# Patient Record
Sex: MALE | Race: WHITE | Employment: OTHER | URBAN - METROPOLITAN AREA
[De-identification: names, ages, dates, MRNs, and addresses within clinical notes are randomized per-mention and may not be internally consistent; named-entity substitution may affect disease eponyms.]

---

## 2017-02-14 ENCOUNTER — HOSPITAL ENCOUNTER (OUTPATIENT)
Dept: NON INVASIVE DIAGNOSTICS | Facility: CLINIC | Age: 77
Discharge: HOME | End: 2017-02-14
Payer: MEDICARE

## 2017-02-14 DIAGNOSIS — I71.4 ABDOMINAL AORTIC ANEURYSM WITHOUT RUPTURE (HCC): ICD-10-CM

## 2017-02-14 PROCEDURE — 93978 VASCULAR STUDY: CPT

## 2017-02-15 ENCOUNTER — LAB (OUTPATIENT)
Dept: LAB | Facility: CLINIC | Age: 77
End: 2017-02-15
Payer: MEDICARE

## 2017-02-15 DIAGNOSIS — Z00.00 ENCOUNTER FOR GENERAL ADULT MEDICAL EXAMINATION WITHOUT ABNORMAL FINDINGS: ICD-10-CM

## 2017-02-15 DIAGNOSIS — I10 ESSENTIAL (PRIMARY) HYPERTENSION: ICD-10-CM

## 2017-02-15 DIAGNOSIS — E79.0 HYPERURICEMIA WITHOUT SIGNS OF INFLAMMATORY ARTHRITIS AND TOPHACEOUS DISEASE: ICD-10-CM

## 2017-02-15 DIAGNOSIS — E78.5 HYPERLIPIDEMIA: ICD-10-CM

## 2017-02-15 DIAGNOSIS — E03.9 HYPOTHYROIDISM: ICD-10-CM

## 2017-02-15 DIAGNOSIS — I71.4 ABDOMINAL AORTIC ANEURYSM WITHOUT RUPTURE (HCC): ICD-10-CM

## 2017-02-15 LAB
ALBUMIN SERPL BCP-MCNC: 4 G/DL (ref 3.5–5)
ALP SERPL-CCNC: 71 U/L (ref 46–116)
ALT SERPL W P-5'-P-CCNC: 29 U/L (ref 12–78)
ANION GAP SERPL CALCULATED.3IONS-SCNC: 5 MMOL/L (ref 4–13)
AST SERPL W P-5'-P-CCNC: 19 U/L (ref 5–45)
BASOPHILS # BLD AUTO: 0.02 THOUSANDS/ΜL (ref 0–0.1)
BASOPHILS NFR BLD AUTO: 0 % (ref 0–1)
BILIRUB SERPL-MCNC: 0.89 MG/DL (ref 0.2–1)
BUN SERPL-MCNC: 17 MG/DL (ref 5–25)
CALCIUM SERPL-MCNC: 9 MG/DL (ref 8.3–10.1)
CHLORIDE SERPL-SCNC: 105 MMOL/L (ref 100–108)
CHOLEST SERPL-MCNC: 148 MG/DL (ref 50–200)
CO2 SERPL-SCNC: 32 MMOL/L (ref 21–32)
CREAT SERPL-MCNC: 1.1 MG/DL (ref 0.6–1.3)
EOSINOPHIL # BLD AUTO: 0.25 THOUSAND/ΜL (ref 0–0.61)
EOSINOPHIL NFR BLD AUTO: 4 % (ref 0–6)
ERYTHROCYTE [DISTWIDTH] IN BLOOD BY AUTOMATED COUNT: 13.1 % (ref 11.6–15.1)
GFR SERPL CREATININE-BSD FRML MDRD: >60 ML/MIN/1.73SQ M
GLUCOSE SERPL-MCNC: 88 MG/DL (ref 65–140)
HCT VFR BLD AUTO: 48.8 % (ref 36.5–49.3)
HDLC SERPL-MCNC: 48 MG/DL (ref 40–60)
HGB BLD-MCNC: 16.2 G/DL (ref 12–17)
LDLC SERPL CALC-MCNC: 70 MG/DL (ref 0–100)
LYMPHOCYTES # BLD AUTO: 1.13 THOUSANDS/ΜL (ref 0.6–4.47)
LYMPHOCYTES NFR BLD AUTO: 17 % (ref 14–44)
MCH RBC QN AUTO: 30.9 PG (ref 26.8–34.3)
MCHC RBC AUTO-ENTMCNC: 33.2 G/DL (ref 31.4–37.4)
MCV RBC AUTO: 93 FL (ref 82–98)
MONOCYTES # BLD AUTO: 0.5 THOUSAND/ΜL (ref 0.17–1.22)
MONOCYTES NFR BLD AUTO: 7 % (ref 4–12)
NEUTROPHILS # BLD AUTO: 4.81 THOUSANDS/ΜL (ref 1.85–7.62)
NEUTS SEG NFR BLD AUTO: 72 % (ref 43–75)
NRBC BLD AUTO-RTO: 0 /100 WBCS
PLATELET # BLD AUTO: 171 THOUSANDS/UL (ref 149–390)
PMV BLD AUTO: 11.5 FL (ref 8.9–12.7)
POTASSIUM SERPL-SCNC: 4.3 MMOL/L (ref 3.5–5.3)
PROT SERPL-MCNC: 7.3 G/DL (ref 6.4–8.2)
RBC # BLD AUTO: 5.24 MILLION/UL (ref 3.88–5.62)
SODIUM SERPL-SCNC: 142 MMOL/L (ref 136–145)
TRIGL SERPL-MCNC: 150 MG/DL
TSH SERPL DL<=0.05 MIU/L-ACNC: 2.15 UIU/ML (ref 0.36–3.74)
URATE SERPL-MCNC: 8.5 MG/DL (ref 4.2–8)
WBC # BLD AUTO: 6.73 THOUSAND/UL (ref 4.31–10.16)

## 2017-02-15 PROCEDURE — 84550 ASSAY OF BLOOD/URIC ACID: CPT

## 2017-02-15 PROCEDURE — 85025 COMPLETE CBC W/AUTO DIFF WBC: CPT

## 2017-02-15 PROCEDURE — 84443 ASSAY THYROID STIM HORMONE: CPT

## 2017-02-15 PROCEDURE — 36415 COLL VENOUS BLD VENIPUNCTURE: CPT

## 2017-02-15 PROCEDURE — 80053 COMPREHEN METABOLIC PANEL: CPT

## 2017-02-15 PROCEDURE — 86376 MICROSOMAL ANTIBODY EACH: CPT

## 2017-02-15 PROCEDURE — 80061 LIPID PANEL: CPT

## 2017-02-16 LAB — THYROPEROXIDASE AB SERPL-ACNC: 9 IU/ML (ref 0–34)

## 2017-02-21 ENCOUNTER — APPOINTMENT (OUTPATIENT)
Dept: MRI IMAGING | Facility: HOSPITAL | Age: 77
DRG: 100 | End: 2017-02-21
Payer: MEDICARE

## 2017-02-21 ENCOUNTER — APPOINTMENT (INPATIENT)
Dept: NEUROLOGY | Facility: HOSPITAL | Age: 77
DRG: 100 | End: 2017-02-21
Attending: PSYCHIATRY & NEUROLOGY
Payer: MEDICARE

## 2017-02-21 ENCOUNTER — HOSPITAL ENCOUNTER (INPATIENT)
Facility: HOSPITAL | Age: 77
LOS: 3 days | Discharge: HOME HEALTH | DRG: 100 | End: 2017-02-24
Attending: EMERGENCY MEDICINE | Admitting: FAMILY MEDICINE
Payer: MEDICARE

## 2017-02-21 ENCOUNTER — APPOINTMENT (EMERGENCY)
Dept: RADIOLOGY | Facility: HOSPITAL | Age: 77
DRG: 100 | End: 2017-02-21
Payer: MEDICARE

## 2017-02-21 ENCOUNTER — APPOINTMENT (EMERGENCY)
Dept: CT IMAGING | Facility: HOSPITAL | Age: 77
DRG: 100 | End: 2017-02-21
Payer: MEDICARE

## 2017-02-21 ENCOUNTER — APPOINTMENT (INPATIENT)
Dept: CT IMAGING | Facility: HOSPITAL | Age: 77
DRG: 100 | End: 2017-02-21
Payer: MEDICARE

## 2017-02-21 ENCOUNTER — GENERIC CONVERSION - ENCOUNTER (OUTPATIENT)
Dept: OTHER | Facility: OTHER | Age: 77
End: 2017-02-21

## 2017-02-21 DIAGNOSIS — R77.8 ELEVATED TROPONIN: ICD-10-CM

## 2017-02-21 DIAGNOSIS — R56.9 NEW ONSET SEIZURE (HCC): Primary | ICD-10-CM

## 2017-02-21 DIAGNOSIS — N17.9 ACUTE KIDNEY INJURY (HCC): ICD-10-CM

## 2017-02-21 DIAGNOSIS — M25.519 SHOULDER PAIN, ACUTE: ICD-10-CM

## 2017-02-21 DIAGNOSIS — R79.89 ELEVATED LACTIC ACID LEVEL: ICD-10-CM

## 2017-02-21 LAB
ALBUMIN SERPL BCP-MCNC: 4.1 G/DL (ref 3.5–5)
ALP SERPL-CCNC: 73 U/L (ref 46–116)
ALT SERPL W P-5'-P-CCNC: 38 U/L (ref 12–78)
AMMONIA PLAS-SCNC: 38 UMOL/L (ref 11–35)
AMPHETAMINES SERPL QL SCN: NEGATIVE
ANION GAP BLD CALC-SCNC: 17 MMOL/L (ref 4–13)
ANION GAP SERPL CALCULATED.3IONS-SCNC: 13 MMOL/L (ref 4–13)
APAP SERPL-MCNC: <2 UG/ML (ref 10–30)
APTT PPP: 21 SECONDS (ref 24–36)
AST SERPL W P-5'-P-CCNC: 29 U/L (ref 5–45)
ATRIAL RATE: 115 BPM
ATRIAL RATE: 82 BPM
BACTERIA UR QL AUTO: ABNORMAL /HPF
BARBITURATES UR QL: NEGATIVE
BASOPHILS # BLD AUTO: 0.01 THOUSANDS/ΜL (ref 0–0.1)
BASOPHILS NFR BLD AUTO: 0 % (ref 0–1)
BENZODIAZ UR QL: NEGATIVE
BILIRUB SERPL-MCNC: 0.6 MG/DL (ref 0.2–1)
BILIRUB UR QL STRIP: NEGATIVE
BUN BLD-MCNC: 22 MG/DL (ref 5–25)
BUN SERPL-MCNC: 18 MG/DL (ref 5–25)
CA-I BLD-SCNC: 1.11 MMOL/L (ref 1.12–1.32)
CALCIUM SERPL-MCNC: 8.9 MG/DL (ref 8.3–10.1)
CHLORIDE BLD-SCNC: 104 MMOL/L (ref 100–108)
CHLORIDE SERPL-SCNC: 105 MMOL/L (ref 100–108)
CK MB SERPL-MCNC: 5 NG/ML (ref 0–5)
CK MB SERPL-MCNC: 5.8 NG/ML (ref 0–5)
CK MB SERPL-MCNC: <1 % (ref 0–2.5)
CK MB SERPL-MCNC: <1 % (ref 0–2.5)
CK SERPL-CCNC: 160 U/L (ref 39–308)
CK SERPL-CCNC: 527 U/L (ref 39–308)
CK SERPL-CCNC: 719 U/L (ref 39–308)
CLARITY UR: CLEAR
CO2 SERPL-SCNC: 23 MMOL/L (ref 21–32)
COCAINE UR QL: NEGATIVE
COLOR UR: YELLOW
CREAT BLD-MCNC: 1.2 MG/DL (ref 0.6–1.3)
CREAT SERPL-MCNC: 1.44 MG/DL (ref 0.6–1.3)
EOSINOPHIL # BLD AUTO: 0.2 THOUSAND/ΜL (ref 0–0.61)
EOSINOPHIL NFR BLD AUTO: 3 % (ref 0–6)
ERYTHROCYTE [DISTWIDTH] IN BLOOD BY AUTOMATED COUNT: 13.1 % (ref 11.6–15.1)
ETHANOL SERPL-MCNC: <3 MG/DL (ref 0–3)
GFR SERPL CREATININE-BSD FRML MDRD: 48 ML/MIN/1.73SQ M
GFR SERPL CREATININE-BSD FRML MDRD: 59.2 ML/MIN/1.73SQ M
GLUCOSE SERPL-MCNC: 145 MG/DL (ref 65–140)
GLUCOSE SERPL-MCNC: 149 MG/DL (ref 65–140)
GLUCOSE SERPL-MCNC: 153 MG/DL (ref 65–140)
GLUCOSE UR STRIP-MCNC: NEGATIVE MG/DL
HCT VFR BLD AUTO: 49.1 % (ref 36.5–49.3)
HCT VFR BLD CALC: 51 % (ref 36.5–49.3)
HGB BLD-MCNC: 16.3 G/DL (ref 12–17)
HGB BLDA-MCNC: 17.3 G/DL (ref 12–17)
HGB UR QL STRIP.AUTO: ABNORMAL
INR PPP: 1.1 (ref 0.86–1.16)
KETONES UR STRIP-MCNC: NEGATIVE MG/DL
LACTATE SERPL-SCNC: 1.5 MMOL/L (ref 0.5–2)
LACTATE SERPL-SCNC: 1.6 MMOL/L (ref 0.5–2)
LACTATE SERPL-SCNC: 4 MMOL/L (ref 0.5–2)
LACTATE SERPL-SCNC: 6.6 MMOL/L (ref 0.5–2)
LEUKOCYTE ESTERASE UR QL STRIP: NEGATIVE
LYMPHOCYTES # BLD AUTO: 1.43 THOUSANDS/ΜL (ref 0.6–4.47)
LYMPHOCYTES NFR BLD AUTO: 18 % (ref 14–44)
MCH RBC QN AUTO: 30.4 PG (ref 26.8–34.3)
MCHC RBC AUTO-ENTMCNC: 33.2 G/DL (ref 31.4–37.4)
MCV RBC AUTO: 92 FL (ref 82–98)
METHADONE UR QL: NEGATIVE
MONOCYTES # BLD AUTO: 0.26 THOUSAND/ΜL (ref 0.17–1.22)
MONOCYTES NFR BLD AUTO: 3 % (ref 4–12)
NEUTROPHILS # BLD AUTO: 6.21 THOUSANDS/ΜL (ref 1.85–7.62)
NEUTS SEG NFR BLD AUTO: 76 % (ref 43–75)
NITRITE UR QL STRIP: NEGATIVE
NON-SQ EPI CELLS URNS QL MICRO: ABNORMAL /HPF
OPIATES UR QL SCN: NEGATIVE
OTHER STN SPEC: ABNORMAL
P AXIS: -2 DEGREES
P AXIS: 37 DEGREES
PCO2 BLD: 24 MMOL/L (ref 21–32)
PCP UR QL: NEGATIVE
PH UR STRIP.AUTO: 5 [PH] (ref 4.5–8)
PLATELET # BLD AUTO: 191 THOUSANDS/UL (ref 149–390)
PMV BLD AUTO: 11.4 FL (ref 8.9–12.7)
POTASSIUM BLD-SCNC: 4.7 MMOL/L (ref 3.5–5.3)
POTASSIUM SERPL-SCNC: 5 MMOL/L (ref 3.5–5.3)
PR INTERVAL: 142 MS
PROT SERPL-MCNC: 6.9 G/DL (ref 6.4–8.2)
PROT UR STRIP-MCNC: ABNORMAL MG/DL
PROTHROMBIN TIME: 14 SECONDS (ref 12–14.3)
QRS AXIS: 69 DEGREES
QRS AXIS: 70 DEGREES
QRSD INTERVAL: 72 MS
QRSD INTERVAL: 78 MS
QT INTERVAL: 334 MS
QT INTERVAL: 382 MS
QTC INTERVAL: 424 MS
QTC INTERVAL: 446 MS
RBC # BLD AUTO: 5.36 MILLION/UL (ref 3.88–5.62)
RBC #/AREA URNS AUTO: ABNORMAL /HPF
SALICYLATES SERPL-MCNC: 5.1 MG/DL (ref 3–20)
SODIUM BLD-SCNC: 139 MMOL/L (ref 136–145)
SODIUM SERPL-SCNC: 141 MMOL/L (ref 136–145)
SP GR UR STRIP.AUTO: >=1.03 (ref 1–1.03)
SPECIMEN SOURCE: ABNORMAL
T WAVE AXIS: 71 DEGREES
T WAVE AXIS: 79 DEGREES
T4 FREE SERPL-MCNC: 0.97 NG/DL (ref 0.76–1.46)
THC UR QL: NEGATIVE
TROPONIN I SERPL-MCNC: 0.23 NG/ML
TROPONIN I SERPL-MCNC: 0.24 NG/ML
TROPONIN I SERPL-MCNC: 0.28 NG/ML
TROPONIN I SERPL-MCNC: 0.29 NG/ML
TROPONIN I SERPL-MCNC: 0.33 NG/ML
TSH SERPL DL<=0.05 MIU/L-ACNC: 6.16 UIU/ML (ref 0.36–3.74)
UROBILINOGEN UR QL STRIP.AUTO: 0.2 E.U./DL
VENTRICULAR RATE: 82 BPM
VENTRICULAR RATE: 97 BPM
WBC # BLD AUTO: 8.11 THOUSAND/UL (ref 4.31–10.16)
WBC #/AREA URNS AUTO: ABNORMAL /HPF

## 2017-02-21 PROCEDURE — 81001 URINALYSIS AUTO W/SCOPE: CPT | Performed by: EMERGENCY MEDICINE

## 2017-02-21 PROCEDURE — 87040 BLOOD CULTURE FOR BACTERIA: CPT | Performed by: EMERGENCY MEDICINE

## 2017-02-21 PROCEDURE — 85025 COMPLETE CBC W/AUTO DIFF WBC: CPT | Performed by: EMERGENCY MEDICINE

## 2017-02-21 PROCEDURE — 80047 BASIC METABLC PNL IONIZED CA: CPT

## 2017-02-21 PROCEDURE — 82553 CREATINE MB FRACTION: CPT | Performed by: PHYSICIAN ASSISTANT

## 2017-02-21 PROCEDURE — 96375 TX/PRO/DX INJ NEW DRUG ADDON: CPT

## 2017-02-21 PROCEDURE — 87086 URINE CULTURE/COLONY COUNT: CPT | Performed by: EMERGENCY MEDICINE

## 2017-02-21 PROCEDURE — 70496 CT ANGIOGRAPHY HEAD: CPT

## 2017-02-21 PROCEDURE — 83605 ASSAY OF LACTIC ACID: CPT | Performed by: PHYSICIAN ASSISTANT

## 2017-02-21 PROCEDURE — 70553 MRI BRAIN STEM W/O & W/DYE: CPT

## 2017-02-21 PROCEDURE — 96365 THER/PROPH/DIAG IV INF INIT: CPT

## 2017-02-21 PROCEDURE — 80320 DRUG SCREEN QUANTALCOHOLS: CPT | Performed by: EMERGENCY MEDICINE

## 2017-02-21 PROCEDURE — 71020 HB CHEST X-RAY 2VW FRONTAL&LATL: CPT

## 2017-02-21 PROCEDURE — 83605 ASSAY OF LACTIC ACID: CPT | Performed by: EMERGENCY MEDICINE

## 2017-02-21 PROCEDURE — 80329 ANALGESICS NON-OPIOID 1 OR 2: CPT | Performed by: EMERGENCY MEDICINE

## 2017-02-21 PROCEDURE — 73030 X-RAY EXAM OF SHOULDER: CPT

## 2017-02-21 PROCEDURE — 84443 ASSAY THYROID STIM HORMONE: CPT | Performed by: EMERGENCY MEDICINE

## 2017-02-21 PROCEDURE — 70498 CT ANGIOGRAPHY NECK: CPT

## 2017-02-21 PROCEDURE — 85014 HEMATOCRIT: CPT

## 2017-02-21 PROCEDURE — 99285 EMERGENCY DEPT VISIT HI MDM: CPT

## 2017-02-21 PROCEDURE — 84484 ASSAY OF TROPONIN QUANT: CPT | Performed by: FAMILY MEDICINE

## 2017-02-21 PROCEDURE — 80307 DRUG TEST PRSMV CHEM ANLYZR: CPT | Performed by: EMERGENCY MEDICINE

## 2017-02-21 PROCEDURE — 93005 ELECTROCARDIOGRAM TRACING: CPT | Performed by: PHYSICIAN ASSISTANT

## 2017-02-21 PROCEDURE — 85610 PROTHROMBIN TIME: CPT | Performed by: EMERGENCY MEDICINE

## 2017-02-21 PROCEDURE — 82140 ASSAY OF AMMONIA: CPT | Performed by: EMERGENCY MEDICINE

## 2017-02-21 PROCEDURE — 80053 COMPREHEN METABOLIC PANEL: CPT | Performed by: EMERGENCY MEDICINE

## 2017-02-21 PROCEDURE — 86592 SYPHILIS TEST NON-TREP QUAL: CPT | Performed by: PSYCHIATRY & NEUROLOGY

## 2017-02-21 PROCEDURE — 82948 REAGENT STRIP/BLOOD GLUCOSE: CPT

## 2017-02-21 PROCEDURE — 70450 CT HEAD/BRAIN W/O DYE: CPT

## 2017-02-21 PROCEDURE — 93005 ELECTROCARDIOGRAM TRACING: CPT | Performed by: EMERGENCY MEDICINE

## 2017-02-21 PROCEDURE — 95816 EEG AWAKE AND DROWSY: CPT

## 2017-02-21 PROCEDURE — 84484 ASSAY OF TROPONIN QUANT: CPT | Performed by: PHYSICIAN ASSISTANT

## 2017-02-21 PROCEDURE — 36415 COLL VENOUS BLD VENIPUNCTURE: CPT | Performed by: EMERGENCY MEDICINE

## 2017-02-21 PROCEDURE — 84439 ASSAY OF FREE THYROXINE: CPT | Performed by: PHYSICIAN ASSISTANT

## 2017-02-21 PROCEDURE — A9585 GADOBUTROL INJECTION: HCPCS | Performed by: FAMILY MEDICINE

## 2017-02-21 PROCEDURE — 82550 ASSAY OF CK (CPK): CPT | Performed by: PHYSICIAN ASSISTANT

## 2017-02-21 PROCEDURE — 85730 THROMBOPLASTIN TIME PARTIAL: CPT | Performed by: EMERGENCY MEDICINE

## 2017-02-21 PROCEDURE — 84484 ASSAY OF TROPONIN QUANT: CPT | Performed by: EMERGENCY MEDICINE

## 2017-02-21 PROCEDURE — 82550 ASSAY OF CK (CPK): CPT | Performed by: EMERGENCY MEDICINE

## 2017-02-21 RX ORDER — HEPARIN SODIUM 5000 [USP'U]/ML
INJECTION, SOLUTION INTRAVENOUS; SUBCUTANEOUS
Status: COMPLETED
Start: 2017-02-21 | End: 2017-02-21

## 2017-02-21 RX ORDER — ASPIRIN 325 MG
325 TABLET ORAL DAILY
Status: DISCONTINUED | OUTPATIENT
Start: 2017-02-21 | End: 2017-02-24 | Stop reason: HOSPADM

## 2017-02-21 RX ORDER — ALLOPURINOL 100 MG/1
300 TABLET ORAL DAILY
Status: DISCONTINUED | OUTPATIENT
Start: 2017-02-21 | End: 2017-02-24 | Stop reason: HOSPADM

## 2017-02-21 RX ORDER — SIMVASTATIN 40 MG
40 TABLET ORAL DAILY
COMMUNITY
End: 2018-02-02 | Stop reason: SDUPTHER

## 2017-02-21 RX ORDER — THIAMINE HYDROCHLORIDE 100 MG/ML
INJECTION, SOLUTION INTRAMUSCULAR; INTRAVENOUS
Status: COMPLETED
Start: 2017-02-21 | End: 2017-02-21

## 2017-02-21 RX ORDER — LORAZEPAM 2 MG/ML
1 INJECTION INTRAMUSCULAR ONCE
Status: COMPLETED | OUTPATIENT
Start: 2017-02-21 | End: 2017-02-21

## 2017-02-21 RX ORDER — PRAVASTATIN SODIUM 80 MG/1
80 TABLET ORAL
Status: DISCONTINUED | OUTPATIENT
Start: 2017-02-21 | End: 2017-02-24 | Stop reason: HOSPADM

## 2017-02-21 RX ORDER — ACETAMINOPHEN 325 MG/1
975 TABLET ORAL ONCE
Status: COMPLETED | OUTPATIENT
Start: 2017-02-21 | End: 2017-02-21

## 2017-02-21 RX ORDER — SODIUM CHLORIDE 9 MG/ML
100 INJECTION, SOLUTION INTRAVENOUS CONTINUOUS
Status: DISCONTINUED | OUTPATIENT
Start: 2017-02-21 | End: 2017-02-24 | Stop reason: HOSPADM

## 2017-02-21 RX ORDER — LORAZEPAM 2 MG/ML
1 INJECTION INTRAMUSCULAR EVERY 4 HOURS PRN
Status: DISCONTINUED | OUTPATIENT
Start: 2017-02-21 | End: 2017-02-24 | Stop reason: HOSPADM

## 2017-02-21 RX ORDER — SODIUM CHLORIDE 9 MG/ML
125 INJECTION, SOLUTION INTRAVENOUS CONTINUOUS
Status: DISCONTINUED | OUTPATIENT
Start: 2017-02-21 | End: 2017-02-21

## 2017-02-21 RX ORDER — LEVETIRACETAM 500 MG/1
TABLET ORAL
Status: COMPLETED
Start: 2017-02-21 | End: 2017-02-21

## 2017-02-21 RX ORDER — LORAZEPAM 2 MG/ML
INJECTION INTRAMUSCULAR
Status: COMPLETED
Start: 2017-02-21 | End: 2017-02-21

## 2017-02-21 RX ORDER — ONDANSETRON 2 MG/ML
4 INJECTION INTRAMUSCULAR; INTRAVENOUS ONCE
Status: COMPLETED | OUTPATIENT
Start: 2017-02-21 | End: 2017-02-21

## 2017-02-21 RX ORDER — HEPARIN SODIUM 5000 [USP'U]/ML
5000 INJECTION, SOLUTION INTRAVENOUS; SUBCUTANEOUS EVERY 8 HOURS SCHEDULED
Status: DISCONTINUED | OUTPATIENT
Start: 2017-02-21 | End: 2017-02-24 | Stop reason: HOSPADM

## 2017-02-21 RX ORDER — ALLOPURINOL 300 MG/1
300 TABLET ORAL DAILY
COMMUNITY
End: 2017-06-06

## 2017-02-21 RX ORDER — LEVETIRACETAM 500 MG/1
1000 TABLET ORAL EVERY 12 HOURS
Status: DISCONTINUED | OUTPATIENT
Start: 2017-02-21 | End: 2017-02-24 | Stop reason: HOSPADM

## 2017-02-21 RX ORDER — THIAMINE HYDROCHLORIDE 100 MG/ML
100 INJECTION, SOLUTION INTRAMUSCULAR; INTRAVENOUS DAILY
Status: DISCONTINUED | OUTPATIENT
Start: 2017-02-21 | End: 2017-02-22

## 2017-02-21 RX ORDER — ACETAMINOPHEN 325 MG/1
650 TABLET ORAL EVERY 6 HOURS PRN
Status: DISCONTINUED | OUTPATIENT
Start: 2017-02-21 | End: 2017-02-24 | Stop reason: HOSPADM

## 2017-02-21 RX ORDER — LORAZEPAM 2 MG/ML
INJECTION INTRAMUSCULAR
Status: DISCONTINUED
Start: 2017-02-21 | End: 2017-02-22 | Stop reason: WASHOUT

## 2017-02-21 RX ORDER — ONDANSETRON 2 MG/ML
4 INJECTION INTRAMUSCULAR; INTRAVENOUS EVERY 6 HOURS PRN
Status: DISCONTINUED | OUTPATIENT
Start: 2017-02-21 | End: 2017-02-24 | Stop reason: HOSPADM

## 2017-02-21 RX ADMIN — IOHEXOL 85 ML: 350 INJECTION, SOLUTION INTRAVENOUS at 12:50

## 2017-02-21 RX ADMIN — LEVETIRACETAM 1000 MG: 500 TABLET ORAL at 20:03

## 2017-02-21 RX ADMIN — LEVETIRACETAM 500 MG: 100 INJECTION, SOLUTION INTRAVENOUS at 06:26

## 2017-02-21 RX ADMIN — HEPARIN SODIUM 5000 UNITS: 5000 INJECTION, SOLUTION INTRAVENOUS; SUBCUTANEOUS at 21:50

## 2017-02-21 RX ADMIN — SODIUM CHLORIDE 125 ML/HR: 0.9 INJECTION, SOLUTION INTRAVENOUS at 09:00

## 2017-02-21 RX ADMIN — SODIUM CHLORIDE 1000 ML: 0.9 INJECTION, SOLUTION INTRAVENOUS at 06:25

## 2017-02-21 RX ADMIN — THIAMINE HYDROCHLORIDE 100 MG: 100 INJECTION, SOLUTION INTRAMUSCULAR; INTRAVENOUS at 05:39

## 2017-02-21 RX ADMIN — ACETAMINOPHEN 650 MG: 325 TABLET ORAL at 23:38

## 2017-02-21 RX ADMIN — ACETAMINOPHEN 975 MG: 325 TABLET ORAL at 05:39

## 2017-02-21 RX ADMIN — HEPARIN SODIUM 5000 UNITS: 5000 INJECTION, SOLUTION INTRAVENOUS; SUBCUTANEOUS at 14:53

## 2017-02-21 RX ADMIN — ACETAMINOPHEN 650 MG: 325 TABLET ORAL at 16:54

## 2017-02-21 RX ADMIN — HYDROMORPHONE HYDROCHLORIDE 0.5 MG: 1 INJECTION, SOLUTION INTRAMUSCULAR; INTRAVENOUS; SUBCUTANEOUS at 08:29

## 2017-02-21 RX ADMIN — LEVETIRACETAM 1000 MG: 500 TABLET ORAL at 20:05

## 2017-02-21 RX ADMIN — LORAZEPAM 1 MG: 2 INJECTION INTRAMUSCULAR; INTRAVENOUS at 06:44

## 2017-02-21 RX ADMIN — ONDANSETRON 4 MG: 2 INJECTION INTRAMUSCULAR; INTRAVENOUS at 08:29

## 2017-02-21 RX ADMIN — PRAVASTATIN SODIUM 80 MG: 80 TABLET ORAL at 16:55

## 2017-02-21 RX ADMIN — LEVETIRACETAM 1500 MG: 100 INJECTION, SOLUTION INTRAVENOUS at 12:10

## 2017-02-21 RX ADMIN — LORAZEPAM 1 MG: 2 INJECTION INTRAMUSCULAR at 06:44

## 2017-02-21 RX ADMIN — ASPIRIN 325 MG: 325 TABLET ORAL at 12:10

## 2017-02-21 RX ADMIN — SODIUM CHLORIDE 100 ML/HR: 0.9 INJECTION, SOLUTION INTRAVENOUS at 18:39

## 2017-02-21 RX ADMIN — GADOBUTROL 10 ML: 604.72 INJECTION INTRAVENOUS at 23:41

## 2017-02-22 ENCOUNTER — APPOINTMENT (INPATIENT)
Dept: NON INVASIVE DIAGNOSTICS | Facility: HOSPITAL | Age: 77
DRG: 100 | End: 2017-02-22
Payer: MEDICARE

## 2017-02-22 PROBLEM — E87.20 LACTIC ACIDOSIS: Status: ACTIVE | Noted: 2017-02-21

## 2017-02-22 PROBLEM — E87.2 LACTIC ACIDOSIS: Status: ACTIVE | Noted: 2017-02-21

## 2017-02-22 LAB
AMMONIA PLAS-SCNC: <10 UMOL/L (ref 11–35)
ANION GAP SERPL CALCULATED.3IONS-SCNC: 5 MMOL/L (ref 4–13)
BACTERIA UR CULT: NORMAL
BUN SERPL-MCNC: 12 MG/DL (ref 5–25)
CALCIUM SERPL-MCNC: 7.8 MG/DL (ref 8.3–10.1)
CHLORIDE SERPL-SCNC: 108 MMOL/L (ref 100–108)
CHOLEST SERPL-MCNC: 131 MG/DL (ref 50–200)
CK MB SERPL-MCNC: 4.8 NG/ML (ref 0–5)
CK MB SERPL-MCNC: <1 % (ref 0–2.5)
CK SERPL-CCNC: 705 U/L (ref 39–308)
CO2 SERPL-SCNC: 31 MMOL/L (ref 21–32)
CREAT SERPL-MCNC: 1.39 MG/DL (ref 0.6–1.3)
ERYTHROCYTE [DISTWIDTH] IN BLOOD BY AUTOMATED COUNT: 13.3 % (ref 11.6–15.1)
EST. AVERAGE GLUCOSE BLD GHB EST-MCNC: 111 MG/DL
GFR SERPL CREATININE-BSD FRML MDRD: 50 ML/MIN/1.73SQ M
GLUCOSE SERPL-MCNC: 106 MG/DL (ref 65–140)
HBA1C MFR BLD: 5.5 % (ref 4.2–6.3)
HCT VFR BLD AUTO: 43.3 % (ref 36.5–49.3)
HDLC SERPL-MCNC: 42 MG/DL (ref 40–60)
HGB BLD-MCNC: 13.9 G/DL (ref 12–17)
LDLC SERPL CALC-MCNC: 54 MG/DL (ref 0–100)
MAGNESIUM SERPL-MCNC: 2.4 MG/DL (ref 1.6–2.6)
MCH RBC QN AUTO: 30.2 PG (ref 26.8–34.3)
MCHC RBC AUTO-ENTMCNC: 32.1 G/DL (ref 31.4–37.4)
MCV RBC AUTO: 94 FL (ref 82–98)
PLATELET # BLD AUTO: 142 THOUSANDS/UL (ref 149–390)
PMV BLD AUTO: 11.3 FL (ref 8.9–12.7)
POTASSIUM SERPL-SCNC: 4.5 MMOL/L (ref 3.5–5.3)
RBC # BLD AUTO: 4.61 MILLION/UL (ref 3.88–5.62)
RPR SER QL: NORMAL
SODIUM SERPL-SCNC: 144 MMOL/L (ref 136–145)
TRIGL SERPL-MCNC: 175 MG/DL
WBC # BLD AUTO: 8.66 THOUSAND/UL (ref 4.31–10.16)

## 2017-02-22 PROCEDURE — 82140 ASSAY OF AMMONIA: CPT | Performed by: NURSE PRACTITIONER

## 2017-02-22 PROCEDURE — 80061 LIPID PANEL: CPT | Performed by: NURSE PRACTITIONER

## 2017-02-22 PROCEDURE — 82550 ASSAY OF CK (CPK): CPT | Performed by: NURSE PRACTITIONER

## 2017-02-22 PROCEDURE — 83036 HEMOGLOBIN GLYCOSYLATED A1C: CPT | Performed by: NURSE PRACTITIONER

## 2017-02-22 PROCEDURE — 82553 CREATINE MB FRACTION: CPT | Performed by: NURSE PRACTITIONER

## 2017-02-22 PROCEDURE — 83735 ASSAY OF MAGNESIUM: CPT | Performed by: PHYSICIAN ASSISTANT

## 2017-02-22 PROCEDURE — 80048 BASIC METABOLIC PNL TOTAL CA: CPT | Performed by: PHYSICIAN ASSISTANT

## 2017-02-22 PROCEDURE — 85027 COMPLETE CBC AUTOMATED: CPT | Performed by: PHYSICIAN ASSISTANT

## 2017-02-22 PROCEDURE — 93306 TTE W/DOPPLER COMPLETE: CPT

## 2017-02-22 RX ORDER — OXYCODONE HYDROCHLORIDE AND ACETAMINOPHEN 5; 325 MG/1; MG/1
1 TABLET ORAL EVERY 4 HOURS PRN
Status: DISCONTINUED | OUTPATIENT
Start: 2017-02-22 | End: 2017-02-24 | Stop reason: HOSPADM

## 2017-02-22 RX ADMIN — LEVETIRACETAM 1000 MG: 500 TABLET ORAL at 20:07

## 2017-02-22 RX ADMIN — OXYCODONE HYDROCHLORIDE AND ACETAMINOPHEN 1 TABLET: 5; 325 TABLET ORAL at 20:07

## 2017-02-22 RX ADMIN — SODIUM CHLORIDE 100 ML/HR: 0.9 INJECTION, SOLUTION INTRAVENOUS at 16:28

## 2017-02-22 RX ADMIN — OXYCODONE HYDROCHLORIDE AND ACETAMINOPHEN 1 TABLET: 5; 325 TABLET ORAL at 05:10

## 2017-02-22 RX ADMIN — THIAMINE HYDROCHLORIDE 100 MG: 100 INJECTION, SOLUTION INTRAMUSCULAR; INTRAVENOUS at 10:01

## 2017-02-22 RX ADMIN — HEPARIN SODIUM 5000 UNITS: 5000 INJECTION, SOLUTION INTRAVENOUS; SUBCUTANEOUS at 05:10

## 2017-02-22 RX ADMIN — PRAVASTATIN SODIUM 80 MG: 80 TABLET ORAL at 16:12

## 2017-02-22 RX ADMIN — SODIUM CHLORIDE 100 ML/HR: 0.9 INJECTION, SOLUTION INTRAVENOUS at 05:13

## 2017-02-22 RX ADMIN — HEPARIN SODIUM 5000 UNITS: 5000 INJECTION, SOLUTION INTRAVENOUS; SUBCUTANEOUS at 14:14

## 2017-02-22 RX ADMIN — HEPARIN SODIUM 5000 UNITS: 5000 INJECTION, SOLUTION INTRAVENOUS; SUBCUTANEOUS at 21:16

## 2017-02-22 RX ADMIN — LEVETIRACETAM 1000 MG: 500 TABLET ORAL at 09:15

## 2017-02-22 RX ADMIN — ALLOPURINOL 300 MG: 100 TABLET ORAL at 09:15

## 2017-02-22 RX ADMIN — ASPIRIN 325 MG: 325 TABLET ORAL at 09:15

## 2017-02-23 PROBLEM — R79.89 ELEVATED TROPONIN LEVEL: Status: RESOLVED | Noted: 2017-02-21 | Resolved: 2017-02-23

## 2017-02-23 PROBLEM — E87.20 LACTIC ACIDOSIS: Status: RESOLVED | Noted: 2017-02-21 | Resolved: 2017-02-23

## 2017-02-23 PROBLEM — E87.2 LACTIC ACIDOSIS: Status: RESOLVED | Noted: 2017-02-21 | Resolved: 2017-02-23

## 2017-02-23 PROBLEM — R77.8 ELEVATED TROPONIN LEVEL: Status: RESOLVED | Noted: 2017-02-21 | Resolved: 2017-02-23

## 2017-02-23 LAB
ANION GAP SERPL CALCULATED.3IONS-SCNC: 4 MMOL/L (ref 4–13)
BUN SERPL-MCNC: 11 MG/DL (ref 5–25)
CALCIUM SERPL-MCNC: 7.6 MG/DL (ref 8.3–10.1)
CHLORIDE SERPL-SCNC: 110 MMOL/L (ref 100–108)
CK MB SERPL-MCNC: 2.7 NG/ML (ref 0–5)
CK MB SERPL-MCNC: <1 % (ref 0–2.5)
CK SERPL-CCNC: 550 U/L (ref 39–308)
CO2 SERPL-SCNC: 30 MMOL/L (ref 21–32)
CREAT SERPL-MCNC: 1.22 MG/DL (ref 0.6–1.3)
ERYTHROCYTE [DISTWIDTH] IN BLOOD BY AUTOMATED COUNT: 13.3 % (ref 11.6–15.1)
GFR SERPL CREATININE-BSD FRML MDRD: 58.1 ML/MIN/1.73SQ M
GLUCOSE SERPL-MCNC: 101 MG/DL (ref 65–140)
HCT VFR BLD AUTO: 41.6 % (ref 36.5–49.3)
HGB BLD-MCNC: 13.4 G/DL (ref 12–17)
MCH RBC QN AUTO: 30.3 PG (ref 26.8–34.3)
MCHC RBC AUTO-ENTMCNC: 32.2 G/DL (ref 31.4–37.4)
MCV RBC AUTO: 94 FL (ref 82–98)
PLATELET # BLD AUTO: 133 THOUSANDS/UL (ref 149–390)
PMV BLD AUTO: 11 FL (ref 8.9–12.7)
POTASSIUM SERPL-SCNC: 4.6 MMOL/L (ref 3.5–5.3)
RBC # BLD AUTO: 4.42 MILLION/UL (ref 3.88–5.62)
SODIUM SERPL-SCNC: 144 MMOL/L (ref 136–145)
WBC # BLD AUTO: 7.39 THOUSAND/UL (ref 4.31–10.16)

## 2017-02-23 PROCEDURE — G8979 MOBILITY GOAL STATUS: HCPCS

## 2017-02-23 PROCEDURE — 82553 CREATINE MB FRACTION: CPT | Performed by: NURSE PRACTITIONER

## 2017-02-23 PROCEDURE — G8988 SELF CARE GOAL STATUS: HCPCS

## 2017-02-23 PROCEDURE — 97166 OT EVAL MOD COMPLEX 45 MIN: CPT

## 2017-02-23 PROCEDURE — 80048 BASIC METABOLIC PNL TOTAL CA: CPT | Performed by: NURSE PRACTITIONER

## 2017-02-23 PROCEDURE — 85027 COMPLETE CBC AUTOMATED: CPT | Performed by: NURSE PRACTITIONER

## 2017-02-23 PROCEDURE — 82550 ASSAY OF CK (CPK): CPT | Performed by: NURSE PRACTITIONER

## 2017-02-23 PROCEDURE — G8978 MOBILITY CURRENT STATUS: HCPCS

## 2017-02-23 PROCEDURE — G8987 SELF CARE CURRENT STATUS: HCPCS

## 2017-02-23 PROCEDURE — 97163 PT EVAL HIGH COMPLEX 45 MIN: CPT

## 2017-02-23 PROCEDURE — 97116 GAIT TRAINING THERAPY: CPT

## 2017-02-23 RX ORDER — OXYCODONE HYDROCHLORIDE AND ACETAMINOPHEN 5; 325 MG/1; MG/1
1 TABLET ORAL EVERY 6 HOURS PRN
Qty: 15 TABLET | Refills: 0 | Status: SHIPPED | OUTPATIENT
Start: 2017-02-23 | End: 2017-02-24

## 2017-02-23 RX ORDER — LEVETIRACETAM 1000 MG/1
1000 TABLET ORAL EVERY 12 HOURS
Qty: 60 TABLET | Refills: 0 | Status: SHIPPED | OUTPATIENT
Start: 2017-02-23 | End: 2017-02-24

## 2017-02-23 RX ORDER — ASPIRIN 325 MG
325 TABLET ORAL DAILY
Qty: 30 TABLET | Refills: 0 | Status: SHIPPED | OUTPATIENT
Start: 2017-02-23 | End: 2017-02-24

## 2017-02-23 RX ADMIN — ALLOPURINOL 300 MG: 100 TABLET ORAL at 08:21

## 2017-02-23 RX ADMIN — OXYCODONE HYDROCHLORIDE AND ACETAMINOPHEN 1 TABLET: 5; 325 TABLET ORAL at 08:23

## 2017-02-23 RX ADMIN — SODIUM CHLORIDE 100 ML/HR: 0.9 INJECTION, SOLUTION INTRAVENOUS at 00:37

## 2017-02-23 RX ADMIN — OXYCODONE HYDROCHLORIDE AND ACETAMINOPHEN 1 TABLET: 5; 325 TABLET ORAL at 04:22

## 2017-02-23 RX ADMIN — LEVETIRACETAM 1000 MG: 500 TABLET ORAL at 08:22

## 2017-02-23 RX ADMIN — HEPARIN SODIUM 5000 UNITS: 5000 INJECTION, SOLUTION INTRAVENOUS; SUBCUTANEOUS at 05:06

## 2017-02-23 RX ADMIN — ACETAMINOPHEN 650 MG: 325 TABLET ORAL at 14:02

## 2017-02-23 RX ADMIN — THIAMINE HYDROCHLORIDE 100 MG: 100 INJECTION, SOLUTION INTRAMUSCULAR; INTRAVENOUS at 11:00

## 2017-02-23 RX ADMIN — HEPARIN SODIUM 5000 UNITS: 5000 INJECTION, SOLUTION INTRAVENOUS; SUBCUTANEOUS at 21:26

## 2017-02-23 RX ADMIN — PRAVASTATIN SODIUM 80 MG: 80 TABLET ORAL at 17:04

## 2017-02-23 RX ADMIN — LEVETIRACETAM 1000 MG: 500 TABLET ORAL at 21:26

## 2017-02-23 RX ADMIN — HEPARIN SODIUM 5000 UNITS: 5000 INJECTION, SOLUTION INTRAVENOUS; SUBCUTANEOUS at 14:01

## 2017-02-23 RX ADMIN — ASPIRIN 325 MG: 325 TABLET ORAL at 08:22

## 2017-02-23 RX ADMIN — OXYCODONE HYDROCHLORIDE AND ACETAMINOPHEN 1 TABLET: 5; 325 TABLET ORAL at 21:26

## 2017-02-24 VITALS
DIASTOLIC BLOOD PRESSURE: 60 MMHG | RESPIRATION RATE: 15 BRPM | TEMPERATURE: 99 F | WEIGHT: 225.53 LBS | BODY MASS INDEX: 32.29 KG/M2 | HEIGHT: 70 IN | SYSTOLIC BLOOD PRESSURE: 137 MMHG | HEART RATE: 66 BPM | OXYGEN SATURATION: 95 %

## 2017-02-24 LAB
ANION GAP SERPL CALCULATED.3IONS-SCNC: 6 MMOL/L (ref 4–13)
BUN SERPL-MCNC: 11 MG/DL (ref 5–25)
CALCIUM SERPL-MCNC: 8.3 MG/DL (ref 8.3–10.1)
CHLORIDE SERPL-SCNC: 107 MMOL/L (ref 100–108)
CO2 SERPL-SCNC: 29 MMOL/L (ref 21–32)
CREAT SERPL-MCNC: 0.97 MG/DL (ref 0.6–1.3)
GFR SERPL CREATININE-BSD FRML MDRD: >60 ML/MIN/1.73SQ M
GLUCOSE SERPL-MCNC: 95 MG/DL (ref 65–140)
POTASSIUM SERPL-SCNC: 4 MMOL/L (ref 3.5–5.3)
SODIUM SERPL-SCNC: 142 MMOL/L (ref 136–145)

## 2017-02-24 PROCEDURE — 80048 BASIC METABOLIC PNL TOTAL CA: CPT | Performed by: NURSE PRACTITIONER

## 2017-02-24 RX ORDER — OXYCODONE HYDROCHLORIDE AND ACETAMINOPHEN 5; 325 MG/1; MG/1
1 TABLET ORAL EVERY 6 HOURS PRN
Qty: 15 TABLET | Refills: 0 | Status: SHIPPED | OUTPATIENT
Start: 2017-02-24 | End: 2017-03-06

## 2017-02-24 RX ORDER — LEVETIRACETAM 1000 MG/1
1000 TABLET ORAL EVERY 12 HOURS
Qty: 60 TABLET | Refills: 0 | Status: ON HOLD | OUTPATIENT
Start: 2017-02-24 | End: 2017-06-07 | Stop reason: DRUGHIGH

## 2017-02-24 RX ORDER — ASPIRIN 325 MG
325 TABLET ORAL DAILY
Qty: 30 TABLET | Refills: 0 | Status: SHIPPED | OUTPATIENT
Start: 2017-02-24 | End: 2018-02-13 | Stop reason: SDUPTHER

## 2017-02-24 RX ADMIN — HEPARIN SODIUM 5000 UNITS: 5000 INJECTION, SOLUTION INTRAVENOUS; SUBCUTANEOUS at 13:06

## 2017-02-24 RX ADMIN — LEVETIRACETAM 1000 MG: 500 TABLET ORAL at 08:10

## 2017-02-24 RX ADMIN — HEPARIN SODIUM 5000 UNITS: 5000 INJECTION, SOLUTION INTRAVENOUS; SUBCUTANEOUS at 05:09

## 2017-02-24 RX ADMIN — PRAVASTATIN SODIUM 80 MG: 80 TABLET ORAL at 15:59

## 2017-02-24 RX ADMIN — ASPIRIN 325 MG: 325 TABLET ORAL at 08:10

## 2017-02-24 RX ADMIN — ALLOPURINOL 300 MG: 100 TABLET ORAL at 08:11

## 2017-02-24 RX ADMIN — OXYCODONE HYDROCHLORIDE AND ACETAMINOPHEN 1 TABLET: 5; 325 TABLET ORAL at 04:04

## 2017-02-26 LAB
BACTERIA BLD CULT: NORMAL
BACTERIA BLD CULT: NORMAL

## 2017-03-01 ENCOUNTER — ALLSCRIPTS OFFICE VISIT (OUTPATIENT)
Dept: OTHER | Facility: OTHER | Age: 77
End: 2017-03-01

## 2017-03-29 ENCOUNTER — TRANSCRIBE ORDERS (OUTPATIENT)
Dept: ADMINISTRATIVE | Facility: HOSPITAL | Age: 77
End: 2017-03-29

## 2017-03-29 ENCOUNTER — GENERIC CONVERSION - ENCOUNTER (OUTPATIENT)
Dept: OTHER | Facility: OTHER | Age: 77
End: 2017-03-29

## 2017-03-29 ENCOUNTER — ALLSCRIPTS OFFICE VISIT (OUTPATIENT)
Dept: OTHER | Facility: OTHER | Age: 77
End: 2017-03-29

## 2017-03-29 DIAGNOSIS — M75.101 RIGHT ROTATOR CUFF TEAR: ICD-10-CM

## 2017-03-29 DIAGNOSIS — G40.909 UNSPECIFIED EPILEPSY WITHOUT MENTION OF INTRACTABLE EPILEPSY: Primary | ICD-10-CM

## 2017-03-29 DIAGNOSIS — M25.511 PAIN IN RIGHT SHOULDER: ICD-10-CM

## 2017-03-29 DIAGNOSIS — M25.512 PAIN IN LEFT SHOULDER: ICD-10-CM

## 2017-04-01 ENCOUNTER — HOSPITAL ENCOUNTER (OUTPATIENT)
Dept: MRI IMAGING | Facility: HOSPITAL | Age: 77
Discharge: HOME | End: 2017-04-01
Attending: FAMILY MEDICINE
Payer: MEDICARE

## 2017-04-01 DIAGNOSIS — M25.511 PAIN IN RIGHT SHOULDER: ICD-10-CM

## 2017-04-01 PROCEDURE — 73221 MRI JOINT UPR EXTREM W/O DYE: CPT

## 2017-04-03 ENCOUNTER — GENERIC CONVERSION - ENCOUNTER (OUTPATIENT)
Dept: OTHER | Facility: OTHER | Age: 77
End: 2017-04-03

## 2017-04-14 ENCOUNTER — ALLSCRIPTS OFFICE VISIT (OUTPATIENT)
Dept: OTHER | Facility: OTHER | Age: 77
End: 2017-04-14

## 2017-04-14 ENCOUNTER — HOSPITAL ENCOUNTER (OUTPATIENT)
Dept: RADIOLOGY | Facility: CLINIC | Age: 77
Discharge: HOME | End: 2017-04-14
Payer: MEDICARE

## 2017-04-14 DIAGNOSIS — M25.512 PAIN IN LEFT SHOULDER: ICD-10-CM

## 2017-04-14 PROCEDURE — 73030 X-RAY EXAM OF SHOULDER: CPT

## 2017-04-18 ENCOUNTER — GENERIC CONVERSION - ENCOUNTER (OUTPATIENT)
Dept: OTHER | Facility: OTHER | Age: 77
End: 2017-04-18

## 2017-04-18 ENCOUNTER — HOSPITAL ENCOUNTER (OUTPATIENT)
Dept: NEUROLOGY | Facility: AMBULATORY SURGERY CENTER | Age: 77
Discharge: HOME | End: 2017-04-18
Payer: MEDICARE

## 2017-04-18 DIAGNOSIS — G40.909 EPILEPSY (HCC): ICD-10-CM

## 2017-04-18 DIAGNOSIS — R56.9 SEIZURE (HCC): ICD-10-CM

## 2017-04-18 PROCEDURE — 95813 EEG EXTND MNTR 61-119 MIN: CPT

## 2017-04-21 ENCOUNTER — GENERIC CONVERSION - ENCOUNTER (OUTPATIENT)
Dept: OTHER | Facility: OTHER | Age: 77
End: 2017-04-21

## 2017-05-03 ENCOUNTER — GENERIC CONVERSION - ENCOUNTER (OUTPATIENT)
Dept: OTHER | Facility: OTHER | Age: 77
End: 2017-05-03

## 2017-05-31 ENCOUNTER — ALLSCRIPTS OFFICE VISIT (OUTPATIENT)
Dept: OTHER | Facility: OTHER | Age: 77
End: 2017-05-31

## 2017-06-01 ENCOUNTER — APPOINTMENT (OUTPATIENT)
Dept: LAB | Facility: CLINIC | Age: 77
End: 2017-06-01
Payer: MEDICARE

## 2017-06-01 ENCOUNTER — GENERIC CONVERSION - ENCOUNTER (OUTPATIENT)
Dept: OTHER | Facility: OTHER | Age: 77
End: 2017-06-01

## 2017-06-01 DIAGNOSIS — I10 ESSENTIAL (PRIMARY) HYPERTENSION: ICD-10-CM

## 2017-06-01 DIAGNOSIS — E03.9 HYPOTHYROIDISM: ICD-10-CM

## 2017-06-01 LAB
ANION GAP SERPL CALCULATED.3IONS-SCNC: 5 MMOL/L (ref 4–13)
BUN SERPL-MCNC: 13 MG/DL (ref 5–25)
CALCIUM SERPL-MCNC: 9.7 MG/DL (ref 8.3–10.1)
CHLORIDE SERPL-SCNC: 107 MMOL/L (ref 100–108)
CO2 SERPL-SCNC: 31 MMOL/L (ref 21–32)
CREAT SERPL-MCNC: 1.15 MG/DL (ref 0.6–1.3)
GFR SERPL CREATININE-BSD FRML MDRD: >60 ML/MIN/1.73SQ M
GLUCOSE SERPL-MCNC: 93 MG/DL (ref 65–140)
POTASSIUM SERPL-SCNC: 5.3 MMOL/L (ref 3.5–5.3)
SODIUM SERPL-SCNC: 143 MMOL/L (ref 136–145)
TSH SERPL DL<=0.05 MIU/L-ACNC: 2.52 UIU/ML (ref 0.36–3.74)

## 2017-06-01 PROCEDURE — 80048 BASIC METABOLIC PNL TOTAL CA: CPT

## 2017-06-01 PROCEDURE — 36415 COLL VENOUS BLD VENIPUNCTURE: CPT

## 2017-06-01 PROCEDURE — 84443 ASSAY THYROID STIM HORMONE: CPT

## 2017-06-07 ENCOUNTER — ANESTHESIA (OUTPATIENT)
Dept: GASTROENTEROLOGY | Facility: HOSPITAL | Age: 77
End: 2017-06-07
Payer: MEDICARE

## 2017-06-07 ENCOUNTER — HOSPITAL ENCOUNTER (OUTPATIENT)
Facility: HOSPITAL | Age: 77
Setting detail: OUTPATIENT SURGERY
Discharge: HOME | End: 2017-06-07
Attending: COLON & RECTAL SURGERY | Admitting: COLON & RECTAL SURGERY
Payer: MEDICARE

## 2017-06-07 ENCOUNTER — ANESTHESIA EVENT (OUTPATIENT)
Dept: GASTROENTEROLOGY | Facility: HOSPITAL | Age: 77
End: 2017-06-07
Payer: MEDICARE

## 2017-06-07 VITALS
DIASTOLIC BLOOD PRESSURE: 65 MMHG | BODY MASS INDEX: 30.8 KG/M2 | HEART RATE: 64 BPM | HEIGHT: 71 IN | SYSTOLIC BLOOD PRESSURE: 120 MMHG | TEMPERATURE: 98.8 F | WEIGHT: 220 LBS | OXYGEN SATURATION: 95 % | RESPIRATION RATE: 16 BRPM

## 2017-06-07 DIAGNOSIS — Z86.010 HISTORY OF COLONIC POLYPS: ICD-10-CM

## 2017-06-07 PROCEDURE — 88305 TISSUE EXAM BY PATHOLOGIST: CPT | Performed by: COLON & RECTAL SURGERY

## 2017-06-07 RX ORDER — PROPOFOL 10 MG/ML
INJECTION, EMULSION INTRAVENOUS CONTINUOUS PRN
Status: DISCONTINUED | OUTPATIENT
Start: 2017-06-07 | End: 2017-06-07 | Stop reason: SURG

## 2017-06-07 RX ORDER — LEVETIRACETAM 500 MG/1
1500 TABLET ORAL DAILY
COMMUNITY
End: 2018-02-13 | Stop reason: SDUPTHER

## 2017-06-07 RX ORDER — AMOXICILLIN 500 MG/1
500 CAPSULE ORAL AS NEEDED
COMMUNITY
End: 2019-02-25 | Stop reason: ALTCHOICE

## 2017-06-07 RX ORDER — SODIUM CHLORIDE 9 MG/ML
125 INJECTION, SOLUTION INTRAVENOUS CONTINUOUS
Status: DISCONTINUED | OUTPATIENT
Start: 2017-06-07 | End: 2017-06-07 | Stop reason: HOSPADM

## 2017-06-07 RX ORDER — ACETAMINOPHEN 500 MG
500 TABLET ORAL EVERY 6 HOURS PRN
COMMUNITY

## 2017-06-07 RX ORDER — LIDOCAINE HYDROCHLORIDE 10 MG/ML
INJECTION, SOLUTION INFILTRATION; PERINEURAL AS NEEDED
Status: DISCONTINUED | OUTPATIENT
Start: 2017-06-07 | End: 2017-06-07 | Stop reason: SURG

## 2017-06-07 RX ORDER — PROPOFOL 10 MG/ML
INJECTION, EMULSION INTRAVENOUS AS NEEDED
Status: DISCONTINUED | OUTPATIENT
Start: 2017-06-07 | End: 2017-06-07 | Stop reason: SURG

## 2017-06-07 RX ADMIN — SODIUM CHLORIDE 125 ML/HR: 0.9 INJECTION, SOLUTION INTRAVENOUS at 09:11

## 2017-06-07 RX ADMIN — PROPOFOL 120 MG: 10 INJECTION, EMULSION INTRAVENOUS at 10:01

## 2017-06-07 RX ADMIN — PROPOFOL 120 MCG/KG/MIN: 10 INJECTION, EMULSION INTRAVENOUS at 10:01

## 2017-06-07 RX ADMIN — LIDOCAINE HYDROCHLORIDE 50 MG: 10 INJECTION, SOLUTION INFILTRATION; PERINEURAL at 10:01

## 2017-08-10 ENCOUNTER — ALLSCRIPTS OFFICE VISIT (OUTPATIENT)
Dept: OTHER | Facility: OTHER | Age: 77
End: 2017-08-10

## 2017-08-10 DIAGNOSIS — G40.909 EPILEPSY WITHOUT STATUS EPILEPTICUS, NOT INTRACTABLE (HCC): ICD-10-CM

## 2017-08-10 DIAGNOSIS — I71.4 ABDOMINAL AORTIC ANEURYSM WITHOUT RUPTURE (HCC): ICD-10-CM

## 2017-08-10 DIAGNOSIS — E78.5 HYPERLIPIDEMIA: ICD-10-CM

## 2017-08-10 DIAGNOSIS — I10 ESSENTIAL (PRIMARY) HYPERTENSION: ICD-10-CM

## 2017-08-10 DIAGNOSIS — E79.0 HYPERURICEMIA WITHOUT SIGNS OF INFLAMMATORY ARTHRITIS AND TOPHACEOUS DISEASE: ICD-10-CM

## 2017-08-25 ENCOUNTER — APPOINTMENT (OUTPATIENT)
Dept: LAB | Facility: CLINIC | Age: 77
End: 2017-08-25
Payer: MEDICARE

## 2017-08-25 DIAGNOSIS — E78.5 HYPERLIPIDEMIA: ICD-10-CM

## 2017-08-25 DIAGNOSIS — G40.909 EPILEPSY WITHOUT STATUS EPILEPTICUS, NOT INTRACTABLE (HCC): ICD-10-CM

## 2017-08-25 DIAGNOSIS — I71.4 ABDOMINAL AORTIC ANEURYSM WITHOUT RUPTURE (HCC): ICD-10-CM

## 2017-08-25 DIAGNOSIS — I10 ESSENTIAL (PRIMARY) HYPERTENSION: ICD-10-CM

## 2017-08-25 DIAGNOSIS — E79.0 HYPERURICEMIA WITHOUT SIGNS OF INFLAMMATORY ARTHRITIS AND TOPHACEOUS DISEASE: ICD-10-CM

## 2017-08-25 LAB
ALBUMIN SERPL BCP-MCNC: 4 G/DL (ref 3.5–5)
ALP SERPL-CCNC: 59 U/L (ref 46–116)
ALT SERPL W P-5'-P-CCNC: 23 U/L (ref 12–78)
ANION GAP SERPL CALCULATED.3IONS-SCNC: 6 MMOL/L (ref 4–13)
AST SERPL W P-5'-P-CCNC: 20 U/L (ref 5–45)
BASOPHILS # BLD AUTO: 0.01 THOUSANDS/ΜL (ref 0–0.1)
BASOPHILS NFR BLD AUTO: 0 % (ref 0–1)
BILIRUB SERPL-MCNC: 0.96 MG/DL (ref 0.2–1)
BUN SERPL-MCNC: 16 MG/DL (ref 5–25)
CALCIUM SERPL-MCNC: 9.5 MG/DL (ref 8.3–10.1)
CHLORIDE SERPL-SCNC: 104 MMOL/L (ref 100–108)
CHOLEST SERPL-MCNC: 119 MG/DL (ref 50–200)
CO2 SERPL-SCNC: 32 MMOL/L (ref 21–32)
CREAT SERPL-MCNC: 1.15 MG/DL (ref 0.6–1.3)
EOSINOPHIL # BLD AUTO: 0.6 THOUSAND/ΜL (ref 0–0.61)
EOSINOPHIL NFR BLD AUTO: 8 % (ref 0–6)
ERYTHROCYTE [DISTWIDTH] IN BLOOD BY AUTOMATED COUNT: 12.7 % (ref 11.6–15.1)
GFR SERPL CREATININE-BSD FRML MDRD: 62 ML/MIN/1.73SQ M
GLUCOSE P FAST SERPL-MCNC: 93 MG/DL (ref 65–99)
HCT VFR BLD AUTO: 47.6 % (ref 36.5–49.3)
HDLC SERPL-MCNC: 39 MG/DL (ref 40–60)
HGB BLD-MCNC: 16.2 G/DL (ref 12–17)
LDLC SERPL CALC-MCNC: 44 MG/DL (ref 0–100)
LYMPHOCYTES # BLD AUTO: 1.81 THOUSANDS/ΜL (ref 0.6–4.47)
LYMPHOCYTES NFR BLD AUTO: 23 % (ref 14–44)
MCH RBC QN AUTO: 31.7 PG (ref 26.8–34.3)
MCHC RBC AUTO-ENTMCNC: 34 G/DL (ref 31.4–37.4)
MCV RBC AUTO: 93 FL (ref 82–98)
MONOCYTES # BLD AUTO: 0.58 THOUSAND/ΜL (ref 0.17–1.22)
MONOCYTES NFR BLD AUTO: 7 % (ref 4–12)
NEUTROPHILS # BLD AUTO: 4.99 THOUSANDS/ΜL (ref 1.85–7.62)
NEUTS SEG NFR BLD AUTO: 62 % (ref 43–75)
NRBC BLD AUTO-RTO: 0 /100 WBCS
PLATELET # BLD AUTO: 174 THOUSANDS/UL (ref 149–390)
PMV BLD AUTO: 11.5 FL (ref 8.9–12.7)
POTASSIUM SERPL-SCNC: 4.8 MMOL/L (ref 3.5–5.3)
PROT SERPL-MCNC: 7 G/DL (ref 6.4–8.2)
RBC # BLD AUTO: 5.11 MILLION/UL (ref 3.88–5.62)
SODIUM SERPL-SCNC: 142 MMOL/L (ref 136–145)
TRIGL SERPL-MCNC: 180 MG/DL
URATE SERPL-MCNC: 8.2 MG/DL (ref 4.2–8)
WBC # BLD AUTO: 8 THOUSAND/UL (ref 4.31–10.16)

## 2017-08-25 PROCEDURE — 80061 LIPID PANEL: CPT

## 2017-08-25 PROCEDURE — 80053 COMPREHEN METABOLIC PANEL: CPT

## 2017-08-25 PROCEDURE — 84550 ASSAY OF BLOOD/URIC ACID: CPT

## 2017-08-25 PROCEDURE — 85025 COMPLETE CBC W/AUTO DIFF WBC: CPT

## 2017-08-27 ENCOUNTER — GENERIC CONVERSION - ENCOUNTER (OUTPATIENT)
Dept: OTHER | Facility: OTHER | Age: 77
End: 2017-08-27

## 2017-09-12 ENCOUNTER — ALLSCRIPTS OFFICE VISIT (OUTPATIENT)
Dept: OTHER | Facility: OTHER | Age: 77
End: 2017-09-12

## 2017-10-26 NOTE — PROGRESS NOTES
Assessment  1  Seizure disorder (345 90) (G40 909)    Plan  Seizure disorder    · LevETIRAcetam  MG Oral Tablet Extended Release 24 Hour; TAKE 3  TABLET Bedtime   Rx By: Alfred Pop; Dispense: 90 Days ; #:270 Tablet Extended Release 24 Hour; Refill: 3;For: Seizure disorder; RAJEEV = N; Verified Transmission to [facility]; Last Updated By: SystemOmmven; 9/12/2017 11:25:23 AM   · Follow-Up visit in 9 months Evaluation and Treatment  Follow-up, 30 min  Status:  Complete  Done: 65KDM5696   Ordered; For: Seizure disorder; Ordered By: Alfred Pop Performed:  Due: 83Woj7675; Last Updated By: Aayush Kasper; 9/12/2017 11:28:40 AM    Discussion/Summary  Donovan Self is a 78year old right handed man with history that includes abdominal aortic aneurysm status post repair that presents regarding 2 lifetime seizures occurring a few hours apart on 2/21/2017  His neurological exam has been essentially normal  MRI And EEG (awake only) were unrevealing for a cause  There are no seizure risk factors  His seizures were not  by more than 24 hours and therefore at this point he does not meet the technical criteria for a diagnosis of epilepsy  However, his initial seizure occurred out of sleep and there was no clear provocation suggesting some degree of elevated recurrence risk  He is tolerating levetiracetam without side effects  The plan is currently to continue therapy for at least 18 months  EEG should be repeated if medication withdrawal is considered  Discussion Summary:   Today's Plan: Continue levetiracetam ER 1500 mg nightly  Let us know if there are seizures or problems with your medication  Return in about 9 months       Counseling Documentation With Imm: The patient was counseled regarding diagnostic results,-- instructions for management,-- risk factor reductions,-- prognosis,-- patient and family education,-- impressions,-- risks and benefits of treatment options,-- importance of compliance with treatment  Chief Complaint  Chief Complaint Free Text Note Form: Patient present for follow up appointment regarding seizures      History of Present Illness  Delmi Nogueira presents to the [facility] for follow up  He was last seen on 3/29/2017 (intake visit)  No seizures since last visit  He has returned to driving  Mood is stable  Injections helped his shoulder pain  Seizures/Spell characteristics:  1  Witnessed GTC seizure  2 events on 2/21/17  One out of sleep, the other a few hours later in the ER  Special Features:  - History of status epilepticus: No  - Self injury due to seizures: No  - Precipitating factors: None    Seizure risk factors: Normal birth and development  No history of seizures as a child  No family history of seizures  No head trauma resulting in loss of consciousness  No history of brain tumor, stroke or CNS infection  Past Medical History includes:  abdominal aortic aneurysm s/p endovascular repair  HLD, HTN, hypothyroidism, hyperuricemia    Social History:  Lives with his wife  She has MS and he is her caregiver  Retired   Current AEDs:  Levetiracetam ER 1500 mg qhs (compliant, no side effects)    Past AEDs:  None    Other medications include:      A full 12 system review was performed and is negative except for what is mentioned in the ROS section or in the HPI     ================================================================  Prior Work-up:  REEG 2/21/17: normal, awake  MRI brain w/ and w/o 2/21/17: normal           Review of Systems  ROS Reviewed:   ROS reviewed  Active Problems  1  Aneurysm of abdominal aorta (441 4) (I71 4)   2  Disc degeneration, 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)   3  Diverticulosis (562 10) (K57 90)   4  Hyperlipidemia (272 4) (E78 5)   5  Hypertension (401 9) (I10)   6  Hyperuricemia (790 6) (E79 0)   7  Hypothyroidism (244 9) (E03 9)   8  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 94)   9  Screening for genitourinary condition (V81 6) (Z13 89)   10  Screening for neurological condition (V80 09) (Z13 89)   11  Seizure disorder (345 90) (G40 909)   12  Tear of right rotator cuff (840 4) (M75 101)    Past Medical History  1  History of Adhesive capsulitis of left shoulder (726 0) (M75 02)   2  History of Benign Polyps Of The Large Intestine (V12 72)   3  History of Bursitis of left shoulder (726 10) (M75 52)   4  History of basal cell carcinoma (V10 83) (Z85 828)   5  History of gout (V12 29) (Z87 39)   6  History of Lumbar Disc Degeneration L1 - L2 (722 52)   7  History of Lumbar Facet Syndrome (724 8)   8  History of Shoulder Impingement (726 2)  Active Problems And Past Medical History Reviewed: The active problems and past medical history were reviewed and updated today  Surgical History  1  History of Endovascular Repair Of Abdominal Aorta Using Prosthesis    Family History  Mother    1  Family history of Aneurysm Of Abdominal Aorta  Father    2  Family history of Coronary Artery Disease (V17 49)  Brother    3  Family history of Coronary Artery Disease (V17 49)  Family History    4  Family history of No history of seizures    Social History   · Alcohol   · Former smoker (J08 04) (I23 291)  Social History Reviewed: The social history was reviewed and updated today  Current Meds   1  Amoxicillin 500 MG Oral Tablet; 4 tablet 30-60 mins prior to dental   procedure Recorded   2  Aspirin 325 MG CAPS; take 1 capsule daily; Therapy: (Recorded:01Mar2017) to Recorded   3  LevETIRAcetam  MG Oral Tablet Extended Release 24 Hour; TAKE 3 TABLET   Bedtime; Therapy: 21Apr2017 to (Evaluate:18Oct2017)  Requested for: 21Apr2017; Last   Rx:21Apr2017; Status: ACTIVE - Transmit to Pharmacy - Awaiting Verification Ordered   4  Oxycodone-Acetaminophen 5-325 MG Oral Tablet; TAKE 1 TABLET EVERY 4 HOURS AS   NEEDED FOR PAIN;   Therapy: 03WCF2777 to (Evaluate:04May2017); Last Rx:26Apr2017 Ordered   5  Oxycodone-Acetaminophen 5-325 MG Oral Tablet; TAKE 1 TO 2 TABLETS EVERY 4 TO 6   HOURS AS NEEDED FOR PAIN;   Therapy: (Recorded:01Mar2017) to Recorded   6  Simvastatin 40 MG Oral Tablet; take 1 tablet by mouth every day; Therapy: 17Cas2441 to (Evaluate:18Nov2017)  Requested for: 21Jul2017; Last   Rx:21Jul2017 Ordered  Medication List Reviewed: The medication list was reviewed and updated today  Allergies  1  Fenofibrate TABS    Vitals  Signs   Recorded: 12Sep2017 10:55AM   Heart Rate: 011  Systolic: 299, LUE, Sitting  Diastolic: 84, LUE, Sitting  Weight: 228 lb 4 oz  BMI Calculated: 32 75  BSA Calculated: 2 21  O2 Saturation: 98    Physical Exam    Constitutional   General appearance: No acute distress, well appearing and well nourished  Musculoskeletal   Gait and station: Normal gait, stance and balance  Neurologic   Orientation to person, place, and time: Normal        Future Appointments    Date/Time Provider Specialty Site   02/13/2018 02:00 PM MILTON Avery  [address] [facility]   06/20/2018 10:00 AM MILTON Ford   Neurology [facility]     Signatures   Electronically signed by : MILTON Gambino ; Sep 12 2017  7:14PM EST                       (Author)

## 2017-12-26 ENCOUNTER — ALLSCRIPTS OFFICE VISIT (OUTPATIENT)
Dept: OTHER | Facility: OTHER | Age: 77
End: 2017-12-26

## 2017-12-27 NOTE — PROGRESS NOTES
Assessment   1  Bilateral shoulder pain (719 41) (M25 511,M25 512)    Plan   Bilateral shoulder pain    · MethylPREDNISolone Acetate 40 MG/ML Injection Suspension    (DEPO-Medrol); INJECT 1  ML Injection; To Be Done:    74PLO6104   · MethylPREDNISolone Acetate 40 MG/ML Injection Suspension    (DEPO-Medrol)    Chief Complaint   1  Shoulder Pain    Discussion/Summary      Bilateral shoulder pain with chronic rotator cuff tear right shoulder and subacromial bursitis left shoulder  steroid injections performed today  with massage therapy for neck pain as planned  and activity modification as needed  as needed if pain persists  History of Present Illness   Patient returns today with increased pain in bilateral shoulders localized to the anterior lateral aspect  Pain is worse with activity  He has increased pain with lying on his side as well  He has developed some neck pain on the right in addition to the shoulder pain  He plans to pursue massage therapy for that  He had subacromial steroid injections on 4/14/17 with moderate relief until recently  Active Problems   1  Aneurysm of abdominal aorta (441 4) (I71 4)   2  Bilateral shoulder pain (719 41) (M25 511,M25 512)   3  Disc degeneration, 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)   4  Diverticulosis (562 10) (K57 90)   5  Hyperlipidemia (272 4) (E78 5)   6  Hypertension (401 9) (I10)   7  Hyperuricemia (790 6) (E79 0)   8  Hypothyroidism (244 9) (E03 9)   9  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)   10  Screening for genitourinary condition (V81 6) (Z13 89)   11  Screening for neurological condition (V80 09) (Z13 89)   12  Seizure disorder (345 90) (G40 909)   13   Tear of right rotator cuff (840 4) (M75 101)    Past Medical History    · History of Adhesive capsulitis of left shoulder (726 0) (M75 02)   · History of Benign Polyps Of The Large Intestine (V12 72)   · History of Bursitis of left shoulder (726 10) (M75 52)   · History of basal cell carcinoma (V10 83) (N40 922)   · History of gout (V12 29) (Z87 39)   · History of Lumbar Disc Degeneration L1 - L2 (722 52)   · History of Lumbar Facet Syndrome (724 8)   · History of Shoulder Impingement (726 2)    Surgical History    · History of Endovascular Repair Of Abdominal Aorta Using Prosthesis    Family History   Mother    · Family history of Aneurysm Of Abdominal Aorta  Father    · Family history of Coronary Artery Disease (V17 49)  Brother    · Family history of Coronary Artery Disease (V17 49)  Family History    · Family history of No history of seizures    Social History    · Alcohol   · Former smoker (V15 82) (L54 430)    Current Meds    1  Amoxicillin 500 MG Oral Tablet; 4 tablet 30-60 mins prior to dental   procedure Recorded   2  Aspirin 325 MG CAPS; take 1 capsule daily; Therapy: (Recorded:01Mar2017) to Recorded   3  LevETIRAcetam  MG Oral Tablet Extended Release 24 Hour; TAKE 3 TABLET     Bedtime; Therapy: 15Euu9987 to (Evaluate:39Ibd4617)  Requested for: 87Ekz7258; Last     Rx:33Xhc4193 Ordered   4  Oxycodone-Acetaminophen 5-325 MG Oral Tablet; TAKE 1 TABLET EVERY 4 HOURS     AS NEEDED FOR PAIN;     Therapy: 38KLV7352 to (Evaluate:68Zlv2064); Last Rx:69Lay1856 Ordered   5  Simvastatin 40 MG Oral Tablet; TAKE ONE TABLET BY MOUTH ONCE DAILY; Therapy: 90Rfn6770 to ([facility])  Requested for: [phone]; Last     Rx:27Nov2017 Ordered    Allergies   1  Fenofibrate TABS    Vitals    Recorded: 53DLA0606 03:07PM   Heart Rate 91   Systolic 132   Diastolic 83   Height 5 ft 10 in   Weight 227 lb    BMI Calculated 32 57   BSA Calculated 2 21     Physical Exam   Bilateral shoulders:  Tenderness to palpation diffusely  Passive range of motion is forward flexion 130Â°, external rotation-abduction 70Â°, and internal rotation-abduction 30Â°, which is symmetric bilaterally  No gross instability  Impingement signs are positive  Empty can test is positive  Speed's test is positive  Cross-body adduction test is positive    4/5 strength forward flexion bilaterally  2+ radial pulse  Skin is intact without erythema  Mood and affect are appropriate  Procedure      Procedure: Injection of bilateral subacromial bursa  Verbal consent was obtained prior to the procedure  Alcohol was used to prep the area  ethyl chloride spray was used as a topical anesthetic  Was used to inject 5 mL of 1% Lidocaine-- and-- 1 mL 40mg/mL methylprednisolone  A bandage was applied  the patient tolerated the procedure well  Complications: none  Future Appointments      Date/Time Provider Specialty Site   02/13/2018 02:00 PM MILTON Coburn  [address] [place] [address]   06/20/2018 10:00 AM MILTON Davalos   Neurology [address] [place] [address]     Signatures    Electronically signed by : MILTON Santiago ; Dec 26 2017  3:26PM EST                       (Author)

## 2018-01-10 NOTE — PROCEDURES
Procedures by Mary Kay Nguyen MD  at 2017  1:44 PM      Author:  Mary Kay Nguyne MD Service:  Neurology Author Type:  Physician     Filed:  2017  1:51 PM Date of Service:  2017  1:44 PM Status:  Signed     :  Mary Kay Nguyen MD (Physician)            ELECTROENCEPHALOGRAM (EEG)      Patient Name:  Ricardo Ferguson  MRN: [id number]   :  1940 File #: SLT    Age: 78 y o  Encounter #: [id number]   Date performed: 2017            Report date: 2017          Study type: Routine EEG    ICD 10 diagnosis: Spells/Fit NOS R56 9    Start time:  End time: 7917     -------------------------------------------------------------------------------------------------------------------   Patient History:  78year old male who reportedly had seizure activity noted by wife on 17  No known previous history of seizures  Medications include:   aspirin 325 mg Oral Daily   levETIRAcetam 1,000 mg Oral Q12H   thiamine 100 mg Intravenous Daily       -------------------------------------------------------------------------------------------------------------------   Description of Procedure:  ·  32 channel digital recording with electrodes placed according to the International 10-20 system with additional  T1/T2 electrodes, EOG, EKG, and simultaneous  video  The recording was technically satisfactory  -------------------------------------------------------------------------------------------------------------------   Results:  Background Activity:  The recording was performed with the patient awake  During wakefulness, there were long runs of well regulated/regular,  mid amplitude, posteriorly dominant, symmetric 8-10 Hz activity that  did attenuate with eye opening      -------------------------------------------------------------------------------------------------------------------   Activation Procedures:  Hyperventilation was not performed    Stepped photic stimulation between 1-20 cps was performed and did not induce any changes  -------------------------------------------------------------------------------------------------------------------   Abnormal Findings:  No focal abnormalities or interictal epileptiform discharges were noted  No electrographic seizures were observed during this recording     -------------------------------------------------------------------------------------------------------------------  Other Findings: The single lead ECG demonstrated a regular  rhythm     -------------------------------------------------------------------------------------------------------------------  Events:  No patient push button events  -------------------------------------------------------------------------------------------------------------------   EEG Interpretation:  Normal 32 minute EEG  Donn Portillo MD   Medical Director  [address] [facility]  Pager # Liana CANAS    Feb 21 2017  1:51PM [facility] Standard Time

## 2018-01-11 NOTE — PROCEDURES
Procedures by J Luis Clarke MD  at 2017  5:34 PM      Author:  J Luis Clarke MD Service:  Neurology Author Type:  Physician     Filed:  2017  5:38 PM Date of Service:  2017  5:34 PM Status:  Signed     :  J Luis Clarke MD (Physician)            ELECTROENCEPHALOGRAM (EEG)      Patient Name:  Sonu Gómez  MRN: [id number]   :  1940 File #: [id number] 12-26   Age: 78 y o  Encounter #: [id number]   Date performed: 17            Report date: 2017          Study type: EEG > 1 hour    ICD 10 diagnosis: Spells/Fit NOS R56 9    Start time: 12:15 End time: 14:37     -------------------------------------------------------------------------------------------------------------------   Patient History:  78year old male who had a seizure on 17  EEG was ordered to evaluate seizure risk and to classify possible epilepsy      -------------------------------------------------------------------------------------------------------------------   Description of Procedure:  ·  32 channel digital recording with electrodes placed according to the International 10-20 system with additional  T1/T2 electrodes, EOG, EKG, and simultaneous  video  A monitoring technologist supervised the continuous recording  The recording was technically satisfactory  -------------------------------------------------------------------------------------------------------------------   Results:   Background Activity:   During wakefulness, background activity consists of continuous well formed,  normal voltage, posteriorly dominant, symmetric, reactive  9-10 Hz alpha  activity with AP gradient present  Activation Procedures:   Hyperventilation was performed for 3-4 minutes and did not produce any  changes  Stepped photic stimulation between 1-30 cps was performed and did not alter the tracing  Abnormal Findings:  No focal abnormalities nor interictal epileptiform discharges were noted   No electrographic seizures occurred during this recording  Other findings: The single lead EKG demonstrated a regular rhythm  Events:   No push buttons were activated  -------------------------------------------------------------------------------------------------------------------   Interpretation:   Normal 2 hour EEG  Scribe Attestation:  Documented by Murray Gray, acting as a scribe for Dr Radha Crawford on 4/18/201 7 at 5:38 PM     Physician Attestation:  All documentation recorded by the scribe accurately reflects the service I personally performed and the decisions made by me  I was present during the time the encounter was recorded and have reviewed all the documentation and confirm that it is all accurate  and representative of the encounter  Brenden Peguero MD   Medical Director  [address] [facility]  Pager # Terrerikan Kyle CANAS    Apr 18 2017  5:39PM [facility] Standard Time

## 2018-01-12 VITALS
DIASTOLIC BLOOD PRESSURE: 83 MMHG | SYSTOLIC BLOOD PRESSURE: 180 MMHG | HEART RATE: 88 BPM | BODY MASS INDEX: 31.85 KG/M2 | WEIGHT: 222.5 LBS | HEIGHT: 70 IN

## 2018-01-12 VITALS
RESPIRATION RATE: 18 BRPM | TEMPERATURE: 98 F | BODY MASS INDEX: 32.21 KG/M2 | HEART RATE: 84 BPM | SYSTOLIC BLOOD PRESSURE: 140 MMHG | DIASTOLIC BLOOD PRESSURE: 80 MMHG | WEIGHT: 225 LBS | HEIGHT: 70 IN

## 2018-01-12 NOTE — RESULT NOTES
Discussion/Summary   Mr Maria Eugenia Oh I have enclosed a copy of your labs  normal TSH or thyroid test  normal fasting blood sugar, kidney function and elecrtrolytes  Verified Results  (1) BASIC METABOLIC PROFILE 62GCY6972 09:05AM Shakeel Wang Order Number: [id number]     Test Name Result Flag Reference   GLUCOSE,RANDM 93 mg/dL     If the patient is fasting, the ADA then defines impaired fasting glucose as > 100 mg/dL and diabetes as > or equal to 123 mg/dL  SODIUM 143 mmol/L  136-145   POTASSIUM 5 3 mmol/L  3 5-5 3   CHLORIDE 107 mmol/L  100-108   CARBON DIOXIDE 31 mmol/L  21-32   ANION GAP (CALC) 5 mmol/L  4-13   BLOOD UREA NITROGEN 13 mg/dL  5-25   CREATININE 1 15 mg/dL  0 60-1 30   Standardized to IDMS reference method   CALCIUM 9 7 mg/dL  8 3-10 1   eGFR Non-African American      >60 0 ml/min/1 73sq [facility] [employer] Disease Education Program recommendations are as follows:  GFR calculation is accurate only with a steady state creatinine  Chronic Kidney disease less than 60 ml/min/1 73 sq  meters  Kidney failure less than 15 ml/min/1 73 sq  meters

## 2018-01-12 NOTE — MISCELLANEOUS
Message   Recorded as Task   Date: 03/16/2017 09:19 AM, Created By: Gatito Panda   Task Name: Medical Complaint Callback   Assigned To: Anil Schilling   Regarding Patient: Joshua Mancini, Status: In Progress   Comment:    Melany Zavala - 16 Mar 2017 9:19 AM     TASK CREATED  Caller: Self; Medical Complaint; [phone] (Home); [phone] (Work)  pt calling    needs to renew his Levetiracetam 1000mg (given to pt when he was in the hospital)    pt states that this medication gives him arm pain   makes arms feel heavy (pain travels across back)    pt also taking Tylenol    pt has f/u apt with Neurology on 3/29        pls advise   Sudheer Goncalves - 16 Mar 2017 1:28 PM     TASK REPLIED TO: Previously Assigned To Anil Schilling                      call not sure if symptoms are from his seizure medication generic Keppra  I would prefer not to change his seizure medication until he is seen by neurology and let them make that decision   Chris Magaña - 16 Mar 2017 3:36 PM     TASK IN PROGRESS   [place] - 16 Mar 2017 3:41 PM     TASK REPLIED TO: Previously Assigned To Ainl Schilling  SPOKE WITH PATIENT, INFORMED PATIENT, PATIENT WAS WONDERING HE HAS ENOUGH MEDS TO 03/27- AND HE IS SEEING DR TODD ARTEAGA ON 3/29 AND WANTS TO KNOW IF HE CAN GET ENOUGH MEDS TO LAST HIM TILL HE SEES THE NEUROLOGIST? PLEASE ADVISE   Raeann Lake - 16 Mar 2017 4:41 PM     TASK REPLIED TO: Previously Assigned To Sudheer Goncalves                      yes script refilled   Annemarie Quezada - 16 Mar 2017 5:01 PM     TASK EDITED  patient notified that RX was sent to pharmacy  Annemarie Quezada - 16 Mar 2017 5:01 PM     TASK COMPLETED   Melany Zavala - 29 Mar 2017 10:09 AM     TASK REACTIVATED   Melany Zavala - 29 Mar 2017 10:18 AM     TASK EDITED  PT CALLING    PT WAS SEEN BY NEUROLOGIST TODAY   STATED THAT HE STILL IS HAVING SHOULDER PAIN (WHICH HE FEELSWAS CAUSED WHEN PT WAS DROPPED FROM THE STRETCHER WHEN PT HAD HIS SEIZURE)  NEUROLOGIST ADVISED PT TO CALL PCP  PT TAKING TYLENOL FOR SHOULDER PAIN  ARM IS SORE, ESPECIALLY WHEN HE LAYS ON IT     PT SAYS IT IS HARD TO GET TO [facility] BECAUSE PT UNABLE TO DRIVE/HIS WIFE CANT DRIVE  PLS ADVISE    CELL    Sudheer Goncalves - 29 Mar 2017 12:23 PM     TASK REPLIED TO: Previously Assigned To Sudheer Goncalves                      Call patient would need MRI of shoulder  script printed   LindaTika cuba - 29 Mar 2017 12:32 PM     TASK IN PROGRESS   Tika Mcdermott - 29 Mar 2017 12:39 PM     TASK EDITED  Called patient back and patient got very angry on the phone  I explained to him in order for Dr Dea Vazquez to be able to treat him properly he would need to get some testing done  He was angry at that, proceeded to say he will suffer with the pain  I advised with his current driving situation I could mail him the script and he stated he will not go for MRI and hung up on me  Active Problems    1  Aneurysm of abdominal aorta (441 4) (I71 4)   2  Disc degeneration, 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)   3  Diverticulosis (562 10) (K57 90)   4  Hyperlipidemia (272 4) (E78 5)   5  Hypertension (401 9) (I10)   6  Hyperuricemia (790 6) (E79 0)   7  Hypothyroidism (244 9) (E03 9)   8  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)   9  Right shoulder pain (719 41) (M25 511)   10  Seizure disorder (345 90) (G40 909)    Current Meds   1  Amoxicillin 500 MG Oral Tablet; 4 tablet 30-60 mins prior to dental   procedure Recorded   2  Aspirin 325 MG CAPS; take 1 capsule daily; Therapy: (Recorded:01Mar2017) to Recorded   3  LevETIRAcetam 500 MG Oral Tablet; TAKE 1 5 TABLET Twice daily  Requested for:   29Mar2017; Last Rx:29Mar2017; Status: ACTIVE - Transmit to Pharmacy - Awaiting   Verification Ordered   4  Oxycodone-Acetaminophen 5-325 MG Oral Tablet; TAKE 1 TO 2 TABLETS EVERY 4 TO   6 HOURS AS NEEDED FOR PAIN;   Therapy: (Recorded:01Mar2017) to Recorded   5   Simvastatin 40 MG Oral Tablet; TAKE ONE TABLET BY MOUTH ONCE DAILY; Therapy: 68Kdg1291 to [phone])  Requested for: 35Auz8681; Last   Rx:47Txk4272 Ordered    Allergies    1  Fenofibrate TABS   2   Vicodin TABS    Signatures   Electronically signed by : Diamond Cooper, ; Mar 29 2017 12:42PM EST                       (Author)    Electronically signed by : MILTON Yadav ; Mar 30 2017  7:26AM EST                       (Author)

## 2018-01-12 NOTE — RESULT NOTES
Verified Results  * MRI SHOULDER RIGHT WO CONTRAST 01Apr2017 08:17AM Jillian Guthrie Order Number: [id number]   Performing Comments: Recurrent right shoulder pain status post new onset seizure disorder  X-rays of right shoulder normal   - Patient Instructions: To schedule this appointment, please contact Central Scheduling at [phone]  Test Name Result Flag Reference   MRI SHOULDER RIGHT WO CONTRAST (Report)     This is a summary report  The complete report is available in the patient's medical record  If you cannot access the medical record, please contact the sending organization for a detailed fax or copy  MRI RIGHT SHOULDER     INDICATION: [id number]: Pain in right shoulder  History taken directly from the electronic ordering system  Shoulder pain     COMPARISON: Plain film dated 2/21/2017     TECHNIQUE:  The following MR sequences were obtained of the right shoulder: Localizer, axial GRE/PD fat sat, oblique coronal T2 fat sat, oblique sagittal T1/T2 fat sat  Images were acquired on a 1 5 Angi unit  Gadolinium was not used  FINDINGS: Image quality is degraded by patient motion  SUBCUTANEOUS TISSUES: Normal     JOINT EFFUSION: Moderate effusion  ACROMION PROCESS: Mild to moderate subacromial spur  ROTATOR CUFF: Complete full-thickness infraspinatus tendon tear with retraction to the level of the glenohumeral joint  The tear extends anteriorly to involve the dorsal half of the supraspinatus tendon which also appears torn  The anterior aspect of    the supraspinatus remains intact with insertional tendinosis and interstitial tearing  Subscapularis insertional tendinosis without tear is also appreciated  SUBACROMIAL/SUBDELTOID BURSA: Mild bursal fluid evident  LONG HEAD OF BICEPS TENDON: Proximal biceps tendinosis without tear  GLENOID LABRUM: Intact  GLENOHUMERAL JOINT: Intact  ACROMIOCLAVICULAR JOINT: Mild degenerative change noted       BONE MARROW SIGNAL: Normal        IMPRESSION:   1  Full-thickness complete infraspinatus tendon tear extending anteriorly to involve the dorsal half of the supraspinatus tendon as above  There is tendon retraction to the level of the glenohumeral joint without evidence of fatty muscular    infiltration  Associated subscapularis insertional tendinosis without further tear also noted  2  Proximal biceps tendinosis without tear  3  Joint effusion         Workstation performed: [id number]     Signed by:   Sujit Zuniga MD   4/3/17

## 2018-01-13 VITALS
SYSTOLIC BLOOD PRESSURE: 130 MMHG | HEART RATE: 109 BPM | DIASTOLIC BLOOD PRESSURE: 84 MMHG | BODY MASS INDEX: 32.75 KG/M2 | WEIGHT: 228.25 LBS | OXYGEN SATURATION: 98 %

## 2018-01-13 VITALS
SYSTOLIC BLOOD PRESSURE: 171 MMHG | HEIGHT: 70 IN | DIASTOLIC BLOOD PRESSURE: 80 MMHG | HEART RATE: 93 BPM | WEIGHT: 220.5 LBS | BODY MASS INDEX: 31.57 KG/M2

## 2018-01-13 VITALS
HEART RATE: 86 BPM | SYSTOLIC BLOOD PRESSURE: 130 MMHG | HEIGHT: 70 IN | WEIGHT: 222.5 LBS | BODY MASS INDEX: 31.85 KG/M2 | RESPIRATION RATE: 16 BRPM | OXYGEN SATURATION: 96 % | DIASTOLIC BLOOD PRESSURE: 68 MMHG

## 2018-01-13 NOTE — RESULT NOTES
Message  Mr Devon Benson I have enclosed a copy of your most recent labs        Results/Data  Results    (1) CBC/PLT/DIFF   WBC COUNT: 7 72 Thousand/uL Reference Range 4 31-10 16  RBC COUNT: 4 99 Million/uL Reference Range 3 88-5 62  HEMOGLOBIN: 16 0 g/dL Reference Range 12 0-17 0  HEMATOCRIT: 47 7 % Reference Range 36 5-49 3  MCV: 96 fL Reference Range 82-98  MCH: 32 1 pg Reference Range 26 8-34 3  MCHC: 33 5 g/dL Reference Range 31 4-37 4  RDW: 13 3 % Reference Range 11 6-15 1  MPV: 11 8 fL Reference Range 8 9-12 7  PLATELET COUNT: 445 Thousands/uL Reference Range 149-390  nRBC AUTOMATED: 0 /100 WBCs  NEUTROPHILS RELATIVE PERCENT: 67 % Reference Range 43-75  LYMPHOCYTES RELATIVE PERCENT: 20 % Reference Range 14-44  MONOCYTES RELATIVE PERCENT: 7 % Reference Range 4-12  EOSINOPHILS RELATIVE PERCENT: 6 % Reference Range 0-6  BASOPHILS RELATIVE PERCENT: 0 % Reference Range 0-1  NEUTROPHILS ABSOLUTE COUNT: 5 18 Thousands/?L Reference Range 1 85-7 62  LYMPHOCYTES ABSOLUTE COUNT: 1 52 Thousands/?L Reference Range 0 60-4 47  MONOCYTES ABSOLUTE COUNT: 0 53 Thousand/?L Reference Range 0 17-1 22  EOSINOPHILS ABSOLUTE COUNT: 0 46 Thousand/?L Reference Range 0 00-0 61  BASOPHILS ABSOLUTE COUNT: 0 02 Thousands/?L Reference Range 0 00-0 10  (1) URIC ACID   URIC ACID: 4 8 mg/dL Reference Range 4 2-8 0  (1) COMPREHENSIVE METABOLIC PANEL   SODIUM: 092 mmol/L Reference Range 136-145  POTASSIUM: 4 4 mmol/L Reference Range 3 5-5 3  CHLORIDE: 104 mmol/L Reference Range 100-108  CARBON DIOXIDE: 29 mmol/L Reference Range 21-32  ANION GAP (CALC): 7 mmol/L Reference Range 4-13  BLOOD UREA NITROGEN: 15 mg/dL Reference Range 5-25  CREATININE: 1 21 mg/dL Reference Range 0 60-1 30  GLUCOSE,RANDM: 89 mg/dL Reference Range   CALCIUM: 9 3 mg/dL Reference Range 8 3-10 1  AST(SGOT): 26 U/L Reference Range 5-45  ALT (SGPT): 35 U/L Reference Range 12-78  ALK PHOSPHATAS: 71 U/L Reference Range   TOTAL PROTEIN: 7 2 g/dL Reference Range 6  4-8  2  ALBUMIN: 4 0 g/dL Reference Range 3 5-5 0  BILI, TOTAL: 0 71 mg/dL Reference Range 0 20-1 00  eGFR Non-: 58 8 ml/min/1 73sq m  (1) LIPID PANEL, FASTING   CHOLESTEROL: 163 mg/dL Reference Range   TRIGLYCERIDES: 251 mg/dL Abnormal High Reference Range <=150  HDL,DIRECT: 42 mg/dL Reference Range 40-60  LDL CHOLESTEROL CALCULATED: 71 mg/dL Reference Range 0-100  (1) TSH   TSH: 4 350 uIU/mL Abnormal High Reference Range 0 358-3 740    Signatures   Electronically signed by : MILTON West ; Jul 7 2016 12:46PM EST                       (Author)

## 2018-01-13 NOTE — MISCELLANEOUS
Assessment    1  Seizure disorder (345 90) (G40 909)   2  Hypertension (401 9) (I10)   3  Hyperuricemia (790 6) (E79 0)   4  Hyperlipidemia (272 4) (E78 5)   5  Aneurysm of abdominal aorta (441 4) (I71 4)   6  Hypothyroidism (244 9) (E03 9)    Plan  Hypertension, Hypothyroidism    · (1) BASIC METABOLIC PROFILE; Status:Active; Requested TCH:[id number];    Perform:[facility]; SKJ:[id number]; Ordered;  For:Hypertension, Hypothyroidism; Ordered By:Sudheer Goncalves;   · (1) TSH WITH FT4 REFLEX; Status:Active; Requested EHX:[id number];    Perform:[facility]; ZGN:[id number]; Ordered;  For:Hypertension, Hypothyroidism; Ordered By:Sudheer Goncalves;  PMH: Depression screen    · *VB-Depression Screening; Status:Temporary Deferral - Condition may interfere with  Testing;    3/1/2018   Perform: In Office; 21 ; Last Updated Ciera Grey; 3/1/2017 1:31:46 PM;Ordered; [address]: Depression screen; Ordered By:Sudheer Goncalves;  PMH: Need for influenza vaccination, PMH: Need for prophylactic vaccination against  Streptococcus pneumoniae (pneumococcus)    · Stop: Fluzone Quadrivalent 0 5 ML Intramuscular Suspension   For: PMH: Need for influenza vaccination, PMH: Need for prophylactic vaccination against Streptococcus pneumoniae (pneumococcus); Ordered By:Paulino Goncalves; Effective Date:01Mar2017; Last Updated By: Mary Doan; 3/1/2017 1:30:05 PM  Seizure disorder    · *1 - [facility] Physician Referral  Consult Only: the expectation is that the  referring provider will communicate back to the patient on treatment options  Evaluation  and Treatment: the expectation is that the referred to provider will communicate back  to the patient on treatment options  NEW ONSET SEIZURE  PATIENT WAS SEEN IN HOSPITAL BY NEUROLOGY  Milagro Recinos FOLLOW UP  Status: Active  Requested for: [id number]   Ordered; For: Seizure disorder; Ordered By: Shanel Genao Performed:  Due: 14NWD6165  Care Summary provided   [facility] Yes    Discussion/Summary  Discussion Summary:   Continue with current medications  I did not restart Allopurinol  no treatment at this time for borderline abnormal TSH  repeat TSH and BMP in 3 months  follow up visit with neurology  he has been advised not to drive for 6 months  History of Present Illness  TCM Communication [facility]: The patient is being contacted for follow-up after hospitalization  Hospital course was discussed with the inpatient physician  He was hospitalized at [facility]  The date of admission: 02/21/2017, date of discharge: 02/24/2017  Diagnosis: SEIZURE, HYPERLIPIDEMIA, GOUT, RICK, HTN, ELEVATED TROPONIN, LACTIC ACIDOSIS  He was discharged to home  Medications were not reviewed today  He scheduled a follow up appointment  Counseling was provided to the patient  Topics counseled included importance of compliance with treatment  Communication performed and completed by Ping Barrientos   HPI: Follow-up visit  ALBERTO new onset seizure disorder  seizure witnessed by wife while in bed  patient had another seizure while in the hospital  admission labs CBC normal hemoglobin 16 3 chemistry profile random blood glucose 149 electrolytes normal  Creatinine 1 44  LFTs normal  TSH mildly elevated at 6 163  Ammonia level mildly elevated 38  Lactic acid 6 6  Troponin elevated at 0 23  EKG normal sinus rhythm  no acute changes  elevated troponin felt to be secondary to NSTEMI type II due to RICK and seizure  Urinalysis 1+ protein and trace blood    Urine drug screen negative  Alcohol level less than 3  Salicylate 5 1  CXR left lung base opacity atelectasis versus pneumonia  CT scan head showed mild microangiopathic changes no acute changes  Patient was seen and evaluated by neurology  MRI of brain no acute infarction, intracranial hemorrhage or mass  No MR evidence of mesial temporal sclerosis  CTA showed no large vessel flow restrictive disease   EEG no focal abnormalities or interictal epileptiform discharges were noted  medications reviewed  patient was discharged on Keppra  out patient labs 02/2017 prior to admission see note  hyperlipidemia on Simvastatin 40 mg daily  lipid profile cholesterol 148, TGs 150 decreased from 251 HDL 48, LDL 70  LFTs normal  FBS 88  TSH 2 150 decreased from 4 350  past history of hypertension stable off blood pressure medications  creatinine 02/15/2017 pre admission 1 10 electrolytes normal  hyperuricemia  past history of gout  no recent episodes  patient stopped taking Allopurinol 300 mg daily  Uric acid level 8 5 increased from 4 8      Review of Systems  Complete-Male:   Constitutional: no recent weight changes  6 lb weight gain from 08/2015  Eyes: no blurred vision  Cardiovascular: no claudications  history of AAA s/p endovascular stent  02/2017 abdominal aorta u/s widely patent endograft  no endovascular leak , but no chest pain, no palpitations and no lower extremity edema  Respiratory: no shortness of breath, no wheezing, not sleeping upright or with extra pillows and no cough  Gastrointestinal: no abdominal pain, no nausea, no diarrhea, no constipation, no bright red blood per rectum and no melena  Genitourinary: 07/2015 PSA 0 5, but no dysuria, no urinary incontinence, no urinary hesitancy and no nocturia  Musculoskeletal: pain in other joints and bilateral shoulder pain after seizure  he was seen by orthopedics while in the hospital  x rays right shoulder normal    Integumentary and Breasts: BCC nose s/p Mohs surgery, but no rashes and no skin lesions  Neurological: no headache, no dizziness and no difficulty walking  Psychiatric: no anxiety and no depression  Endocrine: no muscle weakness  Active Problems    1  Aneurysm of abdominal aorta (441 4) (I71 4)   2  Disc degeneration, 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)   3  Diverticulosis (562 10) (K57 90)   4  Hyperlipidemia (272 4) (E78 5)   5  Hypertension (401 9) (I10)   6   Hyperuricemia (790 6) (E79 0)   7  Hypothyroidism (244 9) (E03 9)   8  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)    Past Medical History    1  History of Benign Polyps Of The Large Intestine (V12 72)   2  History of basal cell carcinoma (V10 83) (Z85 828)   3  History of dizziness (V13 89) (Z87 898)   4  History of gout (V12 29) (Z87 39)   5  History of Lumbar Disc Degeneration L1 - L2 (722 52)   6  History of Lumbar Facet Syndrome (724 8)   7  History of Shoulder Impingement (726 2)    Surgical History    1  History of Endovascular Repair Of Abdominal Aorta Using Prosthesis    Family History  Mother    1  Family history of Aneurysm Of Abdominal Aorta  Father    2  Family history of Coronary Artery Disease (V17 49)  Brother    3  Family history of Coronary Artery Disease (V17 49)    Social History    · Alcohol   · Former smoker (L27 31) (W70 737)    Current Meds   1  Aspirin 325 MG CAPS; take 1 capsule daily; Therapy: (Recorded:01Mar2017) to Recorded   2  LevETIRAcetam 1000 MG Oral Tablet; TAKE 1 TABLET TWICE DAILY; Therapy: (Recorded:01Mar2017) to Recorded   3  Oxycodone-Acetaminophen 5-325 MG Oral Tablet; TAKE 1 TO 2 TABLETS EVERY 4 TO 6   HOURS AS NEEDED FOR PAIN;   Therapy: (Recorded:01Mar2017) to Recorded   4  Simvastatin 40 MG Oral Tablet; TAKE ONE TABLET BY MOUTH ONCE DAILY; Therapy: 46Jky7021 to 585 812 351)  Requested for: 53Tqb7806; Last   Rx:[id number] Ordered    Allergies    1  Fenofibrate TABS   2  Vicodin TABS    Vitals  Signs   Recorded: 15BTC3019 01:22PM   Temperature: 99 1 F  Heart Rate: 84  Respiration: 16  Systolic: 599, LUE, Sitting  Diastolic: 72, LUE, Sitting  BP Cuff Size: Large  Height: 5 ft 10 in  Weight: 221 lb   BMI Calculated: 31 71  BSA Calculated: 2 18    Physical Exam    Constitutional   General appearance: No acute distress, well appearing and well nourished  Eyes   Conjunctiva and lids: No erythema, swelling or discharge  fundi not seen  Pupils and irises: Equal, round, reactive to light  EOMI  no nystagmus  Ears, Nose, Mouth, and Throat   Otoscopic examination: Tympanic membranes translucent with normal light reflex  Canals patent without erythema  Oropharynx: Normal with no erythema, edema, exudate or lesions  Neck   Neck: Supple, symmetric, trachea midline, no masses  Thyroid: Normal, no thyromegaly  There were no thyroid nodules  Pulmonary   Auscultation of lungs: Clear to auscultation  Cardiovascular   Auscultation of heart: Normal rate and rhythm, normal S1 and S2, no murmurs  Heart sounds: no gallop heard  Carotid pulses: 2+ bilaterally  no bruit heard over the right carotid and no bruit heard over the left carotid  Examination of extremities for edema and/or varicosities: Normal     Lymphatic   Palpation of lymph nodes in neck: No lymphadenopathy  no anterior cervical node enlargement, no posterior cervical node enlargement, no submandibular node enlargement and no supraclavicular node enlargement  Musculoskeletal   Gait and station: Normal     Range of motion: Normal   full ROM shoulders  no impingement sign  Skin   Skin and subcutaneous tissue: Normal without rashes or lesions  Neurologic   Cranial nerves: Cranial nerves 2-12 intact  Psychiatric   Mood and affect: Normal        Results/Data  (1) URIC ACID [id number] 09:49AM Ulloa Gil     Test Name Result Flag Reference   URIC ACID 8 5 mg/dL H 4 2-8 0   Specimen collection should occur prior to Metamizole administration due to the potential for falsely depressed results       (1) CBC/PLT/DIFF [id number] 08:40AM Grant Aquino Order Number: [id number]     Test Name Result Flag Reference   WBC COUNT 6 73 Thousand/uL  4 31-10 16   RBC COUNT 5 24 Million/uL  3 88-5 62   HEMOGLOBIN 16 2 g/dL  12 0-17 0   HEMATOCRIT 48 8 %  36 5-49 3   MCV 93 fL  82-98   MCH 30 9 pg  26 8-34 3   MCHC 33 2 g/dL  31 4-37 4   RDW 13 1 %  11 6-15 1   MPV 11 5 fL  8 9-12 7   PLATELET COUNT 025 Thousands/uL  149-390   nRBC AUTOMATED 0 /100 WBCs     NEUTROPHILS RELATIVE PERCENT 72 %  43-75   LYMPHOCYTES RELATIVE PERCENT 17 %  14-44   MONOCYTES RELATIVE PERCENT 7 %  4-12   EOSINOPHILS RELATIVE PERCENT 4 %  0-6   BASOPHILS RELATIVE PERCENT 0 %  0-1   NEUTROPHILS ABSOLUTE COUNT 4 81 Thousands/?L  1 85-7 62   LYMPHOCYTES ABSOLUTE COUNT 1 13 Thousands/?L  0 60-4 47   MONOCYTES ABSOLUTE COUNT 0 50 Thousand/?L  0 17-1 22   EOSINOPHILS ABSOLUTE COUNT 0 25 Thousand/?L  0 00-0 61   BASOPHILS ABSOLUTE COUNT 0 02 Thousands/?L  0 00-0 10   - Patient Instructions: This bloodwork is non-fasting  Please drink two glasses of water morning of bloodwork  - Patient Instructions: This bloodwork is non-fasting  Please drink two glasses of water morning of bloodwork  (1) COMPREHENSIVE METABOLIC PANEL [id number] 50:11KI Leon Jewels Order Number: [id number]     Test Name Result Flag Reference   GLUCOSE,RANDM 88 mg/dL     If the patient is fasting, the ADA then defines impaired fasting glucose as > 100 mg/dL and diabetes as > or equal to 123 mg/dL  SODIUM 142 mmol/L  136-145   POTASSIUM 4 3 mmol/L  3 5-5 3   CHLORIDE 105 mmol/L  100-108   CARBON DIOXIDE 32 mmol/L  21-32   ANION GAP (CALC) 5 mmol/L  4-13   BLOOD UREA NITROGEN 17 mg/dL  5-25   CREATININE 1 10 mg/dL  0 60-1 30   Standardized to IDMS reference method   CALCIUM 9 0 mg/dL  8 3-10 1   BILI, TOTAL 0 89 mg/dL  0 20-1 00   ALK PHOSPHATAS 71 U/L     ALT (SGPT) 29 U/L  12-78   AST(SGOT) 19 U/L  5-45   ALBUMIN 4 0 g/dL  3 5-5 0   TOTAL PROTEIN 7 3 g/dL  6 4-8 2   eGFR Non-African American      >60 0 ml/min/1 73sq m   - Patient Instructions:  This is a fasting blood test  Water,black tea or black  coffee only after 9:00pm the night before test Drink 2 glasses of water the morning of test   National Kidney Disease Education Program recommendations are as follows:  GFR calculation is accurate only with a steady state creatinine  Chronic Kidney disease less than 60 ml/min/1 73 sq  meters  Kidney failure less than 15 ml/min/1 73 sq  meters  (1) LIPID PANEL, FASTING [id number] 08:40AM Precious Hacorky Order Number: [id number]     Test Name Result Flag Reference   CHOLESTEROL 148 mg/dL     HDL,DIRECT 48 mg/dL  40-60   Specimen collection should occur prior to Metamizole administration due to the potential for falsely depressed results  LDL CHOLESTEROL CALCULATED 70 mg/dL  0-100   - Patient Instructions: This is a fasting blood test  Water,black tea or black  coffee only after 9:00pm the night before test   Drink 2 glasses of water the morning of test     - Patient Instructions: This is a fasting blood test  Water,black tea or black  coffee only after 9:00pm the night before test Drink 2 glasses of water the morning of test   Triglyceride:         Normal              <150 mg/dl       Borderline High    150-199 mg/dl       High               200-499 mg/dl       Very High          >499 mg/dl  Cholesterol:         Desirable        <200 mg/dl      Borderline High  200-239 mg/dl      High             >239 mg/dl  HDL Cholesterol:        High    >59 mg/dL      Low     <41 mg/dL  LDL CALCULATED:    This screening LDL is a calculated result  It does not have the accuracy of the Direct Measured LDL in the monitoring of patients with hyperlipidemia and/or statin therapy  Direct Measure LDL (VWM291) must be ordered separately in these patients  TRIGLYCERIDES 150 mg/dL  <=150   Specimen collection should occur prior to N-Acetylcysteine or Metamizole administration due to the potential for falsely depressed results  (1) TSH WITH FT4 REFLEX [id number] 08:40AM Precious corky Order Number: [id number]     Test Name Result Flag Reference   TSH 2 150 uIU/mL  0 358-3 740   - Patient Instructions:  This is a fasting blood test  Water,black tea or black  coffee only after 9:00pm the night before test Drink 2 glasses of water the morning of test   Patients undergoing fluorescein dye angiography may retain small amounts of fluorescein in the body for 48-72 hours post procedure  Samples containing fluorescein can produce falsely depressed TSH values  If the patient had this procedure,a specimen should be resubmitted post fluorescein clearance  (1) THYROID MICROSOMAL ANTIBODY [id number] 08:40AM Emma Zachary Order Number: [id number]     Test Name Result Flag Reference   THY MICROSOM AB 9 IU/mL  0 - 34   Performed at:  [address] [place] [address]  [id number]  : Katherine Murray MD, Phone:  [phone]     VAS EVAR ENDOVASCULAR AORTIC REPAIR DUPLEX [id number] 07:46AM Remington Dolan     Test Name Result Flag Reference   VAS EVAR ENDOVASCULAR AORTIC REPAIR DUPLEX (Report)     [facility] REPORT   CLINICAL:   Indications: Abdominal Aortic Aneurysm, without Rupture [I71 4]  He denies any   claudication type symptoms  Dizziness with position changes  Clinical:   Operative History:   2010-09-16 Endovascular graft, abdominal aortic aneurysm Endoluminal graft   2010-09-16 EVAR - AAA repair - Cascade Excluder graft and Cardiomems   2010-09-16 Insertion CardioMems Sensor Endoluminal graft   Clinical   Right Pressure: 141/ mm Hg, Left Pressure: 156/ mm Hg  FINDINGS:      Unilateral        PSV AP (cm)    AAA Sac              3 9    Prox Fixation       77         Prox Main Stem Endograft  57         Dist Main Stem Endograft  83         Sup-Damari Ao         64   2 5    Jux Renal Aorta      77   2 5    Celiac          105         Prox  SMA         102            Segment          Rig      Left                            PSV AP (cm) Impression PSV AP (cm)    Prox Limb Endograft     79            59         Mid Limb Endograft     74            63         Dist Limb Endograft     64            109         Distal endograft fixation 169   1 0       120   1 5    Prox   EIA         102   1 2 Normal   104   1 4    Dist EIA          107 1 0       119   1 1    Ostial Renal                     79         Prox Renal         152            136                  CONCLUSION:      Impression   Duplex evaluation of the abdominal aortic aneurysm post EVAR shows a widely   patent endograft without evidence of endoleak  Sac size is 3 9 cm  (Prior 4 1cm)   The aorta proximal to the graft measures 2 5 cm  The iliac arteries distal to the graft measures 1 0 on the right and 1 5cm on   the left  The celiac artery, SMA and bilateral proximal renal arteries are patent  Right Lower Limb   Ankle Pressure: 191 mm Hg  RUI: 1 22  Right Great Toe Pressure 150mm Hg  within the healing range  Right Metatarsal Pressure 208 mm Hg   Left Lower Limb:   Ankle Pressure 193 mm Hg  RUI: 1 24   Left Great Toe Pressure 149 mm Hg  within the healing range  Left Metatarsal Pressure 203 mm Hg  In comparison to the study of 02/12/2016, there is no significant interval   change in the disease process  Recommend repeat testing in 1 year as per protocol unless otherwise indicated        SIGNATURE:   Electronically Signed by: Jemima Treviño on 2017-02-14 03:59:19 PM     Message   Recorded as Task   Date: 02/24/2017 05:09 PM, Created By: System   Task Name: Selin Head To: Ruben Risk   Regarding Patient: Oral Sacks, Status: Active   Comment:    System - 24 Feb 2017 5:09 PM     Patient discharged from hospital   Patient Name: Colonel Soto  Patient YOB: 1940  Discharge Date: 2/24/2017  Facility: [facility] 27 Feb 2017 7:54 AM     TASK REASSIGNED: Previously Assigned To Padilla Jimenez     Signatures   Electronically signed by : MILTON Briones ; Mar  1 2017  8:03PM EST                       (Author)

## 2018-01-14 VITALS
BODY MASS INDEX: 31.64 KG/M2 | DIASTOLIC BLOOD PRESSURE: 72 MMHG | RESPIRATION RATE: 16 BRPM | SYSTOLIC BLOOD PRESSURE: 130 MMHG | HEART RATE: 84 BPM | TEMPERATURE: 99.1 F | WEIGHT: 221 LBS | HEIGHT: 70 IN

## 2018-01-15 NOTE — MISCELLANEOUS
Message   Recorded as Task   Date: 04/21/2017 03:45 PM, Created By: Jaymie Lentz   Task Name: Miscellaneous   Assigned To: Annabel Nielsen   Regarding Patient: Sarah Barrow, Status: Active   Comment:    Bridget Robertson - 21 Apr 2017 3:45 PM     TASK CREATED  Pt called to review EEG  Advised pt of findings  Pt states he decreased keppra to 500mg BID yesterday as he feels he is overmedicated  Please advise  Pt [phone]   Ryan Naylor - 21 Apr 2017 3:53 PM     TASK REPLIED TO: Previously Assigned To Neurology Clinical,Team  Rather than decreasing the dose to a level that may be ineffective, I would prefer changing him to the ER formulation and doing the entire dose at night (levetiracetam ER 1500 mg qhs)  He told me the morning dose was making him feel bad and the ER form will eliminate the morning peak dose effect  Luzmaria Moreno - 21 Apr 2017 4:21 PM     TASK REPLIED TO: Previously Assigned To Neurology Clinical,Team                    Pt called back to state that he is not agreeable to switch to extended release as it would cost him $55 vs the $20 he currently pays and he also has a lot of the current medication  Pt states he is agreeable to stay at the 750mg dose    Pt denies the morning dose effects are that bad - "I just sit for a few minutes and feel better"        Plan  Seizure disorder    · LevETIRAcetam  MG Oral Tablet Extended Release 24 Hour; TAKE 3  TABLET Bedtime    Signatures   Electronically signed by : MILTON Gambino ; Apr 21 2017  5:21PM EST                       (Author)

## 2018-01-15 NOTE — MISCELLANEOUS
Message  I returned phone call from patient  he is concerned/distressed over the fact his license was temporarily suspended due to recent new onset seizure  he was seen by neurology at the end of March 2017  per patient he has retained a  and now requesting if needed that I speak with his         Signatures   Electronically signed by : MILTON Wolf ; May  3 2017  6:20PM EST                       (Author)

## 2018-01-16 ENCOUNTER — TRANSCRIBE ORDERS (OUTPATIENT)
Dept: ADMINISTRATIVE | Facility: HOSPITAL | Age: 78
End: 2018-01-16

## 2018-01-17 NOTE — RESULT NOTES
Message  Mr Lucia Gordillo I have enclosed a copy of your recent labs  all results are normal except for an elevated triglyceride level and mildly elevated uric acid  watch diet  you could consider restarted Allopurinol for elevated uric acid level  Verified Results  (1) CBC/PLT/DIFF [id number] 08:19AM Rosemary Obrien Order Number: [id number]     Test Name Result Flag Reference   WBC COUNT 8 00 Thousand/uL  4 31-10 16   RBC COUNT 5 11 Million/uL  3 88-5 62   HEMOGLOBIN 16 2 g/dL  12 0-17 0   HEMATOCRIT 47 6 %  36 5-49 3   MCV 93 fL  82-98   MCH 31 7 pg  26 8-34 3   MCHC 34 0 g/dL  31 4-37 4   RDW 12 7 %  11 6-15 1   MPV 11 5 fL  8 9-12 7   PLATELET COUNT 737 Thousands/uL  149-390   nRBC AUTOMATED 0 /100 WBCs     NEUTROPHILS RELATIVE PERCENT 62 %  43-75   LYMPHOCYTES RELATIVE PERCENT 23 %  14-44   MONOCYTES RELATIVE PERCENT 7 %  4-12   EOSINOPHILS RELATIVE PERCENT 8 % H 0-6   BASOPHILS RELATIVE PERCENT 0 %  0-1   NEUTROPHILS ABSOLUTE COUNT 4 99 Thousands/? ??L  1 85-7 62   LYMPHOCYTES ABSOLUTE COUNT 1 81 Thousands/? ??L  0 60-4 47   MONOCYTES ABSOLUTE COUNT 0 58 Thousand/? ??L  0 17-1 22   EOSINOPHILS ABSOLUTE COUNT 0 60 Thousand/? ??L  0 00-0 61   BASOPHILS ABSOLUTE COUNT 0 01 Thousands/? ??L  0 00-0 10     (1) COMPREHENSIVE METABOLIC PANEL [id number] 74:50WW Rosemarychelsey Obrien Order Number: [id number]     Test Name Result Flag Reference   SODIUM 142 mmol/L  136-145   POTASSIUM 4 8 mmol/L  3 5-5 3   CHLORIDE 104 mmol/L  100-108   CARBON DIOXIDE 32 mmol/L  21-32   ANION GAP (CALC) 6 mmol/L  4-13   BLOOD UREA NITROGEN 16 mg/dL  5-25   CREATININE 1 15 mg/dL  0 60-1 30   Standardized to IDMS reference method   CALCIUM 9 5 mg/dL  8 3-10 1   BILI, TOTAL 0 96 mg/dL  0 20-1 00   ALK PHOSPHATAS 59 U/L     ALT (SGPT) 23 U/L  12-78   Specimen collection should occur prior to Sulfasalazine and/or Sulfapyridine administration due to the potential for falsely depressed results     AST(SGOT) 20 U/L  5-45 Specimen collection should occur prior to Sulfasalazine administration due to the potential for falsely depressed results  ALBUMIN 4 0 g/dL  3 5-5 0   TOTAL PROTEIN 7 0 g/dL  6 4-8 2   eGFR 62 ml/min/1 73sq m     National Kidney Disease Education Program recommendations are as follows:  GFR calculation is accurate only with a steady state creatinine  Chronic Kidney disease less than 60 ml/min/1 73 sq  meters  Kidney failure less than 15 ml/min/1 73 sq  meters  GLUCOSE FASTING 93 mg/dL  65-99   Specimen collection should occur prior to Sulfasalazine administration due to the potential for falsely depressed results  Specimen collection should occur prior to Sulfapyridine administration due to the potential for falsely elevated results  (1) LIPID PANEL, FASTING [id number] 08:19AM Josiemj Sarah Order Number: [id number]     Test Name Result Flag Reference   CHOLESTEROL 119 mg/dL     HDL,DIRECT 39 mg/dL L 40-60   Specimen collection should occur prior to Metamizole administration due to the potential for falsley depressed results  LDL CHOLESTEROL CALCULATED 44 mg/dL  0-100   Triglyceride:        Normal ??? ??? ??? ??? ??? ??? ??? <150 mg/dl   ??? ??? ???Borderline High ??? ??? 150-199 mg/dl   ??? ??? ? ?? High ??? ??? ??? ??? ??? ??? ??? 200-499 mg/dl   ??? ??? ? ??Very High ??? ??? ??? ??? ??? >499 mg/dl      Cholesterol:       Desirable ??? ??? ??? ??? <200 mg/dl   ??? ??? Borderline High ??? 200-239 mg/dl   ??? ??? High ??? ??? ??? ??? ??? ??? >239 mg/dl      HDL Cholesterol:       High ??? ???>59 mg/dL   ??? ??? Low ??? ??? <41 mg/dL      This screening LDL is a calculated result  It does not have the accuracy of the Direct Measured LDL in the monitoring of patients with hyperlipidemia and/or statin therapy  Direct Measure LDL (ILR739) must be ordered separately in these patients     TRIGLYCERIDES 180 mg/dL H <=150   Specimen collection should occur prior to N-Acetylcysteine or Metamizole administration due to the potential for falsely depressed results  (1) URIC ACID [id number] 08:19AM Saman Alexmike Order Number: [id number]     Test Name Result Flag Reference   URIC ACID 8 2 mg/dL H 4 2-8 0   Specimen collection should occur prior to Metamizole administration due to the potential for falsely depressed results         Signatures   Electronically signed by : MILTON Bhatt ; Aug 27 2017 10:09AM EST                       (Author)

## 2018-01-22 VITALS
HEART RATE: 91 BPM | DIASTOLIC BLOOD PRESSURE: 83 MMHG | HEIGHT: 70 IN | WEIGHT: 227 LBS | BODY MASS INDEX: 32.5 KG/M2 | SYSTOLIC BLOOD PRESSURE: 157 MMHG

## 2018-02-02 ENCOUNTER — TELEPHONE (OUTPATIENT)
Dept: FAMILY MEDICINE CLINIC | Facility: CLINIC | Age: 78
End: 2018-02-02

## 2018-02-02 DIAGNOSIS — E78.2 MIXED HYPERLIPIDEMIA: Primary | ICD-10-CM

## 2018-02-02 RX ORDER — SIMVASTATIN 40 MG
40 TABLET ORAL DAILY
Qty: 90 TABLET | Refills: 3 | Status: SHIPPED | OUTPATIENT
Start: 2018-02-02 | End: 2018-02-13 | Stop reason: SDUPTHER

## 2018-02-02 RX ORDER — SIMVASTATIN 40 MG
40 TABLET ORAL DAILY
Qty: 90 TABLET | Refills: 0 | Status: SHIPPED | OUTPATIENT
Start: 2018-02-02 | End: 2018-02-02 | Stop reason: SDUPTHER

## 2018-02-13 ENCOUNTER — OFFICE VISIT (OUTPATIENT)
Dept: FAMILY MEDICINE CLINIC | Facility: CLINIC | Age: 78
End: 2018-02-13
Payer: MEDICARE

## 2018-02-13 VITALS
WEIGHT: 229 LBS | TEMPERATURE: 97 F | SYSTOLIC BLOOD PRESSURE: 124 MMHG | DIASTOLIC BLOOD PRESSURE: 78 MMHG | BODY MASS INDEX: 32.06 KG/M2 | HEART RATE: 80 BPM | HEIGHT: 71 IN | RESPIRATION RATE: 16 BRPM

## 2018-02-13 DIAGNOSIS — I10 ESSENTIAL HYPERTENSION: Primary | ICD-10-CM

## 2018-02-13 DIAGNOSIS — E03.9 ACQUIRED HYPOTHYROIDISM: ICD-10-CM

## 2018-02-13 DIAGNOSIS — R56.9 SEIZURE (HCC): ICD-10-CM

## 2018-02-13 DIAGNOSIS — E78.2 MIXED HYPERLIPIDEMIA: ICD-10-CM

## 2018-02-13 DIAGNOSIS — I71.4 ABDOMINAL AORTIC ANEURYSM (AAA) WITHOUT RUPTURE (HCC): ICD-10-CM

## 2018-02-13 DIAGNOSIS — E79.0 HYPERURICEMIA: ICD-10-CM

## 2018-02-13 PROBLEM — M75.101 TEAR OF RIGHT ROTATOR CUFF: Status: ACTIVE | Noted: 2017-04-04

## 2018-02-13 PROBLEM — G40.909 SEIZURE DISORDER (HCC): Status: ACTIVE | Noted: 2017-03-01

## 2018-02-13 PROBLEM — N17.9 AKI (ACUTE KIDNEY INJURY) (HCC): Status: RESOLVED | Noted: 2017-02-21 | Resolved: 2018-02-13

## 2018-02-13 PROCEDURE — 99214 OFFICE O/P EST MOD 30 MIN: CPT | Performed by: FAMILY MEDICINE

## 2018-02-13 RX ORDER — SIMVASTATIN 40 MG
1 TABLET ORAL DAILY
COMMUNITY
Start: 2010-07-19 | End: 2019-02-25 | Stop reason: ALTCHOICE

## 2018-02-13 RX ORDER — AMOXICILLIN 500 MG/1
TABLET, FILM COATED ORAL
COMMUNITY
End: 2018-06-20 | Stop reason: SDUPTHER

## 2018-02-13 RX ORDER — LEVETIRACETAM 500 MG/1
3 TABLET, EXTENDED RELEASE ORAL
COMMUNITY
Start: 2017-04-21 | End: 2018-06-20 | Stop reason: SDUPTHER

## 2018-02-13 RX ORDER — ASPIRIN 325 MG
1 TABLET ORAL DAILY
COMMUNITY
End: 2020-08-31 | Stop reason: ALTCHOICE

## 2018-02-13 NOTE — PATIENT INSTRUCTIONS

## 2018-02-13 NOTE — PROGRESS NOTES
Assessment/Plan:         Diagnoses and all orders for this visit:    Essential hypertension    Mixed hyperlipidemia    Abdominal aortic aneurysm (AAA) without rupture (HCC)    Seizure ([facility] [address] )    Hyperuricemia    Acquired hypothyroidism    Other orders  -     aspirin 325 mg tablet; Take 1 capsule by mouth daily  -     amoxicillin (AMOXIL) 500 MG tablet; Take by mouth  -     levETIRAcetam (KEPPRA XR) 500 MG 24 hr tablet; Take 3 tablets by mouth  -     simvastatin (ZOCOR) 40 mg tablet; Take 1 tablet by mouth daily          Continue with current medications  Repeat labs  Follow up with vascular surgery and Neurology  Monitor blood pressures    Patient Instructions   Hyperlipidemia   AMBULATORY CARE:   Hyperlipidemia  is a high level of lipids (fats) in your blood  These lipids include cholesterol or triglycerides  Lipids are made by your body  They also come from the foods you eat  Your body needs lipids to work properly, but high levels increase your risk for heart disease, heart attack, and stroke  Call 911 for any of the following:   · You have any of the following signs of a heart attack:      ¨ Squeezing, pressure, or pain in your chest that lasts longer than 5 minutes or returns    ¨ Discomfort or pain in your back, neck, jaw, stomach, or arm     ¨ Trouble breathing    ¨ Nausea or vomiting    ¨ Lightheadedness or a sudden cold sweat, especially with chest pain or trouble breathing    · You have any of the following signs of a stroke:      ¨ Numbness or drooping on one side of your face     ¨ Weakness in an arm or leg    ¨ Confusion or difficulty speaking    ¨ Dizziness, a severe headache, or vision loss  Contact your healthcare provider if:   · You have questions or concerns about your condition or care  Treatment of hyperlipidemia  may first include lifestyle changes to help decrease your lipid levels  You may also need to take medicine to lower your lipid levels   Some of the lifestyle changes you may need to make include the following:  · Maintain a healthy weight  Ask your healthcare provider how much you should weigh  Ask him or her to help you create a weight loss plan if you are overweight  Weight loss can decrease your cholesterol and triglyceride levels  · Exercise as directed  Exercise lowers your cholesterol levels and helps you maintain a healthy weight  Get 40 minutes or more of moderate exercise 3 to 4 days each week  You can split your exercise into four 10-minute workouts instead of 40 minutes at one time  Examples of moderate exercises include walking briskly, swimming, or riding a bike  Work with your healthcare provider to plan the best exercise program for you  · Do not smoke  Nicotine and other chemicals in cigarettes and cigars can increase your risk for a heart attack and stroke  Ask your healthcare provider for information if you currently smoke and need help to quit  E-cigarettes or smokeless tobacco still contain nicotine  Talk to your healthcare provider before you use these products  · Eat heart-healthy foods  Talk to your dietitian about a heart-healthy diet  The following will help you manage hyperlipidemia:     ¨ Decrease the total amount of fat you eat  Choose lean meats, fat-free or 1% fat milk, and low-fat dairy products, such as yogurt and cheese  Limit or do not eat red meat  Red meats are high in fat and cholesterol  ¨ Replace unhealthy fats with healthy fats  Unhealthy fats include saturated fat, trans fat, and cholesterol  Choose soft margarines that are low in saturated fat and have little or no trans fat  Monounsaturated fats are healthy fats  These are found in olive oil, canola oil, avocado, and nuts  Polyunsaturated fats are also healthy  These are found in fish, flaxseed, walnuts, and soybeans  ¨ Eat fruits and vegetables every day  They are low in calories and fat and a good source of essential vitamins   Include dark green, red, and orange vegetables  Examples include spinach, kale, broccoli, and carrots  ¨ Eat foods high in fiber  Choose whole grain, high-fiber foods  Good choices include whole-wheat breads or cereals, beans, peas, fruits, and vegetables  · Ask your healthcare provider if it is safe for you to drink alcohol  Alcohol can increase your cholesterol and triglyceride levels  A drink of alcohol is 12 ounces of beer, 5 ounces of wine, or 1½ ounces of liquor  Follow up with your healthcare provider as directed: You may need to return for more tests  Your healthcare provider may refer you to a dietitian  Write down your questions so you remember to ask them during your visits  © 2017 [employer]  Information is for End User's use only and may not be sold, redistributed or otherwise used for commercial purposes  All illustrations and images included in CareNotes® are the copyrighted property of A D A M , Inc  or [employer]  The above information is an  only  It is not intended as medical advice for individual conditions or treatments  Talk to your doctor, nurse or pharmacist before following any medical regimen to see if it is safe and effective for you  Patient ID: King Travis is a 78 y o  male  Follow-up visit  Medications reviewed  Labs 08/2017  Hypertension blood pressures have been stable off medication  Creatinine 1 15  Electrolytes normal   Hemoglobin 16 2  Hyperlipidemia with history of AAA s/p  Endovascular stent  On Simvastatin 40 mg daily  Cholesterol 119  Triglycerides 180  HDL 39  LDL 44  LFTs normal   FBS 93           Review of Systems   Constitutional: Negative for activity change, appetite change, chills, fever and unexpected weight change  HENT: Negative for congestion, trouble swallowing and voice change  Eyes: Negative for visual disturbance  Respiratory: Negative for cough, chest tightness, shortness of breath and wheezing  Cardiovascular: Negative for chest pain, palpitations and leg swelling  History of abdominal aortic aneurysm status post endovascular stenting  He is followed by vascular surgery  No claudications   Gastrointestinal: Negative for abdominal pain, blood in stool, constipation, diarrhea, nausea and vomiting  Endocrine:          Past history of borderline abnormal elevated TSH 6 163  Repeat TSH normal at 2 520  On no medications   Genitourinary: Negative for difficulty urinating and urgency  Musculoskeletal: Negative for arthralgias and myalgias  History of hyperlipidemia and gout  08/2017 uric acid 8 2  No longer on allopurinol  Chronic bilateral shoulder pain with chronic rotator cuff tear right shoulder and subacromial bursitis left shoulder  Status post bilateral steroid injections by Orthopedic surgery 12/2017  He is on no pain medications at present   Skin: Negative for rash  Neurological: Negative for dizziness and headaches  History of seizure disorder with 2 seizures occurring within several hours 02/ 2017  extensive workup and neurological evaluation  MRI brain normal   Awake EEG normal   He did not drive for 6 months and regained his license at the end of August 2017  He has been seizure-free on Keppra no side effects  Hematological: Negative for adenopathy  Does not bruise/bleed easily  Psychiatric/Behavioral: Negative for dysphoric mood and sleep disturbance  /78 (BP Location: Left arm, Patient Position: Sitting, Cuff Size: Large)   Pulse 80   Temp (!) 97 °F (36 1 °C) (Tympanic)   Resp 16   Ht 5' 11" (1 803 m)   Wt 104 kg (229 lb)   BMI 31 94 kg/m²          Physical Exam   Constitutional: He is oriented to person, place, and time  He appears well-developed and well-nourished  No distress     HENT:   Right Ear: Tympanic membrane normal    Left Ear: Tympanic membrane normal    Mouth/Throat: Oropharynx is clear and moist    Eyes: Conjunctivae and EOM are normal  Pupils are equal, round, and reactive to light  No scleral icterus  Funduscopic exam normal   Neck: Normal range of motion  Neck supple  No JVD present  Carotid bruit is not present  No tracheal deviation present  No thyroid mass and no thyromegaly present  Cardiovascular: Normal rate, regular rhythm, normal heart sounds and intact distal pulses  Exam reveals no gallop  No murmur heard  Pulses:       Carotid pulses are 2+ on the right side, and 2+ on the left side  Pulmonary/Chest: Effort normal and breath sounds normal  No respiratory distress  He has no wheezes  He has no rales  Abdominal: Soft  Bowel sounds are normal  He exhibits no distension and no mass  There is no hepatosplenomegaly  There is no tenderness  There is no rebound and no guarding  Musculoskeletal: He exhibits no edema or deformity  Decreased range of motion of shoulders with internal rotation   Lymphadenopathy:     He has no cervical adenopathy  Neurological: He is alert and oriented to person, place, and time  No cranial nerve deficit  Skin: Skin is warm and dry  No rash noted  Psychiatric: He has a normal mood and affect  His behavior is normal    Nursing note and vitals reviewed        Recent Results (from the past 5040 hour(s))   CBC and differential    Collection Time: 08/25/17  8:19 AM   Result Value Ref Range    WBC 8 00 4 31 - 10 16 Thousand/uL    RBC 5 11 3 88 - 5 62 Million/uL    Hemoglobin 16 2 12 0 - 17 0 g/dL    Hematocrit 47 6 36 5 - 49 3 %    MCV 93 82 - 98 fL    MCH 31 7 26 8 - 34 3 pg    MCHC 34 0 31 4 - 37 4 g/dL    RDW 12 7 11 6 - 15 1 %    MPV 11 5 8 9 - 12 7 fL    Platelets 904 320 - 946 Thousands/uL    nRBC 0 /100 WBCs    Neutrophils Relative 62 43 - 75 %    Lymphocytes Relative 23 14 - 44 %    Monocytes Relative 7 4 - 12 %    Eosinophils Relative 8 (H) 0 - 6 %    Basophils Relative 0 0 - 1 %    Neutrophils Absolute 4 99 1 85 - 7 62 Thousands/µL    Lymphocytes Absolute 1 81 0 60 - 4 47 Thousands/µL    Monocytes Absolute 0 58 0 17 - 1 22 Thousand/µL    Eosinophils Absolute 0 60 0 00 - 0 61 Thousand/µL    Basophils Absolute 0 01 0 00 - 0 10 Thousands/µL   Comprehensive metabolic panel    Collection Time: 08/25/17  8:19 AM   Result Value Ref Range    Sodium 142 136 - 145 mmol/L    Potassium 4 8 3 5 - 5 3 mmol/L    Chloride 104 100 - 108 mmol/L    CO2 32 21 - 32 mmol/L    Anion Gap 6 4 - 13 mmol/L    BUN 16 5 - 25 mg/dL    Creatinine 1 15 0 60 - 1 30 mg/dL    Glucose, Fasting 93 65 - 99 mg/dL    Calcium 9 5 8 3 - 10 1 mg/dL    AST 20 5 - 45 U/L    ALT 23 12 - 78 U/L    Alkaline Phosphatase 59 46 - 116 U/L    Total Protein 7 0 6 4 - 8 2 g/dL    Albumin 4 0 3 5 - 5 0 g/dL    Total Bilirubin 0 96 0 20 - 1 00 mg/dL    eGFR 62 ml/min/1 73sq m   Lipid panel    Collection Time: 08/25/17  8:19 AM   Result Value Ref Range    Cholesterol 119 50 - 200 mg/dL    Triglycerides 180 (H) <=150 mg/dL    HDL, Direct 39 (L) 40 - 60 mg/dL    LDL Calculated 44 0 - 100 mg/dL   Uric acid    Collection Time: 08/25/17  8:19 AM   Result Value Ref Range    Uric Acid 8 2 (H) 4 2 - 8 0 mg/dL

## 2018-02-16 DIAGNOSIS — I71.4 ABDOMINAL AORTIC ANEURYSM WITHOUT RUPTURE (HCC): ICD-10-CM

## 2018-02-20 ENCOUNTER — APPOINTMENT (OUTPATIENT)
Dept: LAB | Facility: CLINIC | Age: 78
End: 2018-02-20
Payer: MEDICARE

## 2018-02-20 ENCOUNTER — HOSPITAL ENCOUNTER (OUTPATIENT)
Dept: NON INVASIVE DIAGNOSTICS | Facility: CLINIC | Age: 78
Discharge: HOME | End: 2018-02-20
Payer: MEDICARE

## 2018-02-20 DIAGNOSIS — I71.4 ABDOMINAL AORTIC ANEURYSM WITHOUT RUPTURE (HCC): ICD-10-CM

## 2018-02-20 LAB
ALBUMIN SERPL BCP-MCNC: 4 G/DL (ref 3.5–5)
ALP SERPL-CCNC: 56 U/L (ref 46–116)
ALT SERPL W P-5'-P-CCNC: 30 U/L (ref 12–78)
ANION GAP SERPL CALCULATED.3IONS-SCNC: 5 MMOL/L (ref 4–13)
AST SERPL W P-5'-P-CCNC: 25 U/L (ref 5–45)
BASOPHILS # BLD AUTO: 0.02 THOUSANDS/ΜL (ref 0–0.1)
BASOPHILS NFR BLD AUTO: 0 % (ref 0–1)
BILIRUB SERPL-MCNC: 0.92 MG/DL (ref 0.2–1)
BUN SERPL-MCNC: 15 MG/DL (ref 5–25)
CALCIUM SERPL-MCNC: 9.1 MG/DL (ref 8.3–10.1)
CHLORIDE SERPL-SCNC: 106 MMOL/L (ref 100–108)
CHOLEST SERPL-MCNC: 126 MG/DL (ref 50–200)
CO2 SERPL-SCNC: 32 MMOL/L (ref 21–32)
CREAT SERPL-MCNC: 1.18 MG/DL (ref 0.6–1.3)
EOSINOPHIL # BLD AUTO: 0.44 THOUSAND/ΜL (ref 0–0.61)
EOSINOPHIL NFR BLD AUTO: 5 % (ref 0–6)
ERYTHROCYTE [DISTWIDTH] IN BLOOD BY AUTOMATED COUNT: 13 % (ref 11.6–15.1)
GFR SERPL CREATININE-BSD FRML MDRD: 60 ML/MIN/1.73SQ M
GLUCOSE P FAST SERPL-MCNC: 88 MG/DL (ref 65–99)
HCT VFR BLD AUTO: 47.8 % (ref 36.5–49.3)
HDLC SERPL-MCNC: 44 MG/DL (ref 40–60)
HGB BLD-MCNC: 15.9 G/DL (ref 12–17)
LDLC SERPL CALC-MCNC: 46 MG/DL (ref 0–100)
LYMPHOCYTES # BLD AUTO: 1.42 THOUSANDS/ΜL (ref 0.6–4.47)
LYMPHOCYTES NFR BLD AUTO: 18 % (ref 14–44)
MCH RBC QN AUTO: 30.9 PG (ref 26.8–34.3)
MCHC RBC AUTO-ENTMCNC: 33.3 G/DL (ref 31.4–37.4)
MCV RBC AUTO: 93 FL (ref 82–98)
MONOCYTES # BLD AUTO: 0.54 THOUSAND/ΜL (ref 0.17–1.22)
MONOCYTES NFR BLD AUTO: 7 % (ref 4–12)
NEUTROPHILS # BLD AUTO: 5.64 THOUSANDS/ΜL (ref 1.85–7.62)
NEUTS SEG NFR BLD AUTO: 70 % (ref 43–75)
NRBC BLD AUTO-RTO: 0 /100 WBCS
PLATELET # BLD AUTO: 180 THOUSANDS/UL (ref 149–390)
PMV BLD AUTO: 11.4 FL (ref 8.9–12.7)
POTASSIUM SERPL-SCNC: 4.7 MMOL/L (ref 3.5–5.3)
PROT SERPL-MCNC: 7.3 G/DL (ref 6.4–8.2)
RBC # BLD AUTO: 5.14 MILLION/UL (ref 3.88–5.62)
SODIUM SERPL-SCNC: 143 MMOL/L (ref 136–145)
TRIGL SERPL-MCNC: 182 MG/DL
TSH SERPL DL<=0.05 MIU/L-ACNC: 3.54 UIU/ML (ref 0.36–3.74)
WBC # BLD AUTO: 8.09 THOUSAND/UL (ref 4.31–10.16)

## 2018-02-20 PROCEDURE — 85025 COMPLETE CBC W/AUTO DIFF WBC: CPT | Performed by: FAMILY MEDICINE

## 2018-02-20 PROCEDURE — 80061 LIPID PANEL: CPT | Performed by: FAMILY MEDICINE

## 2018-02-20 PROCEDURE — 93922 UPR/L XTREMITY ART 2 LEVELS: CPT

## 2018-02-20 PROCEDURE — 36415 COLL VENOUS BLD VENIPUNCTURE: CPT | Performed by: FAMILY MEDICINE

## 2018-02-20 PROCEDURE — 84443 ASSAY THYROID STIM HORMONE: CPT | Performed by: FAMILY MEDICINE

## 2018-02-20 PROCEDURE — 93978 VASCULAR STUDY: CPT

## 2018-02-20 PROCEDURE — 80053 COMPREHEN METABOLIC PANEL: CPT | Performed by: FAMILY MEDICINE

## 2018-02-20 PROCEDURE — 93979 VASCULAR STUDY: CPT | Performed by: SURGERY

## 2018-03-26 ENCOUNTER — TELEPHONE (OUTPATIENT)
Dept: FAMILY MEDICINE CLINIC | Facility: CLINIC | Age: 78
End: 2018-03-26

## 2018-03-26 DIAGNOSIS — Z20.828 EXPOSURE TO THE FLU: Primary | ICD-10-CM

## 2018-03-26 RX ORDER — OSELTAMIVIR PHOSPHATE 75 MG/1
75 CAPSULE ORAL DAILY
Qty: 10 CAPSULE | Refills: 0 | Status: SHIPPED | OUTPATIENT
Start: 2018-03-26 | End: 2018-04-05

## 2018-03-26 NOTE — TELEPHONE ENCOUNTER
Spoke with pt, gave message  [facility]ed rx to [facility] in chart  [facility] to [facility] [address] by mistake  Called and cx'ed at that pharm

## 2018-03-26 NOTE — TELEPHONE ENCOUNTER
PTS WIFE IS AT OLD [place] FOR REHAB AND WAS JUST DX WITH [facility] THIS MORNING   HE HAS BEEN SPENDING A LOT OF TIME WITH HER AND WANTS TO KNOW IF YOU THINK HE SHOULD START TAMIFLU AS A PREVENTATIVE

## 2018-04-12 ENCOUNTER — TRANSCRIBE ORDERS (OUTPATIENT)
Dept: VASCULAR SURGERY | Facility: CLINIC | Age: 78
End: 2018-04-12

## 2018-04-12 DIAGNOSIS — I71.4 ABDOMINAL AORTIC ANEURYSM WITHOUT RUPTURE (HCC): Primary | ICD-10-CM

## 2018-05-07 ENCOUNTER — ANESTHESIA EVENT (OUTPATIENT)
Dept: PERIOP | Facility: AMBULARY SURGERY CENTER | Age: 78
End: 2018-05-07
Payer: MEDICARE

## 2018-05-18 ENCOUNTER — ANESTHESIA (OUTPATIENT)
Dept: PERIOP | Facility: AMBULARY SURGERY CENTER | Age: 78
End: 2018-05-18
Payer: MEDICARE

## 2018-05-18 ENCOUNTER — HOSPITAL ENCOUNTER (OUTPATIENT)
Facility: AMBULARY SURGERY CENTER | Age: 78
Setting detail: OUTPATIENT SURGERY
Discharge: HOME | End: 2018-05-18
Attending: COLON & RECTAL SURGERY | Admitting: COLON & RECTAL SURGERY
Payer: MEDICARE

## 2018-05-18 VITALS
BODY MASS INDEX: 29.92 KG/M2 | SYSTOLIC BLOOD PRESSURE: 118 MMHG | WEIGHT: 209 LBS | RESPIRATION RATE: 18 BRPM | OXYGEN SATURATION: 96 % | HEIGHT: 70 IN | DIASTOLIC BLOOD PRESSURE: 65 MMHG | HEART RATE: 80 BPM | TEMPERATURE: 96.5 F

## 2018-05-18 DIAGNOSIS — Z86.010 HISTORY OF COLONIC POLYPS: ICD-10-CM

## 2018-05-18 PROCEDURE — 88305 TISSUE EXAM BY PATHOLOGIST: CPT | Performed by: PATHOLOGY

## 2018-05-18 RX ORDER — EPHEDRINE SULFATE 50 MG/ML
INJECTION, SOLUTION INTRAVENOUS AS NEEDED
Status: DISCONTINUED | OUTPATIENT
Start: 2018-05-18 | End: 2018-05-18 | Stop reason: SURG

## 2018-05-18 RX ORDER — PROPOFOL 10 MG/ML
INJECTION, EMULSION INTRAVENOUS AS NEEDED
Status: DISCONTINUED | OUTPATIENT
Start: 2018-05-18 | End: 2018-05-18 | Stop reason: SURG

## 2018-05-18 RX ORDER — SODIUM CHLORIDE 9 MG/ML
125 INJECTION, SOLUTION INTRAVENOUS CONTINUOUS
Status: DISCONTINUED | OUTPATIENT
Start: 2018-05-18 | End: 2018-05-18

## 2018-05-18 RX ADMIN — PROPOFOL 20 MG: 10 INJECTION, EMULSION INTRAVENOUS at 09:48

## 2018-05-18 RX ADMIN — PROPOFOL 20 MG: 10 INJECTION, EMULSION INTRAVENOUS at 09:50

## 2018-05-18 RX ADMIN — EPHEDRINE SULFATE 10 MG: 50 INJECTION, SOLUTION INTRAMUSCULAR; INTRAVENOUS; SUBCUTANEOUS at 09:44

## 2018-05-18 RX ADMIN — PROPOFOL 80 MG: 10 INJECTION, EMULSION INTRAVENOUS at 09:38

## 2018-05-18 RX ADMIN — SODIUM CHLORIDE 125 ML/HR: 0.9 INJECTION, SOLUTION INTRAVENOUS at 08:37

## 2018-05-18 RX ADMIN — PROPOFOL 50 MG: 10 INJECTION, EMULSION INTRAVENOUS at 09:42

## 2018-05-18 NOTE — ANESTHESIA PREPROCEDURE EVALUATION
Review of Systems/Medical History  Patient summary reviewed        Cardiovascular  EKG reviewed, Hyperlipidemia, Hypertension , Valvular heart disease , aortic insufficiency,   Comment: s/p aaa    LEFT VENTRICLE:  Systolic function was normal by visual assessment  Ejection fraction was estimated to be 60 %  There were no regional wall motion abnormalities  Wall thickness was mildly increased      MITRAL VALVE:  There was mild regurgitation      AORTIC VALVE:  Transaortic velocity was increased due to valvular stenosis  There was mild stenosis  There was mild to moderate regurgitation      TRICUSPID VALVE:  There was mild regurgitation      PULMONIC VALVE:  There was trace regurgitation      AORTA:  The root exhibited mild dilatation  The ascending aorta is mildly dilated at 4 1 cm ,  Pulmonary  Negative pulmonary ROS        GI/Hepatic  Negative GI/hepatic ROS          Negative  ROS        Endo/Other  History of thyroid disease , hypothyroidism,      GYN  Negative gynecology ROS          Hematology  Negative hematology ROS      Musculoskeletal  Gout,   Arthritis     Neurology  Seizures ,     Psychology   Negative psychology ROS              Physical Exam    Airway    Mallampati score: II  TM Distance: >3 FB  Neck ROM: full     Dental       Cardiovascular  Cardiovascular exam normal    Pulmonary  Pulmonary exam normal     Other Findings        Anesthesia Plan  ASA Score- 3     Anesthesia Type- IV sedation with anesthesia with ASA Monitors  Additional Monitors:   Airway Plan:         Plan Factors-    Induction- intravenous  Postoperative Plan-     Informed Consent- Anesthetic plan and risks discussed with patient  I personally reviewed this patient with the CRNA  Discussed and agreed on the Anesthesia Plan with the CRNA  Arleen Brush

## 2018-05-18 NOTE — OP NOTE
OPERATIVE REPORT  PATIENT NAME: Claudio Hoskins    :  1940  MRN: [id number]  Pt Location: [facility]  GI ROOM 01    SURGERY DATE: 2018    Surgeon(s) and Role:     Amanda Cole MD - Primary    Preop Diagnosis:  History of colonic polyps [Z86 010]    Post-Op Diagnosis Codes:     * History of colonic polyps [Z86 010]    Procedure(s) (LRB):  COLONOSCOPY (N/A)    Specimen(s):  ID Type Source Tests Collected by Time Destination   1 : cold snare sigmoid colon polyp Tissue Polyp, Colorectal TISSUE EXAM Oleksandr Muhammad MD 2018 [id number]        Estimated Blood Loss:   Minimal    Drains:       Anesthesia Type:   IV Sedation with Anesthesia    Operative Indications:  History of colonic polyps [Z86 010]    Operative Findings:  Colon polyps    Complications:   None    Procedure and Technique:  Digital rectal exam revealed a normal prostate gland without nodules  There were no rectal masses are anal disease noted  The colonoscope was inserted and advanced to the cecum without difficulty  The preparation was excellent  Two sigmoid colon polyps that were flat and measured approximately 1 cm in diameter were removed with cold snare technique and sent for pathologic evaluation  A large transverse colon scar at a tattooed site was free of any recurrence polyp  Sigmoid diverticulosis was mild to moderate  The remainder of the study was unremarkable  The cecal landmarks were clearly identified and were photographed  The appendiceal orifice was clearly seen  I was present for the entire procedure    Patient Disposition:  APU     Plan: Colonoscopy in 3 years      SIGNATURE: Oleksandr Muhammad MD  DATE: May 18, 2018  TIME: 9:52 AM

## 2018-05-18 NOTE — ANESTHESIA POSTPROCEDURE EVALUATION
Post-Op Assessment Note      CV Status:  Stable    Mental Status:  Alert and awake    Hydration Status:  Euvolemic    PONV Controlled:  Controlled    Airway Patency:  Patent    Post Op Vitals Reviewed: Yes          Staff: MILI           BP (!) 93/47 (05/18/18 0951)    Temp      Pulse 76 (05/18/18 0951)   Resp 15 (05/18/18 0951)    SpO2 96 % (05/18/18 0951)

## 2018-05-18 NOTE — H&P
History and Physical   Colon and Rectal Surgery   Channing Macdonald 78 y o  male MRN: [id number]  Unit/Bed#: OR [place] Encounter: [id number]  05/18/18   9:30 AM      CC: History of polyps  History of Present Illness   HPI:  Channing Macdonald is a 78 y o  male with no GI symptoms  Historical Information   Past Medical History:   Diagnosis Date    Colon polyps     Gout     Hyperlipidemia     Hypertension     S/P AAA repair     Seizure disorder [facility])      Past Surgical History:   Procedure Laterality Date    ABDOMINAL AORTIC ANEURYSM REPAIR, ENDOVASCULAR N/A     DC COLONOSCOPY FLX DX W/COLLJ SPEC WHEN PFRMD N/A 6/7/2017    Procedure: COLONOSCOPY;  Surgeon: Dimitrios Guerra MD;  Location: [facility];   Service: Colorectal    TONSILLECTOMY      WISDOM TOOTH EXTRACTION Bilateral        Meds/Allergies     Prescriptions Prior to Admission   Medication    acetaminophen (TYLENOL) 500 mg tablet    aspirin 325 mg tablet    levETIRAcetam (KEPPRA XR) 500 MG 24 hr tablet    simvastatin (ZOCOR) 40 mg tablet    amoxicillin (AMOXIL) 500 mg capsule    amoxicillin (AMOXIL) 500 MG tablet         Current Facility-Administered Medications:     sodium chloride 0 9 % infusion, 125 mL/hr, Intravenous, Continuous, Sun MD [place], Last Rate: 125 mL/hr at 05/18/18 0837, 125 mL/hr at 05/18/18 3176    Allergies   Allergen Reactions    Fenofibrate      Patient not aware of allergy    Hydrocodone-Acetaminophen      Patient not aware of allergy         Social History   History   Alcohol Use    Yes     Comment: occasional     History   Drug Use No     History   Smoking Status    Former Smoker    Packs/day: 3 00    Years: 10 00    Quit date: 2/21/1971   Smokeless Tobacco    Never Used         Family History:   Family History   Problem Relation Age of Onset    Aneurysm Mother      ABDMONIAL  AORTA    Coronary artery disease Father     Coronary artery disease Brother          Objective     Current Vitals:   Blood Pressure: 152/76 (05/18/18 4518)  Pulse: 81 (05/18/18 0821)  Temperature: 97 5 °F (36 4 °C) (05/18/18 0821)  Temp Source: Temporal (05/18/18 1777)  Respirations: 18 (05/18/18 0821)  Height: 5' 10" (177 8 cm) (05/18/18 8127)  Weight - Scale: 94 8 kg (209 lb) (05/18/18 0821)  SpO2: 96 % (05/18/18 0821)  No intake or output data in the 24 hours ending 05/18/18 0930    Physical Exam:  General:  Well nourished, no distress  Neuro: Alert and oriented  Eyes:Sclera anicteric, conjunctiva pink  Pulm: Clear to auscultation bilaterally  No respiratory Distress  CV:  Regular rate and rhythm  No murmurs  Abdomen:  Soft, flat, non-tender, without masses or hepatosplenomegaly  ASSESSMENT:  Heike Cedeño is a 78 y o  male for surveillance of colon polyps  PLAN:  Colonoscopy  Risks , including, but not limited to, bleeding, perforation, missed lesions, and potential need for surgery, were reviewed  Alternatives to colonoscopy were discussed    Radha Limon MD

## 2018-05-18 NOTE — DISCHARGE INSTRUCTIONS
Colonoscopy   WHAT YOU NEED TO KNOW:   A colonoscopy is a procedure to examine the inside of your colon (intestine) with a scope  Polyps or tissue growths may have been removed during your colonoscopy  It is normal to feel bloated and to have some abdominal discomfort  You should be passing gas  If you have hemorrhoids or you had polyps removed, you may have a small amount of bleeding  DISCHARGE INSTRUCTIONS:   Seek care immediately if:   · You have a large amount of bright red blood in your bowel movements  · Your abdomen is hard and firm and you have severe pain  · You have sudden trouble breathing  Contact your healthcare provider if:   · You develop a rash or hives  · You have a fever within 24 hours of your procedure       · You have not had a bowel movement for 3 days after your procedure  · You have questions or concerns about your condition or care  Activity:   · Do not lift, strain, or run  for 3 days after your procedure  · Rest after your procedure  You have been given medicine to relax you  Do not  drive or make important decisions until the day after your procedure  Return to your normal activity as directed  · Relieve gas and discomfort from bloating  by lying on your right side with a heating pad on your abdomen  You may need to take short walks to help the gas move out  Eat small meals until bloating is relieved  If you had polyps removed: For 7 days after your procedure:  · Do not  take aspirin  · Do not  go on long car rides  Follow up with your healthcare provider as directed:  Write down your questions so you remember to ask them during your visits  © 2017 [employer] is for End User's use only and may not be sold, redistributed or otherwise used for commercial purposes  All illustrations and images included in CareNotes® are the copyrighted property of [employer]  or Christian Moe    The above information is an  only  It is not intended as medical advice for individual conditions or treatments  Talk to your doctor, nurse or pharmacist before following any medical regimen to see if it is safe and effective for you

## 2018-06-20 ENCOUNTER — OFFICE VISIT (OUTPATIENT)
Dept: NEUROLOGY | Facility: CLINIC | Age: 78
End: 2018-06-20
Payer: MEDICARE

## 2018-06-20 VITALS
SYSTOLIC BLOOD PRESSURE: 132 MMHG | WEIGHT: 212.3 LBS | DIASTOLIC BLOOD PRESSURE: 64 MMHG | BODY MASS INDEX: 30.39 KG/M2 | HEIGHT: 70 IN | HEART RATE: 79 BPM

## 2018-06-20 DIAGNOSIS — R56.9 SEIZURE (HCC): Primary | ICD-10-CM

## 2018-06-20 PROCEDURE — 99214 OFFICE O/P EST MOD 30 MIN: CPT | Performed by: PSYCHIATRY & NEUROLOGY

## 2018-06-20 RX ORDER — LEVETIRACETAM 500 MG/1
1500 TABLET, EXTENDED RELEASE ORAL DAILY
Qty: 270 TABLET | Refills: 3 | Status: SHIPPED | OUTPATIENT
Start: 2018-06-20 | End: 2019-02-25 | Stop reason: SDUPTHER

## 2018-06-20 NOTE — PROGRESS NOTES
[place] [address] [facility]  Follow Up Visit    Impression/Plan    Mr  Sheba Davison is a 78 y o  male with history that includes abdominal aortic aneurysm status post repair that presents regarding 2 lifetime seizures occurring a few hours apart on 2/21/2017  His neurological exam has been essentially normal  MRI and EEG have been unrevealing for a cause  There are no seizure risk factors  His seizures were not  by more than 24 hours and therefore at this point he does not meet the technical criteria for a diagnosis of epilepsy  However, his initial seizure occurred out of sleep and there was no clear provocation suggesting some degree of elevated recurrence risk  He is tolerating levetiracetam without side effects  The initial plan had been to continue therapy for at least 18 months  He is currently experiencing no side effects and would like to continue on levetiracetam to reduce seizure risk especially in the setting of increased stress related to caring for his wife who has MS  She was recently admitted after a urinary tract infection became a systemic infection and she has been in out of the hospital and rehab since  If he decides to wean off AED therapy I would likely repeat an EEG after therapy is discontinued to confirm low seizure recurrence risk  Patient Instructions   1  Continue levetiracetam ER 1500 mg nightly  2  Return in one year  Diagnoses and all orders for this visit:    Seizure ([address] )  -     levETIRAcetam (KEPPRA XR) 500 MG 24 hr tablet; Take 3 tablets (1,500 mg total) by mouth daily        Asha Nicole is returning to the [place] [address] [facility] for follow up regarding seizure  Interval Events:   Seizures since last visit: None    There is increased stress related to caring for his wife who has MS  There has been a decline in the recent months and multiple admissions related to UTI and system infection   His wife follows with   Hammad  Current AEDs:  Levetiracetam ER 1500 mg nightly  Medication side effects: None  Medication adherence: Yes    Seizures/Spell characteristics:  1  Witnessed GTC seizure  2 events on 2/21/17  One out of sleep, the other a few hours later in the ER       Special Features:  - History of status epilepticus: No  - Self injury due to seizures: No  - Precipitating factors: None     Seizure risk factors: Normal birth and development  No history of seizures as a child  No family history of seizures  No head trauma resulting in loss of consciousness  No history of brain tumor, stroke or CNS infection       Past Medical History includes:  abdominal aortic aneurysm s/p endovascular repair  HLD, HTN, hypothyroidism, hyperuricemia     Social History:  Lives with his wife  She has MS and he is her caregiver  Retired        Prior AEDs:  None    Prior Evaluation:  REEG 2/21/17: normal, awake  1 hour EEG sleep-deprived EEG, 2 hours, 4/18/2017:  Normal     MRI brain w/ and w/o 2/21/17: normal      History Reviewed: The following were reviewed and updated as appropriate: allergies, current medications, past medical history, past social history and problem list    Psychiatric History:  None    Social History:   Driving: Yes  Lives Alone: No  Occupation: retired    ROS:  Review of Systems  Constitutional: Negative  Negative for appetite change and fever  HENT: Negative  Negative for hearing loss, tinnitus, trouble swallowing and voice change  Eyes: Negative  Negative for photophobia and pain  Respiratory: Negative  Negative for shortness of breath  Cardiovascular: Negative  Negative for palpitations  Gastrointestinal: Negative  Negative for nausea and vomiting  Endocrine: Negative  Negative for cold intolerance and heat intolerance  Genitourinary: Negative  Negative for dysuria, frequency and urgency  Musculoskeletal: Negative  Negative for myalgias and neck pain     Skin: Negative  Negative for rash  Neurological: Negative  Negative for dizziness, tremors, seizures, syncope, facial asymmetry, speech difficulty, weakness, light-headedness, numbness and headaches  Hematological: Negative  Does not bruise/bleed easily  Psychiatric/Behavioral: Negative  Negative for confusion, hallucinations and sleep disturbance  Ten systems were reviewed and negative except for what is documented separately or in the HPI  Objective    /64 (BP Location: Right arm, Patient Position: Sitting, Cuff Size: Adult)   Pulse 79   Ht 5' 10" (1 778 m)   Wt 96 3 kg (212 lb 4 8 oz)   BMI 30 46 kg/m²      General Exam  No acute distress  Neurologic Exam  Mental Status:  Alert and oriented x 3  Language: normal fluency and comprehension  Cranial Nerves:  VFFTC  EOMI, no nystagums  Face symmetric  No dysarthria  Motor:  No drift  Strength 5/5 throughout  Coordination: Finger to nose intact  DTRs: Normal and symmetric (biceps, patella)  Gait: Normal casual gait

## 2018-08-13 ENCOUNTER — OFFICE VISIT (OUTPATIENT)
Dept: FAMILY MEDICINE CLINIC | Facility: CLINIC | Age: 78
End: 2018-08-13
Payer: MEDICARE

## 2018-08-13 VITALS
HEART RATE: 80 BPM | SYSTOLIC BLOOD PRESSURE: 142 MMHG | TEMPERATURE: 98.2 F | WEIGHT: 206.6 LBS | RESPIRATION RATE: 16 BRPM | DIASTOLIC BLOOD PRESSURE: 68 MMHG | BODY MASS INDEX: 29.58 KG/M2 | HEIGHT: 70 IN

## 2018-08-13 DIAGNOSIS — E79.0 HYPERURICEMIA: ICD-10-CM

## 2018-08-13 DIAGNOSIS — E78.2 MIXED HYPERLIPIDEMIA: ICD-10-CM

## 2018-08-13 DIAGNOSIS — G40.909 SEIZURE DISORDER (HCC): ICD-10-CM

## 2018-08-13 DIAGNOSIS — E03.9 ACQUIRED HYPOTHYROIDISM: ICD-10-CM

## 2018-08-13 DIAGNOSIS — I71.4 ABDOMINAL AORTIC ANEURYSM (AAA) WITHOUT RUPTURE (HCC): ICD-10-CM

## 2018-08-13 DIAGNOSIS — Z00.00 MEDICARE ANNUAL WELLNESS VISIT, SUBSEQUENT: Primary | ICD-10-CM

## 2018-08-13 PROBLEM — R56.9 SEIZURE (HCC): Status: RESOLVED | Noted: 2017-02-21 | Resolved: 2018-08-13

## 2018-08-13 PROCEDURE — G0439 PPPS, SUBSEQ VISIT: HCPCS | Performed by: FAMILY MEDICINE

## 2018-08-13 PROCEDURE — 99214 OFFICE O/P EST MOD 30 MIN: CPT | Performed by: FAMILY MEDICINE

## 2018-08-13 NOTE — PROGRESS NOTES
Assessment/Plan:     Diagnoses and all orders for this visit:    Medicare annual wellness visit, subsequent    Mixed hyperlipidemia  -     Lipid panel  -     TSH, 3rd generation with Free T4 reflex    Abdominal aortic aneurysm (AAA) without rupture (HCC)    Hyperuricemia    Seizure disorder (HCC)  -     CBC and differential  -     Comprehensive metabolic panel    Acquired hypothyroidism        Repeat blood pressure right arm large cuff 132/70  Diet, weight loss and exercise  Monitor blood pressures  Repeat labs  He is not interested in flu vaccine, pneumococcal vaccines or shingles vaccine  OV 6 months  Patient ID: Claudio Hoskins is a 78 y o  male  Follow-up visit  Medications reviewed  Labs 02/2018  Hypertension blood pressures have been stable off medication  Creatinine 1 18  Electrolytes normal   Hemoglobin 15 9 Hyperlipidemia with history of AAA s/p  Endovascular stent  On Simvastatin 40 mg daily  Cholesterol 126  Triglycerides 182  HDL 44  LDL 46  LFTs normal   FBS 88        The following portions of the patient's history were reviewed and updated as appropriate: allergies, current medications, past family history, past medical history, past social history, past surgical history and problem list     Review of Systems   Constitutional: Negative for activity change, appetite change, chills, fever and unexpected weight change  HENT: Negative for congestion, trouble swallowing and voice change  Eyes: Negative for visual disturbance  Respiratory: Negative for cough, shortness of breath and wheezing  Cardiovascular: Negative for chest pain, palpitations and leg swelling  History of abdominal aortic aneurysm status post endovascular stenting  He is followed by vascular surgery  No claudications  02/2018 aorto iliac study Widely patent Endograft without evidence of endoleak  Celiac artery, SMA and bilateral proximal renal arteries patent  RUI right 1 3    RUI left 1 28   02/2017 echocardiogram normal left ventricular systolic function  No regional wall motion abnormalities  Mild MR  mild to moderate aortic regurgitation  Mild aortic stenosis  Mild tricuspid regurgitation  Aortic root mild dilatation  Ascending aorta mildly dilated at 4 1 cm  Gastrointestinal: Negative for abdominal pain, blood in stool, constipation, diarrhea, nausea and vomiting  Colonoscopy 05/2018   Endocrine:        Past history of borderline abnormal elevated TSH 6 163   02/2018 TSH normal at 3 540  On no medications   Genitourinary: Negative for difficulty urinating and urgency  07/2015 PSA 0 5   Musculoskeletal: Negative for arthralgias and myalgias  History of hyperuricemia and gout  08/2017 uric acid 8 2  No longer on allopurinol  Chronic bilateral shoulder pain with chronic rotator cuff tear right shoulder and subacromial bursitis left shoulder  Status post bilateral steroid injections by Orthopedic surgery 12/2017  He is on no pain medications at present   Skin: Negative for rash  Neurological: Negative for dizziness and headaches  History of seizure disorder with 2 seizures occurring within several hours 02/ 2017  extensive workup and neurological evaluation  MRI brain normal   Awake EEG normal   He did not drive for 6 months and regained his license at the end of August 2017  He has been seizure-free on Keppra no side effects  Hematological: Negative for adenopathy  Does not bruise/bleed easily  Psychiatric/Behavioral: Negative for dysphoric mood and sleep disturbance  Objective:      /68 (BP Location: Left arm, Patient Position: Sitting, Cuff Size: Large)   Pulse 80   Temp 98 2 °F (36 8 °C) (Tympanic)   Resp 16   Ht 5' 10" (1 778 m)   Wt 93 7 kg (206 lb 9 6 oz)   BMI 29 64 kg/m²        Physical Exam   Constitutional: He is oriented to person, place, and time  He appears well-developed and well-nourished  No distress     HENT:   Right Ear: Tympanic membrane normal    Left Ear: Tympanic membrane normal    Mouth/Throat: Oropharynx is clear and moist    Eyes: Conjunctivae and EOM are normal  Pupils are equal, round, and reactive to light  No scleral icterus  Neck: Normal range of motion  Neck supple  No JVD present  Carotid bruit is not present  No tracheal deviation present  No thyroid mass and no thyromegaly present  Cardiovascular: Normal rate, regular rhythm, normal heart sounds and intact distal pulses  Exam reveals no gallop  No murmur heard  Pulses:       Carotid pulses are 2+ on the right side, and 2+ on the left side  Pulmonary/Chest: Effort normal and breath sounds normal  No respiratory distress  He has no wheezes  He has no rales  Abdominal: Soft  Bowel sounds are normal  He exhibits no distension and no mass  There is no hepatosplenomegaly  There is no tenderness  There is no rebound and no guarding  Musculoskeletal: He exhibits no edema or deformity  Decreased range of motion of shoulders with internal rotation   Lymphadenopathy:     He has no cervical adenopathy  Neurological: He is alert and oriented to person, place, and time  No cranial nerve deficit  Skin: Skin is warm and dry  No rash noted  Psychiatric: He has a normal mood and affect  His behavior is normal    Nursing note and vitals reviewed            Recent Results (from the past 4704 hour(s))   CBC and differential    Collection Time: 02/20/18  9:41 AM   Result Value Ref Range    WBC 8 09 4 31 - 10 16 Thousand/uL    RBC 5 14 3 88 - 5 62 Million/uL    Hemoglobin 15 9 12 0 - 17 0 g/dL    Hematocrit 47 8 36 5 - 49 3 %    MCV 93 82 - 98 fL    MCH 30 9 26 8 - 34 3 pg    MCHC 33 3 31 4 - 37 4 g/dL    RDW 13 0 11 6 - 15 1 %    MPV 11 4 8 9 - 12 7 fL    Platelets 332 164 - 973 Thousands/uL    nRBC 0 /100 WBCs    Neutrophils Relative 70 43 - 75 %    Lymphocytes Relative 18 14 - 44 %    Monocytes Relative 7 4 - 12 %    Eosinophils Relative 5 0 - 6 %    Basophils Relative 0 0 - 1 %    Neutrophils Absolute 5 64 1 85 - 7 62 Thousands/µL    Lymphocytes Absolute 1 42 0 60 - 4 47 Thousands/µL    Monocytes Absolute 0 54 0 17 - 1 22 Thousand/µL    Eosinophils Absolute 0 44 0 00 - 0 61 Thousand/µL    Basophils Absolute 0 02 0 00 - 0 10 Thousands/µL   Comprehensive metabolic panel    Collection Time: 02/20/18  9:41 AM   Result Value Ref Range    Sodium 143 136 - 145 mmol/L    Potassium 4 7 3 5 - 5 3 mmol/L    Chloride 106 100 - 108 mmol/L    CO2 32 21 - 32 mmol/L    Anion Gap 5 4 - 13 mmol/L    BUN 15 5 - 25 mg/dL    Creatinine 1 18 0 60 - 1 30 mg/dL    Glucose, Fasting 88 65 - 99 mg/dL    Calcium 9 1 8 3 - 10 1 mg/dL    AST 25 5 - 45 U/L    ALT 30 12 - 78 U/L    Alkaline Phosphatase 56 46 - 116 U/L    Total Protein 7 3 6 4 - 8 2 g/dL    Albumin 4 0 3 5 - 5 0 g/dL    Total Bilirubin 0 92 0 20 - 1 00 mg/dL    eGFR 60 ml/min/1 73sq m   Lipid panel    Collection Time: 02/20/18  9:41 AM   Result Value Ref Range    Cholesterol 126 50 - 200 mg/dL    Triglycerides 182 (H) <=150 mg/dL    HDL, Direct 44 40 - 60 mg/dL    LDL Calculated 46 0 - 100 mg/dL   TSH, 3rd generation with T4 reflex    Collection Time: 02/20/18  9:41 AM   Result Value Ref Range    TSH 3RD GENERATON 3 540 0 358 - 3 740 uIU/mL   Tissue Exam    Collection Time: 05/18/18  9:41 AM   Result Value Ref Range    Case Report       Surgical Pathology Report                         Case: [id number]                                   Authorizing Provider:  Charis Aguilar MD       Collected:           05/18/2018 0941              Ordering Location:     [facility]        Received:            05/18/2018 [address] [place] [address]                                                      Pathologist:           Sherif Ritter MD                                                                Specimen: Polyp, Colorectal, cold snare sigmoid colon polyp                                          Final Diagnosis       A  Sigmoid colon polyp (cold snare):     - Portions of tubular adenoma  - No high-grade dysplasia or malignancy identified  Note       Interpretation performed at [facility], [address] [place] [address]  Additional Information       All controls performed with the immunohistochemical stains reported above reacted appropriately  These tests were developed and their performance characteristics determined by [facility] or [facility]  They may not be cleared or approved by the [place]  Food and Drug Administration  The FDA has determined that such clearance or approval is not necessary  These tests are used for clinical purposes  They should not be regarded as investigational or for research  This laboratory has been approved by [place], designated as a high-complexity laboratory and is qualified to perform these tests  Gross Description       A  The specimen is received in formalin, labeled with the patient's name and hospital number, and is designated "sigmoid colon polyp  The specimen consists 2 tan soft tissue fragments measuring 0 6 and 1 1 cm  Entirely submitted  One cassette  Note: The estimated total formalin fixation time based upon information provided by the submitting clinician and the standard processing schedule is under 72 hours      MCrites       Clinical Information History of colonic polyps

## 2018-08-14 NOTE — PROGRESS NOTES
Assessment and Plan:    Problem List Items Addressed This Visit     Hyperlipidemia    Relevant Orders    Lipid panel    TSH, 3rd generation with Free T4 reflex    Aneurysm of abdominal aorta (HCC)    Hyperuricemia    Hypothyroidism    Seizure disorder ([address] )    Relevant Orders    CBC and differential    Comprehensive metabolic panel      Other Visit Diagnoses     Medicare annual wellness visit, subsequent    -  Primary        Health Maintenance   Topic Date Due    INFLUENZA VACCINE  02/13/2019 (Originally 9/1/2018)    Medicare Annual Wellness Visit (AWV)  08/13/2019 (Originally 1940)    GLAUCOMA SCREENING 65 + YR  08/13/2019 (Originally 10/4/2009)    DTaP,Tdap,and Td Vaccines (1 - Tdap) 08/13/2019 (Originally 10/4/1961)    PNEUMOCOCCAL POLYSACCHARIDE VACCINE AGE 72 AND OVER  08/13/2019 (Originally 10/4/2007)    Fall Risk  08/13/2019    Depression Screening PHQ-9  08/13/2019    CRC Screening: Colonoscopy  05/18/2028     Medicare AWV, fall risk and depression screen today  Up to date with eye exam  He is not interested in flu vaccine or pneumococcal vaccines  Colonoscopy 05/2018    HPI:  Gregorio Sotelo is a 78 y o  male here for his Subsequent Wellness Visit  Patient Active Problem List   Diagnosis    Hyperlipidemia    Hypertension    Aneurysm of abdominal aorta (HCC)    Disc degeneration, lumbar    Diverticulosis    Hyperuricemia    Hypothyroidism    Osteoarthrosis, hand    Seizure disorder ([address] )    Tear of right rotator cuff     Past Medical History:   Diagnosis Date    Colon polyps     Gout     Hyperlipidemia     Hypertension     S/P AAA repair     Seizure disorder [facility])      Past Surgical History:   Procedure Laterality Date    ABDOMINAL AORTIC ANEURYSM REPAIR, ENDOVASCULAR N/A     MA COLONOSCOPY FLX DX W/COLLJ SPEC WHEN PFRMD N/A 6/7/2017    Procedure: COLONOSCOPY;  Surgeon: Robert Saldaña MD;  [facility];   Service: Colorectal    MA COLONOSCOPY FLX DX W/COLLJ SPEC WHEN PFRMD N/A 5/18/2018    Procedure: COLONOSCOPY;  Surgeon: Daron Lara MD;  [facility]; Service: Colorectal    TONSILLECTOMY      WISDOM TOOTH EXTRACTION Bilateral      Family History   Problem Relation Age of Onset    Aneurysm Mother         ABDMONIAL  AORTA    Coronary artery disease Father     Coronary artery disease Brother      History   Smoking Status    Former Smoker    Packs/day: 3 00    Years: 10 00    Quit date: 2/21/1971   Smokeless Tobacco    Never Used     History   Alcohol Use    Yes     Comment: occasional      History   Drug Use No       Current Outpatient Prescriptions   Medication Sig Dispense Refill    acetaminophen (TYLENOL) 500 mg tablet Take 500 mg by mouth every 6 (six) hours as needed for mild pain      amoxicillin (AMOXIL) 500 mg capsule Take 500 mg by mouth as needed (when has dental work)      aspirin 325 mg tablet Take 1 capsule by mouth daily      levETIRAcetam (KEPPRA XR) 500 MG 24 hr tablet Take 3 tablets (1,500 mg total) by mouth daily 270 tablet 3    simvastatin (ZOCOR) 40 mg tablet Take 1 tablet by mouth daily       No current facility-administered medications for this visit  Allergies   Allergen Reactions    Fenofibrate      Patient not aware of allergy    Hydrocodone-Acetaminophen      Patient not aware of allergy     There is no immunization history for the selected administration types on file for this patient  Patient Care Team:  Rosalva Alexis MD as PCP - MD Johnathon Red MD Burnadette Cone, MD (Colon and Rectal Surgery)      Medicare Screening Tests and Risk Assessments:  AWV Clinical     ISAR:   Previous hospitalizations?:  Yes       Once in a Lifetime Medicare Screening:   EKG performed?:  Yes Results:  02/2017    AAA screening performed? (if performed, please add date to Health Maintenance):   Yes    Results:  known AAA s/p endovascular stent        Medicare Screening Tests and Risk Assessment:   AAA Risk Assessment    Age over 72 (males only):  Yes    Osteoporosis Risk Assessment    :  Yes    Age over 48:  Yes    Tobacco use:  No    HIV Risk Assessment    None indicated:  Yes        Drug and Alcohol Use:   Tobacco use    Cigarettes:  former smoker    Smokeless:  never used smokeless tobacco    Tobacco use duration    Tobacco Cessation Readiness    Alcohol use    Alcohol use:  rare use    Alcohol Treatment Readiness   Illicit Drug Use    Drug use:  never        Diet & Exercise:   Diet   What is your diet?:  Regular, Limited junk food   How many servings a day of the following:   Fruits and Vegetables:  3-4 Meat:  3-4   Whole Grains:  3    Dairy:  1    Coffee:  2    Exercise    Do you currently exercise?:  yes    Frequency:  daily    Minutes per day:  60    Type of exercise:  walking       Cognitive Impairment Screening:   Depression screening preformed:  Yes     PHQ-9 Depression scale score:  7   Depression screening results:  mild to moderate symptoms   Cognitive Impairment Screening    Do you have difficulty learning or retaining new information?:  No Do you have difficulty handling new tasks?:  No   Do you have difficulty with reasoning?:  No Do you have difficulty with spatial ability and orientation?:  No   Do you have difficulty with language?:  No Do you have difficulty with behavior?:  No       Functional Ability/Level of Safety:   Hearing    Hearing difficulties:  No Bilateral:  normal   Hearing Impairment Assessment    Current Activities    Status:  unlimited ADL's, unlimited driving, unlimited IADL's, unlimited social activities   Help needed with the folllowing:    ADL    Fall Risk   Have you fallen in the last 12 months?:  No    Injury History       Home Safety:   Are there hazards in your environment?:  No   Home Safety Risk Factors   Unfamilar with surroundings:  No        Advanced Directives:   Advanced Directives    Living Will:  No Durable POA for healthcare:  No   Advanced directive: No    Patient's End of Life Decisions    Reviewed with patient:  No        Urinary Incontinence:   Do you have urinary incontinence?:  No        Glaucoma:            Provider Screening     Preventative Screening/Counseling:   Cardiovascular Screening/Counseling:   (Labs Q5 years, EKG optional one-time)   General:  Screening Not Indicated Counseling:  Healthy Diet, Healthy Weight          Diabetes Screening/Counseling:   (2 tests/year if Pre-Diabetes or 1 test/year if no Diabetes)   General:  Screening Current           Colorectal Cancer Screening/Counseling:   (FOBT Q1 yr; Flex Sig Q4 yrs or Q10 yrs after Screening Colonoscopy; Screening Colonoscpy Q2 yrs High Risk or Q10 yrs Low Risk; Barium Enema Q2 yrs High Risk or Q4 yrs Low Risk)   General:  Screening Current           Prostate Cancer Screening/Counseling:   (Annual)    General:  Screening Not Indicated          Breast Cancer Screening/Counseling:   (Baseline Age 28 - 43; Annual Age 36+)         Cervical Cancer Screening/Counseling:   (Annual for High Risk or Childbearing Age with Abnormal Pap in Last 3 yrs; Every 2 all others)         Osteoporosis Screening/Counseling:   (Every 2 Yrs if at risk or more if medically necessary)   General:  Screening Not Indicated           AAA Screening/Counseling:   (Once per Lifetime with risk factors)     Age over 72 (males only):  Yes    General:  Screening Current           Glaucoma Screening/Counseling:   (Annual)         HIV Screening/Counseling:   (Voluntary;  Once annually for high risk OR 3 times for Pregnancy at diagnosis of IUP; 3rd trimester; and at Labor   General:  Screening Current           Hepatitis C Screening:             Immunizations:   Influenza (annual):  Risks & Benefits Discussed, Patient Declines   Pneumococcal (Once in a Lifetime):  Risks & Benefits Discussed, Patient Declines   Zostavax (Medicare D Coverage, Pt >72 yo):  Risks & Benefits Discussed, Patient Declines       Other Preventative Couseling (Non-Medicare Wellness Visit Required):   nutrition counseling performed, weight reduction was discussed       Referrals (Non-Medicare Wellness Visit Required):   neurology consult       Medical Equipment/Suppliers:

## 2018-09-18 ENCOUNTER — APPOINTMENT (OUTPATIENT)
Dept: LAB | Facility: CLINIC | Age: 78
End: 2018-09-18
Payer: MEDICARE

## 2018-09-18 LAB
ALBUMIN SERPL BCP-MCNC: 3.8 G/DL (ref 3.5–5)
ALP SERPL-CCNC: 64 U/L (ref 46–116)
ALT SERPL W P-5'-P-CCNC: 24 U/L (ref 12–78)
ANION GAP SERPL CALCULATED.3IONS-SCNC: 6 MMOL/L (ref 4–13)
AST SERPL W P-5'-P-CCNC: 20 U/L (ref 5–45)
BASOPHILS # BLD AUTO: 0.03 THOUSANDS/ΜL (ref 0–0.1)
BASOPHILS NFR BLD AUTO: 0 % (ref 0–1)
BILIRUB SERPL-MCNC: 1.25 MG/DL (ref 0.2–1)
BUN SERPL-MCNC: 18 MG/DL (ref 5–25)
CALCIUM SERPL-MCNC: 8.8 MG/DL (ref 8.3–10.1)
CHLORIDE SERPL-SCNC: 105 MMOL/L (ref 100–108)
CHOLEST SERPL-MCNC: 116 MG/DL (ref 50–200)
CO2 SERPL-SCNC: 31 MMOL/L (ref 21–32)
CREAT SERPL-MCNC: 1.19 MG/DL (ref 0.6–1.3)
EOSINOPHIL # BLD AUTO: 0.39 THOUSAND/ΜL (ref 0–0.61)
EOSINOPHIL NFR BLD AUTO: 5 % (ref 0–6)
ERYTHROCYTE [DISTWIDTH] IN BLOOD BY AUTOMATED COUNT: 12.5 % (ref 11.6–15.1)
GFR SERPL CREATININE-BSD FRML MDRD: 59 ML/MIN/1.73SQ M
GLUCOSE P FAST SERPL-MCNC: 83 MG/DL (ref 65–99)
HCT VFR BLD AUTO: 50.5 % (ref 36.5–49.3)
HDLC SERPL-MCNC: 44 MG/DL (ref 40–60)
HGB BLD-MCNC: 16.3 G/DL (ref 12–17)
IMM GRANULOCYTES # BLD AUTO: 0.02 THOUSAND/UL (ref 0–0.2)
IMM GRANULOCYTES NFR BLD AUTO: 0 % (ref 0–2)
LDLC SERPL CALC-MCNC: 44 MG/DL (ref 0–100)
LYMPHOCYTES # BLD AUTO: 1.3 THOUSANDS/ΜL (ref 0.6–4.47)
LYMPHOCYTES NFR BLD AUTO: 17 % (ref 14–44)
MCH RBC QN AUTO: 31.2 PG (ref 26.8–34.3)
MCHC RBC AUTO-ENTMCNC: 32.3 G/DL (ref 31.4–37.4)
MCV RBC AUTO: 97 FL (ref 82–98)
MONOCYTES # BLD AUTO: 0.49 THOUSAND/ΜL (ref 0.17–1.22)
MONOCYTES NFR BLD AUTO: 6 % (ref 4–12)
NEUTROPHILS # BLD AUTO: 5.52 THOUSANDS/ΜL (ref 1.85–7.62)
NEUTS SEG NFR BLD AUTO: 72 % (ref 43–75)
NONHDLC SERPL-MCNC: 72 MG/DL
NRBC BLD AUTO-RTO: 0 /100 WBCS
PLATELET # BLD AUTO: 183 THOUSANDS/UL (ref 149–390)
PMV BLD AUTO: 11.3 FL (ref 8.9–12.7)
POTASSIUM SERPL-SCNC: 4.2 MMOL/L (ref 3.5–5.3)
PROT SERPL-MCNC: 7.1 G/DL (ref 6.4–8.2)
RBC # BLD AUTO: 5.22 MILLION/UL (ref 3.88–5.62)
SODIUM SERPL-SCNC: 142 MMOL/L (ref 136–145)
TRIGL SERPL-MCNC: 141 MG/DL
TSH SERPL DL<=0.05 MIU/L-ACNC: 3.16 UIU/ML (ref 0.36–3.74)
WBC # BLD AUTO: 7.75 THOUSAND/UL (ref 4.31–10.16)

## 2018-09-18 PROCEDURE — 80053 COMPREHEN METABOLIC PANEL: CPT | Performed by: FAMILY MEDICINE

## 2018-09-18 PROCEDURE — 36415 COLL VENOUS BLD VENIPUNCTURE: CPT | Performed by: FAMILY MEDICINE

## 2018-09-18 PROCEDURE — 80061 LIPID PANEL: CPT | Performed by: FAMILY MEDICINE

## 2018-09-18 PROCEDURE — 85025 COMPLETE CBC W/AUTO DIFF WBC: CPT | Performed by: FAMILY MEDICINE

## 2018-09-18 PROCEDURE — 84443 ASSAY THYROID STIM HORMONE: CPT | Performed by: FAMILY MEDICINE

## 2019-01-24 DIAGNOSIS — E78.2 MIXED HYPERLIPIDEMIA: ICD-10-CM

## 2019-01-24 RX ORDER — SIMVASTATIN 40 MG
TABLET ORAL
Qty: 90 TABLET | Refills: 3 | Status: SHIPPED | OUTPATIENT
Start: 2019-01-24 | End: 2020-02-20 | Stop reason: SDUPTHER

## 2019-02-04 DIAGNOSIS — I71.4 ABDOMINAL AORTIC ANEURYSM WITHOUT RUPTURE (HCC): Primary | ICD-10-CM

## 2019-02-24 NOTE — PROGRESS NOTES
Assessment/Plan:     Diagnoses and all orders for this visit:    Mixed hyperlipidemia  -     Lipid panel  -     TSH, 3rd generation with Free T4 reflex    Hyperuricemia  -     Uric acid; Future    Abdominal aortic aneurysm (AAA) without rupture (HCC)    Essential hypertension  -     CBC and differential  -     Comprehensive metabolic panel    Seizure disorder (HCC)  -     levETIRAcetam (KEPPRA XR) 500 MG 24 hr tablet; Take 3 tablets (1,500 mg total) by mouth daily        Continue with current medications  Monitor blood pressures  Repeat labs 03/2019  BMI Counseling: Body mass index is 28 27 kg/m²  Discussed the patient's BMI with him  The BMI is above average  BMI counseling and education was provided to the patient  Nutrition recommendations include reducing portion sizes, decreasing overall calorie intake, consuming healthier snacks, moderation in carbohydrate intake, reducing intake of saturated fat and trans fat and reducing intake of cholesterol  Patient ID: Mike Marroquin is a 70 y o  male  Follow-up visit  Medications reviewed  Last labs 09/2018   Hypertension blood pressures have been stable off medication  Creatinine 1 19  Electrolytes normal   Hemoglobin 16 3  Hyperlipidemia with history of AAA s/p  Endovascular stent on Simvastatin 40 mg daily  Lipid profile  Cholesterol 116  Triglycerides 141  HDL 44  LDL 44  LFTs normal   FBS 83      The following portions of the patient's history were reviewed and updated as appropriate: allergies, current medications, past family history, past medical history, past social history, past surgical history and problem list     Review of Systems   Constitutional: Negative for activity change, appetite change, chills, fever and unexpected weight change  HENT: Negative for congestion, ear pain, postnasal drip, rhinorrhea, sinus pressure, sinus pain, sore throat, trouble swallowing and voice change  Eyes: Negative for visual disturbance  Respiratory: Negative for cough, chest tightness, shortness of breath and wheezing  Cardiovascular: Negative for chest pain, palpitations and leg swelling  History of abdominal aortic aneurysm status post endovascular stenting  He is followed by vascular surgery  No claudications  02/2018 aorto iliac study Widely patent Endograft without evidence of endoleak  Celiac artery, SMA and bilateral proximal renal arteries patent  RUI right 1 3  RUI left 1 28   02/2017 echocardiogram normal left ventricular systolic function  No regional wall motion abnormalities  Mild MR  mild to moderate aortic regurgitation  Mild aortic stenosis  Mild tricuspid regurgitation  Aortic root mild dilatation  Ascending aorta mildly dilated at 4 1 cm  Gastrointestinal: Negative for abdominal pain, blood in stool, constipation, diarrhea, nausea and vomiting  Colonoscopy 05/2018   Endocrine:        Past history of borderline abnormal elevated TSH 6 163   02/2018 TSH normal at 3 540  On no medications   Genitourinary: Negative for difficulty urinating and urgency  07/2015 PSA 0 5   Musculoskeletal: Negative for arthralgias and myalgias  History of hyperuricemia and gout  08/2017 uric acid 8 2  No longer on allopurinol  Chronic bilateral shoulder pain with chronic rotator cuff tear right shoulder and subacromial bursitis left shoulder  Status post bilateral steroid injections by Orthopedic surgery 12/2017  He is on no pain medications at present   Skin: Negative for rash  Allergic/Immunologic: Negative for environmental allergies  Neurological: Negative for dizziness, weakness, light-headedness and headaches  History of seizure disorder with 2 seizures occurring within several hours 02/ 2017  extensive workup and neurological evaluation  MRI brain normal   Awake EEG normal   He did not drive for 6 months and regained his license at the end of August 2017    He has been seizure-free on Keppra no side effects  Hematological: Negative for adenopathy  Does not bruise/bleed easily  Psychiatric/Behavioral: Negative for dysphoric mood and sleep disturbance  Objective:      /62   Pulse 88   Temp 98 1 °F (36 7 °C)   Resp 16   Wt 89 4 kg (197 lb)   SpO2 96%   BMI 28 27 kg/m²          Physical Exam   Constitutional: He is oriented to person, place, and time  He appears well-developed and well-nourished  No distress  HENT:   Right Ear: Tympanic membrane normal    Left Ear: Tympanic membrane normal    Mouth/Throat: Oropharynx is clear and moist    Eyes: Pupils are equal, round, and reactive to light  Conjunctivae and EOM are normal  No scleral icterus  Neck: No JVD present  Carotid bruit is not present  No tracheal deviation present  No thyroid mass and no thyromegaly present  Cardiovascular: Normal rate, regular rhythm and normal heart sounds  Exam reveals no gallop  No murmur heard  Pulses:       Carotid pulses are 2+ on the right side, and 2+ on the left side  Pulmonary/Chest: Effort normal and breath sounds normal  No respiratory distress  He has no wheezes  He has no rales  Abdominal: Soft  Bowel sounds are normal  He exhibits no distension, no abdominal bruit, no pulsatile midline mass and no mass  There is no hepatosplenomegaly  There is no tenderness  There is no rebound and no guarding  Musculoskeletal: He exhibits no edema  Lymphadenopathy:     He has no cervical adenopathy  Neurological: He is alert and oriented to person, place, and time  He has normal reflexes  No cranial nerve deficit  Skin: No rash noted  Psychiatric: He has a normal mood and affect  Nursing note and vitals reviewed          Recent Results (from the past 6048 hour(s))   CBC and differential    Collection Time: 09/18/18  8:27 AM   Result Value Ref Range    WBC 7 75 4 31 - 10 16 Thousand/uL    RBC 5 22 3 88 - 5 62 Million/uL    Hemoglobin 16 3 12 0 - 17 0 g/dL    Hematocrit 50 5 (H) 36 5 - 49 3 %    MCV 97 82 - 98 fL    MCH 31 2 26 8 - 34 3 pg    MCHC 32 3 31 4 - 37 4 g/dL    RDW 12 5 11 6 - 15 1 %    MPV 11 3 8 9 - 12 7 fL    Platelets 488 948 - 234 Thousands/uL    nRBC 0 /100 WBCs    Neutrophils Relative 72 43 - 75 %    Immat GRANS % 0 0 - 2 %    Lymphocytes Relative 17 14 - 44 %    Monocytes Relative 6 4 - 12 %    Eosinophils Relative 5 0 - 6 %    Basophils Relative 0 0 - 1 %    Neutrophils Absolute 5 52 1 85 - 7 62 Thousands/µL    Immature Grans Absolute 0 02 0 00 - 0 20 Thousand/uL    Lymphocytes Absolute 1 30 0 60 - 4 47 Thousands/µL    Monocytes Absolute 0 49 0 17 - 1 22 Thousand/µL    Eosinophils Absolute 0 39 0 00 - 0 61 Thousand/µL    Basophils Absolute 0 03 0 00 - 0 10 Thousands/µL   Comprehensive metabolic panel    Collection Time: 09/18/18  8:27 AM   Result Value Ref Range    Sodium 142 136 - 145 mmol/L    Potassium 4 2 3 5 - 5 3 mmol/L    Chloride 105 100 - 108 mmol/L    CO2 31 21 - 32 mmol/L    ANION GAP 6 4 - 13 mmol/L    BUN 18 5 - 25 mg/dL    Creatinine 1 19 0 60 - 1 30 mg/dL    Glucose, Fasting 83 65 - 99 mg/dL    Calcium 8 8 8 3 - 10 1 mg/dL    AST 20 5 - 45 U/L    ALT 24 12 - 78 U/L    Alkaline Phosphatase 64 46 - 116 U/L    Total Protein 7 1 6 4 - 8 2 g/dL    Albumin 3 8 3 5 - 5 0 g/dL    Total Bilirubin 1 25 (H) 0 20 - 1 00 mg/dL    eGFR 59 ml/min/1 73sq m   Lipid panel    Collection Time: 09/18/18  8:27 AM   Result Value Ref Range    Cholesterol 116 50 - 200 mg/dL    Triglycerides 141 <=150 mg/dL    HDL, Direct 44 40 - 60 mg/dL    LDL Calculated 44 0 - 100 mg/dL    Non-HDL-Chol (CHOL-HDL) 72 mg/dl   TSH, 3rd generation with Free T4 reflex    Collection Time: 09/18/18  8:27 AM   Result Value Ref Range    TSH 3RD GENERATON 3 160 0 358 - 3 740 uIU/mL

## 2019-02-25 ENCOUNTER — OFFICE VISIT (OUTPATIENT)
Dept: FAMILY MEDICINE CLINIC | Facility: CLINIC | Age: 79
End: 2019-02-25
Payer: MEDICARE

## 2019-02-25 VITALS
TEMPERATURE: 98.1 F | OXYGEN SATURATION: 96 % | RESPIRATION RATE: 16 BRPM | HEART RATE: 88 BPM | DIASTOLIC BLOOD PRESSURE: 62 MMHG | WEIGHT: 197 LBS | BODY MASS INDEX: 28.27 KG/M2 | SYSTOLIC BLOOD PRESSURE: 140 MMHG

## 2019-02-25 DIAGNOSIS — G40.909 SEIZURE DISORDER (HCC): ICD-10-CM

## 2019-02-25 DIAGNOSIS — I10 ESSENTIAL HYPERTENSION: ICD-10-CM

## 2019-02-25 DIAGNOSIS — I71.4 ABDOMINAL AORTIC ANEURYSM (AAA) WITHOUT RUPTURE (HCC): ICD-10-CM

## 2019-02-25 DIAGNOSIS — E78.2 MIXED HYPERLIPIDEMIA: Primary | ICD-10-CM

## 2019-02-25 DIAGNOSIS — E79.0 HYPERURICEMIA: ICD-10-CM

## 2019-02-25 PROBLEM — M75.101 TEAR OF RIGHT ROTATOR CUFF: Status: RESOLVED | Noted: 2017-04-04 | Resolved: 2019-02-25

## 2019-02-25 PROCEDURE — 99214 OFFICE O/P EST MOD 30 MIN: CPT | Performed by: FAMILY MEDICINE

## 2019-02-25 RX ORDER — LEVETIRACETAM 500 MG/1
1500 TABLET, EXTENDED RELEASE ORAL DAILY
Qty: 270 TABLET | Refills: 3 | Status: SHIPPED | OUTPATIENT
Start: 2019-02-25 | End: 2019-07-23 | Stop reason: SDUPTHER

## 2019-02-26 ENCOUNTER — HOSPITAL ENCOUNTER (OUTPATIENT)
Dept: NON INVASIVE DIAGNOSTICS | Facility: CLINIC | Age: 79
Discharge: HOME | End: 2019-02-26
Payer: MEDICARE

## 2019-02-26 ENCOUNTER — TELEPHONE (OUTPATIENT)
Dept: VASCULAR SURGERY | Facility: CLINIC | Age: 79
End: 2019-02-26

## 2019-02-26 DIAGNOSIS — I71.4 ABDOMINAL AORTIC ANEURYSM WITHOUT RUPTURE (HCC): ICD-10-CM

## 2019-02-26 PROCEDURE — 93978 VASCULAR STUDY: CPT

## 2019-02-26 PROCEDURE — 93922 UPR/L XTREMITY ART 2 LEVELS: CPT

## 2019-02-26 PROCEDURE — 93978 VASCULAR STUDY: CPT | Performed by: SURGERY

## 2019-02-26 NOTE — TELEPHONE ENCOUNTER
Pt here for doppler  He is scheduled for dental cleaning and is ? If he needs antibx prior, his dentist always prescribes  Per protocol antibx recommended for 6mos after evar, pt's evar was done in 2010, pt states he has not had any additional surgery/procedures since then     Confirmed w/ JUANA WARREN per protocol antibx not required    Pt states he will notify his dentist

## 2019-04-22 ENCOUNTER — APPOINTMENT (OUTPATIENT)
Dept: LAB | Facility: CLINIC | Age: 79
End: 2019-04-22
Payer: MEDICARE

## 2019-04-22 DIAGNOSIS — E79.0 HYPERURICEMIA: ICD-10-CM

## 2019-04-22 LAB
ALBUMIN SERPL BCP-MCNC: 3.7 G/DL (ref 3.5–5)
ALP SERPL-CCNC: 77 U/L (ref 46–116)
ALT SERPL W P-5'-P-CCNC: 21 U/L (ref 12–78)
ANION GAP SERPL CALCULATED.3IONS-SCNC: 0 MMOL/L (ref 4–13)
AST SERPL W P-5'-P-CCNC: 16 U/L (ref 5–45)
BASOPHILS # BLD AUTO: 0.02 THOUSANDS/ΜL (ref 0–0.1)
BASOPHILS NFR BLD AUTO: 0 % (ref 0–1)
BILIRUB SERPL-MCNC: 0.87 MG/DL (ref 0.2–1)
BUN SERPL-MCNC: 15 MG/DL (ref 5–25)
CALCIUM SERPL-MCNC: 8.3 MG/DL (ref 8.3–10.1)
CHLORIDE SERPL-SCNC: 107 MMOL/L (ref 100–108)
CHOLEST SERPL-MCNC: 111 MG/DL (ref 50–200)
CO2 SERPL-SCNC: 31 MMOL/L (ref 21–32)
CREAT SERPL-MCNC: 1.18 MG/DL (ref 0.6–1.3)
EOSINOPHIL # BLD AUTO: 0.28 THOUSAND/ΜL (ref 0–0.61)
EOSINOPHIL NFR BLD AUTO: 4 % (ref 0–6)
ERYTHROCYTE [DISTWIDTH] IN BLOOD BY AUTOMATED COUNT: 12 % (ref 11.6–15.1)
GFR SERPL CREATININE-BSD FRML MDRD: 60 ML/MIN/1.73SQ M
GLUCOSE P FAST SERPL-MCNC: 85 MG/DL (ref 65–99)
HCT VFR BLD AUTO: 48.8 % (ref 36.5–49.3)
HDLC SERPL-MCNC: 36 MG/DL (ref 40–60)
HGB BLD-MCNC: 16.1 G/DL (ref 12–17)
IMM GRANULOCYTES # BLD AUTO: 0.02 THOUSAND/UL (ref 0–0.2)
IMM GRANULOCYTES NFR BLD AUTO: 0 % (ref 0–2)
LDLC SERPL CALC-MCNC: 51 MG/DL (ref 0–100)
LYMPHOCYTES # BLD AUTO: 1.28 THOUSANDS/ΜL (ref 0.6–4.47)
LYMPHOCYTES NFR BLD AUTO: 17 % (ref 14–44)
MCH RBC QN AUTO: 31.6 PG (ref 26.8–34.3)
MCHC RBC AUTO-ENTMCNC: 33 G/DL (ref 31.4–37.4)
MCV RBC AUTO: 96 FL (ref 82–98)
MONOCYTES # BLD AUTO: 0.4 THOUSAND/ΜL (ref 0.17–1.22)
MONOCYTES NFR BLD AUTO: 5 % (ref 4–12)
NEUTROPHILS # BLD AUTO: 5.53 THOUSANDS/ΜL (ref 1.85–7.62)
NEUTS SEG NFR BLD AUTO: 74 % (ref 43–75)
NONHDLC SERPL-MCNC: 75 MG/DL
NRBC BLD AUTO-RTO: 0 /100 WBCS
PLATELET # BLD AUTO: 192 THOUSANDS/UL (ref 149–390)
PMV BLD AUTO: 12.1 FL (ref 8.9–12.7)
POTASSIUM SERPL-SCNC: 4.3 MMOL/L (ref 3.5–5.3)
PROT SERPL-MCNC: 6.9 G/DL (ref 6.4–8.2)
RBC # BLD AUTO: 5.1 MILLION/UL (ref 3.88–5.62)
SODIUM SERPL-SCNC: 138 MMOL/L (ref 136–145)
TRIGL SERPL-MCNC: 119 MG/DL
TSH SERPL DL<=0.05 MIU/L-ACNC: 3.26 UIU/ML (ref 0.36–3.74)
URATE SERPL-MCNC: 7 MG/DL (ref 4.2–8)
WBC # BLD AUTO: 7.53 THOUSAND/UL (ref 4.31–10.16)

## 2019-04-22 PROCEDURE — 80061 LIPID PANEL: CPT | Performed by: FAMILY MEDICINE

## 2019-04-22 PROCEDURE — 80053 COMPREHEN METABOLIC PANEL: CPT | Performed by: FAMILY MEDICINE

## 2019-04-22 PROCEDURE — 36415 COLL VENOUS BLD VENIPUNCTURE: CPT | Performed by: FAMILY MEDICINE

## 2019-04-22 PROCEDURE — 84550 ASSAY OF BLOOD/URIC ACID: CPT

## 2019-04-22 PROCEDURE — 85025 COMPLETE CBC W/AUTO DIFF WBC: CPT | Performed by: FAMILY MEDICINE

## 2019-04-22 PROCEDURE — 84443 ASSAY THYROID STIM HORMONE: CPT | Performed by: FAMILY MEDICINE

## 2019-07-23 ENCOUNTER — OFFICE VISIT (OUTPATIENT)
Dept: NEUROLOGY | Facility: CLINIC | Age: 79
End: 2019-07-23
Payer: MEDICARE

## 2019-07-23 VITALS
DIASTOLIC BLOOD PRESSURE: 60 MMHG | BODY MASS INDEX: 26.7 KG/M2 | WEIGHT: 186.5 LBS | SYSTOLIC BLOOD PRESSURE: 120 MMHG | HEIGHT: 70 IN | HEART RATE: 73 BPM

## 2019-07-23 DIAGNOSIS — G40.909 SEIZURE DISORDER (HCC): Primary | ICD-10-CM

## 2019-07-23 PROCEDURE — 99213 OFFICE O/P EST LOW 20 MIN: CPT | Performed by: PSYCHIATRY & NEUROLOGY

## 2019-07-23 RX ORDER — LEVETIRACETAM 500 MG/1
1500 TABLET, EXTENDED RELEASE ORAL DAILY
Qty: 270 TABLET | Refills: 3 | Status: SHIPPED | OUTPATIENT
Start: 2019-07-23 | End: 2020-08-31 | Stop reason: SDUPTHER

## 2019-07-23 NOTE — PROGRESS NOTES
[place] [address] [facility] [address] [facility]  Follow Up Visit    Impression/Plan    Mr Robin Ayon is a 70 y o  male with history that includes abdominal aortic aneurysm status post repair that presents regarding 2 lifetime seizures occurring a few hours apart on 2/21/2017  His neurological exam has been essentially normal  MRI and EEG have been unrevealing for a cause  There are no seizure risk factors  His seizures were not  by more than 24 hours and therefore at this point he does not meet the technical criteria for a diagnosis of epilepsy  However, his initial seizure occurred out of sleep and there was no clear provocation suggesting some degree of elevated recurrence risk  He is tolerating levetiracetam without side effects  The initial plan had been to continue therapy for at least 18 months  He is currently experiencing no side effects and would like to continue on levetiracetam to reduce seizure risk especially in the setting of increased stress related to caring for his wife who has MS  If he decides to wean off AED therapy I would likely repeat an EEG after therapy is discontinued to confirm low seizure recurrence risk  Patient Instructions   1  Continue Keppra XR 1500 mg    2  Let us know if there are events concerning for seizure  3  Let us know if you want to consider wean off levetiracetam    4  Return in one year  Diagnoses and all orders for this visit:    Seizure disorder ([facility] [address] )  -     levETIRAcetam (KEPPRA XR) 500 MG 24 hr tablet; Take 3 tablets (1,500 mg total) by mouth daily        Asha Mcintyre is returning to the [place] [address] [facility] for follow up regarding seizure  Interval Events:   Seizures since last visit: None    No events concerning for seizure  No staring spells  No evidence of nocturnal seizure  No concerning myoclonus  He denies any possible side effects  Mood stable   He remains the primary caregiver for this wife who has MS and will be taking her to an appointment after this visit  He sleeps on the couch downstairs next to her hospital bed  He has an aide in the house for 4 hours per day  He is not interested in coming off levetiracetam at this point due to the risk of having another seizure, especially while under the stress of caring for his wife  Current AEDs:  Levetiracetam ER 1500 mg nightly  Medication side effects: None  Medication adherence: Yes    Seizures/Spell characteristics:  1  Witnessed GTC seizure  2 events on 2/21/17  One out of sleep, the other a few hours later in the ER       Special Features:  - History of status epilepticus: No  - Self injury due to seizures: No  - Precipitating factors: None     Seizure risk factors: Normal birth and development  No history of seizures as a child  No family history of seizures  No head trauma resulting in loss of consciousness  No history of brain tumor, stroke or CNS infection       Prior AEDs:  None    Prior Evaluation:  REEG 2/21/17: normal, awake  1 hour EEG sleep-deprived EEG, 2 hours, 4/18/2017:  Normal     MRI brain w/ and w/o 2/21/17: normal      History Reviewed: The following were reviewed and updated as appropriate: allergies, current medications, past medical history, past social history and problem list   Past Medical History includes:  abdominal aortic aneurysm s/p endovascular repair  HLD, HTN, hypothyroidism, hyperuricemia       Psychiatric History:  None    Social History:   Driving: Yes  Lives Alone: No  Occupation: retired  Lives with his wife  She has MS and he is her caregiver  His wife follows with Dr Arleth Peace  Retired   ROS:  Constitutional: Negative  Negative for appetite change and fever  HENT: Negative  Negative for hearing loss, tinnitus, trouble swallowing and voice change  Eyes: Negative  Negative for photophobia and pain  Respiratory: Negative  Negative for shortness of breath  Cardiovascular: Negative    Negative for palpitations  Gastrointestinal: Negative  Negative for nausea and vomiting  Endocrine: Negative  Negative for cold intolerance and heat intolerance  Genitourinary: Negative  Negative for dysuria, frequency and urgency  Musculoskeletal: Negative  Negative for myalgias and neck pain  Skin: Negative  Negative for rash  Neurological: Negative  Negative for dizziness, tremors, seizures, syncope, facial asymmetry, speech difficulty, weakness, light-headedness, numbness and headaches  Hematological: Negative  Does not bruise/bleed easily  Psychiatric/Behavioral: Negative  Negative for confusion, hallucinations and sleep disturbance  ROS reviewed and updated as appropriate  Objective    /60 (BP Location: Right arm, Patient Position: Sitting, Cuff Size: Standard)   Pulse 73   Ht 5' 10" (1 778 m)   Wt 84 6 kg (186 lb 8 oz)   BMI 26 76 kg/m²      General Exam  No acute distress  Neurologic Exam  Mental Status:  Alert and oriented x 3  Language: normal fluency and comprehension  Cranial Nerves: Face symmetric  No dysarthria  Gait: Normal casual gait

## 2019-07-23 NOTE — PATIENT INSTRUCTIONS
1  Continue Keppra XR 1500 mg    2  Let us know if there are events concerning for seizure  3  Let us know if you want to consider wean off levetiracetam    4  Return in one year

## 2019-08-18 NOTE — PROGRESS NOTES
Assessment/Plan:         Diagnoses and all orders for this visit:    Mixed hyperlipidemia    Hyperuricemia    Abdominal aortic aneurysm (AAA) without rupture (HCC)    Other orders  -     TDAP VACCINE GREATER THAN OR EQUAL TO 8YO IM  -     PNEUMOCOCCAL CONJUGATE VACCINE 13-VALENT GREATER THAN 6 MONTHS         Continue with current medications  OV 6 months  He is not interested in pneumococcal vaccines or tetanus booster  Patient ID: Justino Panchal is a 70 y o  male  Follow-up visit  Medications reviewed  Labs 04/2019   Hypertension blood pressures have been stable off medication  Creatinine 1 19  Electrolytes normal   Hemoglobin 16 3  Hyperlipidemia with history of AAA s/p  endovascular stent on Simvastatin 40 mg daily  04/2019 Lipid profile Cholesterol 111  Triglycerides 119  HDL 36  LDL 51  LFTs normal  FBS 85  Creatinine 1 18      The following portions of the patient's history were reviewed and updated as appropriate: allergies, current medications, past family history, past medical history, past social history, past surgical history and problem list     Review of Systems   Constitutional: Negative for activity change, appetite change, chills, fatigue, fever and unexpected weight change  HENT: Negative for congestion, ear pain, hearing loss, postnasal drip, rhinorrhea, sinus pressure, sinus pain, sore throat, trouble swallowing and voice change  Eyes: Negative for visual disturbance  Respiratory: Negative for cough, chest tightness, shortness of breath and wheezing  Cardiovascular: Negative for chest pain, palpitations and leg swelling  History of abdominal aortic aneurysm status post endovascular stenting  He is followed by vascular surgery  No claudications  02/2019 aorto iliac study Widely patent Endograft without evidence of endoleak  Celiac artery, SMA and bilateral proximal renal arteries patent  02/2017 echocardiogram normal left ventricular systolic function    No regional wall motion abnormalities  Mild MR  mild to moderate aortic regurgitation  Mild aortic stenosis  Mild tricuspid regurgitation  Aortic root mild dilatation  Ascending aorta mildly dilated at 4 1 cm  Gastrointestinal: Negative for abdominal pain, blood in stool, constipation, diarrhea, nausea and vomiting  Colonoscopy 05/2018   Endocrine: Negative for polydipsia and polyuria  Past history of borderline abnormal elevated TSH 6 163   04/2019 TSH normal at 3 260  On no medications   Genitourinary: Negative for difficulty urinating and urgency  07/2015 PSA 0 5   Musculoskeletal: Negative for arthralgias and myalgias  History of hyperuricemia and gout  04/2019 uric acid 7 0  No longer on allopurinol  Chronic bilateral shoulder pain with chronic rotator cuff tear right shoulder and subacromial bursitis left shoulder  Status post bilateral steroid injections by Orthopedic surgery 12/2017  He is on no pain medications at present   Skin: Negative for rash  Allergic/Immunologic: Negative for environmental allergies  Neurological: Negative for dizziness, weakness, light-headedness and headaches  History of seizure disorder with 2 seizures occurring within several hours 02/ 2017  extensive workup and neurological evaluation  MRI brain normal   Awake EEG normal   He did not drive for 6 months and regained his license at the end of August 2017  He has been seizure-free on Keppra no side effects  Hematological: Negative for adenopathy  Does not bruise/bleed easily  Psychiatric/Behavioral: Negative for dysphoric mood and sleep disturbance  Objective:      /70 (BP Location: Left arm, Patient Position: Sitting, Cuff Size: Adult)   Pulse 76          Physical Exam   Constitutional: He appears well-nourished  No distress  Neck: Carotid bruit is not present  No tracheal deviation present  No thyromegaly present     Cardiovascular: Normal rate, regular rhythm and normal heart sounds  Exam reveals no gallop  No murmur heard  Pulses:       Carotid pulses are 2+ on the right side, and 2+ on the left side  Pulmonary/Chest: Effort normal and breath sounds normal  He has no wheezes  He has no rales  Musculoskeletal: He exhibits no edema  Lymphadenopathy:     He has no cervical adenopathy  Skin: No cyanosis  Nails show no clubbing  Psychiatric: He has a normal mood and affect           Recent Results (from the past 4032 hour(s))   CBC and differential    Collection Time: 04/22/19  8:36 AM   Result Value Ref Range    WBC 7 53 4 31 - 10 16 Thousand/uL    RBC 5 10 3 88 - 5 62 Million/uL    Hemoglobin 16 1 12 0 - 17 0 g/dL    Hematocrit 48 8 36 5 - 49 3 %    MCV 96 82 - 98 fL    MCH 31 6 26 8 - 34 3 pg    MCHC 33 0 31 4 - 37 4 g/dL    RDW 12 0 11 6 - 15 1 %    MPV 12 1 8 9 - 12 7 fL    Platelets [phone] - 822 Thousands/uL    nRBC 0 /100 WBCs    Neutrophils Relative 74 43 - 75 %    Immat GRANS % 0 0 - 2 %    Lymphocytes Relative 17 14 - 44 %    Monocytes Relative 5 4 - 12 %    Eosinophils Relative 4 0 - 6 %    Basophils Relative 0 0 - 1 %    Neutrophils Absolute 5 53 1 85 - 7 62 Thousands/µL    Immature Grans Absolute 0 02 0 00 - 0 20 Thousand/uL    Lymphocytes Absolute 1 28 0 60 - 4 47 Thousands/µL    Monocytes Absolute 0 40 0 17 - 1 22 Thousand/µL    Eosinophils Absolute 0 28 0 00 - 0 61 Thousand/µL    Basophils Absolute 0 02 0 00 - 0 10 Thousands/µL   Comprehensive metabolic panel    Collection Time: 04/22/19  8:36 AM   Result Value Ref Range    Sodium 138 136 - 145 mmol/L    Potassium 4 3 3 5 - 5 3 mmol/L    Chloride 107 100 - 108 mmol/L    CO2 31 21 - 32 mmol/L    ANION GAP 0 (L) 4 - 13 mmol/L    BUN 15 5 - 25 mg/dL    Creatinine 1 18 0 60 - 1 30 mg/dL    Glucose, Fasting 85 65 - 99 mg/dL    Calcium 8 3 8 3 - 10 1 mg/dL    AST 16 5 - 45 U/L    ALT 21 12 - 78 U/L    Alkaline Phosphatase 77 46 - 116 U/L    Total Protein 6 9 6 4 - 8 2 g/dL    Albumin 3 7 3 5 - 5 0 g/dL    Total Bilirubin 0 87 0 20 - 1 00 mg/dL    eGFR 60 ml/min/1 73sq m   Lipid panel    Collection Time: 04/22/19  8:36 AM   Result Value Ref Range    Cholesterol 111 50 - 200 mg/dL    Triglycerides 119 <=150 mg/dL    HDL, Direct 36 (L) 40 - 60 mg/dL    LDL Calculated 51 0 - 100 mg/dL    Non-HDL-Chol (CHOL-HDL) 75 mg/dl   TSH, 3rd generation with Free T4 reflex    Collection Time: 04/22/19  8:36 AM   Result Value Ref Range    TSH 3RD GENERATON 3 260 0 358 - 3 740 uIU/mL   Uric acid    Collection Time: 04/22/19  8:36 AM   Result Value Ref Range    Uric Acid 7 0 4 2 - 8 0 mg/dL

## 2019-08-19 ENCOUNTER — OFFICE VISIT (OUTPATIENT)
Dept: FAMILY MEDICINE CLINIC | Facility: CLINIC | Age: 79
End: 2019-08-19
Payer: MEDICARE

## 2019-08-19 VITALS — DIASTOLIC BLOOD PRESSURE: 70 MMHG | HEART RATE: 76 BPM | SYSTOLIC BLOOD PRESSURE: 130 MMHG

## 2019-08-19 DIAGNOSIS — I71.4 ABDOMINAL AORTIC ANEURYSM (AAA) WITHOUT RUPTURE (HCC): ICD-10-CM

## 2019-08-19 DIAGNOSIS — E79.0 HYPERURICEMIA: ICD-10-CM

## 2019-08-19 DIAGNOSIS — E78.2 MIXED HYPERLIPIDEMIA: Primary | ICD-10-CM

## 2019-08-19 PROCEDURE — 99213 OFFICE O/P EST LOW 20 MIN: CPT | Performed by: FAMILY MEDICINE

## 2020-02-10 ENCOUNTER — TELEPHONE (OUTPATIENT)
Dept: FAMILY MEDICINE CLINIC | Facility: CLINIC | Age: 80
End: 2020-02-10

## 2020-02-10 DIAGNOSIS — E79.0 HYPERURICEMIA: Primary | ICD-10-CM

## 2020-02-10 DIAGNOSIS — I10 ESSENTIAL HYPERTENSION: ICD-10-CM

## 2020-02-10 DIAGNOSIS — E78.2 MIXED HYPERLIPIDEMIA: ICD-10-CM

## 2020-02-10 NOTE — TELEPHONE ENCOUNTER
Patient called asking if he needed to have blood work done prior to next weeks  Apt  Please advise   Patient would like  A return call

## 2020-02-11 ENCOUNTER — APPOINTMENT (OUTPATIENT)
Dept: LAB | Facility: CLINIC | Age: 80
End: 2020-02-11
Payer: MEDICARE

## 2020-02-11 DIAGNOSIS — E79.0 HYPERURICEMIA: ICD-10-CM

## 2020-02-11 LAB
ALBUMIN SERPL BCP-MCNC: 3.9 G/DL (ref 3.5–5)
ALP SERPL-CCNC: 69 U/L (ref 46–116)
ALT SERPL W P-5'-P-CCNC: 20 U/L (ref 12–78)
ANION GAP SERPL CALCULATED.3IONS-SCNC: 2 MMOL/L (ref 4–13)
AST SERPL W P-5'-P-CCNC: 20 U/L (ref 5–45)
BASOPHILS # BLD AUTO: 0.03 THOUSANDS/ΜL (ref 0–0.1)
BASOPHILS NFR BLD AUTO: 0 % (ref 0–1)
BILIRUB SERPL-MCNC: 1 MG/DL (ref 0.2–1)
BUN SERPL-MCNC: 12 MG/DL (ref 5–25)
CALCIUM SERPL-MCNC: 9.2 MG/DL (ref 8.3–10.1)
CHLORIDE SERPL-SCNC: 110 MMOL/L (ref 100–108)
CHOLEST SERPL-MCNC: 138 MG/DL (ref 50–200)
CO2 SERPL-SCNC: 31 MMOL/L (ref 21–32)
CREAT SERPL-MCNC: 1.22 MG/DL (ref 0.6–1.3)
EOSINOPHIL # BLD AUTO: 0.31 THOUSAND/ΜL (ref 0–0.61)
EOSINOPHIL NFR BLD AUTO: 4 % (ref 0–6)
ERYTHROCYTE [DISTWIDTH] IN BLOOD BY AUTOMATED COUNT: 12.6 % (ref 11.6–15.1)
GFR SERPL CREATININE-BSD FRML MDRD: 57 ML/MIN/1.73SQ M
GLUCOSE P FAST SERPL-MCNC: 95 MG/DL (ref 65–99)
HCT VFR BLD AUTO: 48.4 % (ref 36.5–49.3)
HDLC SERPL-MCNC: 49 MG/DL
HGB BLD-MCNC: 15.8 G/DL (ref 12–17)
IMM GRANULOCYTES # BLD AUTO: 0.01 THOUSAND/UL (ref 0–0.2)
IMM GRANULOCYTES NFR BLD AUTO: 0 % (ref 0–2)
LDLC SERPL CALC-MCNC: 63 MG/DL (ref 0–100)
LYMPHOCYTES # BLD AUTO: 1.36 THOUSANDS/ΜL (ref 0.6–4.47)
LYMPHOCYTES NFR BLD AUTO: 19 % (ref 14–44)
MCH RBC QN AUTO: 31.7 PG (ref 26.8–34.3)
MCHC RBC AUTO-ENTMCNC: 32.6 G/DL (ref 31.4–37.4)
MCV RBC AUTO: 97 FL (ref 82–98)
MONOCYTES # BLD AUTO: 0.45 THOUSAND/ΜL (ref 0.17–1.22)
MONOCYTES NFR BLD AUTO: 6 % (ref 4–12)
NEUTROPHILS # BLD AUTO: 5.09 THOUSANDS/ΜL (ref 1.85–7.62)
NEUTS SEG NFR BLD AUTO: 71 % (ref 43–75)
NONHDLC SERPL-MCNC: 89 MG/DL
NRBC BLD AUTO-RTO: 0 /100 WBCS
PLATELET # BLD AUTO: 175 THOUSANDS/UL (ref 149–390)
PMV BLD AUTO: 11.4 FL (ref 8.9–12.7)
POTASSIUM SERPL-SCNC: 4.4 MMOL/L (ref 3.5–5.3)
PROT SERPL-MCNC: 6.9 G/DL (ref 6.4–8.2)
RBC # BLD AUTO: 4.99 MILLION/UL (ref 3.88–5.62)
SODIUM SERPL-SCNC: 143 MMOL/L (ref 136–145)
TRIGL SERPL-MCNC: 128 MG/DL
URATE SERPL-MCNC: 7 MG/DL (ref 4.2–8)
WBC # BLD AUTO: 7.25 THOUSAND/UL (ref 4.31–10.16)

## 2020-02-11 PROCEDURE — 80061 LIPID PANEL: CPT | Performed by: FAMILY MEDICINE

## 2020-02-11 PROCEDURE — 85025 COMPLETE CBC W/AUTO DIFF WBC: CPT | Performed by: FAMILY MEDICINE

## 2020-02-11 PROCEDURE — 80053 COMPREHEN METABOLIC PANEL: CPT | Performed by: FAMILY MEDICINE

## 2020-02-11 PROCEDURE — 84550 ASSAY OF BLOOD/URIC ACID: CPT

## 2020-02-11 PROCEDURE — 36415 COLL VENOUS BLD VENIPUNCTURE: CPT | Performed by: FAMILY MEDICINE

## 2020-02-20 ENCOUNTER — OFFICE VISIT (OUTPATIENT)
Dept: FAMILY MEDICINE CLINIC | Facility: CLINIC | Age: 80
End: 2020-02-20
Payer: MEDICARE

## 2020-02-20 VITALS
RESPIRATION RATE: 16 BRPM | HEART RATE: 92 BPM | BODY MASS INDEX: 25.2 KG/M2 | WEIGHT: 180 LBS | SYSTOLIC BLOOD PRESSURE: 118 MMHG | DIASTOLIC BLOOD PRESSURE: 66 MMHG | TEMPERATURE: 99.3 F | HEIGHT: 71 IN

## 2020-02-20 DIAGNOSIS — I77.810 MILD DILATION OF ASCENDING AORTA (HCC): ICD-10-CM

## 2020-02-20 DIAGNOSIS — I35.1 NONRHEUMATIC AORTIC VALVE INSUFFICIENCY: ICD-10-CM

## 2020-02-20 DIAGNOSIS — E78.2 MIXED HYPERLIPIDEMIA: Primary | ICD-10-CM

## 2020-02-20 DIAGNOSIS — I34.0 NONRHEUMATIC MITRAL VALVE REGURGITATION: ICD-10-CM

## 2020-02-20 DIAGNOSIS — I71.4 ABDOMINAL AORTIC ANEURYSM (AAA) WITHOUT RUPTURE (HCC): ICD-10-CM

## 2020-02-20 DIAGNOSIS — I35.0 NONRHEUMATIC AORTIC VALVE STENOSIS: ICD-10-CM

## 2020-02-20 DIAGNOSIS — E79.0 HYPERURICEMIA: ICD-10-CM

## 2020-02-20 DIAGNOSIS — G40.909 SEIZURE DISORDER (HCC): ICD-10-CM

## 2020-02-20 PROCEDURE — 1125F AMNT PAIN NOTED PAIN PRSNT: CPT | Performed by: FAMILY MEDICINE

## 2020-02-20 PROCEDURE — 1170F FXNL STATUS ASSESSED: CPT | Performed by: FAMILY MEDICINE

## 2020-02-20 PROCEDURE — G0438 PPPS, INITIAL VISIT: HCPCS | Performed by: FAMILY MEDICINE

## 2020-02-20 PROCEDURE — 99214 OFFICE O/P EST MOD 30 MIN: CPT | Performed by: FAMILY MEDICINE

## 2020-02-20 PROCEDURE — 3078F DIAST BP <80 MM HG: CPT | Performed by: FAMILY MEDICINE

## 2020-02-20 PROCEDURE — 1036F TOBACCO NON-USER: CPT | Performed by: FAMILY MEDICINE

## 2020-02-20 PROCEDURE — 3074F SYST BP LT 130 MM HG: CPT | Performed by: FAMILY MEDICINE

## 2020-02-20 PROCEDURE — 1160F RVW MEDS BY RX/DR IN RCRD: CPT | Performed by: FAMILY MEDICINE

## 2020-02-20 PROCEDURE — 3008F BODY MASS INDEX DOCD: CPT | Performed by: FAMILY MEDICINE

## 2020-02-20 RX ORDER — SIMVASTATIN 40 MG
40 TABLET ORAL DAILY
Qty: 90 TABLET | Refills: 3 | Status: SHIPPED | OUTPATIENT
Start: 2020-02-20 | End: 2021-09-01 | Stop reason: SDUPTHER

## 2020-02-20 NOTE — PROGRESS NOTES
Assessment/Plan:       Diagnoses and all orders for this visit:    Mixed hyperlipidemia  -     simvastatin (ZOCOR) 40 mg tablet; Take 1 tablet (40 mg total) by mouth daily  -     Comprehensive metabolic panel  -     Lipid panel    Abdominal aortic aneurysm (AAA) without rupture (HCC)    Seizure disorder (HCC)  -     CBC and differential    Hyperuricemia  -     Uric acid; Future    Nonrheumatic aortic valve stenosis    Nonrheumatic mitral valve regurgitation    Nonrheumatic aortic valve insufficiency    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HCC)          Continue with current medications  OV 6 months with repeat labs at that time  He is not interested in a flu vaccine or pneumococcal vaccines  Patient ID: Berlin Spears is a 70 y o  male  Follow-up visit  Medications reviewed  Labs 02/2020 see note  Hypertension blood pressures have been stable off medication  Creatinine 1 22  GFR 57  Electrolytes normal except for chloride 110  Hemoglobin 15 8  Hyperlipidemia with history of AAA s/p  endovascular stent on Simvastatin 40 mg daily  02/2020 Lipid profile Cholesterol 138  Triglycerides 128  HDL 49  LDL 63  LFTs normal  FBS 95  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  She has advanced MS  The following portions of the patient's history were reviewed and updated as appropriate: allergies, current medications, past family history, past medical history, past social history, past surgical history and problem list     Review of Systems   Constitutional: Positive for unexpected weight change  Negative for appetite change, chills, fatigue and fever  17 lb weight loss from 02/2019   HENT: Negative for congestion, ear pain, hearing loss, postnasal drip, rhinorrhea, sinus pressure, sinus pain, sore throat, trouble swallowing and voice change  Eyes: Negative for visual disturbance  Respiratory: Negative for cough, shortness of breath and wheezing      Cardiovascular: Negative for chest pain, palpitations and leg swelling  Abdominal aortic aneurysm status post endovascular stenting followed by vascular surgery  No claudications  Last vascular study 02/2019 aorto iliac study-widely patent Endograft without evidence of endoleak  Celiac artery, SMA and bilateral proximal renal arteries patent  02/2017 echocardiogram normal left ventricular systolic function  No regional wall motion abnormalities  Mild MR  mild to moderate aortic regurgitation  Mild aortic stenosis  Mild tricuspid regurgitation  Aortic root mild dilatation  Ascending aorta mildly dilated at 4 1 cm  Gastrointestinal: Negative for abdominal pain, blood in stool, constipation, diarrhea, nausea and vomiting  Colonoscopy 05/2018   Endocrine: Negative for polydipsia and polyuria  Past history of borderline abnormal elevated TSH 6 163   04/2019  Repeat TSH normal at 3 260  On no medications   Genitourinary: Negative for difficulty urinating and urgency  07/2015 PSA 0 5   Musculoskeletal: Positive for arthralgias  Negative for myalgias  Hyperuricemia and history of gout  02/2020 uric acid 7 0  No longer on allopurinol  Chronic bilateral shoulder pain with chronic rotator cuff tear right shoulder and subacromial bursitis left shoulder  Status post bilateral steroid injections by Orthopedic surgery 12/2017  He is on no pain medications at present   Skin: Negative for rash  Allergic/Immunologic: Negative for environmental allergies  Neurological: Positive for seizures  Negative for dizziness, light-headedness and headaches  History of seizure disorder with 2 seizures occurring within several hours 02/2017  Extensive workup and neurological evaluation  MRI brain normal   Awake EEG normal   He did not drive for 6 months and regained his license at the end of August 2017  He has been seizure-free on Keppra no side effects  Hematological: Negative for adenopathy  Does not bruise/bleed easily  Psychiatric/Behavioral: Negative for dysphoric mood and sleep disturbance  Objective:      /66   Pulse 92   Temp 99 3 °F (37 4 °C)   Resp 16   Ht 5' 11" (1 803 m)   Wt 81 6 kg (180 lb)   BMI 25 10 kg/m²     BP Readings from Last 3 Encounters:   02/20/20 118/66   08/19/19 130/70   07/23/19 120/60       Wt Readings from Last 3 Encounters:   02/20/20 81 6 kg (180 lb)   07/23/19 84 6 kg (186 lb 8 oz)   02/25/19 89 4 kg (197 lb)        Physical Exam   Constitutional: He is oriented to person, place, and time  He appears well-developed and well-nourished  No distress  HENT:   Right Ear: Tympanic membrane normal    Left Ear: Tympanic membrane normal    Mouth/Throat: Oropharynx is clear and moist    Eyes: Pupils are equal, round, and reactive to light  Conjunctivae and EOM are normal  No scleral icterus  Neck: No JVD present  Carotid bruit is not present  No tracheal deviation present  No thyroid mass and no thyromegaly present  Cardiovascular: Normal rate, regular rhythm and normal heart sounds  Exam reveals no gallop  No murmur heard  Pulses:       Carotid pulses are 2+ on the right side, and 2+ on the left side  Pulmonary/Chest: Effort normal and breath sounds normal  No respiratory distress  He has no wheezes  He has no rales  Abdominal: Soft  Bowel sounds are normal  He exhibits no distension, no abdominal bruit and no mass  There is no hepatosplenomegaly  There is no tenderness  There is no rebound and no guarding  Musculoskeletal: Normal range of motion  He exhibits no edema  Lymphadenopathy:     He has no cervical adenopathy  Neurological: He is alert and oriented to person, place, and time  No cranial nerve deficit  Skin: No rash noted  No cyanosis  Nails show no clubbing  Psychiatric: He has a normal mood and affect  Cognition and memory are normal    Nursing note and vitals reviewed          Creatinine (mg/dL)   Date Value   02/11/2020 1 22 04/22/2019 1 18 09/18/2018 1 19 07/29/2015 1 23 01/26/2015 1 08   07/17/2014 1 05       Recent Results (from the past 672 hour(s))   Comprehensive metabolic panel    Collection Time: 02/11/20  9:30 AM   Result Value Ref Range    Sodium 143 136 - 145 mmol/L    Potassium 4 4 3 5 - 5 3 mmol/L    Chloride 110 (H) 100 - 108 mmol/L    CO2 31 21 - 32 mmol/L    ANION GAP 2 (L) 4 - 13 mmol/L    BUN 12 5 - 25 mg/dL    Creatinine 1 22 0 60 - 1 30 mg/dL    Glucose, Fasting 95 65 - 99 mg/dL    Calcium 9 2 8 3 - 10 1 mg/dL    AST 20 5 - 45 U/L    ALT 20 12 - 78 U/L    Alkaline Phosphatase 69 46 - 116 U/L    Total Protein 6 9 6 4 - 8 2 g/dL    Albumin 3 9 3 5 - 5 0 g/dL    Total Bilirubin 1 00 0 20 - 1 00 mg/dL    eGFR 57 ml/min/1 73sq m   CBC and differential    Collection Time: 02/11/20  9:30 AM   Result Value Ref Range    WBC 7 25 4 31 - 10 16 Thousand/uL    RBC 4 99 3 88 - 5 62 Million/uL    Hemoglobin 15 8 12 0 - 17 0 g/dL    Hematocrit 48 4 36 5 - 49 3 %    MCV 97 82 - 98 fL    MCH 31 7 26 8 - 34 3 pg    MCHC 32 6 31 4 - 37 4 g/dL    RDW 12 6 11 6 - 15 1 %    MPV 11 4 8 9 - 12 7 fL    Platelets 570 566 - 787 Thousands/uL    nRBC 0 /100 WBCs    Neutrophils Relative 71 43 - 75 %    Immat GRANS % 0 0 - 2 %    Lymphocytes Relative 19 14 - 44 %    Monocytes Relative 6 4 - 12 %    Eosinophils Relative 4 0 - 6 %    Basophils Relative 0 0 - 1 %    Neutrophils Absolute 5 09 1 85 - 7 62 Thousands/µL    Immature Grans Absolute 0 01 0 00 - 0 20 Thousand/uL    Lymphocytes Absolute 1 36 0 60 - 4 47 Thousands/µL    Monocytes Absolute 0 45 0 17 - 1 22 Thousand/µL    Eosinophils Absolute 0 31 0 00 - 0 61 Thousand/µL    Basophils Absolute 0 03 0 00 - 0 10 Thousands/µL   Lipid panel    Collection Time: 02/11/20  9:30 AM   Result Value Ref Range    Cholesterol 138 50 - 200 mg/dL    Triglycerides 128 <=150 mg/dL    HDL, Direct 49 >=40 mg/dL    LDL Calculated 63 0 - 100 mg/dL    Non-HDL-Chol (CHOL-HDL) 89 mg/dl   Uric acid    Collection Time: 02/11/20  9:30 AM   Result Value Ref Range    Uric Acid 7 0 4 2 - 8 0 mg/dL       Lab Results   Component Value Date    [id number] 3 260 04/22/2019

## 2020-02-20 NOTE — PATIENT INSTRUCTIONS

## 2020-02-20 NOTE — PROGRESS NOTES
Assessment and Plan:     Problem List Items Addressed This Visit        Cardiovascular and Mediastinum    Aneurysm of abdominal aorta (HCC)    Nonrheumatic aortic valve stenosis    Nonrheumatic mitral valve regurgitation    Nonrheumatic aortic valve insufficiency    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HCC)       Nervous and Auditory    Seizure disorder (HCC)       Other    Hyperlipidemia - Primary    Relevant Medications    simvastatin (ZOCOR) 40 mg tablet    Hyperuricemia        BMI Counseling: Body mass index is 25 1 kg/m²  The BMI is above normal  Nutrition recommendations include decreasing portion sizes, consuming healthier snacks, moderation in carbohydrate intake, reducing intake of saturated and trans fat and reducing intake of cholesterol  Exercise recommendations include exercising 3-5 times per week  No pharmacotherapy was ordered  Preventive health issues were discussed with patient, and age appropriate screening tests were ordered as noted in patient's After Visit Summary  Personalized health advice and appropriate referrals for health education or preventive services given if needed, as noted in patient's After Visit Summary       History of Present Illness:     Patient presents for Medicare Annual Wellness visit    Patient Care Team:  Vandana Blackmon MD as PCP - Karrie Shone, MD Linell Morales, MD Luz Poles, MD (Colon and Rectal Surgery)  Elodia Sanchez MD as Endoscopist     Problem List:     Patient Active Problem List   Diagnosis    Hyperlipidemia    Hypertension    Aneurysm of abdominal aorta ([address] )    Disc degeneration, lumbar    Diverticulosis    Hyperuricemia    Hypothyroidism    Osteoarthrosis, hand    Seizure disorder ([address] )    Nonrheumatic aortic valve stenosis    Nonrheumatic mitral valve regurgitation    Nonrheumatic aortic valve insufficiency    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[address] )      Past Medical and Surgical History:     Past Medical History: Diagnosis Date    Colon polyps     Gout     Hyperlipidemia     Hypertension     S/P AAA repair     Se[address]ure disorder ([facility] [address]     Tear of right rotator cuff 2017     Past Surgical History:   Procedure Laterality Date    ABDOMINAL AORTIC ANEURYSM REPAIR, ENDOVASCULAR N/A     [place] COLONOSCOPY FLX DX W/COLLJ SPEC WHEN PFRMD N/A 2017    Procedure: COLONOSCOPY;  Surgeon: Aleksandar Lange MD;  Location: [facility]; Service: Colorectal    [place] COLONOSCOPY FLX DX W/COLLJ SPEC WHEN PFRMD N/A 2018    Procedure: COLONOSCOPY;  Surgeon: Aleksandar Lange MD;  [facility];   Service: Colorectal    TONSILLECTOMY      WISDOM TOOTH EXTRACTION Bilateral       Family History:     Family History   Problem Relation Age of Onset    Aneurysm Mother         ABDMONIAL  AORTA    Coronary artery disease Father     Coronary artery disease Brother       Social History:        Social History     Socioeconomic History    Marital status: /Civil Union     Spouse name: Not on file    Number of children: Not on file    Years of education: Not on file    Highest education level: Not on file   Occupational History    Not on file   Social Needs    Financial resource strain: Not on file    Food insecurity:     Worry: Not on file     Inability: Not on file    Transportation needs:     Medical: Not on file     Non-medical: Not on file   Tobacco Use    Smoking status: Former Smoker     Packs/day: 3 00     Years: 10 00     Pack years: 30 00     Last attempt to quit: 1971     Years since quittin 0    Smokeless tobacco: Never Used   Substance and Sexual Activity    Alcohol use: Yes     Comment: occasional    Drug use: No    Sexual activity: Not on file   Lifestyle    Physical activity:     Days per week: Not on file     Minutes per session: Not on file    Stress: Not on file   Relationships    Social connections:     Talks on phone: Not on file     Gets together: Not on file     Attends Rastafari service: Not on file     Active member of club or organization: Not on file     Attends meetings of clubs or organizations: Not on file     Relationship status: Not on file    Intimate partner violence:     Fear of current or ex partner: Not on file     Emotionally abused: Not on file     Physically abused: Not on file     Forced sexual activity: Not on file   Other Topics Concern    Not on file   Social History Narrative    Not on file      Medications and Allergies:     Current Outpatient Medications   Medication Sig Dispense Refill    acetaminophen (TYLENOL) 500 mg tablet Take 500 mg by mouth every 6 (six) hours as needed for mild pain      aspirin 325 mg tablet Take 1 capsule by mouth daily      levETIRAcetam (KEPPRA XR) 500 MG 24 hr tablet Take 3 tablets (1,500 mg total) by mouth daily 270 tablet 3    simvastatin (ZOCOR) 40 mg tablet Take 1 tablet (40 mg total) by mouth daily 90 tablet 3     No current facility-administered medications for this visit  Allergies   Allergen Reactions    Fenofibrate      Patient not aware of allergy    Hydrocodone-Acetaminophen      Patient not aware of allergy      Immunizations: There is no immunization history for the selected administration types on file for this patient  Health Maintenance:         Topic Date Due    CRC Screening: Colonoscopy  05/18/2028     There are no preventive care reminders to display for this patient  Medicare Health Risk Assessment:     /66   Pulse 92   Temp 99 3 °F (37 4 °C)   Resp 16   Ht 5' 11" (1 803 m)   Wt 81 6 kg (180 lb)   BMI 25 10 kg/m²      Honorio Shepherd is here for his Subsequent Wellness visit  Last Medicare Wellness visit information reviewed, patient interviewed and updates made to the record today  Health Risk Assessment:   Patient rates overall health as good  Patient feels that their physical health rating is same  Eyesight was rated as same  Hearing was rated as same   Patient feels that their emotional and mental health rating is same  Pain experienced in the last 7 days has been some  Patient's pain rating has been 4/10  Patient states that he has experienced no weight loss or gain in last 6 months  Depression Screening:   PHQ-2 Score: 0      Fall Risk Screening: In the past year, patient has experienced: no history of falling in past year      Home Safety:  Patient does not have trouble with stairs inside or outside of their home  Patient has working smoke alarms and has no working carbon monoxide detector  Home safety hazards include: none  Nutrition:   Current diet is Regular  Medications:   Patient is currently taking over-the-counter supplements  OTC medications include: see medication list  Patient is able to manage medications  Activities of Daily Living (ADLs)/Instrumental Activities of Daily Living (IADLs):   Walk and transfer into and out of bed and chair?: Yes  Dress and groom yourself?: Yes    Bathe or shower yourself?: Yes    Feed yourself?  Yes  Do your laundry/housekeeping?: Yes  Manage your money, pay your bills and track your expenses?: Yes  Make your own meals?: Yes    Do your own shopping?: Yes    Previous Hospitalizations:   Any hospitalizations or ED visits within the last 12 months?: No      Advance Care Planning:   Living will: No    Advanced directive: No      Cognitive Screening:   Provider or family/friend/caregiver concerned regarding cognition?: No    PREVENTIVE SCREENINGS      Cardiovascular Screening:    General: Screening Not Indicated and History Lipid Disorder      Diabetes Screening:     General: Screening Current      Colorectal Cancer Screening:     General: Screening Current      Prostate Cancer Screening:    General: Screening Not Indicated      Osteoporosis Screening:    General: Screening Not Indicated      Abdominal Aortic Aneurysm (AAA) Screening:    Risk factors include: tobacco use        General: Screening Not Indicated and History AAA Lung Cancer Screening:     General: Screening Not Indicated      Hepatitis C Screening:    General: Screening Not Indicated    Other Counseling Topics:   Calcium and vitamin D intake and regular weightbearing exercise         Kandace Newsome MD

## 2020-04-16 ENCOUNTER — TELEPHONE (OUTPATIENT)
Dept: FAMILY MEDICINE CLINIC | Facility: CLINIC | Age: 80
End: 2020-04-16

## 2020-06-01 ENCOUNTER — TELEPHONE (OUTPATIENT)
Dept: FAMILY MEDICINE CLINIC | Facility: CLINIC | Age: 80
End: 2020-06-01

## 2020-06-01 ENCOUNTER — NURSE TRIAGE (OUTPATIENT)
Dept: OTHER | Facility: OTHER | Age: 80
End: 2020-06-01

## 2020-06-02 ENCOUNTER — OFFICE VISIT (OUTPATIENT)
Dept: FAMILY MEDICINE CLINIC | Facility: CLINIC | Age: 80
End: 2020-06-02
Payer: MEDICARE

## 2020-06-02 VITALS
HEIGHT: 71 IN | TEMPERATURE: 97.9 F | DIASTOLIC BLOOD PRESSURE: 70 MMHG | RESPIRATION RATE: 16 BRPM | BODY MASS INDEX: 25.2 KG/M2 | HEART RATE: 72 BPM | WEIGHT: 180 LBS | SYSTOLIC BLOOD PRESSURE: 122 MMHG

## 2020-06-02 DIAGNOSIS — H11.32 SUBCONJUNCTIVAL HEMORRHAGE OF LEFT EYE: Primary | ICD-10-CM

## 2020-06-02 PROCEDURE — 3078F DIAST BP <80 MM HG: CPT | Performed by: PHYSICIAN ASSISTANT

## 2020-06-02 PROCEDURE — 99214 OFFICE O/P EST MOD 30 MIN: CPT | Performed by: PHYSICIAN ASSISTANT

## 2020-06-02 PROCEDURE — 1160F RVW MEDS BY RX/DR IN RCRD: CPT | Performed by: PHYSICIAN ASSISTANT

## 2020-06-02 PROCEDURE — 3074F SYST BP LT 130 MM HG: CPT | Performed by: PHYSICIAN ASSISTANT

## 2020-06-02 PROCEDURE — 3008F BODY MASS INDEX DOCD: CPT | Performed by: PHYSICIAN ASSISTANT

## 2020-06-02 PROCEDURE — 1036F TOBACCO NON-USER: CPT | Performed by: PHYSICIAN ASSISTANT

## 2020-07-29 ENCOUNTER — OFFICE VISIT (OUTPATIENT)
Dept: NEUROLOGY | Facility: CLINIC | Age: 80
End: 2020-07-29
Payer: MEDICARE

## 2020-07-29 VITALS
BODY MASS INDEX: 24.92 KG/M2 | HEIGHT: 71 IN | DIASTOLIC BLOOD PRESSURE: 63 MMHG | HEART RATE: 77 BPM | TEMPERATURE: 97.7 F | WEIGHT: 178 LBS | SYSTOLIC BLOOD PRESSURE: 137 MMHG

## 2020-07-29 DIAGNOSIS — G40.909 SEIZURE DISORDER (HCC): Primary | ICD-10-CM

## 2020-07-29 PROCEDURE — 1036F TOBACCO NON-USER: CPT | Performed by: PSYCHIATRY & NEUROLOGY

## 2020-07-29 PROCEDURE — 3078F DIAST BP <80 MM HG: CPT | Performed by: PSYCHIATRY & NEUROLOGY

## 2020-07-29 PROCEDURE — 99213 OFFICE O/P EST LOW 20 MIN: CPT | Performed by: PSYCHIATRY & NEUROLOGY

## 2020-07-29 PROCEDURE — 3075F SYST BP GE 130 - 139MM HG: CPT | Performed by: PSYCHIATRY & NEUROLOGY

## 2020-07-29 PROCEDURE — 1160F RVW MEDS BY RX/DR IN RCRD: CPT | Performed by: PSYCHIATRY & NEUROLOGY

## 2020-07-29 NOTE — PROGRESS NOTES
Patient ID: Adriano Bruce is a 70 y o  male  Assessment/Plan:    No problem-specific Assessment & Plan notes found for this encounter  {Assess/PlanSmartLinks:78019}       Subjective:    HPI    {[facility] HPI texts:59324}    {Common ambulatory SmartLinks:11974}         Objective:    Blood pressure 137/63, pulse 77, temperature 97 7 °F (36 5 °C), height 5' 11" (1 803 m), weight 80 7 kg (178 lb)  Physical Exam    Neurological Exam      ROS:    Review of Systems   Constitutional: Negative  Negative for appetite change and fever  HENT: Negative  Negative for hearing loss, tinnitus, trouble swallowing and voice change  Eyes: Negative  Negative for photophobia and pain  Respiratory: Negative  Negative for shortness of breath  Cardiovascular: Negative  Negative for palpitations  Gastrointestinal: Negative  Negative for nausea and vomiting  Endocrine: Negative  Negative for cold intolerance  Genitourinary: Negative  Negative for dysuria, frequency and urgency  Musculoskeletal: Negative  Negative for myalgias and neck pain  Skin: Negative  Negative for rash  Allergic/Immunologic: Negative  Neurological: Positive for dizziness  Negative for tremors, seizures, syncope, facial asymmetry, speech difficulty, weakness, light-headedness, numbness and headaches  Hematological: Negative  Does not bruise/bleed easily  Psychiatric/Behavioral: Negative  Negative for confusion, hallucinations and sleep disturbance

## 2020-07-29 NOTE — PROGRESS NOTES
[facility] [address] [facility]  Follow Up Visit    Impression/Plan    Mr Vaishali Leyva is a 70 y o  male with history that includes abdominal aortic aneurysm status post repair that presents regarding 2 lifetime seizures occurring a few hours apart on 2/21/2017  His neurological exam has been essentially normal  MRI and EEG have been unrevealing for a cause  There are no seizure risk factors  His seizures were not  by more than 24 hours and therefore at this point he does not meet the technical criteria for a diagnosis of epilepsy  However, his initial seizure occurred out of sleep and there was no clear provocation suggesting some degree of elevated recurrence risk  He is tolerating levetiracetam without side effects  The initial plan had been to continue therapy for at least 18 months  He is currently experiencing no side effects and would like to continue on levetiracetam to reduce seizure risk especially in the setting of increased stress related to caring for his wife who has MS  If he decides to wean off AED therapy I would likely repeat an EEG after therapy is discontinued to confirm low seizure recurrence risk  Does not want to make any changes  Spent time discussing medication cost  Reviewed goodrx com  Patient Instructions   1  Continue levetiracetam ER 1500 mg nightly  2  Look into [employer]   3  Return in about one year  Diagnoses and all orders for this visit:    Seizure disorder ([address] )        Subjective    Cyn Lawson is returning to the [facility] for follow up regarding seizure  Interval Events:   Seizures since last visit: None    No events concerning for seizure  No new medical problems  Feels depressed at times lately related to COVID-19  He continues to care for his wife who as MS  She is not able to walk  He has help 4 hours per day with her care       Current AEDs:  Levetiracetam ER 1500 mg nightly  Medication side effects: None  Medication adherence: Yes    Seizures/Spell characteristics:  1  Witnessed GTC seizure  2 events on 2/21/17  One out of sleep, the other a few hours later in the ER       Special Features:  - History of status epilepticus: No  - Self injury due to seizures: No  - Precipitating factors: None     Seizure risk factors: Normal birth and development  No history of seizures as a child  No family history of seizures  No head trauma resulting in loss of consciousness  No history of brain tumor, stroke or CNS infection       Prior AEDs:  None    Prior Evaluation:  REEG 2/21/17: normal, awake  1 hour EEG sleep-deprived EEG, 2 hours, 4/18/2017:  Normal     MRI brain w/ and w/o 2/21/17: normal      History Reviewed: The following were reviewed and updated as appropriate: allergies, current medications, past medical history, past social history and problem list   Past Medical History includes:  abdominal aortic aneurysm s/p endovascular repair  HLD, HTN, hypothyroidism, hyperuricemia       Psychiatric History:  None    Social History:   Driving: Yes  Lives Alone: No  Occupation: retired  Lives with his wife  She has MS and he is her caregiver  His wife follows with Dr Tatiana Madden  Retired   ROS:  Review of Systems   Constitutional: Negative  Negative for appetite change and fever  HENT: Negative  Negative for hearing loss, tinnitus, trouble swallowing and voice change  Eyes: Negative  Negative for photophobia and pain  Respiratory: Negative  Negative for shortness of breath  Cardiovascular: Negative  Negative for palpitations  Gastrointestinal: Negative  Negative for nausea and vomiting  Endocrine: Negative  Negative for cold intolerance  Genitourinary: Negative  Negative for dysuria, frequency and urgency  Musculoskeletal: Negative  Negative for myalgias and neck pain  Skin: Negative  Negative for rash  Allergic/Immunologic: Negative      Neurological: Positive for dizziness  Negative for tremors, seizures, syncope, facial asymmetry, speech difficulty, weakness, light-headedness, numbness and headaches  Hematological: Negative  Does not bruise/bleed easily  Psychiatric/Behavioral: Negative  Negative for confusion, hallucinations and sleep disturbance  ROS reviewed and updated as appropriate  Objective    /63 (Patient Position: Sitting, Cuff Size: Large)   Pulse 77   Temp 97 7 °F (36 5 °C)   Ht 5' 11"   Wt 80 7 kg (178 lb)   BMI 24 83 kg/m²      General Exam  No acute distress  Neurologic Exam  Mental Status:  Alert and oriented x 3  Language: normal fluency and comprehension  Cranial Nerves: No dysarthria  Gait: Normal casual gait  Total time with patient today: 20 minutes  Greater than 50% of total time was spent with the patient and / or family counseling and / or coordination of care  A description of the counseling / coordination of care: discussed impression/plan in detail  See above

## 2020-08-31 ENCOUNTER — OFFICE VISIT (OUTPATIENT)
Dept: FAMILY MEDICINE CLINIC | Facility: CLINIC | Age: 80
End: 2020-08-31
Payer: MEDICARE

## 2020-08-31 VITALS
BODY MASS INDEX: 24.85 KG/M2 | WEIGHT: 177.5 LBS | SYSTOLIC BLOOD PRESSURE: 132 MMHG | DIASTOLIC BLOOD PRESSURE: 78 MMHG | HEIGHT: 71 IN | TEMPERATURE: 98.2 F | HEART RATE: 70 BPM | RESPIRATION RATE: 18 BRPM | OXYGEN SATURATION: 96 %

## 2020-08-31 DIAGNOSIS — E79.0 HYPERURICEMIA: ICD-10-CM

## 2020-08-31 DIAGNOSIS — I35.1 NONRHEUMATIC AORTIC VALVE INSUFFICIENCY: ICD-10-CM

## 2020-08-31 DIAGNOSIS — G40.909 SEIZURE DISORDER (HCC): ICD-10-CM

## 2020-08-31 DIAGNOSIS — I77.810 MILD DILATION OF ASCENDING AORTA (HCC): ICD-10-CM

## 2020-08-31 DIAGNOSIS — I34.0 NONRHEUMATIC MITRAL VALVE REGURGITATION: ICD-10-CM

## 2020-08-31 DIAGNOSIS — E78.2 MIXED HYPERLIPIDEMIA: Primary | ICD-10-CM

## 2020-08-31 DIAGNOSIS — I35.0 NONRHEUMATIC AORTIC VALVE STENOSIS: ICD-10-CM

## 2020-08-31 DIAGNOSIS — I71.4 ABDOMINAL AORTIC ANEURYSM (AAA) WITHOUT RUPTURE (HCC): ICD-10-CM

## 2020-08-31 DIAGNOSIS — E03.8 SUBCLINICAL HYPOTHYROIDISM: ICD-10-CM

## 2020-08-31 PROCEDURE — 3078F DIAST BP <80 MM HG: CPT | Performed by: FAMILY MEDICINE

## 2020-08-31 PROCEDURE — 99214 OFFICE O/P EST MOD 30 MIN: CPT | Performed by: FAMILY MEDICINE

## 2020-08-31 PROCEDURE — 3008F BODY MASS INDEX DOCD: CPT | Performed by: FAMILY MEDICINE

## 2020-08-31 PROCEDURE — 3075F SYST BP GE 130 - 139MM HG: CPT | Performed by: FAMILY MEDICINE

## 2020-08-31 PROCEDURE — 1036F TOBACCO NON-USER: CPT | Performed by: FAMILY MEDICINE

## 2020-08-31 PROCEDURE — 1160F RVW MEDS BY RX/DR IN RCRD: CPT | Performed by: FAMILY MEDICINE

## 2020-08-31 RX ORDER — LEVETIRACETAM 500 MG/1
1500 TABLET, EXTENDED RELEASE ORAL DAILY
Qty: 270 TABLET | Refills: 3 | Status: SHIPPED | OUTPATIENT
Start: 2020-08-31 | End: 2021-05-26 | Stop reason: SDUPTHER

## 2020-08-31 NOTE — PROGRESS NOTES
Assessment/Plan:       Diagnoses and all orders for this visit:    Mixed hyperlipidemia  -     Comprehensive metabolic panel  -     Lipid panel    Abdominal aortic aneurysm (AAA) without rupture (HCC)  -     VAS evar endovascular aortic repair duplex; Future    Seizure disorder (HCC)  -     CBC and differential  -     levETIRAcetam (KEPPRA XR) 500 MG 24 hr tablet; Take 3 tablets (1,500 mg total) by mouth daily    Subclinical hypothyroidism  -     TSH, 3rd generation with Free T4 reflex    Hyperuricemia  -     Uric acid; Future    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HCC)    Nonrheumatic aortic valve insufficiency    Nonrheumatic aortic valve stenosis    Nonrheumatic mitral valve regurgitation          Continue with current medications  Repeat labs  He is due for a follow-up abdominal aortic ultrasound  Flu vaccine offered patient decline  Office visit 6 months     Patient ID: Levy Merida is a 70 y o  male  Follow-up visit  Medications reviewed  Last labs 02/2020 see note  Hypertension blood pressures have been stable off medication  Creatinine 1 22  GFR 57  Electrolytes normal except for chloride 110  Hemoglobin 15 8  Hyperlipidemia with history of AAA s/p  endovascular stent on Simvastatin 40 mg  He cut his dose to every other day  02/2020 Lipid profile Cholesterol 138  Triglycerides 128  HDL 49  LDL 63  LFTs normal  FBS 95  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  She has advanced MS  The following portions of the patient's history were reviewed and updated as appropriate: allergies, current medications, past family history, past medical history, past social history, past surgical history and problem list     Review of Systems   Constitutional: Negative for appetite change, chills, fatigue, fever and unexpected weight change  HENT: Negative for congestion, ear pain, hearing loss, postnasal drip, rhinorrhea, sinus pressure, sinus pain, sore throat, trouble swallowing and voice change      Eyes: Negative for visual disturbance  Respiratory: Negative for cough, shortness of breath and wheezing  Cardiovascular: Negative for chest pain, palpitations and leg swelling  Abdominal aortic aneurysm status post endovascular stenting followed by vascular surgery  No claudications  Last vascular study 02/2019 aorto iliac study-widely patent Endograft without evidence of endoleak  Celiac artery, SMA and bilateral proximal renal arteries patent  02/2017 echocardiogram normal left ventricular systolic function  No regional wall motion abnormalities  Mild MR  mild to moderate aortic regurgitation  Mild aortic stenosis  Mild tricuspid regurgitation  Aortic root mild dilatation  Ascending aorta mildly dilated at 4 1 cm  Gastrointestinal: Negative for abdominal pain, blood in stool, constipation, diarrhea, nausea and vomiting  Colonoscopy 05/2018   Endocrine: Negative for polydipsia and polyuria  Past history of borderline abnormal elevated TSH 6 163   04/2019  Repeat TSH normal at 3 260  On no medications      Lab Results       Component                Value               Date                       [id number]             3 260               04/22/2019               Genitourinary: Negative for difficulty urinating and urgency  07/2015 PSA 0 5   Musculoskeletal: Negative for arthralgias and myalgias  Hyperuricemia and history of gout  02/2020 uric acid 7 0  No longer on allopurinol  Chronic bilateral shoulder pain with chronic rotator cuff tear right shoulder and subacromial bursitis left shoulder  Status post bilateral steroid injections by Orthopedic surgery 12/2017  He is on no pain medications at present   Skin: Negative for rash  Allergic/Immunologic: Negative for environmental allergies  Neurological: Positive for seizures  Negative for dizziness, light-headedness and headaches  History of seizure disorder with 2 seizures occurring within several hours 02/2017  Extensive workup and neurological evaluation  MRI brain normal   Awake EEG normal   He did not drive for 6 months and regained his license at the end of August 2017  He has been seizure-free on Keppra no side effects  Hematological: Negative for adenopathy  Does not bruise/bleed easily  Psychiatric/Behavioral: Negative for dysphoric mood and sleep disturbance  Objective:      /78 (BP Location: Left arm, Patient Position: Sitting, Cuff Size: Large)   Pulse 70   Temp 98 2 °F (36 8 °C)   Resp 18   Ht 5' 11" (1 803 m)   Wt 80 5 kg (177 lb 8 oz)   SpO2 96%   BMI 24 76 kg/m²     BP Readings from Last 3 Encounters:   08/31/20 132/78   07/29/20 137/63   06/02/20 122/70       Wt Readings from Last 3 Encounters:   08/31/20 80 5 kg (177 lb 8 oz)   07/29/20 80 7 kg (178 lb)   06/02/20 81 6 kg (180 lb)        Physical Exam  Vitals signs and nursing note reviewed  Constitutional:       General: He is not in acute distress  Appearance: He is well-developed  HENT:      Right Ear: External ear normal       Left Ear: External ear normal    Eyes:      General: No scleral icterus  Conjunctiva/sclera: Conjunctivae normal       Pupils: Pupils are equal, round, and reactive to light  Neck:      Thyroid: No thyroid mass or thyromegaly  Vascular: No carotid bruit or JVD  Trachea: No tracheal deviation  Cardiovascular:      Rate and Rhythm: Normal rate and regular rhythm  Pulses:           Carotid pulses are 2+ on the right side and 2+ on the left side  Heart sounds: Murmur (1/6 systolic murmur at LSB) present  No gallop  Pulmonary:      Effort: Pulmonary effort is normal  No respiratory distress  Breath sounds: Normal breath sounds  No wheezing or rales  Abdominal:      General: Bowel sounds are normal  There is no distension or abdominal bruit  Palpations: Abdomen is soft  There is no mass  Tenderness: There is no abdominal tenderness   There is no guarding or rebound  Musculoskeletal: Normal range of motion  Lymphadenopathy:      Cervical: No cervical adenopathy  Skin:     Findings: No rash  Nails: There is no clubbing  Neurological:      Mental Status: He is alert and oriented to person, place, and time  Cranial Nerves: No cranial nerve deficit     Psychiatric:         Mood and Affect: Mood normal          Behavior: Behavior normal

## 2020-09-04 ENCOUNTER — APPOINTMENT (OUTPATIENT)
Dept: LAB | Facility: CLINIC | Age: 80
End: 2020-09-04
Payer: MEDICARE

## 2020-09-04 DIAGNOSIS — E79.0 HYPERURICEMIA: ICD-10-CM

## 2020-09-04 LAB
ALBUMIN SERPL BCP-MCNC: 3.9 G/DL (ref 3.5–5)
ALP SERPL-CCNC: 70 U/L (ref 46–116)
ALT SERPL W P-5'-P-CCNC: 19 U/L (ref 12–78)
ANION GAP SERPL CALCULATED.3IONS-SCNC: 4 MMOL/L (ref 4–13)
AST SERPL W P-5'-P-CCNC: 20 U/L (ref 5–45)
BASOPHILS # BLD AUTO: 0.02 THOUSANDS/ΜL (ref 0–0.1)
BASOPHILS NFR BLD AUTO: 0 % (ref 0–1)
BILIRUB SERPL-MCNC: 1.41 MG/DL (ref 0.2–1)
BUN SERPL-MCNC: 17 MG/DL (ref 5–25)
CALCIUM SERPL-MCNC: 8.9 MG/DL (ref 8.3–10.1)
CHLORIDE SERPL-SCNC: 109 MMOL/L (ref 100–108)
CHOLEST SERPL-MCNC: 164 MG/DL (ref 50–200)
CO2 SERPL-SCNC: 30 MMOL/L (ref 21–32)
CREAT SERPL-MCNC: 1.2 MG/DL (ref 0.6–1.3)
EOSINOPHIL # BLD AUTO: 0.23 THOUSAND/ΜL (ref 0–0.61)
EOSINOPHIL NFR BLD AUTO: 3 % (ref 0–6)
ERYTHROCYTE [DISTWIDTH] IN BLOOD BY AUTOMATED COUNT: 12.2 % (ref 11.6–15.1)
GFR SERPL CREATININE-BSD FRML MDRD: 58 ML/MIN/1.73SQ M
GLUCOSE P FAST SERPL-MCNC: 86 MG/DL (ref 65–99)
HCT VFR BLD AUTO: 46.4 % (ref 36.5–49.3)
HDLC SERPL-MCNC: 47 MG/DL
HGB BLD-MCNC: 15.2 G/DL (ref 12–17)
IMM GRANULOCYTES # BLD AUTO: 0.03 THOUSAND/UL (ref 0–0.2)
IMM GRANULOCYTES NFR BLD AUTO: 0 % (ref 0–2)
LDLC SERPL CALC-MCNC: 81 MG/DL (ref 0–100)
LYMPHOCYTES # BLD AUTO: 1.45 THOUSANDS/ΜL (ref 0.6–4.47)
LYMPHOCYTES NFR BLD AUTO: 21 % (ref 14–44)
MCH RBC QN AUTO: 31.7 PG (ref 26.8–34.3)
MCHC RBC AUTO-ENTMCNC: 32.8 G/DL (ref 31.4–37.4)
MCV RBC AUTO: 97 FL (ref 82–98)
MONOCYTES # BLD AUTO: 0.46 THOUSAND/ΜL (ref 0.17–1.22)
MONOCYTES NFR BLD AUTO: 7 % (ref 4–12)
NEUTROPHILS # BLD AUTO: 4.6 THOUSANDS/ΜL (ref 1.85–7.62)
NEUTS SEG NFR BLD AUTO: 69 % (ref 43–75)
NONHDLC SERPL-MCNC: 117 MG/DL
NRBC BLD AUTO-RTO: 0 /100 WBCS
PLATELET # BLD AUTO: 180 THOUSANDS/UL (ref 149–390)
PMV BLD AUTO: 11.1 FL (ref 8.9–12.7)
POTASSIUM SERPL-SCNC: 4.4 MMOL/L (ref 3.5–5.3)
PROT SERPL-MCNC: 6.6 G/DL (ref 6.4–8.2)
RBC # BLD AUTO: 4.8 MILLION/UL (ref 3.88–5.62)
SODIUM SERPL-SCNC: 143 MMOL/L (ref 136–145)
TRIGL SERPL-MCNC: 182 MG/DL
TSH SERPL DL<=0.05 MIU/L-ACNC: 2.08 UIU/ML (ref 0.36–3.74)
URATE SERPL-MCNC: 7.7 MG/DL (ref 4.2–8)
WBC # BLD AUTO: 6.79 THOUSAND/UL (ref 4.31–10.16)

## 2020-09-04 PROCEDURE — 84550 ASSAY OF BLOOD/URIC ACID: CPT

## 2020-09-04 PROCEDURE — 84443 ASSAY THYROID STIM HORMONE: CPT | Performed by: FAMILY MEDICINE

## 2020-09-04 PROCEDURE — 85025 COMPLETE CBC W/AUTO DIFF WBC: CPT | Performed by: FAMILY MEDICINE

## 2020-09-04 PROCEDURE — 36415 COLL VENOUS BLD VENIPUNCTURE: CPT | Performed by: FAMILY MEDICINE

## 2020-09-04 PROCEDURE — 80053 COMPREHEN METABOLIC PANEL: CPT | Performed by: FAMILY MEDICINE

## 2020-09-04 PROCEDURE — 80061 LIPID PANEL: CPT | Performed by: FAMILY MEDICINE

## 2020-09-07 ENCOUNTER — TELEPHONE (OUTPATIENT)
Dept: FAMILY MEDICINE CLINIC | Facility: CLINIC | Age: 80
End: 2020-09-07

## 2020-09-08 NOTE — TELEPHONE ENCOUNTER
Call re labs   All labs are normal except for mildly elevated triglycerides at 182 < 150 watch diet   Cholesterol 164 < 200 FBS 86 < 100 normal  Uric acid normal      Recent Results (from the past 336 hour(s))   Comprehensive metabolic panel    Collection Time: 09/04/20  9:43 AM   Result Value Ref Range    Sodium 143 136 - 145 mmol/L    Potassium 4 4 3 5 - 5 3 mmol/L    Chloride 109 (H) 100 - 108 mmol/L    CO2 30 21 - 32 mmol/L    ANION GAP 4 4 - 13 mmol/L    BUN 17 5 - 25 mg/dL    Creatinine 1 20 0 60 - 1 30 mg/dL    Glucose, Fasting 86 65 - 99 mg/dL    Calcium 8 9 8 3 - 10 1 mg/dL    AST 20 5 - 45 U/L    ALT 19 12 - 78 U/L    Alkaline Phosphatase 70 46 - 116 U/L    Total Protein 6 6 6 4 - 8 2 g/dL    Albumin 3 9 3 5 - 5 0 g/dL    Total Bilirubin 1 41 (H) 0 20 - 1 00 mg/dL    eGFR 58 ml/min/1 73sq m   CBC and differential    Collection Time: 09/04/20  9:43 AM   Result Value Ref Range    WBC 6 79 4 31 - 10 16 Thousand/uL    RBC 4 80 3 88 - 5 62 Million/uL    Hemoglobin 15 2 12 0 - 17 0 g/dL    Hematocrit 46 4 36 5 - 49 3 %    MCV 97 82 - 98 fL    MCH 31 7 26 8 - 34 3 pg    MCHC 32 8 31 4 - 37 4 g/dL    RDW 12 2 11 6 - 15 1 %    MPV 11 1 8 9 - 12 7 fL    Platelets 724 130 - 998 Thousands/uL    nRBC 0 /100 WBCs    Neutrophils Relative 69 43 - 75 %    Immat GRANS % 0 0 - 2 %    Lymphocytes Relative 21 14 - 44 %    Monocytes Relative 7 4 - 12 %    Eosinophils Relative 3 0 - 6 %    Basophils Relative 0 0 - 1 %    Neutrophils Absolute 4 60 1 85 - 7 62 Thousands/µL    Immature Grans Absolute 0 03 0 00 - 0 20 Thousand/uL    Lymphocytes Absolute 1 45 0 60 - 4 47 Thousands/µL    Monocytes Absolute 0 46 0 17 - 1 22 Thousand/µL    Eosinophils Absolute 0 23 0 00 - 0 61 Thousand/µL    Basophils Absolute 0 02 0 00 - 0 10 Thousands/µL   Lipid panel    Collection Time: 09/04/20  9:43 AM   Result Value Ref Range    Cholesterol 164 50 - 200 mg/dL    Triglycerides 182 (H) <=150 mg/dL    HDL, Direct 47 >=40 mg/dL    LDL Calculated 81 0 - 100 mg/dL    Non-HDL-Chol (CHOL-HDL) 117 mg/dl   TSH, 3rd generation with Free T4 reflex    Collection Time: 09/04/20  9:43 AM   Result Value Ref Range    TSH 3RD GENERATON 2 080 0 358 - 3 740 uIU/mL   Uric acid    Collection Time: 09/04/20  9:43 AM   Result Value Ref Range    Uric Acid 7 7 4 2 - 8 0 mg/dL         Current Outpatient Medications:     acetaminophen (TYLENOL) 500 mg tablet, Take 500 mg by mouth every 6 (six) hours as needed for mild pain, Disp: , Rfl:     levETIRAcetam (KEPPRA XR) 500 MG 24 hr tablet, Take 3 tablets (1,500 mg total) by mouth daily, Disp: 270 tablet, Rfl: 3    simvastatin (ZOCOR) 40 mg tablet, Take 1 tablet (40 mg total) by mouth daily, Disp: 90 tablet, Rfl: 3

## 2020-10-09 ENCOUNTER — HOSPITAL ENCOUNTER (OUTPATIENT)
Dept: NON INVASIVE DIAGNOSTICS | Facility: CLINIC | Age: 80
Discharge: HOME | End: 2020-10-09
Payer: MEDICARE

## 2020-10-09 DIAGNOSIS — I71.4 ABDOMINAL AORTIC ANEURYSM (AAA) WITHOUT RUPTURE (HCC): ICD-10-CM

## 2020-10-09 PROCEDURE — 93978 VASCULAR STUDY: CPT

## 2020-10-09 PROCEDURE — 93978 VASCULAR STUDY: CPT | Performed by: SURGERY

## 2020-10-09 PROCEDURE — 93922 UPR/L XTREMITY ART 2 LEVELS: CPT

## 2021-01-26 ENCOUNTER — IMMUNIZATIONS (OUTPATIENT)
Dept: FAMILY MEDICINE CLINIC | Facility: HOSPITAL | Age: 81
End: 2021-01-26

## 2021-01-26 DIAGNOSIS — Z23 ENCOUNTER FOR IMMUNIZATION: Primary | ICD-10-CM

## 2021-01-26 PROCEDURE — 0011A SARS-COV-2 / COVID-19 MRNA VACCINE (MODERNA) 100 MCG: CPT

## 2021-01-26 PROCEDURE — 91301 SARS-COV-2 / COVID-19 MRNA VACCINE (MODERNA) 100 MCG: CPT

## 2021-01-28 ENCOUNTER — TELEPHONE (OUTPATIENT)
Dept: FAMILY MEDICINE CLINIC | Facility: CLINIC | Age: 81
End: 2021-01-28

## 2021-01-28 NOTE — TELEPHONE ENCOUNTER
Call he can OTC elbow wrap with Aleve 1-2 tablets BID with food  Try ice or heat   Consider x rays/PT if symptoms persist

## 2021-01-28 NOTE — TELEPHONE ENCOUNTER
Patient is complaining pf right elbow pain  There is no swelling  He hurt his elbow a couple of months ago when he caught Elen Cintron from falling  He wants you to recommend a treatment plan to help with the pain like a support or bandage & otc pain relievers

## 2021-02-21 ENCOUNTER — IMMUNIZATIONS (OUTPATIENT)
Dept: FAMILY MEDICINE CLINIC | Facility: HOSPITAL | Age: 81
End: 2021-02-21

## 2021-02-21 DIAGNOSIS — Z23 ENCOUNTER FOR IMMUNIZATION: Primary | ICD-10-CM

## 2021-02-21 PROCEDURE — 91301 SARS-COV-2 / COVID-19 MRNA VACCINE (MODERNA) 100 MCG: CPT

## 2021-02-21 PROCEDURE — 0012A SARS-COV-2 / COVID-19 MRNA VACCINE (MODERNA) 100 MCG: CPT

## 2021-02-28 NOTE — PROGRESS NOTES
Assessment/Plan:     Diagnoses and all orders for this visit:    Mixed hyperlipidemia    Hyperuricemia    Stage 3a chronic kidney disease    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HCC)    Nonrheumatic aortic valve insufficiency    Nonrheumatic aortic valve stenosis    Nonrheumatic mitral valve regurgitation    Seizure disorder ([address] )    Medicare annual wellness visit, subsequent          No changes in medications  He completed the COV 19 vaccine series  Patient ID: Lorenzo Aj is a 68 y o  male  Follow-up visit  Medications reviewed  Hypertension blood pressures have been stable off medication  09/2020 Creatinine 1 20  GFR 58  Electrolytes normal except for chloride 109  Hemoglobin 15 2  Hyperlipidemia with history of AAA s/p  endovascular stent on Simvastatin 40 mg  He cut his dose to every other day  09/2020 Lipid profile Cholesterol 164  Triglycerides 182 increased from 128  HDL 47  LDL 81  LFTs normal except for total bilirubin 1 41  FBS 86  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  She has advanced MS       Recent Results (from the past 5040 hour(s))   Comprehensive metabolic panel    Collection Time: 09/04/20  9:43 AM   Result Value Ref Range    Sodium 143 136 - 145 mmol/L    Potassium 4 4 3 5 - 5 3 mmol/L    Chloride 109 (H) 100 - 108 mmol/L    CO2 30 21 - 32 mmol/L    ANION GAP 4 4 - 13 mmol/L    BUN 17 5 - 25 mg/dL    Creatinine 1 20 0 60 - 1 30 mg/dL    Glucose, Fasting 86 65 - 99 mg/dL    Calcium 8 9 8 3 - 10 1 mg/dL    AST 20 5 - 45 U/L    ALT 19 12 - 78 U/L    Alkaline Phosphatase 70 46 - 116 U/L    Total Protein 6 6 6 4 - 8 2 g/dL    Albumin 3 9 3 5 - 5 0 g/dL    Total Bilirubin 1 41 (H) 0 20 - 1 00 mg/dL    eGFR 58 ml/min/1 73sq m   CBC and differential    Collection Time: 09/04/20  9:43 AM   Result Value Ref Range    WBC 6 79 4 31 - 10 16 Thousand/uL    RBC 4 80 3 88 - 5 62 Million/uL    Hemoglobin 15 2 12 0 - 17 0 g/dL    Hematocrit 46 4 36 5 - 49 3 %    MCV 97 82 - 98 fL    MCH 31 7 26 8 - 34 3 pg MCHC 32 8 31 4 - 37 4 g/dL    RDW 12 2 11 6 - 15 1 %    MPV 11 1 8 9 - 12 7 fL    Platelets 387 691 - 478 Thousands/uL    nRBC 0 /100 WBCs    Neutrophils Relative 69 43 - 75 %    Immat GRANS % 0 0 - 2 %    Lymphocytes Relative 21 14 - 44 %    Monocytes Relative 7 4 - 12 %    Eosinophils Relative 3 0 - 6 %    Basophils Relative 0 0 - 1 %    Neutrophils Absolute 4 60 1 85 - 7 62 Thousands/µL    Immature Grans Absolute 0 03 0 00 - 0 20 Thousand/uL    Lymphocytes Absolute 1 45 0 60 - 4 47 Thousands/µL    Monocytes Absolute 0 46 0 17 - 1 22 Thousand/µL    Eosinophils Absolute 0 23 0 00 - 0 61 Thousand/µL    Basophils Absolute 0 02 0 00 - 0 10 Thousands/µL   Lipid panel    Collection Time: 09/04/20  9:43 AM   Result Value Ref Range    Cholesterol 164 50 - 200 mg/dL    Triglycerides 182 (H) <=150 mg/dL    HDL, Direct 47 >=40 mg/dL    LDL Calculated 81 0 - 100 mg/dL    Non-HDL-Chol (CHOL-HDL) 117 mg/dl   TSH, 3rd generation with Free T4 reflex    Collection Time: 09/04/20  9:43 AM   Result Value Ref Range    TSH 3RD GENERATON 2 080 0 358 - 3 740 uIU/mL   Uric acid    Collection Time: 09/04/20  9:43 AM   Result Value Ref Range    Uric Acid 7 7 4 2 - 8 0 mg/dL               The following portions of the patient's history were reviewed and updated as appropriate: allergies, current medications, past family history, past medical history, past social history, past surgical history and problem list     Review of Systems   Constitutional: Negative for appetite change, chills, fatigue, fever and unexpected weight change  HENT: Negative for congestion, ear pain, hearing loss, postnasal drip, rhinorrhea, sinus pressure, sinus pain, sore throat, trouble swallowing and voice change  Eyes: Negative for visual disturbance  Respiratory: Negative for cough, shortness of breath and wheezing  Cardiovascular: Negative for chest pain, palpitations and leg swelling          Abdominal aortic aneurysm status post endovascular stenting followed by vascular surgery  No claudications  10/2020 widely patent endograft without evidence of endoleak  Celiac artery, SMA and bilateral proximal renal arteries patent  No changes from 02/2019 02/2017 echocardiogram normal left ventricular systolic function  No regional wall motion abnormalities  Mild MR  Mild to moderate aortic regurgitation  Mild aortic stenosis  Mild tricuspid regurgitation  Aortic root mild dilatation  Ascending aorta mildly dilated at 4 1 cm  Gastrointestinal: Negative for abdominal pain, blood in stool, constipation, diarrhea, nausea and vomiting  Colonoscopy 05/2018   Endocrine: Negative for polydipsia and polyuria  Past history of borderline abnormal elevated TSH 6 163   04/2019  Repeat TSH normal at 3 260  On no medications      Lab Results       Component                Value               Date                       [id number]             2 080               09/04/2020                         Genitourinary: Negative for difficulty urinating and urgency  07/2015 PSA 0 5   Musculoskeletal: Negative for arthralgias and myalgias  Hyperuricemia and history of gout  No longer on allopurinol  Chronic bilateral shoulder pain with chronic rotator cuff tear right shoulder and subacromial bursitis left shoulder  Status post bilateral steroid injections by Orthopedic surgery 12/2017  He is on no pain medications at present      Lab Results       Component                Value               Date                       URICACID                 7 7                 09/04/2020               Skin: Negative for rash  Allergic/Immunologic: Negative for environmental allergies  Neurological: Positive for seizures  Negative for dizziness, light-headedness and headaches  History of seizure disorder with 2 seizures occurring within several hours 02/2017  Extensive workup and neurological evaluation    MRI brain normal   Awake EEG normal   He did not drive for 6 months and regained his license at the end of August 2017  He has been seizure-free on Keppra no side effects  Hematological: Negative for adenopathy  Does not bruise/bleed easily  Psychiatric/Behavioral: Negative for dysphoric mood and sleep disturbance  Objective:    /74   Pulse 88   Temp (!) 96 8 °F (36 °C)   Resp 16   Ht 5' 10" (1 778 m)   Wt 82 1 kg (181 lb)   BMI 25 97 kg/m²     BP Readings from Last 3 Encounters:   03/01/21 126/74   08/31/20 132/78   07/29/20 137/63       Wt Readings from Last 3 Encounters:   03/01/21 82 1 kg (181 lb)   08/31/20 80 5 kg (177 lb 8 oz)   07/29/20 80 7 kg (178 lb)         Physical Exam   Constitutional: No distress  Neurological: He is alert     Psychiatric: His behavior is normal  Mood, memory and judgment normal

## 2021-03-01 ENCOUNTER — OFFICE VISIT (OUTPATIENT)
Dept: FAMILY MEDICINE CLINIC | Facility: CLINIC | Age: 81
End: 2021-03-01
Payer: MEDICARE

## 2021-03-01 VITALS
SYSTOLIC BLOOD PRESSURE: 126 MMHG | HEART RATE: 88 BPM | BODY MASS INDEX: 25.91 KG/M2 | DIASTOLIC BLOOD PRESSURE: 74 MMHG | WEIGHT: 181 LBS | HEIGHT: 70 IN | TEMPERATURE: 96.8 F | RESPIRATION RATE: 16 BRPM

## 2021-03-01 DIAGNOSIS — I77.810 MILD DILATION OF ASCENDING AORTA (HCC): ICD-10-CM

## 2021-03-01 DIAGNOSIS — I35.0 NONRHEUMATIC AORTIC VALVE STENOSIS: ICD-10-CM

## 2021-03-01 DIAGNOSIS — E79.0 HYPERURICEMIA: ICD-10-CM

## 2021-03-01 DIAGNOSIS — N18.31 STAGE 3A CHRONIC KIDNEY DISEASE (HCC): ICD-10-CM

## 2021-03-01 DIAGNOSIS — I35.1 NONRHEUMATIC AORTIC VALVE INSUFFICIENCY: ICD-10-CM

## 2021-03-01 DIAGNOSIS — G40.909 SEIZURE DISORDER (HCC): ICD-10-CM

## 2021-03-01 DIAGNOSIS — E78.2 MIXED HYPERLIPIDEMIA: Primary | ICD-10-CM

## 2021-03-01 DIAGNOSIS — I34.0 NONRHEUMATIC MITRAL VALVE REGURGITATION: ICD-10-CM

## 2021-03-01 DIAGNOSIS — Z00.00 MEDICARE ANNUAL WELLNESS VISIT, SUBSEQUENT: ICD-10-CM

## 2021-03-01 PROCEDURE — 99213 OFFICE O/P EST LOW 20 MIN: CPT | Performed by: FAMILY MEDICINE

## 2021-03-01 PROCEDURE — G0439 PPPS, SUBSEQ VISIT: HCPCS | Performed by: FAMILY MEDICINE

## 2021-03-01 PROCEDURE — 1123F ACP DISCUSS/DSCN MKR DOCD: CPT | Performed by: FAMILY MEDICINE

## 2021-03-01 NOTE — PROGRESS NOTES
Assessment and Plan:     Problem List Items Addressed This Visit        Cardiovascular and Mediastinum    Nonrheumatic aortic valve stenosis    Nonrheumatic mitral valve regurgitation    Nonrheumatic aortic valve insufficiency    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HCC)       Nervous and Auditory    Seizure disorder (HCC)       Genitourinary    Stage 3a chronic kidney disease       Other    Hyperlipidemia - Primary    Hyperuricemia      Other Visit Diagnoses     Medicare annual wellness visit, subsequent            BMI Counseling: Body mass index is 25 97 kg/m²  The BMI is above normal  Nutrition recommendations include decreasing portion sizes, consuming healthier snacks, moderation in carbohydrate intake, reducing intake of saturated and trans fat and reducing intake of cholesterol  Exercise recommendations include exercising 3-5 times per week  No pharmacotherapy was ordered  Preventive health issues were discussed with patient, and age appropriate screening tests were ordered as noted in patient's After Visit Summary  Personalized health advice and appropriate referrals for health education or preventive services given if needed, as noted in patient's After Visit Summary       History of Present Illness:     Patient presents for Medicare Annual Wellness visit    Review of Systems     Lab Results   Component Value Date    WBC 6 79 09/04/2020    HGB 15 2 09/04/2020    HCT 46 4 09/04/2020    MCV 97 09/04/2020     09/04/2020     Lab Results   Component Value Date     07/29/2015    SODIUM 143 09/04/2020    K 4 4 09/04/2020     (H) 09/04/2020    CO2 30 09/04/2020    ANIONGAP 7 07/29/2015    AGAP 4 09/04/2020    BUN 17 09/04/2020    CREATININE 1 20 09/04/2020    GLUC 93 06/01/2017    GLUF 86 09/04/2020    CALCIUM 8 9 09/04/2020    AST 20 09/04/2020    ALT 19 09/04/2020    ALKPHOS 70 09/04/2020    PROT 7 0 07/29/2015    TP 6 6 09/04/2020    BILITOT 1 23 (H) 07/29/2015    TBILI 1 41 (H) 09/04/2020    EGFR 58 09/04/2020       Lab Results   Component Value Date    CHOLESTEROL 164 09/04/2020    CHOLESTEROL 138 02/11/2020    CHOLESTEROL 111 04/22/2019     Lab Results   Component Value Date    HDL 47 09/04/2020    HDL 49 02/11/2020    HDL 36 (L) 04/22/2019     Lab Results   Component Value Date    TRIG 182 (H) 09/04/2020    TRIG 128 02/11/2020    TRIG 119 04/22/2019     Lab Results   Component Value Date    LDLCALC 81 09/04/2020           Patient Care Team:  Alvaro Pham MD as PCP - MD Josué Howard MD Volney Mandril, MD (Colon and Rectal Surgery)     Problem List:     Patient Active Problem List   Diagnosis    Hyperlipidemia    Hypertension    Aneurysm of abdominal aorta ([address] )    Disc degeneration, lumbar    Diverticulosis    Hyperuricemia    Osteoarthrosis, hand    Seizure disorder ([address] )    Nonrheumatic aortic valve stenosis    Nonrheumatic mitral valve regurgitation    Nonrheumatic aortic valve insufficiency    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HCC)    Stage 3a chronic kidney disease      Past Medical and Surgical History:     Past Medical History:   Diagnosis Date    Colon polyps     Gout     Hyperlipidemia     Hypertension     S/P AAA repair     [facility] disorder [facility]     Tear of right rotator cuff 4/4/2017     Past Surgical History:   Procedure Laterality Date    ABDOMINAL AORTIC ANEURYSM REPAIR, ENDOVASCULAR N/A     VA COLONOSCOPY FLX DX W/COLLJ SPEC WHEN PFRMD N/A 6/7/2017    Procedure: COLONOSCOPY;  Surgeon: Kisha Watkins MD;  [facility]; Service: Colorectal    VA COLONOSCOPY FLX DX W/COLLJ SPEC WHEN PFRMD N/A 5/18/2018    Procedure: COLONOSCOPY;  Surgeon: Kisha Watkins MD;  [facility];   Service: Colorectal    TONSILLECTOMY      WISDOM TOOTH EXTRACTION Bilateral       Family History:     Family History   Problem Relation Age of Onset    Aneurysm Mother         ABDMONIAL  AORTA    Coronary artery disease Father     Coronary artery disease Brother       Social History:     E-Cigarette/Vaping    E-Cigarette Use Never User      E-Cigarette/Vaping Substances    Nicotine No     THC No     CBD No      Social History     Socioeconomic History    Marital status: /Civil Union     Spouse name: None    Number of children: None    Years of education: None    Highest education level: None   Occupational History    None   Social Needs    Financial resource strain: None    Food insecurity     Worry: None     Inability: None    Transportation needs     Medical: None     Non-medical: None   Tobacco Use    Smoking status: Former Smoker     Packs/day: 3 00     Years: 10 00     Pack years: 30 00     Quit date: 1971     Years since quittin 0    Smokeless tobacco: Never Used   Substance and Sexual Activity    Alcohol use: Yes     Frequency: 2-3 times a week     Comment: occasional    Drug use: No    Sexual activity: None   Lifestyle    Physical activity     Days per week: None     Minutes per session: None    Stress: None   Relationships    Social connections     Talks on phone: None     Gets together: None     Attends Sabianism service: None     Active member of club or organization: None     Attends meetings of clubs or organizations: None     Relationship status: None    Intimate partner violence     Fear of current or ex partner: None     Emotionally abused: None     Physically abused: None     Forced sexual activity: None   Other Topics Concern    None   Social History Narrative    None      Medications and Allergies:     Current Outpatient Medications   Medication Sig Dispense Refill    acetaminophen (TYLENOL) 500 mg tablet Take 500 mg by mouth every 6 (six) hours as needed for mild pain      levETIRAcetam (KEPPRA XR) 500 MG 24 hr tablet Take 3 tablets (1,500 mg total) by mouth daily 270 tablet 3    simvastatin (ZOCOR) 40 mg tablet Take 1 tablet (40 mg total) by mouth daily (Patient taking differently: Take 40 mg by mouth every other day ) 90 tablet 3     No current facility-administered medications for this visit  Allergies   Allergen Reactions    Fenofibrate      Patient not aware of allergy    Hydrocodone-Acetaminophen      Patient not aware of allergy      Immunizations:     Immunization History   Administered Date(s) Administered    SARS-CoV-2 / COVID-19 mRNA IM ([facility]) 01/26/2021, 02/21/2021      Health Maintenance:         Topic Date Due    Colonoscopy Surveillance  05/18/2023     There are no preventive care reminders to display for this patient  Medicare Health Risk Assessment:     /74   Pulse 88   Temp (!) 96 8 °F (36 °C)   Resp 16   Ht 5' 10" (1 778 m)   Wt 82 1 kg (181 lb)   BMI 25 97 kg/m²      Rosa Covington is here for his Subsequent Wellness visit  Last Medicare Wellness visit information reviewed, patient interviewed and updates made to the record today  Health Risk Assessment:   Patient rates overall health as good  Patient feels that their physical health rating is same  Eyesight was rated as slightly worse  Hearing was rated as same  Patient feels that their emotional and mental health rating is same  Pain experienced in the last 7 days has been some  Patient's pain rating has been 3/10  Patient states that he has experienced no weight loss or gain in last 6 months  Depression Screening:   PHQ-2 Score: 2      Fall Risk Screening: In the past year, patient has experienced: history of falling in past year    Number of falls: 2 or more  Injured during fall?: No    Feels unsteady when standing or walking?: No    Worried about falling?: No      Home Safety:  Patient does not have trouble with stairs inside or outside of their home  Patient has working smoke alarms and has working carbon monoxide detector  Home safety hazards include: not having non-slip bath and/or shower mats  Nutrition:   Current diet is Regular and Limited junk food  Medications:   Patient is currently taking over-the-counter supplements  OTC medications include: see medication list  Patient is able to manage medications  Activities of Daily Living (ADLs)/Instrumental Activities of Daily Living (IADLs):   Walk and transfer into and out of bed and chair?: Yes  Dress and groom yourself?: Yes    Bathe or shower yourself?: Yes    Feed yourself? Yes  Do your laundry/housekeeping?: Yes  Manage your money, pay your bills and track your expenses?: Yes  Make your own meals?: Yes    Do your own shopping?: Yes    Previous Hospitalizations:   Any hospitalizations or ED visits within the last 12 months?: No      Advance Care Planning:   Living will: No    Advanced directive: No      Cognitive Screening:   Provider or family/friend/caregiver concerned regarding cognition?: No    PREVENTIVE SCREENINGS      Cardiovascular Screening:    General: Screening Not Indicated and History Lipid Disorder      Diabetes Screening:     General: Screening Current      Colorectal Cancer Screening:     General: Screening Current      Prostate Cancer Screening:    General: Screening Not Indicated      Osteoporosis Screening:    General: Screening Not Indicated      Abdominal Aortic Aneurysm (AAA) Screening:    Risk factors include: tobacco use        General: Screening Not Indicated and History AAA      Lung Cancer Screening:     General: Screening Not Indicated      Hepatitis C Screening:    General: Screening Not Indicated    Other Counseling Topics:   Calcium and vitamin D intake and regular weightbearing exercise         Cj Rogers MD

## 2021-03-01 NOTE — PATIENT INSTRUCTIONS
Medicare Preventive Visit Patient Instructions  Thank you for completing your Welcome to Medicare Visit or Medicare Annual Wellness Visit today  Your next wellness visit will be due in one year (3/1/2022)  The screening/preventive services that you may require over the next 5-10 years are detailed below  Some tests may not apply to you based off risk factors and/or age  Screening tests ordered at today's visit but not completed yet may show as past due  Also, please note that scanned in results may not display below  Preventive Screenings:  Service Recommendations Previous Testing/Comments   Colorectal Cancer Screening  · Colonoscopy    · Fecal Occult Blood Test (FOBT)/Fecal Immunochemical Test (FIT)  · Fecal DNA/Cologuard Test  · Flexible Sigmoidoscopy Age: 54-65 years old   Colonoscopy: every 10 years (May be performed more frequently if at higher risk)  OR  FOBT/FIT: every 1 year  OR  Cologuard: every 3 years  OR  Sigmoidoscopy: every 5 years  Screening may be recommended earlier than age 48 if at higher risk for colorectal cancer  Also, an individualized decision between you and your healthcare provider will decide whether screening between the ages of 74-80 would be appropriate   Colonoscopy: Not on file  FOBT/FIT: Not on file  Cologuard: Not on file  Sigmoidoscopy: Not on file         Prostate Cancer Screening Individualized decision between patient and health care provider in men between ages of 53-78   Medicare will cover every 12 months beginning on the day after your 50th birthday PSA: No results in last 5 years     Screening Not Indicated     Hepatitis C Screening Once for adults born between [facility]  More frequently in patients at high risk for Hepatitis C Hep C Antibody: Not on file       Diabetes Screening 1-2 times per year if you're at risk for diabetes or have pre-diabetes Fasting glucose: 86 mg/dL   A1C: 5 5 %    Screening Current   Cholesterol Screening Once every 5 years if you don't have a lipid disorder  May order more often based on risk factors  Lipid panel: 09/04/2020    Screening Not Indicated  History Lipid Disorder      Other Preventive Screenings Covered by Medicare:  1  Abdominal Aortic Aneurysm (AAA) Screening: covered once if your at risk  You're considered to be at risk if you have a family history of AAA or a male between the age of 73-68 who smoking at least 100 cigarettes in your lifetime  2  Lung Cancer Screening: covers low dose CT scan once per year if you meet all of the following conditions: (1) Age 50-69; (2) No signs or symptoms of lung cancer; (3) Current smoker or have quit smoking within the last 15 years; (4) You have a tobacco smoking history of at least 30 pack years (packs per day x number of years you smoked); (5) You get a written order from a healthcare provider  3  Glaucoma Screening: covered annually if you're considered high risk: (1) You have diabetes OR (2) Family history of glaucoma OR (3)  aged 48 and older OR (3)  American aged 72 and older  3  Osteoporosis Screening: covered every 2 years if you meet one of the following conditions: (1) Have a vertebral abnormality; (2) On glucocorticoid therapy for more than 3 months; (3) Have primary hyperparathyroidism; (4) On osteoporosis medications and need to assess response to drug therapy  5  HIV Screening: covered annually if you're between the age of 12-76  Also covered annually if you are younger than 13 and older than 72 with risk factors for HIV infection  For pregnant patients, it is covered up to 3 times per pregnancy      Immunizations:  Immunization Recommendations   Influenza Vaccine Annual influenza vaccination during flu season is recommended for all persons aged >= 6 months who do not have contraindications   Pneumococcal Vaccine (Prevnar and Pneumovax)  * Prevnar = PCV13  * Pneumovax = PPSV23 Adults 25-60 years old: 1-3 doses may be recommended based on certain risk factors  Adults 72 years old: Prevnar (PCV13) vaccine recommended followed by Pneumovax (PPSV23) vaccine  If already received PPSV23 since turning 65, then PCV13 recommended at least one year after PPSV23 dose  Hepatitis B Vaccine 3 dose series if at intermediate or high risk (ex: diabetes, end stage renal disease, liver disease)   Tetanus (Td) Vaccine - COST NOT COVERED BY MEDICARE PART B Following completion of primary series, a booster dose should be given every 10 years to maintain immunity against tetanus  Td may also be given as tetanus wound prophylaxis  Tdap Vaccine - COST NOT COVERED BY MEDICARE PART B Recommended at least once for all adults  For pregnant patients, recommended with each pregnancy  Shingles Vaccine (Shingrix) - COST NOT COVERED BY MEDICARE PART B  2 shot series recommended in those aged 48 and above     Health Maintenance Due:      Topic Date Due    Colonoscopy Surveillance  05/18/2023     Immunizations Due:  There are no preventive care reminders to display for this patient  Advance Directives   What are advance directives? Advance directives are legal documents that state your wishes and plans for medical care  These plans are made ahead of time in case you lose your ability to make decisions for yourself  Advance directives can apply to any medical decision, such as the treatments you want, and if you want to donate organs  What are the types of advance directives? There are many types of advance directives, and each state has rules about how to use them  You may choose a combination of any of the following:  · Living will: This is a written record of the treatment you want  You can also choose which treatments you do not want, which to limit, and which to stop at a certain time  This includes surgery, medicine, IV fluid, and tube feedings  · Durable power of  for healthcare [facility]):   This is a written record that states who you want to make healthcare choices for you when you are unable to make them for yourself  This person, called a proxy, is usually a family member or a friend  You may choose more than 1 proxy  · Do not resuscitate (DNR) order:  A DNR order is used in case your heart stops beating or you stop breathing  It is a request not to have certain forms of treatment, such as CPR  A DNR order may be included in other types of advance directives  · Medical directive: This covers the care that you want if you are in a coma, near death, or unable to make decisions for yourself  You can list the treatments you want for each condition  Treatment may include pain medicine, surgery, blood transfusions, dialysis, IV or tube feedings, and a ventilator (breathing machine)  · Values history: This document has questions about your views, beliefs, and how you feel and think about life  This information can help others choose the care that you would choose  Why are advance directives important? An advance directive helps you control your care  Although spoken wishes may be used, it is better to have your wishes written down  Spoken wishes can be misunderstood, or not followed  Treatments may be given even if you do not want them  An advance directive may make it easier for your family to make difficult choices about your care  Fall Prevention    Fall prevention  includes ways to make your home and other areas safer  It also includes ways you can move more carefully to prevent a fall  Health conditions that cause changes in your blood pressure, vision, or muscle strength and coordination may increase your risk for falls  Medicines may also increase your risk for falls if they make you dizzy, weak, or sleepy  Fall prevention tips:   · Stand or sit up slowly  · Use assistive devices as directed  · Wear shoes that fit well and have soles that   · Wear a personal alarm  · Stay active  · Manage your medical conditions      Home Safety Tips:  · Add items to prevent falls in the bathroom  · Keep paths clear  · Install bright lights in your home  · Keep items you use often on shelves within reach  · Paint or place reflective tape on the edges of your stairs  Weight Management   Why it is important to manage your weight:  Being overweight increases your risk of health conditions such as heart disease, high blood pressure, type 2 diabetes, and certain types of cancer  It can also increase your risk for osteoarthritis, sleep apnea, and other respiratory problems  Aim for a slow, steady weight loss  Even a small amount of weight loss can lower your risk of health problems  How to lose weight safely:  A safe and healthy way to lose weight is to eat fewer calories and get regular exercise  You can lose up about 1 pound a week by decreasing the number of calories you eat by 500 calories each day  Healthy meal plan for weight management:  A healthy meal plan includes a variety of foods, contains fewer calories, and helps you stay healthy  A healthy meal plan includes the following:  · Eat whole-grain foods more often  A healthy meal plan should contain fiber  Fiber is the part of grains, fruits, and vegetables that is not broken down by your body  Whole-grain foods are healthy and provide extra fiber in your diet  Some examples of whole-grain foods are whole-wheat breads and pastas, oatmeal, brown rice, and bulgur  · Eat a variety of vegetables every day  Include dark, leafy greens such as spinach, kale, raymundo greens, and mustard greens  Eat yellow and orange vegetables such as carrots, sweet potatoes, and winter squash  · Eat a variety of fruits every day  Choose fresh or canned fruit (canned in its own juice or light syrup) instead of juice  Fruit juice has very little or no fiber  · Eat low-fat dairy foods  Drink fat-free (skim) milk or 1% milk  Eat fat-free yogurt and low-fat cottage cheese   Try low-fat cheeses such as mozzarella and other reduced-fat cheeses  · Choose meat and other protein foods that are low in fat  Choose beans or other legumes such as split peas or lentils  Choose fish, skinless poultry (chicken or turkey), or lean cuts of red meat (beef or pork)  Before you cook meat or poultry, cut off any visible fat  · Use less fat and oil  Try baking foods instead of frying them  Add less fat, such as margarine, sour cream, regular salad dressing and mayonnaise to foods  Eat fewer high-fat foods  Some examples of high-fat foods include french fries, doughnuts, ice cream, and cakes  · Eat fewer sweets  Limit foods and drinks that are high in sugar  This includes candy, cookies, regular soda, and sweetened drinks  Exercise:  Exercise at least 30 minutes per day on most days of the week  Some examples of exercise include walking, biking, dancing, and swimming  You can also fit in more physical activity by taking the stairs instead of the elevator or parking farther away from stores  Ask your healthcare provider about the best exercise plan for you  © Copyright [employer] 2018 Information is for End User's use only and may not be sold, redistributed or otherwise used for commercial purposes   All illustrations and images included in CareNotes® are the copyrighted property of [employer]  or [address] [place] [address] [employer] [facility]

## 2021-05-26 DIAGNOSIS — G40.909 SEIZURE DISORDER (HCC): ICD-10-CM

## 2021-05-26 DIAGNOSIS — I71.4 ABDOMINAL AORTIC ANEURYSM (AAA) WITHOUT RUPTURE (HCC): Primary | ICD-10-CM

## 2021-05-26 RX ORDER — LEVETIRACETAM 500 MG/1
1500 TABLET, EXTENDED RELEASE ORAL DAILY
Qty: 270 TABLET | Refills: 3 | Status: SHIPPED | OUTPATIENT
Start: 2021-05-26 | End: 2021-07-28 | Stop reason: SDUPTHER

## 2021-07-28 ENCOUNTER — OFFICE VISIT (OUTPATIENT)
Dept: NEUROLOGY | Facility: CLINIC | Age: 81
End: 2021-07-28
Payer: MEDICARE

## 2021-07-28 VITALS
DIASTOLIC BLOOD PRESSURE: 70 MMHG | SYSTOLIC BLOOD PRESSURE: 140 MMHG | HEART RATE: 72 BPM | BODY MASS INDEX: 25.91 KG/M2 | HEIGHT: 70 IN | WEIGHT: 181 LBS

## 2021-07-28 DIAGNOSIS — G40.909 SEIZURE DISORDER (HCC): Primary | ICD-10-CM

## 2021-07-28 PROCEDURE — 99214 OFFICE O/P EST MOD 30 MIN: CPT | Performed by: PSYCHIATRY & NEUROLOGY

## 2021-07-28 RX ORDER — LEVETIRACETAM 500 MG/1
1000 TABLET, EXTENDED RELEASE ORAL DAILY
Qty: 180 TABLET | Refills: 3 | Status: SHIPPED | OUTPATIENT
Start: 2021-07-28 | End: 2021-12-20 | Stop reason: SDUPTHER

## 2021-07-28 NOTE — PROGRESS NOTES
[place] [address] [facility]  Follow Up Visit    Impression/Plan    Mr Ramon Brice is a 68 y o  male with history that includes abdominal aortic aneurysm status post repair that presents regarding 2 lifetime seizures occurring a few hours apart on 2/21/2017  His neurological exam has been essentially normal  MRI and EEG have been unrevealing for a cause  There are no seizure risk factors  His seizures were not  by more than 24 hours and therefore at this point he does not meet the technical criteria for a diagnosis of epilepsy  However, his initial seizure occurred out of sleep and there was no clear provocation suggesting some degree of elevated recurrence risk  He is tolerating levetiracetam without side effects  The initial plan had been to continue therapy for at least 18 months  Today we again discussed the options of continuing levetiracetam therapy or tapering off in the setting of indefinite diagnosis and more than 4 years of seizure freedom  He does not want to come off at this point, but was interested in a decrease in dose  Will decrease to 1000 mg per day  Return in one year  If levetiracetam is stopped would repeat an EEG after stopping  Patient Instructions   1  Decrease levetiracetam ER to 1000 mg nightly  2  Let us know if there are events concerning for seizure  3  Return in about one year  Diagnoses and all orders for this visit:    Seizure disorder ([address] )  -     levETIRAcetam (KEPPRA XR) 500 MG 24 hr tablet; Take 2 tablets (1,000 mg total) by mouth daily        Asha Ferguson is returning to the [place] [address] [facility] for follow up  Interval Events:   Seizures since last visit: None    He wonders if his events in 2017 were caused by low blood sugar  He has been noticing his blood sugar get low at times and the feeling improves when he eats something  No events concerning for seizure  He cares for his wife who has MS  Current AEDs:  Levetiracetam ER 1500 mg nightly  Medication side effects: None  Medication adherence: Yes     Seizures/Spell characteristics:  1  Witnessed GTC seizure  2 events on 2/21/17  One out of sleep, the other a few hours later in the ER       Special Features:  - History of status epilepticus: No  - Self injury due to seizures: No  - Precipitating factors: None     Seizure risk factors: Normal birth and development  No history of seizures as a child  No family history of seizures  No head trauma resulting in loss of consciousness  No history of brain tumor, stroke or CNS infection       Prior AEDs:  None     Prior Evaluation:  REEG 2/21/17: normal, awake       1 hour EEG sleep-deprived EEG, 2 hours, 4/18/2017:  Normal     MRI brain w/ and w/o 2/21/17: normal       History Reviewed: The following were reviewed and updated as appropriate: allergies, current medications, past medical history, past social history and problem list   Past Medical History includes:  abdominal aortic aneurysm s/p endovascular repair  HLD, HTN, hypothyroidism, hyperuricemia        Psychiatric History:  None     Social History:   Driving: Yes  Lives Alone: No  Occupation: retired  Lives with his wife  She has MS and he is her caregiver  His wife follows with Dr Macario Mcclelland  Retired   Objective    /70 (BP Location: Left arm, Patient Position: Sitting, Cuff Size: Standard)   Pulse 72   Ht 5' 10" (1 778 m)   Wt 82 1 kg (181 lb)   BMI 25 97 kg/m²      General Exam  No acute distress  Neurologic Exam  Mental Status:  Alert and oriented x 3  Language: normal fluency and comprehension  Cranial Nerves:  VFFTC  EOMI, no nystagums  No dysarthria  Motor:  No drift  Coordination: Finger to nose intact  Gait: Normal casual gait  ROS:    Review of Systems   Constitutional: Negative  Negative for appetite change and fever  HENT: Negative    Negative for hearing loss, tinnitus, trouble swallowing and voice change  Eyes: Negative  Negative for photophobia and pain  Respiratory: Negative  Negative for shortness of breath  Cardiovascular: Negative  Negative for palpitations  Gastrointestinal: Negative  Negative for nausea and vomiting  Endocrine: Negative  Negative for cold intolerance  Genitourinary: Negative  Negative for dysuria, frequency and urgency  Musculoskeletal: Negative  Negative for myalgias and neck pain  Skin: Negative  Negative for rash  Neurological: Negative  Negative for dizziness, tremors, seizures, syncope, facial asymmetry, speech difficulty, weakness, light-headedness, numbness and headaches  Hematological: Negative  Does not bruise/bleed easily  Psychiatric/Behavioral: Negative  Negative for confusion, hallucinations and sleep disturbance  ROS reviewed and updated as appropriate

## 2021-07-28 NOTE — PATIENT INSTRUCTIONS
1  Decrease levetiracetam ER to 1000 mg nightly  2  Let us know if there are events concerning for seizure  3  Return in about one year

## 2021-07-30 ENCOUNTER — TELEPHONE (OUTPATIENT)
Dept: FAMILY MEDICINE CLINIC | Facility: CLINIC | Age: 81
End: 2021-07-30

## 2021-07-30 NOTE — TELEPHONE ENCOUNTER
Patient called stating he has a stye on his eye  He is asking if there is anything over the counter that he can take or if a prescription can be sent

## 2021-09-01 DIAGNOSIS — E78.2 MIXED HYPERLIPIDEMIA: ICD-10-CM

## 2021-09-01 RX ORDER — SIMVASTATIN 40 MG
40 TABLET ORAL DAILY
Qty: 90 TABLET | Refills: 3 | Status: SHIPPED | OUTPATIENT
Start: 2021-09-01 | End: 2022-06-10 | Stop reason: SDUPTHER

## 2021-10-12 ENCOUNTER — HOSPITAL ENCOUNTER (OUTPATIENT)
Dept: NON INVASIVE DIAGNOSTICS | Facility: CLINIC | Age: 81
Discharge: HOME | End: 2021-10-12
Payer: MEDICARE

## 2021-10-12 DIAGNOSIS — I71.4 ABDOMINAL AORTIC ANEURYSM (AAA) WITHOUT RUPTURE (HCC): ICD-10-CM

## 2021-10-12 PROCEDURE — 93923 UPR/LXTR ART STDY 3+ LVLS: CPT

## 2021-10-12 PROCEDURE — 93978 VASCULAR STUDY: CPT | Performed by: SURGERY

## 2021-10-12 PROCEDURE — 93978 VASCULAR STUDY: CPT

## 2021-10-21 ENCOUNTER — OFFICE VISIT (OUTPATIENT)
Dept: VASCULAR SURGERY | Facility: CLINIC | Age: 81
End: 2021-10-21
Payer: MEDICARE

## 2021-10-21 VITALS
HEIGHT: 70 IN | BODY MASS INDEX: 25.91 KG/M2 | DIASTOLIC BLOOD PRESSURE: 80 MMHG | WEIGHT: 181 LBS | SYSTOLIC BLOOD PRESSURE: 132 MMHG | TEMPERATURE: 96.9 F | HEART RATE: 86 BPM

## 2021-10-21 DIAGNOSIS — I71.4 ABDOMINAL AORTIC ANEURYSM (AAA) WITHOUT RUPTURE (HCC): Primary | ICD-10-CM

## 2021-10-21 PROCEDURE — 99203 OFFICE O/P NEW LOW 30 MIN: CPT | Performed by: NURSE PRACTITIONER

## 2021-12-06 ENCOUNTER — HOSPITAL ENCOUNTER (OUTPATIENT)
Dept: NON INVASIVE DIAGNOSTICS | Facility: CLINIC | Age: 81
Discharge: HOME | End: 2021-12-06
Payer: MEDICARE

## 2021-12-06 DIAGNOSIS — I71.4 ABDOMINAL AORTIC ANEURYSM (AAA) WITHOUT RUPTURE (HCC): ICD-10-CM

## 2021-12-06 PROCEDURE — 93922 UPR/L XTREMITY ART 2 LEVELS: CPT | Performed by: SURGERY

## 2021-12-06 PROCEDURE — 93925 LOWER EXTREMITY STUDY: CPT | Performed by: SURGERY

## 2021-12-06 PROCEDURE — 93923 UPR/LXTR ART STDY 3+ LVLS: CPT

## 2021-12-06 PROCEDURE — 93925 LOWER EXTREMITY STUDY: CPT

## 2021-12-09 ENCOUNTER — TELEPHONE (OUTPATIENT)
Dept: VASCULAR SURGERY | Facility: CLINIC | Age: 81
End: 2021-12-09

## 2021-12-17 ENCOUNTER — TELEPHONE (OUTPATIENT)
Dept: VASCULAR SURGERY | Facility: CLINIC | Age: 81
End: 2021-12-17

## 2021-12-20 DIAGNOSIS — G40.909 SEIZURE DISORDER (HCC): ICD-10-CM

## 2021-12-20 RX ORDER — LEVETIRACETAM 500 MG/1
1000 TABLET, EXTENDED RELEASE ORAL DAILY
Qty: 180 TABLET | Refills: 3 | Status: SHIPPED | OUTPATIENT
Start: 2021-12-20 | End: 2022-02-01 | Stop reason: HOSPADM

## 2022-01-18 ENCOUNTER — TELEPHONE (OUTPATIENT)
Dept: FAMILY MEDICINE CLINIC | Facility: CLINIC | Age: 82
End: 2022-01-18

## 2022-01-18 ENCOUNTER — HOSPITAL ENCOUNTER (INPATIENT)
Facility: HOSPITAL | Age: 82
LOS: 14 days | Discharge: HOME HEALTH | DRG: 100 | End: 2022-02-01
Attending: INTERNAL MEDICINE | Admitting: INTERNAL MEDICINE
Payer: MEDICARE

## 2022-01-18 ENCOUNTER — HOSPITAL ENCOUNTER (EMERGENCY)
Facility: HOSPITAL | Age: 82
End: 2022-01-18
Attending: EMERGENCY MEDICINE | Admitting: EMERGENCY MEDICINE
Payer: MEDICARE

## 2022-01-18 ENCOUNTER — APPOINTMENT (EMERGENCY)
Dept: CT IMAGING | Facility: HOSPITAL | Age: 82
End: 2022-01-18
Payer: MEDICARE

## 2022-01-18 ENCOUNTER — APPOINTMENT (EMERGENCY)
Dept: RADIOLOGY | Facility: HOSPITAL | Age: 82
End: 2022-01-18
Payer: MEDICARE

## 2022-01-18 VITALS
DIASTOLIC BLOOD PRESSURE: 57 MMHG | WEIGHT: 187.39 LBS | OXYGEN SATURATION: 99 % | BODY MASS INDEX: 26.89 KG/M2 | SYSTOLIC BLOOD PRESSURE: 111 MMHG | HEART RATE: 66 BPM | TEMPERATURE: 99.3 F | RESPIRATION RATE: 16 BRPM

## 2022-01-18 DIAGNOSIS — I63.9 STROKE (CEREBRUM) (HCC): Primary | ICD-10-CM

## 2022-01-18 DIAGNOSIS — S06.5X9A SUBDURAL HEMATOMA (HCC): ICD-10-CM

## 2022-01-18 DIAGNOSIS — G40.909 SEIZURE DISORDER (HCC): Primary | ICD-10-CM

## 2022-01-18 DIAGNOSIS — R56.9 SEIZURE (HCC): ICD-10-CM

## 2022-01-18 DIAGNOSIS — G40.901 STATUS EPILEPTICUS (HCC): ICD-10-CM

## 2022-01-18 LAB
2HR DELTA HS TROPONIN: -214 NG/L
4HR DELTA HS TROPONIN: -368 NG/L
ALBUMIN SERPL BCP-MCNC: 3.7 G/DL (ref 3.5–5)
ALP SERPL-CCNC: 80 U/L (ref 46–116)
ALT SERPL W P-5'-P-CCNC: 28 U/L (ref 12–78)
AMMONIA PLAS-SCNC: 21 UMOL/L (ref 11–35)
AMORPH URATE CRY URNS QL MICRO: ABNORMAL /HPF
ANION GAP SERPL CALCULATED.3IONS-SCNC: 11 MMOL/L (ref 4–13)
ANION GAP SERPL CALCULATED.3IONS-SCNC: 9 MMOL/L (ref 4–13)
APPEARANCE CSF: NORMAL
APTT PPP: 28 SECONDS (ref 23–37)
AST SERPL W P-5'-P-CCNC: 36 U/L (ref 5–45)
ATRIAL RATE: 68 BPM
ATRIAL RATE: 93 BPM
BACTERIA UR QL AUTO: ABNORMAL /HPF
BASE EX.OXY STD BLDV CALC-SCNC: 88.6 % (ref 60–80)
BASE EXCESS BLDV CALC-SCNC: -0.2 MMOL/L
BILIRUB SERPL-MCNC: 1.1 MG/DL (ref 0.2–1)
BILIRUB UR QL STRIP: NEGATIVE
BUN SERPL-MCNC: 20 MG/DL (ref 5–25)
BUN SERPL-MCNC: 20 MG/DL (ref 5–25)
C GATTII+NEOFOR DNA CSF QL NAA+NON-PROBE: NOT DETECTED
CALCIUM SERPL-MCNC: 8.5 MG/DL (ref 8.3–10.1)
CALCIUM SERPL-MCNC: 8.5 MG/DL (ref 8.3–10.1)
CARDIAC TROPONIN I PNL SERPL HS: 1159 NG/L
CARDIAC TROPONIN I PNL SERPL HS: 791 NG/L
CARDIAC TROPONIN I PNL SERPL HS: 945 NG/L
CHLORIDE SERPL-SCNC: 104 MMOL/L (ref 100–108)
CHLORIDE SERPL-SCNC: 104 MMOL/L (ref 100–108)
CLARITY UR: ABNORMAL
CMV DNA CSF QL NAA+NON-PROBE: NOT DETECTED
CO2 SERPL-SCNC: 25 MMOL/L (ref 21–32)
CO2 SERPL-SCNC: 27 MMOL/L (ref 21–32)
COLOR UR: YELLOW
CREAT SERPL-MCNC: 1.53 MG/DL (ref 0.6–1.3)
CREAT SERPL-MCNC: 1.57 MG/DL (ref 0.6–1.3)
E COLI K1 DNA CSF QL NAA+NON-PROBE: NOT DETECTED
ERYTHROCYTE [DISTWIDTH] IN BLOOD BY AUTOMATED COUNT: 12.5 % (ref 11.6–15.1)
EV RNA CSF QL NAA+NON-PROBE: NOT DETECTED
FLUAV RNA RESP QL NAA+PROBE: NEGATIVE
FLUBV RNA RESP QL NAA+PROBE: NEGATIVE
GFR SERPL CREATININE-BSD FRML MDRD: 41 ML/MIN/1.73SQ M
GFR SERPL CREATININE-BSD FRML MDRD: 42 ML/MIN/1.73SQ M
GLUCOSE CSF-MCNC: 81 MG/DL (ref 50–80)
GLUCOSE SERPL-MCNC: 133 MG/DL (ref 65–140)
GLUCOSE SERPL-MCNC: 135 MG/DL (ref 65–140)
GLUCOSE SERPL-MCNC: 135 MG/DL (ref 65–140)
GLUCOSE UR STRIP-MCNC: NEGATIVE MG/DL
GP B STREP DNA CSF QL NAA+NON-PROBE: NOT DETECTED
GRAM STN SPEC: NORMAL
HAEM INFLU DNA CSF QL NAA+NON-PROBE: NOT DETECTED
HCO3 BLDV-SCNC: 24.7 MMOL/L (ref 24–30)
HCT VFR BLD AUTO: 40.5 % (ref 36.5–49.3)
HGB BLD-MCNC: 13.3 G/DL (ref 12–17)
HGB UR QL STRIP.AUTO: ABNORMAL
HHV6 DNA CSF QL NAA+NON-PROBE: NOT DETECTED
HSV1 DNA CSF QL NAA+NON-PROBE: NOT DETECTED
HSV2 DNA CSF QL NAA+NON-PROBE: NOT DETECTED
INR PPP: 1.1 (ref 0.84–1.19)
KETONES UR STRIP-MCNC: ABNORMAL MG/DL
L MONOCYTOG DNA CSF QL NAA+NON-PROBE: NOT DETECTED
LACTATE SERPL-SCNC: 2.1 MMOL/L (ref 0.5–2)
LACTATE SERPL-SCNC: 3.1 MMOL/L (ref 0.5–2)
LEUKOCYTE ESTERASE UR QL STRIP: NEGATIVE
LYMPHOCYTES NFR CSF MANUAL: 40 %
MCH RBC QN AUTO: 31.7 PG (ref 26.8–34.3)
MCHC RBC AUTO-ENTMCNC: 32.8 G/DL (ref 31.4–37.4)
MCV RBC AUTO: 96 FL (ref 82–98)
MONOS+MACROS CSF MANUAL: 50 %
N MEN DNA CSF QL NAA+NON-PROBE: NOT DETECTED
NEUTROPHILS NFR CSF MANUAL: 10 %
NITRITE UR QL STRIP: NEGATIVE
NON-SQ EPI CELLS URNS QL MICRO: ABNORMAL /HPF
O2 CT BLDV-SCNC: 17.4 ML/DL
OTHER STN SPEC: ABNORMAL
P AXIS: 58 DEGREES
P AXIS: 59 DEGREES
PARECHOVIRUS A RNA CSF QL NAA+NON-PROBE: NOT DETECTED
PCO2 BLDV: 41.3 MM HG (ref 42–50)
PH BLDV: 7.39 [PH] (ref 7.3–7.4)
PH UR STRIP.AUTO: 5.5 [PH]
PLATELET # BLD AUTO: 159 THOUSANDS/UL (ref 149–390)
PMV BLD AUTO: 11.2 FL (ref 8.9–12.7)
PO2 BLDV: 57.5 MM HG (ref 35–45)
POTASSIUM SERPL-SCNC: 3.8 MMOL/L (ref 3.5–5.3)
POTASSIUM SERPL-SCNC: 3.8 MMOL/L (ref 3.5–5.3)
PR INTERVAL: 140 MS
PR INTERVAL: 142 MS
PROLACTIN SERPL-MCNC: 5.7 NG/ML (ref 2.5–17.4)
PROT CSF-MCNC: 73 MG/DL (ref 15–45)
PROT SERPL-MCNC: 6.8 G/DL (ref 6.4–8.2)
PROT UR STRIP-MCNC: NEGATIVE MG/DL
PROTHROMBIN TIME: 14.2 SECONDS (ref 11.6–14.5)
QRS AXIS: 71 DEGREES
QRS AXIS: 73 DEGREES
QRSD INTERVAL: 76 MS
QRSD INTERVAL: 82 MS
QT INTERVAL: 376 MS
QT INTERVAL: 446 MS
QTC INTERVAL: 467 MS
QTC INTERVAL: 474 MS
RBC # BLD AUTO: 4.2 MILLION/UL (ref 3.88–5.62)
RBC # CSF MANUAL: 388 UL (ref 0–10)
RBC # CSF MANUAL: 7 UL (ref 0–10)
RBC #/AREA URNS AUTO: ABNORMAL /HPF
RSV RNA RESP QL NAA+PROBE: NEGATIVE
S PNEUM DNA CSF QL NAA+NON-PROBE: NOT DETECTED
SARS-COV-2 RNA RESP QL NAA+PROBE: NEGATIVE
SODIUM SERPL-SCNC: 140 MMOL/L (ref 136–145)
SODIUM SERPL-SCNC: 140 MMOL/L (ref 136–145)
SP GR UR STRIP.AUTO: 1.02 (ref 1–1.03)
T WAVE AXIS: 71 DEGREES
T WAVE AXIS: 76 DEGREES
TOTAL CELLS COUNTED BLD: NO
TOTAL CELLS COUNTED SPEC: 10
TUBE # CSF: 4
UROBILINOGEN UR QL STRIP.AUTO: 0.2 E.U./DL
VENTRICULAR RATE: 68 BPM
VENTRICULAR RATE: 93 BPM
VZV DNA CSF QL NAA+NON-PROBE: NOT DETECTED
WBC # BLD AUTO: 15.62 THOUSAND/UL (ref 4.31–10.16)
WBC # CSF AUTO: 1 /UL (ref 0–5)
WBC #/AREA URNS AUTO: ABNORMAL /HPF

## 2022-01-18 PROCEDURE — 81001 URINALYSIS AUTO W/SCOPE: CPT | Performed by: EMERGENCY MEDICINE

## 2022-01-18 PROCEDURE — 99285 EMERGENCY DEPT VISIT HI MDM: CPT

## 2022-01-18 PROCEDURE — 87070 CULTURE OTHR SPECIMN AEROBIC: CPT

## 2022-01-18 PROCEDURE — 96365 THER/PROPH/DIAG IV INF INIT: CPT

## 2022-01-18 PROCEDURE — 0T9B70Z DRAINAGE OF BLADDER WITH DRAINAGE DEVICE, VIA NATURAL OR ARTIFICIAL OPENING: ICD-10-PCS | Performed by: INTERNAL MEDICINE

## 2022-01-18 PROCEDURE — 89051 BODY FLUID CELL COUNT: CPT

## 2022-01-18 PROCEDURE — 93005 ELECTROCARDIOGRAM TRACING: CPT

## 2022-01-18 PROCEDURE — 0241U HB NFCT DS VIR RESP RNA 4 TRGT: CPT | Performed by: EMERGENCY MEDICINE

## 2022-01-18 PROCEDURE — 96368 THER/DIAG CONCURRENT INF: CPT

## 2022-01-18 PROCEDURE — 84157 ASSAY OF PROTEIN OTHER: CPT

## 2022-01-18 PROCEDURE — 94760 N-INVAS EAR/PLS OXIMETRY 1: CPT

## 2022-01-18 PROCEDURE — 36415 COLL VENOUS BLD VENIPUNCTURE: CPT | Performed by: EMERGENCY MEDICINE

## 2022-01-18 PROCEDURE — 89050 BODY FLUID CELL COUNT: CPT

## 2022-01-18 PROCEDURE — 99285 EMERGENCY DEPT VISIT HI MDM: CPT | Performed by: EMERGENCY MEDICINE

## 2022-01-18 PROCEDURE — 80053 COMPREHEN METABOLIC PANEL: CPT | Performed by: PSYCHIATRY & NEUROLOGY

## 2022-01-18 PROCEDURE — 87483 CNS DNA AMP PROBE TYPE 12-25: CPT

## 2022-01-18 PROCEDURE — 71045 X-RAY EXAM CHEST 1 VIEW: CPT

## 2022-01-18 PROCEDURE — 96366 THER/PROPH/DIAG IV INF ADDON: CPT

## 2022-01-18 PROCEDURE — 62270 DX LMBR SPI PNXR: CPT | Performed by: EMERGENCY MEDICINE

## 2022-01-18 PROCEDURE — 31500 INSERT EMERGENCY AIRWAY: CPT

## 2022-01-18 PROCEDURE — 85610 PROTHROMBIN TIME: CPT | Performed by: EMERGENCY MEDICINE

## 2022-01-18 PROCEDURE — 96367 TX/PROPH/DG ADDL SEQ IV INF: CPT

## 2022-01-18 PROCEDURE — 83605 ASSAY OF LACTIC ACID: CPT

## 2022-01-18 PROCEDURE — 85027 COMPLETE CBC AUTOMATED: CPT | Performed by: EMERGENCY MEDICINE

## 2022-01-18 PROCEDURE — 94762 N-INVAS EAR/PLS OXIMTRY CONT: CPT

## 2022-01-18 PROCEDURE — 82945 GLUCOSE OTHER FLUID: CPT

## 2022-01-18 PROCEDURE — 94002 VENT MGMT INPAT INIT DAY: CPT

## 2022-01-18 PROCEDURE — 82948 REAGENT STRIP/BLOOD GLUCOSE: CPT

## 2022-01-18 PROCEDURE — 99291 CRITICAL CARE FIRST HOUR: CPT | Performed by: PSYCHIATRY & NEUROLOGY

## 2022-01-18 PROCEDURE — 84146 ASSAY OF PROLACTIN: CPT | Performed by: INTERNAL MEDICINE

## 2022-01-18 PROCEDURE — 70498 CT ANGIOGRAPHY NECK: CPT

## 2022-01-18 PROCEDURE — 87040 BLOOD CULTURE FOR BACTERIA: CPT

## 2022-01-18 PROCEDURE — 80048 BASIC METABOLIC PNL TOTAL CA: CPT | Performed by: EMERGENCY MEDICINE

## 2022-01-18 PROCEDURE — 93010 ELECTROCARDIOGRAM REPORT: CPT | Performed by: INTERNAL MEDICINE

## 2022-01-18 PROCEDURE — 94150 VITAL CAPACITY TEST: CPT

## 2022-01-18 PROCEDURE — G1004 CDSM NDSC: HCPCS

## 2022-01-18 PROCEDURE — 80177 DRUG SCRN QUAN LEVETIRACETAM: CPT | Performed by: EMERGENCY MEDICINE

## 2022-01-18 PROCEDURE — 85730 THROMBOPLASTIN TIME PARTIAL: CPT | Performed by: EMERGENCY MEDICINE

## 2022-01-18 PROCEDURE — 31500 INSERT EMERGENCY AIRWAY: CPT | Performed by: EMERGENCY MEDICINE

## 2022-01-18 PROCEDURE — 5A1945Z RESPIRATORY VENTILATION, 24-96 CONSECUTIVE HOURS: ICD-10-PCS | Performed by: INTERNAL MEDICINE

## 2022-01-18 PROCEDURE — 82805 BLOOD GASES W/O2 SATURATION: CPT

## 2022-01-18 PROCEDURE — 84484 ASSAY OF TROPONIN QUANT: CPT | Performed by: EMERGENCY MEDICINE

## 2022-01-18 PROCEDURE — 82140 ASSAY OF AMMONIA: CPT

## 2022-01-18 PROCEDURE — 70496 CT ANGIOGRAPHY HEAD: CPT

## 2022-01-18 RX ORDER — LORAZEPAM 2 MG/ML
2 INJECTION INTRAMUSCULAR ONCE
Status: COMPLETED | OUTPATIENT
Start: 2022-01-18 | End: 2022-01-18

## 2022-01-18 RX ORDER — SUCCINYLCHOLINE/SOD CL,ISO/PF 100 MG/5ML
1.5 SYRINGE (ML) INTRAVENOUS ONCE
Status: COMPLETED | OUTPATIENT
Start: 2022-01-18 | End: 2022-01-18

## 2022-01-18 RX ORDER — PROPOFOL 10 MG/ML
5-50 INJECTION, EMULSION INTRAVENOUS
Status: DISCONTINUED | OUTPATIENT
Start: 2022-01-19 | End: 2022-01-22

## 2022-01-18 RX ORDER — ETOMIDATE 2 MG/ML
0.3 INJECTION INTRAVENOUS ONCE
Status: COMPLETED | OUTPATIENT
Start: 2022-01-18 | End: 2022-01-18

## 2022-01-18 RX ORDER — CHLORHEXIDINE GLUCONATE 0.12 MG/ML
15 RINSE ORAL EVERY 12 HOURS SCHEDULED
Status: DISCONTINUED | OUTPATIENT
Start: 2022-01-19 | End: 2022-02-01 | Stop reason: HOSPADM

## 2022-01-18 RX ORDER — PROPOFOL 10 MG/ML
5-50 INJECTION, EMULSION INTRAVENOUS
Status: DISCONTINUED | OUTPATIENT
Start: 2022-01-18 | End: 2022-01-18 | Stop reason: HOSPADM

## 2022-01-18 RX ORDER — SODIUM CHLORIDE, SODIUM GLUCONATE, SODIUM ACETATE, POTASSIUM CHLORIDE, MAGNESIUM CHLORIDE, SODIUM PHOSPHATE, DIBASIC, AND POTASSIUM PHOSPHATE .53; .5; .37; .037; .03; .012; .00082 G/100ML; G/100ML; G/100ML; G/100ML; G/100ML; G/100ML; G/100ML
75 INJECTION, SOLUTION INTRAVENOUS CONTINUOUS
Status: DISCONTINUED | OUTPATIENT
Start: 2022-01-18 | End: 2022-01-18 | Stop reason: HOSPADM

## 2022-01-18 RX ADMIN — SODIUM CHLORIDE 500 ML: 0.9 INJECTION, SOLUTION INTRAVENOUS at 11:46

## 2022-01-18 RX ADMIN — IOHEXOL 85 ML: 350 INJECTION, SOLUTION INTRAVENOUS at 10:27

## 2022-01-18 RX ADMIN — Medication 128 MG: at 13:45

## 2022-01-18 RX ADMIN — SODIUM CHLORIDE, SODIUM GLUCONATE, SODIUM ACETATE, POTASSIUM CHLORIDE, MAGNESIUM CHLORIDE, SODIUM PHOSPHATE, DIBASIC, AND POTASSIUM PHOSPHATE 75 ML/HR: .53; .5; .37; .037; .03; .012; .00082 INJECTION, SOLUTION INTRAVENOUS at 21:49

## 2022-01-18 RX ADMIN — VANCOMYCIN HYDROCHLORIDE 1750 MG: 1 INJECTION, POWDER, LYOPHILIZED, FOR SOLUTION INTRAVENOUS at 16:54

## 2022-01-18 RX ADMIN — ACYCLOVIR SODIUM 725 MG: 50 INJECTION, SOLUTION INTRAVENOUS at 15:47

## 2022-01-18 RX ADMIN — PROPOFOL 20 MCG/KG/MIN: 10 INJECTION, EMULSION INTRAVENOUS at 13:51

## 2022-01-18 RX ADMIN — PROPOFOL 30 MCG/KG/MIN: 10 INJECTION, EMULSION INTRAVENOUS at 18:22

## 2022-01-18 RX ADMIN — SODIUM CHLORIDE 1700 MG: 9 INJECTION, SOLUTION INTRAVENOUS at 12:37

## 2022-01-18 RX ADMIN — ETOMIDATE 25.6 MG: 2 INJECTION, SOLUTION INTRAVENOUS at 13:45

## 2022-01-18 RX ADMIN — CEFTRIAXONE SODIUM 1000 MG: 10 INJECTION, POWDER, FOR SOLUTION INTRAVENOUS at 14:12

## 2022-01-18 RX ADMIN — LEVETIRACETAM 500 MG: 100 INJECTION, SOLUTION INTRAVENOUS at 11:16

## 2022-01-18 RX ADMIN — SODIUM CHLORIDE 1000 ML: 0.9 INJECTION, SOLUTION INTRAVENOUS at 18:17

## 2022-01-18 RX ADMIN — AMPICILLIN SODIUM 2000 MG: 2 INJECTION, POWDER, FOR SOLUTION INTRAMUSCULAR; INTRAVENOUS at 20:20

## 2022-01-18 RX ADMIN — LEVETIRACETAM 1000 MG: 100 INJECTION, SOLUTION INTRAVENOUS at 11:46

## 2022-01-18 RX ADMIN — CEFTRIAXONE SODIUM 1000 MG: 10 INJECTION, POWDER, FOR SOLUTION INTRAVENOUS at 15:05

## 2022-01-18 NOTE — ED NOTES
Pt transferred to CT with this RN  Pt on monitor       Salinas Signs, RN  01/18/22 [address] Bianca Coffey RN  01/18/22 1146

## 2022-01-18 NOTE — ED NOTES
Propofol increased per request of provider  Provider at bedside with team for lumbar puncture       Nasima Jenkins RN  01/18/22 4709

## 2022-01-18 NOTE — ASSESSMENT & PLAN NOTE
Patient with history seizure disorder on Keppra 1g ER q h s, hypertension, hyperlipidemia, CKD and AAA who presents initially as a stroke alert but later found with breakthrough seizure activity in the setting of fever (102 reported by EMS) and elevated WBC  Previous history of two seizures in 2017 without diagnosis of epilepsy, stable on Keppra since then  Family denies any known risk factors to decrease seizure threshold such as fever or illness  Only report stress taking care of his wife  Found to have an elevated WBC in the ED  CTH/CTA for stroke alert negative  Received ativan 4 mg on transport, 2 mg in ED, Keppra load 1500 and depakote load 20 mg /kg  Patient continue to seize assisted into keppra load  Exam later improved with localization to pain  Intubated and sedated for airway protection  Impression: Patient came in as a stroke alert however noted to be seizing  Breakthrough seizure activity in the setting of infection  Initially likely in status epilepticus with later improvement in exam   Rule out meningitis  Likely to improve with treatment of underlying infection  Plan:  · Transfer to [facility] for continuous EEG monitoring   · Blood cultures, U/A, CXR for etiology of possible underlying infection, procal pending, source control   · Recommend empiric coverage for bacterial/viral meningitis with ceftriaxone vancomycin and acyclovir    · Rule out meningitis, ideally LP in a timely manner   · Continue Keppra 1 g bid   · Continue Depakote 500 mg bid   · Ativan 2 mg p r n    · Seizure precautions   · Frequent neurochecks

## 2022-01-18 NOTE — EMTALA/ACUTE CARE TRANSFER
[address] [place] [address]  Dept: [phone]      EMTALA TRANSFER CONSENT    NAME Levy Merida                                         1940                              MRN [id number]    I have been informed of my rights regarding examination, treatment, and transfer   by Dr Bhupendra Wei MD    Benefits: Specialized equipment and/or services available at the receiving facility (Include comment)________________________    Risks: Potential for delay in receiving treatment,Potential deterioration of medical condition,Loss of IV,Increased discomfort during transfer,Possible worsening of condition or death during transfer      Transfer Request   I acknowledge that my medical condition has been evaluated and explained to me by the emergency department physician or other qualified medical person and/or my attending physician who has recommended and offered to me further medical examination and treatment  I understand the Hospital's obligation with respect to the treatment and stabilization of my emergency medical condition  I nevertheless request to be transferred  I release the Hospital, the doctor, and any other persons caring for me from all responsibility or liability for any injury or ill effects that may result from my transfer and agree to accept all responsibility for the consequences of my choice to transfer, rather than receive stabilizing treatment at the Hospital  I understand that because the transfer is my request, my insurance may not provide reimbursement for the services  The Hospital will assist and direct me and my family in how to make arrangements for transfer, but the hospital is not liable for any fees charged by the transport service    In spite of this understanding, I refuse to consent to further medical examination and treatment which has been offered to me, and request transfer to  [facility] Name, [address] : [facility]  I authorize the performance of emergency medical procedures and treatments upon me in both transit and upon arrival at the receiving facility  Additionally, I authorize the release of any and all medical records to the receiving facility and request they be transported with me, if possible  I authorize the performance of emergency medical procedures and treatments upon me in both transit and upon arrival at the receiving facility  Additionally, I authorize the release of any and all medical records to the receiving facility and request they be transported with me, if possible  I understand that the safest mode of transportation during a medical emergency is an ambulance and that the Hospital advocates the use of this mode of transport  Risks of traveling to the receiving facility by car, including absence of medical control, life sustaining equipment, such as oxygen, and medical personnel has been explained to me and I fully understand them  (RAY CORRECT BOX BELOW)  [  ]  I consent to the stated transfer and to be transported by ambulance/helicopter  [  ]  I consent to the stated transfer, but refuse transportation by ambulance and accept full responsibility for my transportation by car  I understand the risks of non-ambulance transfers and I exonerate the Hospital and its staff from any deterioration in my condition that results from this refusal     X___________________________________________    DATE  22  TIME________  Signature of patient or legally responsible individual signing on patient behalf           RELATIONSHIP TO PATIENT_________________________          Provider Certification    NAME Nohemy Thomson                                        [facility] 1940                              MRN [id number]    A medical screening exam was performed on the above named patient    Based on the examination:    Condition Necessitating Transfer The encounter diagnosis was Stroke (cerebrum) ([facility] [address] )  Patient Condition: The patient has been stabilized such that within reasonable medical probability, no material deterioration of the patient condition or the condition of the unborn child(mildred) is likely to result from the transfer    Reason for Transfer: Level of Care needed not available at this facility    Transfer Requirements: [facility]   · Space available and qualified personnel available for treatment as acknowledged by Verito Cohen  · Agreed to accept transfer and to provide appropriate medical treatment as acknowledged by       Dr Raegan Sandhu  · Appropriate medical records of the examination and treatment of the patient are provided at the time of transfer   [facility], [address] _______  · Transfer will be performed by qualified personnel from    and appropriate transfer equipment as required, including the use of necessary and appropriate life support measures      Provider Certification: I have examined the patient and explained the following risks and benefits of being transferred/refusing transfer to the patient/family:  General risk, such as traffic hazards, adverse weather conditions, rough terrain or turbulence, possible failure of equipment (including vehicle or aircraft), or consequences of actions of persons outside the control of the transport personnel,Unanticipated needs of medical equipment and personnel during transport,Risk of worsening condition,The possibility of a transport vehicle being unavailable,Consent was not obtained as patient is committed to psychiatric facility and transfer is mandated,The patient is stable for psychiatric transfer because they are medically stable, and is protected from harming him/herself or others during transport      Based on these reasonable risks and benefits to the patient and/or the unborn child(mildred), and based upon the information available at the time of the patients examination, I certify that the medical benefits reasonably to be expected from the provision of appropriate medical treatments at another medical facility outweigh the increasing risks, if any, to the individuals medical condition, and in the case of labor to the unborn child, from effecting the transfer      X____________________________________________ DATE 01/18/22        TIME_______      ORIGINAL - SEND TO MEDICAL RECORDS   COPY - SEND WITH PATIENT DURING TRANSFER

## 2022-01-18 NOTE — CONSULTS
Consultation - Stroke   Mike Marroquin 80 y o  male MRN: [id number]  Unit/Bed#: ED 24 Encounter: [id number]      Assessment/Plan     Seizure disorder [facility])  Assessment & Plan  Patient with history seizure disorder on Keppra 1g ER q h s, hypertension, hyperlipidemia, CKD and AAA who presents initially as a stroke alert but later found with breakthrough seizure activity in the setting of fever (102 reported by EMS) and elevated WBC  Previous history of two seizures in 2017 without diagnosis of epilepsy, stable on Keppra since then  Family denies any known risk factors to decrease seizure threshold such as fever or illness  Only report stress taking care of his wife  Found to have an elevated WBC in the ED  CTH/CTA for stroke alert negative  Received ativan 4 mg on transport, 2 mg in ED, Keppra load 1500 and depakote load 20 mg /kg  Patient continue to seize [facility] into keppra load  Exam later improved with localization to pain  Intubated and sedated for airway protection  Impression: Patient came in as a stroke alert however noted to be seizing  Breakthrough seizure activity in the setting of infection  Initially likely in status epilepticus with later improvement in exam   Rule out meningitis  Likely to improve with treatment of underlying infection  Plan:  · Transfer to [facility] for continuous EEG monitoring   · Blood cultures, U/A, CXR for etiology of possible underlying infection, procal pending, source control   · Recommend empiric coverage for bacterial/viral meningitis with ceftriaxone vancomycin and acyclovir    · Rule out meningitis, ideally LP in a timely manner   · Continue Keppra 1 g bid   · Continue Depakote 500 mg bid   · Ativan 2 mg p r n  · Seizure precautions   · Frequent neurochecks           Mike Marroquin will need follow up in in 2 weeks with epilepsy attending or advance practitioner  He will not require outpatient neurological testing      History of Present Illness     Reason for Consult / Principal Problem: Stroke alert   Hx and PE limited by: Mental status change   Patient last known well: 1/17   Stroke alert called: 1010  Neurology time of arrival: 1015  HPI: Alen Florez is a 80 y o   male with history seizure disorder on Keppra 1g ER q h s, hypertension, hyperlipidemia and AAA who presents as a stroke alert for facial droop  History gathered from patient's wife's caregiver and daughter, Princess Jean  This morning caregiver found patient seizing on the couch at around 9:15 a m  upon arrival to the house  From the moment he was found to the moment EMS arrived patient had 4 seizures  Unclear how long patient had been seizing prior to arrival as the only other person in the house was his wife and she has dementia and is nonverbal   Patient's daughter states that yesterday around 9:17 a m  She was talking to patient and he was talking about nonsensical things such as a jacket belonging to an old neighbor or spitting into a bottle  There was a moment were patient stared off into space  She denies him having any recent illness, fever, cough, diarrhea or COVID symptoms  She does state that he assumes all responsibility for the care of his wife with MS and he has been exhausted due to this  She believes that he might have missed the Keppra dose the night of 1/16  EMS called  Found him with a temperature of 102 with right gaze preference and seizure activity on transport to the ED  He was given 4 mg of ativan  On arrival to the ED /80, HR 90, RR 18 and SPO2 96% on room air  Stroke alert called  CT/CTA negative for ischemia or hemorrhage, CTA with 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  I noted on chart review that patient has a history of seizure  In the ED patient continued unresponsive with gaze deviation  Given additional 2 mg of ativan  Loaded with keppra 1500 and Depakote 20 mg/kg  Gaze deviation continue midway through Keppra load   GCS of 8  It was decided to transfer patient to [facility] for continuous VEEG monitoring  Elevated WBC on CBC  Patient is followed as outpatient by Dr Marilee Chauhan given two seizures episodes  by hours in 2017  This occurred out of sleep with no provocation  He has been seizure free for 4 years with no recurrence  Last visit in 21 when keppra was decreased to 1000 mg qhs  Initially discharged on 1000 mg bid, then switched to 1500 mg qhs before more recent dosing  Consults    Review of Systems   Unable to perform ROS: Mental status change       Historical Information   Past Medical History:   Diagnosis Date    Colon polyps     Gout     Hyperlipidemia     Hypertension     S/P AAA repair     Seizure disorder ([facility] [address] )     Tear of right rotator cuff 2017     Past Surgical History:   Procedure Laterality Date    ABDOMINAL AORTIC ANEURYSM REPAIR, ENDOVASCULAR N/A     AL COLONOSCOPY FLX DX W/COLLJ SPEC WHEN PFRMD N/A 2017    Procedure: COLONOSCOPY;  Surgeon: Christian Gomes MD;  Location: [facility]; Service: Colorectal    AL COLONOSCOPY FLX DX W/COLLJ SPEC WHEN PFRMD N/A 2018    Procedure: COLONOSCOPY;  Surgeon: Christian Gomes MD;  [facility]; Service: Colorectal    TONSILLECTOMY      WISDOM TOOTH EXTRACTION Bilateral      Social History   Social History     Substance and Sexual Activity   Alcohol Use Yes    Comment: occasional     Social History     Substance and Sexual Activity   Drug Use No     E-Cigarette/Vaping    E-Cigarette Use Never User      E-Cigarette/Vaping Substances    Nicotine No     THC No     CBD No      Social History     Tobacco Use   Smoking Status Former Smoker    Packs/day: 3 00    Years: 10 00    Pack years: 30 00    Quit date: 1971    Years since quittin 9   Smokeless Tobacco Never Used     Family History: non-contributory    Review of previous medical records was  completed       Meds/Allergies   all current active meds have been reviewed    Allergies   Allergen Reactions    Fenofibrate      Patient not aware of allergy    Hydrocodone-Acetaminophen      Patient not aware of allergy       Objective   Vitals:Blood pressure 137/65, pulse 68, temperature 99 °F (37 2 °C), resp  rate 16, weight 85 kg (187 lb 6 3 oz), SpO2 100 %  ,Body mass index is 26 89 kg/m²  Intake/Output Summary (Last 24 hours) at 1/18/2022 1522  Last data filed at 1/18/2022 1420  Gross per 24 hour   Intake --   Output 300 ml   Net -300 ml       Invasive Devices: Invasive Devices  Report    Peripheral Intravenous Line            Peripheral IV 01/18/22 Left;Ventral (anterior) Forearm <1 day    Peripheral IV 01/18/22 Right Antecubital <1 day    Peripheral IV 01/18/22 Right Forearm <1 day          Drain            NG/OG/Enteral Tube Orogastric 16 Fr Center mouth <1 day    Urethral Catheter Temperature probe 16 Fr  <1 day                Physical Exam  Eyes:      Pupils: Pupils are equal, round, and reactive to light  Neurologic Exam     Mental Status   Patient obtunded   Initially had minimal response to noxious stimuli  Later able to localize with right arm   One one occasion able to say ow     Cranial Nerves     CN III, IV, VI   Pupils are equal, round, and reactive to light  Nystagmus: none   Right gaze deviation on arrival, improvement with AED  Some asymmetry of smile  Normal eye lid strength with improvement of exam      Motor Exam Moves RUE with noxious stimuli  Limited movement of the LUE and LLE      Gait, Coordination, and Reflexes     Reflexes   Right plantar: equivocal  Left plantar: equivocalDiminished reflexes all throughout   Mute plantar      Modified [place] Score:  0 (No baseline symptoms/disability)    Lab Results: I have personally reviewed pertinent reports  Imaging Studies: I have personally reviewed pertinent reports  EKG, Pathology, and Other Studies: I have personally reviewed pertinent reports      VTE Prophylaxis: Sequential compression device Andreina Castillo)     Code Status: Prior  Advance Directive and Living Will:      Power of :    POLST:      Counseling / Coordination of Care  Total Critical Care time spent 60 minutes excluding procedures, teaching and family updates

## 2022-01-18 NOTE — TELEPHONE ENCOUNTER
Patient's daughter called to let us know that they had to send the patient to the hospital due to possible seizures   Just an FYI at this time

## 2022-01-18 NOTE — ED PROVIDER NOTES
History  Chief Complaint   Patient presents with   Jade Boone     Pt brought in by EMS with worsening confusion and seizure activity prior to arrival       80year old male with past medical history of seizure disorder on Keppra 1g BID, hypertension, hyperlipidemia, status post AAA repair who presents as a stroke alert for possible facial droop vs  status epilepticus  History obtained from EMS, patient's wife's caregiver via phone and patient's daughter, Marta Saint on arrival to ED  Patient's daughter states that she was talking to patient on the phone yesterday afternoon and he started to say nonsensical things  However, she spoke to him later that evening and he was at his baseline  Last known normal was around 830 pm last night  This morning, the caregiver arrived at the house and found the patient seizing on the couch at around 915 AM  Per caregiver, patient had 4 seizures in her presence until EMS arrived  Unclear how long patient had been seizing prior to arrival as the only other person in the house was his wife and she has dementia and is nonverbal   Patient's daughter denies him having any recent illness, fever, cough, or any additional complaints that she was aware  Per daughter, she believes that he missed his Keppra dose the night of 1/16  Per EMS, they found him with a temperature of 102 with right gaze preference and seizure activity on transport to the ED  He was given 4 mg of ativan en route  On arrival to the ED /80, HR 90, RR 18 and SPO2 96% on room air  Stroke alert called  CT/CTA negative for ischemia or hemorrhage, 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 Taking?   acetaminophen (TYLENOL) 500 mg tablet  Self Yes No   Sig: Take 500 mg by mouth every 6 (six) hours as needed for mild pain   levETIRAcetam (KEPPRA XR) 500 MG 24 hr tablet   No No   Sig: Take 2 tablets (1,000 mg total) by mouth daily   simvastatin (ZOCOR) 40 mg tablet  Self No No   Sig: Take 1 tablet (40 mg total) by mouth daily      Facility-Administered Medications: None       Past Medical History:   Diagnosis Date    Colon polyps     Gout     Hyperlipidemia     Hypertension     S/P AAA repair     Seizure disorder ([facility], [address] )     Tear of right rotator cuff 2017       Past Surgical History:   Procedure Laterality Date    ABDOMINAL AORTIC ANEURYSM REPAIR, ENDOVASCULAR N/A     PA COLONOSCOPY FLX DX W/COLLJ SPEC WHEN PFRMD N/A 2017    Procedure: COLONOSCOPY;  Surgeon: Horacio Barry MD;  Location: [facility]; Service: Colorectal    PA COLONOSCOPY FLX DX W/COLLJ SPEC WHEN PFRMD N/A 2018    Procedure: COLONOSCOPY;  Surgeon: Horacio Barry MD;  Location: [facility]; Service: Colorectal    TONSILLECTOMY      WISDOM TOOTH EXTRACTION Bilateral        Family History   Problem Relation Age of Onset    Aneurysm Mother         ABDMONIAL  AORTA    Coronary artery disease Father     Coronary artery disease Brother      I have reviewed and agree with the history as documented      E-Cigarette/Vaping    E-Cigarette Use Never User      E-Cigarette/Vaping Substances    Nicotine No     THC No     CBD No      Social History     Tobacco Use    Smoking status: Former Smoker     Packs/day: 3 00     Years: 10 00     Pack years: 30 00     Quit date: 1971     Years since quittin 9    Smokeless tobacco: Never Used   Vaping Use    Vaping Use: Never used   Substance Use Topics    Alcohol use: Yes     Comment: occasional    Drug use: No        Review of Systems   Unable to perform ROS: Patient unresponsive       Physical Exam  ED Triage Vitals   Temperature Pulse Respirations Blood Pressure SpO2   01/18/22 1012 01/18/22 1012 01/18/22 1012 01/18/22 1012 01/18/22 1045   99 3 °F (37 4 °C) 90 18 122/80 96 %      Temp Source Heart Rate Source Patient Position - Orthostatic VS BP Location FiO2 (%)   01/18/22 1012 01/18/22 1145 01/18/22 1230 -- --   Rectal Monitor Lying        Pain Score       --                    Orthostatic Vital Signs  Vitals:    01/18/22 1600 01/18/22 1700 01/18/22 1800 01/18/22 1814   BP: 107/57 126/65 97/55 93/55   Pulse: 70 82 70    Patient Position - Orthostatic VS:           Physical Exam  Vitals and nursing note reviewed  Constitutional:       General: He is in acute distress (patient only localizes to pain, nonverbal, GCS 8)  Appearance: He is well-developed  He is ill-appearing  HENT:      Head: Normocephalic and atraumatic  Mouth/Throat:      Mouth: Mucous membranes are moist    Eyes:      Extraocular Movements:      Right eye: Nystagmus present  Left eye: Nystagmus present  Conjunctiva/sclera: Conjunctivae normal       Pupils: Pupils are equal, round, and reactive to light  Comments: Right gaze deviation, pupils sluggishly reactive to light    Cardiovascular:      Rate and Rhythm: Normal rate and regular rhythm  Heart sounds: No murmur heard  Pulmonary:      Effort: Pulmonary effort is normal  No respiratory distress  Breath sounds: Normal breath sounds  Abdominal:      Palpations: Abdomen is soft  Tenderness: There is no abdominal tenderness  Musculoskeletal:      Cervical back: Neck supple  Right lower leg: No edema  Left lower leg: No edema  Skin:     General: Skin is warm and dry  Neurological:      Mental Status: He is unresponsive        GCS: GCS eye subscore is 2  GCS verbal subscore is 1  GCS motor subscore is 5  Cranial Nerves: No facial asymmetry           ED Medications  Medications   propofol (DIPRIVAN) 1000 mg in 100 mL infusion (premix) (30 mcg/kg/min × 85 kg Intravenous Rate/Dose Change 1/18/22 1814)   vancomycin (VANCOCIN) 1,750 mg in sodium chloride 0 9 % 500 mL IVPB ( Intravenous Restarted 1/18/22 1816)   multi-electrolyte (PLASMALYTE-A/ISOLYTE-S PH 7 4) IV solution (has no administration in time range)   ampicillin (OMNIPEN) 2,000 mg in sodium chloride 0 9 % 100 mL IVPB (has no administration in time range)   sodium chloride 0 9 % bolus 1,000 mL (1,000 mL Intravenous New Bag 1/18/22 1817)   LORazepam (FOR EMS ONLY) (ATIVAN) 2 mg/mL injection 4 mg (0 mg Does not apply Given to EMS 1/18/22 1105)   iohexol (OMNIPAQUE) 350 MG/ML injection (SINGLE-DOSE) 100 mL (85 mL Intravenous Given 1/18/22 1027)   levETIRAcetam (KEPPRA) 500 mg in sodium chloride 0 9 % 100 mL IVPB (0 mg Intravenous Stopped 1/18/22 1145)   levETIRAcetam (KEPPRA) 1,000 mg in sodium chloride 0 9 % 100 mL IVPB (0 mg Intravenous Stopped 1/18/22 1237)   sodium chloride 0 9 % bolus 500 mL (0 mL Intravenous Stopped 1/18/22 1237)   valproate (DEPACON) 1,700 mg in sodium chloride 0 9 % 50 mL BOLUS (0 mg/kg × 85 kg Intravenous Stopped 1/18/22 1341)   etomidate (AMIDATE) 2 mg/mL injection 25 6 mg (25 6 mg Intravenous Given 1/18/22 1345)   Succinylcholine Chloride 100 mg/5 mL syringe 128 mg (128 mg Intravenous Given 1/18/22 1345)   ceftriaxone (ROCEPHIN) 1 g/50 mL in dextrose IVPB (0 mg Intravenous Stopped 1/18/22 1506)   acyclovir (ZOVIRAX) 725 mg in sodium chloride 0 9 % 250 mL IVPB (0 mg/kg × 73 kg (Ideal) Intravenous Stopped 1/18/22 1654)   ceftriaxone (ROCEPHIN) 1 g/50 mL in dextrose IVPB (0 mg Intravenous Stopped 1/18/22 1612)       Diagnostic Studies  Results Reviewed     Procedure Component Value Units Date/Time    HS Troponin I 4hr [[id number]]  (Abnormal) Collected: 01/18/22 0296 Lab Status: Final result Specimen: Blood from Arm, Right Updated: 01/18/22 1755     hs TnI 4hr 791 ng/L      Delta 4hr hsTnI -368 ng/L     CSF, Glucose (Tube 2) [[id number]]  (Abnormal) Collected: 01/18/22 1721    Lab Status: Final result Specimen: Cerebrospinal Fluid from Lumbar Puncture Updated: 01/18/22 1752     Glucose, CSF 81 mg/dL     CSF, Total Protein (Tube 2) [[id number]]  (Abnormal) Collected: 01/18/22 1721    Lab Status: Final result Specimen: Cerebrospinal Fluid from Lumbar Puncture Updated: 01/18/22 1752     Protein, CSF 73 mg/dL     CSF, White cell count with differential (Tube 4) [[id number]] Collected: 01/18/22 1721    Lab Status: In process Specimen: Cerebrospinal Fluid from Lumbar Puncture Updated: 01/18/22 1728    Meningitis/Encephalitis (ME) Panel [[id number]] Collected: 01/18/22 1721    Lab Status: In process Specimen: Cerebrospinal Fluid from Lumbar Puncture Updated: 01/18/22 1728    CSF, Culture and Gram stain (Tube 3) [[id number]] Collected: 01/18/22 1721    Lab Status: In process Specimen: Cerebrospinal Fluid from Lumbar Puncture Updated: 01/18/22 1728    CSF, RBC count (Tube 4) [[id number]] Collected: 01/18/22 1721    Lab Status: In process Specimen: Cerebrospinal Fluid from Lumbar Puncture Updated: 01/18/22 1728    CSF, Gram Stain (Tube 3) [[id number]] Collected: 01/18/22 1721    Lab Status: In process Specimen: Cerebrospinal Fluid from Lumbar Puncture Updated: 01/18/22 1728    CSF, RBC count (Tube 1) [[id number]] Collected: 01/18/22 1721    Lab Status: In process Specimen: Cerebrospinal Fluid from Lumbar Puncture Updated: 01/18/22 1727    Blood culture #1 [[id number]] Collected: 01/18/22 1048    Lab Status: Preliminary result Specimen: Blood from Arm, Right Updated: 01/18/22 1601     Blood Culture Received in Microbiology Lab  Culture in Progress      Blood culture #2 [[id number]] Collected: 01/18/22 1126    Lab Status: Preliminary result Specimen: Blood from Arm, Left Updated: 01/18/22 1601 Blood Culture Received in Microbiology Lab  Culture in Progress  Prolactin [[id number]]  (Normal) Collected: 01/18/22 1326    Lab Status: Final result Specimen: Blood from Arm, Left Updated: 01/18/22 1523     Prolactin 5 7 ng/mL     Lactic acid 2 Hours [[id number]]  (Abnormal) Collected: 01/18/22 1326    Lab Status: Final result Specimen: Blood from Arm, Left Updated: 01/18/22 1414     LACTIC ACID 2 1 mmol/L     Narrative:      Result may be elevated if tourniquet was used during collection  HS Troponin I 2hr [[id number]]  (Abnormal) Collected: 01/18/22 1326    Lab Status: Final result Specimen: Blood from Arm, Left Updated: 01/18/22 1414     hs TnI 2hr 945 ng/L      Delta 2hr hsTnI -214 ng/L     Lactic acid [[id number]]  (Abnormal) Collected: 01/18/22 1048    Lab Status: Final result Specimen: Blood from Arm, Right Updated: 01/18/22 1205     LACTIC ACID 3 1 mmol/L     Narrative:      Result may be elevated if tourniquet was used during collection  HS Troponin 0hr (reflex protocol) [[id number]]  (Abnormal) Collected: 01/18/22 1058    Lab Status: Final result Specimen: Blood from Arm, Right Updated: 01/18/22 1205     hs TnI 0hr 1,159 ng/L     COVID/FLU/RSV - 2 hour TAT [[id number]]  (Normal) Collected: 01/18/22 1058    Lab Status: Final result Specimen: Nares from Nose Updated: 01/18/22 1149     SARS-CoV-2 Negative     INFLUENZA A PCR Negative     INFLUENZA B PCR Negative     RSV PCR Negative    Narrative:      FOR PEDIATRIC PATIENTS - copy/paste COVID Guidelines URL to browser: [email or web address]    SARS-CoV-2 assay is a Nucleic Acid Amplification assay intended for the  qualitative detection of nucleic acid from SARS-CoV-2 in nasopharyngeal  swabs  Results are for the presumptive identification of SARS-CoV-2 RNA      Positive results are indicative of infection with SARS-CoV-2, the virus  causing COVID-19, but do not rule out bacterial infection or co-infection  with other viruses  Laboratories within the [place] and its  territories are required to report all positive results to the appropriate  public health authorities  Negative results do not preclude SARS-CoV-2  infection and should not be used as the sole basis for treatment or other  patient management decisions  Negative results must be combined with  clinical observations, patient history, and epidemiological information  This test has not been FDA cleared or approved  This test has been authorized by FDA under an Emergency Use Authorization  (EUA)  This test is only authorized for the duration of time the  declaration that circumstances exist justifying the authorization of the  emergency use of an in vitro diagnostic tests for detection of SARS-CoV-2  virus and/or diagnosis of COVID-19 infection under section 564(b)(1) of  the Act, 21 U  S C  503FDR-5(Y)(3), unless the authorization is terminated  or revoked sooner  The test has been validated but independent review by FDA  and CLIA is pending  Test performed using [employer] Gene[employer]pert: This RT-PCR assay targets N2,  a region unique to SARS-CoV-2  A conserved region in the E-gene was chosen  for pan-Sarbecovirus detection which includes SARS-CoV-2      Comprehensive metabolic panel [[id number]]  (Abnormal) Collected: 01/18/22 1058    Lab Status: Final result Specimen: Blood from Arm, Right Updated: 01/18/22 1145     Sodium 140 mmol/L      Potassium 3 8 mmol/L      Chloride 104 mmol/L      CO2 27 mmol/L      ANION GAP 9 mmol/L      BUN 20 mg/dL      Creatinine 1 53 mg/dL      Glucose 135 mg/dL      Calcium 8 5 mg/dL      AST 36 U/L      ALT 28 U/L      Alkaline Phosphatase 80 U/L      Total Protein 6 8 g/dL      Albumin 3 7 g/dL      Total Bilirubin 1 10 mg/dL      eGFR 42 ml/min/1 73sq m     Narrative:      [place] guidelines for Chronic Kidney Disease (CKD):     Stage 1 with normal or high GFR (GFR > 90 mL/min/1 73 square meters)    Stage 2 Mild CKD (GFR = 60-89 mL/min/1 73 square meters)    Stage 3A Moderate CKD (GFR = 45-59 mL/min/1 73 square meters)    Stage 3B Moderate CKD (GFR = 30-44 mL/min/1 73 square meters)    Stage 4 Severe CKD (GFR = 15-29 mL/min/1 73 square meters)    Stage 5 End Stage CKD (GFR <15 mL/min/1 73 square meters)  Note: GFR calculation is accurate only with a steady state creatinine    Protime-INR [[id number]]  (Normal) Collected: 01/18/22 1058    Lab Status: Final result Specimen: Blood from Arm, Right Updated: 01/18/22 1142     Protime 14 2 seconds      INR 1 10    APTT [[id number]]  (Normal) Collected: 01/18/22 1058    Lab Status: Final result Specimen: Blood from Arm, Right Updated: 01/18/22 1142     PTT 28 seconds     Ammonia [[id number]]  (Normal) Collected: 01/18/22 1058    Lab Status: Final result Specimen: Blood from Arm, Right Updated: 01/18/22 1141     Ammonia 21 umol/L     Fingerstick Glucose (POCT) [[id number]]  (Normal) Collected: 01/18/22 1110    Lab Status: Final result Updated: 01/18/22 1139     POC Glucose 133 mg/dl     Basic metabolic panel [[id number]]  (Abnormal) Collected: 01/18/22 1058    Lab Status: Final result Specimen: Blood from Arm, Right Updated: 01/18/22 1138     Sodium 140 mmol/L      Potassium 3 8 mmol/L      Chloride 104 mmol/L      CO2 25 mmol/L      ANION GAP 11 mmol/L      BUN 20 mg/dL      Creatinine 1 57 mg/dL      Glucose 135 mg/dL      Calcium 8 5 mg/dL      eGFR 41 ml/min/1 73sq m     Narrative:      [place] guidelines for Chronic Kidney Disease (CKD):     Stage 1 with normal or high GFR (GFR > 90 mL/min/1 73 square meters)    Stage 2 Mild CKD (GFR = 60-89 mL/min/1 73 square meters)    Stage 3A Moderate CKD (GFR = 45-59 mL/min/1 73 square meters)    Stage 3B Moderate CKD (GFR = 30-44 mL/min/1 73 square meters)    Stage 4 Severe CKD (GFR = 15-29 mL/min/1 73 square meters)    Stage 5 End Stage CKD (GFR <15 mL/min/1 73 square meters)  Note: GFR calculation is accurate only with a steady state creatinine    CBC and Platelet [[id number]]  (Abnormal) Collected: 01/18/22 1058    Lab Status: Final result Specimen: Blood from Arm, Right Updated: 01/18/22 1128     WBC 15 62 Thousand/uL      RBC 4 20 Million/uL      Hemoglobin 13 3 g/dL      Hematocrit 40 5 %      MCV 96 fL      MCH 31 7 pg      MCHC 32 8 g/dL      RDW 12 5 %      Platelets 408 Thousands/uL      MPV 11 2 fL     Blood gas, venous [[id number]]  (Abnormal) Collected: 01/18/22 1058    Lab Status: Final result Specimen: Blood from Arm, Right Updated: 01/18/22 1111     pH, Boris 7 395     pCO2, Boris 41 3 mm Hg      pO2, Boris 57 5 mm Hg      HCO3, Boris 24 7 mmol/L      Base Excess, Boris -0 2 mmol/L      O2 Content, Boris 17 4 ml/dL      O2 HGB, VENOUS 88 6 %     Levetiracetam level [[id number]] Collected: 01/18/22 1058    Lab Status: In process Specimen: Blood from Arm, Right Updated: 01/18/22 1108                 XR chest 1 view portable   Final Result by Jeanie Alas MD (01/18 1602)      Endotracheal tube appears somewhat high in position as above  Further advancement of approximately 4 cm would be helpful  Left lower lobe consolidation and small to moderate left pleural effusion  This is concerning for pneumonia  Follow-up to radiographic resolution advised  Questionable 1 2 cm nodular density in left mid to lower lung which can be further evaluated with a chest CT study after treatment  I personally discussed the position of the endotracheal tube with EMMA JARA on 1/18/2022 at 4:01 PM                                  Workstation performed: [id number]         XR chest 1 view portable   Final Result by Marina Beverly MD (01/18 8227)      No acute cardiopulmonary disease                    Workstation performed: [id number]         CTA stroke alert (head/neck)   Final Result by Shaina Mike DO (01/18 1048)      Stable CT angiography with no flow restrictive stenosis, occlusion or thrombosis of the cervical or intracranial vasculature  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  Findings were directly discussed with Nic Parsons at 10:35 AM                         Workstation performed: [id number]         CT stroke alert brain   Final Result by Maribeth Roman DO (01/18 1037)      Stable examination with no acute intracranial abnormality identified  Findings were directly discussed with Dr Nic Parsons at 10:35 AM       Workstation performed: [id number]               Procedures  Intubation    Date/Time: 1/18/2022 1:45 PM  Performed by: Isa Lee MD  Authorized by: Isa Lee MD     Patient location:  ED and bedside  Other Assisting Provider: Yes (comment) (Dr Jesica Benltey)    Consent:     Consent obtained:  Emergent situation and verbal    Consent given by: Daughter (POA)    Risks discussed:  Aspiration, brain injury, dental trauma, laryngeal injury, pneumothorax, hypoxia, death and bleeding    Alternatives discussed:  No treatment  Universal protocol:     Procedure explained and questions answered to patient or proxy's satisfaction: yes      Patient identity confirmed:  Arm band  Pre-procedure details:     Patient status:  Unresponsive    Mallampati score:  2    Pretreatment medications:  Etomidate    Paralytics:  Succinylcholine  Indications:     Indications for intubation: airway protection    Procedure details:     Preoxygenation:  Bag valve mask    CPR in progress: no      Intubation method:  Oral    Oral intubation technique:  Glidescope    Laryngoscope blade: Mac 3    Tube size (mm):  7 5    Tube type:  Cuffed    Number of attempts:  1    Ventilation between attempts: no      Cricoid pressure: no      Tube visualized through cords: yes    Placement assessment:     ETT to lip:  22    ETT to teeth:  21    Tube secured with:   Adhesive tape and ETT varela    Breath sounds:  Equal and absent over the epigastrium    Placement verification: chest rise, condensation, CXR verification, direct visualization, equal breath sounds and ETCO2 detector      CXR findings:  ETT in proper place    Ventilator settings:  400/16/40%/6  Post-procedure details:     Patient tolerance of procedure: Tolerated well, no immediate complications  Lumbar puncture    Date/Time: 1/18/2022 5:00 PM  Performed by: Jennifer Jarrett MD  Authorized by: Jennifer Jarrett MD   Universal Protocol:  Procedure performed by: (Dr Stephani Mullins )  Consent: Verbal consent obtained  Written consent obtained  Risks and benefits: risks, benefits and alternatives were discussed  Consent given by: power of   Time out: Immediately prior to procedure a "time out" was called to verify the correct patient, procedure, equipment, support staff and site/side marked as required  Timeout called at: 1/18/2022 5:00 PM   Patient understanding: patient states understanding of the procedure being performed  Patient consent: the patient's understanding of the procedure matches consent given  Procedure consent: procedure consent matches procedure scheduled  Site marked: the operative site was marked  Patient identity confirmed: arm band      Patient location:  ED  Pre-procedure details:     Preparation: Patient was prepped and draped in usual sterile fashion    Indications:     Indications: evaluation for infection, evaluation for altered mental status and diagnostic intervention    Anesthesia (see MAR for exact dosages):      Anesthesia method:  Local infiltration    Local anesthetic:  Lidocaine 1% w/o epi  Procedure details:     Lumbar space:  L4-L5 interspace    Patient position:  L lateral decubitus    Equipment: Lumbar puncture kit used      Needle gauge:  22G x 3 5in    Needle type:  Spinal needle - Quincke tip    Ultrasound guidance: no      Number of attempts:  1    Fluid appearance:  Clear    Tubes of fluid:  4    Total volume (ml):  4  Post-procedure: Puncture site:  Adhesive bandage applied and direct pressure applied    Patient tolerance of procedure: Tolerated well, no immediate complications    Patient instructed to lie flat for one(1) hour post lumbar puncture : Yes    ECG 12 Lead Documentation Only    Date/Time: 1/18/2022 10:47 AM  Performed by: Miguel Garner MD  Authorized by: Miguel Garner MD     Indications / Diagnosis:  Ams  ECG reviewed by me, the ED Provider: yes    Patient location:  ED  Previous ECG:     Previous ECG:  Compared to current    Similarity:  Changes noted    Comparison to cardiac monitor: Yes    Interpretation:     Interpretation: abnormal    Rate:     ECG rate:  93    ECG rate assessment: normal    Rhythm:     Rhythm: sinus rhythm    Ectopy:     Ectopy: none    QRS:     QRS axis:  Normal    QRS intervals:  Normal  Conduction:     Conduction: normal    ST segments:     ST segments:  Abnormal    Elevation:  II and III  T waves:     T waves: non-specific    Comments:      Normal sinus rhythm  Normal axis  Normal intervals  Mild ST elevations in V2, V3 with no reciprocal changes   ECG 12 Lead Documentation Only    Date/Time: 1/18/2022 12:18 PM  Performed by: Miguel Garner MD  Authorized by: Miguel Garner MD     Indications / Diagnosis:  Ams  ECG reviewed by me, the ED Provider: yes    Patient location:  ED  Previous ECG:     Previous ECG:  Compared to current    Similarity:  No change    Comparison to cardiac monitor: Yes    Interpretation:     Interpretation: abnormal    Rate:     ECG rate:  68    ECG rate assessment: normal    Rhythm:     Rhythm: sinus rhythm    Ectopy:     Ectopy: none    QRS:     QRS axis:  Normal    QRS intervals:  Normal  Conduction:     Conduction: normal    ST segments:     ST segments:  Abnormal    Elevation:  II and III  T waves:     T waves: normal    Comments:      Normal sinus rhythm  Normal axis  Normal intervals    Mild ST elevations in V2, V3 improved from 2 hour prior, with no reciprocal changes           ED Course  ED Course as of 01/18/22 1819 Tue Jan 18, 2022   2645 Spoke to patients daughter, Pattie Avila, who consented to LP procedure, indications and risks were explained, and she expressed understanding                                        MDM  Number of Diagnoses or Management Options  Status epilepticus ([address] )  Stroke (cerebrum) [facility])  Diagnosis management comments: 80year old male with past medical history of seizure disorder on Keppra 1g BID, hypertension, hyperlipidemia, status post AAA repair who presents as a stroke alert vs  status epilepticus  Last known normal was around 830 pm last night  This morning, the caregiver arrived at the house and found the patient seizing on the couch at around 915 AM  Per caregiver, patient had 4 seizures in her presence until EMS arrived  Per daughter, she believes that he missed his Keppra dose the night of 1/16  Per EMS, they found him with a temperature of 102 with right gaze preference and seizure activity on transport to the ED  He was given 4 mg of ativan en route  Stroke alert called on arrival to ED  CT/CTA negative for ischemia or hemorrhage  Additional 2 mg of ativan given for suspected status epilepticus  Patient received 6 mg Ativan total and 1500 mg loading dose of Keppra in ED  Valproic acid was also initiated per neuro recommendations  Patient has right gaze deviation on arrival that converted to midline pupils while receiving Keppra  Patient localizes pain on the right to sternal rub but does not follow commands and does not move his left side  At this time, considering Derick's paralysis vs  acute CVA  GCS remains 8 throughout encounter but at that time, patient was maintaining and protecting his airway  Neuro recommended transfer to [facility] for continuous EEG monitoring and further treatment in the neuro ICU  Spoke to Dr Kaiden Ge who accepts the patient transfer   Since patient will be transferred, the decision to intubate for airway protection was made  Consent obtained from patient's daughter, who is power of clark, at bedside  Patient was intubated with 7 5 mm ETT tube and tolerated well  Propofol was started for sedation  Post-intubation x-ray showed the ETT placement was somewhat high so further advancement of ETT to additional 4 cm was performed  During workup, patient was also found to have elevated troponin and questionable mild ST elevations on EKG without any reciprocal changes  Findings were discussed with cardiology, who recommended no further interventions are required at this point  Troponin trending downwards and given clinical picture, elevations likely secondary to demand from prolonged seizures  Given reported fever per EMS, neuro ICU at [facility] recommended lumbar puncture to rule out meningitis encephalopathy  Patient was prophylacticly started on antibiotics, including ceftriaxone, vancomycin, ampicillin, and acyclovir given possible suspicion of meningitis  Verbal consent for LP obtained from patient's daughter via phone call  Lumbar puncture performed, patient tolerated procedure without any immediate complications  Patient in stable condition, awaiting transfer to [facility] for further care in the neuro ICU          Amount and/or Complexity of Data Reviewed  Clinical lab tests: ordered and reviewed  Tests in the radiology section of CPT®: ordered and reviewed  Discussion of test results with the performing providers: yes  Decide to obtain previous medical records or to obtain history from someone other than the patient: yes  Obtain history from someone other than the patient: yes  Review and summarize past medical records: yes  Discuss the patient with other providers: yes  Independent visualization of images, tracings, or specimens: yes    Risk of Complications, Morbidity, and/or Mortality  Presenting problems: high  Diagnostic procedures: high  Management options: high    Patient Progress  Patient progress: stable      Disposition  Final diagnoses:   Stroke (cerebrum) ([facility] )   Status epilepticus ([facility] )     Time reflects when diagnosis was documented in both MDM as applicable and the Disposition within this note     Time User Action Codes Description Comment    1/18/2022 10:19 AM Eden Brito Add [I63 9] Stroke (cerebrum) ([facility] )     1/18/2022  5:57 PM Sherif Revelo Add [G40 901] Status epilepticus [facility])       ED Disposition     ED Disposition Condition Date/Time Comment    Transfer to Another Facility-In Network  Tue Jan 18, 2022  2:11 PM Chad Levine should be transferred out to [facility]          MD Documentation      Most Recent Value   Patient Condition The patient has been stabilized such that within reasonable medical probability, no material deterioration of the patient condition or the condition of the unborn child(mildred) is likely to result from the transfer   Reason for Transfer Level of Care needed not available at this facility   Benefits of Transfer Specialized equipment and/or services available at the receiving facility (Include comment)________________________   Risks of Transfer Potential for delay in receiving treatment, Potential deterioration of medical condition, Loss of IV, Increased discomfort during transfer, Possible worsening of condition or death during transfer   Accepting Physician Dr Alfredo Solis Name, [address] [facility] Neuro ICU    (Name & Tel number) Elizabeth Tinsley   Provider Certification General risk, such as traffic hazards, adverse weather conditions, rough terrain or turbulence, possible failure of equipment (including vehicle or aircraft), or consequences of actions of persons outside the control of the transport personnel, Unanticipated needs of medical equipment and personnel during transport, Risk of worsening condition, The possibility of a transport vehicle being unavailable, Consent was not obtained as patient is committed to psychiatric facility and transfer is mandated, The patient is stable for psychiatric transfer because they are medically stable, and is protected from harming him/herself or others during transport      RN Documentation      Most [address] Name, [address] Neuro ICU    (Name & Tel number) Brissa      Follow-up Information    None         Patient's Medications   Discharge Prescriptions    No medications on file     No discharge procedures on file  PDMP Review     None           ED Provider  Attending physically available and evaluated Guero Ohms  I managed the patient along with the ED Attending      Electronically Signed by         Promise Galan MD  01/18/22 2480

## 2022-01-18 NOTE — ED NOTES
Please call wilman Chow at  with any updates, and notify when transport is arranged       Cristóbal Riley RN  01/18/22 7749

## 2022-01-19 ENCOUNTER — APPOINTMENT (INPATIENT)
Dept: RADIOLOGY | Facility: HOSPITAL | Age: 82
DRG: 100 | End: 2022-01-19
Payer: MEDICARE

## 2022-01-19 ENCOUNTER — APPOINTMENT (INPATIENT)
Dept: NON INVASIVE DIAGNOSTICS | Facility: HOSPITAL | Age: 82
DRG: 100 | End: 2022-01-19
Payer: MEDICARE

## 2022-01-19 ENCOUNTER — TELEPHONE (OUTPATIENT)
Dept: RADIOLOGY | Facility: HOSPITAL | Age: 82
End: 2022-01-19

## 2022-01-19 ENCOUNTER — APPOINTMENT (INPATIENT)
Dept: NEUROLOGY | Facility: CLINIC | Age: 82
DRG: 100 | End: 2022-01-19
Payer: MEDICARE

## 2022-01-19 LAB
4HR DELTA HS TROPONIN: -43 NG/L
ALBUMIN SERPL BCP-MCNC: 3.1 G/DL (ref 3.5–5)
ALP SERPL-CCNC: 72 U/L (ref 46–116)
ALT SERPL W P-5'-P-CCNC: 25 U/L (ref 12–78)
ANION GAP SERPL CALCULATED.3IONS-SCNC: 6 MMOL/L (ref 4–13)
AORTIC ROOT: 4.1 CM
AORTIC VALVE MEAN VELOCITY: 24.6 M/S
APICAL FOUR CHAMBER EJECTION FRACTION: 58 %
ASCENDING AORTA: 3.9 CM
AST SERPL W P-5'-P-CCNC: 54 U/L (ref 5–45)
ATRIAL RATE: 85 BPM
AV AREA BY CONTINUOUS VTI: 1.5 CM2
AV AREA PEAK VELOCITY: 1.4 CM2
AV LVOT MEAN GRADIENT: 3 MMHG
AV LVOT PEAK GRADIENT: 5 MMHG
AV MEAN GRADIENT: 27 MMHG
AV PEAK GRADIENT: 49 MMHG
AV VALVE AREA: 1.32 CM2
AV VELOCITY RATIO: 0.31
BASOPHILS # BLD AUTO: 0.03 THOUSANDS/ΜL (ref 0–0.1)
BASOPHILS NFR BLD AUTO: 0 % (ref 0–1)
BILIRUB SERPL-MCNC: 1.4 MG/DL (ref 0.2–1)
BUN SERPL-MCNC: 18 MG/DL (ref 5–25)
CA-I BLD-SCNC: 1.05 MMOL/L (ref 1.12–1.32)
CALCIUM ALBUM COR SERPL-MCNC: 8.5 MG/DL (ref 8.3–10.1)
CALCIUM SERPL-MCNC: 7.8 MG/DL (ref 8.3–10.1)
CARDIAC TROPONIN I PNL SERPL HS: 563 NG/L
CARDIAC TROPONIN I PNL SERPL HS: 606 NG/L
CHLORIDE SERPL-SCNC: 111 MMOL/L (ref 100–108)
CO2 SERPL-SCNC: 26 MMOL/L (ref 21–32)
CREAT SERPL-MCNC: 0.93 MG/DL (ref 0.6–1.3)
DOP CALC AO PEAK VEL: 3.51 M/S
DOP CALC AO VTI: 74.89 CM
DOP CALC LVOT AREA: 3.8 CM2
DOP CALC LVOT DIAMETER: 2.2 CM
DOP CALC LVOT PEAK VEL VTI: 26 CM
DOP CALC LVOT PEAK VEL: 1.08 M/S
DOP CALC LVOT STROKE INDEX: 54.2 ML/M2
DOP CALC LVOT STROKE VOLUME: 98.78 CM3
E WAVE DECELERATION TIME: 263 MS
EOSINOPHIL # BLD AUTO: 0 THOUSAND/ΜL (ref 0–0.61)
EOSINOPHIL NFR BLD AUTO: 0 % (ref 0–6)
ERYTHROCYTE [DISTWIDTH] IN BLOOD BY AUTOMATED COUNT: 12.4 % (ref 11.6–15.1)
FRACTIONAL SHORTENING: 29 % (ref 28–44)
GFR SERPL CREATININE-BSD FRML MDRD: 77 ML/MIN/1.73SQ M
GLUCOSE SERPL-MCNC: 105 MG/DL (ref 65–140)
HCT VFR BLD AUTO: 39.5 % (ref 36.5–49.3)
HGB BLD-MCNC: 12.9 G/DL (ref 12–17)
IMM GRANULOCYTES # BLD AUTO: 0.05 THOUSAND/UL (ref 0–0.2)
IMM GRANULOCYTES NFR BLD AUTO: 0 % (ref 0–2)
INTERVENTRICULAR SEPTUM IN DIASTOLE (PARASTERNAL SHORT AXIS VIEW): 1.4 CM
LEFT ATRIUM AREA SYSTOLE SINGLE PLANE A4C: 19.9 CM2
LEFT ATRIUM SIZE: 4.7 CM
LEFT INTERNAL DIMENSION IN SYSTOLE: 4 CM (ref 2.1–4)
LEFT VENTRICULAR INTERNAL DIMENSION IN DIASTOLE: 5.6 CM (ref 5.1–7.59)
LEFT VENTRICULAR POSTERIOR WALL IN END DIASTOLE: 0.8 CM
LEFT VENTRICULAR STROKE VOLUME: 82 ML
LYMPHOCYTES # BLD AUTO: 0.8 THOUSANDS/ΜL (ref 0.6–4.47)
LYMPHOCYTES NFR BLD AUTO: 6 % (ref 14–44)
MAGNESIUM SERPL-MCNC: 2.4 MG/DL (ref 1.6–2.6)
MCH RBC QN AUTO: 31.2 PG (ref 26.8–34.3)
MCHC RBC AUTO-ENTMCNC: 32.7 G/DL (ref 31.4–37.4)
MCV RBC AUTO: 96 FL (ref 82–98)
MONOCYTES # BLD AUTO: 0.97 THOUSAND/ΜL (ref 0.17–1.22)
MONOCYTES NFR BLD AUTO: 7 % (ref 4–12)
MV E'TISSUE VEL-SEP: 8 CM/S
MV PEAK A VEL: 0.96 M/S
MV PEAK E VEL: 121 CM/S
MV STENOSIS PRESSURE HALF TIME: 0 MS
NEUTROPHILS # BLD AUTO: 12.46 THOUSANDS/ΜL (ref 1.85–7.62)
NEUTS SEG NFR BLD AUTO: 87 % (ref 43–75)
NRBC BLD AUTO-RTO: 0 /100 WBCS
P AXIS: 42 DEGREES
PHOSPHATE SERPL-MCNC: 1.5 MG/DL (ref 2.3–4.1)
PLATELET # BLD AUTO: 132 THOUSANDS/UL (ref 149–390)
PMV BLD AUTO: 11.2 FL (ref 8.9–12.7)
POTASSIUM SERPL-SCNC: 3.5 MMOL/L (ref 3.5–5.3)
PR INTERVAL: 125 MS
PROCALCITONIN SERPL-MCNC: 0.79 NG/ML
PROT SERPL-MCNC: 6.2 G/DL (ref 6.4–8.2)
QRS AXIS: 81 DEGREES
QRSD INTERVAL: 88 MS
QT INTERVAL: 396 MS
QTC INTERVAL: 471 MS
RA PRESSURE ESTIMATED: 8 MMHG
RBC # BLD AUTO: 4.13 MILLION/UL (ref 3.88–5.62)
RIGHT ATRIUM AREA SYSTOLE A4C: 14.8 CM2
RIGHT VENTRICLE ID DIMENSION: 3.4 CM
RV PSP: 53 MMHG
SL CV LV EF: 60
SL CV PED ECHO LEFT VENTRICLE DIASTOLIC VOLUME (MOD BIPLANE) 2D: 153 ML
SL CV PED ECHO LEFT VENTRICLE SYSTOLIC VOLUME (MOD BIPLANE) 2D: 72 ML
SODIUM SERPL-SCNC: 143 MMOL/L (ref 136–145)
T WAVE AXIS: 83 DEGREES
TR MAX PG: 45 MMHG
TRICUSPID VALVE PEAK REGURGITATION VELOCITY: 3.34 M/S
VALPROATE SERPL-MCNC: 52 UG/ML (ref 50–100)
VENTRICULAR RATE: 85 BPM
WBC # BLD AUTO: 14.31 THOUSAND/UL (ref 4.31–10.16)
Z-SCORE OF LEFT VENTRICULAR DIMENSION IN END SYSTOLE: -1.05

## 2022-01-19 PROCEDURE — 36556 INSERT NON-TUNNEL CV CATH: CPT | Performed by: INTERNAL MEDICINE

## 2022-01-19 PROCEDURE — 05HM33Z INSERTION OF INFUSION DEVICE INTO RIGHT INTERNAL JUGULAR VEIN, PERCUTANEOUS APPROACH: ICD-10-PCS | Performed by: INTERNAL MEDICINE

## 2022-01-19 PROCEDURE — 99232 SBSQ HOSP IP/OBS MODERATE 35: CPT | Performed by: PSYCHIATRY & NEUROLOGY

## 2022-01-19 PROCEDURE — 93306 TTE W/DOPPLER COMPLETE: CPT

## 2022-01-19 PROCEDURE — 93010 ELECTROCARDIOGRAM REPORT: CPT | Performed by: INTERNAL MEDICINE

## 2022-01-19 PROCEDURE — 84484 ASSAY OF TROPONIN QUANT: CPT | Performed by: PHYSICIAN ASSISTANT

## 2022-01-19 PROCEDURE — 71045 X-RAY EXAM CHEST 1 VIEW: CPT

## 2022-01-19 PROCEDURE — 84145 PROCALCITONIN (PCT): CPT | Performed by: PHYSICIAN ASSISTANT

## 2022-01-19 PROCEDURE — 83735 ASSAY OF MAGNESIUM: CPT | Performed by: PHYSICIAN ASSISTANT

## 2022-01-19 PROCEDURE — 94003 VENT MGMT INPAT SUBQ DAY: CPT

## 2022-01-19 PROCEDURE — NC001 PR NO CHARGE: Performed by: PSYCHIATRY & NEUROLOGY

## 2022-01-19 PROCEDURE — 95700 EEG CONT REC W/VID EEG TECH: CPT

## 2022-01-19 PROCEDURE — NC001 PR NO CHARGE: Performed by: INTERNAL MEDICINE

## 2022-01-19 PROCEDURE — 85025 COMPLETE CBC W/AUTO DIFF WBC: CPT | Performed by: PHYSICIAN ASSISTANT

## 2022-01-19 PROCEDURE — 95715 VEEG EA 12-26HR INTMT MNTR: CPT

## 2022-01-19 PROCEDURE — 80053 COMPREHEN METABOLIC PANEL: CPT | Performed by: PHYSICIAN ASSISTANT

## 2022-01-19 PROCEDURE — 82330 ASSAY OF CALCIUM: CPT | Performed by: PHYSICIAN ASSISTANT

## 2022-01-19 PROCEDURE — 94760 N-INVAS EAR/PLS OXIMETRY 1: CPT

## 2022-01-19 PROCEDURE — 84100 ASSAY OF PHOSPHORUS: CPT | Performed by: PHYSICIAN ASSISTANT

## 2022-01-19 PROCEDURE — 99291 CRITICAL CARE FIRST HOUR: CPT | Performed by: PHYSICIAN ASSISTANT

## 2022-01-19 PROCEDURE — 80164 ASSAY DIPROPYLACETIC ACD TOT: CPT | Performed by: PHYSICIAN ASSISTANT

## 2022-01-19 PROCEDURE — 87040 BLOOD CULTURE FOR BACTERIA: CPT | Performed by: PHYSICIAN ASSISTANT

## 2022-01-19 PROCEDURE — 87205 SMEAR GRAM STAIN: CPT

## 2022-01-19 PROCEDURE — 93306 TTE W/DOPPLER COMPLETE: CPT | Performed by: INTERNAL MEDICINE

## 2022-01-19 PROCEDURE — 87070 CULTURE OTHR SPECIMN AEROBIC: CPT

## 2022-01-19 RX ORDER — DEXMEDETOMIDINE 100 UG/ML
.1-.7 INJECTION, SOLUTION, CONCENTRATE INTRAVENOUS
Status: DISCONTINUED | OUTPATIENT
Start: 2022-01-19 | End: 2022-01-19

## 2022-01-19 RX ORDER — DIPHENHYDRAMINE HYDROCHLORIDE 50 MG/ML
25 INJECTION INTRAMUSCULAR; INTRAVENOUS ONCE
Status: DISCONTINUED | OUTPATIENT
Start: 2022-01-19 | End: 2022-01-19

## 2022-01-19 RX ORDER — LORAZEPAM 2 MG/ML
2 INJECTION INTRAMUSCULAR ONCE
Status: COMPLETED | OUTPATIENT
Start: 2022-01-19 | End: 2022-01-19

## 2022-01-19 RX ORDER — PRAVASTATIN SODIUM 80 MG/1
80 TABLET ORAL
Status: DISCONTINUED | OUTPATIENT
Start: 2022-01-19 | End: 2022-02-01 | Stop reason: HOSPADM

## 2022-01-19 RX ORDER — LANOLIN ALCOHOL/MO/W.PET/CERES
100 CREAM (GRAM) TOPICAL DAILY
Status: DISCONTINUED | OUTPATIENT
Start: 2022-01-20 | End: 2022-02-01 | Stop reason: HOSPADM

## 2022-01-19 RX ORDER — FOLIC ACID 1 MG/1
1 TABLET ORAL DAILY
Status: DISCONTINUED | OUTPATIENT
Start: 2022-01-20 | End: 2022-02-01 | Stop reason: HOSPADM

## 2022-01-19 RX ORDER — SODIUM CHLORIDE 9 MG/ML
100 INJECTION, SOLUTION INTRAVENOUS CONTINUOUS
Status: DISCONTINUED | OUTPATIENT
Start: 2022-01-19 | End: 2022-01-19

## 2022-01-19 RX ORDER — NOREPINEPHRINE BITARTRATE 1 MG/ML
INJECTION, SOLUTION INTRAVENOUS
Status: COMPLETED
Start: 2022-01-19 | End: 2022-01-19

## 2022-01-19 RX ORDER — CALCIUM GLUCONATE 20 MG/ML
2 INJECTION, SOLUTION INTRAVENOUS ONCE
Status: COMPLETED | OUTPATIENT
Start: 2022-01-19 | End: 2022-01-19

## 2022-01-19 RX ORDER — SODIUM CHLORIDE, SODIUM GLUCONATE, SODIUM ACETATE, POTASSIUM CHLORIDE, MAGNESIUM CHLORIDE, SODIUM PHOSPHATE, DIBASIC, AND POTASSIUM PHOSPHATE .53; .5; .37; .037; .03; .012; .00082 G/100ML; G/100ML; G/100ML; G/100ML; G/100ML; G/100ML; G/100ML
100 INJECTION, SOLUTION INTRAVENOUS CONTINUOUS
Status: DISCONTINUED | OUTPATIENT
Start: 2022-01-19 | End: 2022-01-20

## 2022-01-19 RX ORDER — LORAZEPAM 2 MG/ML
INJECTION INTRAMUSCULAR
Status: COMPLETED
Start: 2022-01-19 | End: 2022-01-19

## 2022-01-19 RX ADMIN — CHLORHEXIDINE GLUCONATE 15 ML: 1.2 SOLUTION ORAL at 02:13

## 2022-01-19 RX ADMIN — LORAZEPAM 2 MG: 2 INJECTION INTRAMUSCULAR at 16:29

## 2022-01-19 RX ADMIN — SODIUM CHLORIDE 500 ML: 9 INJECTION, SOLUTION INTRAVENOUS at 07:40

## 2022-01-19 RX ADMIN — LORAZEPAM 2 MG: 2 INJECTION INTRAMUSCULAR; INTRAVENOUS at 06:34

## 2022-01-19 RX ADMIN — PROPOFOL 50 MCG/KG/MIN: 10 INJECTION, EMULSION INTRAVENOUS at 19:26

## 2022-01-19 RX ADMIN — LEVETIRACETAM 500 MG: 100 INJECTION, SOLUTION INTRAVENOUS at 10:36

## 2022-01-19 RX ADMIN — VALPROATE SODIUM 500 MG: 100 INJECTION, SOLUTION INTRAVENOUS at 22:16

## 2022-01-19 RX ADMIN — POTASSIUM & SODIUM PHOSPHATES POWDER PACK 280-160-250 MG 2 PACKET: 280-160-250 PACK at 09:22

## 2022-01-19 RX ADMIN — NOREPINEPHRINE BITARTRATE 4 MCG/MIN: 1 INJECTION, SOLUTION, CONCENTRATE INTRAVENOUS at 16:38

## 2022-01-19 RX ADMIN — POTASSIUM PHOSPHATE, MONOBASIC AND POTASSIUM PHOSPHATE, DIBASIC 30 MMOL: 224; 236 INJECTION, SOLUTION, CONCENTRATE INTRAVENOUS at 10:46

## 2022-01-19 RX ADMIN — PROPOFOL 25 MCG/KG/MIN: 10 INJECTION, EMULSION INTRAVENOUS at 09:21

## 2022-01-19 RX ADMIN — VALPROATE SODIUM 500 MG: 100 INJECTION, SOLUTION INTRAVENOUS at 09:24

## 2022-01-19 RX ADMIN — NOREPINEPHRINE BITARTRATE 4 MG: 1 INJECTION, SOLUTION, CONCENTRATE INTRAVENOUS at 08:31

## 2022-01-19 RX ADMIN — LEVETIRACETAM 1000 MG: 100 INJECTION, SOLUTION INTRAVENOUS at 02:00

## 2022-01-19 RX ADMIN — AZITHROMYCIN MONOHYDRATE 500 MG: 500 INJECTION, POWDER, LYOPHILIZED, FOR SOLUTION INTRAVENOUS at 04:37

## 2022-01-19 RX ADMIN — SODIUM CHLORIDE, SODIUM GLUCONATE, SODIUM ACETATE, POTASSIUM CHLORIDE, MAGNESIUM CHLORIDE, SODIUM PHOSPHATE, DIBASIC, AND POTASSIUM PHOSPHATE 100 ML/HR: .53; .5; .37; .037; .03; .012; .00082 INJECTION, SOLUTION INTRAVENOUS at 22:16

## 2022-01-19 RX ADMIN — SODIUM CHLORIDE, SODIUM GLUCONATE, SODIUM ACETATE, POTASSIUM CHLORIDE, MAGNESIUM CHLORIDE, SODIUM PHOSPHATE, DIBASIC, AND POTASSIUM PHOSPHATE 100 ML/HR: .53; .5; .37; .037; .03; .012; .00082 INJECTION, SOLUTION INTRAVENOUS at 08:33

## 2022-01-19 RX ADMIN — LEVETIRACETAM 1500 MG: 100 INJECTION, SOLUTION INTRAVENOUS at 14:57

## 2022-01-19 RX ADMIN — CALCIUM GLUCONATE 2 G: 20 INJECTION, SOLUTION INTRAVENOUS at 09:21

## 2022-01-19 RX ADMIN — VALPROATE SODIUM 250 MG: 100 INJECTION, SOLUTION INTRAVENOUS at 02:00

## 2022-01-19 RX ADMIN — CEFTRIAXONE SODIUM 1000 MG: 10 INJECTION, POWDER, FOR SOLUTION INTRAVENOUS at 15:35

## 2022-01-19 RX ADMIN — PROPOFOL 25 MCG/KG/MIN: 10 INJECTION, EMULSION INTRAVENOUS at 14:54

## 2022-01-19 RX ADMIN — LEVETIRACETAM 1000 MG: 100 INJECTION, SOLUTION INTRAVENOUS at 08:29

## 2022-01-19 RX ADMIN — PROPOFOL 25 MCG/KG/MIN: 10 INJECTION, EMULSION INTRAVENOUS at 00:14

## 2022-01-19 RX ADMIN — DEXMEDETOMIDINE HYDROCHLORIDE 0.5 MCG/KG/HR: 100 INJECTION, SOLUTION INTRAVENOUS at 03:00

## 2022-01-19 RX ADMIN — NOREPINEPHRINE BITARTRATE 3 MCG/MIN: 1 INJECTION, SOLUTION, CONCENTRATE INTRAVENOUS at 09:24

## 2022-01-19 RX ADMIN — VALPROATE SODIUM 500 MG: 100 INJECTION, SOLUTION INTRAVENOUS at 13:31

## 2022-01-19 RX ADMIN — VALPROATE SODIUM 250 MG: 100 INJECTION, SOLUTION INTRAVENOUS at 07:42

## 2022-01-19 RX ADMIN — CHLORHEXIDINE GLUCONATE 15 ML: 1.2 SOLUTION ORAL at 20:11

## 2022-01-19 RX ADMIN — SODIUM CHLORIDE 75 ML/HR: 9 INJECTION, SOLUTION INTRAVENOUS at 02:40

## 2022-01-19 RX ADMIN — PRAVASTATIN SODIUM 80 MG: 80 TABLET ORAL at 16:59

## 2022-01-19 RX ADMIN — CHLORHEXIDINE GLUCONATE 15 ML: 1.2 SOLUTION ORAL at 09:22

## 2022-01-19 RX ADMIN — LORAZEPAM 2 MG: 2 INJECTION INTRAMUSCULAR; INTRAVENOUS at 16:29

## 2022-01-19 NOTE — CONSULTS
This is a duplicate order   Please see original consult completed at [facility] by Dr Arden Renee and Dr Gary Zabala from 1/18/22

## 2022-01-19 NOTE — ASSESSMENT & PLAN NOTE
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 XR 1000 mg QHS, CKD, HTN, hyperlipidemia, and s/p AAA repair  Presented to the ED as a stroke alert for right-sided facial droop however had multiple seizures at home, witnessed by daughter, and en route to the ED via EMS  LKW was 1 day prior  Daughter reported patient possibly missing one dose of Keppra and in speaking with patient, he reports he may have missed a dose but is unsure  Received 4 mg of Ativan per EMS followed by an additional 2 mg in the ED due to persistent right gaze deviation concerning for ongoing seizure activity  Was intubated for airway protection  EMS reported a fever of 102F  Received loading dose of 1500 mg Keppra and 20 mg/kg of valproic acid  LP was completed; see CSF results below  CTH and CTA were also unremarkable  Limited MRI brain was negative for ischemia  Patient had additional seizure activity noted on EEG on 1/19  He has now been seizure free for multiple days with last seizure 1/19 in the afternoon  He self-extubated on 1/22/22 and has made gradual improvement each day since then  On exam today, patient able answer orientation questions appropriately and follow commands  Of note, patient's Keppra dose was being decreased (to 1000 mg HS) by his outpatient Epileptologist as patient had been seizure free x 4 years until this admission  Keppra level on presentation therapeutic, no other prior levels for comparison  Lower suspicion of breakthrough seizure due to missed dose as level on presentation was therapeutic  Etiology possibly in the setting of infection/CAP  Plan:  - Video EEG monitoring was discontinued as of 1/22  - Increase Keppra to 1250 mg HS  - Seizure precautions  - Ativan PRN for seizure activity > 2 minutes  - [employer] form completed and faxed on 1/25/22   Patient aware that he should stop driving at this time   - PT/OT  - Monitor neuro exam; notify with any changes  - Medical management and supportive care per primary team  Correction of metabolic or infectious disturbances  Results:   - 1/18 CT head: Stable examination with no acute intracranial abnormality identified  - 1/18 CTA head and neck:  1  Stable CT angiography with no flow restrictive stenosis, occlusion or thrombosis of the cervical or intracranial vasculature  2  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  - 1/22 MRI brain limited: No MR evidence of acute ischemia  - 1/24 MRI brain wo contrast: Motion limited incomplete study  No acute infarct, hemorrhage or mass  - Video EEG monitoring:  · 1/18-1/19: This abnormal study captured 11 subclinical focal seizures from the right frontal region  There were also findings to suggest high seizure tendency and focal non-specific cerebral dysfunction of the right frontal region  The last seizure was seen at 1448  After the escalation of IV sedation the background transitioned to a severe generalized non-specific encephalopathy  · 1/19-1/20: This abnormal study captured seizure tendency from the right frontal region and a severe generalized non-specific encephalopathy  No electrographic seizures were seen  No diagnostic clinical events were captured  Compared to the previous day of recording there were no seizures, BIRDS, and the prevalence of epileptiform discharges are less  · 1/20-1/21: This abnormal study is consistent with a severe encephalopathy and focal left temporal dysfunction  The degree of encephalopathy during recording improved to a moderate degree  No electrographic seizures or interictal epileptiform discharges were seen  No diagnostic clinical events were captured  Compared to the previous day of recording the right epileptiform discharges resolved and the degree of encephalopathy improved  There was also newly appreciated left temporal dysfunction seen on this recording  · 1/21-1/22:  This abnormal study is consistent with a moderate generalized non-specific encephalopathy and focal left temporal cerebral dysfunction  No electrographic seizures or interictal epileptiform discharges were seen  No diagnostic clinical events were captured     - CSF results: RBC tube 1- 388, tube 4- 7, glucose 81, protein 73, gram stain negative, culture and gram stain negative, ME panel negative, WBC 1

## 2022-01-19 NOTE — PROGRESS NOTES
NEUROLOGY RESIDENCY PROGRESS NOTE     Name: Missy Liang   Age & Sex: 80 y o  male   MRN: [id number]  Unit/Bed#: ICU 09   Encounter: [id number]    Recommendations for outpatient neurological follow up have yet to be determined  Pending for discharge: management of seizures, clinical improvement    ASSESSMENT & PLAN     * Seizure [facility])  Assessment & Plan  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 XR 1000 mg QHS, CKD, HTN, hyperlipidemia, and s/p AAA repair  Presented to the ED as a stroke alert for right-sided facial droop however had multiple seizures en route to the ED  LKW was 1 day prior  Reports of possibly missing one dose of Keppra  Received 4 mg of Ativan per EMS followed by an additional 2 mg in the ED due to persistent right gaze deviation concerning for ongoing seizure activity  Was intubated for airway protection  EMS reported a fever of 102F however he has been afebrile since admission  Received loading dose of 1500 mg Keppra and 20 mg/kg of valproic acid  LP was completed and was unrevealing  CTH and CTA were also unremarkable  As patient was weaned off of sedation this morning had increased seizure activity and required additional ativan  AED's were also increased at that time  Concern at this time for recurrent seizures or status epilepticus  It is unclear why he would be in status considering that he missed only one dose of his Keppra which would be unusual to have multiple seizures in that setting  Also questionable febrile illness which may have decreased seizure threshold though again investigations have been unrevealing and WBC is improved  Urine this morning was cloudy and UA will be sent       Plan:  - Stroke pathway discontinued as presentation is not consistent with stroke  - Monitor on video EEG  - Continue Keppra 1500 mg BID  - Valproic acid 500 mg Q8H  - Seizure precautions  - Acyclovir discontinued by CC team as meningitis/encephalitis panel was negative  - MRI brain wo contrast  - Continue ceftriaxone as per CC team  - Remainder of management as per CC team      SUBJECTIVE     Patient was seen and examined  Transferred to [facility] from [facility]  Was started on video EEG at approximately 1:45 AM today  Initially had epileptiform discharges  As sedation was weaned, patient had increased seizure activity  Received another 2 mg of Ativan IV  Currently sedated on Propofol at 25 mcg/kg/min and EEG background is now suppressed  Review of Systems   Unable to perform ROS: Intubated       OBJECTIVE     Patient ID: Chad Levine is a 80 y o  male  Vitals:    22 0700 22 0800 22 0815 22 0830   BP: 102/58 (!) 79/43 134/61 121/60   Pulse: 70 58 66 64   Resp: (!) 23 16 16 16   Temp:       SpO2: 97% 98% 99% 99%      Temperature:   Temp (24hrs), Av 4 °F (37 4 °C), Min:99 °F (37 2 °C), Max:99 7 °F (37 6 °C)    Temperature: 99 7 °F (37 6 °C)      Physical Exam  Vitals and nursing note reviewed  Constitutional:       Appearance: He is normal weight  He is ill-appearing  He is not toxic-appearing  Interventions: He is sedated, intubated and restrained  HENT:      Head: Normocephalic and atraumatic  Right Ear: External ear normal       Left Ear: External ear normal       Nose: Nose normal       Mouth/Throat:      Mouth: Mucous membranes are dry  Pharynx: Oropharynx is clear  Eyes:      Conjunctiva/sclera: Conjunctivae normal       Pupils: Pupils are equal, round, and reactive to light  Cardiovascular:      Rate and Rhythm: Normal rate  Pulses: Normal pulses  Pulmonary:      Effort: He is intubated  Comments: On ventilator  Abdominal:      General: Abdomen is flat  There is no distension  Musculoskeletal:      Cervical back: Neck supple  Right lower leg: No edema  Left lower leg: No edema  Skin:     General: Skin is warm and dry     Psychiatric: Comments: Unable to assess at this time in the setting of intubated, ventilated, and sedated          Neurologic Exam     Mental Status   Patient examined while sedated on Propofol at 25 mcg/kg/hr  Unresponsive to verbal or painful stimuli  Does not follow commands  Cranial Nerves     CN III, IV, VI   Pupils are equal, round, and reactive to light  Blink to threat intact  Eyes are midline, conjugate gaze, no gaze deviation  Corneal reflexes intact  PERRL  VOR intact  Face appears symmetric but presence of ET tube is limiting exam  Tongue is midline in mouth  Motor Exam Tone is decreased throughout all four extremities in the setting of sedation  No spontaneous movements     Sensory Exam   No grimace or withdrawal to deep painful stimuli in all four extremities both distally and proximally     Gait, Coordination, and Reflexes Gait not tested  Coordination not tested  No tremors noted at rest  Reflexes are trace throughout  Toes are mute  LABORATORY DATA     Labs: I have personally reviewed pertinent reports      Results from last 7 days   Lab Units 01/19/22  0537 01/18/22  1058   WBC Thousand/uL 14 31* 15 62*   HEMOGLOBIN g/dL 12 9 13 3   HEMATOCRIT % 39 5 40 5   PLATELETS Thousands/uL 132* 159   NEUTROS PCT % 87*  --    MONOS PCT % 7  --       Results from last 7 days   Lab Units 01/19/22  0536 01/18/22  1058   SODIUM mmol/L 143 140  140   POTASSIUM mmol/L 3 5 3 8  3 8   CHLORIDE mmol/L 111* 104  104   CO2 mmol/L 26 27  25   BUN mg/dL 18 20  20   CREATININE mg/dL 0 93 1 53*  1 57*   CALCIUM mg/dL 7 8* 8 5  8 5   ALK PHOS U/L 72 80   ALT U/L 25 28   AST U/L 54* 36     Results from last 7 days   Lab Units 01/19/22  0536   MAGNESIUM mg/dL 2 4     Results from last 7 days   Lab Units 01/19/22  0536   PHOSPHORUS mg/dL 1 5*      Results from last 7 days   Lab Units 01/18/22  1058   INR  1 10   PTT seconds 28     Results from last 7 days   Lab Units 01/18/22  1326   LACTIC ACID mmol/L 2 1* IMAGING & DIAGNOSTIC TESTING     Radiology Results: I have personally reviewed pertinent reports  and I have personally reviewed pertinent films in PACS    XR chest 1 view portable  Result Date: 1/18/2022  Impression: Endotracheal tube appears somewhat high in position as above  Further advancement of approximately 4 cm would be helpful  Left lower lobe consolidation and small to moderate left pleural effusion  This is concerning for pneumonia  Follow-up to radiographic resolution advised  Questionable 1 2 cm nodular density in left mid to lower lung which can be further evaluated with a chest CT study after treatment  I personally discussed the position of the endotracheal tube with EMMA JARA on 1/18/2022 at 4:01 PM  Workstation performed: [id number]     XR chest 1 view portable  Result Date: 1/18/2022  Impression: No acute cardiopulmonary disease  Workstation performed: [id number]     CT stroke alert brain  Result Date: 1/18/2022  Impression: Stable examination with no acute intracranial abnormality identified  Findings were directly discussed with Dr Connor Rodriguez at 10:35 AM  Workstation performed: [id number]     CTA stroke alert (head/neck)  Result Date: 1/18/2022  Impression: Stable CT angiography with no flow restrictive stenosis, occlusion or thrombosis of the cervical or intracranial vasculature  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  Findings were directly discussed with Connor Rodriguez at 10:35 AM  Workstation performed: [id number]     Other Diagnostic Testing: I have personally reviewed pertinent reports        ACTIVE MEDICATIONS     Current Facility-Administered Medications   Medication Dose Route Frequency    azithromycin (ZITHROMAX) 500 mg in sodium chloride 0 9 % 250 mL IVPB  500 mg Intravenous Q24H    calcium gluconate 2 g in sodium chloride 0 9% 100 mL (premix)  2 g Intravenous Once    cefTRIAXone (ROCEPHIN) 1,000 mg in dextrose 5 % 50 mL IVPB  1,000 mg Intravenous Q24H    chlorhexidine (PERIDEX) 0 12 % oral rinse 15 mL  15 mL Mouth/Throat Q12H PARVIZ    levETIRAcetam (KEPPRA) 1,500 mg in sodium chloride 0 9 % 100 mL IVPB  1,500 mg Intravenous Q12H [address]    levETIRAcetam (KEPPRA) 500 mg in sodium chloride 0 9 % 100 mL IVPB  500 mg Intravenous Once    multi-electrolyte (PLASMALYTE-A/ISOLYTE-S PH 7 4) IV solution  100 mL/hr Intravenous Continuous    norepinephrine (LEVOPHED) 4 mg (STANDARD CONCENTRATION) IV in sodium chloride 0 9% 250 mL  1-30 mcg/min Intravenous Titrated    potassium phosphates 30 mmol in sodium chloride 0 9 % 250 mL infusion  30 mmol Intravenous Once    potassium-sodium phosphates (PHOS-NAK) packet 2 packet  2 packet Oral Once    propofol (DIPRIVAN) 1000 mg in 100 mL infusion (premix)  5-50 mcg/kg/min Intravenous Titrated    valproate (DEPACON) 500 mg in sodium chloride 0 9 % 50 mL IVPB  500 mg Intravenous Q8H [address]       Prior to Admission medications    Medication Sig Start Date End Date Taking? Authorizing Provider   acetaminophen (TYLENOL) 500 mg tablet Take 500 mg by mouth every 6 (six) hours as needed for mild pain    Historical Provider, MD   levETIRAcetam (KEPPRA XR) 500 MG 24 hr tablet Take 2 tablets (1,000 mg total) by mouth daily 12/20/21   Bill Jackson MD   simvastatin (ZOCOR) 40 mg tablet Take 1 tablet (40 mg total) by mouth daily 9/1/21   Bill Jackson MD       VTE Pharmacologic Prophylaxis: Patient is not currently on pharmacological VTE ppx   Recommend starting if no additional invasive procedures are planned  VTE Mechanical Prophylaxis: sequential compression device    ==  Abbe Ji DO   [facility] Neurology Residency, PGY-2

## 2022-01-19 NOTE — PROCEDURES
Central Line Insertion    Date/Time: 1/19/2022 10:23 AM  Performed by: BRIANA Wood  Authorized by: Nacho Wood     Patient location:  ICU  Other Assisting Provider: Yes (comment) Keron Horan    Consent:     Consent obtained:  Verbal    Consent given by:  Healthcare agent (Jam Silva)    Risks discussed:  Arterial puncture, bleeding, incorrect placement, infection and pneumothorax    Alternatives discussed:  No treatment and delayed treatment  Universal protocol:     Patient identity confirmed:  Arm band  Pre-procedure details:     Hand hygiene: Hand hygiene performed prior to insertion      Sterile barrier technique: All elements of maximal sterile technique followed      Skin preparation:  ChloraPrep    Skin preparation agent: Skin preparation agent completely dried prior to procedure    Indications:     Central line indications: medications requiring central line and hemodynamic monitoring    Anesthesia (see MAR for exact dosages): Anesthesia method:  None  Procedure details:     Location:  Right internal jugular    Vessel type: vein      Approach: percutaneous technique used      Catheter type:  Triple lumen 20cm    Landmarks identified: yes      Ultrasound guidance: yes      Ultrasound image availability:  Not saved    Manometry confirmation: yes      Successful placement: yes    Post-procedure details:     Post-procedure:  Dressing applied and line sutured    Assessment:  Blood return through all ports, no pneumothorax on x-ray and placement verified by x-ray    Post-procedure complications: none      Patient tolerance of procedure:   Tolerated well, no immediate complications    Observer: Yes      Observer name:  Shahram Morris

## 2022-01-19 NOTE — RESPIRATORY THERAPY NOTE
RT Ventilator Management Note  Kelley Mccoy 80 y o  male MRN: [id number]  Unit/Bed#: ICU 09 Encounter: [id number]      Daily Screen       1/18/2022  9233             Patient safety screen outcome[de-identified] Failed    Not Ready for Weaning due to[de-identified] Underline problem not resolved            Physical Exam:   Assessment Type: Assess only  General Appearance: Sedated  Respiratory Pattern: Assisted  Chest Assessment: Chest expansion symmetrical  Bilateral Breath Sounds: Diminished  O2 Device: vent      Resp Comments: Pt continues on AC/VC mode  Will continue to mointor

## 2022-01-19 NOTE — CASE MANAGEMENT
Case Management Assessment & Discharge Planning Note    Patient name Wendy Saul  Location ICU ICU 09 MRN [id number]  : 1940 Date 2022       Current Admission Date: 2022  Current Admission Diagnosis:Seizure [facility])   Patient Active Problem List    Diagnosis Date Noted    Stage 3a chronic kidney disease ([facility] [address] ) 2021    Nonrheumatic aortic valve stenosis 2020    Nonrheumatic mitral valve regurgitation 2020    Nonrheumatic aortic valve insufficiency 2020    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[facility] [address] ) 2020    Seizure disorder ([facility] [address] ) 2017    [facility] 2017    Hyperlipidemia     Hypertension     Hyperuricemia 2016    Disc degeneration, lumbar 2013    Diverticulosis 2013    Osteoarthrosis, hand 2013    Aneurysm of abdominal aorta ([facility] [address] ) 2013      LOS (days): 1  Geometric Mean LOS (GMLOS) (days): 4 30  Days to GMLOS:3 8     OBJECTIVE:    Risk of Unplanned Readmission Score: 10      Current admission status: Inpatient    Preferred Pharmacy:   [address] [address]  Phone: [phone] Fax: [phone]    [facility]  [address]  [address] [address]  Phone: [phone] Fax: [phone] [facility] [address]  Phone: [phone] Fax: [phone]    Primary Care Provider: Anika Spencer MD    Primary Insurance: MEDICARE  Secondary Insurance: [facility] E SHIELD    ASSESSMENT:  Active Health Care Agents    There are no active Health Care Agents on file         Advance Directives  Does patient have a [address]?: Yes  Does patient have Advance Directives?: No (unknown)    Patient Information  Admitted from[de-identified] Home  Mental Status: Intubated (on vEEG)  During Assessment patient was accompanied by: Not accompanied during assessment  Assessment information provided by[de-identified] Daughter  Primary Caregiver: Self  Support Systems: Self,Family members,Spouse/significant other,Daughter  Home entry access options  Select all that apply : Stairs  Number of steps to enter home : 1  Type of Current Residence: 2 story home  Upon entering residence, is there a bedroom on the main floor (no further steps)?: Yes  Upon entering residence, is there a bathroom on the main floor (no further steps)?: Yes  In the last 12 months, was there a time when you were not able to pay the mortgage or rent on time?: No  In the last 12 months, how many places have you lived?: 1  In the last 12 months, was there a time when you did not have a steady place to sleep or slept in a shelter (including now)?: No  Living Arrangements: Lives w/ Spouse/significant other (Pt lives with his spouse and is caregiver for her as she is disabled  Pt also has private duty caregiver in the home daily from 10am-2pm to assist with housekeeping and ADLs for pt's wife ) Pt's mike Reyes and grandchildren are taking care of pt's spouse while he is hospitalized      Activities of Daily Living Prior to Admission  Functional Status: Independent  Completes ADLs independently?: Yes  Ambulates independently?: Yes  Does patient use assisted devices?: No  Does patient currently own DME?: No  Does patient have a history of Outpatient Therapy (PT/OT)?: No  Does the patient have a history of Short-Term Rehab?: No  Does patient have a history of HHC?: Yes ([facility])  Does patient currently have [facility]?: No    Patient Information Continued  Within the past 12 months, you worried that your food would run out before you got the money to buy more : Never true  Within the past 12 months, the food you bought just didnt last and you didnt have money to get more : Never true  Does patient receive dialysis treatments?: No  Does patient have a history of substance abuse?: No  Does patient have a history of Mental Health Diagnosis?: No     Means of Transportation  Means of Transport to Appts[de-identified] Drives Self  In the past 12 months, has lack of transportation kept you from medical appointments or from getting medications?: No  In the past 12 months, has lack of transportation kept you from meetings, work, or from getting things needed for daily living?: No    DISCHARGE DETAILS:    Discharge planning discussed with[de-identified] CM spoke with pt's mike Kee via phone to introduce self/role and begin d/c planning  Freedom of Choice: Yes     CM contacted family/caregiver?: Yes     Contacts  Patient Contacts: Terrence mckeon  Relationship to Patient[de-identified] Family  Contact Method: Phone  Phone Number: [phone]  Reason/Outcome: Continuity of Care,Discharge Planning,Emergency Contact     CM reviewed d/c planning process including the following: identifying help at home, patient preference for d/c planning needs, Discharge Lounge, [facility] Meds to Bed program, availability of treatment team to discuss questions or concerns patient and/or family may have regarding understanding medications and recognizing signs and symptoms once discharged  CM also encouraged patient to follow up with all recommended appointments after discharge  Patient advised of importance for patient and family to participate in managing patients medical well being

## 2022-01-19 NOTE — H&P
H&P Exam - [address] 80 y o  male MRN: [id number]  Unit/Bed#: ICU 09 Encounter: [id number]      -------------------------------------------------------------------------------------------------------------  Chief Complaint: Seizure     History of Present Illness   HX and PE limited by: Intubated/Sedated       Duane Ferrer is a 80 y o  male who presents with Stroke Alert due to R facial droop, subsequently found to have multiple seizures around 9:15 on 1/18  LKN 1/17 and per report patient missed a Keppra dose 1/16  Recently also had Keppra dose decreased 7/28/21 from 1500mg HS to 1000mg HS  Last reported seizure 2/21/17  PMH includes seizure disorder, nonrheumatic aortic stenosis, ascending aorta ectasia, AAA s/p EVAR  Patient Keppra loaded 1500mg and VPA loaded 20mg/kg  Also given 6mg total of Ativan  To note, patient also with fever and leokocytosis  LP performed at [facility] ED prior to transfer  Meningitis panel negative  Additionally, patient had elevated troponin  History obtained from chart review  -------------------------------------------------------------------------------------------------------------  Assessment and Plan:    Neuro:    Diagnosis: Seizure    o Plan:   - CTH: Stable examination with no acute intracranial abnormality identified   - CTA H&N: Stable CT angiography with no flow restrictive stenosis, occlusion or thrombosis of the cervical or intracranial vasculature   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   - MRI pending   - S/p Keppra load [id number] and VPA load 20mg/kg   - Continue Keppra 1000mg BID   - Continue VAP 500mg BID   - VAP level pending   - Keppra level pending   - LP performed with meningitis panel negative    DC acyclovir   Blood and urine culture pending   - Ammonia 21  - Continue Continuous vEEG    - Wean propofol off and initiate Precedex infusion to increase chances of capturing seizure activity    Adjust AEDs per EMU  - Ativan PRN for seizure activity    Diagnosis: Hx of seizure disorder   o Plan:   - Follows with Dr Nat Lui as outpatient   - Home regimen: Keppra 1000mg HS   - Dose reduced 7/28/21 from 1500mg HS   - Last known seizure 2/21/17, 2 seizures unprovoked   - Missed dose 1/16       CV:    Diagnosis: Hx of HTN   o Plan:   - No home regimen  - SBP goal <160    Diagnosis: Troponin elevation   o Plan:   - Continue to trend   - Troponin: 1159--> 975--> 791   - Cardiology consult per protocol    Diagnosis: AAA s/p EVAR   o Plan:   - Sx in 2010   - Follows up yearly with repeat duplex, stable     Diagnosis: HLD   o Plan:   - Hold home simvastatin       Pulm:   Diagnosis: Acute Respiratory Failure   o Plan:   - [facility]   - Continue mechanical ventilation D1   VAP bundle    SBT as appropriate    Maintain SpO2>92%       GI:    Diagnosis: No acute issues   o Plan:   - GI ppx: not indicated   - Bowel regimen PRN       :    Diagnosis: RICK superimposed on CKD III  o Plan:   - Continue to trend BUN/Cr  - Baseline creatinine 1 15-1 22  - Strict I/O   - Avoid nephrotoxins       F/E/N:    Plan:   o Fluids: Continue maintenance fluids   o Electrolytes: Will replete as necessary to maintain potassium > 4 0, magnesium > 2 0, and phosphrous > 3 0    o Nutrition: NPO       Heme/Onc:    Diagnosis: Leukocytosis   o Plan:   - Continue to follow fever curve and WBC count   - Follow up cultures   - INR on admission 1 10   - DVT ppx: SQH & SCDs      Endo:    Diagnosis: No acute issues   o Plan:   - Blood glucose goal 140-180   - Initiate SSI if indicated       ID:    Diagnosis: Leukocytosis   o Plan:   - CXR unremarkable   - LP with negative meningitis panel  - Covid negative   - Continue to follow up blood cultures       MSK/Skin:    Diagnosis:  At risk for pressure injury   o Plan:   - Frequent turning/pressure off loading   - PT/OT      Disposition: Admit to Critical Care Code Status: Level 1 - Full Code  --------------------------------------------------------------------------------------------------------------  Review of Systems   Unable to perform ROS: Intubated       A 12-point, complete review of systems was reviewed and negative except as stated above     Physical Exam  Constitutional:       General: He is not in acute distress  Appearance: He is normal weight  He is not toxic-appearing or diaphoretic  Comments: Sedated on prop @25   HENT:      Head: Normocephalic and atraumatic  Mouth/Throat:      Pharynx: Oropharynx is clear  Eyes:      General: No scleral icterus  Conjunctiva/sclera: Conjunctivae normal       Pupils: Pupils are equal, round, and reactive to light  Cardiovascular:      Rate and Rhythm: Normal rate and regular rhythm  Pulmonary:      Effort: Pulmonary effort is normal  No respiratory distress  Abdominal:      General: There is no distension  Palpations: Abdomen is soft  Tenderness: There is no abdominal tenderness  Genitourinary:     Comments: Rigoberto  Musculoskeletal:         General: No swelling or deformity  Normal range of motion  Cervical back: Normal range of motion and neck supple  No rigidity  Right lower leg: No edema  Left lower leg: No edema  Skin:     Coloration: Skin is not jaundiced or pale  Neurological:      Comments: Exam on prop @25; minimal withdrawal to painful stimuli in all 4 extremities        --------------------------------------------------------------------------------------------------------------  Vitals:   Vitals:    01/18/22 2335   SpO2: 96%     Temp  Min: 99 °F (37 2 °C)  Max: 99 7 °F (37 6 °C)        There is no height or weight on file to calculate BMI    N/A    Laboratory and Diagnostics:  Results from last 7 days   Lab Units 01/18/22  1058   WBC Thousand/uL 15 62*   HEMOGLOBIN g/dL 13 3   HEMATOCRIT % 40 5   PLATELETS Thousands/uL 159     Results from last 7 days Lab Units 01/18/22  1058   SODIUM mmol/L 140  140   POTASSIUM mmol/L 3 8  3 8   CHLORIDE mmol/L 104  104   CO2 mmol/L 27  25   ANION GAP mmol/L 9  11   BUN mg/dL 20  20   CREATININE mg/dL 1 53*  1 57*   CALCIUM mg/dL 8 5  8 5   GLUCOSE RANDOM mg/dL 135  135   ALT U/L 28   AST U/L 36   ALK PHOS U/L 80   ALBUMIN g/dL 3 7   TOTAL BILIRUBIN mg/dL 1 10*          Results from last 7 days   Lab Units 01/18/22  1058   INR  1 10   PTT seconds 28          Results from last 7 days   Lab Units 01/18/22  1326 01/18/22  1048   LACTIC ACID mmol/L 2 1* 3 1*     ABG:    VBG:  Results from last 7 days   Lab Units 01/18/22  1058   PH JANET  7 395   PCO2 JANET mm Hg 41 3*   PO2 JANET mm Hg 57 5*   HCO3 JANET mmol/L 24 7   BASE EXC JANET mmol/L -0 2           Micro:  Results from last 7 days   Lab Units 01/18/22  1721 01/18/22  1126 01/18/22  1048   BLOOD CULTURE   --  Received in Microbiology Lab  Culture in Progress  Received in Microbiology Lab  Culture in Progress  GRAM STAIN RESULT  No Polys or Bacteria seen  --   --        EKG: Reviewed       Imaging: I have personally reviewed pertinent films in PACS    Historical Information   Past Medical History:   Diagnosis Date    Colon polyps     Gout     Hyperlipidemia     Hypertension     S/P AAA repair     Seizure disorder ([facility] [address] )     Tear of right rotator cuff 4/4/2017     Past Surgical History:   Procedure Laterality Date    ABDOMINAL AORTIC ANEURYSM REPAIR, ENDOVASCULAR N/A     CA COLONOSCOPY FLX DX W/COLLJ SPEC WHEN PFRMD N/A 6/7/2017    Procedure: COLONOSCOPY;  Surgeon: Sergey Schulte MD;  Location: [facility]; Service: Colorectal    CA COLONOSCOPY FLX DX W/COLLJ SPEC WHEN PFRMD N/A 5/18/2018    Procedure: COLONOSCOPY;  Surgeon: Sergey Schulte MD;  Location: [facility];   Service: Colorectal    TONSILLECTOMY      WISDOM TOOTH EXTRACTION Bilateral      Social History   Social History     Substance and Sexual Activity   Alcohol Use Yes    Comment: occasional Social History     Substance and Sexual Activity   Drug Use No     Social History     Tobacco Use   Smoking Status Former Smoker    Packs/day: 3 00    Years: 10 00    Pack years: 30 00    Quit date: 1971    Years since quittin 9   Smokeless Tobacco Never Used       Family History:   Family History   Problem Relation Age of Onset    Aneurysm Mother         ABDMONIAL  AORTA    Coronary artery disease Father     Coronary artery disease Brother      I have reviewed this patient's family history and commented on sigificant items within the HPI      Medications:  Current Facility-Administered Medications   Medication Dose Route Frequency    chlorhexidine (PERIDEX) 0 12 % oral rinse 15 mL  15 mL Mouth/Throat Q12H [address]    diphenhydrAMINE (BENADRYL) injection 25 mg  25 mg Intravenous Once    propofol (DIPRIVAN) 1000 mg in 100 mL infusion (premix)  5-50 mcg/kg/min Intravenous Titrated     Home medications:  Prior to Admission Medications   Prescriptions Last Dose Informant Patient Reported? Taking?   acetaminophen (TYLENOL) 500 mg tablet  Self Yes No   Sig: Take 500 mg by mouth every 6 (six) hours as needed for mild pain   levETIRAcetam (KEPPRA XR) 500 MG 24 hr tablet   No No   Sig: Take 2 tablets (1,000 mg total) by mouth daily   simvastatin (ZOCOR) 40 mg tablet  Self No No   Sig: Take 1 tablet (40 mg total) by mouth daily      Facility-Administered Medications: None     Allergies:   Allergies   Allergen Reactions    Fenofibrate      Patient not aware of allergy    Hydrocodone-Acetaminophen      Patient not aware of allergy     ------------------------------------------------------------------------------------------------------------  Advance Directive and Living Will:      Power of :    POLST:    ------------------------------------------------------------------------------------------------------------  Anticipated Length of Stay is > 2 midnights    Care Time Delivered:   Upon my evaluation, this patient had a high probability of imminent or life-threatening deterioration due to seizures , which required my direct attention, intervention, and personal management  I have personally provided 40 minutes (0000 to 0040) of critical care time, exclusive of procedures, teaching, family meetings, and any prior time recorded by providers other than myself         Mague Winn PA-C

## 2022-01-19 NOTE — RESPIRATORY THERAPY NOTE
RT Ventilator Management Note  Levi Rosas 80 y o  male MRN: [id number]  Unit/Bed#: ICU 09 Encounter: [id number]      Daily Screen       1/18/2022  1354 1/19/2022  0822          Patient safety screen outcome[de-identified] Failed Failed      Not Ready for Weaning due to[de-identified] Underline problem not resolved Underline problem not resolved      Spont breathing trial outcome[de-identified] -- Failed              Physical Exam:   Assessment Type: Assess only  General Appearance: Sedated  Respiratory Pattern: (P) Assisted  Chest Assessment: (P) Chest expansion symmetrical  Bilateral Breath Sounds: (P) Clear,Diminished  Suction: ET Tube      Resp Comments: Pt found on AC settings  Sedated at this time   No weaning done

## 2022-01-19 NOTE — OCCUPATIONAL THERAPY NOTE
OT CANCEL NOTE    OT orders received  Chart reviewed  Pt is currently intubated/sedated and not appropriate to engage in skilled OT services at this time  Will hold initial OT evaluation  Will continue to follow pt on caseload and see pt when medically stable and as clinically appropriate  01/19/22 0755   OT Last Visit   OT Visit Date 01/19/22   Note Type   Note type Evaluation; Cancelled Session   Cancel Reasons Intubated/sedated       Brea Sosa MS, OTR/L

## 2022-01-19 NOTE — PLAN OF CARE
Problem: MOBILITY - ADULT  Goal: Maintain or return to baseline ADL function  Description: INTERVENTIONS:  -  Assess patient's ability to carry out ADLs; assess patient's baseline for ADL function and identify physical deficits which impact ability to perform ADLs (bathing, care of mouth/teeth, toileting, grooming, dressing, etc )  - Assess/evaluate cause of self-care deficits   - Assess range of motion  - Assess patient's mobility; develop plan if impaired  - Assess patient's need for assistive devices and provide as appropriate  - Encourage maximum independence but intervene and supervise when necessary  - Involve family in performance of ADLs  - Assess for home care needs following discharge   - Consider OT consult to assist with ADL evaluation and planning for discharge  - Provide patient education as appropriate  Outcome: Progressing     Problem: Neurological Deficit  Goal: Neurological status is stable or improving  Description: Interventions:  - Monitor and assess patient's level of consciousness, motor function, sensory function, and level of assistance needed for ADLs  - Monitor and report changes from baseline  Collaborate with interdisciplinary team to initiate plan and implement interventions as ordered  - Provide and maintain a safe environment  - Consider seizure precautions  - Consider fall precautions  - Consider aspiration precautions  - Consider bleeding precautions  Outcome: Progressing     Problem: Activity Intolerance/Impaired Mobility  Goal: Mobility/activity is maintained at optimum level for patient  Description: Interventions:  - Assess and monitor patient  barriers to mobility and need for assistive/adaptive devices  - Assess patient's emotional response to limitations  - Collaborate with interdisciplinary team and initiate plans and interventions as ordered  - Encourage independent activity per ability   - Maintain proper body alignment    - Perform active/passive rom as tolerated/ordered  - Plan activities to conserve energy   - Turn patient as appropriate  Outcome: Progressing     Problem: Communication Impairment  Goal: Ability to express needs and understand communication  Description: Assess patient's communication skills and ability to understand information  Patient will demonstrate use of effective communication techniques, alternative methods of communication and understanding even if not able to speak  - Encourage communication and provide alternate methods of communication as needed  - Collaborate with case management/ for discharge needs  - Include patient/family/caregiver in decisions related to communication  Outcome: Progressing     Problem: Potential for Aspiration  Goal: Non-ventilated patient's risk of aspiration is minimized  Description: Assess and monitor vital signs, respiratory status, and labs (WBC)  Monitor for signs of aspiration (tachypnea, cough, rales, wheezing, cyanosis, fever)  - Assess and monitor patient's ability to swallow  - Place patient up in chair to eat if possible  - HOB up at 90 degrees to eat if unable to get patient up into chair   - Supervise patient during oral intake  - Instruct patient/ family to take small bites  - Instruct patient/ family to take small single sips when taking liquids    - Follow patient-specific strategies generated by speech pathologist   Outcome: Progressing

## 2022-01-19 NOTE — PHYSICAL THERAPY NOTE
Physical Therapy Cancellation Note         01/19/22 0800   PT Last Visit   PT Visit Date 01/19/22   Note Type   Note type Evaluation; Cancelled Session     PT consult received  Chart reviewed  Patient is currently intubated at this time and not medically appropriate for participation in skilled PT services  PT will continue to follow on caseload and perform initial evaluation as medically appropriate        GRIFFIN FINE PT, DPT

## 2022-01-19 NOTE — PROGRESS NOTES
Vancomycin Assessment    Erich Burkett is a 80 y o  male who is currently receiving vancomycin 1750mg loading dose for Pneumonia     Relevant clinical data and objective history reviewed:  Creatinine   Date Value Ref Range Status   01/18/2022 1 57 (H) 0 60 - 1 30 mg/dL Final     Comment:     Standardized to IDMS reference method   01/18/2022 1 53 (H) 0 60 - 1 30 mg/dL Final     Comment:     Standardized to IDMS reference method   09/04/2020 1 20 0 60 - 1 30 mg/dL Final     Comment:     Standardized to IDMS reference method   07/29/2015 1 23 0 60 - 1 30 mg/dL Final     Comment:     Standardized to IDMS reference method   01/26/2015 1 08 0 60 - 1 30 mg/dL Final     Comment:     Standardized to IDMS reference method   07/17/2014 1 05 0 60 - 1 30 mg/dL Final     Comment:     Standardized to IDMS reference method     SpO2 96%   No intake/output data recorded  Lab Results   Component Value Date/Time    BUN 20 01/18/2022 10:58 AM    BUN 20 01/18/2022 10:58 AM    BUN 18 07/29/2015 07:21 AM    WBC 15 62 (H) 01/18/2022 10:58 AM    WBC 7 38 07/29/2015 07:21 AM    HGB 13 3 01/18/2022 10:58 AM    HGB 15 5 07/29/2015 07:21 AM    HCT 40 5 01/18/2022 10:58 AM    HCT 46 4 07/29/2015 07:21 AM    MCV 96 01/18/2022 10:58 AM    MCV 95 07/29/2015 07:21 AM     01/18/2022 10:58 AM     07/29/2015 07:21 AM     Temp Readings from Last 3 Encounters:   01/18/22 99 3 °F (37 4 °C)   10/21/21 (!) 96 9 °F (36 1 °C) (Tympanic)   03/01/21 (!) 96 8 °F (36 °C)     Vancomycin Days of Therapy: 1    Assessment/Plan  The patient is currently on vancomycin utilizing scheduled dosing based on actual body weight  Baseline risks associated with therapy include: advanced age and dehydration  The patient is currently receiving 1750mg loading dose and after clinical evaluation will be changed to 750mg Q12H  Pharmacy will also follow closely for s/sx of nephrotoxicity, infusion reactions, and appropriateness of therapy    BMP and CBC will be ordered per protocol  Plan for trough as patient approaches steady state, prior to the 5th  dose at approximately 2000 on 1/20/22  Due to infection severity, will target a trough of 15-20 (appropriate for most indications)   Pharmacy will continue to follow the patients culture results and clinical progress daily      Kj Lua, Pharmacist

## 2022-01-20 ENCOUNTER — APPOINTMENT (INPATIENT)
Dept: NEUROLOGY | Facility: CLINIC | Age: 82
DRG: 100 | End: 2022-01-20
Payer: MEDICARE

## 2022-01-20 LAB
ANION GAP SERPL CALCULATED.3IONS-SCNC: 3 MMOL/L (ref 4–13)
BASOPHILS # BLD AUTO: 0.02 THOUSANDS/ΜL (ref 0–0.1)
BASOPHILS NFR BLD AUTO: 0 % (ref 0–1)
BUN SERPL-MCNC: 15 MG/DL (ref 5–25)
CA-I BLD-SCNC: 1.07 MMOL/L (ref 1.12–1.32)
CALCIUM SERPL-MCNC: 7.7 MG/DL (ref 8.3–10.1)
CHLORIDE SERPL-SCNC: 115 MMOL/L (ref 100–108)
CO2 SERPL-SCNC: 28 MMOL/L (ref 21–32)
CREAT SERPL-MCNC: 0.83 MG/DL (ref 0.6–1.3)
EOSINOPHIL # BLD AUTO: 0.18 THOUSAND/ΜL (ref 0–0.61)
EOSINOPHIL NFR BLD AUTO: 1 % (ref 0–6)
ERYTHROCYTE [DISTWIDTH] IN BLOOD BY AUTOMATED COUNT: 12.6 % (ref 11.6–15.1)
GFR SERPL CREATININE-BSD FRML MDRD: 83 ML/MIN/1.73SQ M
GLUCOSE SERPL-MCNC: 117 MG/DL (ref 65–140)
GLUCOSE SERPL-MCNC: 135 MG/DL (ref 65–140)
HCT VFR BLD AUTO: 38.5 % (ref 36.5–49.3)
HGB BLD-MCNC: 12.5 G/DL (ref 12–17)
IMM GRANULOCYTES # BLD AUTO: 0.05 THOUSAND/UL (ref 0–0.2)
IMM GRANULOCYTES NFR BLD AUTO: 0 % (ref 0–2)
LYMPHOCYTES # BLD AUTO: 1.02 THOUSANDS/ΜL (ref 0.6–4.47)
LYMPHOCYTES NFR BLD AUTO: 7 % (ref 14–44)
MAGNESIUM SERPL-MCNC: 2.7 MG/DL (ref 1.6–2.6)
MCH RBC QN AUTO: 31.9 PG (ref 26.8–34.3)
MCHC RBC AUTO-ENTMCNC: 32.5 G/DL (ref 31.4–37.4)
MCV RBC AUTO: 98 FL (ref 82–98)
MONOCYTES # BLD AUTO: 0.85 THOUSAND/ΜL (ref 0.17–1.22)
MONOCYTES NFR BLD AUTO: 6 % (ref 4–12)
NEUTROPHILS # BLD AUTO: 12.22 THOUSANDS/ΜL (ref 1.85–7.62)
NEUTS SEG NFR BLD AUTO: 86 % (ref 43–75)
NRBC BLD AUTO-RTO: 0 /100 WBCS
PHOSPHATE SERPL-MCNC: 2.6 MG/DL (ref 2.3–4.1)
PLATELET # BLD AUTO: 161 THOUSANDS/UL (ref 149–390)
PMV BLD AUTO: 10.8 FL (ref 8.9–12.7)
POTASSIUM SERPL-SCNC: 3.8 MMOL/L (ref 3.5–5.3)
PROCALCITONIN SERPL-MCNC: 0.6 NG/ML
RBC # BLD AUTO: 3.92 MILLION/UL (ref 3.88–5.62)
SODIUM SERPL-SCNC: 146 MMOL/L (ref 136–145)
TRIGL SERPL-MCNC: 123 MG/DL
WBC # BLD AUTO: 14.34 THOUSAND/UL (ref 4.31–10.16)

## 2022-01-20 PROCEDURE — NC001 PR NO CHARGE: Performed by: INTERNAL MEDICINE

## 2022-01-20 PROCEDURE — 94760 N-INVAS EAR/PLS OXIMETRY 1: CPT

## 2022-01-20 PROCEDURE — 99232 SBSQ HOSP IP/OBS MODERATE 35: CPT | Performed by: PSYCHIATRY & NEUROLOGY

## 2022-01-20 PROCEDURE — 4A133J1 MONITORING OF ARTERIAL PULSE, PERIPHERAL, PERCUTANEOUS APPROACH: ICD-10-PCS | Performed by: INTERNAL MEDICINE

## 2022-01-20 PROCEDURE — 80048 BASIC METABOLIC PNL TOTAL CA: CPT | Performed by: PHYSICIAN ASSISTANT

## 2022-01-20 PROCEDURE — 82330 ASSAY OF CALCIUM: CPT | Performed by: PHYSICIAN ASSISTANT

## 2022-01-20 PROCEDURE — 85025 COMPLETE CBC W/AUTO DIFF WBC: CPT | Performed by: PHYSICIAN ASSISTANT

## 2022-01-20 PROCEDURE — 82948 REAGENT STRIP/BLOOD GLUCOSE: CPT

## 2022-01-20 PROCEDURE — 03HY32Z INSERTION OF MONITORING DEVICE INTO UPPER ARTERY, PERCUTANEOUS APPROACH: ICD-10-PCS | Performed by: INTERNAL MEDICINE

## 2022-01-20 PROCEDURE — 99292 CRITICAL CARE ADDL 30 MIN: CPT | Performed by: INTERNAL MEDICINE

## 2022-01-20 PROCEDURE — 94003 VENT MGMT INPAT SUBQ DAY: CPT

## 2022-01-20 PROCEDURE — 84145 PROCALCITONIN (PCT): CPT | Performed by: PHYSICIAN ASSISTANT

## 2022-01-20 PROCEDURE — 4A133B1 MONITORING OF ARTERIAL PRESSURE, PERIPHERAL, PERCUTANEOUS APPROACH: ICD-10-PCS | Performed by: INTERNAL MEDICINE

## 2022-01-20 PROCEDURE — 95715 VEEG EA 12-26HR INTMT MNTR: CPT

## 2022-01-20 PROCEDURE — 99291 CRITICAL CARE FIRST HOUR: CPT | Performed by: PHYSICIAN ASSISTANT

## 2022-01-20 PROCEDURE — 84478 ASSAY OF TRIGLYCERIDES: CPT | Performed by: PHYSICIAN ASSISTANT

## 2022-01-20 PROCEDURE — 95720 EEG PHY/QHP EA INCR W/VEEG: CPT | Performed by: STUDENT IN AN ORGANIZED HEALTH CARE EDUCATION/TRAINING PROGRAM

## 2022-01-20 PROCEDURE — 83735 ASSAY OF MAGNESIUM: CPT | Performed by: PHYSICIAN ASSISTANT

## 2022-01-20 PROCEDURE — 84100 ASSAY OF PHOSPHORUS: CPT | Performed by: PHYSICIAN ASSISTANT

## 2022-01-20 RX ORDER — AZITHROMYCIN 500 MG/1
500 TABLET, FILM COATED ORAL EVERY 24 HOURS
Status: DISCONTINUED | OUTPATIENT
Start: 2022-01-21 | End: 2022-01-20

## 2022-01-20 RX ORDER — AZITHROMYCIN 500 MG/1
500 TABLET, FILM COATED ORAL EVERY 24 HOURS
Status: COMPLETED | OUTPATIENT
Start: 2022-01-21 | End: 2022-01-21

## 2022-01-20 RX ADMIN — CHLORHEXIDINE GLUCONATE 15 ML: 1.2 SOLUTION ORAL at 20:24

## 2022-01-20 RX ADMIN — VALPROATE SODIUM 500 MG: 100 INJECTION, SOLUTION INTRAVENOUS at 22:14

## 2022-01-20 RX ADMIN — NOREPINEPHRINE BITARTRATE 9 MCG/MIN: 1 INJECTION, SOLUTION, CONCENTRATE INTRAVENOUS at 00:38

## 2022-01-20 RX ADMIN — NOREPINEPHRINE BITARTRATE 9.01 MCG/MIN: 1 INJECTION, SOLUTION, CONCENTRATE INTRAVENOUS at 10:39

## 2022-01-20 RX ADMIN — CEFTRIAXONE SODIUM 1000 MG: 10 INJECTION, POWDER, FOR SOLUTION INTRAVENOUS at 14:24

## 2022-01-20 RX ADMIN — PRAVASTATIN SODIUM 80 MG: 80 TABLET ORAL at 16:13

## 2022-01-20 RX ADMIN — ENOXAPARIN SODIUM 40 MG: 40 INJECTION SUBCUTANEOUS at 09:12

## 2022-01-20 RX ADMIN — THIAMINE HCL TAB 100 MG 100 MG: 100 TAB at 08:03

## 2022-01-20 RX ADMIN — PROPOFOL 50 MCG/KG/MIN: 10 INJECTION, EMULSION INTRAVENOUS at 14:21

## 2022-01-20 RX ADMIN — PROPOFOL 50 MCG/KG/MIN: 10 INJECTION, EMULSION INTRAVENOUS at 06:50

## 2022-01-20 RX ADMIN — PROPOFOL 50 MCG/KG/MIN: 10 INJECTION, EMULSION INTRAVENOUS at 17:36

## 2022-01-20 RX ADMIN — LEVETIRACETAM 1500 MG: 100 INJECTION, SOLUTION INTRAVENOUS at 08:03

## 2022-01-20 RX ADMIN — FOLIC ACID 1 MG: 1 TABLET ORAL at 08:03

## 2022-01-20 RX ADMIN — PROPOFOL 50 MCG/KG/MIN: 10 INJECTION, EMULSION INTRAVENOUS at 00:01

## 2022-01-20 RX ADMIN — VALPROATE SODIUM 500 MG: 100 INJECTION, SOLUTION INTRAVENOUS at 14:20

## 2022-01-20 RX ADMIN — LEVETIRACETAM 1500 MG: 100 INJECTION, SOLUTION INTRAVENOUS at 20:24

## 2022-01-20 RX ADMIN — VALPROATE SODIUM 500 MG: 100 INJECTION, SOLUTION INTRAVENOUS at 06:35

## 2022-01-20 RX ADMIN — CHLORHEXIDINE GLUCONATE 15 ML: 1.2 SOLUTION ORAL at 08:03

## 2022-01-20 RX ADMIN — PROPOFOL 50 MCG/KG/MIN: 10 INJECTION, EMULSION INTRAVENOUS at 02:23

## 2022-01-20 RX ADMIN — NOREPINEPHRINE BITARTRATE 9 MCG/MIN: 1 INJECTION, SOLUTION, CONCENTRATE INTRAVENOUS at 17:36

## 2022-01-20 RX ADMIN — AZITHROMYCIN MONOHYDRATE 500 MG: 500 INJECTION, POWDER, LYOPHILIZED, FOR SOLUTION INTRAVENOUS at 02:23

## 2022-01-20 RX ADMIN — PROPOFOL 50 MCG/KG/MIN: 10 INJECTION, EMULSION INTRAVENOUS at 11:11

## 2022-01-20 RX ADMIN — PROPOFOL 50 MCG/KG/MIN: 10 INJECTION, EMULSION INTRAVENOUS at 22:14

## 2022-01-20 NOTE — PROGRESS NOTES
Pastoral Care Progress Note    2022  Patient: Lisa Lechuga : 1940  Admission Date & Time: 20226  MRN: [id number] CSN: [id number]      Donned appropriate PPE for visit with pt "Rumamichoacano Alvares " Ruma Alvares is presently intubated and does not respond to his name being said and so this  offered interfaith blessing outside of the pt's room  Will continue to follow  If [place]/ support system desire spiritual care, please contact [employer]  via [employer]  Thank you!                Chaplaincy Interventions Utilized:     Relationship Building: Provided silent and supportive presence    Ritual: Provided prayer    Chaplaincy Outcomes Achieved:  Unknown outcome     22 0900   Clinical Encounter Type   Visited With Patient   Routine Visit Introduction

## 2022-01-20 NOTE — PHYSICAL THERAPY NOTE
Physical Therapy Cancellation Note    PT orders received chart review completed  Pt is currently intubated/sedated and not appropriate to participate in skilled PT at this time  PT will follow and eval as medically appropriate       01/20/22 1300   PT Last Visit   PT Visit Date 01/20/22   Note Type   Note type Cancelled Session   Cancel Reasons Intubated/sedated     Marisol Mckenzie, PT

## 2022-01-20 NOTE — RESPIRATORY THERAPY NOTE
RT Ventilator Management Note  Alen Florez 80 y o  male MRN: [id number]  Unit/Bed#: ICU 09 Encounter: [id number]      Daily Screen       1/19/2022  0822 1/20/2022  0813          Patient safety screen outcome[de-identified] Failed Failed (P)       Not Ready for Weaning due to[de-identified] Underline problem not resolved Underline problem not resolved (P)       Spont breathing trial outcome[de-identified] Failed --              Physical Exam:   Assessment Type: (P) Assess only  General Appearance: (P) Sedated  Respiratory Pattern: (P) Assisted  Chest Assessment: (P) Chest expansion symmetrical  Bilateral Breath Sounds: (P) Diminished  O2 Device: vent      Resp Comments: (P) pt continues on A/C VC settings no changes at this time    will continue to monitor

## 2022-01-20 NOTE — PROCEDURES
Arterial Line Insertion    Date/Time: 1/19/2022 7:50 AM  Performed by: Yaneli Guido MD  Authorized by: Yaneli Guido MD     Patient location:  ICU  Other Assisting Provider: Yes (comment) (Dung Lyons)    Consent:     Consent obtained:  Emergent situation  Universal protocol:     Patient identity confirmed:  Arm band  Indications:     Indications: hemodynamic monitoring    Pre-procedure details:     Skin preparation:  Chlorhexidine  Anesthesia (see MAR for exact dosages): Anesthesia method:  Local infiltration  Procedure details:     Location / Tip of Catheter:  Radial    Laterality:  Left    Needle gauge:  2 5 Fr    Placement technique:  Percutaneous    Number of attempts:  2    Successful placement: yes      Transducer: waveform confirmed    Post-procedure details:     Post-procedure:  Biopatch applied    Patient tolerance of procedure:   Tolerated well, no immediate complications

## 2022-01-20 NOTE — ED ATTENDING ATTESTATION
1/18/2022  ILuz MD, saw and evaluated the patient  I have discussed the patient with the resident/non-physician practitioner and agree with the resident's/non-physician practitioner's findings, Plan of Care, and MDM as documented in the resident's/non-physician practitioner's note, except where noted  All available labs and Radiology studies were reviewed  I was present for key portions of any procedure(s) performed by the resident/non-physician practitioner and I was immediately available to provide assistance  At this point I agree with the current assessment done in the Emergency Department  I have conducted an independent evaluation of this patient a history and physical is as follows:    ED Course  ED Course as of 01/19/22 2231 Tue Jan 18, 2022   1243 Discussed with Cardiology, discussed EKG, labs and patient's clinical situation, no acute intervention needed at this time   1312 Patient evaluated by Critical Care  Would like patient to be transferred to [place]  Recommend intubation at this time  79-year-old presenting with seizure per EMS, on arrival somnolent, GCS 8, responding to painful stimuli only from the right side, stroke alert was called  Ativan given by EMS, had been given by us per Neurology recommendation  Also loaded with Keppra and Depakote  Patient minute status not improving  Concern for continue seizures  Patient intubated and started on propofol  With reported fever per EMS LP was done and antibiotics started and also covered with acyclovir      Patient will need continues EEG monitoring, transfer to [place] neuro ICU        Critical Care Time  Procedures

## 2022-01-20 NOTE — OCCUPATIONAL THERAPY NOTE
OT CANCEL NOTE    OT orders received  Chart reviewed  Pt is currently intubated/sedated and not appropriate to engage in skilled OT services at this time  Will hold initial OT evaluation  Will continue to follow pt on caseload and see pt when medically stable and as clinically appropriate  01/20/22 0757   OT Last Visit   OT Visit Date 01/20/22   Note Type   Note type Evaluation; Cancelled Session   Cancel Reasons Intubated/sedated       Drew Mendez MS, OTR/L

## 2022-01-20 NOTE — RESPIRATORY THERAPY NOTE
RT Ventilator Management Note  Jumana Aldana 80 y o  male MRN: [id number]  Unit/Bed#: ICU 09 Encounter: [id number]      Daily Screen       1/18/2022  1354 1/19/2022  0822          Patient safety screen outcome[de-identified] Failed Failed      Not Ready for Weaning due to[de-identified] Underline problem not resolved Underline problem not resolved      Spont breathing trial outcome[de-identified] -- Failed              Physical Exam:   Assessment Type: Assess only  General Appearance: Sedated  Respiratory Pattern: Assisted  Chest Assessment: Chest expansion symmetrical  Bilateral Breath Sounds: Diminished  O2 Device: vent      Resp Comments: Pt remains on AC/VC settings  Will continue to monitor

## 2022-01-20 NOTE — PLAN OF CARE
Problem: MOBILITY - ADULT  Goal: Maintain or return to baseline ADL function  Description: INTERVENTIONS:  -  Assess patient's ability to carry out ADLs; assess patient's baseline for ADL function and identify physical deficits which impact ability to perform ADLs (bathing, care of mouth/teeth, toileting, grooming, dressing, etc )  - Assess/evaluate cause of self-care deficits   - Assess range of motion  - Assess patient's mobility; develop plan if impaired  - Assess patient's need for assistive devices and provide as appropriate  - Encourage maximum independence but intervene and supervise when necessary  - Involve family in performance of ADLs  - Assess for home care needs following discharge   - Consider OT consult to assist with ADL evaluation and planning for discharge  - Provide patient education as appropriate  Outcome: Progressing  Goal: Maintains/Returns to pre admission functional level  Description: INTERVENTIONS:  - Perform BMAT or MOVE assessment daily    - Set and communicate daily mobility goal to care team and patient/family/caregiver  - Collaborate with rehabilitation services on mobility goals if consulted  - Perform Range of Motion multiple times a day  - Reposition patient every 2 hours    - Record patient progress and toleration of activity level   Outcome: Progressing     Problem: Potential for Falls  Goal: Patient will remain free of falls  Description: INTERVENTIONS:  - Educate patient/family on patient safety including physical limitations  - Instruct patient to call for assistance with activity   - Consult OT/PT to assist with strengthening/mobility   - Keep Call bell within reach  - Keep bed low and locked with side rails adjusted as appropriate  - Keep care items and personal belongings within reach  - Initiate and maintain comfort rounds  - Make Fall Risk Sign visible to staff  - Offer Toileting every 2 Hours, in advance of need  - Initiate/Maintain bed alarm  - Obtain necessary fall risk management equipment  - Apply yellow socks and bracelet for high fall risk patients  - Consider moving patient to room near nurses station  Outcome: Progressing     Problem: Prexisting or High Potential for Compromised Skin Integrity  Goal: Skin integrity is maintained or improved  Description: INTERVENTIONS:  - Identify patients at risk for skin breakdown  - Assess and monitor skin integrity  - Assess and monitor nutrition and hydration status  - Monitor labs   - Assess for incontinence   - Turn and reposition patient  - Assist with mobility/ambulation  - Relieve pressure over bony prominences  - Avoid friction and shearing  - Provide appropriate hygiene as needed including keeping skin clean and dry  - Evaluate need for skin moisturizer/barrier cream  - Collaborate with interdisciplinary team   - Patient/family teaching  - Consider wound care consult   Outcome: Progressing     Problem: SAFETY,RESTRAINT: NV/NON-SELF DESTRUCTIVE BEHAVIOR  Goal: Remains free of harm/injury (restraint for non violent/non self-detsructive behavior)  Description: INTERVENTIONS:  - Instruct patient/family regarding restraint use   - Assess and monitor physiologic and psychological status   - Provide interventions and comfort measures to meet assessed patient needs   - Identify and implement measures to help patient regain control  - Assess readiness for release of restraint   Outcome: Progressing  Goal: Returns to optimal restraint-free functioning  Description: INTERVENTIONS:  - Assess the patient's behavior and symptoms that indicate continued need for restraint  - Identify and implement measures to help patient regain control  - Assess readiness for release of restraint   Outcome: Progressing     Problem: Neurological Deficit  Goal: Neurological status is stable or improving  Description: Interventions:  - Monitor and assess patient's level of consciousness, motor function, sensory function, and level of assistance needed for ADLs    - Monitor and report changes from baseline  Collaborate with interdisciplinary team to initiate plan and implement interventions as ordered  - Provide and maintain a safe environment  - Consider seizure precautions  - Consider fall precautions  - Consider aspiration precautions  - Consider bleeding precautions  Outcome: Progressing     Problem: Activity Intolerance/Impaired Mobility  Goal: Mobility/activity is maintained at optimum level for patient  Description: Interventions:  - Assess and monitor patient  barriers to mobility and need for assistive/adaptive devices  - Assess patient's emotional response to limitations  - Collaborate with interdisciplinary team and initiate plans and interventions as ordered  - Encourage independent activity per ability   - Maintain proper body alignment  - Perform active/passive rom as tolerated/ordered  - Plan activities to conserve energy   - Turn patient as appropriate  Outcome: Progressing     Problem: Communication Impairment  Goal: Ability to express needs and understand communication  Description: Assess patient's communication skills and ability to understand information  Patient will demonstrate use of effective communication techniques, alternative methods of communication and understanding even if not able to speak  - Encourage communication and provide alternate methods of communication as needed  - Collaborate with case management/ for discharge needs  - Include patient/family/caregiver in decisions related to communication  Outcome: Progressing     Problem: Potential for Aspiration  Goal: Non-ventilated patient's risk of aspiration is minimized  Description: Assess and monitor vital signs, respiratory status, and labs (WBC)  Monitor for signs of aspiration (tachypnea, cough, rales, wheezing, cyanosis, fever)  - Assess and monitor patient's ability to swallow  - Place patient up in chair to eat if possible    - HOB up at 90 degrees to eat if unable to get patient up into chair   - Supervise patient during oral intake  - Instruct patient/ family to take small bites  - Instruct patient/ family to take small single sips when taking liquids  - Follow patient-specific strategies generated by speech pathologist   Outcome: Progressing  Goal: Ventilated patient's risk of aspiration is minimized  Description: Assess and monitor vital signs, respiratory status, airway cuff pressure, and labs (WBC)  Monitor for signs of aspiration (tachypnea, cough, rales, wheezing, cyanosis, fever)  - Elevate head of bed 30 degrees if patient has tube feeding   - Monitor tube feeding  Outcome: Progressing     Problem: Nutrition  Goal: Nutrition/Hydration status is improving  Description: Monitor and assess patient's nutrition/hydration status for malnutrition (ex- brittle hair, bruises, dry skin, pale skin and conjunctiva, muscle wasting, smooth red tongue, and disorientation)  Collaborate with interdisciplinary team and initiate plan and interventions as ordered  Monitor patient's weight and dietary intake as ordered or per policy  Utilize nutrition screening tool and intervene per policy  Determine patient's food preferences and provide high-protein, high-caloric foods as appropriate  - Assist patient with eating   - Allow adequate time for meals   - Encourage patient to take dietary supplement as ordered  - Collaborate with clinical nutritionist   - Include patient/family/caregiver in decisions related to nutrition    Outcome: Progressing

## 2022-01-20 NOTE — PROGRESS NOTES
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 XR 1000 mg QHS, CKD, HTN, hyperlipidemia, and s/p AAA repair  Presented to the ED as a stroke alert for right-sided facial droop however had multiple seizures en route to the ED  LKW was 1 day prior  Reports of possibly missing one dose of Keppra  Received 4 mg of Ativan per EMS followed by an additional 2 mg in the ED due to persistent right gaze deviation concerning for ongoing seizure activity  Was intubated for airway protection  EMS reported a fever of 102F however he has been afebrile since admission  Received loading dose of 1500 mg Keppra and 20 mg/kg of valproic acid  LP was completed and was unrevealing  CTH and CTA were also unremarkable  1/19 - had seizure in the morning as sedation was weaned  Had additional sub-clinical seizure in the afternoon  Sedation was increased and has since remained seizure free  Concern at this time for recurrent seizures or status epilepticus  It is unclear why he would be in status considering that he missed only one dose of his Keppra which would be unusual to have multiple seizures in that setting  Also questionable febrile illness which may have decreased seizure threshold though again investigations have been unrevealing and WBC is improved  Urine this morning was cloudy and UA will be sent       Plan:  - Stroke pathway discontinued as presentation is not consistent with stroke  - Monitor on video EEG  - Continue Keppra 1500 mg BID  - Valproic acid 500 mg Q8H  - Seizure precautions  - MRI brain wo contrast  - Continue ceftriaxone as per CC team  - Will discuss sending encephalitis panel with primary team  - Remainder of management as per CC team      SUBJECTIVE     Patient was seen and examined  No acute events overnight  Yesterday afternoon, patient had another subclinical seizure which lasted 22-seconds  This was localized over the right frontal region and was similar to seizure activity from the morning  Additionally the epileptiform discharges were increased during the afternoon compared to the morning  At that point he was sedated on 20 of propofol which has been increased  Since then, patient has remained seizure free  He currently remains sedated on 50 mcg/kg/hr of propofol  Has needed levophed for BP support while on sedation  Review of Systems   Unable to perform ROS: Intubated       OBJECTIVE     Patient ID: Eliud Tinoco is a 80 y o  male  Vitals:    22 0700 22 0733 22 0800 22 0900   BP: 116/55  134/56 104/50   BP Location:       Pulse: 72  64 58   Resp: 19  15 14   Temp:  99 5 °F (37 5 °C)     TempSrc:  Oral     SpO2: 99%  100% 99%   Weight:       Height:          Temperature:   Temp (24hrs), Av 6 °F (37 °C), Min:97 5 °F (36 4 °C), Max:100 4 °F (38 °C)    Temperature: 99 5 °F (37 5 °C)      Physical Exam  Vitals and nursing note reviewed  Constitutional:       Appearance: He is normal weight  He is ill-appearing  He is not toxic-appearing  Interventions: He is sedated, intubated and restrained  HENT:      Head: Normocephalic and atraumatic  Right Ear: External ear normal       Left Ear: External ear normal       Nose: Nose normal       Mouth/Throat:      Mouth: Mucous membranes are dry  Pharynx: Oropharynx is clear  Eyes:      Conjunctiva/sclera: Conjunctivae normal       Pupils: Pupils are equal, round, and reactive to light  Cardiovascular:      Rate and Rhythm: Normal rate  Pulses: Normal pulses     Pulmonary:      Effort: He is intubated  Comments: On ventilator  Abdominal:      General: Abdomen is flat  There is no distension  Musculoskeletal:      Cervical back: Neck supple  Right lower leg: No edema  Left lower leg: No edema  Skin:     General: Skin is warm and dry  Psychiatric:      Comments: Unable to assess at this time in the setting of intubated, ventilated, and sedated          Neurologic Exam     Mental Status   Patient examined while sedated on Propofol at 50 mcg/kg/hr  Arouses to painful stimuli  Cranial Nerves     CN III, IV, VI   Pupils are equal, round, and reactive to light  Blink to threat intact  Eyes are midline, conjugate gaze, no gaze deviation  Corneal reflexes intact  PERRL  VOR intact  Face appears symmetric but presence of ET tube is limiting exam  Tongue is midline in mouth  Motor Exam Tone is decreased throughout all four extremities in the setting of sedation  No spontaneous movements     Sensory Exam   No grimace or withdrawal to painful stimuli on the right upper and lower extremities  On the left, grimace and slight withdrawal noted  Gait, Coordination, and Reflexes Gait not tested  Coordination not tested  No tremors noted at rest  Reflexes are trace throughout  Toes are mute  LABORATORY DATA     Labs: I have personally reviewed pertinent reports      Results from last 7 days   Lab Units 01/20/22  0502 01/19/22  0537 01/18/22  1058   WBC Thousand/uL 14 34* 14 31* 15 62*   HEMOGLOBIN g/dL 12 5 12 9 13 3   HEMATOCRIT % 38 5 39 5 40 5   PLATELETS Thousands/uL 161 132* 159   NEUTROS PCT % 86* 87*  --    MONOS PCT % 6 7  --       Results from last 7 days   Lab Units 01/20/22  0502 01/19/22  0536 01/18/22  1058   SODIUM mmol/L 146* 143 140  140   POTASSIUM mmol/L 3 8 3 5 3 8  3 8   CHLORIDE mmol/L 115* 111* 104  104   CO2 mmol/L 28 26 27  25   BUN mg/dL 15 18 20  20   CREATININE mg/dL 0 83 0 93 1 53*  1 57*   CALCIUM mg/dL 7 7* 7 8* 8 5  8 5   ALK PHOS U/L  --  72 80 ALT U/L  --  25 28   AST U/L  --  54* 36     Results from last 7 days   Lab Units 01/20/22  0502 01/19/22  0536   MAGNESIUM mg/dL 2 7* 2 4     Results from last 7 days   Lab Units 01/20/22  0502 01/19/22  0536   PHOSPHORUS mg/dL 2 6 1 5*      Results from last 7 days   Lab Units 01/18/22  1058   INR  1 10   PTT seconds 28     Results from last 7 days   Lab Units 01/18/22  1326   LACTIC ACID mmol/L 2 1*           IMAGING & DIAGNOSTIC TESTING     Radiology Results: I have personally reviewed pertinent reports  and I have personally reviewed pertinent films in PACS    XR chest portable  Result Date: 1/19/2022  Impression: ET tube tip as above  Persistent left basilar opacity possibly representing atelectasis, pneumonia and/or pleural effusion  Workstation performed: [id number]     XR chest 1 view portable  Result Date: 1/18/2022  Impression: Endotracheal tube appears somewhat high in position as above  Further advancement of approximately 4 cm would be helpful  Left lower lobe consolidation and small to moderate left pleural effusion  This is concerning for pneumonia  Follow-up to radiographic resolution advised  Questionable 1 2 cm nodular density in left mid to lower lung which can be further evaluated with a chest CT study after treatment  I personally discussed the position of the endotracheal tube with EMMA JARA on 1/18/2022 at 4:01 PM  Workstation performed: [id number]     XR chest 1 view portable  Result Date: 1/18/2022  Impression: No acute cardiopulmonary disease  Workstation performed: [id number]     CT stroke alert brain  Result Date: 1/18/2022  Impression: Stable examination with no acute intracranial abnormality identified   Findings were directly discussed with Dr Patel Sis at 10:35 AM  Workstation performed: [id number]     XR chest portable ICU  Result Date: 1/19/2022  Impression: Interval placement of right IJ CVP line without complication Persistent lateral left lung base opacity suspicious for infiltrate/atelectasis Workstation performed: [id number]     CTA stroke alert (head/neck)  Result Date: 1/18/2022  Impression: Stable CT angiography with no flow restrictive stenosis, occlusion or thrombosis of the cervical or intracranial vasculature  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  Findings were directly discussed with Pablo Kent at 10:35 AM  Workstation performed: [id number]       Other Diagnostic Testing: I have personally reviewed pertinent reports  ACTIVE MEDICATIONS     Current Facility-Administered Medications   Medication Dose Route Frequency    azithromycin (ZITHROMAX) 500 mg in sodium chloride 0 9 % 250 mL IVPB  500 mg Intravenous Q24H    cefTRIAXone (ROCEPHIN) 1,000 mg in dextrose 5 % 50 mL IVPB  1,000 mg Intravenous Q24H    chlorhexidine (PERIDEX) 0 12 % oral rinse 15 mL  15 mL Mouth/Throat Q12H PARVIZ    enoxaparin (LOVENOX) subcutaneous injection 40 mg  40 mg Subcutaneous O00Y PARVIZ    folic acid (FOLVITE) tablet 1 mg  1 mg Oral Daily    levETIRAcetam (KEPPRA) 1,500 mg in sodium chloride 0 9 % 100 mL IVPB  1,500 mg Intravenous Q12H [address]    multi-electrolyte (PLASMALYTE-A/ISOLYTE-S PH 7 4) IV solution  100 mL/hr Intravenous Continuous    norepinephrine (LEVOPHED) 4 mg (STANDARD CONCENTRATION) IV in sodium chloride 0 9% 250 mL  1-30 mcg/min Intravenous Titrated    pravastatin (PRAVACHOL) tablet 80 mg  80 mg Oral Daily With Dinner    propofol (DIPRIVAN) 1000 mg in 100 mL infusion (premix)  5-50 mcg/kg/min Intravenous Titrated    thiamine tablet 100 mg  100 mg Oral Daily    valproate (DEPACON) 500 mg in sodium chloride 0 9 % 50 mL IVPB  500 mg Intravenous Q8H [address]       Prior to Admission medications    Medication Sig Start Date End Date Taking?  Authorizing Provider   acetaminophen (TYLENOL) 500 mg tablet Take 500 mg by mouth every 6 (six) hours as needed for mild pain    Historical Provider, MD   levETIRAcetam (KEPPRA XR) 500 MG 24 hr tablet Take 2 tablets (1,000 mg total) by mouth daily 12/20/21   Alan Vo MD   simvastatin (ZOCOR) 40 mg tablet Take 1 tablet (40 mg total) by mouth daily 9/1/21   Alan Vo MD         VTE Pharmacologic Prophylaxis: Enoxaparin (Lovenox)  VTE Mechanical Prophylaxis: sequential compression device    ==  Davie Morales DO   [facility] Neurology Residency, PGY-2

## 2022-01-20 NOTE — PLAN OF CARE
Problem: Neurological Deficit  Goal: Neurological status is stable or improving  Description: Interventions:  - Monitor and assess patient's level of consciousness, motor function, sensory function, and level of assistance needed for ADLs  - Monitor and report changes from baseline  Collaborate with interdisciplinary team to initiate plan and implement interventions as ordered  - Provide and maintain a safe environment  - Consider seizure precautions  - Consider fall precautions  - Consider aspiration precautions  - Consider bleeding precautions  Outcome: Progressing     Problem: Activity Intolerance/Impaired Mobility  Goal: Mobility/activity is maintained at optimum level for patient  Description: Interventions:  - Assess and monitor patient  barriers to mobility and need for assistive/adaptive devices  - Assess patient's emotional response to limitations  - Collaborate with interdisciplinary team and initiate plans and interventions as ordered  - Encourage independent activity per ability   - Maintain proper body alignment  - Perform active/passive rom as tolerated/ordered  - Plan activities to conserve energy   - Turn patient as appropriate  Outcome: Progressing     Problem: Communication Impairment  Goal: Ability to express needs and understand communication  Description: Assess patient's communication skills and ability to understand information  Patient will demonstrate use of effective communication techniques, alternative methods of communication and understanding even if not able to speak  - Encourage communication and provide alternate methods of communication as needed  - Collaborate with case management/ for discharge needs  - Include patient/family/caregiver in decisions related to communication    Outcome: Progressing     Problem: Potential for Aspiration  Goal: Non-ventilated patient's risk of aspiration is minimized  Description: Assess and monitor vital signs, respiratory status, and labs (WBC)  Monitor for signs of aspiration (tachypnea, cough, rales, wheezing, cyanosis, fever)  - Assess and monitor patient's ability to swallow  - Place patient up in chair to eat if possible  - HOB up at 90 degrees to eat if unable to get patient up into chair   - Supervise patient during oral intake  - Instruct patient/ family to take small bites  - Instruct patient/ family to take small single sips when taking liquids  - Follow patient-specific strategies generated by speech pathologist   Outcome: Progressing     Problem: Nutrition  Goal: Nutrition/Hydration status is improving  Description: Monitor and assess patient's nutrition/hydration status for malnutrition (ex- brittle hair, bruises, dry skin, pale skin and conjunctiva, muscle wasting, smooth red tongue, and disorientation)  Collaborate with interdisciplinary team and initiate plan and interventions as ordered  Monitor patient's weight and dietary intake as ordered or per policy  Utilize nutrition screening tool and intervene per policy  Determine patient's food preferences and provide high-protein, high-caloric foods as appropriate  - Assist patient with eating   - Allow adequate time for meals   - Encourage patient to take dietary supplement as ordered  - Collaborate with clinical nutritionist   - Include patient/family/caregiver in decisions related to nutrition    Outcome: Progressing

## 2022-01-20 NOTE — PROGRESS NOTES
Daily Progress Note - Critical Care   Nohemy Thomson 80 y o  male MRN: [id number]  Unit/Bed#: ICU 09 Encounter: [id number]      ------------------------------------------------------------------------------------------------------------    History of Present Illness   HX and PE limited by: Intubated/Sedated       Nohemy Thomson is a 80 y o  male who presents with Stroke Alert due to R facial droop, subsequently found to have multiple seizures around 9:15 on 1/18  LKN 1/17 and per report patient missed a Keppra dose 1/16  Recently also had Keppra dose decreased 7/28/21 from 1500mg HS to 1000mg HS  Last reported seizure 2/21/17  PMH includes seizure disorder, nonrheumatic aortic stenosis, ascending aorta ectasia, AAA s/p EVAR  Patient Keppra loaded 1500mg and VPA loaded 20mg/kg  Also given 6mg total of Ativan  To note, patient also with fever and leokocytosis  LP performed at [facility] ED prior to transfer  Meningitis panel negative  Once patient attached to vEEG propofol discontinued and Precedex initiated  Patient with 4 seizures within 20 minute period  Given 2mg of Ativan and re-initaited on propofol  Patient had an additional seizure at 1600 1/19  Overnight, sedated on propofol @50  Levo increased to 9, previously 6  No seizure activity noted on vEEG         -------------------------------------------------------------------------------------------------------------  Assessment and Plan:    Neuro:    Diagnosis: Seizure    o Plan:   - CTH: Stable examination with no acute intracranial abnormality identified   - CTA H&N: Stable CT angiography with no flow restrictive stenosis, occlusion or thrombosis of the cervical or intracranial vasculature   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   - MRI pending, unfortunately EEG leads not MRI compatible    - S/p Keppra load [id number] and VPA load 20mg/kg   - Continue Keppra 1500mg BID   - Continue VAP 500mg TID - Keppra level pending   - LP performed with meningitis panel negative    DC acyclovir   Blood and urine culture pending   - Ammonia 21  - Continue Continuous vEEG     Adjust AEDs per EMU  - Ativan PRN for seizure activity    Diagnosis: Hx of seizure disorder   o Plan:   - Follows with Dr Prescilla Severe as outpatient   - Home regimen: Keppra 1000mg HS   - Dose reduced 7/28/21 from 1500mg HS   - Last known seizure 2/21/17, 2 seizures unprovoked   - Missed dose 1/16       CV:    Diagnosis: Hypotension   o Plan:   - Likely in the setting of sedation   - Continue levo for MAP>65   - Arterial line in place    Diagnosis: Hx of HTN   o Plan:   - No home regimen  - SBP goal <160    Diagnosis: Troponin elevation   o Plan:   - Continue to trend   - Troponin: 1159--> 975--> 791-->606-->563   - Cardiology consult per protocol    Diagnosis: AAA s/p EVAR   o Plan:   - Sx in 2010   - Follows up yearly with repeat duplex, stable     Diagnosis: HLD   o Plan:   - Hold home simvastatin       Pulm:   Diagnosis: Acute Respiratory Failure   o Plan:   - Intubated at [facility] ED   - Continue mechanical ventilation D2   VAP bundle    SBT as appropriate    Maintain SpO2>92%       GI:    Diagnosis: Elevated AST and T bili   o Plan:   - Possibly exacerbated by RICK   - Continue to trend   - GI ppx: not indicated   - Bowel regimen PRN       :    Diagnosis: CKD III  o Plan:   - Continue to trend BUN/Cr  - Baseline creatinine 1 15-1 22  - Strict I/O   - Avoid nephrotoxins       F/E/N:    Plan:   o Fluids: Continue maintenance fluids   o Electrolytes:  Will replete as necessary to maintain potassium > 4 0, magnesium > 2 0, and phosphrous > 3 0    o Nutrition: TFs       Heme/Onc:    Diagnosis: Leukocytosis   o Plan:   - Continue to follow fever curve and WBC count   - Follow up cultures   - INR on admission 1 10   - DVT ppx: SQH & SCDs      Endo:    Diagnosis: No acute issues   o Plan:   - Blood glucose goal 140-180   - Initiate SSI if indicated       ID:    Diagnosis: Leukocytosis, downtrending    o Plan:   - CXR unremarkable   - LP with negative meningitis panel  - Covid negative   - Continue to follow up blood cultures       MSK/Skin:    Diagnosis: At risk for pressure injury   o Plan:   - Frequent turning/pressure off loading   - PT/OT      Disposition: Continue Critical Care   Code Status: Level 1 - Full Code  --------------------------------------------------------------------------------------------------------------  Review of Systems   Unable to perform ROS: Intubated       A 12-point, complete review of systems was reviewed and negative except as stated above     Physical Exam  Constitutional:       General: He is not in acute distress  Appearance: He is normal weight  He is not toxic-appearing or diaphoretic  Comments: Sedated on prop @50   HENT:      Head: Normocephalic and atraumatic  Mouth/Throat:      Pharynx: Oropharynx is clear  Eyes:      General: No scleral icterus  Conjunctiva/sclera: Conjunctivae normal       Pupils: Pupils are equal, round, and reactive to light  Comments: Pupils pinpoint, reactive   Cardiovascular:      Rate and Rhythm: Regular rhythm  Bradycardia present  Pulmonary:      Effort: Pulmonary effort is normal  No respiratory distress  Abdominal:      General: There is no distension  Palpations: Abdomen is soft  Tenderness: There is no abdominal tenderness  Genitourinary:     Comments: Rigoberto  Musculoskeletal:         General: No swelling or deformity  Normal range of motion  Cervical back: Normal range of motion and neck supple  No rigidity  Right lower leg: No edema  Left lower leg: No edema  Skin:     Coloration: Skin is not jaundiced or pale     Neurological:      Comments: Exam on prop @50; no withdrawal to painful stimuli in all 4 extremities, + corneal, cough, gag --------------------------------------------------------------------------------------------------------------  Vitals:   Vitals:    01/19/22 2130 01/19/22 2200 01/19/22 2300 01/20/22 0000   BP: 108/53 114/54 (!) 99/47 126/55   BP Location:    Right arm   Pulse: 58 58 58 60   Resp: 16 15 16 15   Temp: 97 8 °F (36 6 °C)   98 2 °F (36 8 °C)   TempSrc:    Axillary   SpO2: 99% 99% 99% 99%   Weight:       Height:         Temp  Min: 97 8 °F (36 6 °C)  Max: 100 4 °F (38 °C)  IBW (Ideal Body Weight): 73 kg  Height: 5' 10" (177 8 cm)  Body mass index is 26 83 kg/m²    N/A    Laboratory and Diagnostics:  Results from last 7 days   Lab Units 01/19/22  0537 01/18/22  1058   WBC Thousand/uL 14 31* 15 62*   HEMOGLOBIN g/dL 12 9 13 3   HEMATOCRIT % 39 5 40 5   PLATELETS Thousands/uL 132* 159   NEUTROS PCT % 87*  --    MONOS PCT % 7  --      Results from last 7 days   Lab Units 01/19/22  0536 01/18/22  1058   SODIUM mmol/L 143 140  140   POTASSIUM mmol/L 3 5 3 8  3 8   CHLORIDE mmol/L 111* 104  104   CO2 mmol/L 26 27  25   ANION GAP mmol/L 6 9  11   BUN mg/dL 18 20  20   CREATININE mg/dL 0 93 1 53*  1 57*   CALCIUM mg/dL 7 8* 8 5  8 5   GLUCOSE RANDOM mg/dL 105 135  135   ALT U/L 25 28   AST U/L 54* 36   ALK PHOS U/L 72 80   ALBUMIN g/dL 3 1* 3 7   TOTAL BILIRUBIN mg/dL 1 40* 1 10*     Results from last 7 days   Lab Units 01/19/22  0536   MAGNESIUM mg/dL 2 4   PHOSPHORUS mg/dL 1 5*      Results from last 7 days   Lab Units 01/18/22  1058   INR  1 10   PTT seconds 28          Results from last 7 days   Lab Units 01/18/22  1326 01/18/22  1048   LACTIC ACID mmol/L 2 1* 3 1*     ABG:    VBG:  Results from last 7 days   Lab Units 01/18/22  1058   PH JANET  7 395   PCO2 JANET mm Hg 41 3*   PO2 JANET mm Hg 57 5*   HCO3 JANET mmol/L 24 7   BASE EXC JANET mmol/L -0 2     Results from last 7 days   Lab Units 01/19/22  0537   PROCALCITONIN ng/ml 0 79*       Micro:  Results from last 7 days   Lab Units 01/19/22  0206 01/19/22  0133 22  1721 22  1126 22  1048   BLOOD CULTURE  Received in Microbiology Lab  Culture in Progress  Received in Microbiology Lab  Culture in Progress  --  No Growth at 24 hrs  No Growth at 24 hrs  GRAM STAIN RESULT   --   --  No Polys or Bacteria seen  --   --        EKG: Reviewed       Imaging: I have personally reviewed pertinent films in PACS    Historical Information   Past Medical History:   Diagnosis Date    Colon polyps     Gout     Hyperlipidemia     Hypertension     S/P AAA repair     Seizure disorder ([address] )     Tear of right rotator cuff 2017     Past Surgical History:   Procedure Laterality Date    ABDOMINAL AORTIC ANEURYSM REPAIR, ENDOVASCULAR N/A     MS COLONOSCOPY FLX DX W/COLLJ SPEC WHEN PFRMD N/A 2017    Procedure: COLONOSCOPY;  Surgeon: Edinson Grey MD;  Location: [facility]; Service: Colorectal    MS COLONOSCOPY FLX DX W/COLLJ SPEC WHEN PFRMD N/A 2018    Procedure: COLONOSCOPY;  Surgeon: Edinson Grey MD;  Location: [facility];   Service: Colorectal    TONSILLECTOMY      WISDOM TOOTH EXTRACTION Bilateral      Social History   Social History     Substance and Sexual Activity   Alcohol Use Yes    Comment: occasional     Social History     Substance and Sexual Activity   Drug Use No     Social History     Tobacco Use   Smoking Status Former Smoker    Packs/day: 3 00    Years: 10 00    Pack years: 30 00    Quit date: 1971    Years since quittin 9   Smokeless Tobacco Never Used       Family History:   Family History   Problem Relation Age of Onset    Aneurysm Mother         ABDMONIAL  AORTA    Coronary artery disease Father     Coronary artery disease Brother      I have reviewed this patient's family history and commented on sigificant items within the HPI      Medications:  Current Facility-Administered Medications   Medication Dose Route Frequency    azithromycin (ZITHROMAX) 500 mg in sodium chloride 0 9 % 250 mL IVPB  500 mg Intravenous Q24H    cefTRIAXone (ROCEPHIN) 1,000 mg in dextrose 5 % 50 mL IVPB  1,000 mg Intravenous Q24H    chlorhexidine (PERIDEX) 0 12 % oral rinse 15 mL  15 mL Mouth/Throat I17S PARVIZ    folic acid (FOLVITE) tablet 1 mg  1 mg Oral Daily    levETIRAcetam (KEPPRA) 1,500 mg in sodium chloride 0 9 % 100 mL IVPB  1,500 mg Intravenous Q12H [address]    multi-electrolyte (PLASMALYTE-A/ISOLYTE-S PH 7 4) IV solution  100 mL/hr Intravenous Continuous    norepinephrine (LEVOPHED) 4 mg (STANDARD CONCENTRATION) IV in sodium chloride 0 9% 250 mL  1-30 mcg/min Intravenous Titrated    pravastatin (PRAVACHOL) tablet 80 mg  80 mg Oral Daily With Dinner    propofol (DIPRIVAN) 1000 mg in 100 mL infusion (premix)  5-50 mcg/kg/min Intravenous Titrated    thiamine tablet 100 mg  100 mg Oral Daily    valproate (DEPACON) 500 mg in sodium chloride 0 9 % 50 mL IVPB  500 mg Intravenous Q8H [address]     Home medications:  Prior to Admission Medications   Prescriptions Last Dose Informant Patient Reported? Taking?   acetaminophen (TYLENOL) 500 mg tablet  Self Yes No   Sig: Take 500 mg by mouth every 6 (six) hours as needed for mild pain   levETIRAcetam (KEPPRA XR) 500 MG 24 hr tablet   No No   Sig: Take 2 tablets (1,000 mg total) by mouth daily   simvastatin (ZOCOR) 40 mg tablet  Self No No   Sig: Take 1 tablet (40 mg total) by mouth daily      Facility-Administered Medications: None     Allergies:   Allergies   Allergen Reactions    Fenofibrate      Patient not aware of allergy    Hydrocodone-Acetaminophen      Patient not aware of allergy     ------------------------------------------------------------------------------------------------------------  Advance Directive and Living Will:      Power of :    POLST:    ------------------------------------------------------------------------------------------------------------  Anticipated Length of Stay is > 2 midnights    Care Time Delivered:   Upon my evaluation, this patient had a high probability of imminent or life-threatening deterioration due to seizures , which required my direct attention, intervention, and personal management  I have personally provided 40 minutes (0000 to 0040) of critical care time, exclusive of procedures, teaching, family meetings, and any prior time recorded by providers other than myself         Edith Krishnan PA-C

## 2022-01-21 ENCOUNTER — APPOINTMENT (INPATIENT)
Dept: NEUROLOGY | Facility: CLINIC | Age: 82
DRG: 100 | End: 2022-01-21
Payer: MEDICARE

## 2022-01-21 LAB
ANION GAP SERPL CALCULATED.3IONS-SCNC: 2 MMOL/L (ref 4–13)
BACTERIA CSF CULT: NO GROWTH
BACTERIA SPT RESP CULT: NORMAL
BASOPHILS # BLD AUTO: 0.01 THOUSANDS/ΜL (ref 0–0.1)
BASOPHILS NFR BLD AUTO: 0 % (ref 0–1)
BUN SERPL-MCNC: 11 MG/DL (ref 5–25)
CA-I BLD-SCNC: 1.09 MMOL/L (ref 1.12–1.32)
CALCIUM SERPL-MCNC: 7.8 MG/DL (ref 8.3–10.1)
CHLORIDE SERPL-SCNC: 116 MMOL/L (ref 100–108)
CO2 SERPL-SCNC: 30 MMOL/L (ref 21–32)
CREAT SERPL-MCNC: 0.72 MG/DL (ref 0.6–1.3)
EOSINOPHIL # BLD AUTO: 0.3 THOUSAND/ΜL (ref 0–0.61)
EOSINOPHIL NFR BLD AUTO: 3 % (ref 0–6)
ERYTHROCYTE [DISTWIDTH] IN BLOOD BY AUTOMATED COUNT: 12.9 % (ref 11.6–15.1)
GFR SERPL CREATININE-BSD FRML MDRD: 88 ML/MIN/1.73SQ M
GLUCOSE SERPL-MCNC: 117 MG/DL (ref 65–140)
GLUCOSE SERPL-MCNC: 117 MG/DL (ref 65–140)
GLUCOSE SERPL-MCNC: 126 MG/DL (ref 65–140)
GLUCOSE SERPL-MCNC: 93 MG/DL (ref 65–140)
GRAM STN SPEC: NORMAL
GRAM STN SPEC: NORMAL
HCT VFR BLD AUTO: 37.4 % (ref 36.5–49.3)
HGB BLD-MCNC: 12.3 G/DL (ref 12–17)
IMM GRANULOCYTES # BLD AUTO: 0.04 THOUSAND/UL (ref 0–0.2)
IMM GRANULOCYTES NFR BLD AUTO: 0 % (ref 0–2)
LYMPHOCYTES # BLD AUTO: 0.85 THOUSANDS/ΜL (ref 0.6–4.47)
LYMPHOCYTES NFR BLD AUTO: 8 % (ref 14–44)
MAGNESIUM SERPL-MCNC: 2.8 MG/DL (ref 1.6–2.6)
MCH RBC QN AUTO: 32 PG (ref 26.8–34.3)
MCHC RBC AUTO-ENTMCNC: 32.9 G/DL (ref 31.4–37.4)
MCV RBC AUTO: 97 FL (ref 82–98)
MONOCYTES # BLD AUTO: 0.7 THOUSAND/ΜL (ref 0.17–1.22)
MONOCYTES NFR BLD AUTO: 7 % (ref 4–12)
NEUTROPHILS # BLD AUTO: 8.46 THOUSANDS/ΜL (ref 1.85–7.62)
NEUTS SEG NFR BLD AUTO: 82 % (ref 43–75)
NRBC BLD AUTO-RTO: 0 /100 WBCS
PLATELET # BLD AUTO: 130 THOUSANDS/UL (ref 149–390)
PMV BLD AUTO: 11 FL (ref 8.9–12.7)
POTASSIUM SERPL-SCNC: 3.9 MMOL/L (ref 3.5–5.3)
RBC # BLD AUTO: 3.84 MILLION/UL (ref 3.88–5.62)
SODIUM SERPL-SCNC: 148 MMOL/L (ref 136–145)
WBC # BLD AUTO: 10.36 THOUSAND/UL (ref 4.31–10.16)

## 2022-01-21 PROCEDURE — 95715 VEEG EA 12-26HR INTMT MNTR: CPT

## 2022-01-21 PROCEDURE — 85025 COMPLETE CBC W/AUTO DIFF WBC: CPT | Performed by: REGISTERED NURSE

## 2022-01-21 PROCEDURE — 99232 SBSQ HOSP IP/OBS MODERATE 35: CPT | Performed by: PSYCHIATRY & NEUROLOGY

## 2022-01-21 PROCEDURE — 80048 BASIC METABOLIC PNL TOTAL CA: CPT | Performed by: REGISTERED NURSE

## 2022-01-21 PROCEDURE — 82330 ASSAY OF CALCIUM: CPT | Performed by: REGISTERED NURSE

## 2022-01-21 PROCEDURE — 82948 REAGENT STRIP/BLOOD GLUCOSE: CPT

## 2022-01-21 PROCEDURE — 94760 N-INVAS EAR/PLS OXIMETRY 1: CPT

## 2022-01-21 PROCEDURE — 95720 EEG PHY/QHP EA INCR W/VEEG: CPT | Performed by: STUDENT IN AN ORGANIZED HEALTH CARE EDUCATION/TRAINING PROGRAM

## 2022-01-21 PROCEDURE — 83735 ASSAY OF MAGNESIUM: CPT | Performed by: REGISTERED NURSE

## 2022-01-21 PROCEDURE — 94003 VENT MGMT INPAT SUBQ DAY: CPT

## 2022-01-21 PROCEDURE — 99291 CRITICAL CARE FIRST HOUR: CPT | Performed by: INTERNAL MEDICINE

## 2022-01-21 RX ORDER — DOXAZOSIN MESYLATE 1 MG/1
1 TABLET ORAL DAILY
Status: DISCONTINUED | OUTPATIENT
Start: 2022-01-21 | End: 2022-02-01 | Stop reason: HOSPADM

## 2022-01-21 RX ADMIN — PRAVASTATIN SODIUM 80 MG: 80 TABLET ORAL at 17:57

## 2022-01-21 RX ADMIN — THIAMINE HCL TAB 100 MG 100 MG: 100 TAB at 09:33

## 2022-01-21 RX ADMIN — AZITHROMYCIN 500 MG: 500 TABLET, FILM COATED ORAL at 02:23

## 2022-01-21 RX ADMIN — PROPOFOL 50 MCG/KG/MIN: 10 INJECTION, EMULSION INTRAVENOUS at 02:23

## 2022-01-21 RX ADMIN — LEVETIRACETAM 1500 MG: 100 INJECTION, SOLUTION INTRAVENOUS at 09:11

## 2022-01-21 RX ADMIN — VALPROATE SODIUM 500 MG: 100 INJECTION, SOLUTION INTRAVENOUS at 13:11

## 2022-01-21 RX ADMIN — PROPOFOL 30 MCG/KG/MIN: 10 INJECTION, EMULSION INTRAVENOUS at 13:11

## 2022-01-21 RX ADMIN — FOLIC ACID 1 MG: 1 TABLET ORAL at 09:33

## 2022-01-21 RX ADMIN — LEVETIRACETAM 1500 MG: 100 INJECTION, SOLUTION INTRAVENOUS at 20:17

## 2022-01-21 RX ADMIN — VALPROATE SODIUM 500 MG: 100 INJECTION, SOLUTION INTRAVENOUS at 21:11

## 2022-01-21 RX ADMIN — DOXAZOSIN 1 MG: 1 TABLET ORAL at 13:06

## 2022-01-21 RX ADMIN — CHLORHEXIDINE GLUCONATE 15 ML: 1.2 SOLUTION ORAL at 20:22

## 2022-01-21 RX ADMIN — ENOXAPARIN SODIUM 40 MG: 40 INJECTION SUBCUTANEOUS at 09:33

## 2022-01-21 RX ADMIN — PROPOFOL 50 MCG/KG/MIN: 10 INJECTION, EMULSION INTRAVENOUS at 09:11

## 2022-01-21 RX ADMIN — VALPROATE SODIUM 500 MG: 100 INJECTION, SOLUTION INTRAVENOUS at 05:00

## 2022-01-21 RX ADMIN — CEFTRIAXONE SODIUM 1000 MG: 10 INJECTION, POWDER, FOR SOLUTION INTRAVENOUS at 14:18

## 2022-01-21 RX ADMIN — PROPOFOL 50 MCG/KG/MIN: 10 INJECTION, EMULSION INTRAVENOUS at 04:09

## 2022-01-21 RX ADMIN — CHLORHEXIDINE GLUCONATE 15 ML: 1.2 SOLUTION ORAL at 09:33

## 2022-01-21 RX ADMIN — NOREPINEPHRINE BITARTRATE 8 MCG/MIN: 1 INJECTION, SOLUTION, CONCENTRATE INTRAVENOUS at 02:23

## 2022-01-21 NOTE — RESPIRATORY THERAPY NOTE
RT Ventilator Management Note  Maureen Jimenez 80 y o  male MRN: [id number]  Unit/Bed#: ICU 09 Encounter: [id number]      Daily Screen       1/19/2022 0822 1/20/2022  0813          Patient safety screen outcome[de-identified] Failed Failed      Not Ready for Weaning due to[de-identified] Underline problem not resolved Underline problem not resolved      Spont breathing trial outcome[de-identified] Failed --              Physical Exam:   Assessment Type: Assess only  General Appearance: Sedated  Respiratory Pattern: Assisted  Chest Assessment: Chest expansion symmetrical  Bilateral Breath Sounds: Diminished  O2 Device: vent      Resp Comments: Pt remains on SPONT settings  Will continue to monitor

## 2022-01-21 NOTE — OCCUPATIONAL THERAPY NOTE
OT CANCEL NOTE    OT orders received  Chart reviewed  Pt is currently intubated/sedated and not appropriate to engage in skilled OT services at this time  Will hold initial OT evaluation  Will continue to follow pt on caseload and see pt when medically stable and as clinically appropriate  01/21/22 0755   OT Last Visit   OT Visit Date 01/21/22   Note Type   Note type Evaluation; Cancelled Session   Cancel Reasons Intubated/sedated       Caren Piper MS, OTR/L

## 2022-01-21 NOTE — PHYSICAL THERAPY NOTE
Physical Therapy Cancellation Note    PT orders received chart review completed  Pt is currently intubated/sedated and not appropriate to participate in skilled PT at this time  PT will follow and eval as medically appropriate  01/21/22 1201   PT Last Visit   PT Visit Date 01/21/22   Note Type   Note type Evaluation; Cancelled Session   Cancel Reasons Intubated/sedated     Merlyn Rodriguez, PT

## 2022-01-21 NOTE — PROGRESS NOTES
NEUROLOGY RESIDENCY PROGRESS NOTE     Name: Erich Burkett   Age & Sex: 80 y o  male   MRN: [id number]  Unit/Bed#: ICU 09   Encounter: [id number]    Recommendations for outpatient neurological follow up have yet to be determined  Pending for discharge: continued vEEG monitoring, MRI brain, weaning sedation, clinical improvement, extubation    ASSESSMENT & PLAN     * Seizure [facility])  Assessment & Plan  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 XR 1000 mg QHS, CKD, HTN, hyperlipidemia, and s/p AAA repair  Presented to the ED as a stroke alert for right-sided facial droop however had multiple seizures en route to the ED  LKW was 1 day prior  Reports of possibly missing one dose of Keppra  Received 4 mg of Ativan per EMS followed by an additional 2 mg in the ED due to persistent right gaze deviation concerning for ongoing seizure activity  Was intubated for airway protection  EMS reported a fever of 102F however he has been afebrile since admission  Received loading dose of 1500 mg Keppra and 20 mg/kg of valproic acid  LP was completed and was unrevealing  CTH and CTA were also unremarkable  Unclear why patient had multiple seizures, but prevailing theory is from missing his one dose of Keppra XR  Patient had additional seizure activity on 1/19 in the setting of weaning sedation  He has now been seizure free for 36+ hours with last seizure 1/19 in the afternoon  It would be reasonable at this time to start weaning sedation while monitoring on EEG  Would not change AED regimen at this time      Plan:  - Stroke pathway discontinued as presentation is not consistent with stroke  - Monitor on video EEG - last seizure afternoon of 1/19  - Continue Keppra 1500 mg BID  - Valproic acid 500 mg Q8H  - Please check ammonia level and valproic acid trough before next dose  - Seizure precautions  - MRI brain wo contrast when able  - Continue ceftriaxone as per CC team  - Will discuss sending encephalitis panel with primary team  - Remainder of management as per CC team      SUBJECTIVE     Patient was seen and examined  No acute events overnight  Patient remains on video EEG monitoring with last seizure during the afternoon of   Currently intubated and sedated with 25 mcg/kg/hr of propofol  EEG appears suppressed  Review of Systems   Unable to perform ROS: Intubated       OBJECTIVE     Patient ID: Darlene Frost is a 80 y o  male  Vitals:    22 0400 22 0500 22 0600 22 0826   BP: 140/58 127/55 136/59    BP Location: Left arm      Pulse: 64 56 68 70   Resp: 14 12 18    Temp: 98 7 °F (37 1 °C)      TempSrc: Oral      SpO2: 99% 99% 100% 99%   Weight:       Height:          Temperature:   Temp (24hrs), Av 2 °F (37 3 °C), Min:98 7 °F (37 1 °C), Max:99 8 °F (37 7 °C)    Temperature: 98 7 °F (37 1 °C)      Physical Exam  Vitals and nursing note reviewed  Constitutional:       Appearance: He is normal weight  He is ill-appearing  He is not toxic-appearing  Interventions: He is sedated, intubated and restrained  HENT:      Head: Normocephalic and atraumatic  Right Ear: External ear normal       Left Ear: External ear normal       Nose: Nose normal       Mouth/Throat:      Mouth: Mucous membranes are dry  Pharynx: Oropharynx is clear  Eyes:      Conjunctiva/sclera: Conjunctivae normal       Pupils: Pupils are equal, round, and reactive to light  Cardiovascular:      Rate and Rhythm: Normal rate  Pulses: Normal pulses  Pulmonary:      Effort: He is intubated  Comments: On ventilator  Abdominal:      General: Abdomen is flat  There is no distension  Musculoskeletal:      Cervical back: Neck supple  Right lower leg: No edema  Left lower leg: No edema  Skin:     General: Skin is warm and dry     Psychiatric:      Comments: Unable to assess at this time in the setting of intubated, ventilated, and sedated Neurologic Exam     Mental Status   Patient examined while sedated on Propofol at 25 mcg/kg/hr  Remains stuporous but arouses briefly to painful stimuli  Unable to follow commands  Cranial Nerves     CN III, IV, VI   Pupils are equal, round, and reactive to light  Blink to threat intact  Eyes are midline, conjugate gaze, no gaze deviation  Corneal reflexes intact  PERRL  VOR intact  Face appears symmetric but presence of ET tube is limiting exam  Tongue is midline in mouth  Motor Exam Tone is decreased throughout all four extremities in the setting of sedation  No spontaneous movement noted  Sensory Exam   No grimace or withdrawal to painful stimuli on the right upper and lower extremities  On the left, grimace and slight withdrawal noted  Gait, Coordination, and Reflexes Gait not tested  Coordination not tested  No tremors noted at rest  Reflexes are trace throughout  Toes are mute  LABORATORY DATA     Labs: I have personally reviewed pertinent reports  Results from last 7 days   Lab Units 01/21/22 0451 01/20/22  0502 01/19/22  0537   WBC Thousand/uL 10 36* 14 34* 14 31*   HEMOGLOBIN g/dL 12 3 12 5 12 9   HEMATOCRIT % 37 4 38 5 39 5   PLATELETS Thousands/uL 130* 161 132*   NEUTROS PCT % 82* 86* 87*   MONOS PCT % 7 6 7      Results from last 7 days   Lab Units 01/21/22 0451 01/20/22  0502 01/19/22  0536 01/18/22  1058 01/18/22  1058   SODIUM mmol/L 148* 146* 143   < > 140  140   POTASSIUM mmol/L 3 9 3 8 3 5   < > 3 8  3 8   CHLORIDE mmol/L 116* 115* 111*   < > 104  104   CO2 mmol/L 30 28 26   < > 27  25   BUN mg/dL 11 15 18   < > 20  20   CREATININE mg/dL 0 72 0 83 0 93   < > 1 53*  1 57*   CALCIUM mg/dL 7 8* 7 7* 7 8*   < > 8 5  8 5   ALK PHOS U/L  --   --  72  --  80   ALT U/L  --   --  25  --  28   AST U/L  --   --  54*  --  36    < > = values in this interval not displayed       Results from last 7 days   Lab Units 01/21/22  0451 01/20/22  0502 01/19/22  0536   MAGNESIUM mg/dL 2 8* 2 7* 2 4     Results from last 7 days   Lab Units 01/20/22  0502 01/19/22  0536   PHOSPHORUS mg/dL 2 6 1 5*      Results from last 7 days   Lab Units 01/18/22  1058   INR  1 10   PTT seconds 28     Results from last 7 days   Lab Units 01/18/22  1326   LACTIC ACID mmol/L 2 1*           IMAGING & DIAGNOSTIC TESTING     Radiology Results: No new imaging studies to review today      Other Diagnostic Testing: I have personally reviewed pertinent reports  ACTIVE MEDICATIONS     Current Facility-Administered Medications   Medication Dose Route Frequency    cefTRIAXone (ROCEPHIN) 1,000 mg in dextrose 5 % 50 mL IVPB  1,000 mg Intravenous Q24H    chlorhexidine (PERIDEX) 0 12 % oral rinse 15 mL  15 mL Mouth/Throat Q12H PARVIZ    doxazosin (CARDURA) tablet 1 mg  1 mg Oral Daily    enoxaparin (LOVENOX) subcutaneous injection 40 mg  40 mg Subcutaneous D78C PARVIZ    folic acid (FOLVITE) tablet 1 mg  1 mg Oral Daily    levETIRAcetam (KEPPRA) 1,500 mg in sodium chloride 0 9 % 100 mL IVPB  1,500 mg Intravenous Q12H [address]    norepinephrine (LEVOPHED) 4 mg (STANDARD CONCENTRATION) IV in sodium chloride 0 9% 250 mL  1-30 mcg/min Intravenous Titrated    pravastatin (PRAVACHOL) tablet 80 mg  80 mg Oral Daily With Dinner    propofol (DIPRIVAN) 1000 mg in 100 mL infusion (premix)  5-50 mcg/kg/min Intravenous Titrated    thiamine tablet 100 mg  100 mg Oral Daily    valproate (DEPACON) 500 mg in sodium chloride 0 9 % 50 mL IVPB  500 mg Intravenous Q8H [address]       Prior to Admission medications    Medication Sig Start Date End Date Taking?  Authorizing Provider   acetaminophen (TYLENOL) 500 mg tablet Take 500 mg by mouth every 6 (six) hours as needed for mild pain    Historical Provider, MD   levETIRAcetam (KEPPRA XR) 500 MG 24 hr tablet Take 2 tablets (1,000 mg total) by mouth daily 12/20/21   Hyacinth West MD   simvastatin (ZOCOR) 40 mg tablet Take 1 tablet (40 mg total) by mouth daily 9/1/21   Hyacinth West MD VTE Pharmacologic Prophylaxis: Enoxaparin (Lovenox)  VTE Mechanical Prophylaxis: sequential compression device    ==  Bret Garcia DO   [facility] Neurology Residency, PGY-2

## 2022-01-21 NOTE — NURSING NOTE
Updated patient's daughter via phone two times  Reviewed plan of care with daughter  Aware EEG will be on until tomorrow and MRI will be completed thereafter  Propofol decreasing Q2h decreasing by 10 mcg/kg/hr  per hour as per Dr Major Litten  EEG tech contacted for button to utilize when giving antiseizure medications and aware of the reduction of propofol q2h  Straight Cath #2 completed this am with 500 cc removed and bladder scan of 400 cc  Cardura started this am  Plans to reinsert patel if patient continues with urinary retention

## 2022-01-21 NOTE — PLAN OF CARE
Problem: MOBILITY - ADULT  Goal: Maintain or return to baseline ADL function  Description: INTERVENTIONS:  -  Assess patient's ability to carry out ADLs; assess patient's baseline for ADL function and identify physical deficits which impact ability to perform ADLs (bathing, care of mouth/teeth, toileting, grooming, dressing, etc )  - Assess/evaluate cause of self-care deficits   - Assess range of motion  - Assess patient's mobility; develop plan if impaired  - Assess patient's need for assistive devices and provide as appropriate  - Encourage maximum independence but intervene and supervise when necessary  - Involve family in performance of ADLs  - Assess for home care needs following discharge   - Consider OT consult to assist with ADL evaluation and planning for discharge  - Provide patient education as appropriate  Outcome: Progressing  Goal: Maintains/Returns to pre admission functional level  Description: INTERVENTIONS:  - Perform BMAT or MOVE assessment daily    - Set and communicate daily mobility goal to care team and patient/family/caregiver  - Collaborate with rehabilitation services on mobility goals if consulted  - Perform Range of Motion 3  times a day  - Reposition patient every 2 hours    - Stand patient 2 times a day  - Ambulate patient 3 times a day  - Out of bed to chair 3 times a day   - Out of bed for meals 3 times a day  - Out of bed for toileting for all occurrences  - Record patient progress and toleration of activity level   Outcome: Progressing     Problem: Potential for Falls  Goal: Patient will remain free of falls  Description: INTERVENTIONS:  - Educate patient/family on patient safety including physical limitations  - Instruct patient to call for assistance with activity   - Consult OT/PT to assist with strengthening/mobility   - Keep Call bell within reach  - Keep bed low and locked with side rails adjusted as appropriate  - Keep care items and personal belongings within reach  - Initiate and maintain comfort rounds  - Make Fall Risk Sign visible to staff  - Offer Toileting every 2 Hours, in advance of need  - Initiate/Maintain alarm  - Obtain necessary fall risk management equipment:  - Apply yellow socks and bracelet for high fall risk patients  - Consider moving patient to room near nurses station  Outcome: Progressing     Problem: Prexisting or High Potential for Compromised Skin Integrity  Goal: Skin integrity is maintained or improved  Description: INTERVENTIONS:  - Identify patients at risk for skin breakdown  - Assess and monitor skin integrity  - Assess and monitor nutrition and hydration status  - Monitor labs   - Assess for incontinence   - Turn and reposition patient  - Assist with mobility/ambulation  - Relieve pressure over bony prominences  - Avoid friction and shearing  - Provide appropriate hygiene as needed including keeping skin clean and dry  - Evaluate need for skin moisturizer/barrier cream  - Collaborate with interdisciplinary team   - Patient/family teaching  - Consider wound care consult   Outcome: Progressing     Problem: SAFETY,RESTRAINT: NV/NON-SELF DESTRUCTIVE BEHAVIOR  Goal: Remains free of harm/injury (restraint for non violent/non self-detsructive behavior)  Description: INTERVENTIONS:  - Instruct patient/family regarding restraint use   - Assess and monitor physiologic and psychological status   - Provide interventions and comfort measures to meet assessed patient needs   - Identify and implement measures to help patient regain control  - Assess readiness for release of restraint   Outcome: Progressing  Goal: Returns to optimal restraint-free functioning  Description: INTERVENTIONS:  - Assess the patient's behavior and symptoms that indicate continued need for restraint  - Identify and implement measures to help patient regain control  - Assess readiness for release of restraint   Outcome: Progressing     Problem: Neurological Deficit  Goal: Neurological status is stable or improving  Description: Interventions:  - Monitor and assess patient's level of consciousness, motor function, sensory function, and level of assistance needed for ADLs  - Monitor and report changes from baseline  Collaborate with interdisciplinary team to initiate plan and implement interventions as ordered  - Provide and maintain a safe environment  - Consider seizure precautions  - Consider fall precautions  - Consider aspiration precautions  - Consider bleeding precautions  Outcome: Progressing     Problem: Activity Intolerance/Impaired Mobility  Goal: Mobility/activity is maintained at optimum level for patient  Description: Interventions:  - Assess and monitor patient  barriers to mobility and need for assistive/adaptive devices  - Assess patient's emotional response to limitations  - Collaborate with interdisciplinary team and initiate plans and interventions as ordered  - Encourage independent activity per ability   - Maintain proper body alignment  - Perform active/passive rom as tolerated/ordered  - Plan activities to conserve energy   - Turn patient as appropriate  Outcome: Progressing     Problem: Communication Impairment  Goal: Ability to express needs and understand communication  Description: Assess patient's communication skills and ability to understand information  Patient will demonstrate use of effective communication techniques, alternative methods of communication and understanding even if not able to speak  - Encourage communication and provide alternate methods of communication as needed  - Collaborate with case management/ for discharge needs  - Include patient/family/caregiver in decisions related to communication  Outcome: Progressing     Problem: Potential for Aspiration  Goal: Non-ventilated patient's risk of aspiration is minimized  Description: Assess and monitor vital signs, respiratory status, and labs (WBC)    Monitor for signs of aspiration (tachypnea, cough, rales, wheezing, cyanosis, fever)  - Assess and monitor patient's ability to swallow  - Place patient up in chair to eat if possible  - HOB up at 90 degrees to eat if unable to get patient up into chair   - Supervise patient during oral intake  - Instruct patient/ family to take small bites  - Instruct patient/ family to take small single sips when taking liquids  - Follow patient-specific strategies generated by speech pathologist   Outcome: Progressing  Goal: Ventilated patient's risk of aspiration is minimized  Description: Assess and monitor vital signs, respiratory status, airway cuff pressure, and labs (WBC)  Monitor for signs of aspiration (tachypnea, cough, rales, wheezing, cyanosis, fever)  - Elevate head of bed 30 degrees if patient has tube feeding   - Monitor tube feeding  Outcome: Progressing     Problem: Nutrition  Goal: Nutrition/Hydration status is improving  Description: Monitor and assess patient's nutrition/hydration status for malnutrition (ex- brittle hair, bruises, dry skin, pale skin and conjunctiva, muscle wasting, smooth red tongue, and disorientation)  Collaborate with interdisciplinary team and initiate plan and interventions as ordered  Monitor patient's weight and dietary intake as ordered or per policy  Utilize nutrition screening tool and intervene per policy  Determine patient's food preferences and provide high-protein, high-caloric foods as appropriate  - Assist patient with eating   - Allow adequate time for meals   - Encourage patient to take dietary supplement as ordered  - Collaborate with clinical nutritionist   - Include patient/family/caregiver in decisions related to nutrition    Outcome: Progressing     Problem: Nutrition/Hydration-ADULT  Goal: Nutrient/Hydration intake appropriate for improving, restoring or maintaining nutritional needs  Description: Monitor and assess patient's nutrition/hydration status for malnutrition  Collaborate with interdisciplinary team and initiate plan and interventions as ordered  Monitor patient's weight and dietary intake as ordered or per policy  Utilize nutrition screening tool and intervene as necessary  Determine patient's food preferences and provide high-protein, high-caloric foods as appropriate       INTERVENTIONS:  - Monitor oral intake, urinary output, labs, and treatment plans  - Assess nutrition and hydration status and recommend course of action  - Evaluate amount of meals eaten  - Assist patient with eating if necessary   - Allow adequate time for meals  - Recommend/ encourage appropriate diets, oral nutritional supplements, and vitamin/mineral supplements  - Order, calculate, and assess calorie counts as needed  - Recommend, monitor, and adjust tube feedings and TPN/PPN based on assessed needs  - Assess need for intravenous fluids  - Provide specific nutrition/hydration education as appropriate  - Include patient/family/caregiver in decisions related to nutrition  Outcome: Progressing

## 2022-01-21 NOTE — NUTRITION
01/20/22 2003   Recommendations/Interventions   Summary Pt currently on 25 5 ml propofol  Recommend Jevity 1 2cal @ 55ml/hr continuous  Initiate @ 20ml/hr advance 10ml q4 hrs to goal   Provides 2257 Kcals, 73g protein, and 1065ml free water  Recommend adding 1 pkt Prosource liquid qday for an additional 60kcals +15g protein  Will monitor propofol titration and adjust EN accordingly

## 2022-01-21 NOTE — PROGRESS NOTES
Daily Progress Note - Critical Care   Maureen Jimenez 80 y o  male MRN: [id number]  Unit/Bed#: ICU 09 Encounter: [id number]        ----------------------------------------------------------------------------------------  HPI/24hr events: Maureen Jimenez is a 80 y o  male who presents with Stroke Alert due to R facial droop, subsequently found to have multiple seizures around 9:15 on 1/18  LKN 1/17 and per report patient missed a Keppra dose 1/16  Recently also had Keppra dose decreased 7/28/21 from 1500mg HS to 1000mg HS  Last reported seizure 2/21/17  PMH includes seizure disorder, nonrheumatic aortic stenosis, ascending aorta ectasia, AAA s/p EVAR  No acute events overnight    ---------------------------------------------------------------------------------------  SUBJECTIVE  Unable to obtain 2/2 mental status      ---------------------------------------------------------------------------------------  Assessment and Plan:    Neuro:   · Diagnosis: Seizure    ? Plan:   ? No more seizures on EEG, will DC today  ? Brain MRI today   § Continue Keppra, Depakote  § Keppra level pending   § Wean Propofol gtt today  § Ativan PRN for seizure activity   · Diagnosis: Hx of seizure disorder   ? Plan:   § Follows with Dr Clotilde Vizcarra as outpatient   § Home regimen: Keppra 1000mg HS   § Dose reduced 7/28/21 from 1500mg HS   § Last known seizure 2/21/17, 2 seizures unprovoked   § Missed dose 1/16         CV:   · Diagnosis: Hypotension   ? Plan:   § Likely in the setting of sedation   § Continue levo for MAP>65   § Arterial line in place   · Diagnosis: Hx of HTN   ? Plan:   § No home regimen  § SBP goal <160   · Diagnosis: Troponin elevation   ? Plan:   § Resolved  · Diagnosis: AAA s/p EVAR   ? Plan:   § Stable  · Diagnosis: HLD   ? Plan:   § Hold home simvastatin         Pulm:  · Diagnosis: Acute Respiratory Failure   ?  Plan:   § Intubated at [facility] ED   § Continue mechanical ventilation on PS  § VAP bundle   § SBT as appropriate § Maintain SpO2>92%         GI:   · Diagnosis: Elevated AST and T bili   ? Plan:   § Resolved  § GI ppx: not indicated   § Bowel regimen PRN         :   · Diagnosis: CKD III  ? Plan:   § Continue to trend BUN/Cr  § Baseline creatinine 1 15-1 22  § Strict I/O   § Avoid nephrotoxins         F/E/N:   · Plan:   ? Fluids: 10cc/hr  ? Electrolytes: Will replete as necessary to maintain potassium > 4 0, magnesium > 2 0, and phosphrous > 3 0    ? Nutrition: TFs         Heme/Onc:   · Diagnosis: Leukocytosis   ? Plan:   § Resolved, continue to monitor  2  DVT ppx: SQH & SCDs        Endo:   · Diagnosis: No acute issues   ? Plan:   § Blood glucose goal 140-180   § Initiate SSI if indicated         ID:   · Diagnosis: Leukocytosis, downtrending    ? Plan:   § CXR unremarkable   § LP with negative meningitis panel  § Covid negative  § Blood cx - no growth at 24 hrs      MSK/Skin:   · Diagnosis: At risk for pressure injury   ? Plan:   § Frequent turning/pressure off loading   § PT/OT        Disposition: Continue Critical Care   Code Status: Level 1 - Full Code  Patient appropriate for transfer out of the ICU today?: No  Disposition: Continue Critical Care   Code Status: Level 1 - Full Code  ---------------------------------------------------------------------------------------  ICU CORE MEASURES    Prophylaxis   VTE Pharmacologic Prophylaxis: Enoxaparin (Lovenox)  VTE Mechanical Prophylaxis: sequential compression device  Stress Ulcer Prophylaxis: Prophylaxis Not Indicated     ABCDE Protocol (if indicated)  Plan to perform spontaneous awakening trial today? Not applicable  Plan to perform spontaneous breathing trial today? No  Obvious barriers to extubation?  Yes  CAM-ICU: Negative    Invasive Devices Review  Invasive Devices  Report    Central Venous Catheter Line            CVC Central Lines 01/19/22 Triple 20cm 1 day          Peripheral Intravenous Line            Peripheral IV 01/18/22 Right Antecubital 2 days    Peripheral IV 22 Right Forearm 2 days          Arterial Line            Arterial Line 22 Radial 1 day          Drain            NG/OG/Enteral Tube Orogastric 16 Fr Center mouth 2 days    Rectal Tube <1 day          Airway            ETT  7 5 mm 2 days              Can any invasive devices be discontinued today? No  ---------------------------------------------------------------------------------------  OBJECTIVE    Vitals   Vitals:    22 0322 22 0400 22 0500 22 0600   BP:  140/58 127/55 136/59   BP Location:  Left arm     Pulse:  64 56 68   Resp:  14 12 18   Temp:  98 7 °F (37 1 °C)     TempSrc:  Oral     SpO2: 99% 99% 99% 100%   Weight:       Height:         Temp (24hrs), Av 3 °F (37 4 °C), Min:98 7 °F (37 1 °C), Max:99 8 °F (37 7 °C)  Current: Temperature: 98 7 °F (37 1 °C)      Respiratory:  SpO2: SpO2: 100 %       Invasive/non-invasive ventilation settings   Respiratory  Report   Lab Data (Last 4 hours)    None         O2/Vent Data (Last 4 hours)      322           Vent Mode CPAP/PS Spont       FIO2 (%) (%) 40       PEEP (cmH2O) (cmH2O) 6       Pressure Support (cmH2O) (cmH20) 8       MV (Obs) 6 31       RSBI 25                   Physical Exam  Constitutional:       Comments: Sedated   HENT:      Head: Normocephalic  Eyes:      General:         Right eye: No discharge  Left eye: No discharge  Cardiovascular:      Rate and Rhythm: Normal rate  Pulmonary:      Effort: No respiratory distress  Abdominal:      Tenderness: There is no abdominal tenderness  Musculoskeletal:      Cervical back: Neck supple  Skin:     General: Skin is warm and dry  Neurological:      Comments: Sedated, does not open eyes or follow commands, similar to prior exams   Psychiatric:         Behavior: Behavior is not agitated               Laboratory and Diagnostics:  Results from last 7 days   Lab Units 22  0451 22  0502 22  0537 22  1058   WBC Thousand/uL 10 36* 14 34* 14 31* 15 62*   HEMOGLOBIN g/dL 12 3 12 5 12 9 13 3   HEMATOCRIT % 37 4 38 5 39 5 40 5   PLATELETS Thousands/uL 130* 161 132* 159   NEUTROS PCT % 82* 86* 87*  --    MONOS PCT % 7 6 7  --      Results from last 7 days   Lab Units 01/21/22  0451 01/20/22  0502 01/19/22  0536 01/18/22  1058   SODIUM mmol/L 148* 146* 143 140  140   POTASSIUM mmol/L 3 9 3 8 3 5 3 8  3 8   CHLORIDE mmol/L 116* 115* 111* 104  104   CO2 mmol/L 30 28 26 27  25   ANION GAP mmol/L 2* 3* 6 9  11   BUN mg/dL 11 15 18 20  20   CREATININE mg/dL 0 72 0 83 0 93 1 53*  1 57*   CALCIUM mg/dL 7 8* 7 7* 7 8* 8 5  8 5   GLUCOSE RANDOM mg/dL 117 117 105 135  135   ALT U/L  --   --  25 28   AST U/L  --   --  54* 36   ALK PHOS U/L  --   --  72 80   ALBUMIN g/dL  --   --  3 1* 3 7   TOTAL BILIRUBIN mg/dL  --   --  1 40* 1 10*     Results from last 7 days   Lab Units 01/21/22  0451 01/20/22  0502 01/19/22  0536   MAGNESIUM mg/dL 2 8* 2 7* 2 4   PHOSPHORUS mg/dL  --  2 6 1 5*      Results from last 7 days   Lab Units 01/18/22  1058   INR  1 10   PTT seconds 28          Results from last 7 days   Lab Units 01/18/22  1326 01/18/22  1048   LACTIC ACID mmol/L 2 1* 3 1*     ABG:    VBG:  Results from last 7 days   Lab Units 01/18/22  1058   PH JANET  7 395   PCO2 JANET mm Hg 41 3*   PO2 JANET mm Hg 57 5*   HCO3 JANET mmol/L 24 7   BASE EXC JANET mmol/L -0 2     Results from last 7 days   Lab Units 01/20/22  0502 01/19/22  0537   PROCALCITONIN ng/ml 0 60* 0 79*       Micro  Results from last 7 days   Lab Units 01/19/22  1145 01/19/22  0206 01/19/22  0133 01/18/22  1721 01/18/22  1126 01/18/22  1048   BLOOD CULTURE   --  No Growth at 24 hrs  No Growth at 24 hrs   --  No Growth at 48 hrs  No Growth at 48 hrs  SPUTUM CULTURE  Culture too young- will reincubate  --   --   --   --   --    GRAM STAIN RESULT  No bacteria seen  2+ Polys  --   --  No Polys or Bacteria seen  --   --        EKG:   Imaging:  I have personally reviewed pertinent reports  Intake and Output  I/O       01/19 0701 01/20 0700 01/20 0701  01/21 0700    I V  (mL/kg) 3495 4 (41 2) 2038 9 (24)    NG/ 180    IV Piggyback 1580 480    Feedings 750 1160    Total Intake(mL/kg) 5945 4 (70 1) 3858 9 (45 5)    Urine (mL/kg/hr) 1595 (0 8) 965 (0 5)    Stool 0 200    Total Output 1595 1165    Net +4350 4 +2693 9          Unmeasured Stool Occurrence 4 x 1 x            Height and Weights   Height: 5' 10" (177 8 cm)  IBW (Ideal Body Weight): 73 kg  Body mass index is 26 83 kg/m²  Weight (last 2 days)     Date/Time Weight    01/19/22 1415 84 8 (187)            Nutrition       Diet Orders   (From admission, onward)             Start     Ordered    01/20/22 1406  Diet Enteral/Parenteral; Tube Feeding No Oral Diet; Jevity 1 2 Wu; Continuous; 40; 0  Diet effective now        References:    Nutrtion Support Algorithm Enteral vs  Parenteral   Question Answer Comment   Diet Type Enteral/Parenteral    Enteral/Parenteral Tube Feeding No Oral Diet    Tube Feeding Formula: Jevity 1 2 Wu    Bolus/Cyclic/Continuous Continuous    Tube Feeding Goal Rate (mL/hr): 40    Tube Feeding flush (mL): 0    RD to adjust diet per protocol?  Yes        01/20/22 1405                    Active Medications  Scheduled Meds:  Current Facility-Administered Medications   Medication Dose Route Frequency Provider Last Rate    cefTRIAXone  1,000 mg Intravenous Q24H Roro Barboza MD Stopped (01/20/22 1500)    chlorhexidine  15 mL Mouth/Throat Q12H [facility] Mckayla Medicus, PA-C      enoxaparin  40 mg Subcutaneous Q24H [facility] Edward Chávez MD      folic acid  1 mg Oral Daily [address] [employer] [facility] [place], CRNP      levETIRAcetam  1,500 mg Intravenous Q12H [facility] Edward Chávez MD Stopped (01/20/22 2040)    norepinephrine  1-30 mcg/min Intravenous Titrated BRIANA Alcantara 7 mcg/min (01/21/22 0451)    pravastatin  80 mg Oral Daily With Nancy Nam MD      propofol  5-50 mcg/kg/min Intravenous Titrated Jose Mckay PA-C 50 mcg/kg/min (01/21/22 0409)    thiamine  100 mg Oral Daily [facility]      valproate sodium  500 mg Intravenous [facility] Celestine Villeda MD Stopped (01/21/22 0600)     Continuous Infusions:  norepinephrine, 1-30 mcg/min, Last Rate: 7 mcg/min (01/21/22 0451)  propofol, 5-50 mcg/kg/min, Last Rate: 50 mcg/kg/min (01/21/22 0409)      PRN Meds:        Allergies   Allergies   Allergen Reactions    Fenofibrate      Patient not aware of allergy    Hydrocodone-Acetaminophen      Patient not aware of allergy     ---------------------------------------------------------------------------------------    Angy Queen MD      Portions of the record may have been created with voice recognition software  Occasional wrong word or "sound a like" substitutions may have occurred due to the inherent limitations of voice recognition software    Read the chart carefully and recognize, using context, where substitutions have occurred

## 2022-01-21 NOTE — RESPIRATORY THERAPY NOTE
RT Ventilator Management Note  Alen Florez 80 y o  male MRN: [id number]  Unit/Bed#: ICU 09 Encounter: [id number]      Daily Screen       1/20/2022  0813 1/21/2022  0826          Patient safety screen outcome[de-identified] Failed Passed (P)       Not Ready for Weaning due to[de-identified] Underline problem not resolved --      Spont breathing trial % for 30 min: -- No (P)               Physical Exam:   Assessment Type: (P) Assess only  General Appearance: (P) Sleeping  Respiratory Pattern: (P) Spontaneous,Assisted  Chest Assessment: (P) Chest expansion symmetrical  Bilateral Breath Sounds: (P) Diminished  Suction: (P) ET Tube  O2 Device: vent      Resp Comments: (P) Pt continues on PS settings  No changes made to settings at this time  Pt appears comfortbale on current settings  Will continue to monitor per resp protocol

## 2022-01-21 NOTE — PLAN OF CARE
Problem: MOBILITY - ADULT  Goal: Maintain or return to baseline ADL function  Description: INTERVENTIONS:  -  Assess patient's ability to carry out ADLs; assess patient's baseline for ADL function and identify physical deficits which impact ability to perform ADLs (bathing, care of mouth/teeth, toileting, grooming, dressing, etc )  - Assess/evaluate cause of self-care deficits   - Assess range of motion  - Assess patient's mobility; develop plan if impaired  - Assess patient's need for assistive devices and provide as appropriate  - Encourage maximum independence but intervene and supervise when necessary  - Involve family in performance of ADLs  - Assess for home care needs following discharge   - Consider OT consult to assist with ADL evaluation and planning for discharge  - Provide patient education as appropriate  Outcome: Progressing  Goal: Maintains/Returns to pre admission functional level  Description: INTERVENTIONS:  - Perform BMAT or MOVE assessment daily    - Set and communicate daily mobility goal to care team and patient/family/caregiver  - Collaborate with rehabilitation services on mobility goals if consulted  - Perform Range of Motion multiple times a day  - Reposition patient every 2 hours    - Record patient progress and toleration of activity level   Outcome: Progressing     Problem: Potential for Falls  Goal: Patient will remain free of falls  Description: INTERVENTIONS:  - Educate patient/family on patient safety including physical limitations  - Instruct patient to call for assistance with activity   - Consult OT/PT to assist with strengthening/mobility   - Keep Call bell within reach  - Keep bed low and locked with side rails adjusted as appropriate  - Keep care items and personal belongings within reach  - Initiate and maintain comfort rounds  - Make Fall Risk Sign visible to staff  - Offer Toileting every 2 Hours, in advance of need  - Initiate/Maintain bed alarm  - Obtain necessary fall risk management equipment  - Apply yellow socks and bracelet for high fall risk patients  - Consider moving patient to room near nurses station  Outcome: Progressing     Problem: Prexisting or High Potential for Compromised Skin Integrity  Goal: Skin integrity is maintained or improved  Description: INTERVENTIONS:  - Identify patients at risk for skin breakdown  - Assess and monitor skin integrity  - Assess and monitor nutrition and hydration status  - Monitor labs   - Assess for incontinence   - Turn and reposition patient  - Assist with mobility/ambulation  - Relieve pressure over bony prominences  - Avoid friction and shearing  - Provide appropriate hygiene as needed including keeping skin clean and dry  - Evaluate need for skin moisturizer/barrier cream  - Collaborate with interdisciplinary team   - Patient/family teaching  - Consider wound care consult   Outcome: Progressing     Problem: SAFETY,RESTRAINT: NV/NON-SELF DESTRUCTIVE BEHAVIOR  Goal: Remains free of harm/injury (restraint for non violent/non self-detsructive behavior)  Description: INTERVENTIONS:  - Instruct patient/family regarding restraint use   - Assess and monitor physiologic and psychological status   - Provide interventions and comfort measures to meet assessed patient needs   - Identify and implement measures to help patient regain control  - Assess readiness for release of restraint   Outcome: Progressing  Goal: Returns to optimal restraint-free functioning  Description: INTERVENTIONS:  - Assess the patient's behavior and symptoms that indicate continued need for restraint  - Identify and implement measures to help patient regain control  - Assess readiness for release of restraint   Outcome: Progressing     Problem: Neurological Deficit  Goal: Neurological status is stable or improving  Description: Interventions:  - Monitor and assess patient's level of consciousness, motor function, sensory function, and level of assistance needed for ADLs    - Monitor and report changes from baseline  Collaborate with interdisciplinary team to initiate plan and implement interventions as ordered  - Provide and maintain a safe environment  - Consider seizure precautions  - Consider fall precautions  - Consider aspiration precautions  - Consider bleeding precautions  Outcome: Progressing     Problem: Activity Intolerance/Impaired Mobility  Goal: Mobility/activity is maintained at optimum level for patient  Description: Interventions:  - Assess and monitor patient  barriers to mobility and need for assistive/adaptive devices  - Assess patient's emotional response to limitations  - Collaborate with interdisciplinary team and initiate plans and interventions as ordered  - Encourage independent activity per ability   - Maintain proper body alignment  - Perform active/passive rom as tolerated/ordered  - Plan activities to conserve energy   - Turn patient as appropriate  Outcome: Progressing     Problem: Communication Impairment  Goal: Ability to express needs and understand communication  Description: Assess patient's communication skills and ability to understand information  Patient will demonstrate use of effective communication techniques, alternative methods of communication and understanding even if not able to speak  - Encourage communication and provide alternate methods of communication as needed  - Collaborate with case management/ for discharge needs  - Include patient/family/caregiver in decisions related to communication  Outcome: Progressing     Problem: Potential for Aspiration  Goal: Non-ventilated patient's risk of aspiration is minimized  Description: Assess and monitor vital signs, respiratory status, and labs (WBC)  Monitor for signs of aspiration (tachypnea, cough, rales, wheezing, cyanosis, fever)  - Assess and monitor patient's ability to swallow  - Place patient up in chair to eat if possible    - HOB up at 90 degrees to eat if unable to get patient up into chair   - Supervise patient during oral intake  - Instruct patient/ family to take small bites  - Instruct patient/ family to take small single sips when taking liquids  - Follow patient-specific strategies generated by speech pathologist   Outcome: Progressing  Goal: Ventilated patient's risk of aspiration is minimized  Description: Assess and monitor vital signs, respiratory status, airway cuff pressure, and labs (WBC)  Monitor for signs of aspiration (tachypnea, cough, rales, wheezing, cyanosis, fever)  - Elevate head of bed 30 degrees if patient has tube feeding   - Monitor tube feeding  Outcome: Progressing     Problem: Nutrition  Goal: Nutrition/Hydration status is improving  Description: Monitor and assess patient's nutrition/hydration status for malnutrition (ex- brittle hair, bruises, dry skin, pale skin and conjunctiva, muscle wasting, smooth red tongue, and disorientation)  Collaborate with interdisciplinary team and initiate plan and interventions as ordered  Monitor patient's weight and dietary intake as ordered or per policy  Utilize nutrition screening tool and intervene per policy  Determine patient's food preferences and provide high-protein, high-caloric foods as appropriate  - Assist patient with eating   - Allow adequate time for meals   - Encourage patient to take dietary supplement as ordered  - Collaborate with clinical nutritionist   - Include patient/family/caregiver in decisions related to nutrition  Outcome: Progressing     Problem: Nutrition/Hydration-ADULT  Goal: Nutrient/Hydration intake appropriate for improving, restoring or maintaining nutritional needs  Description: Monitor and assess patient's nutrition/hydration status for malnutrition  Collaborate with interdisciplinary team and initiate plan and interventions as ordered  Monitor patient's weight and dietary intake as ordered or per policy   Utilize nutrition screening tool and intervene as necessary  Determine patient's food preferences and provide high-protein, high-caloric foods as appropriate       INTERVENTIONS:  - Monitor oral intake, urinary output, labs, and treatment plans  - Assess nutrition and hydration status and recommend course of action  - Evaluate amount of meals eaten  - Assist patient with eating if necessary   - Allow adequate time for meals  - Recommend/ encourage appropriate diets, oral nutritional supplements, and vitamin/mineral supplements  - Order, calculate, and assess calorie counts as needed  - Recommend, monitor, and adjust tube feedings and TPN/PPN based on assessed needs  - Assess need for intravenous fluids  - Provide specific nutrition/hydration education as appropriate  - Include patient/family/caregiver in decisions related to nutrition  Outcome: Progressing

## 2022-01-22 ENCOUNTER — APPOINTMENT (INPATIENT)
Dept: RADIOLOGY | Facility: HOSPITAL | Age: 82
DRG: 100 | End: 2022-01-22
Payer: MEDICARE

## 2022-01-22 ENCOUNTER — APPOINTMENT (INPATIENT)
Dept: NEUROLOGY | Facility: CLINIC | Age: 82
DRG: 100 | End: 2022-01-22
Attending: STUDENT IN AN ORGANIZED HEALTH CARE EDUCATION/TRAINING PROGRAM
Payer: MEDICARE

## 2022-01-22 LAB
ALBUMIN SERPL BCP-MCNC: 2.2 G/DL (ref 3.5–5)
ALP SERPL-CCNC: 57 U/L (ref 46–116)
ALT SERPL W P-5'-P-CCNC: 24 U/L (ref 12–78)
AMMONIA PLAS-SCNC: 21 UMOL/L (ref 11–35)
ANION GAP SERPL CALCULATED.3IONS-SCNC: 1 MMOL/L (ref 4–13)
AST SERPL W P-5'-P-CCNC: 37 U/L (ref 5–45)
BILIRUB SERPL-MCNC: 0.64 MG/DL (ref 0.2–1)
BUN SERPL-MCNC: 14 MG/DL (ref 5–25)
CALCIUM ALBUM COR SERPL-MCNC: 9.7 MG/DL (ref 8.3–10.1)
CALCIUM SERPL-MCNC: 8.3 MG/DL (ref 8.3–10.1)
CHLORIDE SERPL-SCNC: 114 MMOL/L (ref 100–108)
CO2 SERPL-SCNC: 31 MMOL/L (ref 21–32)
CREAT SERPL-MCNC: 0.78 MG/DL (ref 0.6–1.3)
ERYTHROCYTE [DISTWIDTH] IN BLOOD BY AUTOMATED COUNT: 12.8 % (ref 11.6–15.1)
GFR SERPL CREATININE-BSD FRML MDRD: 85 ML/MIN/1.73SQ M
GLUCOSE SERPL-MCNC: 112 MG/DL (ref 65–140)
GLUCOSE SERPL-MCNC: 112 MG/DL (ref 65–140)
GLUCOSE SERPL-MCNC: 113 MG/DL (ref 65–140)
GLUCOSE SERPL-MCNC: 118 MG/DL (ref 65–140)
GLUCOSE SERPL-MCNC: 81 MG/DL (ref 65–140)
HCT VFR BLD AUTO: 36.4 % (ref 36.5–49.3)
HGB BLD-MCNC: 11.7 G/DL (ref 12–17)
MCH RBC QN AUTO: 31.5 PG (ref 26.8–34.3)
MCHC RBC AUTO-ENTMCNC: 32.1 G/DL (ref 31.4–37.4)
MCV RBC AUTO: 98 FL (ref 82–98)
PLATELET # BLD AUTO: 107 THOUSANDS/UL (ref 149–390)
PMV BLD AUTO: 11.1 FL (ref 8.9–12.7)
POTASSIUM SERPL-SCNC: 4.1 MMOL/L (ref 3.5–5.3)
PROT SERPL-MCNC: 5.5 G/DL (ref 6.4–8.2)
RBC # BLD AUTO: 3.71 MILLION/UL (ref 3.88–5.62)
SODIUM SERPL-SCNC: 146 MMOL/L (ref 136–145)
VALPROATE SERPL-MCNC: 80 UG/ML (ref 50–100)
WBC # BLD AUTO: 6.74 THOUSAND/UL (ref 4.31–10.16)

## 2022-01-22 PROCEDURE — 0T9B70Z DRAINAGE OF BLADDER WITH DRAINAGE DEVICE, VIA NATURAL OR ARTIFICIAL OPENING: ICD-10-PCS | Performed by: INTERNAL MEDICINE

## 2022-01-22 PROCEDURE — 94003 VENT MGMT INPAT SUBQ DAY: CPT

## 2022-01-22 PROCEDURE — 85027 COMPLETE CBC AUTOMATED: CPT

## 2022-01-22 PROCEDURE — 94760 N-INVAS EAR/PLS OXIMETRY 1: CPT

## 2022-01-22 PROCEDURE — 82948 REAGENT STRIP/BLOOD GLUCOSE: CPT

## 2022-01-22 PROCEDURE — 95715 VEEG EA 12-26HR INTMT MNTR: CPT

## 2022-01-22 PROCEDURE — 82140 ASSAY OF AMMONIA: CPT

## 2022-01-22 PROCEDURE — 99291 CRITICAL CARE FIRST HOUR: CPT | Performed by: INTERNAL MEDICINE

## 2022-01-22 PROCEDURE — 95720 EEG PHY/QHP EA INCR W/VEEG: CPT | Performed by: STUDENT IN AN ORGANIZED HEALTH CARE EDUCATION/TRAINING PROGRAM

## 2022-01-22 PROCEDURE — 80164 ASSAY DIPROPYLACETIC ACD TOT: CPT

## 2022-01-22 PROCEDURE — 80053 COMPREHEN METABOLIC PANEL: CPT

## 2022-01-22 PROCEDURE — 92610 EVALUATE SWALLOWING FUNCTION: CPT

## 2022-01-22 PROCEDURE — 99232 SBSQ HOSP IP/OBS MODERATE 35: CPT | Performed by: PSYCHIATRY & NEUROLOGY

## 2022-01-22 RX ORDER — SODIUM CHLORIDE 9 MG/ML
100 INJECTION, SOLUTION INTRAVENOUS CONTINUOUS
Status: DISCONTINUED | OUTPATIENT
Start: 2022-01-22 | End: 2022-01-23

## 2022-01-22 RX ORDER — OLANZAPINE 10 MG/1
2.5 INJECTION, POWDER, LYOPHILIZED, FOR SOLUTION INTRAMUSCULAR ONCE
Status: COMPLETED | OUTPATIENT
Start: 2022-01-22 | End: 2022-01-22

## 2022-01-22 RX ADMIN — SODIUM CHLORIDE 100 ML/HR: 9 INJECTION, SOLUTION INTRAVENOUS at 16:20

## 2022-01-22 RX ADMIN — THIAMINE HCL TAB 100 MG 100 MG: 100 TAB at 09:52

## 2022-01-22 RX ADMIN — DOXAZOSIN 1 MG: 1 TABLET ORAL at 09:52

## 2022-01-22 RX ADMIN — CEFTRIAXONE SODIUM 1000 MG: 10 INJECTION, POWDER, FOR SOLUTION INTRAVENOUS at 15:37

## 2022-01-22 RX ADMIN — WATER: 1 INJECTION INTRAMUSCULAR; INTRAVENOUS; SUBCUTANEOUS at 21:41

## 2022-01-22 RX ADMIN — ENOXAPARIN SODIUM 40 MG: 40 INJECTION SUBCUTANEOUS at 09:52

## 2022-01-22 RX ADMIN — CHLORHEXIDINE GLUCONATE 15 ML: 1.2 SOLUTION ORAL at 09:52

## 2022-01-22 RX ADMIN — OLANZAPINE 2.5 MG: 10 INJECTION, POWDER, FOR SOLUTION INTRAMUSCULAR at 21:41

## 2022-01-22 RX ADMIN — VALPROATE SODIUM 500 MG: 100 INJECTION, SOLUTION INTRAVENOUS at 14:18

## 2022-01-22 RX ADMIN — VALPROATE SODIUM 500 MG: 100 INJECTION, SOLUTION INTRAVENOUS at 05:00

## 2022-01-22 RX ADMIN — LEVETIRACETAM 1500 MG: 100 INJECTION, SOLUTION INTRAVENOUS at 20:22

## 2022-01-22 RX ADMIN — VALPROATE SODIUM 500 MG: 100 INJECTION, SOLUTION INTRAVENOUS at 21:42

## 2022-01-22 RX ADMIN — LEVETIRACETAM 1500 MG: 100 INJECTION, SOLUTION INTRAVENOUS at 09:53

## 2022-01-22 RX ADMIN — FOLIC ACID 1 MG: 1 TABLET ORAL at 09:52

## 2022-01-22 RX ADMIN — CHLORHEXIDINE GLUCONATE 15 ML: 1.2 SOLUTION ORAL at 21:44

## 2022-01-22 NOTE — PROGRESS NOTES
Daily Progress Note - Critical Care   Radha Campos 80 y o  male MRN: [id number]  Unit/Bed#: ICU 09 Encounter: [id number]        ----------------------------------------------------------------------------------------  HPI/24hr events:  Pierce Page a 80 y  o  male who presents with Stroke Alert due to R facial droop, subsequently found to have multiple seizures around 9:15 on 1/18  LKN 1/17 and per report patient missed a Keppra dose 1/16  Recently also had Keppra dose decreased 7/28/21 from 1500mg HS to 1000mg HS  Last reported seizure 2/21/17  PMH includes seizure disorder, nonrheumatic aortic stenosis, ascending aorta ectasia, AAA s/p EVAR       Yesterday the Propofol was weaned and DC'd  Continue on vEEG overnight  No acute events overnight    ---------------------------------------------------------------------------------------  SUBJECTIVE  Unable to obtain  ---------------------------------------------------------------------------------------  Assessment and Plan:    Neuro:   · Diagnosis: Seizure    ? Plan:   § DC vEEG todday  § Brain MRI today   § Continue Keppra, Depakote  § Keppra level still pending   § Off Propofol  § Ativan PRN for seizure activity   · Diagnosis: Hx of seizure disorder   ? Plan:   § Follows with Dr Lalita Dubon as outpatient   § Home regimen: Keppra 1000mg HS   § Dose reduced 7/28/21 from 1500mg HS   § Last known seizure 2/21/17, 2 seizures unprovoked   § Missed dose 1/16         CV:   · Diagnosis: Hypotension   ? Plan:   § Likely in the setting of sedation   § Continue levo for MAP>65   § Arterial line in place   · Diagnosis: Hx of HTN   ? Plan:   § No home regimen  § SBP goal <160   · Diagnosis: Troponin elevation   ? Plan:   § Resolved  · Diagnosis: AAA s/p EVAR   ? Plan:   § Stable  · Diagnosis: HLD   ? Plan:   § Hold home simvastatin         Pulm:  · Diagnosis: Acute Respiratory Failure   ?  Plan:   § Intubated at [facility]   § Continue mechanical ventilation on PS  § VAP bundle § SBT as appropriate   § Maintain SpO2>92%   3  Consider Extubation today s/p propofol wean        GI:   · Diagnosis: Elevated AST and T bili   ? Plan:   § Resolved  § GI ppx: not indicated   § Bowel regimen PRN         :   · Diagnosis: CKD III  ? Plan:   § Continue to trend BUN/Cr  § Baseline creatinine 1 15-1 22  § Strict I/O   § Avoid nephrotoxins         F/E/N:   · Plan:   ? Fluids: 10cc/hr  ? Electrolytes: Will replete as necessary to maintain potassium > 4 0, magnesium > 2 0, and phosphrous > 3 0    ? Nutrition: TFs         Heme/Onc:   · Diagnosis: Leukocytosis   ? Plan:   § Resolved, continue to monitor  1  DVT ppx: SQH & SCDs        Endo:   · Diagnosis: No acute issues   ? Plan:   § Blood glucose goal 140-180   § Initiate SSI if indicated         ID:   § Diagnosis: No acute issues  § Continue to monitor for clinical signs of infection, trend vitals     MSK/Skin:   · Diagnosis: At risk for pressure injury   ? Plan:   § Frequent turning/pressure off loading   § PT/OT    Patient appropriate for transfer out of the ICU today?: No  Disposition: Continue Critical Care   Code Status: Level 1 - Full Code  ---------------------------------------------------------------------------------------  ICU CORE MEASURES    Prophylaxis   VTE Pharmacologic Prophylaxis: Enoxaparin (Lovenox)  VTE Mechanical Prophylaxis: sequential compression device  Stress Ulcer Prophylaxis: Prophylaxis Not Indicated     ABCDE Protocol (if indicated)  Plan to perform spontaneous awakening trial today? No Not applicable  Plan to perform spontaneous breathing trial today? Yes  Obvious barriers to extubation?  No  CAM-ICU: Negative    Invasive Devices Review  Invasive Devices  Report    Central Venous Catheter Line            CVC Central Lines 01/19/22 Triple 20cm 2 days          Arterial Line            Arterial Line 01/19/22 Radial 2 days          Drain            NG/OG/Enteral Tube Orogastric 16 Fr Center mouth 3 days    Rectal Tube 1 day Urethral Catheter 18 Fr  <1 day          Airway            ETT  7 5 mm 3 days              Can any invasive devices be discontinued today? No  ---------------------------------------------------------------------------------------  OBJECTIVE    Vitals   Vitals:    22 0345 22 0400 22 0500 22 0600   BP:  133/74 124/57 137/74   Pulse:  94 88 94   Resp:  13 13 (!) 5   Temp:    99 8 °F (37 7 °C)   TempSrc:    Rectal   SpO2: 98% 99% 99% 98%   Weight:       Height:         Temp (24hrs), Av 6 °F (37 6 °C), Min:99 5 °F (37 5 °C), Max:99 8 °F (37 7 °C)  Current: Temperature: 99 8 °F (37 7 °C)    Respiratory:  SpO2: SpO2: 99 %       Invasive/non-invasive ventilation settings   Respiratory  Report   Lab Data (Last 4 hours)    None         O2/Vent Data (Last 4 hours)      345           Vent Mode CPAP/PS Spont       FIO2 (%) (%) 40       PEEP (cmH2O) (cmH2O) 6       Pressure Support (cmH2O) (cmH20) 8       MV (Obs) 7 58       RSBI 30                   Physical Exam  Constitutional:       Appearance: He is ill-appearing  HENT:      Head: Normocephalic  Eyes:      Pupils: Pupils are equal, round, and reactive to light  Cardiovascular:      Rate and Rhythm: Normal rate  Pulmonary:      Effort: No respiratory distress  Abdominal:      Tenderness: There is no abdominal tenderness  Musculoskeletal:      Cervical back: Normal range of motion  Right lower leg: Edema present  Left lower leg: Edema present  Skin:     General: Skin is warm and dry  Neurological:      Comments: Pt opens eyes to pain, able to follow commands in all four extremities, no intentional movements, improved from prior exams   Psychiatric:         Behavior: Behavior is not agitated               Laboratory and Diagnostics:  Results from last 7 days   Lab Units 22  0000 22  0451 22  0502 22  0537 22  1058   WBC Thousand/uL 6 74 10 36* 14 34* 14 31* 15 62*   HEMOGLOBIN g/dL 11 7* 12 3 12 5 12 9 13 3   HEMATOCRIT % 36 4* 37 4 38 5 39 5 40 5   PLATELETS Thousands/uL 107* 130* 161 132* 159   NEUTROS PCT %  --  82* 86* 87*  --    MONOS PCT %  --  7 6 7  --      Results from last 7 days   Lab Units 01/22/22  0422 01/21/22  0451 01/20/22  0502 01/19/22  0536 01/18/22  1058   SODIUM mmol/L 146* 148* 146* 143 140  140   POTASSIUM mmol/L 4 1 3 9 3 8 3 5 3 8  3 8   CHLORIDE mmol/L 114* 116* 115* 111* 104  104   CO2 mmol/L 31 30 28 26 27  25   ANION GAP mmol/L 1* 2* 3* 6 9  11   BUN mg/dL 14 11 15 18 20  20   CREATININE mg/dL 0 78 0 72 0 83 0 93 1 53*  1 57*   CALCIUM mg/dL 8 3 7 8* 7 7* 7 8* 8 5  8 5   GLUCOSE RANDOM mg/dL 112 117 117 105 135  135   ALT U/L 24  --   --  25 28   AST U/L 37  --   --  54* 36   ALK PHOS U/L 57  --   --  72 80   ALBUMIN g/dL 2 2*  --   --  3 1* 3 7   TOTAL BILIRUBIN mg/dL 0 64  --   --  1 40* 1 10*     Results from last 7 days   Lab Units 01/21/22  0451 01/20/22  0502 01/19/22  0536   MAGNESIUM mg/dL 2 8* 2 7* 2 4   PHOSPHORUS mg/dL  --  2 6 1 5*      Results from last 7 days   Lab Units 01/18/22  1058   INR  1 10   PTT seconds 28          Results from last 7 days   Lab Units 01/18/22  1326 01/18/22  1048   LACTIC ACID mmol/L 2 1* 3 1*     ABG:    VBG:  Results from last 7 days   Lab Units 01/18/22  1058   PH JANET  7 395   PCO2 JANET mm Hg 41 3*   PO2 JANET mm Hg 57 5*   HCO3 JANET mmol/L 24 7   BASE EXC JANET mmol/L -0 2     Results from last 7 days   Lab Units 01/20/22  0502 01/19/22  0537   PROCALCITONIN ng/ml 0 60* 0 79*       Micro  Results from last 7 days   Lab Units 01/19/22  1145 01/19/22  0206 01/19/22  0133 01/18/22  1721 01/18/22  1126 01/18/22  1048   BLOOD CULTURE   --  No Growth at 48 hrs  No Growth at 48 hrs  --  No Growth at 72 hrs  No Growth at 72 hrs     SPUTUM CULTURE  Few Colonies of   --   --   --   --   --    GRAM STAIN RESULT  No bacteria seen  2+ Polys  --   --  No Polys or Bacteria seen  --   --        EKG:   Imaging:  I have personally reviewed pertinent reports  Intake and Output  I/O       01/20 0701 01/21 0700 01/21 0701  01/22 0700    I V  (mL/kg) 2038 9 (24) 224 3 (2 6)    NG/     IV Piggyback 480 200    Feedings 1160 880    Total Intake(mL/kg) 3858 9 (45 5) 1304 3 (15 4)    Urine (mL/kg/hr) 965 (0 5) 1230 (0 6)    Stool 200 125    Total Output 1165 1355    Net +2693 9 -50 7          Unmeasured Stool Occurrence 1 x           Height and Weights   Height: 5' 10" (177 8 cm)  IBW (Ideal Body Weight): 73 kg  Body mass index is 26 83 kg/m²  Weight (last 2 days)     None            Nutrition       Diet Orders   (From admission, onward)             Start     Ordered    01/20/22 1406  Diet Enteral/Parenteral; Tube Feeding No Oral Diet; Jevity 1 2 Uw; Continuous; 40; 0  Diet effective now        References:    Nutrtion Support Algorithm Enteral vs  Parenteral   Question Answer Comment   Diet Type Enteral/Parenteral    Enteral/Parenteral Tube Feeding No Oral Diet    Tube Feeding Formula: Jevity 1 2 Wu    Bolus/Cyclic/Continuous Continuous    Tube Feeding Goal Rate (mL/hr): 40    Tube Feeding flush (mL): 0    RD to adjust diet per protocol?  Yes        01/20/22 1405                    Active Medications  Scheduled Meds:  Current Facility-Administered Medications   Medication Dose Route Frequency Provider Last Rate    cefTRIAXone  1,000 mg Intravenous Q24H Leatha Mcgowan MD Stopped (01/21/22 1391)    chlorhexidine  15 mL Mouth/Throat Q12H [address], PA-C      doxazosin  1 mg Oral Daily Cl Cole MD      enoxaparin  40 mg Subcutaneous Q24H [facility] Bijan Sheppard MD      folic acid  1 mg Oral Daily Kevan Cavanaugh [facility]      levETIRAcetam  1,500 mg Intravenous Q12H Katalina Cotton MD 1,500 mg (01/21/22 2017)    norepinephrine  1-30 mcg/min Intravenous Titrated Cl Cole MD      pravastatin  80 mg Oral Daily With Nicanor Kelley MD      propofol  5-50 mcg/kg/min Intravenous Titrated Cade An PA-C Stopped (01/21/22 1859)    thiamine  100 mg Oral Daily [facility]      valproate sodium  500 mg Intravenous [facility] Dorina Galvin  mg (01/22/22 0500)     Continuous Infusions:  norepinephrine, 1-30 mcg/min  propofol, 5-50 mcg/kg/min, Last Rate: Stopped (01/21/22 1859)      PRN Meds:        Allergies   Allergies   Allergen Reactions    Fenofibrate      Patient not aware of allergy    Hydrocodone-Acetaminophen      Patient not aware of allergy     ---------------------------------------------------------------------------------------      Hood Parra MD      Portions of the record may have been created with voice recognition software  Occasional wrong word or "sound a like" substitutions may have occurred due to the inherent limitations of voice recognition software    Read the chart carefully and recognize, using context, where substitutions have occurred

## 2022-01-22 NOTE — PROGRESS NOTES
NEUROLOGY RESIDENCY PROGRESS NOTE     Name: Cricket Lee   Age & Sex: 80 y o  male   MRN: [id number]  Unit/Bed#: ICU 09   Encounter: [id number]    Recommendations for outpatient neurological follow up have yet to be determined  ASSESSMENT & PLAN     * Seizure [facility])  Assessment & Plan  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 XR 1000 mg QHS, CKD, HTN, hyperlipidemia, and s/p AAA repair  Presented to the ED as a stroke alert for right-sided facial droop however had multiple seizures en route to the ED  LKW was 1 day prior  Reports of possibly missing one dose of Keppra  Received 4 mg of Ativan per EMS followed by an additional 2 mg in the ED due to persistent right gaze deviation concerning for ongoing seizure activity  Was intubated for airway protection  EMS reported a fever of 102F however he has been afebrile since admission  Received loading dose of 1500 mg Keppra and 20 mg/kg of valproic acid  LP was completed and was unrevealing  CTH and CTA were also unremarkable  Valproic acid before trough 1/22/2022 80 normal  Ammonia level before trough 1/22/2022 21 normal    Continue to be unclear why patient had multiple seizures, but prevailing theory is from missing his one dose of Keppra XR  Patient had additional seizure activity on 1/19 in the setting of weaning sedation  He has now been seizure free for multiple days with last seizure 1/19 in the afternoon  Patient had weaned sedation today  Would not change AED regimen at this time      Plan:  - Stroke pathway discontinued as presentation is not consistent with stroke  - Monitor on video EEG - last seizure afternoon of 1/19  - Continue Keppra 1500 mg BID  - Valproic acid 500 mg Q8H  - Seizure precautions  - MRI brain wo contrast when able  - Continue ceftriaxone as per CC team  - Will discuss sending encephalitis panel with primary team  - Remainder of management as per CC team            SUBJECTIVE Patient was seen and examined  No acute events overnight  Patient No seizures on EEG  Patient continues to be intubated  Patient able to show 2 fingers on the left but find it difficult on the right  Patient can wiggle toes bilaterally  Patient no longer on propofol  Review of Systems   Unable to perform ROS: Intubated       OBJECTIVE     Patient ID: Mike Marroquin is a 80 y o  male  Vitals:    22 1600 22 1700 22 1800 22 1900   BP: 147/68 138/62 139/67 127/52   BP Location:       Pulse: 90 90 92 82   Resp: 15 18 20 18   Temp: 98 7 °F (37 1 °C)      TempSrc: Oral      SpO2: 99% 97% 98% 99%   Weight:       Height:          Temperature:   Temp (24hrs), Av 3 °F (37 4 °C), Min:98 7 °F (37 1 °C), Max:99 8 °F (37 7 °C)    Temperature: 98 7 °F (37 1 °C)      Physical Exam  Eyes:      Extraocular Movements: EOM normal       Pupils: Pupils are equal, round, and reactive to light  Neurological:      Deep Tendon Reflexes:      Reflex Scores:       Bicep reflexes are 1+ on the right side and 1+ on the left side  Brachioradialis reflexes are 1+ on the right side and 1+ on the left side  Patellar reflexes are 1+ on the right side and 1+ on the left side  Achilles reflexes are 1+ on the right side and 1+ on the left side  Neurologic Exam     Mental Status   Level of consciousness: alert  Patient intubated  Patient not on sedation    Patient readily responsive to voice  Patient attends to voice and person     Cranial Nerves     CN III, IV, VI   Pupils are equal, round, and reactive to light  Extraocular motions are normal    Vestibulo-ocular reflex: present    CN XI   CN XI normal      Motor Exam   Muscle bulk: normal  Overall muscle tone: normalPatient can squeeze hands bilaterally with mostly firm        Sensory Exam   Bilateral intact sensation upper and lower extremities     Gait, Coordination, and Reflexes     Reflexes   Right brachioradialis: 1+  Left brachioradialis: 1+  Right biceps: 1+  Left biceps: 1+  Right patellar: 1+  Left patellar: 1+  Right achilles: 1+  Left achilles: 1+  Right plantar: equivocal  Left plantar: equivocal         LABORATORY DATA     Labs: I have personally reviewed pertinent reports  Results from last 7 days   Lab Units 01/22/22  0000 01/21/22  0451 01/20/22  0502 01/19/22  0537 01/19/22  0537   WBC Thousand/uL 6 74 10 36* 14 34*   < > 14 31*   HEMOGLOBIN g/dL 11 7* 12 3 12 5   < > 12 9   HEMATOCRIT % 36 4* 37 4 38 5   < > 39 5   PLATELETS Thousands/uL 107* 130* 161   < > 132*   NEUTROS PCT %  --  82* 86*  --  87*   MONOS PCT %  --  7 6  --  7    < > = values in this interval not displayed  Results from last 7 days   Lab Units 01/22/22  0422 01/21/22  0451 01/20/22  0502 01/19/22  0536 01/19/22  0536 01/18/22  1058 01/18/22  1058   SODIUM mmol/L 146* 148* 146*   < > 143   < > 140  140   POTASSIUM mmol/L 4 1 3 9 3 8   < > 3 5   < > 3 8  3 8   CHLORIDE mmol/L 114* 116* 115*   < > 111*   < > 104  104   CO2 mmol/L 31 30 28   < > 26   < > 27  25   BUN mg/dL 14 11 15   < > 18   < > 20  20   CREATININE mg/dL 0 78 0 72 0 83   < > 0 93   < > 1 53*  1 57*   CALCIUM mg/dL 8 3 7 8* 7 7*   < > 7 8*   < > 8 5  8 5   ALK PHOS U/L 57  --   --   --  72  --  80   ALT U/L 24  --   --   --  25  --  28   AST U/L 37  --   --   --  54*  --  36    < > = values in this interval not displayed  Results from last 7 days   Lab Units 01/21/22  0451 01/20/22  0502 01/19/22  0536   MAGNESIUM mg/dL 2 8* 2 7* 2 4     Results from last 7 days   Lab Units 01/20/22  0502 01/19/22  0536   PHOSPHORUS mg/dL 2 6 1 5*      Results from last 7 days   Lab Units 01/18/22  1058   INR  1 10   PTT seconds 28     Results from last 7 days   Lab Units 01/18/22  1326   LACTIC ACID mmol/L 2 1*           IMAGING & DIAGNOSTIC TESTING     Radiology Results: I have personally reviewed pertinent reports        XR chest portable ICU   Final Result by Mell Duke MD (01/19 1026)   Interval placement of right IJ CVP line without complication      Persistent lateral left lung base opacity suspicious for infiltrate/atelectasis               Workstation performed: [id number]         XR chest portable   Final Result by Salinas Mendoza DO (01/19 9942)      ET tube tip as above  Persistent left basilar opacity possibly representing atelectasis, pneumonia and/or pleural effusion  Workstation performed: [id number]         MRI inpatient order    (Results Pending)       Other Diagnostic Testing: I have personally reviewed pertinent reports  ACTIVE MEDICATIONS     Current Facility-Administered Medications   Medication Dose Route Frequency    chlorhexidine (PERIDEX) 0 12 % oral rinse 15 mL  15 mL Mouth/Throat Q12H [address]    doxazosin (CARDURA) tablet 1 mg  1 mg Oral Daily    enoxaparin (LOVENOX) subcutaneous injection 40 mg  40 mg Subcutaneous X26U PARVIZ    folic acid (FOLVITE) tablet 1 mg  1 mg Oral Daily    levETIRAcetam (KEPPRA) 1,500 mg in sodium chloride 0 9 % 100 mL IVPB  1,500 mg Intravenous Q12H [address]    pravastatin (PRAVACHOL) tablet 80 mg  80 mg Oral Daily With Dinner    sodium chloride 0 9 % infusion  100 mL/hr Intravenous Continuous    thiamine tablet 100 mg  100 mg Oral Daily    valproate (DEPACON) 500 mg in sodium chloride 0 9 % 50 mL IVPB  500 mg Intravenous Q8H [address]       Prior to Admission medications    Medication Sig Start Date End Date Taking?  Authorizing Provider   acetaminophen (TYLENOL) 500 mg tablet Take 500 mg by mouth every 6 (six) hours as needed for mild pain    Historical Provider, MD   levETIRAcetam (KEPPRA XR) 500 MG 24 hr tablet Take 2 tablets (1,000 mg total) by mouth daily 12/20/21   Desirae Granger MD   simvastatin (ZOCOR) 40 mg tablet Take 1 tablet (40 mg total) by mouth daily 9/1/21   Desirae Granger MD         VTE Pharmacologic Prophylaxis: Enoxaparin (Lovenox)  VTE Mechanical Prophylaxis: sequential compression device    ==  MD Judy [facility]'s Neurology Residency, PGY-2

## 2022-01-22 NOTE — PROGRESS NOTES
Pt extubated self and removed OGT  while b/l wrist restraints intact, Dr Levi Mckenzie on unit, notified   Pt currently on NC at 6 liters, sats %, No resp difficulty

## 2022-01-22 NOTE — PLAN OF CARE
Problem: MOBILITY - ADULT  Goal: Maintain or return to baseline ADL function  Description: INTERVENTIONS:  -  Assess patient's ability to carry out ADLs; assess patient's baseline for ADL function and identify physical deficits which impact ability to perform ADLs (bathing, care of mouth/teeth, toileting, grooming, dressing, etc )  - Assess/evaluate cause of self-care deficits   - Assess range of motion  - Assess patient's mobility; develop plan if impaired  - Assess patient's need for assistive devices and provide as appropriate  - Encourage maximum independence but intervene and supervise when necessary  - Involve family in performance of ADLs  - Assess for home care needs following discharge   - Consider OT consult to assist with ADL evaluation and planning for discharge  - Provide patient education as appropriate  Outcome: Progressing  Goal: Maintains/Returns to pre admission functional level  Description: INTERVENTIONS:  - Perform BMAT or MOVE assessment daily    - Set and communicate daily mobility goal to care team and patient/family/caregiver     - Collaborate with rehabilitation services on mobility goals if consulted  - Out of bed for toileting  - Record patient progress and toleration of activity level   Outcome: Progressing     Problem: Potential for Falls  Goal: Patient will remain free of falls  Description: INTERVENTIONS:  - Educate patient/family on patient safety including physical limitations  - Instruct patient to call for assistance with activity   - Consult OT/PT to assist with strengthening/mobility   - Keep Call bell within reach  - Keep bed low and locked with side rails adjusted as appropriate  - Keep care items and personal belongings within reach  - Initiate and maintain comfort rounds  - Make Fall Risk Sign visible to staff  - Apply yellow socks and bracelet for high fall risk patients  - Consider moving patient to room near nurses station  Outcome: Progressing     Problem: Prexisting or High Potential for Compromised Skin Integrity  Goal: Skin integrity is maintained or improved  Description: INTERVENTIONS:  - Identify patients at risk for skin breakdown  - Assess and monitor skin integrity  - Assess and monitor nutrition and hydration status  - Monitor labs   - Assess for incontinence   - Turn and reposition patient  - Assist with mobility/ambulation  - Relieve pressure over bony prominences  - Avoid friction and shearing  - Provide appropriate hygiene as needed including keeping skin clean and dry  - Evaluate need for skin moisturizer/barrier cream  - Collaborate with interdisciplinary team   - Patient/family teaching  - Consider wound care consult   Outcome: Progressing     Problem: SAFETY,RESTRAINT: NV/NON-SELF DESTRUCTIVE BEHAVIOR  Goal: Remains free of harm/injury (restraint for non violent/non self-detsructive behavior)  Description: INTERVENTIONS:  - Instruct patient/family regarding restraint use   - Assess and monitor physiologic and psychological status   - Provide interventions and comfort measures to meet assessed patient needs   - Identify and implement measures to help patient regain control  - Assess readiness for release of restraint   Outcome: Progressing  Goal: Returns to optimal restraint-free functioning  Description: INTERVENTIONS:  - Assess the patient's behavior and symptoms that indicate continued need for restraint  - Identify and implement measures to help patient regain control  - Assess readiness for release of restraint   Outcome: Progressing     Problem: Neurological Deficit  Goal: Neurological status is stable or improving  Description: Interventions:  - Monitor and assess patient's level of consciousness, motor function, sensory function, and level of assistance needed for ADLs  - Monitor and report changes from baseline  Collaborate with interdisciplinary team to initiate plan and implement interventions as ordered  - Provide and maintain a safe environment    - Consider seizure precautions  - Consider fall precautions  - Consider aspiration precautions  - Consider bleeding precautions  Outcome: Progressing     Problem: Activity Intolerance/Impaired Mobility  Goal: Mobility/activity is maintained at optimum level for patient  Description: Interventions:  - Assess and monitor patient  barriers to mobility and need for assistive/adaptive devices  - Assess patient's emotional response to limitations  - Collaborate with interdisciplinary team and initiate plans and interventions as ordered  - Encourage independent activity per ability   - Maintain proper body alignment  - Perform active/passive rom as tolerated/ordered  - Plan activities to conserve energy   - Turn patient as appropriate  Outcome: Progressing     Problem: Communication Impairment  Goal: Ability to express needs and understand communication  Description: Assess patient's communication skills and ability to understand information  Patient will demonstrate use of effective communication techniques, alternative methods of communication and understanding even if not able to speak  - Encourage communication and provide alternate methods of communication as needed  - Collaborate with case management/ for discharge needs  - Include patient/family/caregiver in decisions related to communication  Outcome: Progressing     Problem: Potential for Aspiration  Goal: Non-ventilated patient's risk of aspiration is minimized  Description: Assess and monitor vital signs, respiratory status, and labs (WBC)  Monitor for signs of aspiration (tachypnea, cough, rales, wheezing, cyanosis, fever)  - Assess and monitor patient's ability to swallow  - Place patient up in chair to eat if possible  - HOB up at 90 degrees to eat if unable to get patient up into chair   - Supervise patient during oral intake  - Instruct patient/ family to take small bites    - Instruct patient/ family to take small single sips when taking liquids  - Follow patient-specific strategies generated by speech pathologist   Outcome: Progressing  Goal: Ventilated patient's risk of aspiration is minimized  Description: Assess and monitor vital signs, respiratory status, airway cuff pressure, and labs (WBC)  Monitor for signs of aspiration (tachypnea, cough, rales, wheezing, cyanosis, fever)  - Elevate head of bed 30 degrees if patient has tube feeding   - Monitor tube feeding  Outcome: Progressing     Problem: Nutrition  Goal: Nutrition/Hydration status is improving  Description: Monitor and assess patient's nutrition/hydration status for malnutrition (ex- brittle hair, bruises, dry skin, pale skin and conjunctiva, muscle wasting, smooth red tongue, and disorientation)  Collaborate with interdisciplinary team and initiate plan and interventions as ordered  Monitor patient's weight and dietary intake as ordered or per policy  Utilize nutrition screening tool and intervene per policy  Determine patient's food preferences and provide high-protein, high-caloric foods as appropriate  - Assist patient with eating   - Allow adequate time for meals   - Encourage patient to take dietary supplement as ordered  - Collaborate with clinical nutritionist   - Include patient/family/caregiver in decisions related to nutrition  Outcome: Progressing     Problem: Nutrition/Hydration-ADULT  Goal: Nutrient/Hydration intake appropriate for improving, restoring or maintaining nutritional needs  Description: Monitor and assess patient's nutrition/hydration status for malnutrition  Collaborate with interdisciplinary team and initiate plan and interventions as ordered  Monitor patient's weight and dietary intake as ordered or per policy  Utilize nutrition screening tool and intervene as necessary  Determine patient's food preferences and provide high-protein, high-caloric foods as appropriate       INTERVENTIONS:  - Monitor oral intake, urinary output, labs, and treatment plans  - Assess nutrition and hydration status and recommend course of action  - Evaluate amount of meals eaten  - Assist patient with eating if necessary   - Allow adequate time for meals  - Recommend/ encourage appropriate diets, oral nutritional supplements, and vitamin/mineral supplements  - Order, calculate, and assess calorie counts as needed  - Recommend, monitor, and adjust tube feedings and TPN/PPN based on assessed needs  - Assess need for intravenous fluids  - Provide specific nutrition/hydration education as appropriate  - Include patient/family/caregiver in decisions related to nutrition  Outcome: Progressing

## 2022-01-22 NOTE — RESPIRATORY THERAPY NOTE
01/22/22 0755   Respiratory Assessment   Assessment Type Assess only   General Appearance Sleeping   Respiratory Pattern Assisted   Suction ET Tube   Resp Comments Pt resting quietly on PSV, no eye opening  pt sxnd for small  plan to maintain PSV at this time   O2 Device 840   Vent Information   Vent ID [id number]   Vent type     Vent Mode CPAP/PS Spont   $ Pulse Oximetry Spot Check Charge Completed   CPAP/PS Spont Settings   FIO2 (%) 40 %   PEEP (cmH2O) 6 cmH2O   Pressure Support (cmH2O) 8 cmH20   Trigger Sensitivity Flow (lpm) 3 LPM   Rise Time (%) 50 %   Esens % 25 %   Humidification Heater   Heater Temp 98 6 °F (37 °C)   CPAP/PS Spont Actuals   Resp Rate (BPM) 12 BPM   VT (mL) 569 mL   MV (Obs) 6 79   MAP (cmH2O) 8 7 cmH2O   Peak Pressure (cmH2O) 14 cmH2O   I/E Ratio (Obs) 1:2 3   RSBI 19   Heater Temperature (Obs) 96 3 °F (35 7 °C)   CPAP/PS Spont Alarms   High Peak Pressure (cmH20) 40 cmH2O   High Resp Rate (BPM) 40 BPM   High MV (L/min) 20 L/min   Low MV (L/min) 3 5 L/min   High Marco VTE (mL) 1000 mL   Low Marco VTE (mL) 250 mL   High SPONT VTE (mL) 1000 mL   Low Spont VTE (mL) 250 mL   CPAP/PS Spont Apnea Settings   Resp Rate (BPM) 14 BPM   VT (mL) 400 mL   FIO2 (%) 100 %   Apnea Time (s) 20 S   Apnea Flow (LPM) 60 LPM   Maintenance   Alarm (pink) cable attached Yes   Resuscitation bag with peep valve at bedside Yes   Water bag changed Yes   Circuit changed No   Daily Screen   Patient safety screen outcome: Passed   IHI Ventilator Associated Pneumonia Bundle   Daily Assessment of Readiness to Extubate Yes   Head of Bed Elevated HOB 30   ETT  7 5 mm   Placement Date/Time: 01/18/22 1345   Preoxygenated: Yes  Technique: Stylet  Tube Size: 7 5 mm  Laryngoscope: GlideScope  Placement Verification: Auscultation; Chest x-ray;End tidal CO2  Secured at (cm): 22  Placed By: Resident    Secured at (cm) 24   Measured from 5 [facility] Location Right   Repositioned Center to Right   Secured by Commercial tube varela   Site Condition Dry   Cuff Pressure (cm H2O) 22 cm H2O   HI-LO Suction  Intermittent suction   HI-LO Secretions Scant   HI-LO Intervention Patent

## 2022-01-22 NOTE — SPEECH THERAPY NOTE
Speech-Language Pathology Bedside Swallow Evaluation    Patient Name: Stevo KELLERIUG'STUART Date: 1/22/2022     Problem List  Principal Problem:    Seizure [facility])  Active Problems:    Hyperlipidemia    Hypertension    Aneurysm of abdominal aorta (HCC)    [facility] disorder ([facility] )    Nonrheumatic aortic valve stenosis    Stage 3a chronic kidney disease ([facility] )      Past Medical History  Past Medical History:   Diagnosis Date    Colon polyps     Gout     Hyperlipidemia     Hypertension     S/P AAA repair     [facility]ure disorder ([facility] )     Tear of right rotator cuff 4/4/2017       Past Surgical History  Past Surgical History:   Procedure Laterality Date    ABDOMINAL AORTIC ANEURYSM REPAIR, ENDOVASCULAR N/A     MT COLONOSCOPY FLX DX W/COLLJ SPEC WHEN PFRMD N/A 6/7/2017    Procedure: COLONOSCOPY;  Surgeon: Joanna Montez MD;  Location: [facility]; Service: Colorectal    MT COLONOSCOPY FLX DX W/COLLJ SPEC WHEN PFRMD N/A 5/18/2018    Procedure: COLONOSCOPY;  Surgeon: Joanna Montez MD;  Location: [facility]; Service: Colorectal    TONSILLECTOMY      WISDOM TOOTH EXTRACTION Bilateral        Summary  Pt seen for dysphagia evaluation following self extubation  Appearing drowsy but able to make eye contact, follow directions, and attempting to speak, dysarthric but intelligible at times  Pt presented with s/s suggestive of moderate-severe oropharyngeal dysphagia  Symptoms or concerns included decreased bolus propulsion and suspected decreased control of liquids, as well as pharyngeal swallow delay, decreased hyolaryngeal elevation upon palpation, suspected pharyngeal residue, multiple audible swallows (swallows 5-6x per trial) with delayed cough after trials  ST will re assess daily to determine readiness for PO  May need to consider NGT, and pt will likely require VBS when appropriate       Risk/s for Aspiration: mid-high    Recommended Diet: NPO   Recommended Form of Meds: non-oral if possible   Aspiration precautions and swallowing strategies: ST to re assess prior to initiation of oral diet  Other Recommendations: Continue frequent oral care      Current Medical Status per neuro note 1/22/22  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 XR 1000 mg QHS, CKD, HTN, hyperlipidemia, and s/p AAA repair  Presented to the ED as a stroke alert for right-sided facial droop however had multiple seizures en route to the ED  LKW was 1 day prior  Reports of possibly missing one dose of Keppra  Received 4 mg of Ativan per EMS followed by an additional 2 mg in the ED due to persistent right gaze deviation concerning for ongoing seizure activity  Was intubated for airway protection  EMS reported a fever of 102F however he has been afebrile since admission  Received loading dose of 1500 mg Keppra and 20 mg/kg of valproic acid  LP was completed and was unrevealing  CTH and CTA were also unremarkable       Unclear why patient had multiple seizures, but prevailing theory is from missing his one dose of Keppra XR  Patient had additional seizure activity on 1/19 in the setting of weaning sedation  He has now been seizure free for 36+ hours with last seizure 1/19 in the afternoon  It would be reasonable at this time to start weaning sedation while monitoring on EEG   Would not change AED regimen at this time      Plan:  - Stroke pathway discontinued as presentation is not consistent with stroke  - Monitor on video EEG - last seizure afternoon of 1/19  - Continue Keppra 1500 mg BID  - Valproic acid 500 mg Q8H  - Please check ammonia level and valproic acid trough before next dose  - Seizure precautions  - MRI brain wo contrast when able  - Continue ceftriaxone as per CC team  - Will discuss sending encephalitis panel with primary team  - Remainder of management as per CC team    Pt self extubated 1/22/22, ST consulted     Special Studies:  CXR 1/19/22  IMPRESSION:  Interval placement of right IJ CVP line without complication  Persistent lateral left lung base opacity suspicious for infiltrate/atelectasis    Social/Education/Vocational Hx:  Pt lives home with spouse (he is her caregiver)    Swallow Information   Current Risks for Dysphagia & Aspiration: recent intubation and seizures  Current Symptoms/Concerns: coughing with po and change in respiratory status  Current Diet: NPO   Baseline Diet: regular diet and thin liquids      Baseline Assessment   Behavior/Cognition: alert and waxing and waning arousal level  Speech/Language Status: able to follow commands and limited verbal output  Patient Positioning: upright in bed  Pain Status/Interventions/Response to Interventions: No report of or nonverbal indications of pain  Swallow Mechanism Exam  Facial: masked facies  Labial: decreased strength but symmetrical smile  Lingual: decreased coordination  Velum: symmetrical  Mandible: adequate ROM  Dentition: adequate  Vocal quality:weak and rough   Volitional Cough: strong/productive   Respiratory Status: on 4L O2     Consistencies Assessed and Performance   Consistencies Administered: ice chips, honey thick and puree    Oral Stage: moderate, decreased bolus propulsion, decreased bolus formation and suspected decreased control of liquids      Pharyngeal Stage: moderate-severe, suspected pharyngeal swallow delay, suspected decreased hyolaryngeal elevation upon palpation, suspected pharyngeal residue and multiple swallows  Delayed cough noted with all conservative PO trials  Esophageal Concerns: none reported    Summary and Recommendations (see above)    Results Reviewed with: patient and RN     Treatment Recommended: yes     Frequency of treatment: 3-5x/week      Dysphagia LTG  -Patient will demonstrate safe and effective oral intake (without overt s/s significant oral/pharyngeal dysphagia including s/s penetration or aspiration) for the highest appropriate diet level       Short Term Goals:  -Pt will tolerate Dysphagia 1/pureed diet and honey thick liquid with no significant s/s oral or pharyngeal dysphagia across 1-3 diagnostic sessions     -Patient will tolerate trials of upgraded food and/or liquid texture with no significant s/s of oral or pharyngeal dysphagia including aspiration across 1-3 diagnostic sessions     -Patient will comply with a Video/Modified Barium Swallow study for more complete assessment of swallowing anatomy/physiology/aspiration risk and to assess efficacy of treatment techniques so as to best guide treatment plan

## 2022-01-22 NOTE — RESPIRATORY THERAPY NOTE
RT Ventilator Management Note  Adriano Bruce 80 y o  male MRN: [id number]  Unit/Bed#: ICU 09 Encounter: [id number]      Daily Screen       1/20/2022  0813 1/21/2022  0826          Patient safety screen outcome[de-identified] Failed Passed      Not Ready for Weaning due to[de-identified] Underline problem not resolved --      Spont breathing trial % for 30 min: -- No              Physical Exam:   Assessment Type: Assess only  General Appearance: Sleeping  Respiratory Pattern: (P) Spontaneous  Chest Assessment: (P) Chest expansion symmetrical  Bilateral Breath Sounds: (P) Diminished  Cough: (P) None  Suction: ET Tube  O2 Device: VENT      Resp Comments: (P) Patient rested comfortably overnight on CPAP+6/PS+8/FiO2 40%  RSBI 25-35, Vt 6-8cc/kg  Continue with spontaneous ventilation as tolerated

## 2022-01-23 ENCOUNTER — APPOINTMENT (INPATIENT)
Dept: RADIOLOGY | Facility: HOSPITAL | Age: 82
DRG: 100 | End: 2022-01-23
Payer: MEDICARE

## 2022-01-23 LAB
ANION GAP SERPL CALCULATED.3IONS-SCNC: 2 MMOL/L (ref 4–13)
BACTERIA BLD CULT: NORMAL
BACTERIA BLD CULT: NORMAL
BUN SERPL-MCNC: 16 MG/DL (ref 5–25)
CALCIUM SERPL-MCNC: 8.3 MG/DL (ref 8.3–10.1)
CHLORIDE SERPL-SCNC: 113 MMOL/L (ref 100–108)
CO2 SERPL-SCNC: 30 MMOL/L (ref 21–32)
CREAT SERPL-MCNC: 0.73 MG/DL (ref 0.6–1.3)
GFR SERPL CREATININE-BSD FRML MDRD: 88 ML/MIN/1.73SQ M
GLUCOSE SERPL-MCNC: 105 MG/DL (ref 65–140)
GLUCOSE SERPL-MCNC: 68 MG/DL (ref 65–140)
GLUCOSE SERPL-MCNC: 68 MG/DL (ref 65–140)
GLUCOSE SERPL-MCNC: 84 MG/DL (ref 65–140)
GLUCOSE SERPL-MCNC: 85 MG/DL (ref 65–140)
GLUCOSE SERPL-MCNC: 86 MG/DL (ref 65–140)
GLUCOSE SERPL-MCNC: 89 MG/DL (ref 65–140)
GLUCOSE SERPL-MCNC: 96 MG/DL (ref 65–140)
POTASSIUM SERPL-SCNC: 4.3 MMOL/L (ref 3.5–5.3)
SODIUM SERPL-SCNC: 145 MMOL/L (ref 136–145)

## 2022-01-23 PROCEDURE — 71045 X-RAY EXAM CHEST 1 VIEW: CPT

## 2022-01-23 PROCEDURE — 99233 SBSQ HOSP IP/OBS HIGH 50: CPT | Performed by: INTERNAL MEDICINE

## 2022-01-23 PROCEDURE — 94760 N-INVAS EAR/PLS OXIMETRY 1: CPT

## 2022-01-23 PROCEDURE — 82948 REAGENT STRIP/BLOOD GLUCOSE: CPT

## 2022-01-23 PROCEDURE — 74018 RADEX ABDOMEN 1 VIEW: CPT

## 2022-01-23 PROCEDURE — 80048 BASIC METABOLIC PNL TOTAL CA: CPT

## 2022-01-23 PROCEDURE — 99232 SBSQ HOSP IP/OBS MODERATE 35: CPT | Performed by: PSYCHIATRY & NEUROLOGY

## 2022-01-23 PROCEDURE — 92526 ORAL FUNCTION THERAPY: CPT

## 2022-01-23 PROCEDURE — 95720 EEG PHY/QHP EA INCR W/VEEG: CPT | Performed by: STUDENT IN AN ORGANIZED HEALTH CARE EDUCATION/TRAINING PROGRAM

## 2022-01-23 PROCEDURE — NC001 PR NO CHARGE: Performed by: INTERNAL MEDICINE

## 2022-01-23 RX ORDER — FENTANYL CITRATE 50 UG/ML
25 INJECTION, SOLUTION INTRAMUSCULAR; INTRAVENOUS
Status: DISCONTINUED | OUTPATIENT
Start: 2022-01-23 | End: 2022-01-23

## 2022-01-23 RX ORDER — ACETAMINOPHEN 325 MG/1
650 TABLET ORAL EVERY 6 HOURS PRN
Status: DISCONTINUED | OUTPATIENT
Start: 2022-01-23 | End: 2022-01-24

## 2022-01-23 RX ORDER — DEXTROSE MONOHYDRATE 25 G/50ML
25 INJECTION, SOLUTION INTRAVENOUS ONCE
Status: DISCONTINUED | OUTPATIENT
Start: 2022-01-23 | End: 2022-01-23

## 2022-01-23 RX ORDER — DEXTROSE 10 % IN WATER 10 %
50 INTRAVENOUS SOLUTION INTRAVENOUS ONCE
Status: COMPLETED | OUTPATIENT
Start: 2022-01-23 | End: 2022-01-23

## 2022-01-23 RX ORDER — OLANZAPINE 10 MG/1
2.5 INJECTION, POWDER, LYOPHILIZED, FOR SOLUTION INTRAMUSCULAR 3 TIMES DAILY PRN
Status: DISCONTINUED | OUTPATIENT
Start: 2022-01-23 | End: 2022-02-01 | Stop reason: HOSPADM

## 2022-01-23 RX ADMIN — DOXAZOSIN 1 MG: 1 TABLET ORAL at 13:17

## 2022-01-23 RX ADMIN — CHLORHEXIDINE GLUCONATE 15 ML: 1.2 SOLUTION ORAL at 20:20

## 2022-01-23 RX ADMIN — ACETAMINOPHEN 650 MG: 325 TABLET, FILM COATED ORAL at 20:21

## 2022-01-23 RX ADMIN — PRAVASTATIN SODIUM 80 MG: 80 TABLET ORAL at 16:15

## 2022-01-23 RX ADMIN — VALPROATE SODIUM 500 MG: 100 INJECTION, SOLUTION INTRAVENOUS at 05:11

## 2022-01-23 RX ADMIN — ENOXAPARIN SODIUM 40 MG: 40 INJECTION SUBCUTANEOUS at 08:06

## 2022-01-23 RX ADMIN — DEXTROSE 50 ML: 10 SOLUTION INTRAVENOUS at 00:56

## 2022-01-23 RX ADMIN — FOLIC ACID 1 MG: 1 TABLET ORAL at 13:16

## 2022-01-23 RX ADMIN — WATER 10 ML: 1 INJECTION INTRAMUSCULAR; INTRAVENOUS; SUBCUTANEOUS at 16:15

## 2022-01-23 RX ADMIN — FENTANYL CITRATE 25 MCG: 50 INJECTION INTRAMUSCULAR; INTRAVENOUS at 06:18

## 2022-01-23 RX ADMIN — THIAMINE HCL TAB 100 MG 100 MG: 100 TAB at 13:16

## 2022-01-23 RX ADMIN — VALPROATE SODIUM 500 MG: 100 INJECTION, SOLUTION INTRAVENOUS at 22:41

## 2022-01-23 RX ADMIN — CHLORHEXIDINE GLUCONATE 15 ML: 1.2 SOLUTION ORAL at 08:06

## 2022-01-23 RX ADMIN — LEVETIRACETAM 1500 MG: 100 INJECTION, SOLUTION INTRAVENOUS at 20:20

## 2022-01-23 RX ADMIN — OLANZAPINE 2.5 MG: 10 INJECTION, POWDER, FOR SOLUTION INTRAMUSCULAR at 16:15

## 2022-01-23 RX ADMIN — LEVETIRACETAM 1500 MG: 100 INJECTION, SOLUTION INTRAVENOUS at 08:06

## 2022-01-23 RX ADMIN — VALPROATE SODIUM 500 MG: 100 INJECTION, SOLUTION INTRAVENOUS at 13:59

## 2022-01-23 NOTE — SPEECH THERAPY NOTE
Speech/Language Pathology Progress Note    Patient Name: Aleta Alicea  [id number] Date: 1/23/2022    Subjective:  Pt's speech more intelligible today, still rough vocal quality with low volume  "I haven't eaten in 3 days"    Objective:  Pt seen for dysphagia therapy for re assessment of PO tolerance  He was kept NPO following ST evaluation yesterday  Today pt was offered ice chips, HTL, thin liquid, and apple sauce  Mild throat clearing and reduced manipulation noted with the ice  HTL and puree trials via tsp consistently yielded multiple swallows per bolus and delayed cough after swallow  Thin liquid sip offered (questioned cough d/t residual with thicker consistencies) however delayed cough was again audible after trial of thin liquid  Assessment:  Pt remains at at increased risk for aspiration at this time  PO diet not yet appropriate  Plan/Recommendations:  Continue NPO  ST will f/u daily for potential to initiate PO diet  Consider short term NGT for nutrition

## 2022-01-23 NOTE — PROGRESS NOTES
Daily Progress Note - Critical Care   Alma Muse 80 y o  male MRN: [id number]  Unit/Bed#: ICU 09 Encounter: [id number]        ----------------------------------------------------------------------------------------  HPI/24hr events: Self-extubated, still encephalopathic Negative vEEG, off of recording     ---------------------------------------------------------------------------------------  SUBJECTIVE  No acute complaints, reluctant to converse  Gives minimal answers  Patient states he is hungry and would like to eat scrambled eggs  Does not offer much else to say  Review of Systems    ---------------------------------------------------------------------------------------  Assessment and Plan:    Neuro:   · Diagnosis: Seizure    ? Plan:   § Off vEEGm propofol  § No ischemic changes on MRI  § Depakote level=80  No new ammonia lv, last 21 5d ago, keppra lv from 1/18 still pending  § Continue Keppra 1500 BID, Depacon 500 mg TID  § Ativan PRN for seizure activity   · Diagnosis: Hx of seizure disorder   ? Plan:   § Follows with Dr Ashli Reyna as outpatient   § Home regimen: Keppra 1000mg HS   § Dose reduced 7/28/21 from 1500mg HS   § Last known seizure 2/21/17, 2 seizures unprovoked   § Missed dose 1/16   · Diagnosis: Agitation (removing keofed tube)    Plan: Soft restraints      CV:   · Diagnosis: Hypotension   ? Plan:   § Likely in the setting of sedation   § Continue levo for MAP>65   § Triple lumen cath in place 1/19-- (D5)  · Diagnosis: Hx of HTN   ? Plan:   § No home regimen  § Bps at goal, avg 110s-120s/50s-60s  § SBP goal <160   · Diagnosis: Troponin elevation   ? Plan:   § Resolved  · Diagnosis: AAA s/p EVAR   ? Plan:   § Stable  · Diagnosis: HLD   ? Plan:   § Continue pravastatin 80 (home simva 40)        Pulm:  · Diagnosis: Acute Respiratory Failure   ? Plan:   § Intubated at [facility] ED, self-extubated 1/22  § Maintain SpO2>92%         GI:   · Diagnosis: Tube feeds Midd[place] Folds) s/p 2 attempts  ?  Pt removed 1st keofed after confirmation w/imaging  ? Plan:   America Bosch in place, confirmed w/KUB and CXR  § Bowel regimen PRN   · nosis: Elevated AST and T bili resolved  ? Plan:   § GI ppx: not indicated   § Bowel regimen PRN         :   · Diagnosis: CKD III  ? Plan:   § Continue to trend BUN/Cr  § Baseline creatinine 1 15-1 22  § Strict I/O   § Avoid nephrotoxins   § Franklin in place - now that pt is extubated, consider removal (Franklin day 2) and switch to condom cath PRN     F/E/N:   · Plan:   ? Fluids: 10cc/hr  ? Electrolytes: Will replete as necessary to maintain potassium > 4 0, magnesium > 2 0, and phosphrous > 3 0    ? Nutrition: TFs         Heme/Onc:   · Diagnosis: No acute issues   ? Plan:   § SubQ heparin SCDs for DVT ppx     Endo:   · Diagnosis: No acute issues   ? Plan:   § Blood glucose goal 140-180   § Initiate SSI if indicated        ID:   · Diagnosis: LLL PNA  ? Plan  § Afebrile  No leukocytosis  § S/p 5d course ceftriaxone ended 1/22/22     MSK/Skin:   · Diagnosis: At risk for pressure injury   ? Plan:   § Frequent turning/pressure off loading   § PT/OT    Patient appropriate for transfer out of the ICU today?: No  Disposition: Continue Critical Care   Code Status: Level 1 - Full Code  ---------------------------------------------------------------------------------------  ICU CORE MEASURES    Prophylaxis   VTE Pharmacologic Prophylaxis: Enoxaparin (Lovenox)  VTE Mechanical Prophylaxis: sequential compression device  Stress Ulcer Prophylaxis: Prophylaxis Not Indicated     ABCDE Protocol (if indicated)  Plan to perform spontaneous awakening trial today? Not applicable  Plan to perform spontaneous breathing trial today? Not applicable  Obvious barriers to extubation?  Not applicable    Invasive Devices Review  Invasive Devices  Report    Central Venous Catheter Line            CVC Central Lines 01/19/22 Triple 20cm 3 days          Drain            Rectal Tube 2 days    Urethral Catheter 18 Fr  1 day              Can any invasive devices be discontinued today? Yes  ---------------------------------------------------------------------------------------  OBJECTIVE    Vitals   Vitals:    22 0400 22 0500 22 0518 22 0600   BP: (!) 126/49 118/53  114/63   Pulse: 86 86  102   Resp: 18 13  (!) 25   Temp: 98 8 °F (37 1 °C)  99 1 °F (37 3 °C)    TempSrc: Axillary  Rectal    SpO2: 99% 99%  97%   Weight:       Height:         Temp (24hrs), Av 9 °F (37 2 °C), Min:98 6 °F (37 °C), Max:99 3 °F (37 4 °C)  Current: Temperature: 99 1 °F (37 3 °C)  HR: 102  BP: 114/63  RR: 25  SpO2: 97 NC 2L    Respiratory:  SpO2: SpO2: 97 %       Invasive/non-invasive ventilation settings   Respiratory  Report   Lab Data (Last 4 hours)    None         O2/Vent Data (Last 4 hours)    None                Physical Exam  Vitals reviewed  Constitutional:       Appearance: He is not ill-appearing  Comments: Frail and weak appearing   HENT:      Head: Normocephalic  Mouth/Throat:      Mouth: Mucous membranes are dry  Pharynx: Oropharynx is clear  Comments: Poor dentition  Eyes:      General: No scleral icterus  Cardiovascular:      Rate and Rhythm: Normal rate and regular rhythm  Pulses: Normal pulses  Pulmonary:      Effort: Pulmonary effort is normal  No respiratory distress  Breath sounds: No wheezing or rales  Abdominal:      General: Bowel sounds are normal  There is no distension  Palpations: Abdomen is soft  There is no mass  Tenderness: There is no abdominal tenderness     Neurological:      Comments: Responds to binary questioning, limited insight             Laboratory and Diagnostics:  Results from last 7 days   Lab Units 22  0000 22  0451 22  0502 22  0537 22  1058   WBC Thousand/uL 6 74 10 36* 14 34* 14 31* 15 62*   HEMOGLOBIN g/dL 11 7* 12 3 12 5 12 9 13 3   HEMATOCRIT % 36 4* 37 4 38 5 39 5 40 5   PLATELETS Thousands/uL 107* 130* 161 132* 159   NEUTROS PCT %  --  82* 86* 87*  --    MONOS PCT %  --  7 6 7  --      Results from last 7 days   Lab Units 01/22/22  0422 01/21/22  0451 01/20/22  0502 01/19/22  0536 01/18/22  1058   SODIUM mmol/L 146* 148* 146* 143 140  140   POTASSIUM mmol/L 4 1 3 9 3 8 3 5 3 8  3 8   CHLORIDE mmol/L 114* 116* 115* 111* 104  104   CO2 mmol/L 31 30 28 26 27  25   ANION GAP mmol/L 1* 2* 3* 6 9  11   BUN mg/dL 14 11 15 18 20  20   CREATININE mg/dL 0 78 0 72 0 83 0 93 1 53*  1 57*   CALCIUM mg/dL 8 3 7 8* 7 7* 7 8* 8 5  8 5   GLUCOSE RANDOM mg/dL 112 117 117 105 135  135   ALT U/L 24  --   --  25 28   AST U/L 37  --   --  54* 36   ALK PHOS U/L 57  --   --  72 80   ALBUMIN g/dL 2 2*  --   --  3 1* 3 7   TOTAL BILIRUBIN mg/dL 0 64  --   --  1 40* 1 10*     Results from last 7 days   Lab Units 01/21/22  0451 01/20/22  0502 01/19/22  0536   MAGNESIUM mg/dL 2 8* 2 7* 2 4   PHOSPHORUS mg/dL  --  2 6 1 5*      Results from last 7 days   Lab Units 01/18/22  1058   INR  1 10   PTT seconds 28          Results from last 7 days   Lab Units 01/18/22  1326 01/18/22  1048   LACTIC ACID mmol/L 2 1* 3 1*     ABG:    VBG:  Results from last 7 days   Lab Units 01/18/22  1058   PH JANET  7 395   PCO2 JANET mm Hg 41 3*   PO2 JANTE mm Hg 57 5*   HCO3 JANET mmol/L 24 7   BASE EXC JANET mmol/L -0 2     Results from last 7 days   Lab Units 01/20/22  0502 01/19/22  0537   PROCALCITONIN ng/ml 0 60* 0 79*       Micro  Results from last 7 days   Lab Units 01/19/22  1145 01/19/22  0206 01/19/22  0133 01/18/22  1721 01/18/22  1126 01/18/22  1048   BLOOD CULTURE   --  No Growth at 72 hrs  No Growth at 72 hrs   --  No Growth After 4 Days  No Growth After 4 Days  SPUTUM CULTURE  Few Colonies of   --   --   --   --   --    GRAM STAIN RESULT  No bacteria seen  2+ Polys  --   --  No Polys or Bacteria seen  --   --        EKG: No new  Imaging: MRI brain non con 1/22  IMPRESSION:     No MR evidence of acute ischemia  I have personally reviewed pertinent reports        Intake and Output  I/O       01/21 0701  01/22 0700 01/22 0701  01/23 0700 01/23 0701  01/24 0700    I V  (mL/kg) 224 3 (2 6) 986 7 (11 6)     NG/GT  60     IV Piggyback 200 250     Feedings 880 240     Total Intake(mL/kg) 1304 3 (15 4) 1536 7 (18 1)     Urine (mL/kg/hr) 1230 (0 6) 1875 (0 9)     Stool 125 300     Total Output 1355 2175     Net -50 7 -638  3                UOP: 78 ml/hr     Height and Weights   Height: 5' 10" (177 8 cm)  IBW (Ideal Body Weight): 73 kg  Body mass index is 26 83 kg/m²  Weight (last 2 days)     None            Nutrition       Diet Orders   (From admission, onward)             Start     Ordered    01/20/22 1406  Diet Enteral/Parenteral; Tube Feeding No Oral Diet; Jevity 1 2 Wu; Continuous; 40; 0  Diet effective now        References:    Nutrtion Support Algorithm Enteral vs  Parenteral   Question Answer Comment   Diet Type Enteral/Parenteral    Enteral/Parenteral Tube Feeding No Oral Diet    Tube Feeding Formula: Jevity 1 2 Wu    Bolus/Cyclic/Continuous Continuous    Tube Feeding Goal Rate (mL/hr): 40    Tube Feeding flush (mL): 0    RD to adjust diet per protocol? Yes        01/20/22 1405              TF currently running at 40 ml/hr with a goal of 40 ml/hr   Formula: 1 2 Jevity      Active Medications  Scheduled Meds:  Current Facility-Administered Medications   Medication Dose Route Frequency Provider Last Rate    chlorhexidine  15 mL Mouth/Throat Q12H [facility] Cade An PA-C      doxazosin  1 mg Oral Daily Hood Parra MD      enoxaparin  40 mg Subcutaneous Q24H [facility] Dorina Galvin MD      fentanyl citrate (PF)  25 mcg Intravenous Q1H PRN Cade An PA-C      folic acid  1 mg Oral Daily [facility], BRIANA      levETIRAcetam  1,500 mg Intravenous Q12H Sandy Ordaz MD 1,500 mg (01/22/22 2022)    pravastatin  80 mg Oral Daily With Lashay Best MD      sodium chloride  100 mL/hr Intravenous Continuous Hood Parra  mL/hr (01/22/22 1620)    thiamine  100 mg Oral Daily Windy Schwab SavonaBRIANA      valproate sodium  500 mg Intravenous [facility] Edward Chávez  mg (01/23/22 0511)     Continuous Infusions:  sodium chloride, 100 mL/hr, Last Rate: 100 mL/hr (01/22/22 1620)      PRN Meds:   fentanyl citrate (PF), 25 mcg, Q1H PRN        Allergies   Allergies   Allergen Reactions    Fenofibrate      Patient not aware of allergy    Hydrocodone-Acetaminophen      Patient not aware of allergy     ---------------------------------------------------------------------------------------  Advance Directive and Living Will:      Power of :    POLST:    ---------------------------------------------------------------------------------------  Care Time Delivered:   No Critical Care time spent     Roro Barboza MD      Portions of the record may have been created with voice recognition software  Occasional wrong word or "sound a like" substitutions may have occurred due to the inherent limitations of voice recognition software    Read the chart carefully and recognize, using context, where substitutions have occurred

## 2022-01-23 NOTE — PROGRESS NOTES
NEUROLOGY RESIDENCY PROGRESS NOTE     Name: Daily Warren   Age & Sex: 80 y o  male   MRN: [id number]  Unit/Bed#: [facility] 713-01   Encounter: [id number]    Daily Warren will need follow up in in 6 weeks with epilepsy attending or advanced practitioner  He will not require outpatient neurological testing  Pending for discharge: Clinical improvement, possible rehab placement    ASSESSMENT & PLAN     * Seizure [facility])  Assessment & Plan  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 XR 1000 mg QHS, CKD, HTN, hyperlipidemia, and s/p AAA repair  Presented to the ED as a stroke alert for right-sided facial droop however had multiple seizures en route to the ED  LKW was 1 day prior  Reports of possibly missing one dose of Keppra  Received 4 mg of Ativan per EMS followed by an additional 2 mg in the ED due to persistent right gaze deviation concerning for ongoing seizure activity  Was intubated for airway protection  EMS reported a fever of 102F however he has been afebrile since admission  Received loading dose of 1500 mg Keppra and 20 mg/kg of valproic acid  LP was completed and was unrevealing  CTH and CTA were also unremarkable  Valproic acid and ammonia levels have been stable  Limited MRI brain was negative for ischemia  Continue to be unclear why patient had multiple seizures, but prevailing theory is from missing his one dose of Keppra XR  Patient had additional seizure activity on 1/19 in the setting of weaning sedation  He has now been seizure free for multiple days with last seizure 1/19 in the afternoon  Patient had weaned sedation today  Would not change AED regimen at this time      Plan:  - Stroke pathway discontinued as presentation is not consistent with stroke  - Video EEG monitoring was discontinued as of 1/22  - Continue Keppra 1500 mg BID  - Valproic acid 500 mg Q8H  - Seizure precautions  - Remainder of management as per CC team      SUBJECTIVE     Patient was seen and examined  No acute events overnight  Patient was extubated on  and is doing well  He remains sleepy but wakes up for exam  He denies any complaints  No headaches, dizziness, lightheadedness  No vision changes or hearing changes  No focal weakness or sensory changes  Denies chest pain, shortness of breath, abdominal pain, n/v/d  A 10-point ROS was completed and is negative except for what is mentioned above  OBJECTIVE     Patient ID: Damian Bess is a 80 y o  male  Vitals:    22 1100 22 1200 22 1300 22 1511   BP: (!) 110/47 136/56 128/70 127/63   BP Location:  Left arm     Pulse: 86 86 84 93   Resp: 16 18 17    Temp:  98 9 °F (37 2 °C)  100 3 °F (37 9 °C)   TempSrc:  Oral     SpO2: 98% 98% 98% 92%   Weight:       Height:          Temperature:   Temp (24hrs), Av 1 °F (37 3 °C), Min:98 6 °F (37 °C), Max:100 3 °F (37 9 °C)    Temperature: 100 3 °F (37 9 °C)      Physical Exam  Vitals and nursing note reviewed  Constitutional:       General: He is sleeping  Appearance: Normal appearance  He is normal weight  He is not ill-appearing or toxic-appearing  Comments: Hoarse voice   HENT:      Head: Normocephalic and atraumatic  Right Ear: External ear normal       Left Ear: External ear normal       Nose: Nose normal       Mouth/Throat:      Mouth: Mucous membranes are dry  Pharynx: Oropharynx is clear  Eyes:      General: No scleral icterus  Extraocular Movements: Extraocular movements intact and EOM normal       Conjunctiva/sclera: Conjunctivae normal       Pupils: Pupils are equal, round, and reactive to light  Cardiovascular:      Rate and Rhythm: Normal rate  Pulses: Normal pulses  Pulmonary:      Effort: Pulmonary effort is normal  No respiratory distress  Abdominal:      General: Abdomen is flat  There is no distension  Musculoskeletal:      Cervical back: Neck supple  Right lower leg: No edema        Left lower leg: No edema    Skin:     General: Skin is warm and dry  Neurological:      Mental Status: He is easily aroused  Deep Tendon Reflexes:      Reflex Scores:       Tricep reflexes are 2+ on the right side and 2+ on the left side  Bicep reflexes are 2+ on the right side and 2+ on the left side  Brachioradialis reflexes are 2+ on the right side and 2+ on the left side  Patellar reflexes are 2+ on the right side and 2+ on the left side  Achilles reflexes are 2+ on the right side and 2+ on the left side  Psychiatric:         Mood and Affect: Mood normal          Behavior: Behavior normal           Neurologic Exam     Mental Status   Oriented to person  Disoriented to place  Disoriented to time  Oriented to year  Unable to perform simple calculations  Able to name object  Able to repeat  Abnormal comprehension  Sleeping but easily arouses to voice  Oriented to person and year  Unable to perform calculations  Can list the days of the week forwards but not backwards  Speech is intermittently difficult to understand but this seems to be primarily from hoarse voice and not dysarthria  Follows 1 step commands  Cranial Nerves     CN II   Visual fields full to confrontation  CN III, IV, VI   Pupils are equal, round, and reactive to light  Extraocular motions are normal    Nystagmus: none   Ophthalmoparesis: none  Upgaze: normal  Downgaze: normal  Conjugate gaze: present    CN V   Facial sensation intact  CN VII   Facial expression full, symmetric  CN VIII   CN VIII normal      CN IX, X   Palate: symmetric    CN XI   CN XI normal      CN XII   CN XII normal      Motor Exam   Muscle bulk: normal  Overall muscle tone: normal  Right arm pronator drift: present  Left arm pronator drift: absentStrength is 5/5 on LUE/LLE  On RUE, strength is 3+/5  RLE strength is 5/5       Sensory Exam   Light touch normal      Gait, Coordination, and Reflexes     Reflexes   Right brachioradialis: 2+  Left brachioradialis: 2+  Right biceps: 2+  Left biceps: 2+  Right triceps: 2+  Left triceps: 2+  Right patellar: 2+  Left patellar: 2+  Right achilles: 2+  Left achilles: 2+  Right plantar: normal  Left plantar: normal  Right ankle clonus: absent  Left ankle clonus: absent       LABORATORY DATA     Labs: I have personally reviewed pertinent reports  Results from last 7 days   Lab Units 01/22/22  0000 01/21/22  0451 01/20/22  0502 01/19/22  0537 01/19/22  0537   WBC Thousand/uL 6 74 10 36* 14 34*   < > 14 31*   HEMOGLOBIN g/dL 11 7* 12 3 12 5   < > 12 9   HEMATOCRIT % 36 4* 37 4 38 5   < > 39 5   PLATELETS Thousands/uL 107* 130* 161   < > 132*   NEUTROS PCT %  --  82* 86*  --  87*   MONOS PCT %  --  7 6  --  7    < > = values in this interval not displayed  Results from last 7 days   Lab Units 01/23/22  0818 01/22/22  0422 01/21/22  0451 01/20/22  0502 01/19/22  0536 01/18/22  1058 01/18/22  1058   SODIUM mmol/L 145 146* 148*   < > 143   < > 140  140   POTASSIUM mmol/L 4 3 4 1 3 9   < > 3 5   < > 3 8  3 8   CHLORIDE mmol/L 113* 114* 116*   < > 111*   < > 104  104   CO2 mmol/L 30 31 30   < > 26   < > 27  25   BUN mg/dL 16 14 11   < > 18   < > 20  20   CREATININE mg/dL 0 73 0 78 0 72   < > 0 93   < > 1 53*  1 57*   CALCIUM mg/dL 8 3 8 3 7 8*   < > 7 8*   < > 8 5  8 5   ALK PHOS U/L  --  57  --   --  72  --  80   ALT U/L  --  24  --   --  25  --  28   AST U/L  --  37  --   --  54*  --  36    < > = values in this interval not displayed       Results from last 7 days   Lab Units 01/21/22  0451 01/20/22  0502 01/19/22  0536   MAGNESIUM mg/dL 2 8* 2 7* 2 4     Results from last 7 days   Lab Units 01/20/22  0502 01/19/22  0536   PHOSPHORUS mg/dL 2 6 1 5*      Results from last 7 days   Lab Units 01/18/22  1058   INR  1 10   PTT seconds 28     Results from last 7 days   Lab Units 01/18/22  1326   LACTIC ACID mmol/L 2 1*           IMAGING & DIAGNOSTIC TESTING     Radiology Results: I have personally reviewed pertinent reports  and I have personally reviewed pertinent films in PACS    MRI follow up neuro  Result Date: 1/22/2022  Impression: No MR evidence of acute ischemia  Workstation performed: [id number]       Other Diagnostic Testing: I have personally reviewed pertinent reports  ACTIVE MEDICATIONS     Current Facility-Administered Medications   Medication Dose Route Frequency    acetaminophen (TYLENOL) tablet 650 mg  650 mg Oral Q6H PRN    chlorhexidine (PERIDEX) 0 12 % oral rinse 15 mL  15 mL Mouth/Throat Q12H [facility]    doxazosin (CARDURA) tablet 1 mg  1 mg Oral Daily    enoxaparin (LOVENOX) subcutaneous injection 40 mg  40 mg Subcutaneous D39N PARVIZ    folic acid (FOLVITE) tablet 1 mg  1 mg Oral Daily    levETIRAcetam (KEPPRA) 1,500 mg in sodium chloride 0 9 % 100 mL IVPB  1,500 mg Intravenous Q12H PARVIZ    OLANZapine (ZyPREXA) IM injection 2 5 mg  2 5 mg Intramuscular TID PRN    pravastatin (PRAVACHOL) tablet 80 mg  80 mg Oral Daily With Dinner    thiamine tablet 100 mg  100 mg Oral Daily    valproate (DEPACON) 500 mg in sodium chloride 0 9 % 50 mL IVPB  500 mg Intravenous Q8H [facility]       Prior to Admission medications    Medication Sig Start Date End Date Taking?  Authorizing Provider   acetaminophen (TYLENOL) 500 mg tablet Take 500 mg by mouth every 6 (six) hours as needed for mild pain    Historical Provider, MD   levETIRAcetam (KEPPRA XR) 500 MG 24 hr tablet Take 2 tablets (1,000 mg total) by mouth daily 12/20/21   Renetta Manzo MD   simvastatin (ZOCOR) 40 mg tablet Take 1 tablet (40 mg total) by mouth daily 9/1/21   Renetta Manoz MD         VTE Pharmacologic Prophylaxis: Enoxaparin (Lovenox)  VTE Mechanical Prophylaxis: sequential compression device    ==  Adriana Moser,    [facility]'s Neurology Residency, PGY-2

## 2022-01-23 NOTE — PROGRESS NOTES
Neuro ICU Transfer Note    Code Status: Level 1 - Full Code  POA:    POLST:      Reason for ICU admission:   Seizure, status epilepticus    Active problems:   Principal Problem:    Seizure ([facility] )  Active Problems:    Hyperlipidemia    Hypertension    Aneurysm of abdominal aorta (HCC)    Seizure disorder ([facility] )    Nonrheumatic aortic valve stenosis    Stage 3a chronic kidney disease ([facility] )  Resolved Problems:    * No resolved hospital problems  *      Consultants:   Neurology    History of Present Illness:per H&P by Sariah Cooper PA-C  Marjorie Wallace is a 80 y  o  male who presents with Stroke Alert due to R facial droop, subsequently found to have multiple seizures around 9:15 on 1/18  LKN 1/17 and per report patient missed a Keppra dose 1/16  Recently also had Keppra dose decreased 7/28/21 from 1500mg HS to 1000mg HS  Last reported seizure 2/21/17  PMH includes seizure disorder, nonrheumatic aortic stenosis, ascending aorta ectasia, AAA s/p EVAR      Summary of clinical course:   Patient was loaded on keppra 1500 mg, VPA loaded 20 mg/kg, ativan 6 mg  Admission showed +leukocytosis and fever  Presented with R sided facial droop, with LKN 1 day PTA  Missed keppra dose 2d PTA  Admitted to ICU for NCSE for veeg  Lp performed at [facility] ED, meningitis panel negative  Ammonia 21, valproate 80  COV/Flu/RSV neg   0/2/4 hr HS trop 1159/945/791, lactate 3 1 but was deemed type 2 MI in setting of demand ischemia from seizure  Was intubated for airway protection from seizure  Was determined that etiology was seizure recurrence and not stroke as head imaging was unremarkable  Patient was also out of window so tpa not given  Stroke pathway was thus dc'd  Monitored on vEEG w/last sz on 1/19/22  Was treated with on keppra 1500 BID and valproate 500 mg TID   Also noted to have community acquired pneumonia on admission, given Ceftriaxone x5d course for w/downtrending WBC from 14 3-->6 7 over 4 days and Tmax 100 4   1/22 self-extubated on day of planned extubation, has been doing well on low flow N/C since (2 L/min)  Hx of CKD3 w/baseline Cr 1 15-1 22, but no issues w/Cr while inpatient  Recent or scheduled procedures:   vEEG - negative, dc'd 1/22    Outstanding/pending diagnostics:   KUB/CXR for 2nd placement of keofed (patient removed after 1st attempt)    Cultures:   Peripheral - NGTD Peripheral BCx at 4 days  CSF - no growth       Mobilization Plan:   Pending PTOT eval    Nutrition Plan: Will need to start tube feeds as he has failed several bedside swallows w/speech and is now NPO    Invasive Devices Review  Invasive Devices  Report    Central Venous Catheter Line            CVC Central Lines 01/19/22 Triple 20cm 4 days          Drain            Rectal Tube 3 days    Urethral Catheter 18 Fr  1 day                Rationale for remaining devices: Urinary retention, failed voiding trials  Franklin in place day 2    VTE Pharmacologic Prophylaxis: Enoxaparin (Lovenox)  VTE Mechanical Prophylaxis: sequential compression device    Discharge Plan:   Patient should be ready for discharge to [address] [place] [address] on 1/23/22 after 1:00 PM    Initial Physical Therapy Recommendations: Last visited while he was intubated - need to be re-eval'd  Initial Occupational Therapy Recommendations: Last visited while he was intubated - need to be re-eval'd  Speech: NPO - failed last on 1/23/22 for dysphagia  Will need NGT/keofeed - attempting to place today  Initial /Plan: N/A - awaiting PTOT recs     Home medications that are not reordered and reason why:   N/A - home meds are tylenol, keppra, and statin  On keppra and statin inpatient  Spoke with Dr Delmis Zuñiga over [employer] regarding transfer  Please contact critical care via [employer] with any questions or concerns  Portions of the record may have been created with voice recognition software    Occasional wrong word or "sound a like" substitutions may have occurred due to the inherent limitations of voice recognition software  Read the chart carefully and recognize, using context, where substitutions have occurred

## 2022-01-24 ENCOUNTER — APPOINTMENT (INPATIENT)
Dept: RADIOLOGY | Facility: HOSPITAL | Age: 82
DRG: 100 | End: 2022-01-24
Payer: MEDICARE

## 2022-01-24 PROBLEM — G93.40 ENCEPHALOPATHY: Status: ACTIVE | Noted: 2022-01-24

## 2022-01-24 PROBLEM — R50.9 FEVER: Status: ACTIVE | Noted: 2022-01-24

## 2022-01-24 PROBLEM — R29.898 RIGHT ARM WEAKNESS: Status: ACTIVE | Noted: 2022-01-24

## 2022-01-24 PROBLEM — R33.9 URINARY RETENTION: Status: ACTIVE | Noted: 2022-01-24

## 2022-01-24 PROBLEM — R13.10 DYSPHAGIA: Status: ACTIVE | Noted: 2022-01-24

## 2022-01-24 LAB
BACTERIA BLD CULT: NORMAL
BACTERIA BLD CULT: NORMAL
BASOPHILS # BLD AUTO: 0.01 THOUSANDS/ΜL (ref 0–0.1)
BASOPHILS NFR BLD AUTO: 0 % (ref 0–1)
EOSINOPHIL # BLD AUTO: 0.27 THOUSAND/ΜL (ref 0–0.61)
EOSINOPHIL NFR BLD AUTO: 4 % (ref 0–6)
ERYTHROCYTE [DISTWIDTH] IN BLOOD BY AUTOMATED COUNT: 12.2 % (ref 11.6–15.1)
GLUCOSE SERPL-MCNC: 109 MG/DL (ref 65–140)
GLUCOSE SERPL-MCNC: 115 MG/DL (ref 65–140)
HCT VFR BLD AUTO: 37.5 % (ref 36.5–49.3)
HGB BLD-MCNC: 12 G/DL (ref 12–17)
IMM GRANULOCYTES # BLD AUTO: 0.02 THOUSAND/UL (ref 0–0.2)
IMM GRANULOCYTES NFR BLD AUTO: 0 % (ref 0–2)
LEVETIRACETAM SERPL-MCNC: 15.2 UG/ML (ref 10–40)
LYMPHOCYTES # BLD AUTO: 0.72 THOUSANDS/ΜL (ref 0.6–4.47)
LYMPHOCYTES NFR BLD AUTO: 10 % (ref 14–44)
MCH RBC QN AUTO: 31.3 PG (ref 26.8–34.3)
MCHC RBC AUTO-ENTMCNC: 32 G/DL (ref 31.4–37.4)
MCV RBC AUTO: 98 FL (ref 82–98)
MONOCYTES # BLD AUTO: 0.72 THOUSAND/ΜL (ref 0.17–1.22)
MONOCYTES NFR BLD AUTO: 10 % (ref 4–12)
NEUTROPHILS # BLD AUTO: 5.32 THOUSANDS/ΜL (ref 1.85–7.62)
NEUTS SEG NFR BLD AUTO: 76 % (ref 43–75)
NRBC BLD AUTO-RTO: 0 /100 WBCS
PLATELET # BLD AUTO: 139 THOUSANDS/UL (ref 149–390)
PMV BLD AUTO: 10.8 FL (ref 8.9–12.7)
PROCALCITONIN SERPL-MCNC: 0.23 NG/ML
RBC # BLD AUTO: 3.84 MILLION/UL (ref 3.88–5.62)
WBC # BLD AUTO: 7.06 THOUSAND/UL (ref 4.31–10.16)

## 2022-01-24 PROCEDURE — 82948 REAGENT STRIP/BLOOD GLUCOSE: CPT

## 2022-01-24 PROCEDURE — 92526 ORAL FUNCTION THERAPY: CPT

## 2022-01-24 PROCEDURE — 70551 MRI BRAIN STEM W/O DYE: CPT

## 2022-01-24 PROCEDURE — 97530 THERAPEUTIC ACTIVITIES: CPT

## 2022-01-24 PROCEDURE — 84145 PROCALCITONIN (PCT): CPT | Performed by: HOSPITALIST

## 2022-01-24 PROCEDURE — 97163 PT EVAL HIGH COMPLEX 45 MIN: CPT

## 2022-01-24 PROCEDURE — G1004 CDSM NDSC: HCPCS

## 2022-01-24 PROCEDURE — 99232 SBSQ HOSP IP/OBS MODERATE 35: CPT | Performed by: PSYCHIATRY & NEUROLOGY

## 2022-01-24 PROCEDURE — 85025 COMPLETE CBC W/AUTO DIFF WBC: CPT | Performed by: HOSPITALIST

## 2022-01-24 PROCEDURE — 97167 OT EVAL HIGH COMPLEX 60 MIN: CPT

## 2022-01-24 PROCEDURE — 99232 SBSQ HOSP IP/OBS MODERATE 35: CPT | Performed by: HOSPITALIST

## 2022-01-24 RX ORDER — ACETAMINOPHEN 325 MG/1
650 TABLET ORAL EVERY 4 HOURS PRN
Status: DISCONTINUED | OUTPATIENT
Start: 2022-01-24 | End: 2022-01-25

## 2022-01-24 RX ORDER — LORAZEPAM 2 MG/ML
0.5 INJECTION INTRAMUSCULAR ONCE
Status: COMPLETED | OUTPATIENT
Start: 2022-01-24 | End: 2022-01-24

## 2022-01-24 RX ORDER — LIDOCAINE 50 MG/G
1 PATCH TOPICAL DAILY
Status: DISCONTINUED | OUTPATIENT
Start: 2022-01-24 | End: 2022-01-26

## 2022-01-24 RX ORDER — LORAZEPAM 2 MG/ML
0.5 INJECTION INTRAMUSCULAR ONCE AS NEEDED
Status: COMPLETED | OUTPATIENT
Start: 2022-01-24 | End: 2022-01-24

## 2022-01-24 RX ORDER — GABAPENTIN 100 MG/1
100 CAPSULE ORAL
Status: DISCONTINUED | OUTPATIENT
Start: 2022-01-24 | End: 2022-02-01 | Stop reason: HOSPADM

## 2022-01-24 RX ADMIN — ACETAMINOPHEN 650 MG: 325 TABLET, FILM COATED ORAL at 14:11

## 2022-01-24 RX ADMIN — LORAZEPAM 0.5 MG: 2 INJECTION INTRAMUSCULAR; INTRAVENOUS at 23:46

## 2022-01-24 RX ADMIN — LEVETIRACETAM 1500 MG: 100 INJECTION, SOLUTION INTRAVENOUS at 09:05

## 2022-01-24 RX ADMIN — CHLORHEXIDINE GLUCONATE 15 ML: 1.2 SOLUTION ORAL at 09:06

## 2022-01-24 RX ADMIN — VALPROATE SODIUM 500 MG: 100 INJECTION, SOLUTION INTRAVENOUS at 14:10

## 2022-01-24 RX ADMIN — ACETAMINOPHEN 650 MG: 325 TABLET, FILM COATED ORAL at 09:06

## 2022-01-24 RX ADMIN — LORAZEPAM 0.5 MG: 2 INJECTION INTRAMUSCULAR; INTRAVENOUS at 22:48

## 2022-01-24 RX ADMIN — DOXAZOSIN 1 MG: 1 TABLET ORAL at 09:06

## 2022-01-24 RX ADMIN — VALPROATE SODIUM 500 MG: 100 INJECTION, SOLUTION INTRAVENOUS at 05:57

## 2022-01-24 RX ADMIN — PRAVASTATIN SODIUM 80 MG: 80 TABLET ORAL at 16:54

## 2022-01-24 RX ADMIN — FOLIC ACID 1 MG: 1 TABLET ORAL at 09:06

## 2022-01-24 RX ADMIN — ENOXAPARIN SODIUM 40 MG: 40 INJECTION SUBCUTANEOUS at 09:10

## 2022-01-24 RX ADMIN — GABAPENTIN 100 MG: 100 CAPSULE ORAL at 21:59

## 2022-01-24 RX ADMIN — LEVETIRACETAM 1500 MG: 100 INJECTION, SOLUTION INTRAVENOUS at 21:59

## 2022-01-24 RX ADMIN — LIDOCAINE 5% 1 PATCH: 700 PATCH TOPICAL at 22:02

## 2022-01-24 RX ADMIN — THIAMINE HCL TAB 100 MG 100 MG: 100 TAB at 09:06

## 2022-01-24 NOTE — PROGRESS NOTES
NEUROLOGY RESIDENCY PROGRESS NOTE     Name: King Travis   Age & Sex: 80 y o  male   MRN: [id number]  Unit/Bed#: [facility] 713-01   Encounter: [id number]    King Travis will need follow up in in 6 weeks with epilepsy attending or advanced practitioner  He currently follows with Dr Nat Lui in clinic  He will not require outpatient neurological testing  Pending for discharge: repeat MRI with and without contrast, swallow study, continued clinical improvement, possible rehab needs    ASSESSMENT & PLAN     * Seizure [facility])  Assessment & Plan  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 XR 1000 mg QHS, CKD, HTN, hyperlipidemia, and s/p AAA repair  Presented to the ED as a stroke alert for right-sided facial droop however had multiple seizures en route to the ED  LKW was 1 day prior  Reports of possibly missing one dose of Keppra  Received 4 mg of Ativan per EMS followed by an additional 2 mg in the ED due to persistent right gaze deviation concerning for ongoing seizure activity  Was intubated for airway protection  EMS reported a fever of 102F however he has been afebrile since admission  Received loading dose of 1500 mg Keppra and 20 mg/kg of valproic acid  LP was completed and was unrevealing  CTH and CTA were also unremarkable  Valproic acid and ammonia levels have been stable  Limited MRI brain was negative for ischemia  Continue to be unclear why patient had multiple refractory seizures, but prevailing theory is from missing his one dose of Keppra XR  Patient had additional seizure activity on 1/19 in the setting of weaning sedation  He has now been seizure free for multiple days with last seizure 1/19 in the afternoon  He self-extubated on 1/22/22 and has made gradual improvement each day since then  He remains mildly disoriented, has decreased concentration, and impulsive (has pulled NG tube 3 times overnight)      Additionally, proximal RUE weakness was confirmed by family today to be a new finding  There have been no imaging correlates to explain this weakness  As only a limited MRI was performed, would recommend repeating with full series  Plan:  - Video EEG monitoring was discontinued as of   - Continue Keppra 1500 mg BID  - Valproic acid 500 mg Q8H  - Seizure precautions  - Repeat MRI brain w/wo contrast seizure protocol as MRI from 22 was only a limited series and his next most recent MRI was in 2017  - Remainder of management as per medicine team    Right arm weakness  Assessment & Plan  Proximal right arm weakness noted by drift on exam  Per family, they believe that this is new  Unsure if he has history of prior shoulder injury on the right  Patient denies any shoulder pain at present  As we are updating MRI for complete series, will also evaluate for ischemia that might explain his symptoms  Additional considerations for possible cord/nerve injury that may have occurred while he was intubated/sedated or with repositioning  SUBJECTIVE     Patient was seen and examined  No acute events overnight  Patient was somewhat disoriented overnight and did not recall that he was in the hospital or in [place]  This morning he is more alert and more oriented but still not back to baseline  He is currently complaining of back pain and leg pain due to positioning in bed  He is also tearful as he thinks that his wife has  (she hasn't and is at home being cared for by their family)  He denies headache or vision changes  No dizziness or lightheadedness  Right arm weakness is new  No other focal weakness or sensory changes  No chest pain or shortness of breath  Appetite is good however he has not passed swallow evaluations yet  Review of Systems   Constitutional: Negative for chills and fever  HENT: Negative for ear pain and sore throat  Eyes: Negative for pain and visual disturbance  Respiratory: Negative for cough and shortness of breath  Cardiovascular: Negative for chest pain and palpitations  Gastrointestinal: Negative for abdominal pain and vomiting  Genitourinary: Negative for dysuria and hematuria  Musculoskeletal: Positive for arthralgias and back pain  Skin: Negative for color change and rash  Neurological: Positive for weakness (RUE)  Negative for dizziness, tremors, seizures, syncope, facial asymmetry, speech difficulty, light-headedness, numbness and headaches  Psychiatric/Behavioral: Positive for confusion  The patient is not nervous/anxious  All other systems reviewed and are negative  OBJECTIVE     Patient ID: Cyn Lawson is a 80 y o  male  Vitals:    22 2209 22 0600 22 0757 22 1930   BP: 127/62  135/69 151/86   BP Location:       Pulse: 94  88 91   Resp:       Temp: (!) 101 1 °F (38 4 °C)  99 7 °F (37 6 °C)    TempSrc:       SpO2: 96%  96% 96%   Weight:  84 4 kg (186 lb)     Height:          Temperature:   Temp (24hrs), Av 4 °F (38 °C), Min:99 7 °F (37 6 °C), Max:101 1 °F (38 4 °C)    Temperature: 99 7 °F (37 6 °C)      Physical Exam  Vitals and nursing note reviewed  Constitutional:       General: He is sleeping  Appearance: Normal appearance  He is normal weight  He is not ill-appearing or toxic-appearing  Comments: Hoarse voice   HENT:      Head: Normocephalic and atraumatic  Right Ear: External ear normal       Left Ear: External ear normal       Nose: Nose normal       Mouth/Throat:      Mouth: Mucous membranes are dry  Pharynx: Oropharynx is clear  Eyes:      General: No scleral icterus  Extraocular Movements: Extraocular movements intact and EOM normal       Conjunctiva/sclera: Conjunctivae normal       Pupils: Pupils are equal, round, and reactive to light  Cardiovascular:      Rate and Rhythm: Normal rate  Pulses: Normal pulses  Pulmonary:      Effort: Pulmonary effort is normal  No respiratory distress     Abdominal:      General: Abdomen is flat  There is no distension  Musculoskeletal:      Cervical back: Neck supple  Right lower leg: No edema  Left lower leg: No edema  Skin:     General: Skin is warm and dry  Neurological:      Mental Status: He is easily aroused  Coordination: Finger-Nose-Finger Test normal       Deep Tendon Reflexes:      Reflex Scores:       Tricep reflexes are 2+ on the right side and 2+ on the left side  Bicep reflexes are 2+ on the right side and 2+ on the left side  Brachioradialis reflexes are 2+ on the right side and 2+ on the left side  Patellar reflexes are 2+ on the right side and 2+ on the left side  Achilles reflexes are 2+ on the right side and 2+ on the left side  Psychiatric:         Mood and Affect: Mood normal          Behavior: Behavior normal           Neurologic Exam     Mental Status   Oriented to person  Disoriented to place  Disoriented to time  Unable to perform simple calculations  Able to name object  Able to repeat  Abnormal comprehension  Patient keenly alert, examined after nurse was just in the room  He is oriented to person and knows that he is in the hospital  He continues to think that he is at [facility] (was originally at [facility] prior to transfer)  Unable to recall 3 items at 3 minutes  Able to name and repeat  Can state the days of the week forwards but not backwards  Can calculate number of quarters in $1 50 but cannot calculate the sum of a quarter, [place]er[place], and nickel  Follows 2-step commands that cross midline  Voice remains hoarse but no dysarthria noted  Cranial Nerves     CN II   Visual fields full to confrontation  CN III, IV, VI   Pupils are equal, round, and reactive to light  Extraocular motions are normal    Nystagmus: none   Ophthalmoparesis: none  Upgaze: normal  Downgaze: normal  Conjugate gaze: present    CN V   Facial sensation intact  CN VII   Facial expression full, symmetric       CN VIII CN VIII normal      CN IX, X   Palate: symmetric    CN XI   CN XI normal      CN XII   CN XII normal      Motor Exam   Muscle bulk: normal  Overall muscle tone: normal  Right arm pronator drift: present  Left arm pronator drift: absentRight arm drift is noted  On individual muscle testing, deltoid strength is 4-/5 on the right  Remainder of strength exam is 5/5 throughout     Sensory Exam   Light touch normal      Gait, Coordination, and Reflexes     Coordination   Finger to nose coordination: normal    Reflexes   Right brachioradialis: 2+  Left brachioradialis: 2+  Right biceps: 2+  Left biceps: 2+  Right triceps: 2+  Left triceps: 2+  Right patellar: 2+  Left patellar: 2+  Right achilles: 2+  Left achilles: 2+  Right plantar: normal  Left plantar: normal  Right ankle clonus: absent  Left ankle clonus: absent       LABORATORY DATA     Labs: I have personally reviewed pertinent reports  Results from last 7 days   Lab Units 01/24/22  1424 01/22/22  0000 01/21/22  0451 01/20/22  0502 01/20/22  0502   WBC Thousand/uL 7 06 6 74 10 36*   < > 14 34*   HEMOGLOBIN g/dL 12 0 11 7* 12 3   < > 12 5   HEMATOCRIT % 37 5 36 4* 37 4   < > 38 5   PLATELETS Thousands/uL 139* 107* 130*   < > 161   NEUTROS PCT % 76*  --  82*  --  86*   MONOS PCT % 10  --  7  --  6    < > = values in this interval not displayed        Results from last 7 days   Lab Units 01/23/22  0818 01/22/22  0422 01/21/22  0451 01/20/22  0502 01/19/22  0536 01/18/22  1058 01/18/22  1058   SODIUM mmol/L 145 146* 148*   < > 143   < > 140  140   POTASSIUM mmol/L 4 3 4 1 3 9   < > 3 5   < > 3 8  3 8   CHLORIDE mmol/L 113* 114* 116*   < > 111*   < > 104  104   CO2 mmol/L 30 31 30   < > 26   < > 27  25   BUN mg/dL 16 14 11   < > 18   < > 20  20   CREATININE mg/dL 0 73 0 78 0 72   < > 0 93   < > 1 53*  1 57*   CALCIUM mg/dL 8 3 8 3 7 8*   < > 7 8*   < > 8 5  8 5   ALK PHOS U/L  --  57  --   --  72  --  80   ALT U/L  --  24  --   --  25  --  28   AST U/L  --  37 --   --  54*  --  36    < > = values in this interval not displayed  Results from last 7 days   Lab Units 01/21/22  0451 01/20/22  0502 01/19/22  0536   MAGNESIUM mg/dL 2 8* 2 7* 2 4     Results from last 7 days   Lab Units 01/20/22  0502 01/19/22  0536   PHOSPHORUS mg/dL 2 6 1 5*      Results from last 7 days   Lab Units 01/18/22  1058   INR  1 10   PTT seconds 28     Results from last 7 days   Lab Units 01/18/22  1326   LACTIC ACID mmol/L 2 1*           IMAGING & DIAGNOSTIC TESTING     Radiology Results: No new pertinent imaging studies to review today      Other Diagnostic Testing: I have personally reviewed pertinent reports  ACTIVE MEDICATIONS     Current Facility-Administered Medications   Medication Dose Route Frequency    acetaminophen (TYLENOL) tablet 650 mg  650 mg Oral Q4H PRN    chlorhexidine (PERIDEX) 0 12 % oral rinse 15 mL  15 mL Mouth/Throat Q12H [address]    doxazosin (CARDURA) tablet 1 mg  1 mg Oral Daily    enoxaparin (LOVENOX) subcutaneous injection 40 mg  40 mg Subcutaneous C06N PARVIZ    folic acid (FOLVITE) tablet 1 mg  1 mg Oral Daily    gabapentin (NEURONTIN) capsule 100 mg  100 mg Oral HS    levETIRAcetam (KEPPRA) 1,500 mg in sodium chloride 0 9 % 100 mL IVPB  1,500 mg Intravenous Q12H [address]    lidocaine (LIDODERM) 5 % patch 1 patch  1 patch Topical Daily    LORazepam (ATIVAN) injection 0 5 mg  0 5 mg Intravenous Once PRN    OLANZapine (ZyPREXA) IM injection 2 5 mg  2 5 mg Intramuscular TID PRN    pravastatin (PRAVACHOL) tablet 80 mg  80 mg Oral Daily With Dinner    thiamine tablet 100 mg  100 mg Oral Daily    valproate (DEPACON) 500 mg in sodium chloride 0 9 % 50 mL IVPB  500 mg Intravenous Q8H [address]       Prior to Admission medications    Medication Sig Start Date End Date Taking?  Authorizing Provider   acetaminophen (TYLENOL) 500 mg tablet Take 500 mg by mouth every 6 (six) hours as needed for mild pain    Historical Provider, MD   levETIRAcetam (KEPPRA XR) 500 MG 24 hr tablet Take 2 tablets (1,000 mg total) by mouth daily 12/20/21   Madeleine Shirley MD   simvastatin (ZOCOR) 40 mg tablet Take 1 tablet (40 mg total) by mouth daily 9/1/21   Madeleine Shirley MD         VTE Pharmacologic Prophylaxis: Enoxaparin (Lovenox)  VTE Mechanical Prophylaxis: sequential compression device    ==  Guillermo DO Edita   [facility], PGY-2

## 2022-01-24 NOTE — ASSESSMENT & PLAN NOTE
· Looks like he had a Franklin inserted on 01/18, removed 1/21, reinserted 1/22 midnight    Suspect due to retention  · Continue Franklin through today and consider removing it in next 24 hours  · On doxazosin

## 2022-01-24 NOTE — RESTORATIVE TECHNICIAN NOTE
Restorative Technician Note      Patient Name: Cricket Lee     Note Type: Mobility  Patient Position Upon Consult: Bedside chair  Activity Performed: Ambulated; Dangled; Stood  Assistive Device: Other (Comment) (HHA x2 stand-pivot back to bed)  Education Provided: Yes  Patient Position at End of Consult: Supine;  All needs within reach; Bed/Chair alarm activated    Jorge L CAIN, Restorative Technician, [employer]

## 2022-01-24 NOTE — SPEECH THERAPY NOTE
Speech/Language Pathology Progress Note    Patient Name: Jeanie Tim  [id number] Date: 1/24/2022    Subjective:  Pt awake, sitting up in chair at bedside  Had been yelling out but able to calm, NGT still in place and restraints in use to prevent self removal      Objective:  Pt seen for dysphagia therapy  Currently NPO with NGT feeds  He was boosted up in recliner and seated fully upright  Pt was offered ice chips, tsp sips and cup sips of thin liquid, nectar thick liquid via cup and straw, and puree  With all trials, swallow remains mildly delayed however coordination appeared improved  Fewer repeat swallows noted with trials  Pt still had fairly consistent throat clearing with majority of trials regardless of texture, no overt coughing noted  He did take meds crushed in apple sauce with RN present  Assessment:  Slight improvement (seems to be improving daily) with swallowing although still concerning for aspiration risk  Plan/Recommendations:  VBS tomorrow  Okay to have meds crushed in applesauce (reduced risk of clogging NGT), and sips of HTL by [place], NGT for nutrition

## 2022-01-24 NOTE — PLAN OF CARE
Problem: MOBILITY - ADULT  Goal: Maintain or return to baseline ADL function  Description: INTERVENTIONS:  -  Assess patient's ability to carry out ADLs; assess patient's baseline for ADL function and identify physical deficits which impact ability to perform ADLs (bathing, care of mouth/teeth, toileting, grooming, dressing, etc )  - Assess/evaluate cause of self-care deficits   - Assess range of motion  - Assess patient's mobility; develop plan if impaired  - Assess patient's need for assistive devices and provide as appropriate  - Encourage maximum independence but intervene and supervise when necessary  - Involve family in performance of ADLs  - Assess for home care needs following discharge   - Consider OT consult to assist with ADL evaluation and planning for discharge  - Provide patient education as appropriate  Outcome: Progressing  Goal: Maintains/Returns to pre admission functional level  Description: INTERVENTIONS:  - Perform BMAT or MOVE assessment daily    - Set and communicate daily mobility goal to care team and patient/family/caregiver  - Collaborate with rehabilitation services on mobility goals if consulted  - Perform Range of Motion 3 times a day  - Reposition patient every 2 hours    - Dangle patient 2 times a day  - Out of bed for toileting  - Record patient progress and toleration of activity level   Outcome: Progressing     Problem: Potential for Falls  Goal: Patient will remain free of falls  Description: INTERVENTIONS:  - Educate patient/family on patient safety including physical limitations  - Instruct patient to call for assistance with activity   - Consult OT/PT to assist with strengthening/mobility   - Keep Call bell within reach  - Keep bed low and locked with side rails adjusted as appropriate  - Keep care items and personal belongings within reach  - Initiate and maintain comfort rounds  - Make Fall Risk Sign visible to staff  - Offer Toileting every 2 Hours, in advance of need  - Initiate/Maintain bed/chair alarm  - Obtain necessary fall risk management equipment    - Apply yellow socks and bracelet for high fall risk patients  - Consider moving patient to room near nurses station  Outcome: Progressing     Problem: Prexisting or High Potential for Compromised Skin Integrity  Goal: Skin integrity is maintained or improved  Description: INTERVENTIONS:  - Identify patients at risk for skin breakdown  - Assess and monitor skin integrity  - Assess and monitor nutrition and hydration status  - Monitor labs   - Assess for incontinence   - Turn and reposition patient  - Assist with mobility/ambulation  - Relieve pressure over bony prominences  - Avoid friction and shearing  - Provide appropriate hygiene as needed including keeping skin clean and dry  - Evaluate need for skin moisturizer/barrier cream  - Collaborate with interdisciplinary team   - Patient/family teaching  - Consider wound care consult   Outcome: Progressing     Problem: SAFETY,RESTRAINT: NV/NON-SELF DESTRUCTIVE BEHAVIOR  Goal: Remains free of harm/injury (restraint for non violent/non self-detsructive behavior)  Description: INTERVENTIONS:  - Instruct patient/family regarding restraint use   - Assess and monitor physiologic and psychological status   - Provide interventions and comfort measures to meet assessed patient needs   - Identify and implement measures to help patient regain control  - Assess readiness for release of restraint   Outcome: Progressing  Goal: Returns to optimal restraint-free functioning  Description: INTERVENTIONS:  - Assess the patient's behavior and symptoms that indicate continued need for restraint  - Identify and implement measures to help patient regain control  - Assess readiness for release of restraint   Outcome: Progressing     Problem: Neurological Deficit  Goal: Neurological status is stable or improving  Description: Interventions:  - Monitor and assess patient's level of consciousness, motor function, sensory function, and level of assistance needed for ADLs  - Monitor and report changes from baseline  Collaborate with interdisciplinary team to initiate plan and implement interventions as ordered  - Provide and maintain a safe environment  - Consider seizure precautions  - Consider fall precautions  - Consider aspiration precautions  - Consider bleeding precautions  Outcome: Progressing     Problem: Activity Intolerance/Impaired Mobility  Goal: Mobility/activity is maintained at optimum level for patient  Description: Interventions:  - Assess and monitor patient  barriers to mobility and need for assistive/adaptive devices  - Assess patient's emotional response to limitations  - Collaborate with interdisciplinary team and initiate plans and interventions as ordered  - Encourage independent activity per ability   - Maintain proper body alignment  - Perform active/passive rom as tolerated/ordered  - Plan activities to conserve energy   - Turn patient as appropriate  Outcome: Progressing     Problem: Communication Impairment  Goal: Ability to express needs and understand communication  Description: Assess patient's communication skills and ability to understand information  Patient will demonstrate use of effective communication techniques, alternative methods of communication and understanding even if not able to speak  - Encourage communication and provide alternate methods of communication as needed  - Collaborate with case management/ for discharge needs  - Include patient/family/caregiver in decisions related to communication  Outcome: Progressing     Problem: Potential for Aspiration  Goal: Non-ventilated patient's risk of aspiration is minimized  Description: Assess and monitor vital signs, respiratory status, and labs (WBC)  Monitor for signs of aspiration (tachypnea, cough, rales, wheezing, cyanosis, fever)      - Assess and monitor patient's ability to swallow  - Place patient up in chair to eat if possible  - HOB up at 90 degrees to eat if unable to get patient up into chair   - Supervise patient during oral intake  - Instruct patient/ family to take small bites  - Instruct patient/ family to take small single sips when taking liquids  - Follow patient-specific strategies generated by speech pathologist   Outcome: Progressing     Problem: Nutrition  Goal: Nutrition/Hydration status is improving  Description: Monitor and assess patient's nutrition/hydration status for malnutrition (ex- brittle hair, bruises, dry skin, pale skin and conjunctiva, muscle wasting, smooth red tongue, and disorientation)  Collaborate with interdisciplinary team and initiate plan and interventions as ordered  Monitor patient's weight and dietary intake as ordered or per policy  Utilize nutrition screening tool and intervene per policy  Determine patient's food preferences and provide high-protein, high-caloric foods as appropriate  - Assist patient with eating   - Allow adequate time for meals   - Encourage patient to take dietary supplement as ordered  - Collaborate with clinical nutritionist   - Include patient/family/caregiver in decisions related to nutrition  Outcome: Progressing     Problem: Nutrition/Hydration-ADULT  Goal: Nutrient/Hydration intake appropriate for improving, restoring or maintaining nutritional needs  Description: Monitor and assess patient's nutrition/hydration status for malnutrition  Collaborate with interdisciplinary team and initiate plan and interventions as ordered  Monitor patient's weight and dietary intake as ordered or per policy  Utilize nutrition screening tool and intervene as necessary  Determine patient's food preferences and provide high-protein, high-caloric foods as appropriate       INTERVENTIONS:  - Monitor oral intake, urinary output, labs, and treatment plans  - Assess nutrition and hydration status and recommend course of action  - Evaluate amount of meals eaten  - Assist patient with eating if necessary   - Allow adequate time for meals  - Recommend/ encourage appropriate diets, oral nutritional supplements, and vitamin/mineral supplements  - Order, calculate, and assess calorie counts as needed  - Recommend, monitor, and adjust tube feedings based on assessed needs  - Assess need for intravenous fluids  - Provide specific nutrition/hydration education as appropriate  - Include patient/family/caregiver in decisions related to nutrition  Outcome: Progressing

## 2022-01-24 NOTE — OCCUPATIONAL THERAPY NOTE
Occupational Therapy Evaluation     Patient Name: Adriano Bruce  UAJJE'I Date: 1/24/2022  Problem List  Principal Problem:    Seizure [facility])  Active Problems:    Hyperlipidemia    Hypertension    Aneurysm of abdominal aorta (HCC)    Seizure disorder ([facility] )    Nonrheumatic aortic valve stenosis    Stage 3a chronic kidney disease ([facility] )    Past Medical History  Past Medical History:   Diagnosis Date    Colon polyps     Gout     Hyperlipidemia     Hypertension     S/P AAA repair     Seizure disorder ([facility] )     Tear of right rotator cuff 4/4/2017     Past Surgical History  Past Surgical History:   Procedure Laterality Date    ABDOMINAL AORTIC ANEURYSM REPAIR, ENDOVASCULAR N/A     ME COLONOSCOPY FLX DX W/COLLJ SPEC WHEN PFRMD N/A 6/7/2017    Procedure: COLONOSCOPY;  Surgeon: Mellisa Ramires MD;  [facility]: [facility]; Service: Colorectal    ME COLONOSCOPY FLX DX W/COLLJ SPEC WHEN PFRMD N/A 5/18/2018    Procedure: COLONOSCOPY;  Surgeon: Mellisa Ramires MD;  Location: [facility]; Service: Colorectal    TONSILLECTOMY      WISDOM TOOTH EXTRACTION Bilateral              01/24/22 0830   OT Last Visit   OT Visit Date 01/24/22   Note Type   Note type Evaluation   Restrictions/Precautions   Weight Bearing Precautions Per Order No   Other Precautions Cognitive; Chair Alarm; Bed Alarm;Multiple lines;Telemetry;O2;Fall Risk;Pain;Restraints  (+Ng tube, 1L 02, uretheral catheter, +B/L wrist restraints)   Pain Assessment   Pain Assessment Tool FLACC   Pain Location/Orientation Location: [facility] Pain Intervention(s) Repositioned; Ambulation/increased activity; Elevated; Emotional support; Rest   Pain Rating: FLACC (Rest) - Face 1   Pain Rating: FLACC (Rest) - Legs 1   Pain Rating: FLACC (Rest) - Activity 1   Pain Rating: FLACC (Rest) - Cry 0   Pain Rating: FLACC (Rest) - Consolability 0   Score: FLACC (Rest) 3   Pain Rating: FLACC (Activity) - Face 1   Pain Rating: FLACC (Activity) - Legs 1   Pain Rating: FLACC (Activity) - Activity 1   Pain Rating: FLACC (Activity) - Cry 1   Pain Rating: FLACC (Activity) - Consolability 1   Score: FLACC (Activity) 5   Home Living   Type of Home House   Home Layout Two level   Prior Function   Level of [place] Independent with ADLs and functional mobility; Needs assistance with IADLs   Lives With Spouse   Receives Help From Family;Home health   ADL Assistance Independent   IADLs Needs assistance   Vocational Retired   Lifestyle   Autonomy I with ADL's, assistance from [facility] wt IADL's, 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  Reciprocal Relationships spouse, daughter, Vidal [place] (are assisting spouse while pt is in the hospital)   Service to Others retired   Intrinsic Gratification "nothing"   Psychosocial   Psychosocial (WDL) X   Patient Behaviors/Mood Anxious; Flat affect   ADL   Eating Assistance 1  Total Assistance   Eating Deficit NPO  (Ng tube, unable to tolerate PO diet per speech)   Grooming Assistance 2  Maximal Assistance   Grooming Deficit Brushing hair  (hand over hand assist with combing hair using RUE)   UB Bathing Assistance 2  Maximal Assistance   LB Bathing Assistance 1  Total Assistance   UB Dressing Assistance 2  Maximal Assistance   LB Dressing Assistance 1  Total Assistance   Toileting Assistance  1  Total Assistance   Toileting Deficit   (uretheral catheter)   Bed Mobility   Supine to Sit 2  Maximal assistance   Additional items Assist x 2   Sit to Supine Unable to assess   Additional Comments pt left in chair with all needs met and alarm activiated and B/L wrist restaints donned   Transfers   Sit to Stand 2  Maximal assistance   Additional items Assist x 2   Stand to Sit 2  Maximal assistance   Additional items Assist x 2   Stand pivot 2  Maximal assistance   Additional items Assist x 2  (bed>drop arm chair to left)   Additional Comments +B/L HHA with B/L knees blocket right lateral lean noted   Functional Mobility   Additional Comments unsafe to assess Balance   Static Sitting Poor   Dynamic Sitting Poor -   Static Standing Poor -   Dynamic Standing Poor -   Ambulatory Zero   Activity Tolerance   Activity Tolerance Patient limited by fatigue;Patient limited by pain; Other (Comment)  (confusion)   Medical Staff Made Aware PT [place] due to the patient's co-morbidities, clinically unstable presentation, and present impairments which are a regression from the patient's baseline  Nurse Made Aware RN cleared pt for therapy   RUE Assessment   RUE Assessment X  (shoulder 2-/5, elbow 2/5,  3+/5 noted hemiparesis to right UE, slow motor responses)   LUE Assessment   LUE Assessment X  (2/5,  4-/5)   Hand Function   Gross Motor Coordination Impaired  (right )   Fine Motor Coordination Impaired  (right)   Sensation   Additional Comments continue to assess   Proprioception   Proprioception Partial deficits in the RUE   Vision - Complex Assessment   Acuity   (unable to read clock accurately, able to number of fingers therapist held in front of patient to right)   Visual Screen Results   (right visual inattention)   Additional Comments continue to assess, pt with difficulty following directives   Perception   Inattention/Neglect Cues to attend to right side of body;Cues to attend right visual field   Cognition   Overall Cognitive Status Impaired   Arousal/Participation Alert; Responsive   Attention Difficulty attending to directions   Orientation Level Oriented to person;Oriented to place; Disoriented to time;Disoriented to situation  (grossly to place "hospital")   Memory Decreased recall of precautions;Decreased recall of recent events;Decreased short term memory;Decreased recall of biographical information;Decreased long term memory   Following Commands Follows one step commands inconsistently   Comments pt with flat affect, B/L wrist restraints, confused,  impaired attention, orientation, memory (stating "my wife is dead for 5 days", able to state daughters name, not granddaughter)  Assessment   Limitation Decreased ADL status; Decreased endurance;Decreased self-care trans;Decreased high-level ADLs; Decreased fine motor control;Visual deficit; Decreased cognition;Decreased Safe judgement during ADL;Decreased UE strength   Prognosis Fair   Assessment Pt is a 80 y o  male who was admitted to [facility] on 1/18/2022 with stroke alert with right facial droop, found to have multiple Seizure ([facility] ) on 1/18, missed keppra dose on 1/16   Pt's problem list also includes PMH of  has a past medical history of Colon polyps, Gout, Hyperlipidemia, Hypertension, S/P AAA repair, Seizure disorder ([facility] ), and Tear of right rotator cuff (4/4/2017)    At baseline pt was completing I with ADL's, assistance with IADL's Pt lives with spouse in a [facility] with 1STE  Currently pt requires max/total assist for overall ADLS and max a x 2 with B/L HHA stand pivot sit for functional transfers  Pt currently presents with impairments in the following categories -steps to enter environment, limited home support, difficulty performing ADLS, difficulty performing IADLS , limited insight into deficits, compliance, flat affect and decreased initiation and engagement  activity tolerance, endurance, standing balance/tolerance, sitting balance/tolerance, UE strength, UE ROM, FMC, GMC, memory, insight, safety , judgement , attention , sequencing , sensation , visual perceptual skills , R/L discrimination , task initiation , (R) attention, proprioception  and communication   These impairments, as well as pt's fatigue, pain, (R) UE dysmetria, (R) hemiparesis, (R) visual deficits , decreased caregiver support and risk for falls  limit pt's ability to safely engage in all baseline areas of occupation, includingeating, grooming, bathing, dressing, toileting, functional mobility/transfers, community mobility, laundry , driving, house maintenance, medication management, meal prep, cleaning, work/volunteer work , social participation  and leisure activities  The patient's raw score on the AM-PAC Daily Activity inpatient short form is 9, standardized score is  , less than 39 4  Patients at this level are likely to benefit from discharge to post-acute rehabilitation services  Please refer to the recommendation of the Occupational Therapist for safe discharge planning  From OT standpoint, recommend  STR upon D/C  OT will continue to follow to address the below stated goals  Goals   Patient Goals go home   LTG Time Frame 10-14   Long Term Goal #1 see goals below   Plan   Treatment Interventions ADL retraining;Visual perceptual retraining;Functional transfer training;UE strengthening/ROM; Endurance training;Cognitive reorientation;Patient/family training;Equipment evaluation/education; Compensatory technique education; Energy conservation; Activityengagement   Goal Expiration Date 02/07/22   OT Frequency 3-5x/wk   Recommendation   OT Discharge Recommendation Post acute rehabilitation services   OT - OK to Discharge Yes  (yes to STR )   [facility] Daily Activity Inpatient   Lower Body Dressing 1   Bathing 2   Toileting 1   Upper Body Dressing 2   Grooming 2   Eating 1   Daily Activity Raw Score 9   Turning Head Towards Sound 3   Follow Simple Instructions 2   Low Function Daily Activity Raw Score 14   Low Function Daily Activity Standardized Score 24 79   -PAC Applied Cognition Inpatient   Following a Speech/Presentation 1   Understanding Ordinary Conversation 3   Taking Medications 1   Remembering Where Things Are Placed or Put Away 1   Remembering List of 4-5 Errands 1   Taking Care of Complicated Tasks 1   Applied Cognition Raw Score 8   Applied Cognition Standardized Score 19 32    Occupational Therapy Goals:    *Mod I with bed mobility to engage in functional tasks    *Mod I Adl's after setup with use of AE PRN  *Mod I toileting and clothing management   *Mod I functional mobility and transfers to/from all surfaces with Fair + dynamic balance and safety for participation in dynamic adls and iadl tasks   *Demonstrate good carryover with safe use of RW during functional tasks   *Assess DME needs   *Increase activity tolerance to 25-30 minutes for participation in adls and enjoyable activities  *Pt to participate in further cognitive testing with good attention and participation to assist with safe d/c recommendations  *Mod I with Simulated IADL management task  *Pt will follow 100% simple one step verbal commands and be A/Ox4 consistently t/o use of external environmental cues w/ mod I  Pt will independently identify and utilize 2-3 coping strategies to increase positive affect and promote overall well-being  *Demonstrate good carryover of pt/family education and training with good tolerance for increased safety and independence with ADL's/ADl's  *Pt will participate in fine/gross motor coordination/strenthening/dexterity exercises to Good in order to increase participation in functional activities  *Pt will improve B/L UE ROM 1/2 MMT via AROM/AAROM/PROM in all planes as tolerated in order to participate in functional activities      Cain Parham MOT, OTR/L

## 2022-01-24 NOTE — PHYSICAL THERAPY NOTE
Physical Therapy Evaluation and Treatment    Patient's Name: Nohemy Thomson    Admitting Diagnosis  CVA (cerebral vascular accident) [facility]) [I63 [id number]]    Problem List  Patient Active Problem List   Diagnosis    [facility]    Hyperlipidemia    Hypertension    Aneurysm of abdominal aorta (HCC)    Disc degeneration, lumbar    Diverticulosis    Hyperuricemia    Osteoarthrosis, hand    [facility]    Nonrheumatic aortic valve stenosis    Nonrheumatic mitral valve regurgitation    Nonrheumatic aortic valve insufficiency    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HCC)    Stage 3a chronic kidney disease ([facility] )       Past Medical History  Past Medical History:   Diagnosis Date    Colon polyps     Gout     Hyperlipidemia     Hypertension     S/P AAA repair     [facility]ure disorder ([facility] )     Tear of right rotator cuff 4/4/2017       Past Surgical History  Past Surgical History:   Procedure Laterality Date    ABDOMINAL AORTIC ANEURYSM REPAIR, ENDOVASCULAR N/A     VA COLONOSCOPY FLX DX W/COLLJ SPEC WHEN PFRMD N/A 6/7/2017    Procedure: COLONOSCOPY;  Surgeon: Brittany Jacobo MD;  Location: [facility]; Service: Colorectal    VA COLONOSCOPY FLX DX W/COLLJ SPEC WHEN PFRMD N/A 5/18/2018    Procedure: COLONOSCOPY;  Surgeon: Brittany Jacobo MD;  [facility]: [facility]; Service: Colorectal    TONSILLECTOMY      WISDOM TOOTH EXTRACTION Bilateral         01/24/22 0845   PT Last Visit   PT Visit Date 01/24/22   Note Type   Note type Evaluation  (and treatment )   Pain Assessment   Pain Assessment Tool FLACC   Pain Location/Orientation Orientation: Left; Location: Leg   Hospital Pain Intervention(s) Repositioned   Pain Rating: FLACC (Rest) - Face 1   Pain Rating: FLACC (Rest) - Legs 1   Pain Rating: FLACC (Rest) - Activity 1   Pain Rating: FLACC (Rest) - Cry 0   Pain Rating: FLACC (Rest) - Consolability 0   Score: FLACC (Rest) 3   Pain Rating: FLACC (Activity) - Face 1   Pain Rating: FLACC (Activity) - Legs 1 Pain Rating: FLACC (Activity) - Activity 1   Pain Rating: FLACC (Activity) - Cry 1   Pain Rating: FLACC (Activity) - Consolability 1   Score: FLACC (Activity) 5   Restrictions/Precautions   Weight Bearing Precautions Per Order No   Braces or Orthoses   (none)   Other Precautions Visual impairment;Pain; Fall Risk;O2;Telemetry;Multiple lines; Seizure;Aspiration; Restraints; Bed Alarm; Chair Alarm;Cognitive  (b/l UE restraints, feeding tube, patel catheter, O2)   [facility]   [facility]/bathroom;Stairs to enter with rails  (1 alisia)   Additional Comments  Per chart review, prior to this admission patient resided in a 2 story home (1st floor set up) with 1 ALISIA  He resides with spouse who is disabled and he is primary caregiver  Patient has private duty caregiver in the home 10-2 daily to assist spouse  Prior Function   Level of [place] Independent with ADLs and functional mobility   Lives With Spouse   Receives Help From Family;Personal care attendant   ADL Assistance Independent   IADLs Needs assistance   Falls in the last 6 months   (did not report )   Vocational Retired   General   Additional Pertinent History 80year old male admitted to [facility] on 1/19/2022 as a transfer from [facility] where he initially presented on 1/18 as CVA alert due to R facial droop (was found by caregiver seizing)  Patient was intubated for airway protection  No TPA or thrombectomy  Underwent lumbar puncture which was negative  MRI of brain (-) for acute abnormalities  1/22 patient self extubated  Diagnosis this admission includes seizures  Family/Caregiver Present No   Cognition   Overall Cognitive Status Impaired   Arousal/Participation Alert   Orientation Level Oriented to person;Disoriented to place; Disoriented to time;Disoriented to situation   Memory Decreased recall of precautions;Decreased recall of recent events;Decreased short term memory;Decreased recall of biographical information;Decreased long term memory   Following Commands Follows one step commands with increased time or repetition   Comments + cognitive impairment, slow processing, reduced memory, tangential    Subjective   Subjective "my wife is "   RUE Assessment   RUE Assessment X   RUE Strength   RUE Overall Strength Deficits  (2/5 grossly )   RUE Tone   RUE Tone Hypotonic   LUE Assessment   LUE Assessment WFL   RLE Assessment   RLE Assessment X   Strength RLE   RLE Overall Strength 2/5   Tone RLE   RLE Tone Hypotonic   LLE Assessment   LLE Assessment X   Strength LLE   LLE Overall Strength 3+/5   Vision-Basic Assessment   Patient Visual Report Other (Comment)  (R sided inattention)   Bed Mobility   Rolling R 4  Minimal assistance   Additional items Assist x 1; Increased time required; Bedrails   Supine to Sit 2  Maximal assistance   Additional items Assist x 2; Increased time required;HOB elevated   Sit to Supine Unable to assess   Additional Comments patient seated in chair with alarm active, b/l UE restraints applied, LE elevated, lines in tact, soft care cushion in place    Transfers   Sit to Stand 2  Maximal assistance   Additional items Assist x 2; Increased time required;Verbal cues   Stand to Sit 2  Maximal assistance   Additional items Assist x 2; Increased time required;Verbal cues   Stand pivot 2  Maximal assistance   Additional items Assist x 2; Increased time required;Verbal cues   Additional Comments stand pivot with b/l HHA  patient did not initiate steps with R LE  transferred toward R side with arm rest of chair dropped  Ambulation/Elevation   Gait pattern Not tested   Balance   Static Sitting Poor   Static Standing Zero   Endurance Deficit   Endurance Deficit Yes   Endurance Deficit Description R sided hemiparsis, + cognitive deficits, 1 L O2 (sats 95%)    Activity Tolerance   Activity Tolerance Other (Comment); Patient limited by fatigue  (impaired cognition )   Medical Staff Made Aware This high complexity evaluation was performed with an occupational therapist due to the patient's co-morbidities, clinically unstable presentation, and present impairments which are a regression from the patient's baseline  Nurse Made Aware anh to see per RN Mirtha and f/u post    Assessment   Prognosis Fair   Problem List Decreased strength;Decreased endurance; Impaired balance;Decreased mobility;Pain; Impaired tone; Impaired vision;Decreased safety awareness; Impaired judgement;Decreased cognition   Assessment PT completed evaluation of 80year old male admitted to [facility]\"" on 1/19/2022 as a transfer from [facility] where he initially presented on 1/18 as CVA alert due to R facial droop (was found by caregiver seizing)  Patient was intubated for airway protection  No TPA or thrombectomy  Underwent lumbar puncture which was negative  MRI of brain (-) for acute abnormalities  1/22 patient self extubated  Diagnosis this admission includes seizures  Current status instabilities include feeding tube, O2 (1 L), b/l UE restraints, patel catheter, IV fluids, falls risk, bed/chair alarms, NPO, and a regression in functional status from baseline  PMH is significant for seizure disorder (last known episode 2017), CKD, and EVAR  Per chart review, prior to this admission patient resided in a 2 story home (1st floor set up) with 1 ALISIA  He resides with spouse who is disabled and he is primary caregiver  Patient has private duty caregiver in the home 10-2 daily to assist spouse  Current impairments include decreased cognition, R sided hemiparesis, R sided inattention, decreased balance and ability to participate in functional mobility  During PT evaluation patient required max-AX2 for supine-->sit transfer, sit<-->stand transfer, and stand pivot transfer (bed to chair)  Required b/l HHA and mild knee block for transfer  Post evaluation patient seated OOB in chair      He is functioning below his baseline and PT d/c recommendation is for rehab  Patient will continue to benefit from continued skilled PT this admission to achieve maximal function and safety  Goals   Patient Goals none expressed, agreeable to getting out of bed    LTG Expiration Date 02/07/22   Long Term Goal #1 1) Perform bed mobility min-Ax1 to participate in frequent repositioning and improve skin integrity; 2) Perform functional transfers min-AX1 to promote I with toileting and OOB mobility; 3) Ambulate 20 feet min-AX1 with least restrictive device to participate in household and community level mobility; 4) Improve b/l LE strength by 1/2 grade in order to improve efficiency of tranfers; 5) Improve balance by 1 grade to reduce risk for falls; 6) Improve overall activity tolerance to 60 minutes in order to increase patient's ability to engage in mobility tasks   PT Treatment Day 0   Plan   Treatment/Interventions Functional transfer training;LE strengthening/ROM; Therapeutic exercise; Endurance training;Patient/family training;Equipment eval/education; Bed mobility;Gait training;OT;Spoke to nursing   PT Frequency 3-5x/wk   Recommendation   PT Discharge Recommendation Post acute rehabilitation services   Equipment Recommended   (will cont to assess)   AM-PAC Basic Mobility Inpatient   Turning in Bed Without Bedrails 2   Lying on Back to Sitting on Edge of Flat Bed 1   Moving Bed to Chair 1   Standing Up From Chair 1   Walk in Room 1   Climb 3-5 Stairs 1   Basic Mobility Inpatient Raw Score 7   Turning Head Towards Sound 2   Follow Simple Instructions 2   Low Function Basic Mobility Raw Score 11   Low Function Basic Mobility Standardized Score 16 55   Highest Level Of Mobility   [facility] Goal 2: Bed activities/Dependent transfer   Additional Treatment Session   Start Time 0835   End Time 0845   Treatment Assessment PT initiated treatment session in order to assist patient in achieving goals to improve transfers and sitting balance   Patient required max-AX2 to perform 1 sit<-->stand transfer with b/l HHA  Provided VC to encourage patient to place b/l UE on therapist's arms and also to lean forward  Patient able to maintain stance x 5 seconds (limited by fatigue)  In seated position, patient presents with poor sitting balance and tendency to lean toward R side  Provided VC for patient to use L UE to aide in maintaining sitting balance (continued to require mod-AX1)  Sitting in chair, patient participated in repositioning including assisting in bringing trunk toward L side for placement of wedge on R side  Post treatment, patient seated in chair  PT d/c recommendation continues to be for rehab  Patient will continue to benefit from continued skilled PT this admission to achieve maximal function and safety  Additional Treatment Day 1   End of Consult   Patient Position at End of Consult Bedside chair;Bed/Chair alarm activated; All needs within reach     The patient's AM-PAC Basic Mobility Inpatient Standardized Score is less than 42 9, suggesting this patient may benefit from discharge to post-acute rehabilitation services  Please also refer to the recommendation of the Physical Therapist for safe discharge planning            Alex Marks, PT, DPT

## 2022-01-24 NOTE — NUTRITION
01/24/22 1601   Recommendations/Interventions   Nutrition Recommendations Tube feeding recs provided  (Suggest pending results of VBS, if unable to tolerate po diet suggest gradually increasing Jevity 1 2 by 10ml/hr Q4hrs to goal 74ml/hr Add 175ml free h20 flushes Q6hrs providing 2161 kcal with 98gms Pro;  2130ml total free H20; RD to follow)

## 2022-01-24 NOTE — ASSESSMENT & PLAN NOTE
· Presented with right facial droop, f/b mutliple seizures  · Was a stroke alert  · MRI - limited study was negative  · Was intubated placed on VEEG in ICU -> received keppra, depacon  · Now Sz free, last Sz on 1/19  · Cont keppra 1500 mg BID, depacon 500 mg q 8 hr  · Etiology for Sz unclear - ? Missed 1 dose at home   keppra was decreased from 1500 mg to 1000 mg 6 months ago in July 2021

## 2022-01-24 NOTE — ASSESSMENT & PLAN NOTE
Lab Results   Component Value Date    EGFR 88 01/23/2022    EGFR 85 01/22/2022    EGFR 88 01/21/2022    CREATININE 0 73 01/23/2022    CREATININE 0 78 01/22/2022    CREATININE 0 72 01/21/2022     · Creatinine has been around baseline

## 2022-01-24 NOTE — PLAN OF CARE
Problem: OCCUPATIONAL THERAPY ADULT  Goal: Performs self-care activities at highest level of function for planned discharge setting  See evaluation for individualized goals  Description: Treatment Interventions: ADL retraining,Visual perceptual retraining,Functional transfer training,UE strengthening/ROM,Endurance training,Cognitive reorientation,Patient/family training,Equipment evaluation/education,Compensatory technique education,Energy conservation,Activityengagement          See flowsheet documentation for full assessment, interventions and recommendations  Note: Limitation: Decreased ADL status,Decreased endurance,Decreased self-care trans,Decreased high-level ADLs,Decreased fine motor control,Visual deficit,Decreased cognition,Decreased Safe judgement during ADL,Decreased UE strength  Prognosis: Fair  Assessment: Pt is a 80 y o  male who [facility] with stroke alert with right facial droop, found to have multiple Seizure ([facility] ) on 1/18, missed keppra dose on 1/16   Pt's problem list also includes PMH of  has a past medical history of Colon polyps, Gout, Hyperlipidemia, Hypertension, S/P AAA repair, Seizure disorder ([facility] ), and Tear of right rotator cuff (4/4/2017)    At baseline pt was completing I with ADL's, assistance with IADL's Pt lives with spouse in a [facility] with 1STE  Currently pt requires max/total assist for overall ADLS and max a x 2 with B/L HHA stand pivot sit for functional transfers   Pt currently presents with impairments in the following categories -steps to enter environment, limited home support, difficulty performing ADLS, difficulty performing IADLS , limited insight into deficits, compliance, flat affect and decreased initiation and engagement  activity tolerance, endurance, standing balance/tolerance, sitting balance/tolerance, UE strength, UE ROM, FMC, GMC, memory, insight, safety , judgement , attention , sequencing , sensation , visual perceptual skills , R/L discrimination , task initiation , (R) attention, proprioception  and communication  These impairments, as well as pt's fatigue, pain, (R) UE dysmetria, (R) hemiparesis, (R) visual deficits , decreased caregiver support and risk for falls  limit pt's ability to safely engage in all baseline areas of occupation, includingeating, grooming, bathing, dressing, toileting, functional mobility/transfers, community mobility, laundry , driving, house maintenance, medication management, meal prep, cleaning, work/volunteer work , social participation  and leisure activities  The patient's raw score on the -PAC Daily Activity inpatient short form is 9, standardized score is  , less than 39 4  Patients at this level are likely to benefit from discharge to post-acute rehabilitation services  Please refer to the recommendation of the Occupational Therapist for safe discharge planning  From OT standpoint, recommend  STR upon D/C  OT will continue to follow to address the below stated goals        OT Discharge Recommendation: Post acute rehabilitation services  OT - OK to Discharge: Yes (yes to STR )

## 2022-01-24 NOTE — PROGRESS NOTES
[facility]  Progress Note Angela Hartman 1940, 80 y o  male MRN: [id number]  Unit/Bed#: [facility] 308-40 Encounter: [id number]  Primary Care Provider: Gely Pabon MD   Date and time admitted to hospital: 1/18/2022 11:46 PM    * Seizure [facility])  Assessment & Plan  · Presented with right facial droop, f/b mutliple seizures  · Was a stroke alert  · MRI - limited study was negative  · Was intubated placed on VEEG in ICU -> received keppra, depacon  · Now Sz free, last Sz on 1/19  · Cont keppra 1500 mg BID, depacon 500 mg q 8 hr  · Etiology for Sz unclear - ? Missed 1 dose at home  keppra was decreased from 1500 mg to 1000 mg 6 months ago in July 2021    Fever  Assessment & Plan  · Had on admission, suspicion of community-acquired pneumonia  · Rxed with 5 days of Rocephin in ICU  · Fever of 101 last night 1/23  WBC normal, procalcitonin 0 23  · Continue to monitor fever, WBC  · Monitor for aspiration, aspiration precautions    Dysphagia  Assessment & Plan  · Currently has job of tube, continue tube feeding  · Has not been doing well in speech evaluation  · VBS to be performed tomorrow    Urinary retention  Assessment & Plan  · Looks like he had a Franklin inserted on 01/18, removed 1/21, reinserted 1/22 midnight  Suspect due to retention  · Continue Franklin through today and consider removing it in next 24 hours  · On doxazosin    Encephalopathy  Assessment & Plan  · Continues to have off and on agitation, on examination does seem to have right upper and lower extremity mild weakness  · Initial limited MRI did not show any stroke  · Has been seizure-free when taken off video EEG  · Neurology ordered for repeat MRI with and without contrast seizure protocol  · Continue restraints, p r n   Zyprexa  · Adding gabapentin 100 mg at bedtime  · If needed consider bedtime Seroquel    Stage 3a chronic kidney disease [facility])  Assessment & Plan  Lab Results   Component Value Date    EGFR 80 2022    EGFR 85 2022    EGFR 88 2022    CREATININE 0 73 2022    CREATININE 0 78 2022    CREATININE 0 72 2022     · Creatinine has been around baseline    Aneurysm of abdominal aorta (HCC)  Assessment & Plan  S/p EVAR          VTE Pharmacologic Prophylaxis: VTE Score: 13 Moderate Risk (Score 3-4) - Pharmacological DVT Prophylaxis Ordered: enoxaparin (Lovenox)  Patient Centered Rounds: I performed bedside rounds with nursing staff today  Discussions with Specialists or Other Care Team Provider:     Education and Discussions with Family / Patient: Updated  (daughter) at bedside  Time Spent for Care: 30 minutes  More than 50% of total time spent on counseling and coordination of care as described above  Current Length of Stay: 6 day(s)  Current Patient Status: Inpatient   Certification Statement: The patient will continue to require additional inpatient hospital stay due to not stable  Discharge Plan: Anticipate discharge in >72 hrs to rehab facility  Code Status: Level 1 - Full Code    Subjective:   Pt awake, alert but unable to answer orientation questions    Objective:     Vitals:   Temp (24hrs), Av 4 °F (38 °C), Min:99 7 °F (37 6 °C), Max:101 1 °F (38 4 °C)    Temp:  [99 7 °F (37 6 °C)-101 1 °F (38 4 °C)] 99 7 °F (37 6 °C)  HR:  [88-94] 88  BP: (127-135)/(62-69) 135/69  SpO2:  [96 %] 96 %  Body mass index is 26 69 kg/m²  Input and Output Summary (last 24 hours): Intake/Output Summary (Last 24 hours) at 2022 1727  Last data filed at 2022 1445  Gross per 24 hour   Intake 340 ml   Output 2225 ml   Net -1885 ml       Physical Exam:   Physical Exam  Vitals reviewed  HENT:      Head: Normocephalic and atraumatic  Cardiovascular:      Rate and Rhythm: Normal rate and regular rhythm  Heart sounds: Normal heart sounds  Pulmonary:      Effort: Pulmonary effort is normal  No respiratory distress        Breath sounds: Normal breath sounds  No wheezing  Abdominal:      General: There is no distension  Palpations: Abdomen is soft  Tenderness: There is no abdominal tenderness  Musculoskeletal:      Right lower leg: No edema  Left lower leg: No edema  Neurological:      Mental Status: He is alert  He is disoriented  Comments: JERONIMO RLE : 4+/5          Additional Data:     Labs:  Results from last 7 days   Lab Units 01/24/22  1424   WBC Thousand/uL 7 06   HEMOGLOBIN g/dL 12 0   HEMATOCRIT % 37 5   PLATELETS Thousands/uL 139*   NEUTROS PCT % 76*   LYMPHS PCT % 10*   MONOS PCT % 10   EOS PCT % 4     Results from last 7 days   Lab Units 01/23/22  0818 01/22/22  0422 01/22/22  0422   SODIUM mmol/L 145   < > 146*   POTASSIUM mmol/L 4 3   < > 4 1   CHLORIDE mmol/L 113*   < > 114*   CO2 mmol/L 30   < > 31   BUN mg/dL 16   < > 14   CREATININE mg/dL 0 73   < > 0 78   ANION GAP mmol/L 2*   < > 1*   CALCIUM mg/dL 8 3   < > 8 3   ALBUMIN g/dL  --   --  2 2*   TOTAL BILIRUBIN mg/dL  --   --  0 64   ALK PHOS U/L  --   --  57   ALT U/L  --   --  24   AST U/L  --   --  37   GLUCOSE RANDOM mg/dL 84   < > 112    < > = values in this interval not displayed       Results from last 7 days   Lab Units 01/18/22  1058   INR  1 10     Results from last 7 days   Lab Units 01/24/22  1327 01/24/22  0754 01/23/22  2059 01/23/22  1802 01/23/22  1118 01/23/22  0402 01/23/22  0122 01/23/22  0044 01/23/22  0022 01/22/22  1729 01/22/22  1145 01/22/22  0555   POC GLUCOSE mg/dl 115 109 105 89 86 85 96 68 68 81 112 113         Results from last 7 days   Lab Units 01/24/22  1424 01/20/22  0502 01/19/22  0537 01/18/22  1326 01/18/22  1048   LACTIC ACID mmol/L  --   --   --  2 1* 3 1*   PROCALCITONIN ng/ml 0 23 0 60* 0 79*  --   --        Lines/Drains:  Invasive Devices  Report    Peripheral Intravenous Line            Peripheral IV 01/23/22 Left;Upper;Ventral (anterior) Arm 1 day    Peripheral IV 01/23/22 Right Antecubital 1 day          Drain            Urethral Catheter 18 Fr  2 days    NG/OG/Enteral Tube Nasogastric Right nare 1 day              Urinary Catheter:  Goal for removal: Voiding trial when ambulation improves               Imaging: No pertinent imaging reviewed  Recent Cultures (last 7 days):   Results from last 7 days   Lab Units 01/19/22  1145 01/19/22  0206 01/19/22  0133 01/18/22  1721 01/18/22  1126 01/18/22  1048   BLOOD CULTURE   --  No Growth After 5 Days  No Growth After 5 Days  --  No Growth After 5 Days  No Growth After 5 Days  SPUTUM CULTURE  Few Colonies of   --   --   --   --   --    GRAM STAIN RESULT  No bacteria seen  2+ Polys  --   --  No Polys or Bacteria seen  --   --        Last 24 Hours Medication List:   Current Facility-Administered Medications   Medication Dose Route Frequency Provider Last Rate    acetaminophen  650 mg Oral Q6H PRN Amilcar Martinez MD      chlorhexidine  15 mL Mouth/Throat Q12H Keren Eason MD      doxazosin  1 mg Oral Daily Radha Keyes MD      enoxaparin  40 mg Subcutaneous Q24H Keren Eason MD      folic acid  1 mg Oral Daily Radha Keyes MD      gabapentin  100 mg Oral HS Amilcar Martinez MD      levETIRAcetam  1,500 mg Intravenous Q12H Keren Eason MD Stopped (01/24/22 0920)    OLANZapine  2 5 mg Intramuscular TID PRN Amilcar Martinez MD      pravastatin  80 mg Oral Daily With Lady Croft MD      thiamine  100 mg Oral Daily Radha Keyes MD      valproate sodium  500 mg Intravenous [facility] Radha Keyes  mg (01/24/22 1410)        Today, Patient Was Seen By: Amilcar Martinez MD    **Please Note: This note may have been constructed using a voice recognition system  **

## 2022-01-24 NOTE — ASSESSMENT & PLAN NOTE
· Continues to have off and on agitation, on examination does seem to have right upper and lower extremity mild weakness  · Initial limited MRI did not show any stroke  · Has been seizure-free when taken off video EEG  · Neurology ordered for repeat MRI with and without contrast seizure protocol  · Continue restraints, p r n   Zyprexa  · Adding gabapentin 100 mg at bedtime  · If needed consider bedtime Seroquel

## 2022-01-24 NOTE — ASSESSMENT & PLAN NOTE
· Currently has job of tube, continue tube feeding  · Has not been doing well in speech evaluation  · VBS to be performed tomorrow

## 2022-01-24 NOTE — PLAN OF CARE
Problem: PHYSICAL THERAPY ADULT  Goal: Performs mobility at highest level of function for planned discharge setting  See evaluation for individualized goals  Description: Treatment/Interventions: Functional transfer training,LE strengthening/ROM,Therapeutic exercise,Endurance training,Patient/family training,Equipment eval/education,Bed mobility,Gait training,OT,Spoke to nursing  Equipment Recommended:  (will cont to assess)       See flowsheet documentation for full assessment, interventions and recommendations  1/24/2022 0921 by Yfn Mckee, PT  Note: Prognosis: Fair  Problem List: Decreased strength,Decreased endurance,Impaired balance,Decreased mobility,Pain,Impaired tone,Impaired vision,Decreased safety awareness,Impaired judgement,Decreased cognition  Assessment: PT completed evaluation of 80year old male admitted to [facility] on 1/19/2022 as a transfer from [facility] where he initially presented on 1/18 as CVA alert due to R facial droop (was found by caregiver seizing)  Patient was intubated for airway protection  No TPA or thrombectomy  Underwent lumbar puncture which was negative  MRI of brain (-) for acute abnormalities  1/22 patient self extubated  Diagnosis this admission includes seizures  Current status instabilities include feeding tube, O2 (1 L), b/l UE restraints, patel catheter, IV fluids, falls risk, bed/chair alarms, NPO, and a regression in functional status from baseline  PMH is significant for seizure disorder (last known episode 2017), CKD, and EVAR  Per chart review, prior to this admission patient resided in a 2 story home (1st floor set up) with 1 ALISIA  He resides with spouse who is disabled and he is primary caregiver  Patient has private duty caregiver in the home 10-2 daily to assist spouse  Current impairments include decreased cognition, R sided hemiparesis, R sided inattention, decreased balance and ability to participate in functional mobility   During PT evaluation patient required max-AX2 for supine-->sit transfer, sit<-->stand transfer, and stand pivot transfer (bed to chair)  Post evaluation patient seated OOB in chair  He is functioning below his baseline and PT d/c recommendation is for rehab  Patient will continue to benefit from continued skilled PT this admission to achieve maximal function and safety  PT Discharge Recommendation: (S) Post acute rehabilitation services          See flowsheet documentation for full assessment  1/24/2022 0921 by Saba Fitzgerald, PT  Note: Prognosis: Fair  Problem List: Decreased strength,Decreased endurance,Impaired balance,Decreased mobility,Pain,Impaired tone,Impaired vision,Decreased safety awareness,Impaired judgement,Decreased cognition  Assessment: PT completed evaluation of 80year old male admitted to [facility] on 1/19/2022 as a transfer from [facility] where he initially presented on 1/18 as CVA alert due to R facial droop (was found by caregiver seizing)  Patient was intubated for airway protection  No TPA or thrombectomy  Underwent lumbar puncture which was negative  MRI of brain (-) for acute abnormalities  1/22 patient self extubated  Diagnosis this admission includes seizures  Current status instabilities include feeding tube, O2 (1 L), b/l UE restraints, patel catheter, IV fluids, falls risk, bed/chair alarms, NPO, and a regression in functional status from baseline  PMH is significant for seizure disorder (last known episode 2017), CKD, and EVAR  Per chart review, prior to this admission patient resided in a 2 story home (1st floor set up) with 1 ALISIA  He resides with spouse who is disabled and he is primary caregiver  Patient has private duty caregiver in the home 10-2 daily to assist spouse  Current impairments include decreased cognition, R sided hemiparesis, R sided inattention, decreased balance and ability to participate in functional mobility   During PT evaluation patient required max-AX2 for supine-->sit transfer, sit<-->stand transfer, and stand pivot transfer (bed to chair)  Post evaluation patient seated OOB in chair  He is functioning below his baseline and PT d/c recommendation is for rehab  Patient will continue to benefit from continued skilled PT this admission to achieve maximal function and safety  PT Discharge Recommendation: (S) Post acute rehabilitation services          See flowsheet documentation for full assessment

## 2022-01-24 NOTE — ASSESSMENT & PLAN NOTE
· Had on admission, suspicion of community-acquired pneumonia  · Rxed with 5 days of Rocephin in ICU  · Fever of 101 last night 1/23    WBC normal, procalcitonin 0 23  · Continue to monitor fever, WBC  · Monitor for aspiration, aspiration precautions

## 2022-01-24 NOTE — PLAN OF CARE
Problem: Nutrition  Goal: Nutrition/Hydration status is improving  Description: Monitor and assess patient's nutrition/hydration status for malnutrition (ex- brittle hair, bruises, dry skin, pale skin and conjunctiva, muscle wasting, smooth red tongue, and disorientation)  Collaborate with interdisciplinary team and initiate plan and interventions as ordered  Monitor patient's weight and dietary intake as ordered or per policy  Utilize nutrition screening tool and intervene per policy  Determine patient's food preferences and provide high-protein, high-caloric foods as appropriate  - Assist patient with eating   - Allow adequate time for meals   - Encourage patient to take dietary supplement as ordered  - Collaborate with clinical nutritionist   - Include patient/family/caregiver in decisions related to nutrition  Outcome: Progressing     Problem: Nutrition/Hydration-ADULT  Goal: Nutrient/Hydration intake appropriate for improving, restoring or maintaining nutritional needs  Description: Monitor and assess patient's nutrition/hydration status for malnutrition  Collaborate with interdisciplinary team and initiate plan and interventions as ordered  Monitor patient's weight and dietary intake as ordered or per policy  Utilize nutrition screening tool and intervene as necessary  Determine patient's food preferences and provide high-protein, high-caloric foods as appropriate       INTERVENTIONS:  - Monitor oral intake, urinary output, labs, and treatment plans  - Assess nutrition and hydration status and recommend course of action  - Evaluate amount of meals eaten  - Assist patient with eating if necessary   - Allow adequate time for meals  - Recommend/ encourage appropriate diets, oral nutritional supplements, and vitamin/mineral supplements  - Order, calculate, and assess calorie counts as needed  - Recommend, monitor, and adjust tube feedings based on assessed needs  - Assess need for intravenous fluids  - Provide specific nutrition/hydration education as appropriate  - Include patient/family/caregiver in decisions related to nutrition  Outcome: Progressing

## 2022-01-25 ENCOUNTER — APPOINTMENT (INPATIENT)
Dept: RADIOLOGY | Facility: HOSPITAL | Age: 82
DRG: 100 | End: 2022-01-25
Payer: MEDICARE

## 2022-01-25 ENCOUNTER — APPOINTMENT (INPATIENT)
Dept: RADIOLOGY | Facility: HOSPITAL | Age: 82
DRG: 100 | End: 2022-01-25
Attending: HOSPITALIST
Payer: MEDICARE

## 2022-01-25 LAB
ANION GAP SERPL CALCULATED.3IONS-SCNC: 4 MMOL/L (ref 4–13)
BASOPHILS # BLD AUTO: 0.01 THOUSANDS/ΜL (ref 0–0.1)
BASOPHILS NFR BLD AUTO: 0 % (ref 0–1)
BUN SERPL-MCNC: 17 MG/DL (ref 5–25)
CALCIUM SERPL-MCNC: 8.9 MG/DL (ref 8.3–10.1)
CHLORIDE SERPL-SCNC: 110 MMOL/L (ref 100–108)
CO2 SERPL-SCNC: 30 MMOL/L (ref 21–32)
CREAT SERPL-MCNC: 0.82 MG/DL (ref 0.6–1.3)
EOSINOPHIL # BLD AUTO: 0.33 THOUSAND/ΜL (ref 0–0.61)
EOSINOPHIL NFR BLD AUTO: 4 % (ref 0–6)
ERYTHROCYTE [DISTWIDTH] IN BLOOD BY AUTOMATED COUNT: 12 % (ref 11.6–15.1)
FLUAV RNA RESP QL NAA+PROBE: NEGATIVE
FLUBV RNA RESP QL NAA+PROBE: NEGATIVE
GFR SERPL CREATININE-BSD FRML MDRD: 84 ML/MIN/1.73SQ M
GLUCOSE SERPL-MCNC: 109 MG/DL (ref 65–140)
HCT VFR BLD AUTO: 39 % (ref 36.5–49.3)
HGB BLD-MCNC: 12.4 G/DL (ref 12–17)
IMM GRANULOCYTES # BLD AUTO: 0.04 THOUSAND/UL (ref 0–0.2)
IMM GRANULOCYTES NFR BLD AUTO: 1 % (ref 0–2)
LYMPHOCYTES # BLD AUTO: 0.71 THOUSANDS/ΜL (ref 0.6–4.47)
LYMPHOCYTES NFR BLD AUTO: 10 % (ref 14–44)
MCH RBC QN AUTO: 31.9 PG (ref 26.8–34.3)
MCHC RBC AUTO-ENTMCNC: 31.8 G/DL (ref 31.4–37.4)
MCV RBC AUTO: 100 FL (ref 82–98)
MONOCYTES # BLD AUTO: 0.88 THOUSAND/ΜL (ref 0.17–1.22)
MONOCYTES NFR BLD AUTO: 12 % (ref 4–12)
NEUTROPHILS # BLD AUTO: 5.45 THOUSANDS/ΜL (ref 1.85–7.62)
NEUTS SEG NFR BLD AUTO: 73 % (ref 43–75)
NRBC BLD AUTO-RTO: 0 /100 WBCS
PLATELET # BLD AUTO: 151 THOUSANDS/UL (ref 149–390)
PMV BLD AUTO: 11.3 FL (ref 8.9–12.7)
POTASSIUM SERPL-SCNC: 4.1 MMOL/L (ref 3.5–5.3)
PROCALCITONIN SERPL-MCNC: 0.22 NG/ML
RBC # BLD AUTO: 3.89 MILLION/UL (ref 3.88–5.62)
RSV RNA RESP QL NAA+PROBE: NEGATIVE
SARS-COV-2 RNA RESP QL NAA+PROBE: NEGATIVE
SODIUM SERPL-SCNC: 144 MMOL/L (ref 136–145)
WBC # BLD AUTO: 7.42 THOUSAND/UL (ref 4.31–10.16)

## 2022-01-25 PROCEDURE — 97535 SELF CARE MNGMENT TRAINING: CPT

## 2022-01-25 PROCEDURE — 99233 SBSQ HOSP IP/OBS HIGH 50: CPT | Performed by: INTERNAL MEDICINE

## 2022-01-25 PROCEDURE — 74230 X-RAY XM SWLNG FUNCJ C+: CPT

## 2022-01-25 PROCEDURE — 0241U HB NFCT DS VIR RESP RNA 4 TRGT: CPT | Performed by: INTERNAL MEDICINE

## 2022-01-25 PROCEDURE — 85025 COMPLETE CBC W/AUTO DIFF WBC: CPT | Performed by: HOSPITALIST

## 2022-01-25 PROCEDURE — 92611 MOTION FLUOROSCOPY/SWALLOW: CPT

## 2022-01-25 PROCEDURE — 99232 SBSQ HOSP IP/OBS MODERATE 35: CPT | Performed by: PSYCHIATRY & NEUROLOGY

## 2022-01-25 PROCEDURE — 80048 BASIC METABOLIC PNL TOTAL CA: CPT | Performed by: HOSPITALIST

## 2022-01-25 PROCEDURE — 84145 PROCALCITONIN (PCT): CPT | Performed by: HOSPITALIST

## 2022-01-25 PROCEDURE — 71045 X-RAY EXAM CHEST 1 VIEW: CPT

## 2022-01-25 RX ORDER — ACETAMINOPHEN 325 MG/1
975 TABLET ORAL EVERY 8 HOURS SCHEDULED
Status: DISCONTINUED | OUTPATIENT
Start: 2022-01-26 | End: 2022-02-01 | Stop reason: HOSPADM

## 2022-01-25 RX ADMIN — PRAVASTATIN SODIUM 80 MG: 80 TABLET ORAL at 15:35

## 2022-01-25 RX ADMIN — CHLORHEXIDINE GLUCONATE 15 ML: 1.2 SOLUTION ORAL at 22:24

## 2022-01-25 RX ADMIN — CHLORHEXIDINE GLUCONATE 15 ML: 1.2 SOLUTION ORAL at 11:09

## 2022-01-25 RX ADMIN — VALPROATE SODIUM 500 MG: 100 INJECTION, SOLUTION INTRAVENOUS at 00:00

## 2022-01-25 RX ADMIN — ENOXAPARIN SODIUM 40 MG: 40 INJECTION SUBCUTANEOUS at 10:55

## 2022-01-25 RX ADMIN — ACETAMINOPHEN 650 MG: 325 TABLET, FILM COATED ORAL at 18:08

## 2022-01-25 RX ADMIN — ACETAMINOPHEN 650 MG: 325 TABLET, FILM COATED ORAL at 10:54

## 2022-01-25 RX ADMIN — LIDOCAINE 5% 1 PATCH: 700 PATCH TOPICAL at 20:00

## 2022-01-25 RX ADMIN — VALPROATE SODIUM 500 MG: 100 INJECTION, SOLUTION INTRAVENOUS at 15:35

## 2022-01-25 RX ADMIN — GABAPENTIN 100 MG: 100 CAPSULE ORAL at 22:24

## 2022-01-25 RX ADMIN — LEVETIRACETAM 1500 MG: 100 INJECTION, SOLUTION INTRAVENOUS at 10:55

## 2022-01-25 RX ADMIN — FOLIC ACID 1 MG: 1 TABLET ORAL at 10:55

## 2022-01-25 RX ADMIN — THIAMINE HCL TAB 100 MG 100 MG: 100 TAB at 10:55

## 2022-01-25 RX ADMIN — ACETAMINOPHEN 650 MG: 325 TABLET, FILM COATED ORAL at 00:00

## 2022-01-25 RX ADMIN — VALPROATE SODIUM 500 MG: 100 INJECTION, SOLUTION INTRAVENOUS at 05:22

## 2022-01-25 RX ADMIN — DOXAZOSIN 1 MG: 1 TABLET ORAL at 10:55

## 2022-01-25 NOTE — QUICK NOTE
QUICK NOTE - Deterioration Index  Georgiana Navarrete 80 y o  male MRN: [id number]  Unit/Bed#: [facility] 712-01 Encounter: [id number]    Date Paged: [phone]  Time Paged: [phone]  Room #: 650  Arrival Time: 1052  Deterioration index score at time of page: 71 5  %  Spoke with Dr Dagmar Castelan from primary team  Need to escalate level of care: no     PROBLEMS resulting in high DI score:   24% Age 80   23% [place] coma scale 13   21% Supplemental oxygen Nasal cannula   17% Neurological exam Lethargic   7% Sodium 144 mmol/L   5% Systolic 054   2% Temperature 100 2 °F (37 9 °C)       PLAN:     DI 52 2 at time of arrival   Per documentation, GCS has been 13   Consider infectious workup with low grade fevers    Please contact critical care via [employer] with any questions or concerns  Vitals:   Vitals:    22 [phone] 22 0524 22 0600 22 1052   BP:    128/64   BP Location:       Pulse:  80  81   Resp:       Temp: 100 5 °F (38 1 °C) 100 2 °F (37 9 °C)  100 5 °F (38 1 °C)   TempSrc: Axillary      SpO2:  97%  96%   Weight:   87 kg (191 lb 12 8 oz)    Height:           Respiratory:  SpO2: SpO2: 96 %, SpO2 Activity: SpO2 Activity: At Rest, SpO2 Device: O2 Device: Nasal cannula (Simultaneous filing  User may not have seen previous data )       Temperature: Temp (24hrs), Av 5 °F (38 1 °C), Min:100 2 °F (37 9 °C), Max:100 6 °F (38 1 °C)  Current: Temperature: 100 5 °F (38 1 °C)    Labs:   Results from last 7 days   Lab Units 22  0521 22  1424 22  0000 22  0451 22  0451   WBC Thousand/uL 7 42 7 06 6 74   < > 10 36*   HEMOGLOBIN g/dL 12 4 12 0 11 7*   < > 12 3   HEMATOCRIT % 39 0 37 5 36 4*   < > 37 4   PLATELETS Thousands/uL 151 139* 107*   < > 130*   NEUTROS PCT % 73 76*  --   --  82*   MONOS PCT % 12 10  --   --  7    < > = values in this interval not displayed       Results from last 7 days   Lab Units 22  0521 22  0818 22  0422 22  0502 22  8619 01/18/22  1058 01/18/22  1058   SODIUM mmol/L 144 145 146*   < > 143   < > 140  140   POTASSIUM mmol/L 4 1 4 3 4 1   < > 3 5   < > 3 8  3 8   CHLORIDE mmol/L 110* 113* 114*   < > 111*   < > 104  104   CO2 mmol/L 30 30 31   < > 26   < > 27  25   BUN mg/dL 17 16 14   < > 18   < > 20  20   CREATININE mg/dL 0 82 0 73 0 78   < > 0 93   < > 1 53*  1 57*   CALCIUM mg/dL 8 9 8 3 8 3   < > 7 8*   < > 8 5  8 5   ALK PHOS U/L  --   --  57  --  72  --  80   ALT U/L  --   --  24  --  25  --  28   AST U/L  --   --  37  --  54*  --  36    < > = values in this interval not displayed       Results from last 7 days   Lab Units 01/21/22  0451 01/20/22  0502 01/19/22  0536   MAGNESIUM mg/dL 2 8* 2 7* 2 4     Results from last 7 days   Lab Units 01/18/22  1326   LACTIC ACID mmol/L 2 1*         Results from last 7 days   Lab Units 01/25/22  0521 01/24/22  1424 01/20/22  0502 01/19/22  0537   PROCALCITONIN ng/ml 0 22 0 23 0 60* 0 79*       Code Status: Level 1 - Full Code

## 2022-01-25 NOTE — ASSESSMENT & PLAN NOTE
· Looks like he had a [place] inserted on 01/18, removed 1/21, reinserted 1/22 midnight    Suspect due to retention  · On doxazosin  · Voiding trial

## 2022-01-25 NOTE — ASSESSMENT & PLAN NOTE
- Proximal right arm weakness noted on exam  Per family, they believe that this is new  Unsure if he has history of prior shoulder injury on the right  Upon further review, patient has a history of right rotator cuff tear   Patient denies any shoulder pain at present and reports he has chronic slight right UE weakness at baseline    - MRI brain negative for acute infarct    - PT/OT

## 2022-01-25 NOTE — ASSESSMENT & PLAN NOTE
· Continues to have off and on agitation, on examination does seem to have right upper and lower extremity mild weakness  · Brain MRI x2 without acute stroke  · Has been seizure-free when taken off video EEG  · Continue restraints, p r n   Zyprexa  · gabapentin 100 mg at bedtime was added  · If needed consider bedtime Seroquel

## 2022-01-25 NOTE — ASSESSMENT & PLAN NOTE
· Presented with right facial droop, f/b mutliple seizures  · Was a stroke alert  · MRI - limited study was negative  · Was intubated placed on VEEG in ICU -> received keppra, depacon  · Now Sz free, last Sz on 1/19  · Cont keppra 1500 mg BID, depacon 500 mg q 8 hr  · Etiology for Sz unclear - ? Missed 1 dose at home   keppra was decreased from 1500 mg to 1000 mg 6 months ago in July 2021  · Repeat brain MRI without acute abnormality  · Neurology is following

## 2022-01-25 NOTE — PLAN OF CARE
Problem: OCCUPATIONAL THERAPY ADULT  Goal: Performs self-care activities at highest level of function for planned discharge setting  See evaluation for individualized goals  Description: Treatment Interventions: ADL retraining          See flowsheet documentation for full assessment, interventions and recommendations  Outcome: Progressing  Note: Limitation: Decreased ADL status,Decreased endurance,Decreased self-care trans,Decreased high-level ADLs,Decreased fine motor control,Visual deficit,Decreased cognition,Decreased Safe judgement during ADL,Decreased UE strength  Prognosis: Fair  Assessment: Pt seen for Occupational Therapy session with focus on activity tolerance, functional transfers/mob, bed mob,  standing tolerance and balance for pt engagement in Ub/LB self-care tasks and AROM/AAROM and Self-ROM,to pt L UE for decreased swelling and increased function  Pt presented sitting out of bed to bedside chair upon initiation of OT session  Pt daughter present and reported that pt was uncomfortable in chair  Pt instructed and assisted to re-position in chair however was noted to continue to scoot buttocks forward slouching in chair despite encouragement to remain upright  Pt  Required assist x 2 for stand pivot to bedside chair 2* pt decreased overall strength, coordination and balance  Pt will require further instruction on L UE self-ROM 2* pt poor carryover this session  Pt will require post acute rehab service to continue to address pt deficit which currently impair pt ADL and functional mob  Pt return to bed post session, bed alarm activated and all needs within reach       OT Discharge Recommendation: Post acute rehabilitation services  OT - OK to Discharge: Yes (yes to STR )

## 2022-01-25 NOTE — SPEECH THERAPY NOTE
Speech Pathology Videofluoroscopic Swallow Study (VFSS=VBS/MBS)    Patient Name: Adriano Bruce    BBMWU'Y Date: 1/25/2022     Problem List  Principal Problem:    Seizure [facility])  Active Problems:    Hyperlipidemia    Hypertension    Aneurysm of abdominal aorta (HCC)    Seizure disorder ([facility] )    Nonrheumatic aortic valve stenosis    Stage 3a chronic kidney disease (HCC)    Fever    Encephalopathy    Urinary retention    Dysphagia    Right arm weakness      Past Medical History  Past Medical History:   Diagnosis Date    Colon polyps     Gout     Hyperlipidemia     Hypertension     S/P AAA repair     Seizure disorder ([facility] )     Tear of right rotator cuff 4/4/2017       Past Surgical History  Past Surgical History:   Procedure Laterality Date    ABDOMINAL AORTIC ANEURYSM REPAIR, ENDOVASCULAR N/A     AR COLONOSCOPY FLX DX W/COLLJ SPEC WHEN PFRMD N/A 6/7/2017    Procedure: COLONOSCOPY;  Surgeon: Mellisa Ramires MD;  Location: [facility]; Service: Colorectal    AR COLONOSCOPY FLX DX W/COLLJ SPEC WHEN PFRMD N/A 5/18/2018    Procedure: COLONOSCOPY;  Surgeon: Mellisa Ramires MD;  Location: [facility]; Service: Colorectal    TONSILLECTOMY      WISDOM TOOTH EXTRACTION Bilateral        General Information;  Pt is a 80 y o  male with a PMH remarkable for Seizures, initially suspected CVA however imaging was negative for acute infarct  Current concerns for dysphagia include waxing/waning alertness, multiple swallows with trials at bedside and intermittent throat clearing  A VFSS was recommended to assess oropharyngeal stage swallowing skills at this time  Pt was viewed in lateral position and was given trials of pureed, soft moist (apple sauce, sliced banana), honey (moderately thick), nectar (mildly thick), and thin liquid  Oral stage:  Pt presented with mild-moderate oral stage dysphagia  Bolus retrieval was adequate  Mastication was prolonged/incomplete with banana   Oral control was reduced with premature spillage over the base of tongue  Pharyngeal stage:  Pt presented with mild-moderate pharyngeal dysphagia  Swallowing initiation was delayed  Hyolaryngeal excursion was reduced  Airway entrance closure or protection appeared mildly reduced  Penetration and epiglottic undercoating occurred consistently with thin liquid trials, and x1 with NTL  HTL tolerated without penetration but resulted in increased pharyngeal residual  Tongue base retraction and pharyngeal constriction were reduced  When swallowing banana, bolus was retained in the vallecula, regurgitated to the oral cavity for additional mastication and swallowed again  This occurred x2  Aspiration Response and Efficacy: No aspiration was seen on this test  Occasional throat clearing occurred, likely in response to pharyngeal residual     Esophageal stage:  Esophageal screening was completed  Suspect dysmotility  Assessment Summary:    Pt presents with mild-moderate oropharyngeal dysphagia characterized by decreased mastication and oral control with premature spillage over the base of the tongue, as well as delayed swallow initiation, reduced hyolaryngeal rise, mildly reduced airway closure, and reduced base of tongue retraction/pharyngeal stripping wave  No aspiration occurred on this study, however deep penetration and epiglottic undercoating occurred with thin liquid, with in creased risk for aspiration of residual  Penetration seen with NTL x1 but ultimately cleared  Recommend initiating conservative diet of puree and NTL at this time  ST will continue to follow  Note: Images are available for review in PACS as desired      Recommendations:   Recommended Diet:  puree/level 1 diet and nectar thick liquids   Recommended Form of Medications: crushed with puree   Aspiration precautions and compensatory swallowing strategies: upright posture, only feed when fully alert, slow rate of feeding, small bites/sips and alternating bites and sips  SLP Dysphagia therapy recommended: yes    Results Reviewed with: patient and MD   Pt Education: initiated  Pt and caregivers would benefit from continued education  Dysphagia Goals per SLP: Pt will tolerate the least restrictive diet with the least restrictive liquid consistency without s/s of aspiration x72hrs

## 2022-01-25 NOTE — PLAN OF CARE
Problem: MOBILITY - ADULT  Goal: Maintain or return to baseline ADL function  Description: INTERVENTIONS:  -  Assess patient's ability to carry out ADLs; assess patient's baseline for ADL function and identify physical deficits which impact ability to perform ADLs (bathing, care of mouth/teeth, toileting, grooming, dressing, etc )  - Assess/evaluate cause of self-care deficits   - Assess range of motion  - Assess patient's mobility; develop plan if impaired  - Assess patient's need for assistive devices and provide as appropriate  - Encourage maximum independence but intervene and supervise when necessary  - Involve family in performance of ADLs  - Assess for home care needs following discharge   - Consider OT consult to assist with ADL evaluation and planning for discharge  - Provide patient education as appropriate  Outcome: Progressing  Goal: Maintains/Returns to pre admission functional level  Description: INTERVENTIONS:  - Perform BMAT or MOVE assessment daily    - Set and communicate daily mobility goal to care team and patient/family/caregiver  - Collaborate with rehabilitation services on mobility goals if consulted  - Perform Range of Motion 4 times a day  - Reposition patient every 2 hours    - Dangle patient 4 times a day  - Stand patient 4 times a day  - Ambulate patient 4 times a day  - Out of bed to chair 4 times a day   - Out of bed for meals 4 times a day  - Out of bed for toileting  - Record patient progress and toleration of activity level   Outcome: Progressing     Problem: Potential for Falls  Goal: Patient will remain free of falls  Description: INTERVENTIONS:  - Educate patient/family on patient safety including physical limitations  - Instruct patient to call for assistance with activity   - Consult OT/PT to assist with strengthening/mobility   - Keep Call bell within reach  - Keep bed low and locked with side rails adjusted as appropriate  - Keep care items and personal belongings within reach  - Initiate and maintain comfort rounds  - Make Fall Risk Sign visible to staff  - Offer Toileting every 4 Hours, in advance of need  - Initiate/Maintain bed alarm  - Obtain necessary fall risk management equipment: bed alarm  - Apply yellow socks and bracelet for high fall risk patients  - Consider moving patient to room near nurses station  Outcome: Progressing     Problem: Prexisting or High Potential for Compromised Skin Integrity  Goal: Skin integrity is maintained or improved  Description: INTERVENTIONS:  - Identify patients at risk for skin breakdown  - Assess and monitor skin integrity  - Assess and monitor nutrition and hydration status  - Monitor labs   - Assess for incontinence   - Turn and reposition patient  - Assist with mobility/ambulation  - Relieve pressure over bony prominences  - Avoid friction and shearing  - Provide appropriate hygiene as needed including keeping skin clean and dry  - Evaluate need for skin moisturizer/barrier cream  - Collaborate with interdisciplinary team   - Patient/family teaching  - Consider wound care consult   Outcome: Progressing     Problem: SAFETY,RESTRAINT: NV/NON-SELF DESTRUCTIVE BEHAVIOR  Goal: Remains free of harm/injury (restraint for non violent/non self-detsructive behavior)  Description: INTERVENTIONS:  - Instruct patient/family regarding restraint use   - Assess and monitor physiologic and psychological status   - Provide interventions and comfort measures to meet assessed patient needs   - Identify and implement measures to help patient regain control  - Assess readiness for release of restraint   Outcome: Progressing  Goal: Returns to optimal restraint-free functioning  Description: INTERVENTIONS:  - Assess the patient's behavior and symptoms that indicate continued need for restraint  - Identify and implement measures to help patient regain control  - Assess readiness for release of restraint   Outcome: Progressing     Problem: Neurological Deficit  Goal: Neurological status is stable or improving  Description: Interventions:  - Monitor and assess patient's level of consciousness, motor function, sensory function, and level of assistance needed for ADLs  - Monitor and report changes from baseline  Collaborate with interdisciplinary team to initiate plan and implement interventions as ordered  - Provide and maintain a safe environment  - Consider seizure precautions  - Consider fall precautions  - Consider aspiration precautions  - Consider bleeding precautions  Outcome: Progressing     Problem: Activity Intolerance/Impaired Mobility  Goal: Mobility/activity is maintained at optimum level for patient  Description: Interventions:  - Assess and monitor patient  barriers to mobility and need for assistive/adaptive devices  - Assess patient's emotional response to limitations  - Collaborate with interdisciplinary team and initiate plans and interventions as ordered  - Encourage independent activity per ability   - Maintain proper body alignment  - Perform active/passive rom as tolerated/ordered  - Plan activities to conserve energy   - Turn patient as appropriate  Outcome: Progressing     Problem: Communication Impairment  Goal: Ability to express needs and understand communication  Description: Assess patient's communication skills and ability to understand information  Patient will demonstrate use of effective communication techniques, alternative methods of communication and understanding even if not able to speak  - Encourage communication and provide alternate methods of communication as needed  - Collaborate with case management/ for discharge needs  - Include patient/family/caregiver in decisions related to communication    Outcome: Progressing     Problem: Potential for Aspiration  Goal: Non-ventilated patient's risk of aspiration is minimized  Description: Assess and monitor vital signs, respiratory status, and labs (WBC)  Monitor for signs of aspiration (tachypnea, cough, rales, wheezing, cyanosis, fever)  - Assess and monitor patient's ability to swallow  - Place patient up in chair to eat if possible  - HOB up at 90 degrees to eat if unable to get patient up into chair   - Supervise patient during oral intake  - Instruct patient/ family to take small bites  - Instruct patient/ family to take small single sips when taking liquids  - Follow patient-specific strategies generated by speech pathologist   Outcome: Progressing     Problem: Nutrition  Goal: Nutrition/Hydration status is improving  Description: Monitor and assess patient's nutrition/hydration status for malnutrition (ex- brittle hair, bruises, dry skin, pale skin and conjunctiva, muscle wasting, smooth red tongue, and disorientation)  Collaborate with interdisciplinary team and initiate plan and interventions as ordered  Monitor patient's weight and dietary intake as ordered or per policy  Utilize nutrition screening tool and intervene per policy  Determine patient's food preferences and provide high-protein, high-caloric foods as appropriate  - Assist patient with eating   - Allow adequate time for meals   - Encourage patient to take dietary supplement as ordered  - Collaborate with clinical nutritionist   - Include patient/family/caregiver in decisions related to nutrition  Outcome: Progressing     Problem: Nutrition/Hydration-ADULT  Goal: Nutrient/Hydration intake appropriate for improving, restoring or maintaining nutritional needs  Description: Monitor and assess patient's nutrition/hydration status for malnutrition  Collaborate with interdisciplinary team and initiate plan and interventions as ordered  Monitor patient's weight and dietary intake as ordered or per policy  Utilize nutrition screening tool and intervene as necessary  Determine patient's food preferences and provide high-protein, high-caloric foods as appropriate  INTERVENTIONS:  - Monitor oral intake, urinary output, labs, and treatment plans  - Assess nutrition and hydration status and recommend course of action  - Evaluate amount of meals eaten  - Assist patient with eating if necessary   - Allow adequate time for meals  - Recommend/ encourage appropriate diets, oral nutritional supplements, and vitamin/mineral supplements  - Order, calculate, and assess calorie counts as needed  - Recommend, monitor, and adjust tube feedings based on assessed needs  - Assess need for intravenous fluids  - Provide specific nutrition/hydration education as appropriate  - Include patient/family/caregiver in decisions related to nutrition  Outcome: Progressing

## 2022-01-25 NOTE — PLAN OF CARE
Problem: MOBILITY - ADULT  Goal: Maintain or return to baseline ADL function  Description: INTERVENTIONS:  -  Assess patient's ability to carry out ADLs; assess patient's baseline for ADL function and identify physical deficits which impact ability to perform ADLs (bathing, care of mouth/teeth, toileting, grooming, dressing, etc )  - Assess/evaluate cause of self-care deficits   - Assess range of motion  - Assess patient's mobility; develop plan if impaired  - Assess patient's need for assistive devices and provide as appropriate  - Encourage maximum independence but intervene and supervise when necessary  - Involve family in performance of ADLs  - Assess for home care needs following discharge   - Consider OT consult to assist with ADL evaluation and planning for discharge  - Provide patient education as appropriate  Outcome: Progressing  Goal: Maintains/Returns to pre admission functional level  Description: INTERVENTIONS:  - Perform BMAT or MOVE assessment daily    - Set and communicate daily mobility goal to care team and patient/family/caregiver  - Collaborate with rehabilitation services on mobility goals if consulted  - Perform Range of Motion  times a day  - Reposition patient every  hours    - Dangle patient  times a day  - Stand patient  times a day  - Ambulate patient  times a day  - Out of bed to chair  times a day   - Out of bed for meals  times a day  - Out of bed for toileting  - Record patient progress and toleration of activity level   Outcome: Progressing     Problem: Potential for Falls  Goal: Patient will remain free of falls  Description: INTERVENTIONS:  - Educate patient/family on patient safety including physical limitations  - Instruct patient to call for assistance with activity   - Consult OT/PT to assist with strengthening/mobility   - Keep Call bell within reach  - Keep bed low and locked with side rails adjusted as appropriate  - Keep care items and personal belongings within reach  - Initiate and maintain comfort rounds  - Make Fall Risk Sign visible to staff  - Offer Toileting every  Hours, in advance of need  - Initiate/Maintain alarm  - Obtain necessary fall risk management equipment:  - Apply yellow socks and bracelet for high fall risk patients  - Consider moving patient to room near nurses station  Outcome: Progressing     Problem: Prexisting or High Potential for Compromised Skin Integrity  Goal: Skin integrity is maintained or improved  Description: INTERVENTIONS:  - Identify patients at risk for skin breakdown  - Assess and monitor skin integrity  - Assess and monitor nutrition and hydration status  - Monitor labs   - Assess for incontinence   - Turn and reposition patient  - Assist with mobility/ambulation  - Relieve pressure over bony prominences  - Avoid friction and shearing  - Provide appropriate hygiene as needed including keeping skin clean and dry  - Evaluate need for skin moisturizer/barrier cream  - Collaborate with interdisciplinary team   - Patient/family teaching  - Consider wound care consult   Outcome: Progressing     Problem: SAFETY,RESTRAINT: NV/NON-SELF DESTRUCTIVE BEHAVIOR  Goal: Remains free of harm/injury (restraint for non violent/non self-detsructive behavior)  Description: INTERVENTIONS:  - Instruct patient/family regarding restraint use   - Assess and monitor physiologic and psychological status   - Provide interventions and comfort measures to meet assessed patient needs   - Identify and implement measures to help patient regain control  - Assess readiness for release of restraint   Outcome: Progressing  Goal: Returns to optimal restraint-free functioning  Description: INTERVENTIONS:  - Assess the patient's behavior and symptoms that indicate continued need for restraint  - Identify and implement measures to help patient regain control  - Assess readiness for release of restraint   Outcome: Progressing     Problem: Neurological Deficit  Goal: Neurological status is stable or improving  Description: Interventions:  - Monitor and assess patient's level of consciousness, motor function, sensory function, and level of assistance needed for ADLs  - Monitor and report changes from baseline  Collaborate with interdisciplinary team to initiate plan and implement interventions as ordered  - Provide and maintain a safe environment  - Consider seizure precautions  - Consider fall precautions  - Consider aspiration precautions  - Consider bleeding precautions  Outcome: Progressing     Problem: Activity Intolerance/Impaired Mobility  Goal: Mobility/activity is maintained at optimum level for patient  Description: Interventions:  - Assess and monitor patient  barriers to mobility and need for assistive/adaptive devices  - Assess patient's emotional response to limitations  - Collaborate with interdisciplinary team and initiate plans and interventions as ordered  - Encourage independent activity per ability   - Maintain proper body alignment  - Perform active/passive rom as tolerated/ordered  - Plan activities to conserve energy   - Turn patient as appropriate  Outcome: Progressing     Problem: Communication Impairment  Goal: Ability to express needs and understand communication  Description: Assess patient's communication skills and ability to understand information  Patient will demonstrate use of effective communication techniques, alternative methods of communication and understanding even if not able to speak  - Encourage communication and provide alternate methods of communication as needed  - Collaborate with case management/ for discharge needs  - Include patient/family/caregiver in decisions related to communication  Outcome: Progressing     Problem: Potential for Aspiration  Goal: Non-ventilated patient's risk of aspiration is minimized  Description: Assess and monitor vital signs, respiratory status, and labs (WBC)  Monitor for signs of aspiration (tachypnea, cough, rales, wheezing, cyanosis, fever)  - Assess and monitor patient's ability to swallow  - Place patient up in chair to eat if possible  - HOB up at 90 degrees to eat if unable to get patient up into chair   - Supervise patient during oral intake  - Instruct patient/ family to take small bites  - Instruct patient/ family to take small single sips when taking liquids  - Follow patient-specific strategies generated by speech pathologist   Outcome: Progressing     Problem: Nutrition  Goal: Nutrition/Hydration status is improving  Description: Monitor and assess patient's nutrition/hydration status for malnutrition (ex- brittle hair, bruises, dry skin, pale skin and conjunctiva, muscle wasting, smooth red tongue, and disorientation)  Collaborate with interdisciplinary team and initiate plan and interventions as ordered  Monitor patient's weight and dietary intake as ordered or per policy  Utilize nutrition screening tool and intervene per policy  Determine patient's food preferences and provide high-protein, high-caloric foods as appropriate  - Assist patient with eating   - Allow adequate time for meals   - Encourage patient to take dietary supplement as ordered  - Collaborate with clinical nutritionist   - Include patient/family/caregiver in decisions related to nutrition  Outcome: Progressing     Problem: Nutrition/Hydration-ADULT  Goal: Nutrient/Hydration intake appropriate for improving, restoring or maintaining nutritional needs  Description: Monitor and assess patient's nutrition/hydration status for malnutrition  Collaborate with interdisciplinary team and initiate plan and interventions as ordered  Monitor patient's weight and dietary intake as ordered or per policy  Utilize nutrition screening tool and intervene as necessary  Determine patient's food preferences and provide high-protein, high-caloric foods as appropriate       INTERVENTIONS:  - Monitor oral intake, urinary output, labs, and treatment plans  - Assess nutrition and hydration status and recommend course of action  - Evaluate amount of meals eaten  - Assist patient with eating if necessary   - Allow adequate time for meals  - Recommend/ encourage appropriate diets, oral nutritional supplements, and vitamin/mineral supplements  - Order, calculate, and assess calorie counts as needed  - Recommend, monitor, and adjust tube feedings based on assessed needs  - Assess need for intravenous fluids  - Provide specific nutrition/hydration education as appropriate  - Include patient/family/caregiver in decisions related to nutrition  Outcome: Progressing

## 2022-01-25 NOTE — PROGRESS NOTES
[address] [facility]  Progress Note Andrew Zuñiga 1940, 80 y o  male MRN: [id number]  Unit/Bed#: [facility] [place] 712-01 Encounter: [id number]  Primary Care Provider: Ema Christianson MD   Date and time admitted to hospital: 1/18/2022 11:46 PM    * Seizure [facility])  Assessment & Plan  · Presented with right facial droop, f/b mutliple seizures  · Was a stroke alert  · MRI - limited study was negative  · Was intubated placed on VEEG in ICU -> received keppra, depacon  · Now Sz free, last Sz on 1/19  · Cont keppra 1500 mg BID, depacon 500 mg q 8 hr  · Etiology for Sz unclear - ? Missed 1 dose at home  keppra was decreased from 1500 mg to 1000 mg 6 months ago in July 2021  · Repeat brain MRI without acute abnormality  · Neurology is following    Encephalopathy  Assessment & Plan  · Continues to have off and on agitation, on examination does seem to have right upper and lower extremity mild weakness  · Brain MRI x2 without acute stroke  · Has been seizure-free when taken off video EEG  · Continue restraints, p r n  Zyprexa  · gabapentin 100 mg at bedtime was added  · If needed consider bedtime Seroquel    Fever  Assessment & Plan  · Had on admission, suspicion of community-acquired pneumonia  · Rxed with 5 days of Rocephin in ICU  · Fever of 101  1/23  WBC normal, procalcitonin 0 23  · Continue to monitor fever, WBC  · Monitor for aspiration, aspiration precautions    Dysphagia  Assessment & Plan  · Currently has keofed, continue tube feeding  · Has not been doing well in speech evaluation  · VBS to be performed today    Urinary retention  Assessment & Plan  · Looks like he had a Franklin inserted on 01/18, removed 1/21, reinserted 1/22 midnight  Suspect due to retention  · On doxazosin  · Voiding trial    Aneurysm of abdominal aorta (HCC)  Assessment & Plan  S/p EVAR      VTE Pharmacologic Prophylaxis: VTE Score: 13 High Risk (Score >/= 5) - Pharmacological DVT Prophylaxis Ordered: enoxaparin (Lovenox)  Sequential Compression Devices Ordered  Patient Centered Rounds: I performed bedside rounds with nursing staff today  Discussions with Specialists or Other Care Team Provider:     Education and Discussions with Family / Patient: Updated  (daughter) via phone  Time Spent for Care: 45 minutes  More than 50% of total time spent on counseling and coordination of care as described above  Current Length of Stay: 7 day(s)  Current Patient Status: Inpatient   Certification Statement: The patient will continue to require additional inpatient hospital stay due to above  Discharge Plan: Anticipate discharge in >72 hrs to rehab facility  Code Status: Level 1 - Full Code    Subjective:   Patient seen and examined  Comfortable in bed  No event overnight  Video barium swallow eval today  Objective:     Vitals:   Temp (24hrs), Av 5 °F (38 1 °C), Min:100 2 °F (37 9 °C), Max:100 6 °F (38 1 °C)    Temp:  [100 2 °F (37 9 °C)-100 6 °F (38 1 °C)] 100 5 °F (38 1 °C)  HR:  [80-98] 81  BP: (128-151)/(64-86) 128/64  SpO2:  [93 %-97 %] 96 %  Body mass index is 27 52 kg/m²  Input and Output Summary (last 24 hours):      Intake/Output Summary (Last 24 hours) at 2022 1059  Last data filed at 2022 2100  Gross per 24 hour   Intake --   Output 1950 ml   Net -1950 ml       Physical Exam:   Physical Exam   Patient is awake alert in acute distress  Chronically ill-appearing  NG tube in place  Lung clear to auscultation bilateral anteriorly  Heart positive S1-S2 no murmur  Abdomen soft nontender  Franklin catheter in place  Lower extremities no edema    Additional Data:     Labs:  Results from last 7 days   Lab Units 22  0521   WBC Thousand/uL 7 42   HEMOGLOBIN g/dL 12 4   HEMATOCRIT % 39 0   PLATELETS Thousands/uL 151   NEUTROS PCT % 73   LYMPHS PCT % 10*   MONOS PCT % 12   EOS PCT % 4     Results from last 7 days   Lab Units 22  0521 22  0818 22  0422   SODIUM mmol/L 144   < > 146* POTASSIUM mmol/L 4 1   < > 4 1   CHLORIDE mmol/L 110*   < > 114*   CO2 mmol/L 30   < > 31   BUN mg/dL 17   < > 14   CREATININE mg/dL 0 82   < > 0 78   ANION GAP mmol/L 4   < > 1*   CALCIUM mg/dL 8 9   < > 8 3   ALBUMIN g/dL  --   --  2 2*   TOTAL BILIRUBIN mg/dL  --   --  0 64   ALK PHOS U/L  --   --  57   ALT U/L  --   --  24   AST U/L  --   --  37   GLUCOSE RANDOM mg/dL 109   < > 112    < > = values in this interval not displayed  Results from last 7 days   Lab Units 01/24/22  1327 01/24/22  0754 01/23/22  2059 01/23/22  1802 01/23/22  1118 01/23/22  0402 01/23/22  0122 01/23/22  0044 01/23/22  0022 01/22/22  1729 01/22/22  1145 01/22/22  0555   POC GLUCOSE mg/dl 115 109 105 89 86 85 96 68 68 81 112 113         Results from last 7 days   Lab Units 01/25/22  0521 01/24/22  1424 01/20/22  0502 01/19/22  0537 01/18/22  1326   LACTIC ACID mmol/L  --   --   --   --  2 1*   PROCALCITONIN ng/ml 0 22 0 23 0 60* 0 79*  --        Lines/Drains:  Invasive Devices  Report    Peripheral Intravenous Line            Peripheral IV 01/23/22 Left;Upper;Ventral (anterior) Arm 1 day    Peripheral IV 01/23/22 Right Antecubital 1 day          Drain            Urethral Catheter 18 Fr  3 days    NG/OG/Enteral Tube Nasogastric Right nare 1 day              Urinary Catheter:  Goal for removal: No longer needed  Will place order to discontinue               Imaging:  Reviewed    Recent Cultures (last 7 days):   Results from last 7 days   Lab Units 01/19/22  1145 01/19/22  0206 01/19/22  0133 01/18/22  1721 01/18/22  1126   BLOOD CULTURE   --  No Growth After 5 Days  No Growth After 5 Days  --  No Growth After 5 Days     SPUTUM CULTURE  Few Colonies of   --   --   --   --    GRAM STAIN RESULT  No bacteria seen  2+ Polys  --   --  No Polys or Bacteria seen  --        Last 24 Hours Medication List:   Current Facility-Administered Medications   Medication Dose Route Frequency Provider Last Rate    acetaminophen  650 mg Oral Q4H PRN Tabatha Hatch MD      chlorhexidine  15 mL Mouth/Throat Q12H [address] Roro Barboza MD      doxazosin  1 mg Oral Daily Roro Barboza MD      enoxaparin  40 mg Subcutaneous Q24H Neftali Pride MD      folic acid  1 mg Oral Daily Roro Barboza MD      gabapentin  100 mg Oral HS Tabatha Hatch MD      levETIRAcetam  1,500 mg Intravenous Q12H Neftali Pride MD 1,500 mg (01/25/22 4145)    lidocaine  1 patch Topical Daily Jacquie Diaz MD      OLANZapine  2 5 mg Intramuscular TID PRN Tabatha Hatch MD      pravastatin  80 mg Oral Daily With Nancy Nam MD      thiamine  100 mg Oral Daily Roro Barboza MD      valproate sodium  500 mg Intravenous [facility] Roro Barboza  mg (01/25/22 7609)        Today, Patient Was Seen By: Palmira James DO    **Please Note: This note may have been constructed using a voice recognition system  **

## 2022-01-25 NOTE — RESTORATIVE TECHNICIAN NOTE
Restorative Technician Note      Patient Name: Guero Mota     Note Type: Mobility  Patient Position Upon Consult: Supine  Activity Performed: Dangled; Stood; Transferred  Assistive Device: Other (Comment) (Assist x2 stand-pivot to the chair )  Education Provided: Yes (Educated/encouraged pt to ambulate with assistance 3-4 x's/day )  Patient Position at End of Consult: Bedside chair;  All needs within reach; Bed/Chair alarm activated    Galo CAIN, Restorative Technician, [employer]

## 2022-01-25 NOTE — OCCUPATIONAL THERAPY NOTE
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  Reciprocal Relationships spouse, daughter, May Goldberg (are assisting spouse while pt is in the hospital)   Service to Others retired   Intrinsic Gratification "nothing"   Pain Assessment   Pain Assessment Tool 0-10   Pain Score 5   Pain Location/Orientation Location: Back   ADL   Where Assessed Chair   Grooming Assistance 5  Supervision/Setup   Grooming Deficit Wash/dry face; Wash/dry hands; Increased time to complete;Verbal cueing;Setup   Bed Mobility   Sit to Supine 2  Maximal assistance   Additional items Assist x 2   Additional Comments pt presented sitting out of bed to bedside chair upon ititation of OT session    Transfers   Sit to Stand 2  Maximal assistance   Additional items Assist x 2   Stand to Sit 2  Maximal assistance   Additional items Assist x 2   Stand pivot 2  Maximal assistance   Additional items Assist x 2   Therapeutic Exercise - ROM   UE-ROM Yes   ROM- Right Upper Extremities   R Shoulder AROM;AAROM; Flexion; Extension; External rotation; Internal rotation   R Elbow AROM;AAROM;Elbow flexion;Elbow extension   ROM - Left Upper Extremities    L Shoulder AROM;AAROM; Extension;Flexion   L Elbow AROM;AAROM;Elbow flexion;Elbow extension   Cognition   Overall Cognitive Status Impaired   Arousal/Participation Alert; Responsive   Attention Difficulty attending to directions   Orientation Level Oriented to person;Oriented to time;Oriented to place; Disoriented to situation   Memory Decreased recall of precautions;Decreased recall of recent events;Decreased short term memory;Decreased recall of biographical information;Decreased long term memory   Following Commands Follows one step commands with increased time or repetition   Activity Tolerance   Activity Tolerance Patient limited by pain   Assessment   Assessment Pt seen for Occupational Therapy session with focus on activity tolerance, functional transfers/mob, bed mob,  standing tolerance and balance for pt engagement in Ub/LB self-care tasks and AROM/AAROM and Self-ROM,to pt L UE for decreased swelling and increased function  Pt presented sitting out of bed to bedside chair upon initiation of OT session  Pt daughter present and reported that pt was uncomfortable in chair  Pt instructed and assisted to re-position in chair however was noted to continue to scoot buttocks forward slouching in chair despite encouragement to remain upright  Pt  Required assist x 2 for stand pivot to bedside chair 2* pt decreased overall strength, coordination and balance  Pt will require further instruction on L UE self-ROM 2* pt poor carryover this session  Pt will require post acute rehab service to continue to address pt deficit which currently impair pt ADL and functional mob  Pt return to bed post session, bed alarm activated and all needs within reach     Plan   Treatment Interventions ADL retraining   Goal Expiration Date 02/07/22   OT Treatment Day 1   OT Frequency 3-5x/wk   Recommendation   OT Discharge Recommendation Post acute rehabilitation services   AM-PAC Daily Activity Inpatient   Lower Body Dressing 1   Bathing 2   Toileting 1   Upper Body Dressing 2   Grooming 2   Eating 1   Daily Activity Raw Score 9   Turning Head Towards Sound 3   Follow Simple Instructions 2   Low Function Daily Activity Raw Score 14   Low Function Daily Activity Standardized Score 24 79   AM-PAC Applied Cognition Inpatient   Following a Speech/Presentation 1   Understanding Ordinary Conversation 3   Taking Medications 1   Remembering Where Things Are Placed or Put Away 1 Remembering List of 4-5 Errands 1   Taking Care of Complicated Tasks 1   Applied Cognition Raw Score 8   Applied Cognition Standardized Score 19 32   Barthel Index   Feeding 5   Bathing 0   Grooming Score 5   Dressing Score 5   Bladder Score 10   Bowels Score 10   Toilet Use Score 5   Transfers (Bed/Chair) Score 10   Mobility (Level Surface) Score 10   Stairs Score 0   Barthel Index Score 60     Bonnie Good  PETE/L

## 2022-01-25 NOTE — ASSESSMENT & PLAN NOTE
· Had on admission, suspicion of community-acquired pneumonia  · Rxed with 5 days of Rocephin in ICU  · Fever of 101  1/23    WBC normal, procalcitonin 0 23  · Continue to monitor fever, WBC  · Monitor for aspiration, aspiration precautions

## 2022-01-25 NOTE — PROGRESS NOTES
Progress Note - Neurology   Radha Garcia 80 y o  male [id number]  Unit/Bed#: [facility] 712/[facility] 36-26    Assessment:    * Seizure [facility])  Assessment & Plan  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 XR 1000 mg QHS, CKD, HTN, hyperlipidemia, and s/p AAA repair  Presented to the ED as a stroke alert for right-sided facial droop however had multiple seizures en route to the ED  LKW was 1 day prior  Wife reported patient possibly missing one dose of Keppra  Received 4 mg of Ativan per EMS followed by an additional 2 mg in the ED due to persistent right gaze deviation concerning for ongoing seizure activity  Was intubated for airway protection  EMS reported a fever of 102F however he has been afebrile since admission  Received loading dose of 1500 mg Keppra and 20 mg/kg of valproic acid  LP was completed and was unrevealing  CTH and CTA were also unremarkable  Valproic acid and ammonia levels have been stable  Limited MRI brain was negative for ischemia  Continue to be unclear why patient had multiple refractory seizures, but prevailing theory is from missing his one dose of Keppra XR  Patient had additional seizure activity on 1/19 in the setting of weaning sedation  He has now been seizure free for multiple days with last seizure 1/19 in the afternoon  He self-extubated on 1/22/22 and has made gradual improvement each day since then  On exam today, patient able answer most orientation questions correctly, except for the year  Of note, patient's Keppra dose was being decreased (to 1000 mg HS) by his outpatient Epileptologist as patient had been seizure free x 4 years until this admission  Per discussion with attending neurologist, may continue on home medication regimen at this time due to suspicion of breakthrough seizure in the setting of missed dose of Keppra at home  Additionally, proximal RUE weakness was confirmed by family today to be a new finding   There have been no imaging correlates to explain this weakness  Of note, patient does have history of right rotator cuff tear  Plan:  - Video EEG monitoring was discontinued as of 1/22  - Continue home medication regimen of Keppra 1000 mg HS  - Seizure precautions  - Ativan PRN for seizure activity > 2 minutes  - [employer] form completed and faxed    - PT/OT  - Monitor neuro exam; notify with any changes  - Medical management and supportive care per primary team  Correction of metabolic or infectious disturbances  Results:   - 1/18 CT head: Stable examination with no acute intracranial abnormality identified  - 1/18 CTA head and neck:  1  Stable CT angiography with no flow restrictive stenosis, occlusion or thrombosis of the cervical or intracranial vasculature  2  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  - 1/22 MRI brain limited: No MR evidence of acute ischemia  - 1/24 MRI brain wo contrast: Motion limited incomplete study  No acute infarct, hemorrhage or mass  Right arm weakness  Assessment & Plan  - Proximal right arm weakness noted on exam  Per family, they believe that this is new  Unsure if he has history of prior shoulder injury on the right  Upon further review, patient has a history of right rotator cuff tear  Patient denies any shoulder pain at present  MRI brain negative for acute infarct  Lisa Clipper will need follow up in in 6 weeks with epilepsy attending/AP  He will not require outpatient neurological testing  Case and treatment plan reviewed with attending neurologist, Dr Tonya Collet  Subjective:   Patient resting in bed with bilateral soft wrist restraints in place  He denies headache at this time         Past Medical History:   Diagnosis Date    Colon polyps     Gout     Hyperlipidemia     Hypertension     S/P AAA repair     Seizure disorder ([facility] [address] )     Tear of right rotator cuff 4/4/2017     Past Surgical History:   Procedure Laterality Date    ABDOMINAL AORTIC ANEURYSM REPAIR, ENDOVASCULAR N/A     W[place] COLONOSCOPY FLX DX W/COLLJ SPEC WHEN PFRMD N/A 2017    Procedure: COLONOSCOPY;  Surgeon: Kendrick Ahumada, MD;  Location: [facility]; Service: Colorectal    [place] COLONOSCOPY FLX DX W/COLLJ SPEC WHEN PFRMD N/A 2018    Procedure: COLONOSCOPY;  Surgeon: Kendrick Ahumada, MD;  Location: [facility]; Service: Colorectal    TONSILLECTOMY      WISDOM TOOTH EXTRACTION Bilateral      Family History   Problem Relation Age of Onset    Aneurysm Mother         ABDMONIAL  AORTA    Coronary artery disease Father     Coronary artery disease Brother      Social History     Socioeconomic History    Marital status: /Civil Union     Spouse name: Not on file    Number of children: Not on file    Years of education: Not on file    Highest education level: Not on file   Occupational History    Not on file   Tobacco Use    Smoking status: Former Smoker     Packs/day: 3 00     Years: 10 00     Pack years: 30 00     Quit date: 1971     Years since quittin 9    Smokeless tobacco: Never Used   Vaping Use    Vaping Use: Never used   Substance and Sexual Activity    Alcohol use: Yes     Comment: occasional    Drug use: No    Sexual activity: Not on file   Other Topics Concern    Not on file   Social History Narrative    Not on file     Social Determinants of Health     Financial Resource Strain: Not on file   Food Insecurity: No Food Insecurity    Worried About Running Out of Food in the Last Year: Never true    Mark of Food in the Last Year: Never true   Transportation Needs: No Transportation Needs    Lack of Transportation (Medical): No    Lack of Transportation (Non-Medical):  No   Physical Activity: Not on file   Stress: Not on file   Social Connections: Not on file   Intimate Partner Violence: Not on file   Housing Stability: Low Risk     Unable to Pay for Housing in the Last Year: No    Number of Places Lived in the Last Year: 1    Unstable Housing in the Last Year: No         Medications: All current active meds have been reviewed and current meds:  Scheduled Meds:  Current Facility-Administered Medications   Medication Dose Route Frequency Provider Last Rate    acetaminophen  650 mg Oral Q4H PRN June Hernandez MD      chlorhexidine  15 mL Mouth/Throat Q12H [address] Danae Barton MD      doxazosin  1 mg Oral Daily Danae Barton MD      enoxaparin  40 mg Subcutaneous Q24H Marianna Bernabe MD      folic acid  1 mg Oral Daily Danae Barton MD      gabapentin  100 mg Oral HS June Hernandez MD     Ayad Whiting ON 1/26/2022] levETIRAcetam  1,000 mg Intravenous Daily BRIANA Rios      lidocaine  1 patch Topical Daily Jacquie Diaz MD      OLANZapine  2 5 mg Intramuscular TID PRN June Hernandez MD      pravastatin  80 mg Oral Daily With José Delaney MD      thiamine  100 mg Oral Daily Danae Barton MD       Continuous Infusions:   PRN Meds:   acetaminophen    OLANZapine       ROS:   Review of Systems   Unable to perform ROS: Mental status change         Vitals:   /58   Pulse 80   Temp 100 °F (37 8 °C)   Resp 17   Ht 5' 10" (1 778 m)   Wt 87 kg (191 lb 12 8 oz)   SpO2 94%   BMI 27 52 kg/m²       Physical Exam:   Physical Exam  Vitals and nursing note reviewed  Constitutional:       General: He is not in acute distress  Appearance: He is not ill-appearing  Comments: Bilateral soft wrist restraints in place  HENT:      Head: Normocephalic  Nose:      Comments: Keofed in place     Mouth/Throat:      Mouth: Mucous membranes are moist       Pharynx: Oropharynx is clear  Eyes:      General: No scleral icterus  Right eye: No discharge  Left eye: No discharge        Extraocular Movements: Extraocular movements intact and EOM normal       Conjunctiva/sclera: Conjunctivae normal    Cardiovascular:      Rate and Rhythm: Normal rate    Pulmonary:      Effort: Pulmonary effort is normal  No respiratory distress  Musculoskeletal:         General: Normal range of motion  Skin:     General: Skin is warm and dry  Coloration: Skin is not jaundiced or pale  Neurological:      Mental Status: He is alert  Deep Tendon Reflexes:      Reflex Scores:       Bicep reflexes are 2+ on the right side and 2+ on the left side  Brachioradialis reflexes are 2+ on the right side and 2+ on the left side  Patellar reflexes are 2+ on the right side and 2+ on the left side  Neurologic Exam     Mental Status   Level of consciousness: alert  Awake and alert  Able to state that he is in the hospital (stated he was at [facility]), states the year is 1962, then 2021, state it is January, and Cristino Flores is POTUS  Able to follow simple commands  No dysarthria  Cranial Nerves     CN II   Right visual field deficit: none  Left visual field deficit: none     CN III, IV, VI   Extraocular motions are normal      CN V   Facial sensation intact  CN VII   Facial expression full, symmetric  CN VIII   Hearing: intact    CN IX, X   Palate: symmetric    CN XI   CN XI normal      CN XII   CN XII normal      Motor Exam   Muscle bulk: normal  Left UE strength 5/5 deltoid, biceps, triceps, hand   Right UE strength 4-/5 deltoid, 5/5 biceps, 5/5 triceps, 5/5 hand   Bilateral LE 5/5 hip flexion, dorsiflexion, plantar flexion     Sensory Exam   Light touch normal      Gait, Coordination, and Reflexes     Tremor   Resting tremor: absent  Intention tremor: absent    Reflexes   Right brachioradialis: 2+  Left brachioradialis: 2+  Right biceps: 2+  Left biceps: 2+  Right patellar: 2+  Left patellar: 2+  Right ankle clonus: absent  Left ankle clonus: absent          Labs: I have personally reviewed pertinent reports     Recent Results (from the past 24 hour(s))   Procalcitonin Reflex    Collection Time: 01/25/22  5:21 AM   Result Value Ref Range Procalcitonin 0 22 <=0 25 ng/ml   CBC and differential    Collection Time: 01/25/22  5:21 AM   Result Value Ref Range    WBC 7 42 4 31 - 10 16 Thousand/uL    RBC 3 89 3 88 - 5 62 Million/uL    Hemoglobin 12 4 12 0 - 17 0 g/dL    Hematocrit 39 0 36 5 - 49 3 %     (H) 82 - 98 fL    MCH 31 9 26 8 - 34 3 pg    MCHC 31 8 31 4 - 37 4 g/dL    RDW 12 0 11 6 - 15 1 %    MPV 11 3 8 9 - 12 7 fL    Platelets 831 609 - 634 Thousands/uL    nRBC 0 /100 WBCs    Neutrophils Relative 73 43 - 75 %    Immat GRANS % 1 0 - 2 %    Lymphocytes Relative 10 (L) 14 - 44 %    Monocytes Relative 12 4 - 12 %    Eosinophils Relative 4 0 - 6 %    Basophils Relative 0 0 - 1 %    Neutrophils Absolute 5 45 1 85 - 7 62 Thousands/µL    Immature Grans Absolute 0 04 0 00 - 0 20 Thousand/uL    Lymphocytes Absolute 0 71 0 60 - 4 47 Thousands/µL    Monocytes Absolute 0 88 0 17 - 1 22 Thousand/µL    Eosinophils Absolute 0 33 0 00 - 0 61 Thousand/µL    Basophils Absolute 0 01 0 00 - 0 10 Thousands/µL   Basic metabolic panel    Collection Time: 01/25/22  5:21 AM   Result Value Ref Range    Sodium 144 136 - 145 mmol/L    Potassium 4 1 3 5 - 5 3 mmol/L    Chloride 110 (H) 100 - 108 mmol/L    CO2 30 21 - 32 mmol/L    ANION GAP 4 4 - 13 mmol/L    BUN 17 5 - 25 mg/dL    Creatinine 0 82 0 60 - 1 30 mg/dL    Glucose 109 65 - 140 mg/dL    Calcium 8 9 8 3 - 10 1 mg/dL    eGFR 84 ml/min/1 73sq m       Imaging: I have personally reviewed pertinent imaging in PACS, and I have personally reviewed PACS reports  EKG, Pathology, and Other Studies: I have personally reviewed pertinent reports  VTE Prophylaxis: Sequential compression device (Venodyne)  and Enoxaparin (Lovenox)        Total time spent today 28 minutes  Greater than 50% of total time was spent with the patient and / or family counseling and / or coordination of care  A description of the counseling / coordination of care: Patient seen and evaluated  Case reviewed with attending   Chart thoroughly reviewed including imaging and labs  Coordination of care with RN  Discussion of imaging, medications, and follow up with patient

## 2022-01-25 NOTE — ASSESSMENT & PLAN NOTE
· Currently has keofed, continue tube feeding  · Has not been doing well in speech evaluation  · VBS to be performed today

## 2022-01-26 PROCEDURE — 92526 ORAL FUNCTION THERAPY: CPT

## 2022-01-26 PROCEDURE — 97535 SELF CARE MNGMENT TRAINING: CPT

## 2022-01-26 PROCEDURE — 97530 THERAPEUTIC ACTIVITIES: CPT

## 2022-01-26 PROCEDURE — 99233 SBSQ HOSP IP/OBS HIGH 50: CPT | Performed by: INTERNAL MEDICINE

## 2022-01-26 PROCEDURE — NC001 PR NO CHARGE: Performed by: PHYSICIAN ASSISTANT

## 2022-01-26 PROCEDURE — 97110 THERAPEUTIC EXERCISES: CPT

## 2022-01-26 PROCEDURE — 97116 GAIT TRAINING THERAPY: CPT

## 2022-01-26 PROCEDURE — 99233 SBSQ HOSP IP/OBS HIGH 50: CPT | Performed by: PSYCHIATRY & NEUROLOGY

## 2022-01-26 RX ORDER — OXYCODONE HYDROCHLORIDE 5 MG/1
2.5 TABLET ORAL EVERY 4 HOURS PRN
Status: DISCONTINUED | OUTPATIENT
Start: 2022-01-26 | End: 2022-02-01 | Stop reason: HOSPADM

## 2022-01-26 RX ORDER — NYSTATIN 100000 [USP'U]/G
POWDER TOPICAL 2 TIMES DAILY
Status: DISCONTINUED | OUTPATIENT
Start: 2022-01-26 | End: 2022-02-01 | Stop reason: HOSPADM

## 2022-01-26 RX ORDER — LIDOCAINE 50 MG/G
1 PATCH TOPICAL DAILY
Status: DISCONTINUED | OUTPATIENT
Start: 2022-01-26 | End: 2022-02-01 | Stop reason: HOSPADM

## 2022-01-26 RX ORDER — LOPERAMIDE HYDROCHLORIDE 2 MG/1
4 CAPSULE ORAL 4 TIMES DAILY PRN
Status: DISCONTINUED | OUTPATIENT
Start: 2022-01-26 | End: 2022-02-01 | Stop reason: HOSPADM

## 2022-01-26 RX ADMIN — NYSTATIN: 100000 POWDER TOPICAL at 17:48

## 2022-01-26 RX ADMIN — CHLORHEXIDINE GLUCONATE 15 ML: 1.2 SOLUTION ORAL at 09:06

## 2022-01-26 RX ADMIN — LOPERAMIDE HYDROCHLORIDE 4 MG: 2 CAPSULE ORAL at 12:51

## 2022-01-26 RX ADMIN — FOLIC ACID 1 MG: 1 TABLET ORAL at 09:06

## 2022-01-26 RX ADMIN — ACETAMINOPHEN 975 MG: 325 TABLET, FILM COATED ORAL at 06:08

## 2022-01-26 RX ADMIN — ACETAMINOPHEN 975 MG: 325 TABLET, FILM COATED ORAL at 12:50

## 2022-01-26 RX ADMIN — LEVETIRACETAM 1000 MG: 100 INJECTION, SOLUTION INTRAVENOUS at 09:06

## 2022-01-26 RX ADMIN — THIAMINE HCL TAB 100 MG 100 MG: 100 TAB at 09:06

## 2022-01-26 RX ADMIN — GABAPENTIN 100 MG: 100 CAPSULE ORAL at 22:12

## 2022-01-26 RX ADMIN — PRAVASTATIN SODIUM 80 MG: 80 TABLET ORAL at 17:18

## 2022-01-26 RX ADMIN — LEVETIRACETAM 1250 MG: 750 TABLET, FILM COATED ORAL at 22:12

## 2022-01-26 RX ADMIN — ENOXAPARIN SODIUM 40 MG: 40 INJECTION SUBCUTANEOUS at 09:06

## 2022-01-26 RX ADMIN — LIDOCAINE 5% 1 PATCH: 700 PATCH TOPICAL at 12:51

## 2022-01-26 RX ADMIN — NYSTATIN: 100000 POWDER TOPICAL at 12:50

## 2022-01-26 RX ADMIN — DOXAZOSIN 1 MG: 1 TABLET ORAL at 09:06

## 2022-01-26 RX ADMIN — ACETAMINOPHEN 975 MG: 325 TABLET, FILM COATED ORAL at 22:12

## 2022-01-26 NOTE — ASSESSMENT & PLAN NOTE
- Fluctuating temps throughout at admission  - Had temp of 101 3 1/25 overnight   Most recent temp 98 3 this AM  - Suspected CAP  - Received antibiotics; currently off antibiotics  - Initial WBC 15 62; improved with most recent level 7 42  - Initial lactic acid level 6 6; most recent level 2 1  - Blood cultures negative  - Medical management per primary team

## 2022-01-26 NOTE — PHYSICAL THERAPY NOTE
PHYSICAL THERAPY NOTE    Patient Name: Mike RG Date: 1/26/2022 01/26/22 1012   PT Last Visit   PT Visit Date 01/26/22   Note Type   Note Type Treatment   Pain Assessment   Pain Assessment Tool 0-10   Pain Score No Pain   Restrictions/Precautions   Weight Bearing Precautions Per Order No   Other Precautions Cognitive; Chair Alarm; Bed Alarm;Multiple lines; Fall Risk   General   Chart Reviewed Yes   Response to Previous Treatment Patient with no complaints from previous session  Family/Caregiver Present No   Cognition   Overall Cognitive Status Impaired   Arousal/Participation Alert; Responsive; Cooperative   Attention Attends with cues to redirect   Orientation Level Oriented to person;Oriented to place   Following Commands Follows one step commands with increased time or repetition   Comments Pt very pleasant and cooperative  Pt requires VCs for safety awareness throughout   Subjective   Subjective Pt seated OOB and agreeable to PT session   Bed Mobility   Supine to Sit Unable to assess   Sit to Supine Unable to assess   Additional Comments Pt seated OOB upon PT arrival  Pt returned seated OOB at end of session w/ all needs within reach and chair alarm activated   Transfers   Sit to Stand 4  Minimal assistance   Additional items Assist x 1; Increased time required;Verbal cues   Stand to Sit 4  Minimal assistance   Additional items Assist x 1; Increased time required;Verbal cues   Additional Comments Transfers w/ RW   Ambulation/Elevation   Gait pattern Excessively slow; Short stride; Inconsistent rachel;Decreased foot clearance;Shuffling; Foward flexed   Gait Assistance 4  Minimal assist   Additional items Assist x 1;Verbal cues  (+ SBA of 2nd for lines and chair follow)   Assistive Device Rolling walker   Distance 40ft + 60ft   Balance   Static Sitting Fair   Dynamic Sitting Fair -   Static Standing Poor +   Dynamic Standing Poor +   Ambulatory Poor +   Endurance Deficit   Endurance Deficit Yes   Endurance Deficit Description weakness, fatigue   Activity Tolerance   Activity Tolerance Patient limited by fatigue   Medical Staff Made Aware OT   Nurse Made Aware yes   Exercises   Knee AROM Long Arc Quad Sitting;Bilateral;20 reps   Marching Sitting;Bilateral;20 reps   Assessment   Prognosis Good   Problem List Decreased strength;Decreased endurance; Impaired balance;Decreased mobility; Decreased cognition;Decreased safety awareness   Assessment Pt presents with decreased mobility, strength, balance, and activity tolerance  Pt seen for PT session focusing on transfers, standing tolerance/balance, mobility, and LE strengthening  Pt w/ significant progression w/ therapy progressing from Max Ax2 to [facility] for STS transfers  Pt able to initiate gait this session and ambulate household distances  Pt does tend to have increased anterior lean throughout mobility and requires cues for upright posture  Pt able to tolerate increased length session this time and demonstrates improved function of RLE  Pt continues to demonstrate progression w/ therapy but remains below functional baseline  Pt continues to benefit from skilled therapy to maximize functional independence  Recommendation at this time is rehab  Pt would benefit from continued ambulation, functional transfers, strength, balance, and activity tolerance   Goals   Patient Goals to get better   LTG Expiration Date 02/07/22   PT Treatment Day 1   Plan   Treatment/Interventions Functional transfer training;LE strengthening/ROM; Therapeutic exercise; Endurance training;Patient/family training;Equipment eval/education;Gait training;Spoke to nursing   Progress Progressing toward goals   PT Frequency 3-5x/wk   Recommendation   PT Discharge Recommendation Post acute rehabilitation services  (PMR)   AM-PAC Basic Mobility Inpatient   Turning in Bed Without Bedrails 3   Lying on Back to Sitting on Edge of Flat Bed 3   Moving Bed to Chair 3   Standing Up From Chair 3   Walk in Room 3   Climb 3-5 Stairs 2   Basic Mobility Inpatient Raw Score 17   Basic Mobility Standardized Score 39 67   Turning Head Towards Sound 4   Follow Simple Instructions 3   Low Function Basic Mobility Raw Score 24   Low Function Basic Mobility Standardized Score 38 53   Highest Level Of Mobility   JH-HLM Goal 5: Stand one or more mins   JH-HLM Highest Level of Mobility 7: Walk 25 feet or more   JH-HLM Goal Achieved Yes     The patient's AM-PAC Basic Mobility Inpatient Short Form Raw Score is 17  A Raw score of greater than 16 suggests the patient may benefit from discharge to home  Please also refer to the recommendation of the Physical Therapist for safe discharge planning    Jean Ordaz, PT, DPT

## 2022-01-26 NOTE — ASSESSMENT & PLAN NOTE
· Had on admission, suspicion of community-acquired pneumonia  · Rxed with 5 days of Rocephin in ICU  · Fever of 101 1/23    WBC normal, procalcitonin 0 23  · Chest x-ray with atelectasis  · Negative COVID/flu/RSV  · Likely secondary to atelectasis  · Continue to monitor fever, WBC  · Monitor for aspiration, aspiration precautions  · Out of bed, incentive spirometry  · PT OT

## 2022-01-26 NOTE — ASSESSMENT & PLAN NOTE
· Continues to have off and on agitation, on examination does seem to have right upper and lower extremity mild weakness  · Brain MRI x2 without acute stroke  · Has been seizure-free when taken off video EEG, off restrains  · gabapentin 100 mg at bedtime was added  · Improving

## 2022-01-26 NOTE — PLAN OF CARE
Problem: OCCUPATIONAL THERAPY ADULT  Goal: Performs self-care activities at highest level of function for planned discharge setting  See evaluation for individualized goals  Description: Treatment Interventions: ADL retraining,Functional transfer training,UE strengthening/ROM,Endurance training,Patient/family training,Equipment evaluation/education,Cognitive reorientation,Compensatory technique education,Energy conservation,Activityengagement          See flowsheet documentation for full assessment, interventions and recommendations  Outcome: Progressing  Note: Limitation: Decreased ADL status,Decreased endurance,Decreased self-care trans,Decreased high-level ADLs,Decreased fine motor control,Visual deficit,Decreased cognition,Decreased Safe judgement during ADL,Decreased UE strength  Prognosis: Fair  Assessment: Patient participated in Skilled OT session this date with interventions consisting of self care tasks, functional transfers/mobility cognitive assessment   Patient agreeable to OT treatment session, upon arrival patient was found seated OOB to Chair  In comparison to previous session, patient with improvements in functional transfers/mobility  Patient requiring verbal cues for safety, verbal cues for correct technique, one step directives and frequent rest periods  Patient continues to be functioning below baseline level, occupational performance remains limited secondary to factors listed above and increased risk for falls and injury  The patient's raw score on the AM-PAC Daily Activity inpatient short form is 12, standardized score is 30 6, less than 39 4  Patients at this level are likely to benefit from discharge to post-acute rehabilitation services  Please refer to the recommendation of the Occupational Therapist for safe discharge planning  From OT standpoint, recommendation at time of d/c would be Short Term Rehab     Patient to benefit from continued Occupational Therapy treatment while in the hospital to address deficits as defined above and maximize level of functional independence with ADLs and functional mobility  OT Discharge Recommendation: Post acute rehabilitation services  OT - OK to Discharge:  Yes

## 2022-01-26 NOTE — PLAN OF CARE
Problem: PHYSICAL THERAPY ADULT  Goal: Performs mobility at highest level of function for planned discharge setting  See evaluation for individualized goals  Description: Treatment/Interventions: Functional transfer training,LE strengthening/ROM,Therapeutic exercise,Endurance training,Patient/family training,Equipment eval/education,Bed mobility,Gait training,OT,Spoke to nursing  Equipment Recommended:  (will cont to assess)       See flowsheet documentation for full assessment, interventions and recommendations  Outcome: Progressing  Note: Prognosis: Good  Problem List: Decreased strength,Decreased endurance,Impaired balance,Decreased mobility,Decreased cognition,Decreased safety awareness  Assessment: Pt presents with decreased mobility, strength, balance, and activity tolerance  Pt seen for PT session focusing on transfers, standing tolerance/balance, mobility, and LE strengthening  Pt w/ significant progression w/ therapy progressing from Max Ax2 to [facility] for STS transfers  Pt able to initiate gait this session and ambulate household distances  Pt does tend to have increased anterior lean throughout mobility and requires cues for upright posture  Pt able to tolerate increased length session this time and demonstrates improved function of RLE  Pt continues to demonstrate progression w/ therapy but remains below functional baseline  Pt continues to benefit from skilled therapy to maximize functional independence  Recommendation at this time is rehab  Pt would benefit from continued ambulation, functional transfers, strength, balance, and activity tolerance           PT Discharge Recommendation: (S) Post acute rehabilitation services (PMR)          See flowsheet documentation for full assessment

## 2022-01-26 NOTE — PROGRESS NOTES
[facility]  Progress Note Carlos Moreira 1940, 80 y o  male MRN: [id number]  Unit/Bed#: [facility] 712-01 Encounter: [id number]  Primary Care Provider: Emma Mariscal MD   Date and time admitted to hospital: 1/18/2022 11:46 PM    * Seizure [facility])  Assessment & Plan  · Presented with right facial droop, f/b mutliple seizures  · Was a stroke alert  · MRI - limited study was negative  · Was intubated placed on VEEG in ICU -> received keppra, depacon  · Now Sz free, last Sz on 1/19  · Cont keppra 1500 mg BID, depacon 500 mg q 8 hr  · Etiology for Sz unclear - ? Missed 1 dose at home  keppra was decreased from 1500 mg to 1000 mg 6 months ago in July 2021  · Repeat brain MRI without acute abnormality  · Neurology is following    Encephalopathy  Assessment & Plan  · Continues to have off and on agitation, on examination does seem to have right upper and lower extremity mild weakness  · Brain MRI x2 without acute stroke  · Has been seizure-free when taken off video EEG, off restrains  · gabapentin 100 mg at bedtime was added  · Improving    Fever  Assessment & Plan  · Had on admission, suspicion of community-acquired pneumonia  · Rxed with 5 days of Rocephin in ICU  · Fever of 101 1/23  WBC normal, procalcitonin 0 23  · Chest x-ray with atelectasis  · Negative COVID/flu/RSV  · Likely secondary to atelectasis  · Continue to monitor fever, WBC  · Monitor for aspiration, aspiration precautions  · Out of bed, incentive spirometry  · PT OT    Dysphagia  Assessment & Plan  · NG tube was removed  · Started on dysphagia diet level 1  · Speech is following    Urinary retention  Assessment & Plan  · Looks like he had a Franklin inserted on 01/18, removed 1/21, reinserted 1/22 midnight    Suspect due to retention  · On doxazosin  · Franklin catheter was removed on 01/25    Aneurysm of abdominal aorta [facility])  Assessment & Plan  S/p EVAR      VTE Pharmacologic Prophylaxis: VTE Score: 13 High Risk (Score >/= 5) - Pharmacological DVT Prophylaxis Ordered: enoxaparin (Lovenox)  Sequential Compression Devices Ordered  Patient Centered Rounds: I performed bedside rounds with nursing staff today  Discussions with Specialists or Other Care Team Provider:     Education and Discussions with Family / Patient: Updated  (daughter) via phone  Time Spent for Care: 45 minutes  More than 50% of total time spent on counseling and coordination of care as described above  Current Length of Stay: 8 day(s)  Current Patient Status: Inpatient   Certification Statement: The patient will continue to require additional inpatient hospital stay due to Above  Discharge Plan: Anticipate discharge tomorrow to rehab facility  Code Status: Level 1 - Full Code    Subjective:   Patient seen and  examined  Comfortable in bed  Low back pain reported by his nurse  Tolerating oral diet  No nausea vomiting or diarrhea    Objective:     Vitals:   Temp (24hrs), Av 8 °F (37 7 °C), Min:98 3 °F (36 8 °C), Max:101 3 °F (38 5 °C)    Temp:  [98 3 °F (36 8 °C)-101 3 °F (38 5 °C)] 98 3 °F (36 8 °C)  HR:  [80-94] 81  Resp:  [16] 16  BP: (126-137)/(58-76) 137/67  SpO2:  [94 %-98 %] 96 %  Body mass index is 27 52 kg/m²  Input and Output Summary (last 24 hours):      Intake/Output Summary (Last 24 hours) at 2022 09  Last data filed at 2022 0900  Gross per 24 hour   Intake 300 ml   Output --   Net 300 ml       Physical Exam:   Physical Exam   Patient is awake alert in no acute distress  Ill-appearing  Lung with decreased breath sounds bilateral at the bases  Heart positive S1 S2 no murmur  Abdomen soft nontender  Lower extremities no edema    Additional Data:     Labs:  Results from last 7 days   Lab Units 22  0521   WBC Thousand/uL 7 42   HEMOGLOBIN g/dL 12 4   HEMATOCRIT % 39 0   PLATELETS Thousands/uL 151   NEUTROS PCT % 73   LYMPHS PCT % 10*   MONOS PCT % 12   EOS PCT % 4     Results from last 7 days   Lab Units 01/25/22  0521 01/23/22  0818 01/22/22  0422   SODIUM mmol/L 144   < > 146*   POTASSIUM mmol/L 4 1   < > 4 1   CHLORIDE mmol/L 110*   < > 114*   CO2 mmol/L 30   < > 31   BUN mg/dL 17   < > 14   CREATININE mg/dL 0 82   < > 0 78   ANION GAP mmol/L 4   < > 1*   CALCIUM mg/dL 8 9   < > 8 3   ALBUMIN g/dL  --   --  2 2*   TOTAL BILIRUBIN mg/dL  --   --  0 64   ALK PHOS U/L  --   --  57   ALT U/L  --   --  24   AST U/L  --   --  37   GLUCOSE RANDOM mg/dL 109   < > 112    < > = values in this interval not displayed           Results from last 7 days   Lab Units 01/24/22  1327 01/24/22  0754 01/23/22  2059 01/23/22  1802 01/23/22  1118 01/23/22  0402 01/23/22  0122 01/23/22  0044 01/23/22  0022 01/22/22  1729 01/22/22  1145 01/22/22  0555   POC GLUCOSE mg/dl 115 109 105 89 86 85 96 68 68 81 112 113         Results from last 7 days   Lab Units 01/25/22  0521 01/24/22  1424 01/20/22  0502   PROCALCITONIN ng/ml 0 22 0 23 0 60*       Lines/Drains:  Invasive Devices  Report    Peripheral Intravenous Line            Peripheral IV 01/26/22 Right Wrist <1 day                      Imaging:  Reviewed    Recent Cultures (last 7 days):   Results from last 7 days   Lab Units 01/19/22  1145   SPUTUM CULTURE  Few Colonies of    GRAM STAIN RESULT  No bacteria seen  2+ Polys       Last 24 Hours Medication List:   Current Facility-Administered Medications   Medication Dose Route Frequency Provider Last Rate    acetaminophen  975 mg Oral [facility] Jessica Graham DO      chlorhexidine  15 mL Mouth/Throat Q12H [address] Terryl Mcburney, MD      doxazosin  1 mg Oral Daily Terryl Mcburney, MD      enoxaparin  40 mg Subcutaneous Q24H [address]  Damon Fuentes MD      folic acid  1 mg Oral Daily Terryl Mcburney, MD      gabapentin  100 mg Oral HS Cheri Raman MD      levETIRAcetam  1,000 mg Intravenous Daily An Virgil, CRNP 1,000 mg (01/26/22 0906)    lidocaine  1 patch Topical Daily Jessica Graham DO      loperamide  4 mg Oral 4x Daily PRN Richelle Martinez DO      nystatin   Topical BID Richelle Martinez DO      OLANZapine  2 5 mg Intramuscular TID PRN Queen Poncho MD      oxyCODONE  2 5 mg Oral Q4H PRN Richelle Martinez DO      pravastatin  80 mg Oral Daily With Mayco Gonzales MD      thiamine  100 mg Oral Daily Mikal Napier MD          Today, Patient Was Seen By: Richelle Martinez DO    **Please Note: This note may have been constructed using a voice recognition system  **

## 2022-01-26 NOTE — PROGRESS NOTES
Progress Note - Neurology   Alexis Estrella 80 y o  male [id number]  Unit/Bed#: [facility] 712/[facility] 36-26    Assessment:    * Seizure [facility])  Assessment & Plan  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 XR 1000 mg QHS, CKD, HTN, hyperlipidemia, and s/p AAA repair  Presented to the ED as a stroke alert for right-sided facial droop however had multiple seizures at home, witnessed by daughter, and en route to the ED via EMS  LKW was 1 day prior  Daughter reported patient possibly missing one dose of Keppra and in speaking with patient, he reports he may have missed a dose but is unsure  Received 4 mg of Ativan per EMS followed by an additional 2 mg in the ED due to persistent right gaze deviation concerning for ongoing seizure activity  Was intubated for airway protection  EMS reported a fever of 102F  Received loading dose of 1500 mg Keppra and 20 mg/kg of valproic acid  LP was completed; see CSF results below  CTH and CTA were also unremarkable  Limited MRI brain was negative for ischemia  Patient had additional seizure activity noted on EEG on 1/19  He has now been seizure free for multiple days with last seizure 1/19 in the afternoon  He self-extubated on 1/22/22 and has made gradual improvement each day since then  On exam today, patient able answer orientation questions appropriately and follow commands  Of note, patient's Keppra dose was being decreased (to 1000 mg HS) by his outpatient Epileptologist as patient had been seizure free x 4 years until this admission  Keppra level on presentation therapeutic, no other prior levels for comparison  Lower suspicion of breakthrough seizure due to missed dose as level on presentation was therapeutic  Etiology possibly in the setting of infection/CAP      Plan:  - Video EEG monitoring was discontinued as of 1/22  - Increase Keppra to 1250 mg HS  - Seizure precautions  - Ativan PRN for seizure activity > 2 minutes  - [employer] form completed and faxed on 1/25/22  Patient aware that he should stop driving at this time   - PT/OT  - Monitor neuro exam; notify with any changes  - Medical management and supportive care per primary team  Correction of metabolic or infectious disturbances  Results:   - 1/18 CT head: Stable examination with no acute intracranial abnormality identified  - 1/18 CTA head and neck:  1  Stable CT angiography with no flow restrictive stenosis, occlusion or thrombosis of the cervical or intracranial vasculature  2  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  - 1/22 MRI brain limited: No MR evidence of acute ischemia  - 1/24 MRI brain wo contrast: Motion limited incomplete study  No acute infarct, hemorrhage or mass  - Video EEG monitoring:  · 1/18-1/19: This abnormal study captured 11 subclinical focal seizures from the right frontal region  There were also findings to suggest high seizure tendency and focal non-specific cerebral dysfunction of the right frontal region  The last seizure was seen at 1448  After the escalation of IV sedation the background transitioned to a severe generalized non-specific encephalopathy  · 1/19-1/20: This abnormal study captured seizure tendency from the right frontal region and a severe generalized non-specific encephalopathy  No electrographic seizures were seen  No diagnostic clinical events were captured  Compared to the previous day of recording there were no seizures, BIRDS, and the prevalence of epileptiform discharges are less  · 1/20-1/21: This abnormal study is consistent with a severe encephalopathy and focal left temporal dysfunction  The degree of encephalopathy during recording improved to a moderate degree  No electrographic seizures or interictal epileptiform discharges were seen  No diagnostic clinical events were captured   Compared to the previous day of recording the right epileptiform discharges resolved and the degree of encephalopathy improved  There was also newly appreciated left temporal dysfunction seen on this recording  · 1/21-1/22: This abnormal study is consistent with a moderate generalized non-specific encephalopathy and focal left temporal cerebral dysfunction  No electrographic seizures or interictal epileptiform discharges were seen  No diagnostic clinical events were captured  - CSF results: RBC tube 1- 388, tube 4- 7, glucose 81, protein 73, gram stain negative, culture and gram stain negative, ME panel negative, WBC 1    Right arm weakness  Assessment & Plan  - Proximal right arm weakness noted on exam  Per family, they believe that this is new  Unsure if he has history of prior shoulder injury on the right  Upon further review, patient has a history of right rotator cuff tear  Patient denies any shoulder pain at present and reports he has chronic slight right UE weakness at baseline    - MRI brain negative for acute infarct    - PT/OT    Fever  Assessment & Plan  - Fluctuating temps throughout at admission  - Had temp of 101 3 1/25 overnight  Most recent temp 98 3 this AM  - Suspected CAP  - Received antibiotics; currently off antibiotics  - Initial WBC 15 62; improved with most recent level 7 42  - Initial lactic acid level 6 6; most recent level 2 1  - Blood cultures negative  - Medical management per primary team       Kelli Elizabeth will need follow up in in 6 weeks with epilepsy attending/AP  He will not require outpatient neurological testing  Case and treatment plan reviewed with attending neurologist, Dr Alan Nicholas  Subjective:   Patient sitting in recliner  He denies pain at this time  He reports that he manages both his and his wife's medications at home  He thinks he might have missed a dose of Keppra on the 16th but is unsure  He states he takes 2 pills of Keppra every day and follows with Dr Centeno Files outpatient   He does reports slight chronic right UE weakness due to history of rotator cuff tear  Past Medical History:   Diagnosis Date    Colon polyps     Gout     Hyperlipidemia     Hypertension     S/P AAA repair     [facility] ([facility] [address] )     Tear of right rotator cuff 2017     Past Surgical History:   Procedure Laterality Date    ABDOMINAL AORTIC ANEURYSM REPAIR, ENDOVASCULAR N/A     CO COLONOSCOPY FLX DX W/COLLJ SPEC WHEN PFRMD N/A 2017    Procedure: COLONOSCOPY;  Surgeon: Mc Allen MD;  Location: [facility]; Service: Colorectal    CO COLONOSCOPY FLX DX W/COLLJ SPEC WHEN PFRMD N/A 2018    Procedure: COLONOSCOPY;  Surgeon: Mc Allen MD;  Location: [facility]; Service: Colorectal    TONSILLECTOMY      WISDOM TOOTH EXTRACTION Bilateral      Family History   Problem Relation Age of Onset    Aneurysm Mother         ABDMONIAL  AORTA    Coronary artery disease Father     Coronary artery disease Brother      Social History     Socioeconomic History    Marital status: /Civil Union     Spouse name: Not on file    Number of children: Not on file    Years of education: Not on file    Highest education level: Not on file   Occupational History    Not on file   Tobacco Use    Smoking status: Former Smoker     Packs/day: 3 00     Years: 10 00     Pack years: 30 00     Quit date: 1971     Years since quittin 9    Smokeless tobacco: Never Used   Vaping Use    Vaping Use: Never used   Substance and Sexual Activity    Alcohol use: Yes     Comment: occasional    Drug use: No    Sexual activity: Not on file   Other Topics Concern    Not on file   Social History Narrative    Not on file     Social Determinants of Health     Financial Resource Strain: Not on file   Food Insecurity: No Food Insecurity    Worried About Running Out of Food in the Last Year: Never true    Mark of Food in the Last Year: Never true   Transportation Needs: No Transportation Needs    Lack of Transportation (Medical):  No    Lack of Transportation (Non-Medical): No   Physical Activity: Not on file   Stress: Not on file   Social Connections: Not on file   Intimate Partner Violence: Not on file   Housing Stability: Low Risk     Unable to Pay for Housing in the Last Year: No    Number of Places Lived in the Last Year: 1    Unstable Housing in the Last Year: No         Medications: All current active meds have been reviewed and current meds:  Scheduled Meds:  Current Facility-Administered Medications   Medication Dose Route Frequency Provider Last Rate    acetaminophen  975 mg Oral [facility] [address], DO      chlorhexidine  15 mL Mouth/Throat Q12H [address] Indira Reyna MD      doxazosin  1 mg Oral Daily Indira Reyna MD      enoxaparin  40 mg Subcutaneous Q24H Nika Goncalves MD      folic acid  1 mg Oral Daily Indira Reyna MD      gabapentin  100 mg Oral HS Rylee Reynolds MD      levETIRAcetam  1,250 mg Oral HS BRIANA Rios      lidocaine  1 patch Topical Daily [address], DO      loperamide  4 mg Oral 4x Daily PRN [address], DO      nystatin   Topical BID [address], DO      OLANZapine  2 5 mg Intramuscular TID PRN Rylee Reynolds MD      oxyCODONE  2 5 mg Oral Q4H PRN [address], DO      pravastatin  80 mg Oral Daily With Sundar Morris MD      thiamine  100 mg Oral Daily Indira Reyna MD       Continuous Infusions:   PRN Meds:  loperamide    OLANZapine    oxyCODONE       ROS:   Review of Systems   Constitutional: Negative for fever  HENT: Negative for trouble swallowing  Eyes: Negative for photophobia and visual disturbance  Respiratory: Negative for shortness of breath  Cardiovascular: Negative for chest pain  Gastrointestinal: Negative for abdominal pain  Musculoskeletal: Negative for arthralgias, back pain, myalgias and neck pain  Skin: Negative for rash  Neurological: Positive for weakness (chronic right UE arm)   Negative for dizziness, tremors, seizures, syncope, facial asymmetry, speech difficulty, light-headedness, numbness and headaches  Psychiatric/Behavioral: Negative for confusion  All other systems reviewed and are negative  Vitals:   /66   Pulse 93   Temp 99 °F (37 2 °C)   Resp 16   Ht 5' 10" (1 778 m)   Wt 87 kg (191 lb 12 8 oz)   SpO2 99%   BMI 27 52 kg/m²       Physical Exam:   Physical Exam  Vitals and nursing note reviewed  Constitutional:       General: He is not in acute distress  Appearance: Normal appearance  He is not ill-appearing  HENT:      Head: Normocephalic  Mouth/Throat:      Mouth: Mucous membranes are moist       Pharynx: Oropharynx is clear  Eyes:      General: No scleral icterus  Right eye: No discharge  Left eye: No discharge  Extraocular Movements: Extraocular movements intact and EOM normal       Conjunctiva/sclera: Conjunctivae normal    Cardiovascular:      Rate and Rhythm: Normal rate  Pulmonary:      Effort: Pulmonary effort is normal  No respiratory distress  Musculoskeletal:         General: Normal range of motion  Cervical back: Normal range of motion  Skin:     General: Skin is warm and dry  Coloration: Skin is not jaundiced or pale  Neurological:      Mental Status: He is alert and oriented to person, place, and time  Psychiatric:         Mood and Affect: Mood normal          Behavior: Behavior normal        Neurologic Exam     Mental Status   Oriented to person, place, and time  Level of consciousness: alert  Able to follow simple and complex commands appropriately  Able to state he is at a hospital in [place], it is January 26th, 2022  No dysarthria noted  Cranial Nerves     CN II   Right visual field deficit: none  Left visual field deficit: none     CN III, IV, VI   Extraocular motions are normal      CN V   Facial sensation intact  CN VII   Facial expression full, symmetric       CN VIII   Hearing: intact    CN IX, X Palate: symmetric    CN XI   CN XI normal      CN XII   CN XII normal      Motor Exam   Muscle bulk: normal  Right UE strength 5-/5 deltoid, 5/5 biceps, 5/5 triceps, 5/5 hand   Left UE strength 5/5 deltoid, biceps, triceps, hand   Bilateral LE strength 5/5 hip flexion, dorsiflexion, plantar flexion     Sensory Exam   Light touch normal      Gait, Coordination, and Reflexes     Tremor   Resting tremor: absent  Intention tremor: absent          Labs: I have personally reviewed pertinent reports  Recent Results (from the past 24 hour(s))   COVID/FLU/RSV    Collection Time: 01/25/22 10:23 PM    Specimen: Nasopharyngeal Swab   Result Value Ref Range    SARS-CoV-2 Negative Negative    INFLUENZA A PCR Negative Negative    INFLUENZA B PCR Negative Negative    RSV PCR Negative Negative       Imaging: I have personally reviewed pertinent imaging in PACS, and I have personally reviewed PACS reports  EKG, Pathology, and Other Studies: I have personally reviewed pertinent reports  VTE Prophylaxis: Sequential compression device (Venodyne)  and Enoxaparin (Lovenox)        Total time spent today 27 minutes  Greater than 50% of total time was spent with the patient and / or family counseling and / or coordination of care  A description of the counseling / coordination of care: Patient seen and evaluated  Case reviewed with attending  Chart thoroughly reviewed including labs and medications  Coordination of care with RN  Discussion of medications and follow up with patient

## 2022-01-26 NOTE — SPEECH THERAPY NOTE
Speech/Language Pathology Progress Note    Patient Name: Sarah Fabian  [id number] Date: 1/26/2022    Subjective:  Pt awake and alert, confused but able to be redirected  Objective:  Pt seen for dysphagia therapy  Current diet is puree with NTL  RN reported pt did very well with breakfast  NGT has been removed  Pt was offered a trial of thin apple juice and antoine cracker in pudding  Mastication appeared complete, however cough was noted after swallow  Delayed cough also noted with NTLs  Pt was then given pudding and NTL juice (perscribed diet) and tolerated all trials without s/s aspiration  Assessment:  Pt is tolerating current diet of puree and NTL, not yet appropriate for diet upgrade  Plan/Recommendations:  Continue puree diet with NTL  ST will continue to follow

## 2022-01-26 NOTE — RESTORATIVE TECHNICIAN NOTE
Restorative Technician Note      Patient Name: Damian Bess     Note Type: Mobility  Patient Position Upon Consult: Supine  Activity Performed: Ambulated; QOLHHXV; Stood  Assistive Device: Roller walker; Other (Comment) (Assist x2)  Education Provided: Yes (Educated/encouraged pt to ambulate with assistance 3-4 x's/day )  Patient Position at End of Consult: Bedside chair;  All needs within reach; Bed/Chair alarm activated    Altagracia CAIN, Restorative Technician, [employer]

## 2022-01-26 NOTE — ASSESSMENT & PLAN NOTE
· Looks like he had a Franklin inserted on 01/18, removed 1/21, reinserted 1/22 midnight    Suspect due to retention  · On doxazosin  · Franklin catheter was removed on 01/25

## 2022-01-26 NOTE — OCCUPATIONAL THERAPY NOTE
Occupational Therapy Progress Note     Patient Name: Kelli Elizabeth  UQBKV'[id number] Date: 1/26/2022  Problem List  Principal Problem:    Seizure [facility])  Active Problems:    Hyperlipidemia    Hypertension    Aneurysm of abdominal aorta (HCC)    Seizure disorder ([address] )    Nonrheumatic aortic valve stenosis    Stage 3a chronic kidney disease (HCC)    Fever    Encephalopathy    Urinary retention    Dysphagia    Right arm weakness          01/26/22 0935   OT Last Visit   OT Visit Date 01/26/22   Note Type   Note Type Treatment   Restrictions/Precautions   Weight Bearing Precautions Per Order No   Other Precautions Cognitive; Chair Alarm; Bed Alarm;Telemetry; Fall Risk   Lifestyle   Autonomy I with ADL's, assistance from HHA with IADL's, 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  Reciprocal Relationships spouse, daughter, Tammi Henderson (are assisting spouse while pt is in the hospital)   Service to Others retired   Intrinsic Gratification "nothing"   Pain Assessment   Pain Assessment Tool 0-10   Pain Score No Pain   ADL   Where Assessed Chair   Grooming Assistance 2  Maximal Assistance   Grooming Deficit Setup;Brushing hair  (with hand over hand assist and UE support 2* To RUE weakness)   LB Dressing Assistance 3  Moderate Assistance   LB Dressing Deficit Don/doff R sock; Don/doff L sock  (able to pull socks up over heels)   Toileting Assistance  1  Total Assistance   Toileting Deficit (incontinent of urine/bowel during session, total assist )   Bed Mobility   Supine to Sit Unable to assess   Sit to Supine Unable to assess   Additional Comments pt left in chair as received with all need met, alarm engaged   Transfers   Sit to Stand 4  Minimal assistance   Additional items Assist x 1   Stand to Sit 4  Minimal assistance   Additional items Assist x 1   Additional Comments +RW   Functional Mobility   Functional Mobility 4  Minimal assistance   Additional Comments min a x 1 with RW short distance functional mobility into hallway with 2 seated rest breaks and cues for upright posture, able to divide attention with increased time with assessing vision with accurately reading objects signs in hallway  Additional items Rolling walker   Cognition   Overall Cognitive Status Impaired   Arousal/Participation Alert; Responsive; Cooperative   Attention Attends with cues to redirect   Orientation Level Oriented to person;Oriented to time;Oriented to place  (grossly oriented)   Memory Decreased recall of precautions;Decreased recall of recent events;Decreased short term memory   Following Commands Follows one step commands with increased time or repetition   Comments Pt with improved attention this session, motivated for therapy, able to recall 1/3 words at end of session approx~10 minutes, grossly oriented but forgetful, able to follow one step directives this session  Activity Tolerance   Activity Tolerance Patient limited by fatigue   Medical Staff Made Aware PT navi   Assessment   Assessment Patient participated in Skilled OT session this date with interventions consisting of self care tasks, functional transfers/mobility cognitive assessment   Patient agreeable to OT treatment session, upon arrival patient was found seated OOB to Chair  In comparison to previous session, patient with improvements in functional transfers/mobility  Patient requiring verbal cues for safety, verbal cues for correct technique, one step directives and frequent rest periods  Patient continues to be functioning below baseline level, occupational performance remains limited secondary to factors listed above and increased risk for falls and injury  The patient's raw score on the AM-PAC Daily Activity inpatient short form is 12, standardized score is 30 6, less than 39 4  Patients at this level are likely to benefit from discharge to post-acute rehabilitation services  Please refer to the recommendation of the Occupational Therapist for safe discharge planning     From OT standpoint, recommendation at time of d/c would be Short Term Rehab  Patient to benefit from continued Occupational Therapy treatment while in the hospital to address deficits as defined above and maximize level of functional independence with ADLs and functional mobility  Plan   Treatment Interventions ADL retraining;Functional transfer training;UE strengthening/ROM; Endurance training;Patient/family training;Equipment evaluation/education;Cognitive reorientation; Compensatory technique education; Energy conservation; Activityengagement   Goal Expiration Date 02/07/22   OT Treatment Day 2   OT Frequency 3-5x/wk   Recommendation   OT Discharge Recommendation Post acute rehabilitation services   OT - OK to Discharge Yes   AM-PAC Daily Activity Inpatient   Lower Body Dressing 2   Bathing 2   Toileting 1   Upper Body Dressing 2   Grooming 2   Eating 3   Daily Activity Raw Score 12   Daily Activity Standardized Score (Calc for Raw Score >=11) 30 6   Turning Head Towards Sound 4   Follow Simple Instructions 3   Low Function Daily Activity Raw Score 19   Low Function Daily Activity Standardized Score 31 34     Soledad Del Real MOT, OTR/L

## 2022-01-27 ENCOUNTER — APPOINTMENT (INPATIENT)
Dept: RADIOLOGY | Facility: HOSPITAL | Age: 82
DRG: 100 | End: 2022-01-27
Payer: MEDICARE

## 2022-01-27 PROBLEM — S06.5XAA SUBDURAL HEMATOMA: Status: ACTIVE | Noted: 2022-01-27

## 2022-01-27 PROBLEM — R29.6 UNWITNESSED FALL: Status: ACTIVE | Noted: 2022-01-27

## 2022-01-27 PROBLEM — S06.5X9A SUBDURAL HEMATOMA (HCC): Status: ACTIVE | Noted: 2022-01-27

## 2022-01-27 PROCEDURE — 99233 SBSQ HOSP IP/OBS HIGH 50: CPT | Performed by: INTERNAL MEDICINE

## 2022-01-27 PROCEDURE — 70450 CT HEAD/BRAIN W/O DYE: CPT

## 2022-01-27 PROCEDURE — G1004 CDSM NDSC: HCPCS

## 2022-01-27 PROCEDURE — 99223 1ST HOSP IP/OBS HIGH 75: CPT | Performed by: PHYSICIAN ASSISTANT

## 2022-01-27 RX ADMIN — PRAVASTATIN SODIUM 80 MG: 80 TABLET ORAL at 17:40

## 2022-01-27 RX ADMIN — LEVETIRACETAM 1250 MG: 750 TABLET, FILM COATED ORAL at 21:47

## 2022-01-27 RX ADMIN — DOXAZOSIN 1 MG: 1 TABLET ORAL at 08:15

## 2022-01-27 RX ADMIN — NYSTATIN: 100000 POWDER TOPICAL at 17:40

## 2022-01-27 RX ADMIN — LOPERAMIDE HYDROCHLORIDE 4 MG: 2 CAPSULE ORAL at 08:16

## 2022-01-27 RX ADMIN — LIDOCAINE 5% 1 PATCH: 700 PATCH TOPICAL at 08:15

## 2022-01-27 RX ADMIN — CHLORHEXIDINE GLUCONATE 15 ML: 1.2 SOLUTION ORAL at 21:46

## 2022-01-27 RX ADMIN — THIAMINE HCL TAB 100 MG 100 MG: 100 TAB at 08:16

## 2022-01-27 RX ADMIN — ENOXAPARIN SODIUM 40 MG: 40 INJECTION SUBCUTANEOUS at 08:15

## 2022-01-27 RX ADMIN — FOLIC ACID 1 MG: 1 TABLET ORAL at 08:16

## 2022-01-27 RX ADMIN — CHLORHEXIDINE GLUCONATE 15 ML: 1.2 SOLUTION ORAL at 08:16

## 2022-01-27 RX ADMIN — NYSTATIN: 100000 POWDER TOPICAL at 08:16

## 2022-01-27 RX ADMIN — ACETAMINOPHEN 975 MG: 325 TABLET, FILM COATED ORAL at 21:46

## 2022-01-27 RX ADMIN — ACETAMINOPHEN 975 MG: 325 TABLET, FILM COATED ORAL at 13:45

## 2022-01-27 RX ADMIN — GABAPENTIN 100 MG: 100 CAPSULE ORAL at 21:47

## 2022-01-27 NOTE — ASSESSMENT & PLAN NOTE
Brain MRI x2 without acute stroke  · Has been seizure-free when taken off video EEG, off restrains  · gabapentin 100 mg at bedtime was added  · Improving

## 2022-01-27 NOTE — QUICK NOTE
Head CT scan with small left-sided subdural hemorrhage  No mass effect  Consult Neurosurgery for further management  Discussed with patient's daughter

## 2022-01-27 NOTE — RAPID RESPONSE
Rapid Response Note  Alexis Estrella 80 y o  male MRN: [id number]  [facility] Encounter: [id number]    Rapid Response Notification(s):   Response called date/time:  1/26/2022 7:24 PM  Response team arrival date/time:  1/26/2022 7:25 PM  Response end date/time:  1/26/2022 7:30 PM  Rapid response location:  [facility]  Primary reason for rapid response call: Other (comment) (Unwitnessed fall)    Rapid Response Intervention(s):   Airway:  None  Breathing:  None  Circulation:  None  Fluids administered:  None  Medications administered:  None       Background/Situation:   Alexis Estrella is a 80 y o  male who was a rapid response called to unwitnessed fall  Upon arrival, patient already attempting to get up himself  Difficult to redirect because patient urgently had to urinate  Patient had no neck pain  Denies headache  Answers questions  Alert and oriented to date and self but believe she is a LVHM  Patient on Lovenox 40mg daily  Review of Systems   Neurological: Negative for dizziness, syncope, speech difficulty, weakness, light-headedness and headaches  L forehead pain over hematoma   Psychiatric/Behavioral: Positive for confusion and decreased concentration  Objective:   Vitals:    01/25/22 2127 01/26/22 0835 01/26/22 1507 01/26/22 2135   BP: 129/76 137/67 118/66 162/90   BP Location: Left arm      Pulse: 85 81 93 90   Resp: 16 16     Temp: 98 9 °F (37 2 °C) 98 3 °F (36 8 °C) 99 °F (37 2 °C)    TempSrc:       SpO2: 98% 96% 99% 94%   Weight:       Height:         Physical Exam    Assessment:   · Unwitnessed fall     Plan:   · NEXUS cleared   · Left forehead hematoma, continue to monitor- may apply ice packs PRN   · No need for CTH at this time unless GCS declines >2pt sin <1hr      Rapid Response Outcome  Family notified of transfer: no       Portions of the record may have been created with voice recognition software    Occasional wrong word or "sound a like" substitutions may have occurred due to the inherent limitations of voice recognition software  Read the chart carefully and recognize, using context, where substitutions have occurred      Delmy Bustos PA-C

## 2022-01-27 NOTE — CONSULTS
Consultation - Neurosurgery   Eliud Tinoco 80 y o  male MRN: [id number]  Unit/Bed#: [facility] 039-79 Encounter: [id number]    Images reviewed at morning rounds on 01/27/2022 at 2:00 p m  Patient was seen and examined on 01/27/2022 at 2:10 p m  Consults    Assessment/Plan               Assessment:  1  Small traumatic left FP SDH  2  History of fall      Plan:   · Exam: A&OX2, disoriented to place, PERRL, EOMI 2 mm conj bilaterally  Slight right facial asymmetry noted  SF with the exception of some word finding issues  Finger-to-nose tests normal and without drift bilaterally  Strength is 5/5 and sensation to light touch intact throughout  DTR 2+ without clonus bilaterally  · Imagings reviewed personally and by attendings  Findings as follows:  · CT head without contrast on 01/27/2022 demonstrate small 0 5 cm left-sided subdural hemorrhage along the left frontoparietal convexity  No mass effect or midline shift  · Pain control:  Per primary team  · DVT ppx: SCDs bilateral legs  DC DVT PPx, no AC/AP for now  · Seizure ppx:  Patient on Keppra  · Activity:  As tolerated  · PT/OT evaluation & treatment  · Medical Mx:  Per primary team  · Neurocheck:  Routine  Stat CT head without contrast if GCS drops 2 point/1H  · HOB>30-45 degrees  · sBP<160  · Recommend repeat CT head in the morning  No AC/AP/pharmacological DVT prophylaxis for now-pending repeat CT head  Will review the image once done  Call with question or concern  History of Present Illness     HPI: Eliud Tinoco is a 80 y o  male with PMHx of hypertension, seizure disorder, S/P AAA repair consulted for small left frontoparietal traumatic subdural hemorrhage  Patient fell down last night  in hospital   Unwitnessed fall  Reports he lost balance and fell down when he tries to get up and walk to the bathroom  Complains of mild headache on left side  He was admitted with right facial droop and suspicion of community-acquired pneumonia   Hx of multiple seizures  Patient denies history of nausea, vomiting, blurry vision or diplopia  He denies loss of consciousness  Per EMR patient had some urinary retention and was catheterized but now he is voiding on his own  Denies any bowel issues  Denies weakness, numbness, or paresthesia in the extremities  Reports left knee pain and pain inhibition movement  Patient denies history of diabetes mellitus, congestive heart failure, previous stroke, bleeding disorder or taking anticoagulant medication  Image findings as described in the assessment section above  Review of Systems   Constitutional: Positive for fever  Negative for activity change, chills and fatigue  HENT: Negative for trouble swallowing and voice change  Eyes: Negative for photophobia and visual disturbance  Respiratory: Positive for cough  Negative for shortness of breath and wheezing  Cardiovascular: Negative for chest pain, palpitations and leg swelling  Gastrointestinal: Negative for nausea and vomiting  Genitourinary: Negative for difficulty urinating  Musculoskeletal: Negative for gait problem  Neurological: Negative for dizziness, tremors, seizures, syncope, facial asymmetry, speech difficulty, weakness, light-headedness, numbness and headaches  Psychiatric/Behavioral: Negative for confusion and decreased concentration  Historical Information   Past Medical History:   Diagnosis Date    Colon polyps     Gout     Hyperlipidemia     Hypertension     S/P AAA repair     Seizure disorder [address] [address] )     Tear of right rotator cuff 4/4/2017     Past Surgical History:   Procedure Laterality Date    ABDOMINAL AORTIC ANEURYSM REPAIR, ENDOVASCULAR N/A     MN COLONOSCOPY FLX DX W/COLLJ SPEC WHEN PFRMD N/A 6/7/2017    Procedure: COLONOSCOPY;  Surgeon: Lester Phelan MD;  Location: [facility];   Service: Colorectal    MN COLONOSCOPY FLX DX W/COLLJ SPEC WHEN PFRMD N/A 5/18/2018    Procedure: COLONOSCOPY;  Surgeon: Alcides Matthews Martin Mercado MD;  Location: [facility]; Service: Colorectal    TONSILLECTOMY      WISDOM TOOTH EXTRACTION Bilateral      Social History     Substance and Sexual Activity   Alcohol Use Yes    Comment: occasional     Social History     Substance and Sexual Activity   Drug Use No     Social History     Tobacco Use   Smoking Status Former Smoker    Packs/day: 3 00    Years: 10 00    Pack years: 30 00    Quit date: 1971    Years since quittin 9   Smokeless Tobacco Never Used     Family History   Problem Relation Age of Onset    Aneurysm Mother         ABDMONIAL  AORTA    Coronary artery disease Father     Coronary artery disease Brother        Meds/Allergies   all current active meds have been reviewed, current meds:   Current Facility-Administered Medications   Medication Dose Route Frequency    acetaminophen (TYLENOL) tablet 975 mg  975 mg Oral Q8H [facility]    chlorhexidine (PERIDEX) 0 12 % oral rinse 15 mL  15 mL Mouth/Throat Q12H [facility]    doxazosin (CARDURA) tablet 1 mg  1 mg Oral Daily    folic acid (FOLVITE) tablet 1 mg  1 mg Oral Daily    gabapentin (NEURONTIN) capsule 100 mg  100 mg Oral HS    levETIRAcetam (KEPPRA) tablet 1,250 mg  1,250 mg Oral HS    lidocaine (LIDODERM) 5 % patch 1 patch  1 patch Topical Daily    loperamide (IMODIUM) capsule 4 mg  4 mg Oral 4x Daily PRN    nystatin (MYCOSTATIN) powder   Topical BID    OLANZapine (ZyPREXA) IM injection 2 5 mg  2 5 mg Intramuscular TID PRN    oxyCODONE (ROXICODONE) IR tablet 2 5 mg  2 5 mg Oral Q4H PRN    pravastatin (PRAVACHOL) tablet 80 mg  80 mg Oral Daily With Dinner    thiamine tablet 100 mg  100 mg Oral Daily    and PTA meds:   Prior to Admission Medications   Prescriptions Last Dose Informant Patient Reported?  Taking?   acetaminophen (TYLENOL) 500 mg tablet  Self Yes No   Sig: Take 500 mg by mouth every 6 (six) hours as needed for mild pain   levETIRAcetam (KEPPRA XR) 500 MG 24 hr tablet   No No   Sig: Take 2 tablets (1,000 mg total) by mouth daily   simvastatin (ZOCOR) 40 mg tablet  Self No No   Sig: Take 1 tablet (40 mg total) by mouth daily      Facility-Administered Medications: None     Allergies   Allergen Reactions    Fenofibrate      Patient not aware of allergy    Hydrocodone-Acetaminophen      Patient not aware of allergy       Objective   I/O       01/25 0701 01/26 0700 01/26 0701 01/27 0700 01/27 0701 01/28 0700    P  O   660     NG/GT       IV Piggyback       Total Intake(mL/kg)  660 (7 7)     Urine (mL/kg/hr) 750 (0 4) 600 (0 3)     Stool 0 0     Total Output 750 600     Net -750 +60            Unmeasured Urine Occurrence 4 x 1 x     Unmeasured Stool Occurrence 5 x 1 x           Physical Exam  Constitutional:       Appearance: Normal appearance  HENT:      Head:      Comments: No scalp hematoma detected on the left side, slight tenderness  Eyes:      Extraocular Movements: Extraocular movements intact  Pupils: Pupils are equal, round, and reactive to light  Cardiovascular:      Rate and Rhythm: Normal rate  Pulmonary:      Effort: Pulmonary effort is normal    Musculoskeletal:         General: Tenderness present  Cervical back: Normal range of motion  Comments: Slight bruise on the left knee and tenderness with range of motion   Neurological:      General: No focal deficit present  Mental Status: He is alert and oriented to person, place, and time  GCS: GCS eye subscore is 4  GCS verbal subscore is 5  GCS motor subscore is 6  Cranial Nerves: Cranial nerves are intact  Sensory: Sensation is intact  Motor: Motor function is intact  Coordination: Finger-Nose-Finger Test normal       Deep Tendon Reflexes: Reflexes are normal and symmetric  Reflex Scores:       Tricep reflexes are 2+ on the right side and 2+ on the left side  Bicep reflexes are 2+ on the right side and 2+ on the left side         Brachioradialis reflexes are 2+ on the right side and 2+ on the left side        Patellar reflexes are 2+ on the right side and 2+ on the left side  Achilles reflexes are 2+ on the right side and 2+ on the left side  Psychiatric:         Speech: Speech normal        Neurologic Exam     Mental Status   Oriented to person, place, and time  Speech: speech is normal   Level of consciousness: alert    Cranial Nerves     CN III, IV, VI   Pupils are equal, round, and reactive to light  Right pupil: Size: 2 mm  Shape: regular  Reactivity: brisk  Left pupil: Size: 2 mm  Shape: regular  Reactivity: brisk  Motor Exam   Muscle bulk: normal  Overall muscle tone: normal  Right arm tone: normal  Left arm tone: normal  Right arm pronator drift: absent  Left arm pronator drift: absent  Right leg tone: normal  Left leg tone: normal    Sensory Exam   Light touch normal      Gait, Coordination, and Reflexes     Coordination   Finger to nose coordination: normal    Reflexes   Right brachioradialis: 2+  Left brachioradialis: 2+  Right biceps: 2+  Left biceps: 2+  Right triceps: 2+  Left triceps: 2+  Right patellar: 2+  Left patellar: 2+  Right achilles: 2+  Left achilles: 2+  Right : 2+  Left : 2+  Right Samaniego: absent  Left Samaniego: absent  Right ankle clonus: absent  Left pendular knee jerk: absent      Vitals:Blood pressure 118/53, pulse 86, temperature 99 °F (37 2 °C), resp  rate 16, height 5' 10" (1 778 m), weight 86 1 kg (189 lb 13 1 oz), SpO2 94 %  ,Body mass index is 27 24 kg/m²  Lab Results:   Results from last 7 days   Lab Units 01/25/22  0521 01/24/22  1424 01/22/22  0000 01/21/22  0451 01/21/22  0451   WBC Thousand/uL 7 42 7 06 6 74   < > 10 36*   HEMOGLOBIN g/dL 12 4 12 0 11 7*   < > 12 3   HEMATOCRIT % 39 0 37 5 36 4*   < > 37 4   PLATELETS Thousands/uL 151 139* 107*   < > 130*   NEUTROS PCT % 73 76*  --   --  82*   MONOS PCT % 12 10  --   --  7    < > = values in this interval not displayed       Results from last 7 days   Lab Units 01/25/22  0521 01/23/22  0818 01/22/22  0422   POTASSIUM mmol/L 4 1 4 3 4 1   CHLORIDE mmol/L 110* 113* 114*   CO2 mmol/L 30 30 31   BUN mg/dL 17 16 14   CREATININE mg/dL 0 82 0 73 0 78   CALCIUM mg/dL 8 9 8 3 8 3   ALK PHOS U/L  --   --  57   ALT U/L  --   --  24   AST U/L  --   --  37     Results from last 7 days   Lab Units 01/21/22  0451   MAGNESIUM mg/dL 2 8*             No results found for: TROPONINT  ABG:No results found for: PHART, ROC3ZUJ, PO2ART, DLI6OBE, N8GUJFSA, BEART, SOURCE    Imaging Studies: I have personally reviewed pertinent reports   , I have personally reviewed pertinent films in PACS and I have personally reviewed pertinent films in PACS with a Radiologist     EKG, Pathology, and Other Studies: I have personally reviewed pertinent reports   , I have personally reviewed pertinent films in PACS and I have personally reviewed pertinent films in PACS with a Radiologist     VTE Prophylaxis: Sequential compression device (Venodyne)     Code Status: Level 1 - Full Code  Advance Directive and Living Will:      Power of :    POLST:      Counseling / Coordination of Care  I spent 20 minutes with the patient

## 2022-01-27 NOTE — ASSESSMENT & PLAN NOTE
· Looks like he had a Franklin inserted on 01/18, removed 1/21, reinserted 1/22 midnight    Suspect due to retention  · On doxazosin  · Franklin catheter was removed on 01/25  · Resolved

## 2022-01-27 NOTE — PLAN OF CARE
Problem: MOBILITY - ADULT  Goal: Maintain or return to baseline ADL function  Description: INTERVENTIONS:  -  Assess patient's ability to carry out ADLs; assess patient's baseline for ADL function and identify physical deficits which impact ability to perform ADLs (bathing, care of mouth/teeth, toileting, grooming, dressing, etc )  - Assess/evaluate cause of self-care deficits   - Assess range of motion  - Assess patient's mobility; develop plan if impaired  - Assess patient's need for assistive devices and provide as appropriate  - Encourage maximum independence but intervene and supervise when necessary  - Involve family in performance of ADLs  - Assess for home care needs following discharge   - Consider OT consult to assist with ADL evaluation and planning for discharge  - Provide patient education as appropriate  Outcome: Progressing  Goal: Maintains/Returns to pre admission functional level  Description: INTERVENTIONS:  - Perform BMAT or MOVE assessment daily    - Set and communicate daily mobility goal to care team and patient/family/caregiver  - Collaborate with rehabilitation services on mobility goals if consulted  - Perform Range of Motion  times a day  - Reposition patient every  hours    - Dangle patient  times a day  - Stand patient  times a day  - Ambulate patient  times a day  - Out of bed to chair  times a day   - Out of bed for meals  times a day  - Out of bed for toileting  - Record patient progress and toleration of activity level   Outcome: Progressing     Problem: Potential for Falls  Goal: Patient will remain free of falls  Description: INTERVENTIONS:  - Educate patient/family on patient safety including physical limitations  - Instruct patient to call for assistance with activity   - Consult OT/PT to assist with strengthening/mobility   - Keep Call bell within reach  - Keep bed low and locked with side rails adjusted as appropriate  - Keep care items and personal belongings within reach  - Initiate and maintain comfort rounds  - Make Fall Risk Sign visible to staff  - Offer Toileting every  Hours, in advance of need  - Initiate/Maintain alarm  - Obtain necessary fall risk management equipment  - Apply yellow socks and bracelet for high fall risk patients  - Consider moving patient to room near nurses station  Outcome: Progressing     Problem: Prexisting or High Potential for Compromised Skin Integrity  Goal: Skin integrity is maintained or improved  Description: INTERVENTIONS:  - Identify patients at risk for skin breakdown  - Assess and monitor skin integrity  - Assess and monitor nutrition and hydration status  - Monitor labs   - Assess for incontinence   - Turn and reposition patient  - Assist with mobility/ambulation  - Relieve pressure over bony prominences  - Avoid friction and shearing  - Provide appropriate hygiene as needed including keeping skin clean and dry  - Evaluate need for skin moisturizer/barrier cream  - Collaborate with interdisciplinary team   - Patient/family teaching  - Consider wound care consult   Outcome: Progressing     Problem: SAFETY,RESTRAINT: NV/NON-SELF DESTRUCTIVE BEHAVIOR  Goal: Remains free of harm/injury (restraint for non violent/non self-detsructive behavior)  Description: INTERVENTIONS:  - Instruct patient/family regarding restraint use   - Assess and monitor physiologic and psychological status   - Provide interventions and comfort measures to meet assessed patient needs   - Identify and implement measures to help patient regain control  - Assess readiness for release of restraint   Outcome: Progressing  Goal: Returns to optimal restraint-free functioning  Description: INTERVENTIONS:  - Assess the patient's behavior and symptoms that indicate continued need for restraint  - Identify and implement measures to help patient regain control  - Assess readiness for release of restraint   Outcome: Progressing     Problem: Neurological Deficit  Goal: Neurological status is stable or improving  Description: Interventions:  - Monitor and assess patient's level of consciousness, motor function, sensory function, and level of assistance needed for ADLs  - Monitor and report changes from baseline  Collaborate with interdisciplinary team to initiate plan and implement interventions as ordered  - Provide and maintain a safe environment  - Consider seizure precautions  - Consider fall precautions  - Consider aspiration precautions  - Consider bleeding precautions  Outcome: Progressing     Problem: Activity Intolerance/Impaired Mobility  Goal: Mobility/activity is maintained at optimum level for patient  Description: Interventions:  - Assess and monitor patient  barriers to mobility and need for assistive/adaptive devices  - Assess patient's emotional response to limitations  - Collaborate with interdisciplinary team and initiate plans and interventions as ordered  - Encourage independent activity per ability   - Maintain proper body alignment  - Perform active/passive rom as tolerated/ordered  - Plan activities to conserve energy   - Turn patient as appropriate  Outcome: Progressing     Problem: Communication Impairment  Goal: Ability to express needs and understand communication  Description: Assess patient's communication skills and ability to understand information  Patient will demonstrate use of effective communication techniques, alternative methods of communication and understanding even if not able to speak  - Encourage communication and provide alternate methods of communication as needed  - Collaborate with case management/ for discharge needs  - Include patient/family/caregiver in decisions related to communication  Outcome: Progressing     Problem: Potential for Aspiration  Goal: Non-ventilated patient's risk of aspiration is minimized  Description: Assess and monitor vital signs, respiratory status, and labs (WBC)  Monitor for signs of aspiration (tachypnea, cough, rales, wheezing, cyanosis, fever)  - Assess and monitor patient's ability to swallow  - Place patient up in chair to eat if possible  - HOB up at 90 degrees to eat if unable to get patient up into chair   - Supervise patient during oral intake  - Instruct patient/ family to take small bites  - Instruct patient/ family to take small single sips when taking liquids  - Follow patient-specific strategies generated by speech pathologist   Outcome: Progressing     Problem: Nutrition  Goal: Nutrition/Hydration status is improving  Description: Monitor and assess patient's nutrition/hydration status for malnutrition (ex- brittle hair, bruises, dry skin, pale skin and conjunctiva, muscle wasting, smooth red tongue, and disorientation)  Collaborate with interdisciplinary team and initiate plan and interventions as ordered  Monitor patient's weight and dietary intake as ordered or per policy  Utilize nutrition screening tool and intervene per policy  Determine patient's food preferences and provide high-protein, high-caloric foods as appropriate  - Assist patient with eating   - Allow adequate time for meals   - Encourage patient to take dietary supplement as ordered  - Collaborate with clinical nutritionist   - Include patient/family/caregiver in decisions related to nutrition  Outcome: Progressing     Problem: Nutrition/Hydration-ADULT  Goal: Nutrient/Hydration intake appropriate for improving, restoring or maintaining nutritional needs  Description: Monitor and assess patient's nutrition/hydration status for malnutrition  Collaborate with interdisciplinary team and initiate plan and interventions as ordered  Monitor patient's weight and dietary intake as ordered or per policy  Utilize nutrition screening tool and intervene as necessary  Determine patient's food preferences and provide high-protein, high-caloric foods as appropriate       INTERVENTIONS:  - Monitor oral intake, urinary output, labs, and treatment plans  - Assess nutrition and hydration status and recommend course of action  - Evaluate amount of meals eaten  - Assist patient with eating if necessary   - Allow adequate time for meals  - Recommend/ encourage appropriate diets, oral nutritional supplements, and vitamin/mineral supplements  - Order, calculate, and assess calorie counts as needed  - Recommend, monitor, and adjust tube feedings based on assessed needs  - Assess need for intravenous fluids  - Provide specific nutrition/hydration education as appropriate  - Include patient/family/caregiver in decisions related to nutrition  Outcome: Progressing

## 2022-01-27 NOTE — PROGRESS NOTES
[facility]  Progress Note Isabela Valdivia 1940, 80 y o  male MRN: [id number]  Unit/Bed#: [facility] 712-01 Encounter: [id number]  Primary Care Provider: Chad Botello MD   Date and time admitted to hospital: 1/18/2022 11:46 PM    * Seizure [facility])  Assessment & Plan  · Presented with right facial droop, f/b mutliple seizures  · Was a stroke alert  · MRI - limited study was negative  · Was intubated placed on VEEG in ICU -> received keppra, depacon  · Now Sz free, last Sz on 1/19  · Cont keppra 1500 mg BID, depacon 500 mg q 8 hr  · Etiology for Sz unclear - ? Missed 1 dose at home  keppra was decreased from 1500 mg to 1000 mg 6 months ago in July 2021  · Repeat brain MRI without acute abnormality  · Neurology is following    Encephalopathy  Assessment & Plan  Brain MRI x2 without acute stroke  · Has been seizure-free when taken off video EEG, off restrains  · gabapentin 100 mg at bedtime was added  · Improving    Fever  Assessment & Plan  · Had on admission, suspicion of community-acquired pneumonia  · Rxed with 5 days of Rocephin in ICU  · Fever of 101 1/23  WBC normal, procalcitonin 0 23  · Chest x-ray with atelectasis  · Negative COVID/flu/RSV  · Likely secondary to atelectasis  · Continue to monitor fever, WBC  · Monitor for aspiration, aspiration precautions  · Out of bed, incentive spirometry  · PT OT    Unwitnessed fall  Assessment & Plan  Patient fell in the hospital on 1/26 with left forehead pain over hematoma  Rapid response was called, he was cleared by rapid response team without  need for head CT scan at that time  Mild headache this morning  Check head CT scan  Continue with PT OT    Dysphagia  Assessment & Plan  · NG tube was removed  · Started on dysphagia diet level 1  · Speech is following    Urinary retention  Assessment & Plan  · Looks like he had a Franklin inserted on 01/18, removed 1/21, reinserted 1/22 midnight    Suspect due to retention  · On doxazosin  · Franklin catheter was removed on   · Resolved    Aneurysm of abdominal aorta (HCC)  Assessment & Plan  S/p EVAR      VTE Pharmacologic Prophylaxis: VTE Score: 13 High Risk (Score >/= 5) - Pharmacological DVT Prophylaxis Ordered: enoxaparin (Lovenox)  Sequential Compression Devices Ordered  Patient Centered Rounds: I performed bedside rounds with nursing staff today  Discussions with Specialists or Other Care Team Provider:     Education and Discussions with Family / Patient: Attempted to update  (daughter) via phone  Unable to contact  Time Spent for Care: 45 minutes  More than 50% of total time spent on counseling and coordination of care as described above  Current Length of Stay: 9 day(s)  Current Patient Status: Inpatient   Certification Statement: The patient will continue to require additional inpatient hospital stay due to Awaiting rehab placement  Discharge Plan: Awaiting rehab placement    Code Status: Level 1 - Full Code    Subjective:   Patient seen and examined  Comfortable in bed  Rapid response was called yesterday around 7:25 p m  Due to unwitnessed fall and head trauma  Feeling better today    Objective:     Vitals:   Temp (24hrs), Av °F (37 2 °C), Min:99 °F (37 2 °C), Max:99 °F (37 2 °C)    Temp:  [99 °F (37 2 °C)] 99 °F (37 2 °C)  HR:  [86-93] 86  Resp:  [16] 16  BP: (118-162)/(53-90) 118/53  SpO2:  [94 %-99 %] 94 %  Body mass index is 27 24 kg/m²  Input and Output Summary (last 24 hours):      Intake/Output Summary (Last 24 hours) at 2022 1053  Last data filed at 2022 0501  Gross per 24 hour   Intake 360 ml   Output 600 ml   Net -240 ml       Physical Exam:   Physical Exam   Awake alert in no acute distress  Mild left forehead hematoma  Lung clear to auscultation bilateral  Heart positive S1-S2 no murmur  Abdomen soft nontender  Lower extremities no edema    Additional Data:     Labs:  Results from last 7 days   Lab Units 22  0521   WBC Thousand/uL 7 42   HEMOGLOBIN g/dL 12 4   HEMATOCRIT % 39 0   PLATELETS Thousands/uL 151   NEUTROS PCT % 73   LYMPHS PCT % 10*   MONOS PCT % 12   EOS PCT % 4     Results from last 7 days   Lab Units 01/25/22  0521 01/23/22  0818 01/22/22  0422   SODIUM mmol/L 144   < > 146*   POTASSIUM mmol/L 4 1   < > 4 1   CHLORIDE mmol/L 110*   < > 114*   CO2 mmol/L 30   < > 31   BUN mg/dL 17   < > 14   CREATININE mg/dL 0 82   < > 0 78   ANION GAP mmol/L 4   < > 1*   CALCIUM mg/dL 8 9   < > 8 3   ALBUMIN g/dL  --   --  2 2*   TOTAL BILIRUBIN mg/dL  --   --  0 64   ALK PHOS U/L  --   --  57   ALT U/L  --   --  24   AST U/L  --   --  37   GLUCOSE RANDOM mg/dL 109   < > 112    < > = values in this interval not displayed           Results from last 7 days   Lab Units 01/24/22  1327 01/24/22  0754 01/23/22  2059 01/23/22  1802 01/23/22  1118 01/23/22  0402 01/23/22  0122 01/23/22  0044 01/23/22  0022 01/22/22  1729 01/22/22  1145 01/22/22  0555   POC GLUCOSE mg/dl 115 109 105 89 86 85 96 68 68 81 112 113         Results from last 7 days   Lab Units 01/25/22  0521 01/24/22  1424   PROCALCITONIN ng/ml 0 22 0 23       Lines/Drains:  Invasive Devices  Report    Peripheral Intravenous Line            Peripheral IV 01/26/22 Right Wrist 1 day                      Imaging:  Reviewed    Recent Cultures (last 7 days):         Last 24 Hours Medication List:   Current Facility-Administered Medications   Medication Dose Route Frequency Provider Last Rate    acetaminophen  975 mg Oral [facility] Ramone Montgomery DO      chlorhexidine  15 mL Mouth/Throat Q12H [address] Brittany Duran MD      doxazosin  1 mg Oral Daily Brittany Duran MD      enoxaparin  40 mg Subcutaneous Q24H [address]  Damon Fuentes MD      folic acid  1 mg Oral Daily Brittany Duran MD      gabapentin  100 mg Oral HS Mt Disla MD      levETIRAcetam  1,250 mg Oral HS BRIANA Rios      lidocaine  1 patch Topical Daily Ramone Montgomery DO      loperamide  4 mg Oral 4x Daily PRN Cathryne Sicard, DO      nystatin   Topical BID Cathryne Sicard, DO      OLANZapine  2 5 mg Intramuscular TID PRN Delana Aase, MD      oxyCODONE  2 5 mg Oral Q4H PRN Cathryne Sicard, DO      pravastatin  80 mg Oral Daily With Lashay Best MD      thiamine  100 mg Oral Daily Willow Greene MD          Today, Patient Was Seen By: Cathryne Sicard, DO    **Please Note: This note may have been constructed using a voice recognition system  **

## 2022-01-27 NOTE — ASSESSMENT & PLAN NOTE
Patient fell in the hospital on 1/26 with left forehead pain over hematoma  Rapid response was called, he was cleared by rapid response team without  need for head CT scan at that time  Mild headache this morning  Check head CT scan  Continue with PT OT

## 2022-01-27 NOTE — PLAN OF CARE
Problem: Nutrition/Hydration-ADULT  Goal: Nutrient/Hydration intake appropriate for improving, restoring or maintaining nutritional needs  Description: Monitor and assess patient's nutrition/hydration status for malnutrition  Collaborate with interdisciplinary team and initiate plan and interventions as ordered  Monitor patient's weight and dietary intake as ordered or per policy  Utilize nutrition screening tool and intervene as necessary  Determine patient's food preferences and provide high-protein, high-caloric foods as appropriate       INTERVENTIONS:  - Monitor oral intake, urinary output, labs, and treatment plans  - Assess nutrition and hydration status and recommend course of action  - Evaluate amount of meals eaten  - Assist patient with eating if necessary   - Allow adequate time for meals  - Recommend/ encourage appropriate diets, oral nutritional supplements, and vitamin/mineral supplements  - Order, calculate, and assess calorie counts as needed  - Recommend, monitor, and adjust tube feedings based on assessed needs  - Assess need for intravenous fluids  - Provide specific nutrition/hydration education as appropriate  - Include patient/family/caregiver in decisions related to nutrition  Outcome: Progressing

## 2022-01-28 ENCOUNTER — APPOINTMENT (INPATIENT)
Dept: RADIOLOGY | Facility: HOSPITAL | Age: 82
DRG: 100 | End: 2022-01-28
Payer: MEDICARE

## 2022-01-28 LAB
FLUAV RNA RESP QL NAA+PROBE: NEGATIVE
FLUBV RNA RESP QL NAA+PROBE: NEGATIVE
RSV RNA RESP QL NAA+PROBE: NEGATIVE
SARS-COV-2 RNA RESP QL NAA+PROBE: POSITIVE

## 2022-01-28 PROCEDURE — 97535 SELF CARE MNGMENT TRAINING: CPT

## 2022-01-28 PROCEDURE — 0241U HB NFCT DS VIR RESP RNA 4 TRGT: CPT | Performed by: STUDENT IN AN ORGANIZED HEALTH CARE EDUCATION/TRAINING PROGRAM

## 2022-01-28 PROCEDURE — 70450 CT HEAD/BRAIN W/O DYE: CPT

## 2022-01-28 PROCEDURE — 99233 SBSQ HOSP IP/OBS HIGH 50: CPT | Performed by: INTERNAL MEDICINE

## 2022-01-28 PROCEDURE — G1004 CDSM NDSC: HCPCS

## 2022-01-28 PROCEDURE — 99232 SBSQ HOSP IP/OBS MODERATE 35: CPT | Performed by: PHYSICIAN ASSISTANT

## 2022-01-28 RX ORDER — DOXAZOSIN MESYLATE 1 MG/1
1 TABLET ORAL DAILY
Refills: 0
Start: 2022-01-29 | End: 2022-02-01

## 2022-01-28 RX ORDER — LIDOCAINE 50 MG/G
1 PATCH TOPICAL DAILY
Refills: 0
Start: 2022-01-29 | End: 2022-02-01

## 2022-01-28 RX ORDER — HEPARIN SODIUM 5000 [USP'U]/ML
5000 INJECTION, SOLUTION INTRAVENOUS; SUBCUTANEOUS EVERY 8 HOURS SCHEDULED
Status: DISCONTINUED | OUTPATIENT
Start: 2022-01-28 | End: 2022-02-01 | Stop reason: HOSPADM

## 2022-01-28 RX ORDER — LEVETIRACETAM 250 MG/1
1250 TABLET ORAL
Refills: 0
Start: 2022-01-28 | End: 2022-02-01

## 2022-01-28 RX ORDER — LOPERAMIDE HYDROCHLORIDE 2 MG/1
4 CAPSULE ORAL 4 TIMES DAILY PRN
Qty: 30 CAPSULE | Refills: 0
Start: 2022-01-28 | End: 2022-02-01

## 2022-01-28 RX ORDER — FOLIC ACID 1 MG/1
1 TABLET ORAL DAILY
Refills: 0
Start: 2022-01-29 | End: 2022-02-01

## 2022-01-28 RX ORDER — LANOLIN ALCOHOL/MO/W.PET/CERES
100 CREAM (GRAM) TOPICAL DAILY
Refills: 0
Start: 2022-01-29 | End: 2022-02-01

## 2022-01-28 RX ORDER — GABAPENTIN 100 MG/1
100 CAPSULE ORAL
Refills: 0
Start: 2022-01-28 | End: 2022-02-01

## 2022-01-28 RX ORDER — PRAVASTATIN SODIUM 80 MG/1
80 TABLET ORAL
Refills: 0
Start: 2022-01-28 | End: 2022-02-01

## 2022-01-28 RX ADMIN — ACETAMINOPHEN 975 MG: 325 TABLET, FILM COATED ORAL at 13:08

## 2022-01-28 RX ADMIN — HEPARIN SODIUM 5000 UNITS: 5000 INJECTION INTRAVENOUS; SUBCUTANEOUS at 22:32

## 2022-01-28 RX ADMIN — PRAVASTATIN SODIUM 80 MG: 80 TABLET ORAL at 16:12

## 2022-01-28 RX ADMIN — ACETAMINOPHEN 975 MG: 325 TABLET, FILM COATED ORAL at 22:31

## 2022-01-28 RX ADMIN — GABAPENTIN 100 MG: 100 CAPSULE ORAL at 22:31

## 2022-01-28 RX ADMIN — CHLORHEXIDINE GLUCONATE 15 ML: 1.2 SOLUTION ORAL at 21:59

## 2022-01-28 RX ADMIN — LEVETIRACETAM 1250 MG: 750 TABLET, FILM COATED ORAL at 22:31

## 2022-01-28 RX ADMIN — CHLORHEXIDINE GLUCONATE 15 ML: 1.2 SOLUTION ORAL at 08:14

## 2022-01-28 RX ADMIN — NYSTATIN: 100000 POWDER TOPICAL at 08:16

## 2022-01-28 RX ADMIN — DOXAZOSIN 1 MG: 1 TABLET ORAL at 08:14

## 2022-01-28 RX ADMIN — LIDOCAINE 5% 1 PATCH: 700 PATCH TOPICAL at 08:14

## 2022-01-28 RX ADMIN — NYSTATIN: 100000 POWDER TOPICAL at 16:14

## 2022-01-28 RX ADMIN — HEPARIN SODIUM 5000 UNITS: 5000 INJECTION INTRAVENOUS; SUBCUTANEOUS at 13:08

## 2022-01-28 RX ADMIN — THIAMINE HCL TAB 100 MG 100 MG: 100 TAB at 08:14

## 2022-01-28 RX ADMIN — FOLIC ACID 1 MG: 1 TABLET ORAL at 08:14

## 2022-01-28 RX ADMIN — ACETAMINOPHEN 975 MG: 325 TABLET, FILM COATED ORAL at 06:21

## 2022-01-28 NOTE — ASSESSMENT & PLAN NOTE
Pleasant 80year old male that presented S/P unwitnessed fall during his hospital stay sustaining a left fronto-parietal SDH  · No home AC/AP at baseline  · GCS 15  · JOHN 5/5  Imagings reviewed personally and by attendings  Findings as follows:    CT head without contrast on 01/27/2022 demonstrate small 0 5 cm left-sided subdural hemorrhage along the left frontoparietal convexity  No mass effect or midline shift  CT head w/o 1/28/22: Unchanged acute trace subarachnoid hemorrhage along left frontal cerebral convexity, previously described as subdural hematoma  No new sites of acute intracranial hemorrhage  Moderate left sphenoid sinus disease with findings suggestive of acute sinusitis      Plan:  · Continue to monitor neurologic exam    · Stat CT head without contrast if GCS drops 2 point/in an hour  · Repeat imaging reviewed with patient; redistribution and fading of hemorrhage is seen on imaging  · Pain control:  Per primary team  · DVT ppx: SCDs and clear to initiate pharmacological DVT PPX  · Hold home AC/AP medication x 2 weeks  · Seizure ppx: On Keppra 1250 mg daily  · Activity:  As tolerated  · Mobile with PT/OT-pending rehab  · Medical management per primary team    · SBP<160    Neurosurgery will sign off  No follow up is needed  Call with questions or concerns  Plan of care discussed with primary team, Dr Medina Dhaliwal,     Plan of care discussed with Cara Ren RN

## 2022-01-28 NOTE — OCCUPATIONAL THERAPY NOTE
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  Reciprocal Relationships spouse, daughter, Rupa Yarbrough (are assisting spouse while pt is in the hospital)   Service to Others retired   Intrinsic Gratification "nothing"   Pain Assessment   Pain Assessment Tool 0-10   Pain Score No Pain   ADL   Where Assessed Chair   Grooming Assistance 4  Minimal Assistance   Grooming Deficit Wash/dry hands   LB Dressing Assistance 3  Moderate Assistance   LB Dressing Deficit Thread RLE into pants; Thread LLE into pants; Thread RLE into underwear; Thread LLE into underwear;Pull up over hips   Toileting Assistance  3  Moderate Assistance   Toileting Deficit Clothing management up;Perineal hygiene   Bed Mobility   Additional Comments pt presented sitting out of bed to bedside chair upon iniitiation of OT    Transfers   Sit to Stand 4  Minimal assistance   Additional items Assist x 1   Stand to Sit 5  Supervision   Additional items Assist x 1   Functional Mobility   Functional Mobility 4  Minimal assistance   Additional items Rolling walker   Cognition   Overall Cognitive Status Impaired   Arousal/Participation Alert; Responsive; Cooperative   Attention Attends with cues to redirect   Orientation Level Oriented to person;Oriented to place   Memory Decreased recall of precautions;Decreased recall of recent events;Decreased short term memory   Following Commands Follows one step commands with increased time or repetition   Activity Tolerance   Activity Tolerance Patient limited by fatigue   Assessment   Assessment Pt seen for Occupational Therapy session with focus on activity tolerance, functional transfers/mob, standing tolerance and balance for pt engagement in Ub/LB self-care tasks and toileting/toilet transfers    Pt pleasant and cooperative with all therapy requests  Pt was seen in two session 2* pt later requested assist to utilize bathroom/for BM  Pt cleared by RN/Cami for pt participated in OT session  Zander Braden Pt presented sitting out of bed to bedside chair and agreeable to participate in therapy following pt identifiers confirmed  Pt noted for improved functional transfers/mob since previous OT session  Pt will require post acute rehab service to continue to address pt deficit which currently impair pt ADL and functional mob  Pt returned to bedside chair post session (both sessions)  chair alarm activated and all needs within reach      Plan   Treatment Interventions ADL retraining   Goal Expiration Date 02/07/22   OT Treatment Day 3   OT Frequency 3-5x/wk   Recommendation   OT Discharge Recommendation Post acute rehabilitation services   AM-PAC Daily Activity Inpatient   Lower Body Dressing 2   Bathing 2   Toileting 1   Upper Body Dressing 2   Grooming 2   Eating 3   Daily Activity Raw Score 12   Daily Activity Standardized Score (Calc for Raw Score >=11) 30 6   Turning Head Towards Sound 4   Follow Simple Instructions 3   Low Function Daily Activity Raw Score 19   Low Function Daily Activity Standardized Score 31 34   AM-PAC Applied Cognition Inpatient   Following a Speech/Presentation 1   Understanding Ordinary Conversation 3   Taking Medications 1   Remembering Where Things Are Placed or Put Away 1   Remembering List of 4-5 Errands 1   Taking Care of Complicated Tasks 1   Applied Cognition Raw Score 8   Applied Cognition Standardized Score 19 32   Barthel Index   Grooming Score 5   Dressing Score 5   Toilet Use Score 5   Transfers (Bed/Chair) Score 10   Mobility (Level Surface) Score 10       Anjali PETE/L

## 2022-01-28 NOTE — PROGRESS NOTES
[address] [facility]  Progress Note Jacquelyn Don 1940, 80 y o  male MRN: [id number]  Unit/Bed#: [facility] 712-01 Encounter: [id number]  Primary Care Provider: Evelyne Eisenberg MD   Date and time admitted to hospital: 1/18/2022 11:46 PM    Subdural hematoma [facility])  Assessment & Plan  Pleasant 80year old male that presented S/P unwitnessed fall during his hospital stay sustaining a left fronto-parietal SDH  · No home AC/AP at baseline  · GCS 15  · JOHN 5/5  Imagings reviewed personally and by attendings  Findings as follows:    CT head without contrast on 01/27/2022 demonstrate small 0 5 cm left-sided subdural hemorrhage along the left frontoparietal convexity  No mass effect or midline shift  CT head w/o 1/28/22: Unchanged acute trace subarachnoid hemorrhage along left frontal cerebral convexity, previously described as subdural hematoma  No new sites of acute intracranial hemorrhage  Moderate left sphenoid sinus disease with findings suggestive of acute sinusitis      Plan:  · Continue to monitor neurologic exam    · Stat CT head without contrast if GCS drops 2 point/in an hour  · Repeat imaging reviewed with patient; redistribution and fading of hemorrhage is seen on imaging  · Pain control:  Per primary team  · DVT ppx: SCDs and clear to initiate pharmacological DVT PPX  · Hold home AC/AP medication x 2 weeks  · Seizure ppx: On Keppra 1250 mg daily  · Activity:  As tolerated  · Mobile with PT/OT-pending rehab  · Medical management per primary team    · SBP<160    Neurosurgery will sign off  No follow up is needed  Call with questions or concerns  Plan of care discussed with primary team, Dr Lalo Schofield, DO    Plan of care discussed with Moiz Klein RN  Unwitnessed fall  Assessment & Plan  · Unwitnessed fall with forehead strike and hematoma  · See plan above       Subjective/Objective     Patient seen sleeping in his Maxine chair with his feet on the bed   Easy to arouse  Patient reports that he feels fine  He notes some generalized weakness with decreased endurance secondary to his hospital stay  He denies any headaches, nausea, vomiting, dizziness, numbness, weakness, or tingling  I/O       01/26 0701 01/27 0700 01/27 0701 01/28 0700 01/28 0701 01/29 0700    P  O  660 120 180    Total Intake(mL/kg) 660 (7 7) 120 (1 4) 180 (2 1)    Urine (mL/kg/hr) 600 (0 3) 500 (0 2) 400 (1 2)    Stool 0  0    Total Output 600 500 400    Net +60 -380 -220           Unmeasured Urine Occurrence 1 x      Unmeasured Stool Occurrence 1 x  2 x          Invasive Devices  Report    Peripheral Intravenous Line            Peripheral IV 01/26/22 Right Wrist 2 days                Physical Exam:  Vitals: Blood pressure 97/56, pulse 87, temperature 98 1 °F (36 7 °C), resp  rate 19, height 5' 10" (1 778 m), weight 86 1 kg (189 lb 13 1 oz), SpO2 97 %  ,Body mass index is 27 24 kg/m²      Hemodynamic Monitoring: MAP: Arterial Line MAP (mmHg): 80 mmHg    General appearance: alert, appears stated age, cooperative and no distress  Head: Normocephalic, without obvious abnormality, atraumatic  Eyes: EOMI, PERRL  Neck: supple, symmetrical, trachea midline and NT  Back: no kyphosis present, no tenderness to percussion or palpation  Lungs: non labored breathing  Heart: regular heart rate  Neurologic:   Mental status: Alert, awake, oriented x 3, thought content appropriate  Cranial nerves: grossly intact (Cranial nerves II-XII)  Sensory: normal to LT x4  Motor: moving all extremities without focal weakness   Coordination: finger to nose normal bilaterally, no drift bilaterally      Lab Results:  Results from last 7 days   Lab Units 01/25/22  0521 01/24/22  1424 01/22/22  0000   WBC Thousand/uL 7 42 7 06 6 74   HEMOGLOBIN g/dL 12 4 12 0 11 7*   HEMATOCRIT % 39 0 37 5 36 4*   PLATELETS Thousands/uL 151 139* 107*   NEUTROS PCT % 73 76*  --    MONOS PCT % 12 10  --      Results from last 7 days   Lab Units 01/25/22  0521 01/23/22  0818 01/22/22  0422   POTASSIUM mmol/L 4 1 4 3 4 1   CHLORIDE mmol/L 110* 113* 114*   CO2 mmol/L 30 30 31   BUN mg/dL 17 16 14   CREATININE mg/dL 0 82 0 73 0 78   CALCIUM mg/dL 8 9 8 3 8 3   ALK PHOS U/L  --   --  57   ALT U/L  --   --  24   AST U/L  --   --  37                 No results found for: TROPONINT  ABG:No results found for: PHART, BEC2WPR, PO2ART, LZS2PDK, Y9WOFQNC, BEART, SOURCE    Imaging Studies: I have personally reviewed pertinent reports  and I have personally reviewed pertinent films in PACS    XR chest portable    Result Date: 1/19/2022  Impression: ET tube tip as above  Persistent left basilar opacity possibly representing atelectasis, pneumonia and/or pleural effusion  Workstation performed: [id number]     XR chest 1 view portable    Result Date: 1/18/2022  Impression: Endotracheal tube appears somewhat high in position as above  Further advancement of approximately 4 cm would be helpful  Left lower lobe consolidation and small to moderate left pleural effusion  This is concerning for pneumonia  Follow-up to radiographic resolution advised  Questionable 1 2 cm nodular density in left mid to lower lung which can be further evaluated with a chest CT study after treatment  I personally discussed the position of the endotracheal tube with EMMA JARA on 1/18/2022 at 4:01 PM  Workstation performed: [id number]     XR chest 1 view portable    Result Date: 1/18/2022  Impression: No acute cardiopulmonary disease  Workstation performed: [id number]     XR abdomen 1 view kub    Result Date: 1/24/2022  Impression: Feeding tube has been advanced and the tip overlies the stomach  Workstation performed: [id number]     XR abdomen 1 view kub    Result Date: 1/24/2022  Impression: Feeding tube has been advanced into the gastric fundus  Tip pointed toward the left  Unchanged left lower lobe airspace consolidation   Workstation performed: [id number]     CT stroke alert brain    Result Date: 1/18/2022  Impression: Stable examination with no acute intracranial abnormality identified  Findings were directly discussed with Dr Tania Munguia at 10:35 AM  Workstation performed: [id number]     XR chest portable ICU    Result Date: 1/24/2022  Impression: Feeding tube tip in the region of the distal esophagus similar to the prior study  Patchy consolidation in the left lower lung zone, stable  Workstation performed: [id number]     XR chest portable ICU    Result Date: 1/24/2022  Impression: Unchanged left lower lobe airspace opacity  There is a feeding tube with tip in the distal esophagus  At the time of this dictation, there has been subsequent films demonstrating repositioning  Workstation performed: [id number]     XR chest portable ICU    Result Date: 1/19/2022  Impression: Interval placement of right IJ CVP line without complication Persistent lateral left lung base opacity suspicious for infiltrate/atelectasis Workstation performed: [id number]     EEG Video Monitoring 24 Hour    Addendum Date: 1/24/2022    Please see the corrected study times Study Start: 1/22/2022 0035 Study End: 1/22/2022 1143    CTA stroke alert (head/neck)    Result Date: 1/18/2022  Impression: Stable CT angiography with no flow restrictive stenosis, occlusion or thrombosis of the cervical or intracranial vasculature  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

## 2022-01-28 NOTE — PLAN OF CARE
Problem: MOBILITY - ADULT  Goal: Maintain or return to baseline ADL function  Description: INTERVENTIONS:  -  Assess patient's ability to carry out ADLs; assess patient's baseline for ADL function and identify physical deficits which impact ability to perform ADLs (bathing, care of mouth/teeth, toileting, grooming, dressing, etc )  - Assess/evaluate cause of self-care deficits   - Assess range of motion  - Assess patient's mobility; develop plan if impaired  - Assess patient's need for assistive devices and provide as appropriate  - Encourage maximum independence but intervene and supervise when necessary  - Involve family in performance of ADLs  - Assess for home care needs following discharge   - Consider OT consult to assist with ADL evaluation and planning for discharge  - Provide patient education as appropriate  1/28/2022 0906 by Fawn Sykes RN  Outcome: Progressing  1/28/2022 0906 by Fawn Sykes RN  Outcome: Progressing  Goal: Maintains/Returns to pre admission functional level  Description: INTERVENTIONS:  - Perform BMAT or MOVE assessment daily    - Set and communicate daily mobility goal to care team and patient/family/caregiver  - Collaborate with rehabilitation services on mobility goals if consulted  - Perform Range of Motion  times a day  - Reposition patient every  hours    - Dangle patient  times a day  - Stand patient  times a day  - Ambulate patient  times a day  - Out of bed to chair  times a day   - Out of bed for meals  times a day  - Out of bed for toileting  - Record patient progress and toleration of activity level   1/28/2022 0906 by Fawn Sykes RN  Outcome: Progressing  1/28/2022 0906 by Fawn Sykes RN  Outcome: Progressing     Problem: Potential for Falls  Goal: Patient will remain free of falls  Description: INTERVENTIONS:  - Educate patient/family on patient safety including physical limitations  - Instruct patient to call for assistance with activity   - Consult OT/PT to assist with strengthening/mobility   - Keep Call bell within reach  - Keep bed low and locked with side rails adjusted as appropriate  - Keep care items and personal belongings within reach  - Initiate and maintain comfort rounds  - Make Fall Risk Sign visible to staff  - Offer Toileting every  Hours, in advance of need  - Initiate/Maintain alarm  - Obtain necessary fall risk management equipment:   - Apply yellow socks and bracelet for high fall risk patients  - Consider moving patient to room near nurses station  1/28/2022 0906 by Jennifer Baires RN  Outcome: Progressing  1/28/2022 0906 by Jennifer Baires RN  Outcome: Progressing     Problem: Prexisting or High Potential for Compromised Skin Integrity  Goal: Skin integrity is maintained or improved  Description: INTERVENTIONS:  - Identify patients at risk for skin breakdown  - Assess and monitor skin integrity  - Assess and monitor nutrition and hydration status  - Monitor labs   - Assess for incontinence   - Turn and reposition patient  - Assist with mobility/ambulation  - Relieve pressure over bony prominences  - Avoid friction and shearing  - Provide appropriate hygiene as needed including keeping skin clean and dry  - Evaluate need for skin moisturizer/barrier cream  - Collaborate with interdisciplinary team   - Patient/family teaching  - Consider wound care consult   1/28/2022 0906 by Jennifer Baires RN  Outcome: Progressing  1/28/2022 0906 by Jennifer Baires RN  Outcome: Progressing     Problem: Neurological Deficit  Goal: Neurological status is stable or improving  Description: Interventions:  - Monitor and assess patient's level of consciousness, motor function, sensory function, and level of assistance needed for ADLs  - Monitor and report changes from baseline  Collaborate with interdisciplinary team to initiate plan and implement interventions as ordered  - Provide and maintain a safe environment  - Consider seizure precautions    - Consider fall precautions  - Consider aspiration precautions  - Consider bleeding precautions  1/28/2022 0906 by Rodriguez Lanier RN  Outcome: Progressing  1/28/2022 0906 by Rodriguez Lanier RN  Outcome: Progressing     Problem: Activity Intolerance/Impaired Mobility  Goal: Mobility/activity is maintained at optimum level for patient  Description: Interventions:  - Assess and monitor patient  barriers to mobility and need for assistive/adaptive devices  - Assess patient's emotional response to limitations  - Collaborate with interdisciplinary team and initiate plans and interventions as ordered  - Encourage independent activity per ability   - Maintain proper body alignment  - Perform active/passive rom as tolerated/ordered  - Plan activities to conserve energy   - Turn patient as appropriate  1/28/2022 0906 by Rodriguez Lanier RN  Outcome: Progressing  1/28/2022 0906 by Rodriguez Lanier RN  Outcome: Progressing     Problem: Communication Impairment  Goal: Ability to express needs and understand communication  Description: Assess patient's communication skills and ability to understand information  Patient will demonstrate use of effective communication techniques, alternative methods of communication and understanding even if not able to speak  - Encourage communication and provide alternate methods of communication as needed  - Collaborate with case management/ for discharge needs  - Include patient/family/caregiver in decisions related to communication  1/28/2022 0906 by Rodriguez Lanier RN  Outcome: Progressing  1/28/2022 0906 by Rodriguez Lanier RN  Outcome: Progressing     Problem: Potential for Aspiration  Goal: Non-ventilated patient's risk of aspiration is minimized  Description: Assess and monitor vital signs, respiratory status, and labs (WBC)  Monitor for signs of aspiration (tachypnea, cough, rales, wheezing, cyanosis, fever)      - Assess and monitor patient's ability to swallow  - Place patient up in chair to eat if possible  - HOB up at 90 degrees to eat if unable to get patient up into chair   - Supervise patient during oral intake  - Instruct patient/ family to take small bites  - Instruct patient/ family to take small single sips when taking liquids  - Follow patient-specific strategies generated by speech pathologist   1/28/2022 0906 by Alexandrea Chacon RN  Outcome: Progressing  1/28/2022 0906 by Alexandrea Chacon RN  Outcome: Progressing     Problem: Nutrition  Goal: Nutrition/Hydration status is improving  Description: Monitor and assess patient's nutrition/hydration status for malnutrition (ex- brittle hair, bruises, dry skin, pale skin and conjunctiva, muscle wasting, smooth red tongue, and disorientation)  Collaborate with interdisciplinary team and initiate plan and interventions as ordered  Monitor patient's weight and dietary intake as ordered or per policy  Utilize nutrition screening tool and intervene per policy  Determine patient's food preferences and provide high-protein, high-caloric foods as appropriate  - Assist patient with eating   - Allow adequate time for meals   - Encourage patient to take dietary supplement as ordered  - Collaborate with clinical nutritionist   - Include patient/family/caregiver in decisions related to nutrition  1/28/2022 0906 by Alexandrea Chacon RN  Outcome: Progressing  1/28/2022 0906 by Alexandrea Chacon RN  Outcome: Progressing     Problem: Nutrition/Hydration-ADULT  Goal: Nutrient/Hydration intake appropriate for improving, restoring or maintaining nutritional needs  Description: Monitor and assess patient's nutrition/hydration status for malnutrition  Collaborate with interdisciplinary team and initiate plan and interventions as ordered  Monitor patient's weight and dietary intake as ordered or per policy  Utilize nutrition screening tool and intervene as necessary   Determine patient's food preferences and provide high-protein, high-caloric foods as appropriate       INTERVENTIONS:  - Monitor oral intake, urinary output, labs, and treatment plans  - Assess nutrition and hydration status and recommend course of action  - Evaluate amount of meals eaten  - Assist patient with eating if necessary   - Allow adequate time for meals  - Recommend/ encourage appropriate diets, oral nutritional supplements, and vitamin/mineral supplements  - Order, calculate, and assess calorie counts as needed  - Recommend, monitor, and adjust tube feedings based on assessed needs  - Assess need for intravenous fluids  - Provide specific nutrition/hydration education as appropriate  - Include patient/family/caregiver in decisions related to nutrition  1/28/2022 0906 by Damon Modi RN  Outcome: Progressing  1/28/2022 0906 by Damon Modi RN  Outcome: Progressing     Problem: PAIN - ADULT  Goal: Verbalizes/displays adequate comfort level or baseline comfort level  Description: Interventions:  - Encourage patient to monitor pain and request assistance  - Assess pain using appropriate pain scale  - Administer analgesics based on type and severity of pain and evaluate response  - Implement non-pharmacological measures as appropriate and evaluate response  - Consider cultural and social influences on pain and pain management  - Notify physician/advanced practitioner if interventions unsuccessful or patient reports new pain  1/28/2022 0906 by Damon Modi RN  Outcome: Progressing  1/28/2022 0906 by Damon Modi RN  Outcome: Progressing     Problem: INFECTION - ADULT  Goal: Absence or prevention of progression during hospitalization  Description: INTERVENTIONS:  - Assess and monitor for signs and symptoms of infection  - Monitor lab/diagnostic results  - Monitor all insertion sites, i e  indwelling lines, tubes, and drains  - Monitor endotracheal if appropriate and nasal secretions for changes in amount and color  - [place] appropriate cooling/warming therapies per order  - Administer medications as ordered  - Instruct and encourage patient and family to use good hand hygiene technique  - Identify and instruct in appropriate isolation precautions for identified infection/condition  1/28/2022 0906 by Angelina Tidwell RN  Outcome: Progressing  1/28/2022 0906 by Angelina Tidwell RN  Outcome: Progressing  Goal: Absence of fever/infection during neutropenic period  Description: INTERVENTIONS:  - Monitor WBC    1/28/2022 0906 by Angelina Tidwell RN  Outcome: Progressing  1/28/2022 0906 by Angelina Tidwell RN  Outcome: Progressing     Problem: SAFETY ADULT  Goal: Maintain or return to baseline ADL function  Description: INTERVENTIONS:  -  Assess patient's ability to carry out ADLs; assess patient's baseline for ADL function and identify physical deficits which impact ability to perform ADLs (bathing, care of mouth/teeth, toileting, grooming, dressing, etc )  - Assess/evaluate cause of self-care deficits   - Assess range of motion  - Assess patient's mobility; develop plan if impaired  - Assess patient's need for assistive devices and provide as appropriate  - Encourage maximum independence but intervene and supervise when necessary  - Involve family in performance of ADLs  - Assess for home care needs following discharge   - Consider OT consult to assist with ADL evaluation and planning for discharge  - Provide patient education as appropriate  1/28/2022 0906 by Angelina Tidwell RN  Outcome: Progressing  1/28/2022 0906 by Angelina Tidwell RN  Outcome: Progressing  Goal: Maintains/Returns to pre admission functional level  Description: INTERVENTIONS:  - Perform BMAT or MOVE assessment daily    - Set and communicate daily mobility goal to care team and patient/family/caregiver  - Collaborate with rehabilitation services on mobility goals if consulted  - Perform Range of Motion  times a day  - Reposition patient every  hours    - Dangle patient  times a day  - Stand patient  times a day  - Ambulate patient  times a day  - Out of bed to chair  times a day   - Out of bed for meals  times a day  - Out of bed for toileting  - Record patient progress and toleration of activity level   1/28/2022 0906 by Keaton Peña RN  Outcome: Progressing  1/28/2022 0906 by Keaton Peña RN  Outcome: Progressing  Goal: Patient will remain free of falls  Description: INTERVENTIONS:  - Educate patient/family on patient safety including physical limitations  - Instruct patient to call for assistance with activity   - Consult OT/PT to assist with strengthening/mobility   - Keep Call bell within reach  - Keep bed low and locked with side rails adjusted as appropriate  - Keep care items and personal belongings within reach  - Initiate and maintain comfort rounds  - Make Fall Risk Sign visible to staff  - Offer Toileting every  Hours, in advance of need  - Initiate/Maintain alarm  - Obtain necessary fall risk management equipmen  - Apply yellow socks and bracelet for high fall risk patients  - Consider moving patient to room near nurses station  1/28/2022 0906 by Keaton Peña RN  Outcome: Progressing  1/28/2022 0906 by Keaton Peña RN  Outcome: Progressing     Problem: DISCHARGE PLANNING  Goal: Discharge to home or other facility with appropriate resources  Description: INTERVENTIONS:  - Identify barriers to discharge w/patient and caregiver  - Arrange for needed discharge resources and transportation as appropriate  - Identify discharge learning needs (meds, wound care, etc )  - Arrange for interpretive services to assist at discharge as needed  - Refer to Case Management Department for coordinating discharge planning if the patient needs post-hospital services based on physician/advanced practitioner order or complex needs related to functional status, cognitive ability, or social support system  1/28/2022 0906 by Keaton Peña RN  Outcome: Progressing  1/28/2022 0906 by Camacho Walker RN  Outcome: Progressing     Problem: Knowledge Deficit  Goal: Patient/family/caregiver demonstrates understanding of disease process, treatment plan, medications, and discharge instructions  Description: Complete learning assessment and assess knowledge base    Interventions:  - Provide teaching at level of understanding  - Provide teaching via preferred learning methods  1/28/2022 0906 by Camacho Walker RN  Outcome: Progressing  1/28/2022 0906 by Camacho Walker RN  Outcome: Progressing

## 2022-01-28 NOTE — PROGRESS NOTES
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 Missed 1 dose at home  keppra was decreased from 1500 mg to 1000 mg 6 months ago in July 2021  · Repeat brain MRI without acute abnormality  · Neurology is following    Acute Encephalopathy  Assessment & Plan  Brain MRI x2 without acute stroke  · Has been seizure-free when taken off video EEG, off restrains  · gabapentin 100 mg at bedtime was added  · Improving    Fever  Assessment & Plan  · Had on admission, suspicion of community-acquired pneumonia  · Rxed with 5 days of Rocephin in ICU  · Fever of 101 1/23    WBC normal, procalcitonin 0 23  · Chest x-ray with atelectasis  · Negative COVID/flu/RSV  · Likely secondary to atelectasis  · Continue to monitor fever, WBC  · Monitor for aspiration, aspiration precautions  · Out of bed, incentive spirometry  · PT OT  · Resolved    Subdural hematoma ([address] )  Assessment & Plan  Secondary to unwitnessed fall on 01/26  Evaluated by Neurosurgery, and change on repeat head CT scan  Recommendation note AC/AP medication x2 weeks  On seizure prophylaxis  Cleared by Neurosurgery to discharge to rehab no need for follow-up  Monitor    Unwitnessed fall  Assessment & Plan  Patient fell in the hospital on 1/26 with left forehead pain over hematoma  Rapid response was called, he was cleared by rapid response team without  need for head CT scan at that time  Cleared by Neurosurgery to discharge  to rehab  Awaiting rehab placement    Dysphagia  Assessment & Plan  · NG tube was removed  · Started on dysphagia diet level 1  · Speech is following    Urinary retention  Assessment & Plan  · Looks like he had a Franklin inserted on , removed , reinserted  midnight  Suspect due to retention  · On doxazosin  · Franklin catheter was removed on   · Resolved    Aneurysm of abdominal aorta (HCC)  Assessment & Plan  S/p EVAR      VTE Pharmacologic Prophylaxis: VTE Score: 13 High Risk (Score >/= 5) - Pharmacological DVT Prophylaxis Ordered: heparin  Sequential Compression Devices Ordered  Patient Centered Rounds: I performed bedside rounds with nursing staff today  Discussions with Specialists or Other Care Team Provider: NS    Education and Discussions with Family / Patient: Updated  (daughter) via phone  Time Spent for Care: 45 minutes  More than 50% of total time spent on counseling and coordination of care as described above  Current Length of Stay: 10 day(s)  Current Patient Status: Inpatient   Certification Statement: The patient will continue to require additional inpatient hospital stay due to Awaiting placement  Discharge Plan: Awaiting rehab placement    Code Status: Level 1 - Full Code    Subjective:   Patient seen and examined  Comfortable sitting in chair  No headache  No nausea vomiting or diarrhea    Objective:     Vitals:   Temp (24hrs), Av 6 °F (37 °C), Min:98 1 °F (36 7 °C), Max:99 °F (37 2 °C)    Temp:  [98 1 °F (36 7 °C)-99 °F (37 2 °C)] 98 1 °F (36 7 °C)  HR:  [82-89] 87  Resp:  [16-19] 19  BP: ()/(56-66) 97/56  SpO2:  [95 %-97 %] 97 %  Body mass index is 27 24 kg/m²  Input and Output Summary (last 24 hours):      Intake/Output Summary (Last 24 hours) at 2022 1150  Last data filed at 2022 0813  Gross per 24 hour   Intake 300 ml   Output 900 ml   Net -600 ml       Physical Exam:   Physical Exam   Patient is awake alert in no acute distress  Comfortable sitting in chair  Lung clear to auscultation bilateral  Heart positive S1-S2 no murmur  Abdomen soft nontender  Lower extremities no edema    Additional Data:     Labs:  Results from last 7 days   Lab Units 01/25/22  0521   WBC Thousand/uL 7 42   HEMOGLOBIN g/dL 12 4   HEMATOCRIT % 39 0   PLATELETS Thousands/uL 151   NEUTROS PCT % 73   LYMPHS PCT % 10*   MONOS PCT % 12   EOS PCT % 4     Results from last 7 days   Lab Units 01/25/22  0521 01/23/22  0818 01/22/22  0422   SODIUM mmol/L 144   < > 146*   POTASSIUM mmol/L 4 1   < > 4 1   CHLORIDE mmol/L 110*   < > 114*   CO2 mmol/L 30   < > 31   BUN mg/dL 17   < > 14   CREATININE mg/dL 0 82   < > 0 78   ANION GAP mmol/L 4   < > 1*   CALCIUM mg/dL 8 9   < > 8 3   ALBUMIN g/dL  --   --  2 2*   TOTAL BILIRUBIN mg/dL  --   --  0 64   ALK PHOS U/L  --   --  57   ALT U/L  --   --  24   AST U/L  --   --  37   GLUCOSE RANDOM mg/dL 109   < > 112    < > = values in this interval not displayed           Results from last 7 days   Lab Units 01/24/22  1327 01/24/22  0754 01/23/22  2059 01/23/22  1802 01/23/22  1118 01/23/22  0402 01/23/22  0122 01/23/22  0044 01/23/22  0022 01/22/22  1729 01/22/22  1145 01/22/22  0555   POC GLUCOSE mg/dl 115 109 105 89 86 85 96 68 68 81 112 113         Results from last 7 days   Lab Units 01/25/22  0521 01/24/22  1424   PROCALCITONIN ng/ml 0 22 0 23       Lines/Drains:  Invasive Devices  Report    Peripheral Intravenous Line            Peripheral IV 01/26/22 Right Wrist 2 days                      Imaging:  Reviewed    Recent Cultures (last 7 days):         Last 24 Hours Medication List:   Current Facility-Administered Medications   Medication Dose Route Frequency Provider Last Rate    acetaminophen  975 mg Oral [facility] Richelle Martinez DO      chlorhexidine  15 mL Mouth/Throat Q12H [facility] Mikal Napier MD      doxazosin  1 mg Oral Daily Mikal Napier MD      folic acid  1 mg Oral Daily Mikal Napier MD     [facility] gabapentin  100 mg Oral HS Aiden Forrester MD      heparin (porcine)  5,000 Units Subcutaneous [facility] Dione Lipoma, DO      levETIRAcetam  1,250 mg Oral HS BRIANA Rios      lidocaine  1 patch Topical Daily Rhoderick Lipoma, DO      loperamide  4 mg Oral 4x Daily PRN Dione Lipoma, DO      nystatin   Topical BID Dione Lipoma, DO      OLANZapine  2 5 mg Intramuscular TID PRN Aiden Forrester MD      oxyCODONE  2 5 mg Oral Q4H PRN Dione Lassiteroma, DO      pravastatin  80 mg Oral Daily With Jaswant Morris MD      thiamine  100 mg Oral Daily Ry Vaca MD          Today, Patient Was Seen By: Dione Meyer DO    **Please Note: This note may have been constructed using a voice recognition system  **

## 2022-01-28 NOTE — ASSESSMENT & PLAN NOTE
Patient fell in the hospital on 1/26 with left forehead pain over hematoma  Rapid response was called, he was cleared by rapid response team without  need for head CT scan at that time  Cleared by Neurosurgery to discharge  to rehab  Awaiting rehab placement

## 2022-01-28 NOTE — PLAN OF CARE
Problem: OCCUPATIONAL THERAPY ADULT  Goal: Performs self-care activities at highest level of function for planned discharge setting  See evaluation for individualized goals  Description: Treatment Interventions: ADL retraining          See flowsheet documentation for full assessment, interventions and recommendations  Outcome: Progressing  Note: Limitation: Decreased ADL status,Decreased endurance,Decreased self-care trans,Decreased high-level ADLs,Decreased fine motor control,Visual deficit,Decreased cognition,Decreased Safe judgement during ADL,Decreased UE strength  Prognosis: Fair  Assessment: Pt seen for Occupational Therapy session with focus on activity tolerance, functional transfers/mob, standing tolerance and balance for pt engagement in Ub/LB self-care tasks and toileting/toilet transfers    Pt pleasant and cooperative with all therapy requests  Pt was seen in two session 2* pt later requested assist to utilize bathroom/for BM  Pt cleared by EWELINA/Cami for pt participated in OT session  Sybil Treutlen Pt presented sitting out of bed to bedside chair and agreeable to participate in therapy following pt identifiers confirmed  Pt noted for improved functional transfers/mob since previous OT session  Pt will require post acute rehab service to continue to address pt deficit which currently impair pt ADL and functional mob  Pt returned to bedside chair post session (both sessions)  chair alarm activated and all needs within reach  OT Discharge Recommendation: Post acute rehabilitation services  OT - OK to Discharge:  Yes

## 2022-01-28 NOTE — ASSESSMENT & PLAN NOTE
Secondary to unwitnessed fall on 01/26  Evaluated by Neurosurgery, and change on repeat head CT scan  Recommendation note AC/AP medication x2 weeks  On seizure prophylaxis  Cleared by Neurosurgery to discharge to rehab no need for follow-up  Monitor

## 2022-01-28 NOTE — ASSESSMENT & PLAN NOTE
· Had on admission, suspicion of community-acquired pneumonia  · Rxed with 5 days of Rocephin in ICU  · Fever of 101 1/23    WBC normal, procalcitonin 0 23  · Chest x-ray with atelectasis  · Negative COVID/flu/RSV  · Likely secondary to atelectasis  · Continue to monitor fever, WBC  · Monitor for aspiration, aspiration precautions  · Out of bed, incentive spirometry  · PT OT  · Resolved

## 2022-01-28 NOTE — ARC ADMISSION
Referral received for consideration of patient for inpatient acute rehab  Notified by CM that patient is discharging to [facility]  Patient's case closed out

## 2022-01-29 PROBLEM — U07.1 COVID-19: Status: ACTIVE | Noted: 2022-01-29

## 2022-01-29 PROCEDURE — 99233 SBSQ HOSP IP/OBS HIGH 50: CPT | Performed by: INTERNAL MEDICINE

## 2022-01-29 RX ADMIN — NYSTATIN: 100000 POWDER TOPICAL at 18:48

## 2022-01-29 RX ADMIN — FOLIC ACID 1 MG: 1 TABLET ORAL at 11:57

## 2022-01-29 RX ADMIN — ACETAMINOPHEN 975 MG: 325 TABLET, FILM COATED ORAL at 22:06

## 2022-01-29 RX ADMIN — LEVETIRACETAM 1250 MG: 750 TABLET, FILM COATED ORAL at 22:05

## 2022-01-29 RX ADMIN — HEPARIN SODIUM 5000 UNITS: 5000 INJECTION INTRAVENOUS; SUBCUTANEOUS at 22:06

## 2022-01-29 RX ADMIN — ACETAMINOPHEN 975 MG: 325 TABLET, FILM COATED ORAL at 13:22

## 2022-01-29 RX ADMIN — LIDOCAINE 5% 1 PATCH: 700 PATCH TOPICAL at 11:37

## 2022-01-29 RX ADMIN — HEPARIN SODIUM 5000 UNITS: 5000 INJECTION INTRAVENOUS; SUBCUTANEOUS at 05:51

## 2022-01-29 RX ADMIN — DOXAZOSIN 1 MG: 1 TABLET ORAL at 11:37

## 2022-01-29 RX ADMIN — NYSTATIN: 100000 POWDER TOPICAL at 10:22

## 2022-01-29 RX ADMIN — PRAVASTATIN SODIUM 80 MG: 80 TABLET ORAL at 18:40

## 2022-01-29 RX ADMIN — ACETAMINOPHEN 975 MG: 325 TABLET, FILM COATED ORAL at 05:50

## 2022-01-29 RX ADMIN — CHLORHEXIDINE GLUCONATE 15 ML: 1.2 SOLUTION ORAL at 11:38

## 2022-01-29 RX ADMIN — CHLORHEXIDINE GLUCONATE 15 ML: 1.2 SOLUTION ORAL at 22:00

## 2022-01-29 RX ADMIN — GABAPENTIN 100 MG: 100 CAPSULE ORAL at 22:06

## 2022-01-29 RX ADMIN — HEPARIN SODIUM 5000 UNITS: 5000 INJECTION INTRAVENOUS; SUBCUTANEOUS at 13:22

## 2022-01-29 RX ADMIN — THIAMINE HCL TAB 100 MG 100 MG: 100 TAB at 11:57

## 2022-01-29 NOTE — PROGRESS NOTES
[facility]  Progress Note Cher Shay 1940, 80 y o  male MRN: [id number]  Unit/Bed#: [facility] 712-01 Encounter: [id number]  Primary Care Provider: Yolanda Chatman MD   Date and time admitted to hospital: 1/18/2022 11:46 PM    * Seizure [facility])  Assessment & Plan  · Presented with right facial droop, f/b mutliple seizures  · Was a stroke alert  · MRI - limited study was negative  · Was intubated placed on VEEG in ICU -> received keppra, depacon  · Now Sz free, last Sz on 1/19  · Cont keppra 1500 mg BID, depacon 500 mg q 8 hr  · Etiology for Sz unclear - ? Missed 1 dose at home   keppra was decreased from 1500 mg to 1000 mg 6 months ago in July 2021  · Repeat brain MRI without acute abnormality  · Neurology is following    Acute encephalopathy  Assessment & Plan  Brain MRI x2 without acute stroke  · Has been seizure-free when taken off video EEG, off restrains  · gabapentin 100 mg at bedtime was added  · Improving    COVID-19  Assessment & Plan  Patient tested positive on 1/28 on screening before discharge to rehab  Was negative on 01/25  Patient asymptomatic, on room air, afebrile without leukocytosis  Continue isolation  Continue supportive care    [address] hemat[address] ([facility] [address] )  Assessment & Plan  Secondary to unwitnessed fall on 01/26  Evaluated by Neurosurgery, and change on repeat head CT scan  Recommendation note AC/AP medication x2 weeks  On seizure prophylaxis  Cleared by Neurosurgery to discharge to rehab no need for follow-up  Monitor    Unwitnessed fall  Assessment & Plan  Patient fell in the hospital on 1/26 with left forehead pain over hematoma  Rapid response was called, he was cleared by rapid response team without  need for head CT scan at that time  Cleared by Neurosurgery to discharge  to rehab  Awaiting rehab placement    Dysphagia  Assessment & Plan  · NG tube was removed  · Started on dysphagia diet level 1  · Speech is following    Aneurysm of abdominal aorta [facility])  Assessment & Plan  S/p EVAR      VTE Pharmacologic Prophylaxis: VTE Score: 13 High Risk (Score >/= 5) - Pharmacological DVT Prophylaxis Ordered: heparin  Sequential Compression Devices Ordered  Patient Centered Rounds: I performed bedside rounds with nursing staff today  Discussions with Specialists or Other Care Team Provider:     Education and Discussions with Family / Patient: Updated  (daughter) via phone  Yesterday    Time Spent for Care: 45 minutes  More than 50% of total time spent on counseling and coordination of care as described above  Current Length of Stay: 11 day(s)  Current Patient Status: Inpatient   Certification Statement: The patient will continue to require additional inpatient hospital stay due to Awaiting rehab placement  Discharge Plan: Awaiting rehab placement    Code Status: Level 1 - Full Code    Subjective:   Patient seen and examined  Comfortable in bed  No chest pain or shortness of breath  No cough  No fever or chills  Tested positive on  for COVID-19 on screening test    Objective:     Vitals:   Temp (24hrs), Av 4 °F (36 9 °C), Min:97 3 °F (36 3 °C), Max:99 7 °F (37 6 °C)    Temp:  [97 3 °F (36 3 °C)-99 7 °F (37 6 °C)] 98 2 °F (36 8 °C)  HR:  [] 94  Resp:  [16-21] 21  BP: (113-124)/(64-72) 120/64  SpO2:  [98 %] 98 %  Body mass index is 27 24 kg/m²  Input and Output Summary (last 24 hours):      Intake/Output Summary (Last 24 hours) at 2022 0932  Last data filed at 2022  Gross per 24 hour   Intake 340 ml   Output --   Net 340 ml       Physical Exam:   Physical Exam   Patient is awake alert in no acute distress  Comfortable sitting in chair  Lung clear to auscultation bilateral  Heart positive S1-S2 no murmur  Abdomen soft nontender  Lower extremities no edema       Additional Data:     Labs:  Results from last 7 days   Lab Units 22  0521   WBC Thousand/uL 7 42   HEMOGLOBIN g/dL 12 4   HEMATOCRIT % 39 0   PLATELETS Thousands/uL 151   NEUTROS PCT % 73   LYMPHS PCT % 10*   MONOS PCT % 12   EOS PCT % 4     Results from last 7 days   Lab Units 01/25/22  0521   SODIUM mmol/L 144   POTASSIUM mmol/L 4 1   CHLORIDE mmol/L 110*   CO2 mmol/L 30   BUN mg/dL 17   CREATININE mg/dL 0 82   ANION GAP mmol/L 4   CALCIUM mg/dL 8 9   GLUCOSE RANDOM mg/dL 109         Results from last 7 days   Lab Units 01/24/22  1327 01/24/22  0754 01/23/22  2059 01/23/22  1802 01/23/22  1118 01/23/22  0402 01/23/22  0122 01/23/22  0044 01/23/22  0022 01/22/22  1729 01/22/22  1145   POC GLUCOSE mg/dl 115 109 105 89 86 85 96 68 68 81 112         Results from last 7 days   Lab Units 01/25/22  0521 01/24/22  1424   PROCALCITONIN ng/ml 0 22 0 23       Lines/Drains:  Invasive Devices  Report    Peripheral Intravenous Line            Peripheral IV 01/26/22 Right Wrist 3 days                      Imaging:  Reviewed    Recent Cultures (last 7 days):         Last 24 Hours Medication List:   Current Facility-Administered Medications   Medication Dose Route Frequency Provider Last Rate    acetaminophen  975 mg Oral [facility] Felix Dalton DO      chlorhexidine  15 mL Mouth/Throat Q12H [facility] Allison Lagunas MD      doxazosin  1 mg Oral Daily Allison Lagunas MD      folic acid  1 mg Oral Daily Allison Lagunas MD      gabapentin  100 mg Oral HS Uvaldo Hutchinson MD      heparin (porcine)  5,000 Units Subcutaneous [facility] Felix Dalton DO      levETIRAcetam  1,250 mg Oral HS BRIANA Rios      lidocaine  1 patch Topical Daily Felix Dalton DO      loperamide  4 mg Oral 4x Daily PRN Felix Dlaton DO      nystatin   Topical BID Felix Dalton DO      OLANZapine  2 5 mg Intramuscular TID PRN Uvaldo Hutchinson MD      oxyCODONE  2 5 mg Oral Q4H PRN Felix Dalton DO      pravastatin  80 mg Oral Daily With Debbie Ritter MD      thiamine  100 mg Oral Daily Allison Lagunas MD          Today, Patient Was Seen By: Felix Dalton DO    **Please Note: This note may have been constructed using a voice recognition system  **

## 2022-01-29 NOTE — PLAN OF CARE
Problem: MOBILITY - ADULT  Goal: Maintain or return to baseline ADL function  Description: INTERVENTIONS:  -  Assess patient's ability to carry out ADLs; assess patient's baseline for ADL function and identify physical deficits which impact ability to perform ADLs (bathing, care of mouth/teeth, toileting, grooming, dressing, etc )  - Assess/evaluate cause of self-care deficits   - Assess range of motion  - Assess patient's mobility; develop plan if impaired  - Assess patient's need for assistive devices and provide as appropriate  - Encourage maximum independence but intervene and supervise when necessary  - Involve family in performance of ADLs  - Assess for home care needs following discharge   - Consider OT consult to assist with ADL evaluation and planning for discharge  - Provide patient education as appropriate  Outcome: Progressing  Goal: Maintains/Returns to pre admission functional level  Description: INTERVENTIONS:  - Perform BMAT or MOVE assessment daily    - Set and communicate daily mobility goal to care team and patient/family/caregiver     - Out of bed for toileting  - Record patient progress and toleration of activity level   Outcome: Progressing

## 2022-01-29 NOTE — ASSESSMENT & PLAN NOTE
Patient tested positive on 1/28 on screening before discharge to rehab  Was negative on 01/25  Patient asymptomatic, on room air, afebrile without leukocytosis  Continue isolation  Continue supportive care

## 2022-01-30 LAB
ALBUMIN SERPL BCP-MCNC: 2.4 G/DL (ref 3.5–5)
ALP SERPL-CCNC: 60 U/L (ref 46–116)
ALT SERPL W P-5'-P-CCNC: 72 U/L (ref 12–78)
ANION GAP SERPL CALCULATED.3IONS-SCNC: 3 MMOL/L (ref 4–13)
AST SERPL W P-5'-P-CCNC: 57 U/L (ref 5–45)
BASOPHILS # BLD AUTO: 0.02 THOUSANDS/ΜL (ref 0–0.1)
BASOPHILS NFR BLD AUTO: 0 % (ref 0–1)
BILIRUB SERPL-MCNC: 0.62 MG/DL (ref 0.2–1)
BUN SERPL-MCNC: 19 MG/DL (ref 5–25)
CALCIUM ALBUM COR SERPL-MCNC: 9.6 MG/DL (ref 8.3–10.1)
CALCIUM SERPL-MCNC: 8.3 MG/DL (ref 8.3–10.1)
CHLORIDE SERPL-SCNC: 110 MMOL/L (ref 100–108)
CO2 SERPL-SCNC: 29 MMOL/L (ref 21–32)
CREAT SERPL-MCNC: 1.01 MG/DL (ref 0.6–1.3)
EOSINOPHIL # BLD AUTO: 0.25 THOUSAND/ΜL (ref 0–0.61)
EOSINOPHIL NFR BLD AUTO: 4 % (ref 0–6)
ERYTHROCYTE [DISTWIDTH] IN BLOOD BY AUTOMATED COUNT: 12.1 % (ref 11.6–15.1)
GFR SERPL CREATININE-BSD FRML MDRD: 70 ML/MIN/1.73SQ M
GLUCOSE SERPL-MCNC: 89 MG/DL (ref 65–140)
HCT VFR BLD AUTO: 38.4 % (ref 36.5–49.3)
HGB BLD-MCNC: 11.9 G/DL (ref 12–17)
IMM GRANULOCYTES # BLD AUTO: 0.05 THOUSAND/UL (ref 0–0.2)
IMM GRANULOCYTES NFR BLD AUTO: 1 % (ref 0–2)
LYMPHOCYTES # BLD AUTO: 1.15 THOUSANDS/ΜL (ref 0.6–4.47)
LYMPHOCYTES NFR BLD AUTO: 17 % (ref 14–44)
MAGNESIUM SERPL-MCNC: 2.4 MG/DL (ref 1.6–2.6)
MCH RBC QN AUTO: 30.9 PG (ref 26.8–34.3)
MCHC RBC AUTO-ENTMCNC: 31 G/DL (ref 31.4–37.4)
MCV RBC AUTO: 100 FL (ref 82–98)
MONOCYTES # BLD AUTO: 0.89 THOUSAND/ΜL (ref 0.17–1.22)
MONOCYTES NFR BLD AUTO: 13 % (ref 4–12)
NEUTROPHILS # BLD AUTO: 4.55 THOUSANDS/ΜL (ref 1.85–7.62)
NEUTS SEG NFR BLD AUTO: 65 % (ref 43–75)
NRBC BLD AUTO-RTO: 0 /100 WBCS
PLATELET # BLD AUTO: 250 THOUSANDS/UL (ref 149–390)
PMV BLD AUTO: 10.8 FL (ref 8.9–12.7)
POTASSIUM SERPL-SCNC: 4.4 MMOL/L (ref 3.5–5.3)
PROT SERPL-MCNC: 6.2 G/DL (ref 6.4–8.2)
RBC # BLD AUTO: 3.85 MILLION/UL (ref 3.88–5.62)
SODIUM SERPL-SCNC: 142 MMOL/L (ref 136–145)
WBC # BLD AUTO: 6.91 THOUSAND/UL (ref 4.31–10.16)

## 2022-01-30 PROCEDURE — 80053 COMPREHEN METABOLIC PANEL: CPT | Performed by: INTERNAL MEDICINE

## 2022-01-30 PROCEDURE — 83735 ASSAY OF MAGNESIUM: CPT | Performed by: INTERNAL MEDICINE

## 2022-01-30 PROCEDURE — 99233 SBSQ HOSP IP/OBS HIGH 50: CPT | Performed by: INTERNAL MEDICINE

## 2022-01-30 PROCEDURE — 85025 COMPLETE CBC W/AUTO DIFF WBC: CPT | Performed by: INTERNAL MEDICINE

## 2022-01-30 RX ADMIN — HEPARIN SODIUM 5000 UNITS: 5000 INJECTION INTRAVENOUS; SUBCUTANEOUS at 22:45

## 2022-01-30 RX ADMIN — LEVETIRACETAM 1250 MG: 750 TABLET, FILM COATED ORAL at 22:44

## 2022-01-30 RX ADMIN — ACETAMINOPHEN 975 MG: 325 TABLET, FILM COATED ORAL at 22:44

## 2022-01-30 RX ADMIN — HEPARIN SODIUM 5000 UNITS: 5000 INJECTION INTRAVENOUS; SUBCUTANEOUS at 15:21

## 2022-01-30 RX ADMIN — GABAPENTIN 100 MG: 100 CAPSULE ORAL at 22:44

## 2022-01-30 RX ADMIN — ACETAMINOPHEN 975 MG: 325 TABLET, FILM COATED ORAL at 15:48

## 2022-01-30 RX ADMIN — LIDOCAINE 5% 1 PATCH: 700 PATCH TOPICAL at 08:45

## 2022-01-30 RX ADMIN — DOXAZOSIN 1 MG: 1 TABLET ORAL at 08:42

## 2022-01-30 RX ADMIN — HEPARIN SODIUM 5000 UNITS: 5000 INJECTION INTRAVENOUS; SUBCUTANEOUS at 06:21

## 2022-01-30 RX ADMIN — CHLORHEXIDINE GLUCONATE 15 ML: 1.2 SOLUTION ORAL at 08:45

## 2022-01-30 RX ADMIN — CHLORHEXIDINE GLUCONATE 15 ML: 1.2 SOLUTION ORAL at 22:45

## 2022-01-30 RX ADMIN — PRAVASTATIN SODIUM 80 MG: 80 TABLET ORAL at 19:55

## 2022-01-30 RX ADMIN — ACETAMINOPHEN 975 MG: 325 TABLET, FILM COATED ORAL at 06:21

## 2022-01-30 RX ADMIN — NYSTATIN: 100000 POWDER TOPICAL at 08:49

## 2022-01-30 RX ADMIN — FOLIC ACID 1 MG: 1 TABLET ORAL at 08:42

## 2022-01-30 RX ADMIN — THIAMINE HCL TAB 100 MG 100 MG: 100 TAB at 08:42

## 2022-01-30 NOTE — ASSESSMENT & PLAN NOTE
· Presented with right facial droop, f/b mutliple seizures  · Was a stroke alert  · MRI - limited study was negative  · Was intubated placed on VEEG in ICU -> received keppra, depacon  · Now Sz free, last Sz on 1/19  · Cont keppra 1500 mg BID, depacon 500 mg q 8 hr  · Etiology for Sz unclear - ? Missed 1 dose at home   keppra was decreased from 1500 mg to 1000 mg 6 months ago in July 2021  · Repeat brain MRI without acute abnormality  · Neurology is following  · Awaiting rehab placement

## 2022-01-30 NOTE — PROGRESS NOTES
[address] [facility]  Progress Note Lala Autumn 1940, 80 y o  male MRN: [id number]  Unit/Bed#: [facility] 712-01 Encounter: [id number]  Primary Care Provider: Teri Johnson MD   Date and time admitted to hospital: 1/18/2022 11:46 PM    * Seizure [facility])  Assessment & Plan  · Presented with right facial droop, f/b mutliple seizures  · Was a stroke alert  · MRI - limited study was negative  · Was intubated placed on VEEG in ICU -> received keppra, depacon  · Now Sz free, last Sz on 1/19  · Cont keppra 1500 mg BID, depacon 500 mg q 8 hr  · Etiology for Sz unclear - ? Missed 1 dose at home   keppra was decreased from 1500 mg to 1000 mg 6 months ago in July 2021  · Repeat brain MRI without acute abnormality  · Neurology is following  · Awaiting rehab placement    Acute encephalopathy  Assessment & Plan  Brain MRI x2 without acute stroke  · Has been seizure-free when taken off video EEG, off restrains  · gabapentin 100 mg at bedtime was added  · Improving    COVID-19  Assessment & Plan  Patient tested positive on 1/28 on screening before discharge to rehab  Was negative on 01/25  Patient asymptomatic, on room air, afebrile without leukocytosis  Continue isolation  Continue supportive care    Subdural hematoma ([address] )  Assessment & Plan  Secondary to unwitnessed fall on 01/26  Evaluated by Neurosurgery, and change on repeat head CT scan  Recommendation note AC/AP medication x2 weeks  On seizure prophylaxis  Cleared by Neurosurgery to discharge to rehab no need for follow-up  Monitor    Unwitnessed fall  Assessment & Plan  Patient fell in the hospital on 1/26 with left forehead pain over hematoma  Rapid response was called, he was cleared by rapid response team without  need for head CT scan at that time  Cleared by Neurosurgery to discharge  to rehab  Awaiting rehab placement    Dysphagia  Assessment & Plan  · NG tube was removed  · Started on dysphagia diet level 1  · Speech is following    Aneurysm of abdominal aorta (HCC)  Assessment & Plan  S/p EVAR      VTE Pharmacologic Prophylaxis: VTE Score: 13 High Risk (Score >/= 5) - Pharmacological DVT Prophylaxis Ordered: heparin  Sequential Compression Devices Ordered  Patient Centered Rounds: I performed bedside rounds with nursing staff today  Discussions with Specialists or Other Care Team Provider:     Education and Discussions with Family / Patient: Updated  (daughter) via phone  Time Spent for Care: 45 minutes  More than 50% of total time spent on counseling and coordination of care as described above  Current Length of Stay: 12 day(s)  Current Patient Status: Inpatient   Certification Statement: The patient will continue to require additional inpatient hospital stay due to Awaiting rehab placement  Discharge Plan: Anticipate discharge tomorrow to rehab facility  Code Status: Level 1 - Full Code    Subjective:   Patient seen examined  Comfortable in bed  I feel better  Wants to go home    Objective:     Vitals:   Temp (24hrs), Av 9 °F (36 6 °C), Min:97 9 °F (36 6 °C), Max:97 9 °F (36 6 °C)    Temp:  [97 9 °F (36 6 °C)] 97 9 °F (36 6 °C)  HR:  [96] 96  BP: (104)/(65) 104/65  SpO2:  [97 %] 97 %  Body mass index is 27 24 kg/m²       Input and Output Summary (last 24 hours):   No intake or output data in the 24 hours ending 22 0921    Physical Exam:   Physical Exam   Patient is awake alert in no acute distress  Comfortable sitting in chair  Lung clear to auscultation bilateral  Heart positive S1-S2 no murmur  Abdomen soft nontender  Lower extremities no edema  Additional Data:     Labs:  Results from last 7 days   Lab Units 22  0653   WBC Thousand/uL 6 91   HEMOGLOBIN g/dL 11 9*   HEMATOCRIT % 38 4   PLATELETS Thousands/uL 250   NEUTROS PCT % 65   LYMPHS PCT % 17   MONOS PCT % 13*   EOS PCT % 4     Results from last 7 days   Lab Units 22  0653   SODIUM mmol/L 142   POTASSIUM mmol/L 4 4 CHLORIDE mmol/L 110*   CO2 mmol/L 29   BUN mg/dL 19   CREATININE mg/dL 1 01   ANION GAP mmol/L 3*   CALCIUM mg/dL 8 3   ALBUMIN g/dL 2 4*   TOTAL BILIRUBIN mg/dL 0 62   ALK PHOS U/L 60   ALT U/L 72   AST U/L 57*   GLUCOSE RANDOM mg/dL 89         Results from last 7 days   Lab Units 01/24/22  1327 01/24/22  0754 01/23/22  2059 01/23/22  1802 01/23/22  1118   POC GLUCOSE mg/dl 115 109 105 89 86         Results from last 7 days   Lab Units 01/25/22  0521 01/24/22  1424   PROCALCITONIN ng/ml 0 22 0 23       Lines/Drains:  Invasive Devices  Report    Peripheral Intravenous Line            Peripheral IV 01/26/22 Right Wrist 4 days    Peripheral IV 01/30/22 Left Forearm <1 day                      Imaging: No pertinent imaging reviewed  Recent Cultures (last 7 days):         Last 24 Hours Medication List:   Current Facility-Administered Medications   Medication Dose Route Frequency Provider Last Rate    acetaminophen  975 mg Oral [facility] Trevon Nair DO      chlorhexidine  15 mL Mouth/Throat Q12H [facility] Jaziel San MD      doxazosin  1 mg Oral Daily Jaziel San MD      folic acid  1 mg Oral Daily Jaziel San MD      gabapentin  100 mg Oral HS Lowell Young MD      heparin (porcine)  5,000 Units Subcutaneous [facility] Trevon Nair DO      levETIRAcetam  1,250 mg Oral HS BRIANA Rios      lidocaine  1 patch Topical Daily Trevon Nair DO      loperamide  4 mg Oral 4x Daily PRN Trevon Nair DO      nystatin   Topical BID Trevon Nair DO      OLANZapine  2 5 mg Intramuscular TID PRN Lowell Young MD      oxyCODONE  2 5 mg Oral Q4H PRN Trevon Nair DO      pravastatin  80 mg Oral Daily With Gaby Ervin MD      thiamine  100 mg Oral Daily Jaziel San MD          Today, Patient Was Seen By: Trevon Nair DO    **Please Note: This note may have been constructed using a voice recognition system  **

## 2022-01-31 PROCEDURE — 97535 SELF CARE MNGMENT TRAINING: CPT

## 2022-01-31 PROCEDURE — 97129 THER IVNTJ 1ST 15 MIN: CPT

## 2022-01-31 PROCEDURE — 99233 SBSQ HOSP IP/OBS HIGH 50: CPT | Performed by: INTERNAL MEDICINE

## 2022-01-31 PROCEDURE — 97530 THERAPEUTIC ACTIVITIES: CPT

## 2022-01-31 PROCEDURE — 92526 ORAL FUNCTION THERAPY: CPT

## 2022-01-31 PROCEDURE — 97116 GAIT TRAINING THERAPY: CPT

## 2022-01-31 PROCEDURE — 97130 THER IVNTJ EA ADDL 15 MIN: CPT

## 2022-01-31 RX ADMIN — ACETAMINOPHEN 975 MG: 325 TABLET, FILM COATED ORAL at 15:47

## 2022-01-31 RX ADMIN — ACETAMINOPHEN 975 MG: 325 TABLET, FILM COATED ORAL at 20:27

## 2022-01-31 RX ADMIN — FOLIC ACID 1 MG: 1 TABLET ORAL at 10:37

## 2022-01-31 RX ADMIN — PRAVASTATIN SODIUM 80 MG: 80 TABLET ORAL at 15:47

## 2022-01-31 RX ADMIN — DOXAZOSIN 1 MG: 1 TABLET ORAL at 10:37

## 2022-01-31 RX ADMIN — HEPARIN SODIUM 5000 UNITS: 5000 INJECTION INTRAVENOUS; SUBCUTANEOUS at 20:27

## 2022-01-31 RX ADMIN — THIAMINE HCL TAB 100 MG 100 MG: 100 TAB at 10:37

## 2022-01-31 RX ADMIN — LIDOCAINE 5% 1 PATCH: 700 PATCH TOPICAL at 10:38

## 2022-01-31 RX ADMIN — HEPARIN SODIUM 5000 UNITS: 5000 INJECTION INTRAVENOUS; SUBCUTANEOUS at 15:47

## 2022-01-31 RX ADMIN — GABAPENTIN 100 MG: 100 CAPSULE ORAL at 20:27

## 2022-01-31 RX ADMIN — ACETAMINOPHEN 975 MG: 325 TABLET, FILM COATED ORAL at 05:13

## 2022-01-31 RX ADMIN — CHLORHEXIDINE GLUCONATE 15 ML: 1.2 SOLUTION ORAL at 20:27

## 2022-01-31 RX ADMIN — LEVETIRACETAM 1250 MG: 750 TABLET, FILM COATED ORAL at 20:27

## 2022-01-31 RX ADMIN — CHLORHEXIDINE GLUCONATE 15 ML: 1.2 SOLUTION ORAL at 10:37

## 2022-01-31 RX ADMIN — HEPARIN SODIUM 5000 UNITS: 5000 INJECTION INTRAVENOUS; SUBCUTANEOUS at 05:14

## 2022-01-31 NOTE — PHYSICAL THERAPY NOTE
PHYSICAL THERAPY NOTE  Patient Name: Darlene Frost  PYPGA'K Date: 1/31/2022 01/31/22 1120   PT Last Visit   PT Visit Date 01/31/22   Note Type   Note Type Treatment   Pain Assessment   Pain Assessment Tool 0-10   Pain Score No Pain   Restrictions/Precautions   Weight Bearing Precautions Per Order No   Other Precautions Chair Alarm   General   Chart Reviewed Yes   Response to Previous Treatment Patient with no complaints from previous session  Family/Caregiver Present No   Cognition   Arousal/Participation Alert; Responsive; Cooperative   Attention Within functional limits   Orientation Level Oriented to person;Oriented to place   Following Commands Follows one step commands without difficulty   Comments Pt very pleasant and cooperative   Subjective   Subjective "I want to go home"   Bed Mobility   Supine to Sit Unable to assess   Sit to Supine Unable to assess   Additional Comments Pt seated OOB upon PT arrival  Pt returned seated OOB at end of session w/ all needs within reach and chair alarm activated   Transfers   Sit to Stand 5  Supervision   Additional items Verbal cues   Stand to Sit 5  Supervision   Additional items Verbal cues   Additional Comments Pt trialed mobility w/ RW vs SPC vs no AD  Pt Mod I w/ RW, supervision w/ SPC, and CGA w/ no AD  Pt educated trialing ambulating w/ [facility] w/ staff present to maximize functional independence   Ambulation/Elevation   Gait pattern Knees flexed; Short stride   Gait Assistance   (Mod I w/ RW, SUP w/ SPC, CGA w/ no AD)   Additional items Assist x 1;Verbal cues   Assistive Device Rolling walker;SPC;None   Distance RW: 80ft + SPC: 80ft + no AD: 40ft   Stair Management Assistance   (CGA for high marches)   Balance   Static Sitting Fair +   Dynamic Sitting Fair   Static Standing Fair -   Dynamic Standing Fair -   Ambulatory Fair -   Endurance Deficit   Endurance Deficit No   Activity Tolerance Activity Tolerance Patient tolerated treatment well   Nurse Made Aware yes   Assessment   Prognosis Good   Problem List Decreased strength; Impaired balance;Decreased mobility   Assessment Pt presents with decreased mobility, strength, balance, and activity tolerance  Pt seen for PT session focusing on transfers, mobility, endurance, LE strengthening  Pt demonstrates significant improvement in mobility this session  Pt able to ambulate household distances w/ RW at Mod I  Pt trialed w/out AD and required CGA for 40ft  Pt then given SPC and required SPC and reported feeling confident w/ SPC  Pt able to ambulate household distances w/ SPC and no LOB  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 and supportive family current caring for wife  Pt feels confident there is enough assistance at home to care for wife and himself  Pt educated on Home PT d/c and is agreeable to plan  CM made aware  Pt continues to benefit from skilled therapy to maximize functional independence  Recommendation at this time is Home PT  Pt would benefit from continued ambulation, functional transfers, strength, balance, and activity tolerance   Goals   Patient Goals to return home   LTG Expiration Date 02/07/22   PT Treatment Day 2   Plan   Treatment/Interventions Functional transfer training;LE strengthening/ROM; Endurance training;Patient/family training;Equipment eval/education; Bed mobility;Gait training;Spoke to nursing   Progress Progressing toward goals   PT Frequency 3-5x/wk   Recommendation   PT Discharge Recommendation Home with home health rehabilitation   [address] [place] [address] Mobility Inpatient   Turning in Bed Without Bedrails 4   Lying on Back to Sitting on Edge of Flat Bed 3   Moving Bed to Chair 3   Standing Up From Chair 3   Walk in Room 3   Climb 3-5 Stairs 3   Basic Mobility Inpatient Raw Score 19   Basic Mobility Standardized Score 42 48   Highest Level Of Mobility   [facility] Goal 6: Walk 10 steps or more   JH-HLM Highest Level of Mobility 7: Walk 25 feet or more   JH-HLM Goal Achieved Yes     The patient's AM-PAC Basic Mobility Inpatient Short Form Raw Score is 19  A Raw score of greater than 16 suggests the patient may benefit from discharge to home  Please also refer to the recommendation of the Physical Therapist for safe discharge planning      Armand Montes, PT, DPT

## 2022-01-31 NOTE — QUICK NOTE
I spoke to this patient's daughter, Cleo Lion, extensively over the phone to provide a comprehensive clinical update  I answered all of her questions and addressed all of her concerns to the best of my ability and to her satisfaction      MISSAEL Hernandez   PGY-3  Internal Medicine Resident  [facility]aw Walsh

## 2022-01-31 NOTE — ASSESSMENT & PLAN NOTE
Patient suffered acute encephalopathy while in the ICU following discontinuation of sedation  He was started on gabapentin to assist with sleep-wake coagulation  He did intermittently require restraints for behaviors but those were able to be quickly titrated off      Plan  · Resolved

## 2022-01-31 NOTE — CASE MANAGEMENT
Case Management Discharge Planning Note    Patient name Adriano Bruce  Location [facility] 712/[facility] 957-80 MRN [id number]  : 1940 Date 2022       Current Admission Date: 2022  Current Admission Diagnosis:Seizure [facility])   Patient Active Problem List    Diagnosis Date Noted    COVID-19 2022    Unwitnessed fall 2022    Subdural hematoma ([address] ) 2022    Fever 2022    Acute encephalopathy 2022    Urinary retention 2022    Dysphagia 2022    Right arm weakness 2022    Stage 3a chronic kidney disease ([address] ) 2021    Nonrheumatic aortic valve stenosis 2020    Nonrheumatic mitral valve regurgitation 2020    Nonrheumatic aortic valve insufficiency 2020    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[address] ) 2020    Seizure disorder ([address] ) 2017    Seizure ([address] ) 2017    Hyperlipidemia     Hypertension     Hyperuricemia 2016    Disc degeneration, lumbar 2013    Diverticulosis 2013    Osteoarthrosis, hand 2013    Aneurysm of abdominal aorta ([address] ) 2013      LOS (days): 13  Geometric Mean LOS (GMLOS) (days): 4 30  Days to GMLOS:-8 4     OBJECTIVE:  Risk of Unplanned Readmission Score: 16         Current admission status: Inpatient   Preferred Pharmacy:   [address] [address]  Phone: [phone] Fax: [phone]    [facility]  [address] [facility] [address]  Phone: [phone] Fax: [phone] [address]  Phone: [phone] Fax: [phone]    Primary Care Provider: Desirae Granger MD    Primary Insurance: MEDICARE  Secondary Insurance: [address] [place] [address]    DISCHARGE DETAILS:    Discharge planning discussed with[de-identified] CM spoke with pt and dtr re: discharge planning and FOC    Additional Comments: CM is aware the pt is medically stable for hospital discharge  SNF placment is no longer recommended by PT  The pt has returned to his functional baseline and can return home with home PT services  The pt is without [address] preference and agrees to blanket home care referral  Case acceptance was confirmed with [facility] and [facility] is anticipated for 2/8/22  Medical clearance is anticipated for 1/31/22  The pt is pending OT and SLP re-eval  CM will continue to follow        Gloria De Oliveira, MSW, LCSW, CCM, ACM-SW, [facility]

## 2022-01-31 NOTE — ASSESSMENT & PLAN NOTE
Patient had unwitnessed fall in the hospital on 01/26  A rapid response was called at that time and he was cleared by the ICU advanced practitioner and ICU physician without any need for head CT at that time  The next morning, primary team noticed small hematoma on the left side of his forehead and the patient complained of pain  He went down for CT scan was found to have small subdural hematoma  He was seen that day by Neurosurgery and cleared for discharge without further follow-up  After working with physical and occupational therapy, patient's strength has improved significantly and he is now able to ambulate with assistance of only a rolling walker      Plan  · Home PT for continued conditioning

## 2022-01-31 NOTE — ASSESSMENT & PLAN NOTE
Patient is fully vaccinated yet overdue for booster series  He incidentally tested positive on 01/28 on screening testing prior to discharge to rehab  He previously tested negative on 01/25 and 1/18  At time of positive testing, patient was asymptomatic, saturating well on room air, afebrile, and without leukocytosis, however his fevers noted prior during this admission may have been secondary to COVID-19 infection      Plan  · Continue isolation for total of 10 days from date of positive test out of abundance of caution  · Safe for discharge to home with isolation precautions enforced in the home  · Obtain booster vaccination series once cleared from isolation

## 2022-01-31 NOTE — OCCUPATIONAL THERAPY NOTE
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  Reciprocal Relationships spouse, daughter, Alejandro Arm (are assisting spouse while pt is in the hospital)   Service to Others retired   Intrinsic Gratification "nothing"   Pain Assessment   Pain Assessment Tool 0-10   Pain Score No Pain   Transfers   Sit to Stand 5  Supervision   Additional items Assist x 1   Stand to Sit 5  Supervision   Additional items Assist x 1   Stand pivot 5  Supervision   Additional items Increased time required   Cognition   Overall Cognitive Status Impaired   Arousal/Participation Alert; Responsive; Cooperative   Attention Within functional limits   Orientation Level Oriented to person;Oriented to place   Memory Decreased recall of precautions;Decreased recall of recent events;Decreased short term memory   Following Commands Follows one step commands without difficulty   Activity Tolerance   Activity Tolerance Patient limited by fatigue   Assessment   Assessment Pt seen for Occupational Therapy session with focus on activity tolerance, functional transfers/mob, standing tolerance and balance for pt engagement formal cognitive screening, Pt cleared by RN/Tracee for pt participated in OT session  Angela Arredondo Pt presented + for Covid and his bed alarm ringing  And pt sitting edge of pt bed  Pt reported need for toileting "urinating in the bottle" He was agreeable to participate in therapy following identifier confirmed   Pt able to complete formal cognitive screen [facility] Cognitive Level Screen ACLS and based on pt score 4 4 the person may live with someone who does a dailycheck on the environment to remove any safety hazards and solve problems when minor changes in the home occur  The person may be alone for part of the day with a pre-established procedure for getting help from a neighbor  Pt progressing well and OT now anticipates pt to return to  home with family support and out-pt rehab services Pt left sitting out of bed to bedside chair post session, chair alarm activated and all needs within reach  Plan   Treatment Interventions ADL retraining;Cognitive reorientation; Functional transfer training   Goal Expiration Date 02/07/22   OT Treatment Day 4   OT Frequency 3-5x/wk   Recommendation   OT Discharge Recommendation Home with outpatient rehabilitation   AM-PAC Daily Activity Inpatient   Lower Body Dressing 2   Bathing 2   Toileting 1   Upper Body Dressing 2   Grooming 2   Eating 3   Daily Activity Raw Score 12   Daily Activity Standardized Score (Calc for Raw Score >=11) 30 6   Turning Head Towards Sound 4   Follow Simple Instructions 3   Low Function Daily Activity Raw Score 19   Low Function Daily Activity Standardized Score 31 34   AM-PAC Applied Cognition Inpatient   Following a Speech/Presentation 1   Understanding Ordinary Conversation 3   Taking Medications 1   Remembering Where Things Are Placed or Put Away 1   Remembering List of 4-5 Errands 1   Taking Care of Complicated Tasks 1   Applied Cognition Raw Score 8   Applied Cognition Standardized Score 19 32   Barthel Index   Feeding 5   Bathing 0   Grooming Score 5   Dressing Score 5   Bladder Score 10   Bowels Score 10   Toilet Use Score 5   Transfers (Bed/Chair) Score 10   Mobility (Level Surface) Score 10   Stairs Score 0   Barthel Index Score 60   4 4    Administered Negro Cognitive Level Screen (ACLS)  Pt scored 4 4/6 0 indicating it is recommended that the person live with someone who does a daily check on the environment to remove any safety hazards and solve problems when minor changes in the home occur    The person may be alone for part of the day with a pre-established procedure for getting help from a neighbor  Behavior:  Knows day/date  Follows routine sequence of activities  Will learn schedule and follow daily routine  Hazards are not anticipated  Memorization is slow  Will need long term repetitive training for all new tasks  May become upset if routine is altered  May seek assist if lost   Do not expect to be aware of needs of others  May need reminders to keep appointments  Grooming:  Irizarry, brushes and styles hair  Applies makeup  May insist on familiar products  Finds supplies in familiar locations  May be unable to master use of new tools  Hazards are not anticipated  Dressing: Finds garments in familiar locations  Initiates dressing at usual times  Selects familiar combinations of outfits and may wear same outfit over and over  Resists new combinations  May fail to consider weather conditions, season or occasion when selecting clothing  May argue with suggested corrections of errors  Bathing:  Initiates bath/shower at customary times and follows typical routine  May collect supplies from a familiar location  May not vary rate  May want to use same products  May use excessive amounts of product  May have difficulty opening small or unusual containers  May leave towels on floor  Protect from unseen hazards (wet floors, electrical appliances near water)  Walking/exercising:  Ambulates within fitness capacity in neighborhood  Chooses to go by familiar routes  May fail to attend to signs or activities outside visual field  Needs new route identified by others and may learn after several weeks of practice  May become anxious in high stimulus environments (malls, airports, casinos)  Eating: May notice and clean highly visible dropped food items  May not be able to eat and converse at the same time  May resist changes in diet and menu  Watch handling of hot foods/liquids and heavy items  Toileting:   Follows a routine of toileting in a familiar environment  Needs to have public rest rooms pointed out  May use too much paper  Does not consider needs of others  May spend excessive time in rest room  Medications: May follow routine rigidly and resist changes in prescriptions based on erroneous beliefs of effects  Does not note adverse side effects  Does not anticipate need to renew prescriptions  May be able to open child proof containers  Can use DAILY pill box marked with day/time (filled by another person)  Use of adaptive equipment:  May be trained to use adaptive equipment that requires a sequence of familiar actions  Does not anticipate hazards  Once learned, may resist changes in equipment  May abandon use of assistive device or use in unsafe manner  Housekeeping:  Sweeps, polishes and puts away objects to match previous performance  Fails to see dirt or dust in corners  May use excessive amounts of , polish or water  May resist changes in routine or products used  May fail to differentiate between similar cleaning products  Food preparation:  May follow a fixed diet and go hungry if usual food items are not available  Does not check inventory and may run out of essentials frequently  Does not consider nutritional needs  Goes to familiar restaurants of grocery stores  Is able to prepare simple hot/cold dishes  Spending money:  Manages routine purchases for immediate needs  May count cash very slowly  May use credit cards or checks  May need assistance for making monthly budget  May not remember to account for taxes or tips  Can manage a daily allowance  Shopping:  Goes to familiar stores for routine items  Does not compare product prices or quality  May not consider cost of item in relation to overall budget  May overspend  Laundry:  Initiates and completes laundry routine at usual time  May sort items by color  May follow schedule rigidly  May forget to clean lint traps    May forget to turn iron off  Traveling:  Needs new routes and travel procedures identified by others  May express interest in driving a car but fails to attend to all environmental cues to do so safely  May not allow adequate time for travel  Telephone:  Writes down messages and new numbers slowly  May attempt to use an address book  May make calls at odd hours without consideration of others schedule or cost of call  May run up large telephone bills  Driving: Should NOT operate a motor vehicle            Vincent SULTANA

## 2022-01-31 NOTE — PLAN OF CARE
Problem: OCCUPATIONAL THERAPY ADULT  Goal: Performs self-care activities at highest level of function for planned discharge setting  See evaluation for individualized goals  Description: Treatment Interventions: ADL retraining,Cognitive reorientation,Functional transfer training          See flowsheet documentation for full assessment, interventions and recommendations  Outcome: Progressing  Note: Limitation: Decreased ADL status,Decreased endurance,Decreased self-care trans,Decreased high-level ADLs,Decreased fine motor control,Visual deficit,Decreased cognition,Decreased Safe judgement during ADL,Decreased UE strength  Prognosis: Fair  Assessment: Pt seen for Occupational Therapy session with focus on activity tolerance, functional transfers/mob, standing tolerance and balance for pt engagement formal cognitive screening, Pt cleared by RN/Tracee for pt participated in OT session  Antonia Youssef Pt presented + for Covid and his bed alarm ringing  And pt sitting edge of pt bed  Pt reported need for toileting "urinating in the bottle" He was agreeable to participate in therapy following identifier confirmed  Pt able to complete formal cognitive screen [facility] Cognitive Level Screen ACLS and based on pt score 4 4 the person may live with someone who does a dailycheck on the environment to remove any safety hazards and solve problems when minor changes in the home occur  The person may be alone for part of the day with a pre-established procedure for getting help from a neighbor  Pt progressing well and OT now anticipates pt to return to  home with family support and out-pt rehab services Pt left sitting out of bed to bedside chair post session, chair alarm activated and all needs within reach  OT Discharge Recommendation: Home with outpatient rehabilitation  OT - OK to Discharge:  Yes

## 2022-01-31 NOTE — ASSESSMENT & PLAN NOTE
Patient received Kaofed tube while in the ICU for tube feeds  He was followed closely by speech pathology and his diet was slowly advanced in terms of consistency  At time of discharge, patient was tolerating a dysphagia level 3 diet with thin liquids well      Plan  · Advance to regular diet as tolerated at home following discharge

## 2022-01-31 NOTE — PLAN OF CARE
Problem: MOBILITY - ADULT  Goal: Maintain or return to baseline ADL function  Description: INTERVENTIONS:  -  Assess patient's ability to carry out ADLs; assess patient's baseline for ADL function and identify physical deficits which impact ability to perform ADLs (bathing, care of mouth/teeth, toileting, grooming, dressing, etc )  - Assess/evaluate cause of self-care deficits   - Assess range of motion  - Assess patient's mobility; develop plan if impaired  - Assess patient's need for assistive devices and provide as appropriate  - Encourage maximum independence but intervene and supervise when necessary  - Involve family in performance of ADLs  - Assess for home care needs following discharge   - Consider OT consult to assist with ADL evaluation and planning for discharge  - Provide patient education as appropriate  Outcome: Progressing     Problem: Neurological Deficit  Goal: Neurological status is stable or improving  Description: Interventions:  - Monitor and assess patient's level of consciousness, motor function, sensory function, and level of assistance needed for ADLs  - Monitor and report changes from baseline  Collaborate with interdisciplinary team to initiate plan and implement interventions as ordered  - Provide and maintain a safe environment  - Consider seizure precautions  - Consider fall precautions  - Consider aspiration precautions  - Consider bleeding precautions    Outcome: Progressing

## 2022-01-31 NOTE — ASSESSMENT & PLAN NOTE
Patient was febrile on arrival to the [address] [place] [address] ED  He initially tested negative for COVID-19 infection and was treated with 5 days of Rocephin on arrival to the [facility] ED for suspicion of community-acquired pneumonia  Patient was again febrile on 01/23  His workup at that time showed normal white count and procalcitonin and chest x-ray showing some atelectasis  He again tested negative for COVID/flu/RSV at that time  Patient had 1 isolated elevated temperature again on 01/25 that resolved spontaneously  He finally tested positive for COVID-19 infection on 01/28 prior to planned discharge to short-term rehab      Plan  · Resolved  · Concerned that etiology for multiple fevers, including fever present on arrival, was breakthrough COVID infection

## 2022-01-31 NOTE — ASSESSMENT & PLAN NOTE
Lab Results   Component Value Date    EGFR 70 01/30/2022    EGFR 84 01/25/2022    EGFR 88 01/23/2022    CREATININE 1 01 01/30/2022    CREATININE 0 82 01/25/2022    CREATININE 0 73 01/23/2022     Patient with known history of stage IIIA CKD  Creatinine at baseline at discharge      Plan  · Outpatient follow-up with PCP  · Consider referral to Nephrology

## 2022-01-31 NOTE — CASE MANAGEMENT
Case Management Progress Note    Patient name Levy Merida  Location [facility] 712/[facility] 818-23 MRN [id number]  : 1940 Date 2022       LOS (days): 13  Geometric Mean LOS (GMLOS) (days): 4 30  Days to GMLOS:-8 4        OBJECTIVE:        Current admission status: Inpatient  Preferred Pharmacy:   [facility] 308 [facility], [address] [place] [address]  [address]  Phone: [phone] Fax: [phone]    [facility]  for [facility]  [address] [place] [address]  Phone: [phone] Fax: [phone] [facility] [address]  Phone: [phone] Fax: [phone]    Primary Care Provider: Felix Aguiar MD    Primary Insurance: MEDICARE  Secondary Insurance: [facility]    PROGRESS NOTE:    Discharge was planned for this evening, as the pt was accepted to [facility] for short term rehab  A COVID swab was requested, and returned positive on this date  CM cannot discharge the pt to [facility] for sub acute rehab as the facility does not have COVID beds available  The pt will remain hospitalized until , and CM will re-address freedom of choice with the pt and his dtr      Yandy García, MSW, LCSW, CCM, ACM-SW, [facility]

## 2022-01-31 NOTE — PLAN OF CARE
Problem: PHYSICAL THERAPY ADULT  Goal: Performs mobility at highest level of function for planned discharge setting  See evaluation for individualized goals  Description: Treatment/Interventions: Functional transfer training,LE strengthening/ROM,Therapeutic exercise,Endurance training,Patient/family training,Equipment eval/education,Bed mobility,Gait training,OT,Spoke to nursing  Equipment Recommended:  (will cont to assess)       See flowsheet documentation for full assessment, interventions and recommendations  Outcome: Progressing  Note: Prognosis: Good  Problem List: Decreased strength,Impaired balance,Decreased mobility  Assessment: Pt presents with decreased mobility, strength, balance, and activity tolerance  Pt seen for PT session focusing on transfers, mobility, endurance, LE strengthening  Pt demonstrates significant improvement in mobility this session  Pt able to ambulate household distances w/ RW at Mod I  Pt trialed w/out AD and required CGA for 40ft  Pt then given SPC and required SPC and reported feeling confident w/ SPC  Pt able to ambulate household distances w/ SPC and no LOB  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

## 2022-01-31 NOTE — PROGRESS NOTES
INTERNAL MEDICINE RESIDENCY PROGRESS NOTE     Name: Eliud Tinoco   Age & Sex: 80 y o  male   MRN: [id number]  Unit/Bed#: OPZG 677-68   Encounter: [id number]  Team: SLIM    PATIENT INFORMATION     Name: Eliud Tinoco   Age & Sex: 80 y o  male   MRN: [id number] Stay Days: 13    ASSESSMENT/PLAN     Principal Problem:    Seizure [facility])  Active Problems:    Hyperlipidemia    Hypertension    Aneurysm of abdominal aorta (HCC)    Seizure disorder ([facility] )    Nonrheumatic aortic valve stenosis    Stage 3a chronic kidney disease ([facility] )    Fever    Acute encephalopathy    Urinary retention    Dysphagia    Right arm weakness    Unwitnessed fall    Subdural hematoma ([facility] )    COVID-19      COVID-19  Assessment & Plan  Patient tested positive on 1/28 on screening before discharge to rehab  Was negative on 01/25  Patient asymptomatic, on room air, afebrile without leukocytosis  Continue isolation for total of 10 days  Continue supportive care    Subdural hematoma ([facility] )  Assessment & Plan  Secondary to unwitnessed fall on 01/26  Evaluated by Neurosurgery, and change on repeat head CT scan  Recommendation note AC/AP medication x2 weeks  On seizure prophylaxis  Cleared by Neurosurgery to discharge to rehab no need for follow-up  Monitor    Unwitnessed fall  Assessment & Plan  Patient fell in the hospital on 1/26 with left forehead pain over hematoma  Rapid response was called, he was cleared by rapid response team without  need for head CT scan at that time  Cleared by Neurosurgery to discharge  to rehab  Awaiting rehab placement    Dysphagia  Assessment & Plan  · NG tube was removed  · Started on dysphagia diet level 1  · Speech is following    Urinary retention  Assessment & Plan  · Looks like he had a Franklin inserted on 01/18, removed 1/21, reinserted 1/22 midnight    Suspect due to retention  · On doxazosin  · Franklin catheter was removed on 01/25  · Resolved    Acute encephalopathy  Assessment & Plan  Brain MRI x2 without acute stroke  · Has been seizure-free when taken off video EEG, off restrains  · gabapentin 100 mg at bedtime was added  · Resolved    Fever  Assessment & Plan  · Had on admission, suspicion of community-acquired pneumonia  · Rxed with 5 days of Rocephin in ICU  · Fever of 101 1/23  WBC normal, procalcitonin 0 23  · Chest x-ray with atelectasis  · Negative COVID/flu/RSV  · Likely secondary to atelectasis  · Continue to monitor fever, WBC  · Monitor for aspiration, aspiration precautions  · Out of bed, incentive spirometry  · PT OT  · Resolved    Stage 3a chronic kidney disease [facility])  Assessment & Plan  Lab Results   Component Value Date    EGFR 70 01/30/2022    EGFR 84 01/25/2022    EGFR 88 01/23/2022    CREATININE 1 01 01/30/2022    CREATININE 0 82 01/25/2022    CREATININE 0 73 01/23/2022     · Creatinine has been around baseline    Aneurysm of abdominal aorta [facility])  Assessment & Plan  S/p EVAR    Hypertension  Assessment & Plan  Blood pressures stable  Continue Cardura  Hyperlipidemia  Assessment & Plan  Continue statin    * Seizure [facility])  Assessment & Plan  · Presented with right facial droop, f/b mutliple seizures  · Was a stroke alert  · MRI - limited study was negative  · Was intubated placed on VEEG in ICU -> received keppra, depacon  · Now Sz free, last Sz on 1/19  · Cont keppra 1500 mg BID, depacon 500 mg q 8 hr  · Etiology for Sz unclear - ? Missed 1 dose at home  keppra was decreased from 1500 mg to 1000 mg 6 months ago in July 2021  · Repeat brain MRI without acute abnormality  · Neurology is following  · Awaiting rehab placement        And update: Will call daughter later today  Disposition:  Medically stable for discharge to rehab when bed is available  SUBJECTIVE     Patient seen and examined  No acute events overnight    Offers up no acute complaints at time of my exam   Eagerly anticipates discharge from the hospital     Case discussed with rounding RN, all questions and concerns addressed at this time  OBJECTIVE     Vitals:    22 2037 22 1438 22 0802 22 1030   BP: 104/65 108/64 110/64 122/65   Pulse: 96 88 84 72   Resp:       Temp: 97 9 °F (36 6 °C) (!) 97 2 °F (36 2 °C) (!) 97 2 °F (36 2 °C)    TempSrc:       SpO2: 97% 99% 100% 99%   Weight:       Height:          Temperature:   Temp (24hrs), Av 2 °F (36 2 °C), Min:97 2 °F (36 2 °C), Max:97 2 °F (36 2 °C)    Temperature: (!) 97 2 °F (36 2 °C)  Intake & Output:  I/O        07 0700  0701   0700  0701   0700    P  O  Total Intake(mL/kg)       Urine (mL/kg/hr)   800 (2 1)    Stool       Total Output   800    Net   -800               Weights:   IBW (Ideal Body Weight): 73 kg    Body mass index is 27 24 kg/m²  Weight (last 2 days)     None        Physical Exam  Vitals and nursing note reviewed  Constitutional:       General: He is not in acute distress  Appearance: Normal appearance  He is normal weight  He is not ill-appearing, toxic-appearing or diaphoretic  HENT:      Head: Normocephalic and atraumatic  Eyes:      General: No scleral icterus  Extraocular Movements: Extraocular movements intact  Conjunctiva/sclera: Conjunctivae normal       Pupils: Pupils are equal, round, and reactive to light  Cardiovascular:      Rate and Rhythm: Normal rate and regular rhythm  Pulses: Normal pulses  Heart sounds: Normal heart sounds  No murmur heard  No friction rub  No gallop  Pulmonary:      Effort: Pulmonary effort is normal  No respiratory distress  Breath sounds: Normal breath sounds  No stridor  No wheezing or rhonchi  Abdominal:      General: Abdomen is flat  Bowel sounds are normal  There is no distension  Palpations: Abdomen is soft  There is no mass  Tenderness: There is no abdominal tenderness  There is no right CVA tenderness, left CVA tenderness, guarding or rebound  Hernia: No hernia is present     Musculoskeletal: General: No swelling, tenderness, deformity or signs of injury  Cervical back: Neck supple  Right lower leg: No edema  Left lower leg: No edema  Lymphadenopathy:      Cervical: No cervical adenopathy  Skin:     General: Skin is warm and dry  Capillary Refill: Capillary refill takes less than 2 seconds  Coloration: Skin is not pale  Findings: No erythema or rash  Neurological:      General: No focal deficit present  Mental Status: He is alert  Psychiatric:         Mood and Affect: Mood normal          Behavior: Behavior normal          Thought Content: Thought content normal          Judgment: Judgment normal        LABORATORY DATA     Labs: I have personally reviewed pertinent reports  Results from last 7 days   Lab Units 01/30/22  0653 01/25/22  0521 01/24/22  1424   WBC Thousand/uL 6 91 7 42 7 06   HEMOGLOBIN g/dL 11 9* 12 4 12 0   HEMATOCRIT % 38 4 39 0 37 5   PLATELETS Thousands/uL 250 151 139*   NEUTROS PCT % 65 73 76*   MONOS PCT % 13* 12 10      Results from last 7 days   Lab Units 01/30/22  0653 01/25/22  0521   POTASSIUM mmol/L 4 4 4 1   CHLORIDE mmol/L 110* 110*   CO2 mmol/L 29 30   BUN mg/dL 19 17   CREATININE mg/dL 1 01 0 82   CALCIUM mg/dL 8 3 8 9   ALK PHOS U/L 60  --    ALT U/L 72  --    AST U/L 57*  --      Results from last 7 days   Lab Units 01/30/22  0653   MAGNESIUM mg/dL 2 4                        IMAGING & DIAGNOSTIC TESTING     Radiology Results: I have personally reviewed pertinent reports  XR chest portable    Result Date: 1/19/2022  Impression: ET tube tip as above  Persistent left basilar opacity possibly representing atelectasis, pneumonia and/or pleural effusion  Workstation performed: [id number]     XR chest 1 view portable    Result Date: 1/18/2022  Impression: Endotracheal tube appears somewhat high in position as above  Further advancement of approximately 4 cm would be helpful   Left lower lobe consolidation and small to moderate left pleural effusion  This is concerning for pneumonia  Follow-up to radiographic resolution advised  Questionable 1 2 cm nodular density in left mid to lower lung which can be further evaluated with a chest CT study after treatment  I personally discussed the position of the endotracheal tube with EMMA JARA on 1/18/2022 at 4:01 PM  Workstation performed: [id number]     XR chest 1 view portable    Result Date: 1/18/2022  Impression: No acute cardiopulmonary disease  Workstation performed: [id number]     XR abdomen 1 view kub    Result Date: 1/24/2022  Impression: Feeding tube has been advanced and the tip overlies the stomach  Workstation performed: [id number]     XR abdomen 1 view kub    Result Date: 1/24/2022  Impression: Feeding tube has been advanced into the gastric fundus  Tip pointed toward the left  Unchanged left lower lobe airspace consolidation  Workstation performed: [id number]     CT stroke alert brain    Result Date: 1/18/2022  Impression: Stable examination with no acute intracranial abnormality identified  Findings were directly discussed with Dr Lolly Fair at 10:35 AM  Workstation performed: [id number]     XR chest portable ICU    Result Date: 1/24/2022  Impression: Feeding tube tip in the region of the distal esophagus similar to the prior study  Patchy consolidation in the left lower lung zone, stable  Workstation performed: [id number]     XR chest portable ICU    Result Date: 1/24/2022  Impression: Unchanged left lower lobe airspace opacity  There is a feeding tube with tip in the distal esophagus  At the time of this dictation, there has been subsequent films demonstrating repositioning   Workstation performed: [id number]     XR chest portable ICU    Result Date: 1/19/2022  Impression: Interval placement of right IJ CVP line without complication Persistent lateral left lung base opacity suspicious for infiltrate/atelectasis Workstation performed: [id number]     EEG Video Monitoring 24 Hour    Addendum Date: 1/24/2022    Please see the corrected study times Study Start: 1/22/2022 0035 Study End: 1/22/2022 1143    CTA stroke alert (head/neck)    Result Date: 1/18/2022  Impression: Stable CT angiography with no flow restrictive stenosis, occlusion or thrombosis of the cervical or intracranial vasculature  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  Findings were directly discussed with Reed Bowden at 10:35 AM  Workstation performed: [id number]     MRI follow up neuro    Result Date: 1/22/2022  Impression: No MR evidence of acute ischemia  Workstation performed: [id number]     Other Diagnostic Testing: I have personally reviewed pertinent reports  ACTIVE MEDICATIONS     Current Facility-Administered Medications   Medication Dose Route Frequency    acetaminophen (TYLENOL) tablet 975 mg  975 mg Oral Q8H [address]    chlorhexidine (PERIDEX) 0 12 % oral rinse 15 mL  15 mL Mouth/Throat Q12H [address]    doxazosin (CARDURA) tablet 1 mg  1 mg Oral Daily    folic acid (FOLVITE) tablet 1 mg  1 mg Oral Daily    gabapentin (NEURONTIN) capsule 100 mg  100 mg Oral HS    heparin (porcine) subcutaneous injection 5,000 Units  5,000 Units Subcutaneous Q8H [address]    levETIRAcetam (KEPPRA) tablet 1,250 mg  1,250 mg Oral HS    lidocaine (LIDODERM) 5 % patch 1 patch  1 patch Topical Daily    loperamide (IMODIUM) capsule 4 mg  4 mg Oral 4x Daily PRN    nystatin (MYCOSTATIN) powder   Topical BID    OLANZapine (ZyPREXA) IM injection 2 5 mg  2 5 mg Intramuscular TID PRN    oxyCODONE (ROXICODONE) IR tablet 2 5 mg  2 5 mg Oral Q4H PRN    pravastatin (PRAVACHOL) tablet 80 mg  80 mg Oral Daily With Dinner    thiamine tablet 100 mg  100 mg Oral Daily       VTE Pharmacologic Prophylaxis: Heparin  VTE Mechanical Prophylaxis: sequential compression device    Portions of the record may have been created with voice recognition software    Occasional wrong word or "sound a like" substitutions may have occurred due to the inherent limitations of voice recognition software    Read the chart carefully and recognize, using context, where substitutions have occurred   ==  Tammy Bansal, [address] [facility]  Internal Medicine Residency PGY-3

## 2022-01-31 NOTE — ASSESSMENT & PLAN NOTE
Patient has a known past medical history of seizures that were previously well controlled on Keppra (last seizure 2017) who presented to the [employer] as a stroke alert secondary to right facial droop on 01/18  Last known well was on the evening of 1/17, so patient did not receive tPA  He was found by his caregiver seizing  En route to the hospital, he was found to have multiple seizures requiring multiple doses of Ativan  Of note, he missed a dose of Keppra 1/16 and had a reduction in his maintenance dose in July of 2021  He was also noted to be febrile on arrival to the hospital   His initial head CT and CTA head and neck were negative for ischemia  He was intubated on arrival to the ED for airway protection  He received an LP that was negative for infection  He was transferred overnight 1/18-1/19 to the [place] for video EEG monitoring  On arrival, he was continued on Keppra and Depakote and was sedated with Precedex during video EEG monitoring  During multiple days of video EEG monitoring, no electrographic seizures or interictal epileptiform discharges were seen  He completed a brief antibiotic course for community-acquired pneumonia  He was transferred to the regular medical floor on 01/23  MRI brain on 01/24, though a limited study, was negative for acute or chronic intracranial pathology  On 01/26, neurology increased dose of Keppra to 1250 mg q h s  And discontinued valproic acid, after which time they signed off  Patient was originally determined to require post acute/short-term rehab by Physical and Occupational therapy, for which he was to be discharged to [facility] on 1/28  This discharge was delayed secondary to incidental COVID-19 diagnosis  Patient then continued to improve and upon repeat evaluation on 01/31, was deemed appropriate for discharge to home with home health care        Outpatient plan:  · Continue Keppra 1250 mg q h s   · Outpatient follow-up with epileptologist in 6 weeks  · Home health care/home physical therapy to establish with patient on 02/08/2022

## 2022-01-31 NOTE — ASSESSMENT & PLAN NOTE
Patient was started on doxazosin for blood pressure and urinary retention while in the ICU  This was continued during his floor stay      Plan  · Discharged on Cardura 1 mg daily with plans to titrate down to off in the outpatient setting

## 2022-01-31 NOTE — ASSESSMENT & PLAN NOTE
During this admission, statin was increased in intensity      Plan:  · Continue pravastatin 80 mg daily started this admission

## 2022-01-31 NOTE — CASE MANAGEMENT
Case Management Progress Note    Patient name Chad Levine  [facility] [id number]  : 1940 Date 2022       LOS (days): 13  Geometric Mean LOS (GMLOS) (days): 4 30  Days to GMLOS:-8 4        OBJECTIVE:        Current admission status: Inpatient  Preferred Pharmacy:   [place] [id number]  Phone: [phone] Fax: [phone]    [facility]  [address] [place] [address]  Phone: [phone] Fax: [phone] [facility] [address]  Phone: [phone] Fax: [phone]    Primary Care Provider: Teri Johnson MD    Primary Insurance: MEDICARE  Secondary Insurance: [address] [place] [address] NOTE:    CM is aware the pt transferred from ICU to P7 on   Previous CM documentation was reviewed and is appreciated  PT/OT evaluation completed on this date reflects the pt would benefit from sub acute rehab post discharge  CM will introduce self to pt/family, explain supportive discharge plan, and explore freedom of choice related to SNF placement      Marsha Rojas, MSW, LCSW, CCM, ACM-SW, [facility]

## 2022-01-31 NOTE — SPEECH THERAPY NOTE
Speech Language/Pathology    Speech/Language Pathology Progress Note    Patient Name: Jeanie Tim  PDODO'H Date: 1/31/2022     Problem List  Principal Problem:    Seizure [facility])  Active Problems:    Hyperlipidemia    Hypertension    Aneurysm of abdominal aorta (HCC)    Seizure disorder ([facility] )    Nonrheumatic aortic valve stenosis    Stage 3a chronic kidney disease ([facility] )    Fever    Acute encephalopathy    Urinary retention    Dysphagia    Right arm weakness    Unwitnessed fall    Subdural hematoma ([facility] )    COVID-19       Past Medical History  Past Medical History:   Diagnosis Date    Colon polyps     Gout     Hyperlipidemia     Hypertension     S/P AAA repair     Seizure disorder ([facility] )     Tear of right rotator cuff 4/4/2017        Past Surgical History  Past Surgical History:   Procedure Laterality Date    ABDOMINAL AORTIC ANEURYSM REPAIR, ENDOVASCULAR N/A     IL COLONOSCOPY FLX DX W/COLLJ SPEC WHEN PFRMD N/A 6/7/2017    Procedure: COLONOSCOPY;  Surgeon: Amie Geiger MD;  Location: [facility]; Service: Colorectal    IL COLONOSCOPY FLX DX W/COLLJ SPEC WHEN PFRMD N/A 5/18/2018    Procedure: COLONOSCOPY;  Surgeon: Amie Geiger MD;  Location: [facility]; Service: Colorectal    TONSILLECTOMY      WISDOM TOOTH EXTRACTION Bilateral          Subjective:  Pt seen for dysphagia tx  Pt sitting upright in chair, pleasant and cooperative  Objective:  Reviewed chart, discussed RN and physician  Pt is reportedly tolerating the pureed diet and NTL, but dislikes it, and is requesting a different diet  Pt was trialed with ice chips, 8 oz thin water by cup and straw, a full bowl of rice crispies softened with thin milk (mixed consistency), and 4 antoine crackers  Bolus retrieval, mastication, bolus formation and transfer appeared fairly timely, with no residue or pocketing  Hyolaryngeal elevation was observed and palpated, swallows appeared fairly prompt   There was slight throat clear x2 after sips of thin liquid, but pt tolerated remaining 8 oz thin water without any overt s/s of aspiration  There were no overt s/s of aspiration with ice chips, rice crispies/milk, or antoine crackers  Reviewed diet recommendations and aspiration precautions with pt, including eating slowly, small bites/sips, upright position, etc  He verbalized understanding  Notified nurse of recommendation for advancing diet, sent TT to physician requesting order change  Aspiration precautions posted in room  Assessment:  Good toleration of trials of advanced solids and thin liquids  No s/s of aspiration with soft, hard, mixed consistency foods; throat clear x2 noted immediately after sips of thin water, but pt tolerated remaining 8 oz without any overt s/s of aspiration  Plan/Recommendations:  Advance to dsyphagia 3 diet and thin liquids  Meds as tolerated, try whole in pudding  Aspiration precautions  ST to see for dysphagia tx, ensure diet toleration

## 2022-01-31 NOTE — ASSESSMENT & PLAN NOTE
Patient required placement of Franklin catheter on arrival   This was removed on 01/21 and reinserted on 01/22 secondary to continued issues with urinary retention  He was started on doxazosin for presumed BPH  His Franklin catheter was then successfully removed on 01/25      Plan  · Continue doxazosin for now, anticipate this will be titrated down by the patient's PCP in the outpatient setting  · Retention resolved

## 2022-02-01 VITALS
HEIGHT: 70 IN | OXYGEN SATURATION: 100 % | SYSTOLIC BLOOD PRESSURE: 127 MMHG | TEMPERATURE: 98 F | DIASTOLIC BLOOD PRESSURE: 75 MMHG | RESPIRATION RATE: 21 BRPM | HEART RATE: 77 BPM | BODY MASS INDEX: 27.17 KG/M2 | WEIGHT: 189.82 LBS

## 2022-02-01 PROBLEM — R50.9 FEVER: Status: RESOLVED | Noted: 2022-01-24 | Resolved: 2022-02-01

## 2022-02-01 PROBLEM — R33.9 URINARY RETENTION: Status: RESOLVED | Noted: 2022-01-24 | Resolved: 2022-02-01

## 2022-02-01 PROBLEM — G93.40 ACUTE ENCEPHALOPATHY: Status: RESOLVED | Noted: 2022-01-24 | Resolved: 2022-02-01

## 2022-02-01 PROCEDURE — 99239 HOSP IP/OBS DSCHRG MGMT >30: CPT | Performed by: INTERNAL MEDICINE

## 2022-02-01 PROCEDURE — 92526 ORAL FUNCTION THERAPY: CPT

## 2022-02-01 RX ORDER — PRAVASTATIN SODIUM 80 MG/1
80 TABLET ORAL
Qty: 30 TABLET | Refills: 0 | Status: SHIPPED | OUTPATIENT
Start: 2022-02-01 | End: 2022-02-10 | Stop reason: ALTCHOICE

## 2022-02-01 RX ORDER — LIDOCAINE 50 MG/G
1 PATCH TOPICAL DAILY
Qty: 5 PATCH | Refills: 0 | Status: SHIPPED | OUTPATIENT
Start: 2022-02-01

## 2022-02-01 RX ORDER — LANOLIN ALCOHOL/MO/W.PET/CERES
100 CREAM (GRAM) TOPICAL DAILY
Qty: 30 TABLET | Refills: 0 | Status: SHIPPED | OUTPATIENT
Start: 2022-02-01

## 2022-02-01 RX ORDER — FOLIC ACID 1 MG/1
1 TABLET ORAL DAILY
Qty: 30 TABLET | Refills: 0 | Status: SHIPPED | OUTPATIENT
Start: 2022-02-01 | End: 2022-03-03

## 2022-02-01 RX ORDER — LEVETIRACETAM 250 MG/1
1250 TABLET ORAL
Qty: 150 TABLET | Refills: 0 | Status: SHIPPED | OUTPATIENT
Start: 2022-02-01 | End: 2022-02-01 | Stop reason: HOSPADM

## 2022-02-01 RX ORDER — LEVETIRACETAM 500 MG/1
500 TABLET, EXTENDED RELEASE ORAL
Qty: 30 TABLET | Refills: 0 | Status: SHIPPED | OUTPATIENT
Start: 2022-02-01 | End: 2022-03-07 | Stop reason: ALTCHOICE

## 2022-02-01 RX ORDER — LOPERAMIDE HYDROCHLORIDE 2 MG/1
4 CAPSULE ORAL 4 TIMES DAILY PRN
Qty: 30 CAPSULE | Refills: 0 | Status: SHIPPED | OUTPATIENT
Start: 2022-02-01

## 2022-02-01 RX ORDER — DOXAZOSIN MESYLATE 1 MG/1
1 TABLET ORAL DAILY
Qty: 30 TABLET | Refills: 0 | Status: SHIPPED | OUTPATIENT
Start: 2022-02-01 | End: 2022-02-18 | Stop reason: SDUPTHER

## 2022-02-01 RX ORDER — GABAPENTIN 100 MG/1
100 CAPSULE ORAL
Qty: 30 CAPSULE | Refills: 0 | Status: SHIPPED | OUTPATIENT
Start: 2022-02-01 | End: 2022-02-01 | Stop reason: HOSPADM

## 2022-02-01 RX ORDER — LEVETIRACETAM 750 MG/1
750 TABLET, EXTENDED RELEASE ORAL
Qty: 30 TABLET | Refills: 0 | Status: SHIPPED | OUTPATIENT
Start: 2022-02-01 | End: 2022-02-18 | Stop reason: SDUPTHER

## 2022-02-01 RX ADMIN — ACETAMINOPHEN 975 MG: 325 TABLET, FILM COATED ORAL at 14:22

## 2022-02-01 RX ADMIN — DOXAZOSIN 1 MG: 1 TABLET ORAL at 09:56

## 2022-02-01 RX ADMIN — PRAVASTATIN SODIUM 80 MG: 80 TABLET ORAL at 17:46

## 2022-02-01 RX ADMIN — FOLIC ACID 1 MG: 1 TABLET ORAL at 09:56

## 2022-02-01 RX ADMIN — HEPARIN SODIUM 5000 UNITS: 5000 INJECTION INTRAVENOUS; SUBCUTANEOUS at 06:43

## 2022-02-01 RX ADMIN — CHLORHEXIDINE GLUCONATE 15 ML: 1.2 SOLUTION ORAL at 10:05

## 2022-02-01 RX ADMIN — ACETAMINOPHEN 975 MG: 325 TABLET, FILM COATED ORAL at 06:43

## 2022-02-01 RX ADMIN — LEVETIRACETAM 1250 MG: 750 TABLET, FILM COATED ORAL at 17:45

## 2022-02-01 RX ADMIN — THIAMINE HCL TAB 100 MG 100 MG: 100 TAB at 09:56

## 2022-02-01 RX ADMIN — LIDOCAINE 5% 1 PATCH: 700 PATCH TOPICAL at 09:56

## 2022-02-01 NOTE — PLAN OF CARE
Problem: MOBILITY - ADULT  Goal: Maintain or return to baseline ADL function  Description: INTERVENTIONS:  -  Assess patient's ability to carry out ADLs; assess patient's baseline for ADL function and identify physical deficits which impact ability to perform ADLs (bathing, care of mouth/teeth, toileting, grooming, dressing, etc )  - Assess/evaluate cause of self-care deficits   - Assess range of motion  - Assess patient's mobility; develop plan if impaired  - Assess patient's need for assistive devices and provide as appropriate  - Encourage maximum independence but intervene and supervise when necessary  - Involve family in performance of ADLs  - Assess for home care needs following discharge   - Consider OT consult to assist with ADL evaluation and planning for discharge  - Provide patient education as appropriate  Outcome: Completed  Goal: Maintains/Returns to pre admission functional level  Description: INTERVENTIONS:  - Perform BMAT or MOVE assessment daily    - Set and communicate daily mobility goal to care team and patient/family/caregiver     - Collaborate with rehabilitation services on mobility goals if consulted  - Out of bed for toileting  - Record patient progress and toleration of activity level   Outcome: Completed     Problem: Potential for Falls  Goal: Patient will remain free of falls  Description: INTERVENTIONS:  - Educate patient/family on patient safety including physical limitations  - Instruct patient to call for assistance with activity   - Consult OT/PT to assist with strengthening/mobility   - Keep Call bell within reach  - Keep bed low and locked with side rails adjusted as appropriate  - Keep care items and personal belongings within reach  - Initiate and maintain comfort rounds  - Make Fall Risk Sign visible to staff  - Apply yellow socks and bracelet for high fall risk patients  - Consider moving patient to room near nurses station  Outcome: Completed     Problem: Prexisting or High Potential for Compromised Skin Integrity  Goal: Skin integrity is maintained or improved  Description: INTERVENTIONS:  - Identify patients at risk for skin breakdown  - Assess and monitor skin integrity  - Assess and monitor nutrition and hydration status  - Monitor labs   - Assess for incontinence   - Turn and reposition patient  - Assist with mobility/ambulation  - Relieve pressure over bony prominences  - Avoid friction and shearing  - Provide appropriate hygiene as needed including keeping skin clean and dry  - Evaluate need for skin moisturizer/barrier cream  - Collaborate with interdisciplinary team   - Patient/family teaching  - Consider wound care consult   Outcome: Completed     Problem: Neurological Deficit  Goal: Neurological status is stable or improving  Description: Interventions:  - Monitor and assess patient's level of consciousness, motor function, sensory function, and level of assistance needed for ADLs  - Monitor and report changes from baseline  Collaborate with interdisciplinary team to initiate plan and implement interventions as ordered  - Provide and maintain a safe environment  - Consider seizure precautions  - Consider fall precautions  - Consider aspiration precautions  - Consider bleeding precautions  Outcome: Completed     Problem: Activity Intolerance/Impaired Mobility  Goal: Mobility/activity is maintained at optimum level for patient  Description: Interventions:  - Assess and monitor patient  barriers to mobility and need for assistive/adaptive devices  - Assess patient's emotional response to limitations  - Collaborate with interdisciplinary team and initiate plans and interventions as ordered  - Encourage independent activity per ability   - Maintain proper body alignment  - Perform active/passive rom as tolerated/ordered    - Plan activities to conserve energy   - Turn patient as appropriate  Outcome: Completed     Problem: Communication Impairment  Goal: Ability to express needs and understand communication  Description: Assess patient's communication skills and ability to understand information  Patient will demonstrate use of effective communication techniques, alternative methods of communication and understanding even if not able to speak  - Encourage communication and provide alternate methods of communication as needed  - Collaborate with case management/ for discharge needs  - Include patient/family/caregiver in decisions related to communication  Outcome: Completed     Problem: Potential for Aspiration  Goal: Non-ventilated patient's risk of aspiration is minimized  Description: Assess and monitor vital signs, respiratory status, and labs (WBC)  Monitor for signs of aspiration (tachypnea, cough, rales, wheezing, cyanosis, fever)  - Assess and monitor patient's ability to swallow  - Place patient up in chair to eat if possible  - HOB up at 90 degrees to eat if unable to get patient up into chair   - Supervise patient during oral intake  - Instruct patient/ family to take small bites  - Instruct patient/ family to take small single sips when taking liquids  - Follow patient-specific strategies generated by speech pathologist   Outcome: Completed     Problem: Nutrition  Goal: Nutrition/Hydration status is improving  Description: Monitor and assess patient's nutrition/hydration status for malnutrition (ex- brittle hair, bruises, dry skin, pale skin and conjunctiva, muscle wasting, smooth red tongue, and disorientation)  Collaborate with interdisciplinary team and initiate plan and interventions as ordered  Monitor patient's weight and dietary intake as ordered or per policy  Utilize nutrition screening tool and intervene per policy  Determine patient's food preferences and provide high-protein, high-caloric foods as appropriate       - Assist patient with eating   - Allow adequate time for meals   - Encourage patient to take dietary supplement as ordered  - Collaborate with clinical nutritionist   - Include patient/family/caregiver in decisions related to nutrition  Outcome: Completed     Problem: Nutrition/Hydration-ADULT  Goal: Nutrient/Hydration intake appropriate for improving, restoring or maintaining nutritional needs  Description: Monitor and assess patient's nutrition/hydration status for malnutrition  Collaborate with interdisciplinary team and initiate plan and interventions as ordered  Monitor patient's weight and dietary intake as ordered or per policy  Utilize nutrition screening tool and intervene as necessary  Determine patient's food preferences and provide high-protein, high-caloric foods as appropriate       INTERVENTIONS:  - Monitor oral intake, urinary output, labs, and treatment plans  - Assess nutrition and hydration status and recommend course of action  - Evaluate amount of meals eaten  - Assist patient with eating if necessary   - Allow adequate time for meals  - Recommend/ encourage appropriate diets, oral nutritional supplements, and vitamin/mineral supplements  - Order, calculate, and assess calorie counts as needed  - Recommend, monitor, and adjust tube feedings based on assessed needs  - Assess need for intravenous fluids  - Provide specific nutrition/hydration education as appropriate  - Include patient/family/caregiver in decisions related to nutrition  Outcome: Completed     Problem: PAIN - ADULT  Goal: Verbalizes/displays adequate comfort level or baseline comfort level  Description: Interventions:  - Encourage patient to monitor pain and request assistance  - Assess pain using appropriate pain scale  - Administer analgesics based on type and severity of pain and evaluate response  - Implement non-pharmacological measures as appropriate and evaluate response  - Consider cultural and social influences on pain and pain management  - Notify physician/advanced practitioner if interventions unsuccessful or patient reports new pain  Outcome: Completed     Problem: INFECTION - ADULT  Goal: Absence or prevention of progression during hospitalization  Description: INTERVENTIONS:  - Assess and monitor for signs and symptoms of infection  - Monitor lab/diagnostic results  - Monitor all insertion sites, i e  indwelling lines, tubes, and drains  - Monitor endotracheal if appropriate and nasal secretions for changes in amount and color  - [place] appropriate cooling/warming therapies per order  - Administer medications as ordered  - Instruct and encourage patient and family to use good hand hygiene technique  - Identify and instruct in appropriate isolation precautions for identified infection/condition  Outcome: Completed  Goal: Absence of fever/infection during neutropenic period  Description: INTERVENTIONS:  - Monitor WBC    Outcome: Completed     Problem: SAFETY ADULT  Goal: Maintain or return to baseline ADL function  Description: INTERVENTIONS:  -  Assess patient's ability to carry out ADLs; assess patient's baseline for ADL function and identify physical deficits which impact ability to perform ADLs (bathing, care of mouth/teeth, toileting, grooming, dressing, etc )  - Assess/evaluate cause of self-care deficits   - Assess range of motion  - Assess patient's mobility; develop plan if impaired  - Assess patient's need for assistive devices and provide as appropriate  - Encourage maximum independence but intervene and supervise when necessary  - Involve family in performance of ADLs  - Assess for home care needs following discharge   - Consider OT consult to assist with ADL evaluation and planning for discharge  - Provide patient education as appropriate  Outcome: Completed  Goal: Maintains/Returns to pre admission functional level  Description: INTERVENTIONS:  - Perform BMAT or MOVE assessment daily    - Set and communicate daily mobility goal to care team and patient/family/caregiver     - Collaborate with rehabilitation services on mobility goals if consulted  - Out of bed for toileting  - Record patient progress and toleration of activity level   Outcome: Completed  Goal: Patient will remain free of falls  Description: INTERVENTIONS:  - Educate patient/family on patient safety including physical limitations  - Instruct patient to call for assistance with activity   - Consult OT/PT to assist with strengthening/mobility   - Keep Call bell within reach  - Keep bed low and locked with side rails adjusted as appropriate  - Keep care items and personal belongings within reach  - Initiate and maintain comfort rounds  - Make Fall Risk Sign visible to staff  - Apply yellow socks and bracelet for high fall risk patients  - Consider moving patient to room near nurses station  Outcome: Completed     Problem: DISCHARGE PLANNING  Goal: Discharge to home or other facility with appropriate resources  Description: INTERVENTIONS:  - Identify barriers to discharge w/patient and caregiver  - Arrange for needed discharge resources and transportation as appropriate  - Identify discharge learning needs (meds, wound care, etc )  - Arrange for interpretive services to assist at discharge as needed  - Refer to Case Management Department for coordinating discharge planning if the patient needs post-hospital services based on physician/advanced practitioner order or complex needs related to functional status, cognitive ability, or social support system  Outcome: Completed     Problem: Knowledge Deficit  Goal: Patient/family/caregiver demonstrates understanding of disease process, treatment plan, medications, and discharge instructions  Description: Complete learning assessment and assess knowledge base    Interventions:  - Provide teaching at level of understanding  - Provide teaching via preferred learning methods  Outcome: Completed

## 2022-02-01 NOTE — DISCHARGE SUMMARY
IMR Discharge Summary - Medical Lisa Lechuga 80 y o  male MRN: [id number]    [address] [address] Room / Bed: [facility] 712/[facility] 648-29 Encounter: [id number]    BRIEF OVERVIEW    Admitting Provider: Abdiaziz Rock MD  Discharge Provider: Michelle Michele MD  Primary Care Physician at Discharge: Toby Dugan MD    [id number]  Admission Date: 1/18/2022     Discharge Date: No discharge date for patient encounter  Hospital Course  Lisa Lechuga is a 80 y o  male with past medical history of previously well-controlled seizures on Keppra, CKD, and AAA status post EVAR who presented to the [facility] ED as a stroke alert on 01/18  En route to the ED, patient was found to have multiple seizures requiring multiple doses of IV Ativan  He reportedly missed a dose of Keppra 2 days prior  Patient was febrile on arrival to the ED  Lumbar puncture was negative for meningitis/encephalitis picture  CT head and CTA head/neck were unremarkable  Patient was intubated on arrival to the ED for airway protection  He was then transferred to the [facility] for continuous video EEG monitoring  He was initially maintained on both Keppra and Depakote  During his ICU stay, he received 72 hours of EEG monitoring which did not reveal any seizure-like activity  He completed an antibiotic course for community-acquired pneumonia  Following extubation, he was transferred to the regular medical floor on 01/23  His course post extubation was complicated by several days of acute encephalopathy that has since resolved  Depakote was eventually discontinued and Keppra uptitrated  His MRI brain was unremarkable, though he was a limited study  He was evaluated by Physical and Occupational therapy and deemed appropriate for short-term rehab, which was scheduled to start on 01/28    Prior to discharge, patient incidentally tested positive for COVID-19 infection, which time his discharge was canceled  He was then later re-evaluated by therapy services on 01/31 and deemed appropriate for discharge to home with home physical therapy  Patient was discharged on 02/01/2022 to home  Further plans as outlined below  On the day of discharge, patient was without any acute complaints  Twelve point review of systems was negative  Physical Exam  Vitals and nursing note reviewed  Constitutional:       General: He is not in acute distress  Appearance: Normal appearance  He is normal weight  He is not ill-appearing, toxic-appearing or diaphoretic  HENT:      Head: Normocephalic and atraumatic  Eyes:      General: No scleral icterus  Extraocular Movements: Extraocular movements intact  Conjunctiva/sclera: Conjunctivae normal       Pupils: Pupils are equal, round, and reactive to light  Cardiovascular:      Rate and Rhythm: Normal rate and regular rhythm  Pulses: Normal pulses  Heart sounds: Normal heart sounds  No murmur heard  No friction rub  No gallop  Pulmonary:      Effort: Pulmonary effort is normal  No respiratory distress  Breath sounds: Normal breath sounds  No stridor  No wheezing or rhonchi  Abdominal:      General: Abdomen is flat  Bowel sounds are normal  There is no distension  Palpations: Abdomen is soft  There is no mass  Tenderness: There is no abdominal tenderness  There is no right CVA tenderness, left CVA tenderness, guarding or rebound  Hernia: No hernia is present  Musculoskeletal:         General: No swelling, tenderness, deformity or signs of injury  Cervical back: Neck supple  Right lower leg: No edema  Left lower leg: No edema  Lymphadenopathy:      Cervical: No cervical adenopathy  Skin:     General: Skin is warm and dry  Capillary Refill: Capillary refill takes less than 2 seconds  Coloration: Skin is not pale  Findings: No erythema or rash     Neurological:      General: No focal deficit present  Mental Status: He is alert  Psychiatric:         Mood and Affect: Mood normal          Behavior: Behavior normal          Thought Content: Thought content normal          Judgment: Judgment normal          Presenting Problem/History of Present Illness  Principal Problem:    Seizure ([facility] )  Active Problems:    Hyperlipidemia    Hypertension    Aneurysm of abdominal aorta (HCC)    Seizure disorder (HCC)    Nonrheumatic aortic valve stenosis    Stage 3a chronic kidney disease (HCC)    Dysphagia    Right arm weakness    Unwitnessed fall    Subdural hematoma ([facility] )    COVID-19  Resolved Problems:    Fever    Acute encephalopathy    Urinary retention    * Seizure [facility])  Assessment & Plan  Patient has a known past medical history of seizures that were previously well controlled on Keppra (last seizure 2017) who presented to the [address] [place] [address] as a stroke alert secondary to right facial droop on 01/18  Last known well was on the evening of 1/17, so patient did not receive tPA  He was found by his caregiver seizing  En route to the hospital, he was found to have multiple seizures requiring multiple doses of Ativan  Of note, he missed a dose of Keppra 1/16 and had a reduction in his maintenance dose in July of 2021  He was also noted to be febrile on arrival to the hospital   His initial head CT and CTA head and neck were negative for ischemia  He was intubated on arrival to the ED for airway protection  He received an LP that was negative for infection  He was transferred overnight 1/18-1/19 to the [facility] for video EEG monitoring  On arrival, he was continued on Keppra and Depakote and was sedated with Precedex during video EEG monitoring  During multiple days of video EEG monitoring, no electrographic seizures or interictal epileptiform discharges were seen  He completed a brief antibiotic course for community-acquired pneumonia    He was transferred to the regular medical floor on 01/23  MRI brain on 01/24, though a limited study, was negative for acute or chronic intracranial pathology  On 01/26, neurology increased dose of Keppra to 1250 mg q h s  And discontinued valproic acid, after which time they signed off  Patient was originally determined to require post acute/short-term rehab by Physical and Occupational therapy, for which he was to be discharged to [facility] on 1/28  This discharge was delayed secondary to incidental COVID-19 diagnosis  Patient then continued to improve and upon repeat evaluation on 01/31, was deemed appropriate for discharge to home with home health care  Outpatient plan:  · Continue Keppra 1250 mg q h s   · Outpatient follow-up with epileptologist in 6 weeks  · Home health care/home physical therapy to establish with patient on 02/08/2022    COVID-19  Assessment & Plan  Patient is fully vaccinated yet overdue for booster series  He incidentally tested positive on 01/28 on screening testing prior to discharge to rehab  He previously tested negative on 01/25 and 1/18  At time of positive testing, patient was asymptomatic, saturating well on room air, afebrile, and without leukocytosis, however his fevers noted prior during this admission may have been secondary to COVID-19 infection  Plan  · Continue isolation for total of 10 days from date of positive test out of abundance of caution  · Safe for discharge to home with isolation precautions enforced in the home  · Obtain booster vaccination series once cleared from isolation    Subdural hematoma [facility])  Assessment & Plan  Patient had an unwitnessed fall in the hospital on 01/26  A rapid response was called at that time and he was cleared by the ICU team without any need for stat CT head  The next morning, primary team noticed a small hematoma on the left side of his forehead and the patient complained of pain    He went down for CT scan the morning of 1/27, which found small 0 5 cm left-sided subdural hemorrhage along the left frontoparietal convexity  No mass effect or midline shift  He was seen at same-day by Neurosurgery, who recommended repeat imaging the next day  Repeat head CT obtained on 01/28 showed redistribution and feeding of hemorrhage per Neurosurgery, after which time neurosurgery signed off and cleared the patient for discharge  They recommended avoiding anticoagulation and antiplatelet agents for 2 weeks  Plan  · No further need for imaging follow-up per Neurosurgery  · Hold anticoagulation and antiplatelet agents for 2 weeks    Unwitnessed fall  Assessment & Plan  Patient had unwitnessed fall in the hospital on 01/26  A rapid response was called at that time and he was cleared by the ICU advanced practitioner and ICU physician without any need for head CT at that time  The next morning, primary team noticed small hematoma on the left side of his forehead and the patient complained of pain  He went down for CT scan was found to have small subdural hematoma  He was seen that day by Neurosurgery and cleared for discharge without further follow-up  After working with physical and occupational therapy, patient's strength has improved significantly and he is now able to ambulate with assistance of only a rolling walker  Plan  · Home PT for continued conditioning    Dysphagia  Assessment & Plan  Patient received Kaofed tube while in the ICU for tube feeds  He was followed closely by speech pathology and his diet was slowly advanced in terms of consistency  At time of discharge, patient was tolerating a dysphagia level 3 diet with thin liquids well      Plan  · Advance to regular diet as tolerated at home following discharge    Stage 3a chronic kidney disease [facility])  Assessment & Plan  Lab Results   Component Value Date    EGFR 70 01/30/2022    EGFR 84 01/25/2022    EGFR 88 01/23/2022    CREATININE 1 01 01/30/2022    CREATININE 0 82 01/25/2022    CREATININE 0 73 01/23/2022     Patient with known history of stage IIIA CKD  Creatinine at baseline at discharge  Plan  · Outpatient follow-up with PCP  · Consider referral to Nephrology    Aneurysm of abdominal aorta [facility])  Assessment & Plan  Patient has known history of AAA status post EVAR  Hypertension  Assessment & Plan  Patient was started on doxazosin for blood pressure and urinary retention while in the ICU  This was continued during his floor stay  Plan  · Discharged on Cardura 1 mg daily with plans to titrate down to off in the outpatient setting    Hyperlipidemia  Assessment & Plan  During this admission, statin was increased in intensity  Plan:  · Continue pravastatin 80 mg daily started this admission    Urinary retention-resolved as of 2/1/2022  Assessment & Plan  Patient required placement of Franklin catheter on arrival   This was removed on 01/21 and reinserted on 01/22 secondary to continued issues with urinary retention  He was started on doxazosin for presumed BPH  His Franklin catheter was then successfully removed on 01/25  Plan  · Continue doxazosin for now, anticipate this will be titrated down by the patient's PCP in the outpatient setting  · Retention resolved    Acute encephalopathy-resolved as of 2/1/2022  Assessment & Plan  Patient suffered acute encephalopathy while in the ICU following discontinuation of sedation  He was started on gabapentin to assist with sleep-wake coagulation  He did intermittently require restraints for behaviors but those were able to be quickly titrated off  Plan  · Resolved    Fever-resolved as of 2/1/2022  Assessment & Plan  Patient was febrile on arrival to the [facility] ED  He initially tested negative for COVID-19 infection and was treated with 5 days of Rocephin on arrival to the [facility] ED for suspicion of community-acquired pneumonia  Patient was again febrile on 01/23    His workup at that time showed normal white count and procalcitonin and chest x-ray showing some atelectasis  He again tested negative for COVID/flu/RSV at that time  Patient had 1 isolated elevated temperature again on 01/25 that resolved spontaneously  He finally tested positive for COVID-19 infection on 01/28 prior to planned discharge to short-term rehab      Plan  · Resolved  · Concerned that etiology for multiple fevers, including fever present on arrival, was breakthrough COVID infection        Diagnostic Procedures Performed  Imaging Studies:  As detailed in chart    Pertinent Labs:  As detailed in chart    Therapeutic Procedures Performed  As detailed in chart    Test Results Pending at Discharge:  None     Medications     Medication List to be Continued at Discharge  Current Discharge Medication List      CONTINUE these medications which have NOT CHANGED    Details   acetaminophen (TYLENOL) 500 mg tablet Take 500 mg by mouth every 6 (six) hours as needed for mild pain      simvastatin (ZOCOR) 40 mg tablet Take 1 tablet (40 mg total) by mouth daily  Qty: 90 tablet, Refills: 3    Associated Diagnoses: Mixed hyperlipidemia           Current Discharge Medication List      START taking these medications    Details   doxazosin (CARDURA) 1 mg tablet Take 1 tablet (1 mg total) by mouth daily  Qty: 30 tablet, Refills: 0    Associated Diagnoses: Seizure ([address] )      folic acid (FOLVITE) 1 mg tablet Take 1 tablet (1 mg total) by mouth daily  Qty: 30 tablet, Refills: 0    Associated Diagnoses: Seizure (HCC)      levETIRAcetam (KEPPRA) 250 mg tablet Take 5 tablets (1,250 mg total) by mouth daily at bedtime  Qty: 150 tablet, Refills: 0    Associated Diagnoses: Seizure (HCC)      lidocaine (LIDODERM) 5 % Apply 1 patch topically daily Remove & Discard patch within 12 hours or as directed by MD  Qty: 5 patch, Refills: 0    Associated Diagnoses: Seizure (HCC)      loperamide (IMODIUM) 2 mg capsule Take 2 capsules (4 mg total) by mouth 4 (four) times a day as needed for diarrhea  Qty: 30 capsule, Refills: 0    Associated Diagnoses: Seizure ([address] )      pravastatin (PRAVACHOL) 80 mg tablet Take 1 tablet (80 mg total) by mouth daily with dinner  Qty: 30 tablet, Refills: 0    Associated Diagnoses: Seizure (HCC)      thiamine 100 MG tablet Take 1 tablet (100 mg total) by mouth daily  Qty: 30 tablet, Refills: 0    Associated Diagnoses: Seizure ([address] )           Current Discharge Medication List      STOP taking these medications       levETIRAcetam (KEPPRA XR) 500 MG 24 hr tablet Comments:   Reason for Stopping:               Allergies  Allergies   Allergen Reactions    Fenofibrate      Patient not aware of allergy    Hydrocodone-Acetaminophen      Patient not aware of allergy     Discharge Diet: Dysphagia level 3 with thin liquids  Activity restrictions: Per therapy recommendations  Discharge Condition: good  Discharged With Lines: no    Discharge Disposition: [address] [place] [address] / Family Member Name: Daughter Edith Muñoz  PCP:  Within 7-14 days of discharge  Neurology:  Within 6 weeks of discharge with epileptologist      Code Status: Level 1 - Full Code  Advance Directive and Living Will: <no information>  Power of :    POLST:      Discharge  Statement   I spent 30 minutes minutes discharging the patient  This time was spent on the day of discharge  I had direct contact with the patient on the day of discharge  Additional documentation is required if more than 30 minutes were spent on discharge       MISSAEL Cao   PGY-3  Internal Medicine Resident  [facility]

## 2022-02-01 NOTE — DISCHARGE INSTRUCTIONS
For your seizures, please take Keppra/levetiracetam extended release 1250 mg at bedtime  To facilitate this, you have been provided with a prescription for Keppra  mg and Keppra  mg to be taken together at bedtime as this will equal the dose you have been taking in the hospital to control your seizures  Neurosurgery discharge instructions following traumatic head bleed:      Do not take any blood thinning medications (ie  No Advil  No motrin  No ibuprofen  No Aleve  No Aspirin  No fishoil  No heparin  No antiplatelet / no anticoagulation medication) x 2 weeks from fall   Refrain from activity that increases chance of trauma to head or falls  Recommend you take fall precaution   No strenuous activity or sports   Return to hospital Emergency Room if you experience worsening / new headache, nausea/vomiting, speech/vision change, seizure, confusion / mental status change, weakness, or other neurological changes

## 2022-02-01 NOTE — PLAN OF CARE
Problem: MOBILITY - ADULT  Goal: Maintain or return to baseline ADL function  Description: INTERVENTIONS:  -  Assess patient's ability to carry out ADLs; assess patient's baseline for ADL function and identify physical deficits which impact ability to perform ADLs (bathing, care of mouth/teeth, toileting, grooming, dressing, etc )  - Assess/evaluate cause of self-care deficits   - Assess range of motion  - Assess patient's mobility; develop plan if impaired  - Assess patient's need for assistive devices and provide as appropriate  - Encourage maximum independence but intervene and supervise when necessary  - Involve family in performance of ADLs  - Assess for home care needs following discharge   - Consider OT consult to assist with ADL evaluation and planning for discharge  - Provide patient education as appropriate  Outcome: Progressing  Goal: Maintains/Returns to pre admission functional level  Description: INTERVENTIONS:  - Perform BMAT or MOVE assessment daily    - Set and communicate daily mobility goal to care team and patient/family/caregiver  - Collaborate with rehabilitation services on mobility goals if consulted  - Perform Range of Motion 3 times a day  - Reposition patient every 3 hours    - Dangle patient 3 times a day  - Stand patient 3 times a day  - Ambulate patient 3 times a day  - Out of bed to chair 3 times a day   - Out of bed for meals 3 times a day  - Out of bed for toileting  - Record patient progress and toleration of activity level   Outcome: Progressing     Problem: Potential for Falls  Goal: Patient will remain free of falls  Description: INTERVENTIONS:  - Educate patient/family on patient safety including physical limitations  - Instruct patient to call for assistance with activity   - Consult OT/PT to assist with strengthening/mobility   - Keep Call bell within reach  - Keep bed low and locked with side rails adjusted as appropriate  - Keep care items and personal belongings within reach  - Initiate and maintain comfort rounds  - Make Fall Risk Sign visible to staff  - Offer Toileting every 2 Hours, in advance of need  - Initiate/Maintain bed alarm  - Apply yellow socks and bracelet for high fall risk patients  - Consider moving patient to room near nurses station  Outcome: Progressing     Problem: Prexisting or High Potential for Compromised Skin Integrity  Goal: Skin integrity is maintained or improved  Description: INTERVENTIONS:  - Identify patients at risk for skin breakdown  - Assess and monitor skin integrity  - Assess and monitor nutrition and hydration status  - Monitor labs   - Assess for incontinence   - Turn and reposition patient  - Assist with mobility/ambulation  - Relieve pressure over bony prominences  - Avoid friction and shearing  - Provide appropriate hygiene as needed including keeping skin clean and dry  - Evaluate need for skin moisturizer/barrier cream  - Collaborate with interdisciplinary team   - Patient/family teaching  - Consider wound care consult   Outcome: Progressing     Problem: Neurological Deficit  Goal: Neurological status is stable or improving  Description: Interventions:  - Monitor and assess patient's level of consciousness, motor function, sensory function, and level of assistance needed for ADLs  - Monitor and report changes from baseline  Collaborate with interdisciplinary team to initiate plan and implement interventions as ordered  - Provide and maintain a safe environment  - Consider seizure precautions  - Consider fall precautions  - Consider aspiration precautions  - Consider bleeding precautions  Outcome: Progressing     Problem: Activity Intolerance/Impaired Mobility  Goal: Mobility/activity is maintained at optimum level for patient  Description: Interventions:  - Assess and monitor patient  barriers to mobility and need for assistive/adaptive devices  - Assess patient's emotional response to limitations    - Collaborate with interdisciplinary team and initiate plans and interventions as ordered  - Encourage independent activity per ability   - Maintain proper body alignment  - Perform active/passive rom as tolerated/ordered  - Plan activities to conserve energy   - Turn patient as appropriate  Outcome: Progressing     Problem: Communication Impairment  Goal: Ability to express needs and understand communication  Description: Assess patient's communication skills and ability to understand information  Patient will demonstrate use of effective communication techniques, alternative methods of communication and understanding even if not able to speak  - Encourage communication and provide alternate methods of communication as needed  - Collaborate with case management/ for discharge needs  - Include patient/family/caregiver in decisions related to communication  Outcome: Progressing     Problem: Potential for Aspiration  Goal: Non-ventilated patient's risk of aspiration is minimized  Description: Assess and monitor vital signs, respiratory status, and labs (WBC)  Monitor for signs of aspiration (tachypnea, cough, rales, wheezing, cyanosis, fever)  - Assess and monitor patient's ability to swallow  - Place patient up in chair to eat if possible  - HOB up at 90 degrees to eat if unable to get patient up into chair   - Supervise patient during oral intake  - Instruct patient/ family to take small bites  - Instruct patient/ family to take small single sips when taking liquids  - Follow patient-specific strategies generated by speech pathologist   Outcome: Progressing     Problem: Nutrition  Goal: Nutrition/Hydration status is improving  Description: Monitor and assess patient's nutrition/hydration status for malnutrition (ex- brittle hair, bruises, dry skin, pale skin and conjunctiva, muscle wasting, smooth red tongue, and disorientation)   Collaborate with interdisciplinary team and initiate plan and interventions as ordered  Monitor patient's weight and dietary intake as ordered or per policy  Utilize nutrition screening tool and intervene per policy  Determine patient's food preferences and provide high-protein, high-caloric foods as appropriate  - Assist patient with eating   - Allow adequate time for meals   - Encourage patient to take dietary supplement as ordered  - Collaborate with clinical nutritionist   - Include patient/family/caregiver in decisions related to nutrition  Outcome: Progressing     Problem: Nutrition/Hydration-ADULT  Goal: Nutrient/Hydration intake appropriate for improving, restoring or maintaining nutritional needs  Description: Monitor and assess patient's nutrition/hydration status for malnutrition  Collaborate with interdisciplinary team and initiate plan and interventions as ordered  Monitor patient's weight and dietary intake as ordered or per policy  Utilize nutrition screening tool and intervene as necessary  Determine patient's food preferences and provide high-protein, high-caloric foods as appropriate       INTERVENTIONS:  - Monitor oral intake, urinary output, labs, and treatment plans  - Assess nutrition and hydration status and recommend course of action  - Evaluate amount of meals eaten  - Assist patient with eating if necessary   - Allow adequate time for meals  - Recommend/ encourage appropriate diets, oral nutritional supplements, and vitamin/mineral supplements  - Order, calculate, and assess calorie counts as needed  - Recommend, monitor, and adjust tube feedings based on assessed needs  - Assess need for intravenous fluids  - Provide specific nutrition/hydration education as appropriate  - Include patient/family/caregiver in decisions related to nutrition  Outcome: Progressing     Problem: PAIN - ADULT  Goal: Verbalizes/displays adequate comfort level or baseline comfort level  Description: Interventions:  - Encourage patient to monitor pain and request assistance  - Assess pain using appropriate pain scale  - Administer analgesics based on type and severity of pain and evaluate response  - Implement non-pharmacological measures as appropriate and evaluate response  - Consider cultural and social influences on pain and pain management  - Notify physician/advanced practitioner if interventions unsuccessful or patient reports new pain  Outcome: Progressing     Problem: INFECTION - ADULT  Goal: Absence or prevention of progression during hospitalization  Description: INTERVENTIONS:  - Assess and monitor for signs and symptoms of infection  - Monitor lab/diagnostic results  - Monitor all insertion sites, i e  indwelling lines, tubes, and drains  - Monitor endotracheal if appropriate and nasal secretions for changes in amount and color  - [place] appropriate cooling/warming therapies per order  - Administer medications as ordered  - Instruct and encourage patient and family to use good hand hygiene technique  - Identify and instruct in appropriate isolation precautions for identified infection/condition  Outcome: Progressing  Goal: Absence of fever/infection during neutropenic period  Description: INTERVENTIONS:  - Monitor WBC    Outcome: Progressing     Problem: SAFETY ADULT  Goal: Maintain or return to baseline ADL function  Description: INTERVENTIONS:  -  Assess patient's ability to carry out ADLs; assess patient's baseline for ADL function and identify physical deficits which impact ability to perform ADLs (bathing, care of mouth/teeth, toileting, grooming, dressing, etc )  - Assess/evaluate cause of self-care deficits   - Assess range of motion  - Assess patient's mobility; develop plan if impaired  - Assess patient's need for assistive devices and provide as appropriate  - Encourage maximum independence but intervene and supervise when necessary  - Involve family in performance of ADLs  - Assess for home care needs following discharge   - Consider OT consult to assist with ADL evaluation and planning for discharge  - Provide patient education as appropriate  Outcome: Progressing  Goal: Maintains/Returns to pre admission functional level  Description: INTERVENTIONS:  - Perform BMAT or MOVE assessment daily    - Set and communicate daily mobility goal to care team and patient/family/caregiver  - Collaborate with rehabilitation services on mobility goals if consulted  - Perform Range of Motion 3 times a day  - Reposition patient every 3 hours    - Dangle patient 3 times a day  - Stand patient 3 times a day  - Ambulate patient 3 times a day  - Out of bed to chair 3 times a day   - Out of bed for meals 3 times a day  - Out of bed for toileting  - Record patient progress and toleration of activity level   Outcome: Progressing  Goal: Patient will remain free of falls  Description: INTERVENTIONS:  - Educate patient/family on patient safety including physical limitations  - Instruct patient to call for assistance with activity   - Consult OT/PT to assist with strengthening/mobility   - Keep Call bell within reach  - Keep bed low and locked with side rails adjusted as appropriate  - Keep care items and personal belongings within reach  - Initiate and maintain comfort rounds  - Make Fall Risk Sign visible to staff  - Offer Toileting every 2 Hours, in advance of need  - Initiate/Maintain bed alarm  - Apply yellow socks and bracelet for high fall risk patients  - Consider moving patient to room near nurses station  Outcome: Progressing     Problem: DISCHARGE PLANNING  Goal: Discharge to home or other facility with appropriate resources  Description: INTERVENTIONS:  - Identify barriers to discharge w/patient and caregiver  - Arrange for needed discharge resources and transportation as appropriate  - Identify discharge learning needs (meds, wound care, etc )  - Arrange for interpretive services to assist at discharge as needed  - Refer to Case Management Department for coordinating discharge planning if the patient needs post-hospital services based on physician/advanced practitioner order or complex needs related to functional status, cognitive ability, or social support system  Outcome: Progressing     Problem: Knowledge Deficit  Goal: Patient/family/caregiver demonstrates understanding of disease process, treatment plan, medications, and discharge instructions  Description: Complete learning assessment and assess knowledge base    Interventions:  - Provide teaching at level of understanding  - Provide teaching via preferred learning methods  Outcome: Progressing

## 2022-02-01 NOTE — SPEECH THERAPY NOTE
Speech/Language Pathology Progress Note    Patient Name: Alen Florez  [facility]'N Date: 2/1/2022     Subjective:  Pt awake and alert, reported he is planning for d/c home tonight  No covid positive  Objective:  Pt seen for diagnostic swallow therapy  Current diet is dysphagia 3/thin liquid (recently upgraded)  Pt's cognitive status has drastically improved in the past week  He is now fully alert and oriented, able to participated in conversation appropriately  Pt seen with lunch tray, which included chopped beef and broccoli, chicken noodle soup, and pie  Pt fed himself at an appropriate rate  Mastication was complete and adequate  Bolus formation and transfer were complete and timely  No significant oral residual noted after swallow  Thin liquids tolerated without s/s aspiration  Assessment:  Pt would likely be appropriate for diet upgrade to regular  Plan is for d/c home therefore may be irrelevant  Plan/Recommendations:  Continue dysphagia 3 diet with thin liquids  If still admitted tomorrow plan for trial diet upgrade

## 2022-02-01 NOTE — QUICK NOTE
I spoke to this patient's daughter extensively over the phone to provide a comprehensive clinical update  I answered all of her questions and addressed all of her concerns to the best of my ability and to her satisfaction      MISSAEL St   PGY-3  Internal Medicine Resident  [facility]

## 2022-02-01 NOTE — PLAN OF CARE
Problem: Nutrition  Goal: Nutrition/Hydration status is improving  Description: Monitor and assess patient's nutrition/hydration status for malnutrition (ex- brittle hair, bruises, dry skin, pale skin and conjunctiva, muscle wasting, smooth red tongue, and disorientation)  Collaborate with interdisciplinary team and initiate plan and interventions as ordered  Monitor patient's weight and dietary intake as ordered or per policy  Utilize nutrition screening tool and intervene per policy  Determine patient's food preferences and provide high-protein, high-caloric foods as appropriate  - Assist patient with eating   - Allow adequate time for meals   - Encourage patient to take dietary supplement as ordered  - Collaborate with clinical nutritionist   - Include patient/family/caregiver in decisions related to nutrition  Outcome: Adequate for Discharge     Problem: Nutrition/Hydration-ADULT  Goal: Nutrient/Hydration intake appropriate for improving, restoring or maintaining nutritional needs  Description: Monitor and assess patient's nutrition/hydration status for malnutrition  Collaborate with interdisciplinary team and initiate plan and interventions as ordered  Monitor patient's weight and dietary intake as ordered or per policy  Utilize nutrition screening tool and intervene as necessary  Determine patient's food preferences and provide high-protein, high-caloric foods as appropriate       INTERVENTIONS:  - Monitor oral intake, urinary output, labs, and treatment plans  - Assess nutrition and hydration status and recommend course of action  - Evaluate amount of meals eaten  - Assist patient with eating if necessary   - Allow adequate time for meals  - Recommend/ encourage appropriate diets, oral nutritional supplements, and vitamin/mineral supplements  - Order, calculate, and assess calorie counts as needed  - Recommend, monitor, and adjust tube feedings based on assessed needs  - Assess need for intravenous fluids  - Provide specific nutrition/hydration education as appropriate  - Include patient/family/caregiver in decisions related to nutrition  Outcome: Adequate for Discharge

## 2022-02-02 ENCOUNTER — TRANSITIONAL CARE MANAGEMENT (OUTPATIENT)
Dept: FAMILY MEDICINE CLINIC | Facility: CLINIC | Age: 82
End: 2022-02-02

## 2022-02-03 ENCOUNTER — PATIENT OUTREACH (OUTPATIENT)
Dept: FAMILY MEDICINE CLINIC | Facility: CLINIC | Age: 82
End: 2022-02-03

## 2022-02-03 ENCOUNTER — TELEPHONE (OUTPATIENT)
Dept: FAMILY MEDICINE CLINIC | Facility: CLINIC | Age: 82
End: 2022-02-03

## 2022-02-03 NOTE — PROGRESS NOTES
Spoke with Ángela Mendieta  He states he is doing  good  Using cane to ambulate  I asked if home PT and OT started and he said no and does not feel he needs it  If he would need those services would like to have [facility] because that is who is working with his wife  PCP appointment scheduled for 2/10/22 he will discuss having nurse come out if that services is needed will order from [facility]  He has his medications  No other questions or concerns  I will call back after PCP appointment

## 2022-02-03 NOTE — TELEPHONE ENCOUNTER
Patient called because he was prescribed medication by the hospitalist on discharge and he wanted to know why he needed them and if he should be taking them  Told him the hospital was in charge of his care and we can not say if he should be taking them or not since he was not in our care at that time  Daughter said she will call the hospital then to follow up   Patient would like to discuss at upcoming appt on 02/10/22

## 2022-02-03 NOTE — TELEPHONE ENCOUNTER
Call  I reviewed his med list  There are 2 different cholesterol medications   He should only be taking one of these-pravastatin was prescribed in the hospital  Otherwise he should taking all meds prescribed at discharge until I see him       Current Outpatient Medications:     acetaminophen (TYLENOL) 500 mg tablet, Take 500 mg by mouth every 6 (six) hours as needed for mild pain, Disp: , Rfl:     doxazosin (CARDURA) 1 mg tablet, Take 1 tablet (1 mg total) by mouth daily, Disp: 30 tablet, Rfl: 0    folic acid (FOLVITE) 1 mg tablet, Take 1 tablet (1 mg total) by mouth daily, Disp: 30 tablet, Rfl: 0    levETIRAcetam (KEPPRA XR) 500 MG 24 hr tablet, Take 1 tablet (500 mg total) by mouth daily at bedtime, Disp: 30 tablet, Rfl: 0    Levetiracetam 750 MG TB24, Take 1 tablet (750 mg total) by mouth daily at bedtime, Disp: 30 tablet, Rfl: 0    lidocaine (LIDODERM) 5 %, Apply 1 patch topically daily Remove & Discard patch within 12 hours or as directed by MD, Disp: 5 patch, Rfl: 0    loperamide (IMODIUM) 2 mg capsule, Take 2 capsules (4 mg total) by mouth 4 (four) times a day as needed for diarrhea, Disp: 30 capsule, Rfl: 0    pravastatin (PRAVACHOL) 80 mg tablet, Take 1 tablet (80 mg total) by mouth daily with dinner, Disp: 30 tablet, Rfl: 0    simvastatin (ZOCOR) 40 mg tablet, Take 1 tablet (40 mg total) by mouth daily, Disp: 90 tablet, Rfl: 3    thiamine 100 MG tablet, Take 1 tablet (100 mg total) by mouth daily, Disp: 30 tablet, Rfl: 0

## 2022-02-04 NOTE — ED NOTES
Dr Brito with Providers at bedside for eval  Pt to CT as per Dr Megan Wheatley, RN  01/18/22 [facility]  01/18/22 [id number] [facility] Pregnancy And Lactation Text: This medication is Pregnancy Category B and is considered safe during pregnancy. It is unknown if this medication is excreted in breast milk.

## 2022-02-07 ENCOUNTER — TELEPHONE (OUTPATIENT)
Dept: NEUROLOGY | Facility: CLINIC | Age: 82
End: 2022-02-07

## 2022-02-07 NOTE — TELEPHONE ENCOUNTER
Referral received for patient to be scheduled  Patient is already an established patient with follow up scheduled for August  On chart review, it appears patient was hospitalized due to seizure and subdural hematoma in January  Do you want patient to be seen sooner? Laisha Iglesias RN  Good afternoon,     I'm working on this referral and saw the patient was referred for seizures but he is already an existing patient and has a yearly in August  Are you able to check and see if he needs to be seen sooner? Please let me know and I can schedule accordingly  Thank you

## 2022-02-08 ENCOUNTER — TELEPHONE (OUTPATIENT)
Dept: NEUROLOGY | Facility: CLINIC | Age: 82
End: 2022-02-08

## 2022-02-08 NOTE — TELEPHONE ENCOUNTER
Called pt to scheduled his HF/U appt  Gave pt dates approved by Dr Monserrat Baker ( 3/7, 3/8, 3/9 ) at 12 pm  (Provider approved)  Pt will call back office to confirm which dates works for him

## 2022-02-10 ENCOUNTER — OFFICE VISIT (OUTPATIENT)
Dept: FAMILY MEDICINE CLINIC | Facility: CLINIC | Age: 82
End: 2022-02-10
Payer: MEDICARE

## 2022-02-10 VITALS
BODY MASS INDEX: 25.34 KG/M2 | HEART RATE: 86 BPM | DIASTOLIC BLOOD PRESSURE: 70 MMHG | SYSTOLIC BLOOD PRESSURE: 120 MMHG | HEIGHT: 70 IN | TEMPERATURE: 97.1 F | RESPIRATION RATE: 16 BRPM | WEIGHT: 177 LBS

## 2022-02-10 DIAGNOSIS — I35.0 NONRHEUMATIC AORTIC VALVE STENOSIS: ICD-10-CM

## 2022-02-10 DIAGNOSIS — I35.1 NONRHEUMATIC AORTIC VALVE INSUFFICIENCY: ICD-10-CM

## 2022-02-10 DIAGNOSIS — N40.1 BENIGN PROSTATIC HYPERPLASIA WITH URINARY RETENTION: ICD-10-CM

## 2022-02-10 DIAGNOSIS — U07.1 COVID-19: ICD-10-CM

## 2022-02-10 DIAGNOSIS — I71.4 ABDOMINAL AORTIC ANEURYSM (AAA) WITHOUT RUPTURE (HCC): ICD-10-CM

## 2022-02-10 DIAGNOSIS — I34.0 NONRHEUMATIC MITRAL VALVE REGURGITATION: ICD-10-CM

## 2022-02-10 DIAGNOSIS — R29.6 UNWITNESSED FALL: ICD-10-CM

## 2022-02-10 DIAGNOSIS — I10 PRIMARY HYPERTENSION: ICD-10-CM

## 2022-02-10 DIAGNOSIS — I77.810 MILD DILATION OF ASCENDING AORTA (HCC): ICD-10-CM

## 2022-02-10 DIAGNOSIS — G40.909 SEIZURE DISORDER (HCC): Primary | ICD-10-CM

## 2022-02-10 DIAGNOSIS — I73.9 PAD (PERIPHERAL ARTERY DISEASE) (HCC): ICD-10-CM

## 2022-02-10 DIAGNOSIS — S06.5X9A SUBDURAL HEMATOMA (HCC): ICD-10-CM

## 2022-02-10 DIAGNOSIS — R33.8 BENIGN PROSTATIC HYPERPLASIA WITH URINARY RETENTION: ICD-10-CM

## 2022-02-10 PROCEDURE — 99495 TRANSJ CARE MGMT MOD F2F 14D: CPT | Performed by: FAMILY MEDICINE

## 2022-02-10 NOTE — PROGRESS NOTES
TCM Call (since 1/13/2022)     Date and time call was made  2/2/2022  2:20 [facility] reviewed  Records reviewed        Patient was hospitialized at  [facility]        Date of Admission  01/18/22    Date of discharge  02/01/22    Diagnosis  Seizure     Disposition  Home    Were the patients medications reviewed and updated  Yes    Current Symptoms  Weakness; Fatigue    Weakness severity  Mild    Fatigue severity  Mild      TCM Call (since 1/13/2022)     Post hospital issues  Reduced activity    Should patient be enrolled in anticoag monitoring? No    Scheduled for follow up? Yes    Did you obtain your prescribed medications  Yes    Do you need help managing your prescriptions or medications  No    Is transportation to your appointment needed  Yes    Specify why  Not currently driving     I have advised the patient to call PCP with any new or worsening symptoms  Carolina Barlow MA     Living Arrangements  Spouse or Significiant other    [address] [place] [address]; Spouse    The type of support provided  Emotional; Physical    Do you have social support  Yes, as much as I need    Are you recieving any outpatient services  Yes    What type of services  visiting nurses     Are you recieving home care services  Yes    Types of home care services  Home PT; Nurse visit    Are you using any community resources  No    Current waiver services  No    Have you fallen in the last 12 months  No    Interperter language line needed  No    Counseling  Family    Comments  I spoke with patients daughter Jolanta Ang          Assessment/Plan:     Diagnoses and all orders for this visit:    Seizure disorder ([address] )    Subdural hematoma ([address] )    Unwitnessed fall    Benign prostatic hyperplasia with urinary retention    COVID-19    Nonrheumatic mitral valve regurgitation    Nonrheumatic aortic valve stenosis    Nonrheumatic aortic valve insufficiency    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HCC)    PAD (peripheral artery disease) ([facility] )    Abdominal aortic aneurysm (AAA) without rupture ([facility] )    Primary hypertension          Continue with current medications  Patient has a letter from the [place] Dept of Motor Vehicles stating he cannot drive and must turn in his 's license  Follow up with Neurology  Patient ID: Duane Ferrer is a 80 y o  male  Here with daughter  TCM s/p admission 01/18/2022 to 02/01/2022 known seizure disorder on Keppra  Patient initially presented as a possible stroke alert  He was found by his wife's caregiver on the morning of admission with seizure activity  He had multiple seizures en route to the hospital requiring IV Lorazepam   CT scan head no acute intracranial abnormality  CTA no flow restrictive stenosis, occlusion or thrombosis of the cervical or intracranial vasculature  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  LP negative  Patient was intubated and transferred to [facility]  He was initially treated with Depakote and Keppra  Depakote was eventually discontinued  He has treated for with antibiotics  Prior to discharge he tested positive for COV 19  During his hospitalization patient had an unwitnessed fall on 01/26/2022  CT scan head 01/27/2022 Small left-sided subdural hemorrhage along the left frontoparietal convexity  No significant mass effect  Repeat CT scan head 01/28/2022 unchanged acute trace subarachnoid hemorrhage previously described a subdural hematoma left frontal cerebral convexity no new sites of acute intracranial hemorrhage  Medications reviewed  Hypertension blood pressures have been stable on Doxazosin 1 mg/day  01/2022 Creatinine 1 01  Electrolytes normal except for chloride 110  Hemoglobin 11 9  Hyperlipidemia with history of AAA s/p  endovascular stent on Simvastatin 40 mg  He cut his dose to every other day  09/2020 Lipid profile Cholesterol 164  Triglycerides 182 increased from 128  HDL 47  LDL 81    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  She has advanced MS  Lab Results   Component Value Date    WBC 6 91 01/30/2022    HGB 11 9 (L) 01/30/2022    HCT 38 4 01/30/2022     (H) 01/30/2022     01/30/2022     Lab Results   Component Value Date    SODIUM 142 01/30/2022    K 4 4 01/30/2022     (H) 01/30/2022    CO2 29 01/30/2022    BUN 19 01/30/2022    CREATININE 1 01 01/30/2022    GLUC 89 01/30/2022    CALCIUM 8 3 01/30/2022     Lab Results   Component Value Date    CHOLESTEROL 164 09/04/2020    CHOLESTEROL 138 02/11/2020    CHOLESTEROL 111 04/22/2019     Lab Results   Component Value Date    HDL 47 09/04/2020    HDL 49 02/11/2020    HDL 36 (L) 04/22/2019     Lab Results   Component Value Date    TRIG 123 01/20/2022    TRIG 182 (H) 09/04/2020    TRIG 128 02/11/2020     Lab Results   Component Value Date    LDLCALC 81 09/04/2020     Lab Results   Component Value Date    KGD2ACSPJNWJ 2 080 09/04/2020               The following portions of the patient's history were reviewed and updated as appropriate: allergies, current medications, past family history, past medical history, past social history, past surgical history and problem list     Review of Systems   Constitutional: Negative for appetite change, chills, fatigue, fever and unexpected weight change  HENT: Negative for congestion, ear pain, hearing loss, postnasal drip, rhinorrhea, sinus pressure, sinus pain, sore throat, trouble swallowing and voice change  Eyes: Negative for visual disturbance  Respiratory: Negative for cough, shortness of breath and wheezing  Cardiovascular: Negative for chest pain, palpitations and leg swelling  01/2022 echocardiogram normal left ventricular systolic function  EF 60%  Diastolic function normal   Mild to moderate AR  Moderate AS  Moderate MR  Abdominal aortic aneurysm status post endovascular stenting followed by vascular surgery  No claudications    10/2021 widely patent endograft without evidence of endoleak  Suggested > 70% stenosis celiac axis  SMA and bilateral proximal renal arteries patent  12/2021 arterial dopplers mild diffuse disease LEs  No significant stenosis   02/2017 echocardiogram normal left ventricular systolic function  No regional wall motion abnormalities  Mild MR  Mild to moderate aortic regurgitation  Mild aortic stenosis  Mild tricuspid regurgitation  Aortic root mild dilatation  Ascending aorta mildly dilated at 4 1 cm  Gastrointestinal: Negative for abdominal pain, blood in stool, constipation, diarrhea, nausea and vomiting  Colonoscopy 05/2018   Endocrine: Negative for polydipsia and polyuria  Past history of borderline abnormal elevated TSH 6 163   04/2019  On no medications      Lab Results       Component                Value               Date                       [id number]             2 080               09/04/2020                         Genitourinary: Negative for difficulty urinating and urgency  Urinary retention during his admission requiring a Franklin catheter  Catheter removed prior to discharge  Musculoskeletal: Negative for arthralgias and myalgias  Hyperuricemia and history of gout  No longer on allopurinol  Chronic bilateral shoulder pain with chronic rotator cuff tear right shoulder and subacromial bursitis left shoulder  Status post bilateral steroid injections by Orthopedic surgery 12/2017  He is on no pain medications at present      Lab Results       Component                Value               Date                       URICACID                 7 7                 09/04/2020               Skin: Negative for rash  Allergic/Immunologic: Negative for environmental allergies  Neurological: Positive for seizures  Negative for dizziness, light-headedness and headaches  See HPI   seizure disorder with 2 seizures occurring within several hours 02/2017   Extensive workup and neurological evaluation  MRI brain normal   Awake EEG normal   He did not drive for 6 months and regained his license at the end of August 2017  He had been seizure-free on Keppra    Hematological: Negative for adenopathy  Does not bruise/bleed easily  Psychiatric/Behavioral: Negative for dysphoric mood and sleep disturbance  Objective:      /70   Pulse 86   Temp (!) 97 1 °F (36 2 °C)   Resp 16   Ht 5' 10" (1 778 m)   Wt 80 3 kg (177 lb)   BMI 25 40 kg/m²        Physical Exam  Vitals and nursing note reviewed  Constitutional:       General: He is not in acute distress  Appearance: He is well-developed  HENT:      Right Ear: Tympanic membrane and ear canal normal       Left Ear: Tympanic membrane normal    Eyes:      General: No scleral icterus  Extraocular Movements: Extraocular movements intact  Conjunctiva/sclera: Conjunctivae normal       Pupils: Pupils are equal, round, and reactive to light  Neck:      Thyroid: No thyroid mass or thyromegaly  Vascular: No carotid bruit or JVD  Trachea: No tracheal deviation  Cardiovascular:      Rate and Rhythm: Normal rate and regular rhythm  Pulses:           Carotid pulses are 2+ on the right side and 2+ on the left side  Heart sounds: Murmur heard  Crescendo decrescendo systolic murmur is present with a grade of 2/6  No gallop  Pulmonary:      Effort: Pulmonary effort is normal  No respiratory distress  Breath sounds: Normal breath sounds  No wheezing or rales  Chest:   Breasts:      Right: No supraclavicular adenopathy  Left: No supraclavicular adenopathy  Abdominal:      General: Bowel sounds are normal  There is no distension or abdominal bruit  Palpations: Abdomen is soft  There is no hepatomegaly, splenomegaly or mass  Tenderness: There is no abdominal tenderness  There is no guarding or rebound  Musculoskeletal:      Right lower leg: No edema        Left lower leg: No edema  Lymphadenopathy:      Cervical: No cervical adenopathy  Upper Body:      Right upper body: No supraclavicular adenopathy  Left upper body: No supraclavicular adenopathy  Skin:     Findings: Bruising present  No rash  Nails: There is no clubbing  Comments: Resolving ecchymotic area left temple region    Neurological:      General: No focal deficit present  Mental Status: He is alert and oriented to person, place, and time     Psychiatric:         Mood and Affect: Mood normal          Behavior: Behavior normal

## 2022-02-11 ENCOUNTER — PATIENT OUTREACH (OUTPATIENT)
Dept: FAMILY MEDICINE CLINIC | Facility: CLINIC | Age: 82
End: 2022-02-11

## 2022-02-13 PROBLEM — R13.10 DYSPHAGIA: Status: RESOLVED | Noted: 2022-01-24 | Resolved: 2022-02-13

## 2022-02-13 PROBLEM — R29.898 RIGHT ARM WEAKNESS: Status: RESOLVED | Noted: 2022-01-24 | Resolved: 2022-02-13

## 2022-02-13 PROBLEM — I73.9 PAD (PERIPHERAL ARTERY DISEASE) (HCC): Status: ACTIVE | Noted: 2022-02-13

## 2022-02-18 DIAGNOSIS — G40.909 SEIZURE DISORDER (HCC): ICD-10-CM

## 2022-02-18 DIAGNOSIS — R56.9 SEIZURE (HCC): ICD-10-CM

## 2022-02-18 RX ORDER — DOXAZOSIN MESYLATE 1 MG/1
1 TABLET ORAL DAILY
Qty: 30 TABLET | Refills: 5 | Status: SHIPPED | OUTPATIENT
Start: 2022-02-18

## 2022-02-18 RX ORDER — LEVETIRACETAM 750 MG/1
750 TABLET, EXTENDED RELEASE ORAL
Qty: 30 TABLET | Refills: 5 | Status: SHIPPED | OUTPATIENT
Start: 2022-02-18 | End: 2022-03-07 | Stop reason: SDUPTHER

## 2022-02-18 NOTE — TELEPHONE ENCOUNTER
Call daughter he should stay on Doxazosin   He had urinary retention in the hospital requiring a catheter-this medication helps with bladder emptying

## 2022-02-24 ENCOUNTER — PATIENT OUTREACH (OUTPATIENT)
Dept: FAMILY MEDICINE CLINIC | Facility: CLINIC | Age: 82
End: 2022-02-24

## 2022-02-24 NOTE — PROGRESS NOTES
Spoke wit Aster Salas he was discussing thoughts on why he had the seizure  Stress, gas leak in the house and he had a doppler test done  Searching for a reason why  I said he can discuss this with neurology on 3/7/22  He states he is taking all his medications after dinner  I will check back again

## 2022-03-07 ENCOUNTER — OFFICE VISIT (OUTPATIENT)
Dept: NEUROLOGY | Facility: CLINIC | Age: 82
End: 2022-03-07
Payer: MEDICARE

## 2022-03-07 ENCOUNTER — TELEPHONE (OUTPATIENT)
Dept: FAMILY MEDICINE CLINIC | Facility: CLINIC | Age: 82
End: 2022-03-07

## 2022-03-07 VITALS
HEART RATE: 66 BPM | BODY MASS INDEX: 25.48 KG/M2 | OXYGEN SATURATION: 97 % | DIASTOLIC BLOOD PRESSURE: 64 MMHG | WEIGHT: 178 LBS | HEIGHT: 70 IN | TEMPERATURE: 97.5 F | SYSTOLIC BLOOD PRESSURE: 134 MMHG

## 2022-03-07 DIAGNOSIS — G40.001 PARTIAL IDIOPATHIC EPILEPSY WITH SEIZURES OF LOCALIZED ONSET, NOT INTRACTABLE, WITH STATUS EPILEPTICUS (HCC): ICD-10-CM

## 2022-03-07 PROCEDURE — 99215 OFFICE O/P EST HI 40 MIN: CPT | Performed by: PSYCHIATRY & NEUROLOGY

## 2022-03-07 RX ORDER — LEVETIRACETAM 750 MG/1
1500 TABLET, EXTENDED RELEASE ORAL
Qty: 180 TABLET | Refills: 3 | Status: SHIPPED | OUTPATIENT
Start: 2022-03-07

## 2022-03-07 NOTE — TELEPHONE ENCOUNTER
Not sure what medication to take    some was from the hospital  he just needs to know if it should be morning or night   he is confused

## 2022-03-07 NOTE — PROGRESS NOTES
[place] [address] [facility]  Follow Up Visit    Impression/Plan    Mr  Aurea Barton is a 80 y o  male  with history that includes abdominal aortic aneurysm status post repair that presents regarding focal epilepsy manifest as 2 lifetime seizures occurring a few hours apart on 2/21/2017 and status epilepticus in 1/2022  His neurological exam has been essentially normal     Levetiracetam was decreased in the setting of seizure freedom in 7/2021  Unfortunately, there was status epilepticus in 1/2022 requiring intubation  VEEG monitoring captured subclinical right frontal seizures  There was no definite acute provocation  There was fever measured by EMS, but not in the hospital  LP and brain MRI unremarkable  COVID infection occurred later in his admission and was incidental  There have been additional seizures since increasing levetiracetam to 1250 mg nightly  Will increase levetiracetam back to prior dose of 1500 mg per day that had previously resulted in seizure freedom for more than 4 years  His seizures in 1/2022 are considered provoked by prescribed decrease in levetiracetam dose  Patient Instructions   1  Increase levetiracetam ER to 1500 mg nightly  2  Let us know if there are seizures  3  Return in about 4 months  Diagnoses and all orders for this visit:    Partial idiopathic epilepsy with seizures of localized onset, not intractable, with status epilepticus ([address] )  -     Ambulatory referral to Neurology  -     Levetiracetam 750 MG TB24; Take 2 tablets (1,500 mg total) by mouth daily at bedtime  -     Levetiracetam level; Future        Subjective    Marina Chamber is returning to the [place] [address] [facility] for follow up  Interval Events:   Seizures since last visit: yes  Hospitalizations: yes - 1/18/2022    Last seen 7/28/2021  Levetiracetam dose decreased from 1500 mg to 1000 mg per day at last visit in setting of seizure freedom x 4 years and indefinite diagnosis  He had multiple seizures, status epilepticus on 1/18/2022  History from 1/18/2022 neurology consultation note:   "History gathered from patient's wife's caregiver and daughter, Luz Leiva  This morning caregiver found patient seizing on the couch at around 9:15 a m  upon arrival to the house  From the moment he was found to the moment EMS arrived patient had 4 seizures  Unclear how long patient had been seizing prior to arrival as the only other person in the house was his wife and she has dementia and is nonverbal   Patient's daughter states that yesterday around 9:17 a m  She was talking to patient and he was talking about nonsensical things such as a jacket belonging to an old neighbor or spitting into a bottle  There was a moment were patient stared off into space  "    Right gaze preference noted after seizures  He required multipel doses of lorazepam as well as valproate and levetiracetam load  He required intubation  VEEG revealed 11 subclinical right frontal seizures during the initial 14 hours of recording as well as right frontal BIRDS, right frontal slowing and right frontal sharp waves  Multiple subsequent days of VEEG revealed no further seizures as sedation was tapered off and he was extubated  No clear immediate provocation, but there was fever per EMS, but afebrile in hospital  He tells me there were no missed doses of levetiracetam  Valproate was added to levetiracetam  MRI brain unrevealing on 1/22 (motion limited)  LP unremarkable  Levetiracetam level was 15 2  Dose increased to 1250 mg nightly  After extubation there were several days of acute encephalopathy  Valproate was stopped  He incidentally tested positive for COVID on 1/28 and discharge was delayed  He was discharged home on 2/1/2022  Current AEDs:  Levetiracetam ER 1250 mg nightly  Medication side effects: None  Medication adherence: Yes    Seizures/Spell characteristics:  1  Witnessed GTC seizure  2 events on 2/21/17   One out of sleep, the other a few hours later in the ER  Status epilepticus in 1/2022    2  Right frontal subclinical seizures on VEEG in 2/2022       Special Features:  - History of status epilepticus: yes (1/2022)  - Self injury due to seizures: No  - Precipitating factors: None     Seizure risk factors: Normal birth and development  No history of seizures as a child  No family history of seizures  No head trauma resulting in loss of consciousness  No history of brain tumor, stroke or CNS infection       Prior AEDs:  None     Prior Evaluation:  REEG 2/21/17: normal, awake       1 hour EEG sleep-deprived EEG, 2 hours, 4/18/2017:  Normal    VEEG 1/2022 revealed 11 subclinical right frontal seizures during the initial 14 hours of recording as well as right frontal BIRDS, right frontal slowing and right frontal sharp waves      MRI brain w/ and w/o 2/21/17: normal       History Reviewed: The following were reviewed and updated as appropriate: allergies, current medications, past medical history, past social history and problem list   Past Medical History includes:  abdominal aortic aneurysm s/p endovascular repair  HLD, HTN, hypothyroidism, hyperuricemia        Psychiatric History:  None     Social History:   Driving: Yes  Lives Alone: No  Occupation: retired  Lives with his wife  She has MS and he is her caregiver  His wife follows with Dr Linda Gonzalez  Retired   ROS:  Review of Systems   Constitutional: Negative  Negative for appetite change and fever  HENT: Negative  Negative for hearing loss, tinnitus, trouble swallowing and voice change  Eyes: Negative  Negative for photophobia and pain  Respiratory: Negative  Negative for shortness of breath  Cardiovascular: Negative  Negative for palpitations  Gastrointestinal: Negative  Negative for nausea and vomiting  Endocrine: Negative  Negative for cold intolerance  Genitourinary: Negative  Negative for dysuria, frequency and urgency  Musculoskeletal: Negative  Negative for myalgias and neck pain  Skin: Negative  Negative for rash  Neurological: Negative  Negative for dizziness, tremors, seizures, syncope, facial asymmetry, speech difficulty, weakness, light-headedness, numbness and headaches  Hematological: Negative  Does not bruise/bleed easily  Psychiatric/Behavioral: Negative  Negative for confusion, hallucinations and sleep disturbance  All other systems reviewed and are negative  ROS reviewed and updated as appropriate  Objective    /64 (BP Location: Left arm, Patient Position: Sitting)   Pulse 66   Temp 97 5 °F (36 4 °C) (Temporal)   Ht 5' 10" (1 778 m)   Wt 80 7 kg (178 lb)   SpO2 97%   BMI 25 54 kg/m²      General Exam  No acute distress  Neurologic Exam  Mental Status:  Alert and oriented x 3  Language: normal fluency and comprehension  Cranial Nerves:  VFFTC  EOMI, no nystagums  Face symmetric  No dysarthria  Motor:  No drift  Strength 5/5 throughout  Coordination: Finger to nose intact  Gait: Steady casual gait

## 2022-03-07 NOTE — TELEPHONE ENCOUNTER
Spoke with patient, reviewed medications with him and his daughter  Patient wanted to be certain what meds he is on and when he can take them

## 2022-03-07 NOTE — PATIENT INSTRUCTIONS
1  Increase levetiracetam ER to 1500 mg nightly  2  Let us know if there are seizures  3  Return in about 4 months

## 2022-03-09 ENCOUNTER — TELEPHONE (OUTPATIENT)
Dept: NEUROLOGY | Facility: CLINIC | Age: 82
End: 2022-03-09

## 2022-03-09 NOTE — TELEPHONE ENCOUNTER
Forms signed/faxed to [employer] and scanned into chart    Pt made aware, forms mailed to pt per pt request

## 2022-03-09 NOTE — TELEPHONE ENCOUNTER
Patient calling to follow up on seizure reporting form  States at recent appointment it was discussed about getting his license back early due to last seizure being contributed to medication mistake  Please advise if you would like seizure reporting form completed and what you want noted

## 2022-03-09 NOTE — LETTER
March 21, 2022     [address] [place] [address]    Patient: Adriano Bruce   YOB: 1940     RE: DL# [id number]      To Whom It May Concern:     Brian Hneao follows with [facility] for his seizure disorder  He was recently seen in office on 3/7/2022  Poncho's last seizure was 1/18/2022 due to a prescribed medication change that was recommended by our office  The patient has only had 3 lifetime seizures  Two events on 2/21/2017 and one event on 1/18/2022  The January 2022 event likely occurred due to the prescribed medication change of lowering levetiracetam dose  Since this event occurred, the levetiracetam was increased back to prior dose of levetiracetam 1500mg daily  Seizure events had previously been completely controlled on this dose  I support his return to driving and restoration of his license  Please let our office know if anything further is needed in regards to this manner      Sincerely,            Chantale Chang MD

## 2022-03-09 NOTE — TELEPHONE ENCOUNTER
Paola, did you send this? I know I asked for a seizure reporting form to be started on Monday  I don't recall if you brought it to me for signature yet  Thanks

## 2022-03-21 ENCOUNTER — TELEPHONE (OUTPATIENT)
Dept: OTHER | Facility: OTHER | Age: 82
End: 2022-03-21

## 2022-03-21 NOTE — TELEPHONE ENCOUNTER
Made a few minor changes: To Whom It May Concern:     Bobby Brooks follows with [facility] for his seizure disorder  He was recently seen in office on 3/7/2022  Poncho's last seizure was 1/18/2022 due to a prescribed medication change that was recommended by our office  The patient has only had 3 lifetime seizures  Two events on 2/21/2017 and one event on 1/18/2022  The January 2022 event likely occurred due to the prescribed medication change of lowering levetiracetam dose  Since this event occurred, the levetiracetam was increased back to prior dose of levetiracetam 1500mg daily  Seizure events had previously been completely controlled on this dose  I support his return to driving and restoration of his license

## 2022-03-21 NOTE — TELEPHONE ENCOUNTER
Letter printed  Copy mailed to patient  Faxed successfully to [employer]  Copy placed at  to be scanned

## 2022-03-21 NOTE — TELEPHONE ENCOUNTER
OK to provide letter  I tried to explain this on the form I sent earlier this month as well  Antione YUEN makes the final decision and it may be that he will still have a 6 month restriction after we provide Antione YUEN with additional data

## 2022-03-21 NOTE — TELEPHONE ENCOUNTER
Patient came to [place] office stating he got a letter from [address] [place] [address] (dated 3/17/2022 - scanned into media) stating his license was recalled due to 1/18/2022 event  Patient believes this form was received in response to Seizure reporting form sent 3/9/2022, but has not actually spoke to anyone at [address] [place] [address]  Patient is asking for letter to be writte (as indicated can be done on [employer] letter) stating event occurred due to prescribed medication change and since event medication has been restarted and no additional events  Are you agreeable to letter? Patient would like a call once letter written if agreeable, and a copy mailed to home

## 2022-03-22 ENCOUNTER — PATIENT OUTREACH (OUTPATIENT)
Dept: FAMILY MEDICINE CLINIC | Facility: CLINIC | Age: 82
End: 2022-03-22

## 2022-03-22 NOTE — PROGRESS NOTES
Spoke with Alma Lennox , he is working with Neurology office to get his license back sooner  Dr Clotilde Vizcarra sent letter to  [facility] and he is hoping to hear something sooner about getting the license back and not have to wait the six months  Feels good otherwise   I will check back again in one month

## 2022-03-23 NOTE — TELEPHONE ENCOUNTER
Patient states he called [employer] and they "did not receive form"  Requesting this be refaxed Attn: Laura Padilla  Printed and faxed

## 2022-03-28 ENCOUNTER — APPOINTMENT (OUTPATIENT)
Dept: LAB | Facility: CLINIC | Age: 82
End: 2022-03-28
Payer: MEDICARE

## 2022-03-28 DIAGNOSIS — G40.909 SEIZURE DISORDER (HCC): ICD-10-CM

## 2022-03-28 PROCEDURE — 36415 COLL VENOUS BLD VENIPUNCTURE: CPT

## 2022-03-28 PROCEDURE — 80177 DRUG SCRN QUAN LEVETIRACETAM: CPT

## 2022-04-01 PROBLEM — G40.001 PARTIAL IDIOPATHIC EPILEPSY WITH SEIZURES OF LOCALIZED ONSET, NOT INTRACTABLE, WITH STATUS EPILEPTICUS (HCC): Status: ACTIVE | Noted: 2017-03-01

## 2022-04-03 LAB — LEVETIRACETAM SERPL-MCNC: 26.2 UG/ML (ref 10–40)

## 2022-04-18 ENCOUNTER — PATIENT OUTREACH (OUTPATIENT)
Dept: FAMILY MEDICINE CLINIC | Facility: CLINIC | Age: 82
End: 2022-04-18

## 2022-04-18 NOTE — PROGRESS NOTES
Spoke with Nani Pavon he is doing good  Feels a little blue because he cannot drive yet  Neurology did send letter to [place] but they said he needs to wait the 6 months  He is also in contact with one of the state representative offices to see if they can help  I will check back in one week to see if he has heard from them   Mulu dewitt

## 2022-04-25 ENCOUNTER — PATIENT OUTREACH (OUTPATIENT)
Dept: FAMILY MEDICINE CLINIC | Facility: CLINIC | Age: 82
End: 2022-04-25

## 2022-04-25 NOTE — PROGRESS NOTES
Spoke with Migel Henriquez he is supposed to hear from [employer] today if they can help get his license back sooner  He said they would need two doctor letters to support return of license  I said if they do he should have state representative office send something to PCP office

## 2022-05-02 ENCOUNTER — PATIENT OUTREACH (OUTPATIENT)
Dept: FAMILY MEDICINE CLINIC | Facility: CLINIC | Age: 82
End: 2022-05-02

## 2022-05-25 DIAGNOSIS — R56.9 SEIZURE (HCC): ICD-10-CM

## 2022-05-25 RX ORDER — LANOLIN ALCOHOL/MO/W.PET/CERES
100 CREAM (GRAM) TOPICAL DAILY
Qty: 90 TABLET | Refills: 1 | OUTPATIENT
Start: 2022-05-25

## 2022-05-25 NOTE — TELEPHONE ENCOUNTER
Patient wants to know if he should continue taking Thiamine that was prescribed in the hospital? He is not sure why he is taking this to begin with  Please advise  If he should continue he needs a refill for #19    Poppy [phone]

## 2022-05-26 ENCOUNTER — TELEPHONE (OUTPATIENT)
Dept: NEUROLOGY | Facility: CLINIC | Age: 82
End: 2022-05-26

## 2022-05-26 NOTE — TELEPHONE ENCOUNTER
Spoke with pt and explained protocols for seizures forms  Informed pt to call office on July/18 to confirm that he has been seizure free for 6 months  Once confirmed staff will work on paper work for getting license back  Emphasize to pt it is a 2 weeks turn around with forms but I will do my best to work on it that day  Pt understood  Pt states he will be stopping by the office sometime to drop off seizure reporting forms, when he does please give to [address]

## 2022-05-26 NOTE — TELEPHONE ENCOUNTER
Patient called very irate stating they he called earlier to move his appointment up for before 7-18-22 so that he can get his license paperwork filled out  I advised him that as of right now that Dr Jeanie Ruiz does not have any availability right now and we can add him to the wait list and he got louder saying that that's not acceptable enough  I told him that I will work with Dr Jeanie Ruiz team to see what I can do and that I will call him back with an answer

## 2022-06-10 DIAGNOSIS — E78.2 MIXED HYPERLIPIDEMIA: ICD-10-CM

## 2022-06-10 RX ORDER — SIMVASTATIN 40 MG
40 TABLET ORAL DAILY
Qty: 90 TABLET | Refills: 3 | Status: SHIPPED | OUTPATIENT
Start: 2022-06-10

## 2022-07-13 ENCOUNTER — TELEPHONE (OUTPATIENT)
Dept: NEUROLOGY | Facility: CLINIC | Age: 82
End: 2022-07-13

## 2022-07-13 NOTE — TELEPHONE ENCOUNTER
Patient stopped in checking on [employer] paperwork  He wanted to know if Dr Xi Rausch would fill out paperwork and just hold till 07/18 then fax  I talked with Kelley Kemp and she has that paperwork at her desk  As soon as 07/18 comes around and we received a phone call from him stating he is seizure free for six months she will have paperwork filled out and faxed  I explained the above and there is a two week turn around

## 2022-07-18 NOTE — TELEPHONE ENCOUNTER
Forms filled out and emailed to provider to review  Dr Joaquin Harrington please review forms and email back to me so I can fax to [employer] today

## 2022-07-18 NOTE — TELEPHONE ENCOUNTER
pt called and states that he has been 6 months seizure free      Please fax [employer] form     Please call pt once form is faxed    [phone] to leave detailed message

## 2022-07-26 ENCOUNTER — OFFICE VISIT (OUTPATIENT)
Dept: NEUROLOGY | Facility: CLINIC | Age: 82
End: 2022-07-26
Payer: MEDICARE

## 2022-07-26 VITALS
TEMPERATURE: 97.8 F | BODY MASS INDEX: 25.91 KG/M2 | SYSTOLIC BLOOD PRESSURE: 120 MMHG | HEART RATE: 76 BPM | DIASTOLIC BLOOD PRESSURE: 70 MMHG | OXYGEN SATURATION: 97 % | HEIGHT: 70 IN | WEIGHT: 181 LBS

## 2022-07-26 DIAGNOSIS — G40.001 PARTIAL IDIOPATHIC EPILEPSY WITH SEIZURES OF LOCALIZED ONSET, NOT INTRACTABLE, WITH STATUS EPILEPTICUS (HCC): ICD-10-CM

## 2022-07-26 PROCEDURE — 99213 OFFICE O/P EST LOW 20 MIN: CPT | Performed by: PSYCHIATRY & NEUROLOGY

## 2022-07-26 NOTE — PATIENT INSTRUCTIONS
Continue current seizure medications unchanged  Let us know if there are seizures or problems with your medication  Return in 6 months (AP)

## 2022-07-26 NOTE — PROGRESS NOTES
Patient ID: Jeanie Tim is a 80 y o  male  Assessment/Plan:    No problem-specific Assessment & Plan notes found for this encounter  {Assess/PlanSmartLinks:82613}       Subjective:    HPI    {[facility] HPI texts:21282}    {Common ambulatory SmartLinks:25682}         Objective:    Blood pressure 120/70, pulse 76, temperature 97 8 °F (36 6 °C), temperature source Temporal, height 5' 10" (1 778 m), weight 82 1 kg (181 lb), SpO2 97 %  Physical Exam    Neurological Exam      ROS:    Review of Systems   Constitutional: Negative  Negative for appetite change and fever  HENT: Negative  Negative for hearing loss, tinnitus, trouble swallowing and voice change  Eyes: Negative  Negative for photophobia and pain  Respiratory: Negative  Negative for shortness of breath  Cardiovascular: Negative  Negative for palpitations  Gastrointestinal: Negative  Negative for nausea and vomiting  Endocrine: Negative  Negative for cold intolerance  Genitourinary: Negative  Negative for dysuria, frequency and urgency  Musculoskeletal: Negative  Negative for myalgias and neck pain  Skin: Negative  Negative for rash  Neurological: Negative  Negative for dizziness, tremors, seizures, syncope, facial asymmetry, speech difficulty, weakness, light-headedness, numbness and headaches  Hematological: Negative  Does not bruise/bleed easily  Psychiatric/Behavioral: Negative  Negative for confusion, hallucinations and sleep disturbance

## 2022-07-26 NOTE — PROGRESS NOTES
[place] [address] [facility] [address] [facility]  Follow Up Visit    Impression/Plan    Mr  Meldon Boeck is a 80 y o  male with history that includes abdominal aortic aneurysm status post repair that presents regarding focal epilepsy manifest as 2 seizures occurring a few hours apart on 2/21/2017 and status epilepticus in 1/2022  His neurological exam has been essentially normal  Seizures controlled on levetiracetam 1500 mg per day  No change today  Patient Instructions   1  Continue current seizure medications unchanged  2  Let us know if there are seizures or problems with your medication  3  Return in 6 months (AP)  Diagnoses and all orders for this visit:    Partial idiopathic epilepsy with seizures of localized onset, not intractable, with status epilepticus ([facility] [address] )        Asha    Erich Burkett is returning to the [place] [facility] [facility] for follow up  Interval Events:   Seizures since last visit: None  Hospitalizations: no    No seizures  Driving for about one week  caring for wife who also comes to today's visit  Mood stable  Current AEDs:  Levetiracetam ER 1500 mg qhs  Medication side effects: None  Medication adherence: Yes    Seizures/Spell characteristics:  1  Witnessed GTC seizure  2 events on 2/21/17  One out of sleep, the other a few hours later in the ER  Status epilepticus in 1/2022    2  Right frontal subclinical seizures on VEEG in 2/2022       Special Features:  - History of status epilepticus: yes (1/2022)  - Self injury due to seizures: No  - Precipitating factors: None     Seizure risk factors: Normal birth and development  No history of seizures as a child  No family history of seizures  No head trauma resulting in loss of consciousness   No history of brain tumor, stroke or CNS infection       Prior AEDs:  None     Prior Evaluation:  REEG 2/21/17: normal, awake       1 hour EEG sleep-deprived EEG, 2 hours, 4/18/2017:  Normal     VEEG 1/2022 revealed 11 subclinical right frontal seizures during the initial 14 hours of recording as well as right frontal BIRDS, right frontal slowing and right frontal sharp waves      MRI brain w/ and w/o 2/21/17: normal       History Reviewed: The following were reviewed and updated as appropriate: allergies, current medications, past medical history, past social history and problem list   Past Medical History includes:  abdominal aortic aneurysm s/p endovascular repair  HLD, HTN, hypothyroidism, hyperuricemia        Psychiatric History:  None     Social History:   Driving: Yes  Lives Alone: No  Occupation: retired  Lives with his wife  She has MS and he is her caregiver  His wife follows with Dr Ashly Prakash  Retired         Objective    /70 (BP Location: Left arm, Patient Position: Sitting, Cuff Size: Standard)   Pulse 76   Temp 97 8 °F (36 6 °C) (Temporal)   Ht 5' 10" (1 778 m)   Wt 82 1 kg (181 lb)   SpO2 97%   BMI 25 97 kg/m²      General Exam  No acute distress  Neurologic Exam  Mental Status:  Alert and oriented x 3  Language: normal fluency and comprehension  Cranial Nerves:  VFFTC  EOMI, no nystagums  Face symmetric  No dysarthria  Motor:  No drift  Coordination: Finger to nose intact  Gait: Normal casual gait

## 2022-08-25 ENCOUNTER — TELEPHONE (OUTPATIENT)
Dept: FAMILY MEDICINE CLINIC | Facility: CLINIC | Age: 82
End: 2022-08-25

## 2022-08-25 NOTE — TELEPHONE ENCOUNTER
Patient is scheduled for AWV on 11/16/22 do you want hime to complete any labs prior to appointment?

## 2022-08-26 DIAGNOSIS — G40.909 SEIZURE DISORDER (HCC): ICD-10-CM

## 2022-08-26 DIAGNOSIS — E79.0 HYPERURICEMIA: ICD-10-CM

## 2022-08-26 DIAGNOSIS — E78.2 MIXED HYPERLIPIDEMIA: Primary | ICD-10-CM

## 2022-08-26 DIAGNOSIS — R56.9 SEIZURE (HCC): ICD-10-CM

## 2022-08-26 RX ORDER — DOXAZOSIN MESYLATE 1 MG/1
TABLET ORAL
Qty: 30 TABLET | Refills: 5 | Status: SHIPPED | OUTPATIENT
Start: 2022-08-26 | End: 2022-09-30

## 2022-09-02 ENCOUNTER — TELEPHONE (OUTPATIENT)
Dept: VASCULAR SURGERY | Facility: CLINIC | Age: 82
End: 2022-09-02

## 2022-09-02 ENCOUNTER — TELEPHONE (OUTPATIENT)
Dept: NEUROLOGY | Facility: CLINIC | Age: 82
End: 2022-09-02

## 2022-09-02 ENCOUNTER — TELEPHONE (OUTPATIENT)
Dept: FAMILY MEDICINE CLINIC | Facility: CLINIC | Age: 82
End: 2022-09-02

## 2022-09-02 NOTE — TELEPHONE ENCOUNTER
Spoke with pt and made him aware   Pt still has concerns, and stated that he will discuss with his neurologist

## 2022-09-02 NOTE — TELEPHONE ENCOUNTER
Spoke to patient states that he is up frequently at night  On Cardura but still has frequency at night  Has blood work that needs to be done  Should he have PSA done?

## 2022-09-02 NOTE — TELEPHONE ENCOUNTER
Pt called  Is scheduled for EVAR duplex 10/25/22  He is very hesitant to get this done  Pt states that last year he had it done, his legs hurt that evening  Three weeks later, he had a seizure  He believes this is due to the EVAR duplex  Told pt that this nurse is almost positive that an EVAR duplex could not cause a seizure a few weeks later  Pt very insistent that the duplex may be the cause  Pt has h/o seizure disorder (see neurology notes)  Routed to triage provider to advise

## 2022-09-02 NOTE — TELEPHONE ENCOUNTER
Patient has questions on when to take them    He wants to take his water pill in the morning because he is up allnight urinating  Luís Dominguez       He just needs to make sure he is allowed to change times  Of the day to take meds  In morning

## 2022-09-02 NOTE — TELEPHONE ENCOUNTER
Pt left  re: doppler scheduled for 10/25  Pt voiced concern that the last time he had a doppler done he had a seizure the next day  Pt was concerned he might have another seizure following this procedure  Assured pt that, while there is no way to 100% predict he wont have a seizure at any given time, that the two are in no way related to each other and merely coincidence  I also assured pt that I would forward this msg to you to see if you had any concerns in the matter  If no concerns, no response necessary

## 2022-09-06 NOTE — TELEPHONE ENCOUNTER
Call he could be switched from Doxazosin to either Tamsulosin or Rapaflo for BPH symptoms         Current Outpatient Medications:     acetaminophen (TYLENOL) 500 mg tablet, Take 500 mg by mouth every 6 (six) hours as needed for mild pain, Disp: , Rfl:     doxazosin (CARDURA) 1 mg tablet, TAKE ONE TABLET BY MOUTH DAILY, Disp: 30 tablet, Rfl: 5    folic acid (FOLVITE) 1 mg tablet, Take 1 tablet (1 mg total) by mouth daily, Disp: 30 tablet, Rfl: 0    Levetiracetam 750 MG TB24, Take 2 tablets (1,500 mg total) by mouth daily at bedtime, Disp: 180 tablet, Rfl: 3    lidocaine (LIDODERM) 5 %, Apply 1 patch topically daily Remove & Discard patch within 12 hours or as directed by MD (Patient not taking: Reported on 7/26/2022), Disp: 5 patch, Rfl: 0    loperamide (IMODIUM) 2 mg capsule, Take 2 capsules (4 mg total) by mouth 4 (four) times a day as needed for diarrhea, Disp: 30 capsule, Rfl: 0    simvastatin (ZOCOR) 40 mg tablet, Take 1 tablet (40 mg total) by mouth daily, Disp: 90 tablet, Rfl: 3    thiamine 100 MG tablet, Take 1 tablet (100 mg total) by mouth daily, Disp: 30 tablet, Rfl: 0

## 2022-09-06 NOTE — TELEPHONE ENCOUNTER
Patient informed  Inquiring if there is a different medication he may take for frequent urination at night?

## 2022-09-06 NOTE — TELEPHONE ENCOUNTER
Patient stated that he will finish up Doxazosin and then call to switch   Dr Martinez Salts made aware

## 2022-09-29 ENCOUNTER — TELEPHONE (OUTPATIENT)
Dept: FAMILY MEDICINE CLINIC | Facility: CLINIC | Age: 82
End: 2022-09-29

## 2022-09-29 NOTE — TELEPHONE ENCOUNTER
Call night time urination can be secondary to enlarged prostate  Dose of Doxazosin can be increased but medication can also lower blood pressure   Alternative would be to switch to a different medication such as Tamsulosin

## 2022-09-29 NOTE — TELEPHONE ENCOUNTER
Patient is taking Doxazosin in am but he still has to get up frequently to urinate in the middle of the night  Is this normal? He said he does not drink a lot before bed

## 2022-09-30 DIAGNOSIS — N40.1 BENIGN PROSTATIC HYPERPLASIA WITH URINARY RETENTION: Primary | ICD-10-CM

## 2022-09-30 DIAGNOSIS — R33.8 BENIGN PROSTATIC HYPERPLASIA WITH URINARY RETENTION: Primary | ICD-10-CM

## 2022-09-30 RX ORDER — TAMSULOSIN HYDROCHLORIDE 0.4 MG/1
0.4 CAPSULE ORAL
Qty: 30 CAPSULE | Refills: 5 | Status: SHIPPED | OUTPATIENT
Start: 2022-09-30 | End: 2023-03-29 | Stop reason: SDUPTHER

## 2022-10-10 NOTE — TELEPHONE ENCOUNTER
[address] [facility] [facility]  New Patient Note      CHIEF COMPLAINT:   Chief Complaint   Patient presents with    Back Pain     Pt presents this PM with c/o LBP. States that he fell about a week ago, and fell into the bathtub backwards. States that he has been having trouble getting out of bed, and pain is worse in the morning. Notices less pain when sleeping on stomach. Has not been taking anything for pain. HISTORY OF PRESENT ILLNESS:                The patient is a 70 y.o. male who presents today with complaints of low back pain that started approximately 1 week ago when he fell in the bathtub backwards. He localizes pain to the right and left sides of the lumbar spine. He denies numbness, tingling, loss sensation or radiation of symptoms into his feet. He denies any symptoms or signs of saddle anesthesia such as inner thigh or genital numbness, bowel or bladder incontinence. He states he has actually been constipated over the course of the last week and has required Dulcolax for bowel movements. He states his back pain is worse with trying to get out of bed in the morning. He states his pain improves the longer he is up and moving around. He has not tried any at home therapies at this time. Past Medical History:        Diagnosis Date    Diabetes mellitus ([facility] [address]     Encounter for screening colonoscopy 2021    Fall from slipping on ice 2019    Damage was equivalent to a mini stroke    Hyperlipidemia     Hypertension     Prolonged emergence from general anesthesia      Past Surgical History:        Procedure Laterality Date    APPENDECTOMY      COLONOSCOPY      10 years ago    COLONOSCOPY N/A 11/23/2021    COLONOSCOPY POLYPECTOMY SNARE/COLD BIOPSY performed by Latricia Harper MD at [facility]       Current Medications:   No current facility-administered medications for this visit.   Allergies:  Mixed feathers    Social History:   TOBACCO:   reports that he has To Whom It May Concern:    Amena Arely follows with [facility] for his seizure disorder  He was recently seen in office on 3/7/2022  Poncho's last seizure was 1/18/2022 due to a prescribed medication change that was recommended by our office  The patient has only had 3 lifetime seizures  Two events on 2/21/2017 and one event on 1/18/2022  The January 2022 event likely occurred to the prescribed medication change of lowering levetiracetam dose  Since this event occurred, the levetiracetam was increased back to prior dose of levetiracetam 1500mg daily  Seizure events had previously been well controlled on this event and as long as the patient remains compliant with taking dose as prescribed we do not anticipate any further issues  I support his return to driving and restoration of license  If agreeable, I can forward to you for signature? never smoked. He has never used smokeless tobacco.  ETOH:   reports current alcohol use of about 3.0 standard drinks per week. DRUGS:   reports no history of drug use. OCCUPATION: Retired    Review of Systems   Constitutional: Negative for fever, chills, diaphoresis, appetite change and fatigue. HENT: Negative for dental issues, hearing loss and tinnitus. Negative for congestion, sinus pressure, sneezing, sore throat. Negative for headache. Eyes: Negative for visual disturbance, blurred and double vision. Negative for pain, discharge, redness and itching  Respiratory: Negative for cough, shortness of breath and wheezing. Cardiovascular: Negative for chest pain, palpitations and leg swelling. No dyspnea on exertion   Gastrointestinal:   Negative for nausea, vomiting, abdominal pain, diarrhea, constipation  or black or bloody. Hematologic\Lymphatic:  negative for bleeding, petechiae,   Genitourinary: Negative for hematuria and difficulty urinating. Musculoskeletal: Negative for neck pain and stiffness. Negative for joint swelling and gait problem. + Lumbar spine pain  Skin: Negative for pallor, rash and wound. Neurological: Negative for dizziness, tremors, seizures, weakness, light-headedness, no TIA or stroke symptoms. No numbness and headaches. Psychiatric/Behavioral: Negative.      Physical Examination:   General appearance: alert, well appearing, and in no distress,  normal appearing weight  Mental status: alert, oriented to person, place, and time, normal mood, behavior, speech, dress, motor activity, and thought processes  Resp:   resp easy and unlabored, no audible wheezes note  Cardiac: distal pulses palpable, skin well perfused  Neurological: alert, oriented X3, normal speech, no focal findings or movement disorder noted, motor and sensory grossly normal bilaterally, normal muscle tone  HEENT: normochephalic atraumatic, external ears and eyes normal, sclera normal, neck supple  Extremities: peripheral pulses normal, no edema, redness or tenderness in the calves   Skin: normal coloration, no rashes or open wounds, no suspicious skin lesions noted  Psych: Affect euthymic   Musculoskeletal:   Spine: On visual inspection there is no obvious deformity throughout the spine. There is no erythema, edema, ecchymosis or open wounds. There is no decreased sensation to light touch throughout the spine, bilateral UE or bilateral LE. Pt is neurovascularly intact. Patient is tender to palpation at the L3-L5 paraspinal muscles. There is no tenderness to palpation elsewhere throughout the spine. No increased pain with trunk flexion, extension, rotation, lateral slides. (-)Sperlings, (-) Weakness of great toe, (+) SLR on right leg at approximately 20 degrees. Ht 5' 8\" (1.727 m)   Wt 205 lb (93 kg)   BMI 31.17 kg/m²      XR: 3 view lumbar spine x-rays were obtained in the office today which shows no acute processes such as fracture or dislocation. 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 The imaging will be reviewed and interpreted by Radiologist.  The report was not complete at the time of this note. Please refer to Radiologist report for their interpretation. ASSESSMENT:   Diagnosis Orders   1. Strain of lumbar region, initial encounter  XR LUMBAR SPINE (2-3 VIEWS)            PLAN:  Patient is a pleasant 47-year-old male who presents to the clinic today for evaluation of lumbar spine pain that began approximately 1 week ago when he lost his balance and fell backwards into the bathtub. On exam he is tender to palpation at L3-L5 paraspinal muscles but no central tenderness to palpation. He has no increase in pain with trunk flexion, extension, rotation or lateral slides. He does have a positive straight leg test on the right side at approximately 20 degrees. He has significant difficulty in the supine position rolling onto his side or getting up.   He needed 2 assists to be able to roll onto his stomach to get up. At this time I recommended we get an MRI of his lumbar spine to rule out any disc involvement secondary to the amount of dysfunction disability has going from a supine position to sitting or standing. At this time I have recommended an oral steroid,  Medrol Dose Mahesh . I did discuss potential side effect such as GI upset, mood changes, depression, anxiety, change in sleep habits. The patient develops any of these signs or symptoms they will call the office immediately and we will discontinue the medication in appropriate fashion. They are aware that he should not use any other oral anti-inflammatories while on the oral steroids. They can use Tylenol 500 mg with the directions to take 1 tablet by mouth every 8 hours as needed for pain. I also provided him with home exercise program with Cat exercises in quadruped hip extension. Did discuss warning signs of saddle anesthesia including general numbness, inner thigh numbness, bowel or bladder incontinence. If you develop any of these signs or symptoms he would need to seek immediate medical attention by going to the emergency room. As for his constipation, I did recommend he follow-up with Dr. Hermon Gaucher for further evaluation of this issue. Pt should apply ice or heat to the effected area for no more than 20 minutes at a time repeating throughout the day as necessary. They should elevate the extremity and rest.     Patient voiced understanding and agrees with the treatment plan outlined for them in the office today. If they have any additional questions or concerns they should call the office. If the symptoms are not improving or are worsening over the next 7-10 days, patient should return to the clinic for further evaluation. Otherwise, I will see the patient back on a PRN basis. I will call him once he has MRI to provide results and definitive treatment options.         Electronically signed by Kenneth Gurrola OPHELIA GARCÍA on 10/10/22 at 1:48 PM EDT    **This report was transcribed using voice recognition software. Every effort was made to ensure accuracy; however, inadvertent computerized transcription errors may be present. **

## 2022-11-02 ENCOUNTER — APPOINTMENT (OUTPATIENT)
Dept: LAB | Facility: CLINIC | Age: 82
End: 2022-11-02

## 2022-11-02 DIAGNOSIS — E79.0 HYPERURICEMIA: ICD-10-CM

## 2022-11-02 LAB
ALBUMIN SERPL BCP-MCNC: 3.7 G/DL (ref 3.5–5)
ALP SERPL-CCNC: 80 U/L (ref 46–116)
ALT SERPL W P-5'-P-CCNC: 28 U/L (ref 12–78)
ANION GAP SERPL CALCULATED.3IONS-SCNC: 2 MMOL/L (ref 4–13)
AST SERPL W P-5'-P-CCNC: 25 U/L (ref 5–45)
BASOPHILS # BLD AUTO: 0.02 THOUSANDS/ÂΜL (ref 0–0.1)
BASOPHILS NFR BLD AUTO: 0 % (ref 0–1)
BILIRUB SERPL-MCNC: 0.71 MG/DL (ref 0.2–1)
BUN SERPL-MCNC: 17 MG/DL (ref 5–25)
CALCIUM SERPL-MCNC: 9.5 MG/DL (ref 8.3–10.1)
CHLORIDE SERPL-SCNC: 109 MMOL/L (ref 96–108)
CHOLEST SERPL-MCNC: 143 MG/DL
CO2 SERPL-SCNC: 30 MMOL/L (ref 21–32)
CREAT SERPL-MCNC: 1.28 MG/DL (ref 0.6–1.3)
EOSINOPHIL # BLD AUTO: 0.39 THOUSAND/ÂΜL (ref 0–0.61)
EOSINOPHIL NFR BLD AUTO: 5 % (ref 0–6)
ERYTHROCYTE [DISTWIDTH] IN BLOOD BY AUTOMATED COUNT: 12.5 % (ref 11.6–15.1)
GFR SERPL CREATININE-BSD FRML MDRD: 52 ML/MIN/1.73SQ M
GLUCOSE P FAST SERPL-MCNC: 103 MG/DL (ref 65–99)
HCT VFR BLD AUTO: 46.1 % (ref 36.5–49.3)
HDLC SERPL-MCNC: 50 MG/DL
HGB BLD-MCNC: 14.8 G/DL (ref 12–17)
IMM GRANULOCYTES # BLD AUTO: 0.04 THOUSAND/UL (ref 0–0.2)
IMM GRANULOCYTES NFR BLD AUTO: 1 % (ref 0–2)
LDLC SERPL CALC-MCNC: 69 MG/DL (ref 0–100)
LYMPHOCYTES # BLD AUTO: 1.45 THOUSANDS/ÂΜL (ref 0.6–4.47)
LYMPHOCYTES NFR BLD AUTO: 19 % (ref 14–44)
MCH RBC QN AUTO: 31.2 PG (ref 26.8–34.3)
MCHC RBC AUTO-ENTMCNC: 32.1 G/DL (ref 31.4–37.4)
MCV RBC AUTO: 97 FL (ref 82–98)
MONOCYTES # BLD AUTO: 0.5 THOUSAND/ÂΜL (ref 0.17–1.22)
MONOCYTES NFR BLD AUTO: 7 % (ref 4–12)
NEUTROPHILS # BLD AUTO: 5.31 THOUSANDS/ÂΜL (ref 1.85–7.62)
NEUTS SEG NFR BLD AUTO: 68 % (ref 43–75)
NONHDLC SERPL-MCNC: 93 MG/DL
NRBC BLD AUTO-RTO: 0 /100 WBCS
PLATELET # BLD AUTO: 158 THOUSANDS/UL (ref 149–390)
PMV BLD AUTO: 11.1 FL (ref 8.9–12.7)
POTASSIUM SERPL-SCNC: 4.6 MMOL/L (ref 3.5–5.3)
PROT SERPL-MCNC: 6.8 G/DL (ref 6.4–8.4)
RBC # BLD AUTO: 4.74 MILLION/UL (ref 3.88–5.62)
SODIUM SERPL-SCNC: 141 MMOL/L (ref 135–147)
TRIGL SERPL-MCNC: 118 MG/DL
URATE SERPL-MCNC: 7.5 MG/DL (ref 3.5–8.5)
WBC # BLD AUTO: 7.71 THOUSAND/UL (ref 4.31–10.16)

## 2022-11-02 PROCEDURE — 80061 LIPID PANEL: CPT | Performed by: FAMILY MEDICINE

## 2022-11-02 PROCEDURE — 80053 COMPREHEN METABOLIC PANEL: CPT | Performed by: FAMILY MEDICINE

## 2022-11-02 PROCEDURE — 36415 COLL VENOUS BLD VENIPUNCTURE: CPT | Performed by: FAMILY MEDICINE

## 2022-11-02 PROCEDURE — 85025 COMPLETE CBC W/AUTO DIFF WBC: CPT | Performed by: FAMILY MEDICINE

## 2022-11-09 ENCOUNTER — HOSPITAL ENCOUNTER (OUTPATIENT)
Dept: NON INVASIVE DIAGNOSTICS | Facility: CLINIC | Age: 82
Discharge: HOME | End: 2022-11-09

## 2022-11-09 ENCOUNTER — RA CDI HCC (OUTPATIENT)
Dept: OTHER | Facility: HOSPITAL | Age: 82
End: 2022-11-09

## 2022-11-09 DIAGNOSIS — I71.40 ABDOMINAL AORTIC ANEURYSM (AAA) WITHOUT RUPTURE: ICD-10-CM

## 2022-11-09 NOTE — PROGRESS NOTES
[facility] [address]  coding opportunities       Chart reviewed, no opportunity found: CHART REVIEWED, NO OPPORTUNITY FOUND        Patients Insurance     Medicare Insurance: Medicare

## 2022-11-14 ENCOUNTER — OFFICE VISIT (OUTPATIENT)
Dept: VASCULAR SURGERY | Facility: CLINIC | Age: 82
End: 2022-11-14

## 2022-11-14 VITALS
DIASTOLIC BLOOD PRESSURE: 82 MMHG | SYSTOLIC BLOOD PRESSURE: 130 MMHG | BODY MASS INDEX: 26.48 KG/M2 | HEIGHT: 70 IN | WEIGHT: 185 LBS | HEART RATE: 81 BPM

## 2022-11-14 DIAGNOSIS — I10 PRIMARY HYPERTENSION: ICD-10-CM

## 2022-11-14 DIAGNOSIS — I71.43 INFRARENAL ABDOMINAL AORTIC ANEURYSM (AAA) WITHOUT RUPTURE: Primary | ICD-10-CM

## 2022-11-14 DIAGNOSIS — E78.2 MIXED HYPERLIPIDEMIA: ICD-10-CM

## 2022-11-14 DIAGNOSIS — I73.9 PAD (PERIPHERAL ARTERY DISEASE) (HCC): ICD-10-CM

## 2022-11-14 RX ORDER — ASPIRIN 81 MG/1
81 TABLET ORAL 3 TIMES WEEKLY
Qty: 30 TABLET | Refills: 1 | Status: SHIPPED | OUTPATIENT
Start: 2022-11-14

## 2022-11-14 NOTE — PATIENT INSTRUCTIONS
AAA    EVAR duplex 11/9/22:    AAA s/p EVAR with patent graft without evidence of endoleak   Sac 3 2 cm (prior 3 5)    Iliacs distal to the graft: R 1 85, L 1 4 cm; RUI R 1 09/ met 203/ gtp 160; L 1 23/ et 207/ met 207/ gtp 161    -overall stable EVAR duplex  -maintain good blood pressure and cholesterol control  -we reviewed sx of rupture for which he should call 46  -daughter and brother should be screened for AAA  -continue with simvastatin 40  -add coated aspirin 81 mg three days weekly (Mon, Wed, Friday, )if no contraindication  -follow up EVAR duplex in 1 year with office visit

## 2022-11-14 NOTE — PROGRESS NOTES
Assessment/Plan:    Infrarenal abdominal aortic aneurysm (AAA) without rupture  -     VAS evar endovascular aortic repair duplex; Future  -     VAS lower limb arterial duplex, complete bilateral; Future  -     aspirin (ECOTRIN LOW STRENGTH) 81 mg EC tablet; Take 1 tablet (81 mg total) by mouth 3 (three) times a week    -asymptomatic AAA  -B LE calf and plantar foot numbness x 10 years  -No claudication or wounds    -EVAR duplex 11/9/22:    AAA s/p EVAR with patent graft without evidence of endoleak  Sac 3 2 cm (prior 3 5)    Iliacs distal to the graft: R 1 85, L 1 4 cm; RUI R 1 09/ met 203/ gtp 160; L 1 23/ et 207/ met 207/ gtp 161    Discussion:     Patient is overall stable after AAA EVAR repair in 2010  He is no abdominal symptoms  No claudication  We reviewed his AO IL which shows patent graft without endoleak  He is maintained on simvastatin 40 mg daily  We discussed that if he has progression of atherosclerotic disease, we could consider intensified statin therapy  Add and enteric-coated aspirin 81 mg 3 times weekly  -overall stable EVAR duplex  -maintain good blood pressure and cholesterol control  -we reviewed sx of rupture for which he should call 46  -daughter and brother should be screened for AAA  -continue with simvastatin 40  -add coated aspirin 81 mg three days weekly (Mon, Wed, Friday,) if no contraindication  -follow up EVAR duplex in 1 year with office visit      Popliteal ectasias  - follow up DESIREE next year    Additional diagnosis:  PAD (peripheral artery disease) ([address] )    Primary hypertension    Mixed hyperlipidemia        Subjective:      Patient ID: Guero Mota is a [de-identified] y o  male  Pt presents to rev EVAR done on 11/09/22  Pt is s/p EVAR in 2010  Pt denies abd/ back pain, post prandial pain, or unintentional weight loss  HPI   Guero Mota 68-year-old male with seizures, nonrheumatic aortic stenosis, 4 1 centimeter ascending aortic ectasia, AAA status post EVAR '10   He presents to review recent AOIL performed 11/9/22 11/14/22:  Mr Shant Monet status post AAA repair  He has no chest/abdominal/back or flank pain  He eats well  He has no claudication  He does have bilateral numbness to both calves and the plantar/pads of both feet  He is had the symptoms for 10 years and they do not seem to be worsening  No foot pain  No  wounds, lesions or discoloration of the feet  He had annual AOIL performed which shows stable AAA  He was afraid to have the lower extremity arterial duplex study performed since he had a seizure couple weeks after his study last year  Was likely just a coincidence and not related to the study  I recommended we follow-up LE a next year due to popliteal ectasias and atherosclerosis lower extremities  We reviewed his recent testing  He is maintained on simvastatin 40 mg daily  We discussed that if he has progression of disease, we could consider intensified statin therapy  He is not on aspirin but given aneurysms, add enteric-coated aspirin 81 mg 3 times weekly  LDL 93  BUN 17/1 28        EVAR duplex 11/9/22  Operative History:  2010-09-16 Endovascular graft, abdominal aortic aneurysm Endoluminal graft  2010-09-16 EVAR - AAA repair - [place] Excluder graft and Cardiomems  2010-09-16 Insertion CardioMems Sensor Endoluminal graft  Risk Factors  The patient has history of Hyperlipidemia  He has no history of PAD  Clinical  Right Pressure:  131/ mm Hg, Left Pressure:  121/ mm Hg       FINDINGS:     Unilateral                PSV  EDV  AP (cm)  TRV (cm)    AAA Sac                                 3 2       3 5    Prox Fixation              45                            Prox Main Stem Endograft   40                            Sup-Damari Ao                 43    0                       Sup Renal Aorta                         2 2       2 2    Px Inf-Neftali Ao              40    0                          Segment                    Rig                Left PSV  EDV  AP (cm)  PSV  EDV  AP (cm)    Prox Limb Endograft         47                 50                  Mid Limb Endograft          54                 56                  Dist Limb Endograft         50                 52                  Distal endograft fixation   73                 99                  Dist NELY                                 1 9                1 4    Prox  EIA                   80                104    0             Dist EIA                    90    0      0 8   90           0 8             CONCLUSION:     Impression  Duplex evaluation of the abdominal aortic aneurysm post EVAR shows a widely  patent endograft without evidence of endoleak  Sac size is 3 2 cm  (Prior 3 5  cm)  The aorta proximal to the graft measures 2 2 cm  The iliac arteries distal to the graft measures 1 85 on the right and 1 4 cm on  the left  Ankle/Brachial indices: Rt - 1  0 9 (Prior 1 16 ) and Lt - 1 23  (Prior 1 19)  Metatarsal pressure are: Rt  203mm Hg , and Lt: 207mm Hg  Great toe pressures are: Rt 160 mm Hg , and Lt: 161mm Hg  and are both within  the healing range  PVR/ PPG tracings are normal      In comparison to the study of 12/06/2021, there is no significant interval  change  Technically limited study, the mesenteric and renal arteries were not  visualized during exam   Recommend repeat testing in 1 year as per protocol unless otherwise indicated  The following portions of the patient's history were reviewed and updated as appropriate: allergies, current medications, past family history, past medical history, past social history, past surgical history and problem list     Review of Systems   Constitutional: Negative  HENT: Negative  Eyes: Negative  Respiratory: Negative  Cardiovascular: Negative  Gastrointestinal: Negative  Endocrine: Negative  Genitourinary: Negative  Musculoskeletal: Negative  Skin: Negative  Allergic/Immunologic: Negative  Neurological: Negative  Hematological: Negative  Psychiatric/Behavioral: Negative  Objective:      /82 (BP Location: Right arm, Patient Position: Sitting, Cuff Size: Standard)   Pulse 81   Ht 5' 10" (1 778 m)   Wt 83 9 kg (185 lb)   BMI 26 54 kg/m²       Abd is soft'; non-tender Ao   Physical Exam  Vitals and nursing note reviewed  Constitutional:       Appearance: He is well-developed  HENT:      Head: Normocephalic and atraumatic  Eyes:      Pupils: Pupils are equal, round, and reactive to light  Neck:      Thyroid: No thyromegaly  Vascular: No carotid bruit or JVD  Trachea: Trachea normal    Cardiovascular:      Rate and Rhythm: Normal rate and regular rhythm  Pulses:           Carotid pulses are 2+ on the right side and 2+ on the left side  Radial pulses are 2+ on the right side and 2+ on the left side  Dorsalis pedis pulses are 0 on the right side and 1+ on the left side  Posterior tibial pulses are 2+ on the right side and 2+ on the left side  Heart sounds: S1 normal and S2 normal  Murmur heard  Crescendo systolic murmur is present with a grade of 3/6  No friction rub  No gallop  Comments: Feet war warm and well-perfused  No open wounds or lesions  Pulmonary:      Effort: Pulmonary effort is normal  No accessory muscle usage or respiratory distress  Breath sounds: Normal breath sounds  No wheezing or rales  Abdominal:      General: Bowel sounds are normal  There is no distension  Palpations: Abdomen is soft  Tenderness: There is no abdominal tenderness  Musculoskeletal:         General: No deformity  Normal range of motion  Cervical back: Neck supple  Skin:     General: Skin is warm and dry  Findings: No lesion or rash  Nails: There is no clubbing  Neurological:      Mental Status: He is alert and oriented to person, place, and time        Comments: Grossly normal    Psychiatric: Behavior: Behavior is cooperative  I have reviewed and made appropriate changes to the review of systems input by the medical assistant  Vitals:    11/14/22 1029   BP: 130/82   BP Location: Right arm   Patient Position: Sitting   Cuff Size: Standard   Pulse: 81   Weight: 83 9 kg (185 lb)   Height: 5' 10" (1 778 m)       Patient Active Problem List   Diagnosis   • Hyperlipidemia   • Hypertension   • Aneurysm of abdominal aorta   • Disc degeneration, lumbar   • Diverticulosis   • Hyperuricemia   • Osteoarthrosis, hand   • Partial idiopathic epilepsy with seizures of localized onset, not intractable, with status epilepticus ([facility] [address] )   • Nonrheumatic aortic valve stenosis   • Nonrheumatic mitral valve regurgitation   • Nonrheumatic aortic valve insufficiency   • 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[facility])   • Stage 3a chronic kidney disease (HCC)   • Unwitnessed fall   • Subdural hematoma   • COVID-19   • PAD (peripheral artery disease) ([facility])       Past Surgical History:   Procedure Laterality Date   • ABDOMINAL AORTIC ANEURYSM REPAIR, ENDOVASCULAR N/A    • IL COLONOSCOPY FLX DX W/COLLJ SPEC WHEN PFRMD N/A 6/7/2017    Procedure: COLONOSCOPY;  Surgeon: Sherri Amos MD;  Location: [facility]; Service: Colorectal   • IL COLONOSCOPY FLX DX W/COLLJ SPEC WHEN PFRMD N/A 5/18/2018    Procedure: COLONOSCOPY;  Surgeon: Sherri Amos MD;  Location: [facility];   Service: Colorectal   • TONSILLECTOMY     • WISDOM TOOTH EXTRACTION Bilateral        Family History   Problem Relation Age of Onset   • Aneurysm Mother         ABDMONIAL  AORTA   • Coronary artery disease Father    • Coronary artery disease Brother        Social History     Socioeconomic History   • Marital status: /Civil Union     Spouse name: Not on file   • Number of children: Not on file   • Years of education: Not on file   • Highest education level: Not on file   Occupational History   • Not on file   Tobacco Use   • Smoking status: Former Smoker     Packs/day: 3 00     Years: 10 00     Pack years: 30 00     Quit date: 1971     Years since quittin 7   • Smokeless tobacco: Never Used   Vaping Use   • Vaping Use: Never used   Substance and Sexual Activity   • Alcohol use: Yes     Comment: occasional   • Drug use: No   • Sexual activity: Not on file   Other Topics Concern   • Not on file   Social History Narrative   • Not on file     Social Determinants of Health     Financial Resource Strain: Not on file   Food Insecurity: No Food Insecurity   • Worried About Running Out of Food in the Last Year: Never true   • Ran Out of Food in the Last Year: Never true   Transportation Needs: No Transportation Needs   • Lack of Transportation (Medical): No   • Lack of Transportation (Non-Medical):  No   Physical Activity: Not on file   Stress: Not on file   Social Connections: Not on file   Intimate Partner Violence: Not on file   Housing Stability: Low Risk    • Unable to Pay for Housing in the Last Year: No   • Number of Places Lived in the Last Year: 1   • Unstable Housing in the Last Year: No       Allergies   Allergen Reactions   • Fenofibrate      Patient not aware of allergy   • Hydrocodone-Acetaminophen      Patient not aware of allergy         Current Outpatient Medications:   •  Levetiracetam 750 MG TB24, Take 2 tablets (1,500 mg total) by mouth daily at bedtime, Disp: 180 tablet, Rfl: 3  •  loperamide (IMODIUM) 2 mg capsule, Take 2 capsules (4 mg total) by mouth 4 (four) times a day as needed for diarrhea, Disp: 30 capsule, Rfl: 0  •  simvastatin (ZOCOR) 40 mg tablet, Take 1 tablet (40 mg total) by mouth daily, Disp: 90 tablet, Rfl: 3  •  tamsulosin (FLOMAX) 0 4 mg, Take 1 capsule (0 4 mg total) by mouth daily with dinner, Disp: 30 capsule, Rfl: 5  •  acetaminophen (TYLENOL) 500 mg tablet, Take 500 mg by mouth every 6 (six) hours as needed for mild pain (Patient not taking: Reported on 2022), Disp: , Rfl:   •  folic acid (FOLVITE) 1 mg tablet, Take 1 tablet (1 mg total) by mouth daily, Disp: 30 tablet, Rfl: 0  •  lidocaine (LIDODERM) 5 %, Apply 1 patch topically daily Remove & Discard patch within 12 hours or as directed by MD (Patient not taking: No sig reported), Disp: 5 patch, Rfl: 0  •  thiamine 100 MG tablet, Take 1 tablet (100 mg total) by mouth daily (Patient not taking: Reported on 11/14/2022), Disp: 30 tablet, Rfl: 0

## 2022-11-16 ENCOUNTER — OFFICE VISIT (OUTPATIENT)
Dept: FAMILY MEDICINE CLINIC | Facility: CLINIC | Age: 82
End: 2022-11-16

## 2022-11-16 VITALS
HEART RATE: 66 BPM | WEIGHT: 187 LBS | DIASTOLIC BLOOD PRESSURE: 70 MMHG | BODY MASS INDEX: 26.77 KG/M2 | OXYGEN SATURATION: 97 % | SYSTOLIC BLOOD PRESSURE: 122 MMHG | TEMPERATURE: 97.1 F | HEIGHT: 70 IN | RESPIRATION RATE: 16 BRPM

## 2022-11-16 DIAGNOSIS — I77.810 MILD DILATION OF ASCENDING AORTA (HCC): ICD-10-CM

## 2022-11-16 DIAGNOSIS — I71.43 INFRARENAL ABDOMINAL AORTIC ANEURYSM (AAA) WITHOUT RUPTURE: ICD-10-CM

## 2022-11-16 DIAGNOSIS — I34.0 NONRHEUMATIC MITRAL VALVE REGURGITATION: ICD-10-CM

## 2022-11-16 DIAGNOSIS — E78.2 MIXED HYPERLIPIDEMIA: Primary | ICD-10-CM

## 2022-11-16 DIAGNOSIS — I35.0 NONRHEUMATIC AORTIC VALVE STENOSIS: ICD-10-CM

## 2022-11-16 DIAGNOSIS — I35.1 NONRHEUMATIC AORTIC VALVE INSUFFICIENCY: ICD-10-CM

## 2022-11-16 DIAGNOSIS — Z00.00 MEDICARE ANNUAL WELLNESS VISIT, SUBSEQUENT: ICD-10-CM

## 2022-11-16 DIAGNOSIS — R73.01 IMPAIRED FASTING GLUCOSE: ICD-10-CM

## 2022-11-16 DIAGNOSIS — I73.9 PAD (PERIPHERAL ARTERY DISEASE) (HCC): ICD-10-CM

## 2022-11-16 DIAGNOSIS — Z13.89 ENCOUNTER FOR SCREENING FOR OTHER DISORDER: ICD-10-CM

## 2022-11-16 NOTE — PROGRESS NOTES
Name: Chad Levine      : 1940      MRN: [id number]  Encounter Provider: Teri Johnson MD  Encounter Date: 2022   Encounter department: [address] [place] [address]     1  Mixed hyperlipidemia    2  Impaired fasting glucose    3  PAD (peripheral artery disease) ([address] )    4  Nonrheumatic mitral valve regurgitation    5  Nonrheumatic aortic valve stenosis    6  Nonrheumatic aortic valve insufficiency    7  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HCC)    8  Infrarenal abdominal aortic aneurysm (AAA) without rupture    9  Medicare annual wellness visit, subsequent    10  Encounter for screening for other disorder    Continue with current medications  Patient not interested in flu vaccine  Office visit 6 months with repeat labs       Subjective     Follow up visit  Medications reviewed  Hyperlipidemia with history of AAA s/p  endovascular stent on Simvastatin 40 mg  He cut his dose to every other day  2022 Lipid profile Cholesterol 143  Triglycerides 118  HDL 50  LDL 69  LFTs normal  IFG 2022   2022 admission  known seizure disorder on Keppra  Patient initially presented as a possible stroke alert  He was found by his wife's caregiver on the morning of admission with seizure activity  He had multiple seizures en route to the hospital requiring IV Lorazepam   CT scan head no acute intracranial abnormality  CTA no flow restrictive stenosis, occlusion or thrombosis of the cervical or intracranial vasculature  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  LP negative  Patient was intubated and transferred to [address] [place] [address]  He was initially treated with Depakote and Keppra  Depakote was eventually discontinued  He has treated for with antibiotics  Prior to discharge he tested positive for COV 19  During his hospitalization patient had an unwitnessed fall on 2022   CT scan head 2022 Small left-sided subdural hemorrhage along the left frontoparietal convexity  No significant mass effect  Repeat CT scan head 01/28/2022 unchanged acute trace subarachnoid hemorrhage previously described a subdural hematoma left frontal cerebral convexity no new sites of acute intracranial hemorrhage   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  She has advanced MS       Recent Results (from the past 672 hour(s))   Comprehensive metabolic panel    Collection Time: 11/02/22  9:31 AM   Result Value Ref Range    Sodium 141 135 - 147 mmol/L    Potassium 4 6 3 5 - 5 3 mmol/L    Chloride 109 (H) 96 - 108 mmol/L    CO2 30 21 - 32 mmol/L    ANION GAP 2 (L) 4 - 13 mmol/L    BUN 17 5 - 25 mg/dL    Creatinine 1 28 0 60 - 1 30 mg/dL    Glucose, Fasting 103 (H) 65 - 99 mg/dL    Calcium 9 5 8 3 - 10 1 mg/dL    AST 25 5 - 45 U/L    ALT 28 12 - 78 U/L    Alkaline Phosphatase 80 46 - 116 U/L    Total Protein 6 8 6 4 - 8 4 g/dL    Albumin 3 7 3 5 - 5 0 g/dL    Total Bilirubin 0 71 0 20 - 1 00 mg/dL    eGFR 52 ml/min/1 73sq m   CBC and differential    Collection Time: 11/02/22  9:31 AM   Result Value Ref Range    WBC 7 71 4 31 - 10 16 Thousand/uL    RBC 4 74 3 88 - 5 62 Million/uL    Hemoglobin 14 8 12 0 - 17 0 g/dL    Hematocrit 46 1 36 5 - 49 3 %    MCV 97 82 - 98 fL    MCH 31 2 26 8 - 34 3 pg    MCHC 32 1 31 4 - 37 4 g/dL    RDW 12 5 11 6 - 15 1 %    MPV 11 1 8 9 - 12 7 fL    Platelets 879 340 - 378 Thousands/uL    nRBC 0 /100 WBCs    Neutrophils Relative 68 43 - 75 %    Immat GRANS % 1 0 - 2 %    Lymphocytes Relative 19 14 - 44 %    Monocytes Relative 7 4 - 12 %    Eosinophils Relative 5 0 - 6 %    Basophils Relative 0 0 - 1 %    Neutrophils Absolute 5 31 1 85 - 7 62 Thousands/µL    Immature Grans Absolute 0 04 0 00 - 0 20 Thousand/uL    Lymphocytes Absolute 1 45 0 60 - 4 47 Thousands/µL    Monocytes Absolute 0 50 0 17 - 1 22 Thousand/µL    Eosinophils Absolute 0 39 0 00 - 0 61 Thousand/µL    Basophils Absolute 0 02 0 00 - 0 10 Thousands/µL Lipid panel    Collection Time: 11/02/22  9:31 AM   Result Value Ref Range    Cholesterol 143 See Comment mg/dL    Triglycerides 118 See Comment mg/dL    HDL, Direct 50 >=40 mg/dL    LDL Calculated 69 0 - 100 mg/dL    Non-HDL-Chol (CHOL-HDL) 93 mg/dl   Uric acid    Collection Time: 11/02/22  9:31 AM   Result Value Ref Range    Uric Acid 7 5 3 5 - 8 5 mg/dL     Lab Results   Component Value Date    HGBA1C 5 5 02/22/2017         Review of Systems   Constitutional: Negative for appetite change, chills, fatigue, fever and unexpected weight change  HENT: Negative for congestion, ear pain, hearing loss, postnasal drip, rhinorrhea, sinus pressure, sinus pain, sore throat, trouble swallowing and voice change  Eyes: Negative for visual disturbance  Respiratory: Negative for cough, shortness of breath and wheezing  Cardiovascular: Negative for chest pain, palpitations and leg swelling  01/2022 echocardiogram normal left ventricular systolic function  EF 60%  Diastolic function normal   Mild to moderate AR  Moderate AS  Moderate MR  Abdominal aortic aneurysm status post endovascular stenting followed by vascular surgery  No claudications  11/2022 widely patent endograft without evidence of endoleak  12/2021 arterial dopplers mild diffuse disease LEs  No significant stenosis   02/2017 echocardiogram normal left ventricular systolic function  No regional wall motion abnormalities  Mild MR  Mild to moderate aortic regurgitation  Mild aortic stenosis  Mild tricuspid regurgitation  Aortic root mild dilatation  Ascending aorta mildly dilated at 4 1 cm  Gastrointestinal: Negative for abdominal pain, blood in stool, constipation, diarrhea, nausea and vomiting  Colonoscopy 05/2018   Endocrine: Negative for polydipsia and polyuria          Past history of borderline abnormal elevated TSH 6 163   04/2019  On no medications    Lab Results       Component                Value               Date [id number]             2 080               09/04/2020                                    Genitourinary: Positive for difficulty urinating  Negative for urgency  BPH on Tamsulosin still getting up several times at night Urinary retention during his admission requiring a Franklin catheter  Catheter removed prior to discharge  Musculoskeletal: Negative for arthralgias and myalgias  Hyperuricemia and history of gout  No longer on allopurinol  Chronic bilateral shoulder pain with chronic rotator cuff tear right shoulder and subacromial bursitis left shoulder  Status post bilateral steroid injections by Orthopedic surgery 12/2017  He is on no pain medications at present      Lab Results       Component                Value               Date                       URICACID                 7 5                 11/02/2022                          Skin: Negative for rash  Allergic/Immunologic: Negative for environmental allergies  Neurological: Positive for seizures  Negative for dizziness, light-headedness and headaches  See HPI   seizure disorder with 2 seizures occurring within several hours 02/2017  Extensive workup and neurological evaluation  MRI brain normal   Awake EEG normal   on Keppra    Hematological: Negative for adenopathy  Does not bruise/bleed easily  Lab Results       Component                Value               Date                       WBC                      7 71                11/02/2022                 HGB                      14 8                11/02/2022                 HCT                      46 1                11/02/2022                 MCV                      97                  11/02/2022                 PLT                      158                 11/02/2022               Psychiatric/Behavioral: Negative for dysphoric mood and sleep disturbance         Past Medical History:   Diagnosis Date   • Colon polyps    • Gout    • Hyperlipidemia    • Hypertension    • S/P AAA repair    • Tear of right rotator cuff 2017     Past Surgical History:   Procedure Laterality Date   • ABDOMINAL AORTIC ANEURYSM REPAIR, ENDOVASCULAR N/A    • WA COLONOSCOPY FLX DX W/COLLJ SPEC WHEN PFRMD N/A 2017    Procedure: COLONOSCOPY;  Surgeon: Cedric Aden MD;  Location: [facility]; Service: Colorectal   • WA COLONOSCOPY FLX DX W/COLLJ SPEC WHEN PFRMD N/A 2018    Procedure: COLONOSCOPY;  Surgeon: Cedric Aden MD;  Location: [facility]; Service: Colorectal   • TONSILLECTOMY     • WISDOM TOOTH EXTRACTION Bilateral      Family History   Problem Relation Age of Onset   • Aneurysm Mother         ABDMONIAL  AORTA   • Coronary artery disease Father    • Coronary artery disease Brother      Social History     Socioeconomic History   • Marital status: /Civil Union     Spouse name: None   • Number of children: None   • Years of education: None   • Highest education level: None   Occupational History   • None   Tobacco Use   • Smoking status: Former     Packs/day: 3 00     Years: 10 00     Pack years: 30 00     Types: Cigarettes     Quit date: 1971     Years since quittin 7   • Smokeless tobacco: Never   Vaping Use   • Vaping Use: Never used   Substance and Sexual Activity   • Alcohol use: Yes     Comment: occasional   • Drug use: No   • Sexual activity: None   Other Topics Concern   • None   Social History Narrative   • None     Social Determinants of Health     Financial Resource Strain: Low Risk    • Difficulty of Paying Living Expenses: Not hard at all   Food Insecurity: No Food Insecurity   • Worried About Running Out of Food in the Last Year: Never true   • Ran Out of Food in the Last Year: Never true   Transportation Needs: No Transportation Needs   • Lack of Transportation (Medical): No   • Lack of Transportation (Non-Medical):  No   Physical Activity: Not on file   Stress: Not on file   Social Connections: Not on file   Intimate Partner Violence: Not on file   Housing Stability: Low Risk    • Unable to Pay for Housing in the Last Year: No   • Number of Places Lived in the Last Year: 1   • Unstable Housing in the Last Year: No     Current Outpatient Medications on File Prior to Visit   Medication Sig   • acetaminophen (TYLENOL) 500 mg tablet Take 500 mg by mouth every 6 (six) hours as needed for mild pain   • aspirin (ECOTRIN LOW STRENGTH) 81 mg EC tablet Take 1 tablet (81 mg total) by mouth 3 (three) times a week   • Levetiracetam 750 MG TB24 Take 2 tablets (1,500 mg total) by mouth daily at bedtime   • loperamide (IMODIUM) 2 mg capsule Take 2 capsules (4 mg total) by mouth 4 (four) times a day as needed for diarrhea   • simvastatin (ZOCOR) 40 mg tablet Take 1 tablet (40 mg total) by mouth daily   • tamsulosin (FLOMAX) 0 4 mg Take 1 capsule (0 4 mg total) by mouth daily with dinner   • [DISCONTINUED] folic acid (FOLVITE) 1 mg tablet Take 1 tablet (1 mg total) by mouth daily (Patient not taking: Reported on 11/16/2022)   • [DISCONTINUED] lidocaine (LIDODERM) 5 % Apply 1 patch topically daily Remove & Discard patch within 12 hours or as directed by MD (Patient not taking: Reported on 7/26/2022)   • [DISCONTINUED] thiamine 100 MG tablet Take 1 tablet (100 mg total) by mouth daily (Patient not taking: Reported on 11/14/2022)     Allergies   Allergen Reactions   • Fenofibrate      Patient not aware of allergy   • Hydrocodone-Acetaminophen      Patient not aware of allergy     Immunization History   Administered Date(s) Administered   • COVID-19 MODERNA VACC 0 5 ML IM 01/26/2021, 02/21/2021       Objective     /70   Pulse 66   Temp (!) 97 1 °F (36 2 °C)   Resp 16   Ht 5' 10" (1 778 m)   Wt 84 8 kg (187 lb)   SpO2 97%   BMI 26 83 kg/m²     Physical Exam  Vitals and nursing note reviewed  Constitutional:       General: He is not in acute distress  Appearance: He is well-developed     HENT: Right Ear: Tympanic membrane and ear canal normal       Left Ear: Tympanic membrane and ear canal normal       Mouth/Throat:      Mouth: No oral lesions  Pharynx: Oropharynx is clear  Eyes:      General: No scleral icterus  Extraocular Movements: Extraocular movements intact  Conjunctiva/sclera: Conjunctivae normal       Pupils: Pupils are equal, round, and reactive to light  Neck:      Thyroid: No thyroid mass or thyromegaly  Vascular: No carotid bruit or JVD  Trachea: No tracheal deviation  Cardiovascular:      Rate and Rhythm: Normal rate and regular rhythm  Pulses:           Carotid pulses are 2+ on the right side and 2+ on the left side  Heart sounds: Murmur heard  Crescendo decrescendo systolic murmur is present with a grade of 3/6  No gallop  Pulmonary:      Effort: Pulmonary effort is normal  No respiratory distress  Breath sounds: Normal breath sounds  No wheezing or rales  Musculoskeletal:      Right lower leg: No edema  Left lower leg: No edema  Lymphadenopathy:      Cervical: No cervical adenopathy  Upper Body:      Right upper body: No supraclavicular adenopathy  Left upper body: No supraclavicular adenopathy  Skin:     Findings: No rash  Nails: There is no clubbing  Neurological:      General: No focal deficit present  Mental Status: He is alert and oriented to person, place, and time     Psychiatric:         Mood and Affect: Mood normal          Behavior: Behavior normal          Cognition and Memory: Cognition normal        Donovan Flower MD

## 2022-11-16 NOTE — PATIENT INSTRUCTIONS
Medicare Preventive Visit Patient Instructions  Thank you for completing your Welcome to Medicare Visit or Medicare Annual Wellness Visit today  Your next wellness visit will be due in one year (11/17/2023)  The screening/preventive services that you may require over the next 5-10 years are detailed below  Some tests may not apply to you based off risk factors and/or age  Screening tests ordered at today's visit but not completed yet may show as past due  Also, please note that scanned in results may not display below  Preventive Screenings:  Service Recommendations Previous Testing/Comments   Colorectal Cancer Screening  · Colonoscopy    · Fecal Occult Blood Test (FOBT)/Fecal Immunochemical Test (FIT)  · Fecal DNA/Cologuard Test  · Flexible Sigmoidoscopy Age: 39-70 years old   Colonoscopy: every 10 years (May be performed more frequently if at higher risk)  OR  FOBT/FIT: every 1 year  OR  Cologuard: every 3 years  OR  Sigmoidoscopy: every 5 years  Screening may be recommended earlier than age 39 if at higher risk for colorectal cancer  Also, an individualized decision between you and your healthcare provider will decide whether screening between the ages of 74-80 would be appropriate   Colonoscopy: 05/18/2018  FOBT/FIT: Not on file  Cologuard: Not on file  Sigmoidoscopy: Not on file    Screening Current     Prostate Cancer Screening Individualized decision between patient and health care provider in men between ages of 53-78   Medicare will cover every 12 months beginning on the day after your 50th birthday PSA: No results in last 5 years     Screening Not Indicated     Hepatitis C Screening Once for adults born between 1945 and 1965  More frequently in patients at high risk for Hepatitis C Hep C Antibody: Not on file        Diabetes Screening 1-2 times per year if you're at risk for diabetes or have pre-diabetes Fasting glucose: 103 mg/dL (11/2/2022)  A1C: No results in last 5 years (No results in last 5 years)  Screening Current   Cholesterol Screening Once every 5 years if you don't have a lipid disorder  May order more often based on risk factors  Lipid panel: 11/02/2022  Screening Not Indicated  History Lipid Disorder      Other Preventive Screenings Covered by Medicare:  1  Abdominal Aortic Aneurysm (AAA) Screening: covered once if your at risk  You're considered to be at risk if you have a family history of AAA or a male between the age of 73-68 who smoking at least 100 cigarettes in your lifetime  2  Lung Cancer Screening: covers low dose CT scan once per year if you meet all of the following conditions: (1) Age 50-69; (2) No signs or symptoms of lung cancer; (3) Current smoker or have quit smoking within the last 15 years; (4) You have a tobacco smoking history of at least 20 pack years (packs per day x number of years you smoked); (5) You get a written order from a healthcare provider  3  Glaucoma Screening: covered annually if you're considered high risk: (1) You have diabetes OR (2) Family history of glaucoma OR (3)  aged 48 and older OR (3)  American aged 72 and older  3  Osteoporosis Screening: covered every 2 years if you meet one of the following conditions: (1) Have a vertebral abnormality; (2) On glucocorticoid therapy for more than 3 months; (3) Have primary hyperparathyroidism; (4) On osteoporosis medications and need to assess response to drug therapy  5  HIV Screening: covered annually if you're between the age of 12-76  Also covered annually if you are younger than 13 and older than 72 with risk factors for HIV infection  For pregnant patients, it is covered up to 3 times per pregnancy      Immunizations:  Immunization Recommendations   Influenza Vaccine Annual influenza vaccination during flu season is recommended for all persons aged >= 6 months who do not have contraindications   Pneumococcal Vaccine   * Pneumococcal conjugate vaccine = PCV13 (Prevnar 13), PCV15 (Vaxneuvance), PCV20 (Prevnar 20)  * Pneumococcal polysaccharide vaccine = PPSV23 (Pneumovax) Adults 2364 years old: 1-3 doses may be recommended based on certain risk factors  Adults 72 years old: 1-2 doses may be recommended based off what pneumonia vaccine you previously received   Hepatitis B Vaccine 3 dose series if at intermediate or high risk (ex: diabetes, end stage renal disease, liver disease)   Tetanus (Td) Vaccine - COST NOT COVERED BY MEDICARE PART B Following completion of primary series, a booster dose should be given every 10 years to maintain immunity against tetanus  Td may also be given as tetanus wound prophylaxis  Tdap Vaccine - COST NOT COVERED BY MEDICARE PART B Recommended at least once for all adults  For pregnant patients, recommended with each pregnancy  Shingles Vaccine (Shingrix) - COST NOT COVERED BY MEDICARE PART B  2 shot series recommended in those aged 48 and above     Health Maintenance Due:      Topic Date Due   • Colorectal Cancer Screening  05/18/2023     Immunizations Due:      Topic Date Due   • Hepatitis B Vaccine (1 of 3 - 3-dose series) Never done   • COVID-19 Vaccine (3 - Booster for Moderna series) 07/21/2021   • Influenza Vaccine (1) Never done     Advance Directives   What are advance directives? Advance directives are legal documents that state your wishes and plans for medical care  These plans are made ahead of time in case you lose your ability to make decisions for yourself  Advance directives can apply to any medical decision, such as the treatments you want, and if you want to donate organs  What are the types of advance directives? There are many types of advance directives, and each state has rules about how to use them  You may choose a combination of any of the following:  · Living will: This is a written record of the treatment you want  You can also choose which treatments you do not want, which to limit, and which to stop at a certain time   This includes surgery, medicine, IV fluid, and tube feedings  · Durable power of  for healthcare [facility]): This is a written record that states who you want to make healthcare choices for you when you are unable to make them for yourself  This person, called a proxy, is usually a family member or a friend  You may choose more than 1 proxy  · Do not resuscitate (DNR) order:  A DNR order is used in case your heart stops beating or you stop breathing  It is a request not to have certain forms of treatment, such as CPR  A DNR order may be included in other types of advance directives  · Medical directive: This covers the care that you want if you are in a coma, near death, or unable to make decisions for yourself  You can list the treatments you want for each condition  Treatment may include pain medicine, surgery, blood transfusions, dialysis, IV or tube feedings, and a ventilator (breathing machine)  · Values history: This document has questions about your views, beliefs, and how you feel and think about life  This information can help others choose the care that you would choose  Why are advance directives important? An advance directive helps you control your care  Although spoken wishes may be used, it is better to have your wishes written down  Spoken wishes can be misunderstood, or not followed  Treatments may be given even if you do not want them  An advance directive may make it easier for your family to make difficult choices about your care  Weight Management   Why it is important to manage your weight:  Being overweight increases your risk of health conditions such as heart disease, high blood pressure, type 2 diabetes, and certain types of cancer  It can also increase your risk for osteoarthritis, sleep apnea, and other respiratory problems  Aim for a slow, steady weight loss  Even a small amount of weight loss can lower your risk of health problems    How to lose weight safely:  A safe and healthy way to lose weight is to eat fewer calories and get regular exercise  You can lose up about 1 pound a week by decreasing the number of calories you eat by 500 calories each day  Healthy meal plan for weight management:  A healthy meal plan includes a variety of foods, contains fewer calories, and helps you stay healthy  A healthy meal plan includes the following:  · Eat whole-grain foods more often  A healthy meal plan should contain fiber  Fiber is the part of grains, fruits, and vegetables that is not broken down by your body  Whole-grain foods are healthy and provide extra fiber in your diet  Some examples of whole-grain foods are whole-wheat breads and pastas, oatmeal, brown rice, and bulgur  · Eat a variety of vegetables every day  Include dark, leafy greens such as spinach, kale, raymundo greens, and mustard greens  Eat yellow and orange vegetables such as carrots, sweet potatoes, and winter squash  · Eat a variety of fruits every day  Choose fresh or canned fruit (canned in its own juice or light syrup) instead of juice  Fruit juice has very little or no fiber  · Eat low-fat dairy foods  Drink fat-free (skim) milk or 1% milk  Eat fat-free yogurt and low-fat cottage cheese  Try low-fat cheeses such as mozzarella and other reduced-fat cheeses  · Choose meat and other protein foods that are low in fat  Choose beans or other legumes such as split peas or lentils  Choose fish, skinless poultry (chicken or turkey), or lean cuts of red meat (beef or pork)  Before you cook meat or poultry, cut off any visible fat  · Use less fat and oil  Try baking foods instead of frying them  Add less fat, such as margarine, sour cream, regular salad dressing and mayonnaise to foods  Eat fewer high-fat foods  Some examples of high-fat foods include french fries, doughnuts, ice cream, and cakes  · Eat fewer sweets  Limit foods and drinks that are high in sugar   This includes candy, cookies, regular soda, and sweetened drinks  Exercise:  Exercise at least 30 minutes per day on most days of the week  Some examples of exercise include walking, biking, dancing, and swimming  You can also fit in more physical activity by taking the stairs instead of the elevator or parking farther away from stores  Ask your healthcare provider about the best exercise plan for you  © Copyright [employer] 2018 Information is for End User's use only and may not be sold, redistributed or otherwise used for commercial purposes   All illustrations and images included in CareNotes® are the copyrighted property of [employer]  or [address] [place] [address] [employer]

## 2022-11-16 NOTE — PROGRESS NOTES
Assessment and Plan:     Problem List Items Addressed This Visit        Cardiovascular and Mediastinum    Aneurysm of abdominal aorta    Nonrheumatic aortic valve stenosis    Nonrheumatic mitral valve regurgitation    Nonrheumatic aortic valve insufficiency    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HCC)    PAD (peripheral artery disease) ([address] )       Other    Hyperlipidemia - Primary   Other Visit Diagnoses     Impaired fasting glucose        Medicare annual wellness visit, subsequent        Encounter for screening for other disorder            BMI Counseling: Body mass index is 26 83 kg/m²  The BMI is above normal  Nutrition recommendations include decreasing portion sizes, consuming healthier snacks, moderation in carbohydrate intake, reducing intake of saturated and trans fat and reducing intake of cholesterol  Exercise recommendations include exercising 3-5 times per week  No pharmacotherapy was ordered  Rationale for BMI follow-up plan is due to patient being overweight or obese  Depression Screening and Follow-up Plan: Patient was screened for depression during today's encounter  They screened negative with a PHQ-2 score of 0  Preventive health issues were discussed with patient, and age appropriate screening tests were ordered as noted in patient's After Visit Summary  Personalized health advice and appropriate referrals for health education or preventive services given if needed, as noted in patient's After Visit Summary       History of Present Illness:     Patient presents for a Medicare Wellness Visit    HPI   Patient Care Team:  Renetta Manzo MD as PCP - MD Tonie Ferraro MD Angelique Parish, MD (Colon and Rectal Surgery)     Review of Systems:     Review of Systems     Problem List:     Patient Active Problem List   Diagnosis   • Hyperlipidemia   • Hypertension   • Aneurysm of abdominal aorta   • Disc degeneration, lumbar   • Diverticulosis   • Hyperuricemia   • Osteoarthrosis, hand   • Partial idiopathic epilepsy with seizures of localized onset, not intractable, with status epilepticus ([facility] [address] )   • Nonrheumatic aortic valve stenosis   • Nonrheumatic mitral valve regurgitation   • Nonrheumatic aortic valve insufficiency   • 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HCC)   • Stage 3a chronic kidney disease (HCC)   • Unwitnessed fall   • Subdural hematoma   • COVID-19   • PAD (peripheral artery disease) (HCC)      Past Medical and Surgical History:     Past Medical History:   Diagnosis Date   • Colon polyps    • Gout    • Hyperlipidemia    • Hypertension    • S/P AAA repair    • Tear of right rotator cuff 2017     Past Surgical History:   Procedure Laterality Date   • ABDOMINAL AORTIC ANEURYSM REPAIR, ENDOVASCULAR N/A    • CO COLONOSCOPY FLX DX W/COLLJ SPEC WHEN PFRMD N/A 2017    Procedure: COLONOSCOPY;  Surgeon: Emilie Schulz MD;  Location: [facility]; Service: Colorectal   • CO COLONOSCOPY FLX DX W/COLLJ SPEC WHEN PFRMD N/A 2018    Procedure: COLONOSCOPY;  Surgeon: Emilie Schulz MD;  Location: [facility];   Service: Colorectal   • TONSILLECTOMY     • WISDOM TOOTH EXTRACTION Bilateral       Family History:     Family History   Problem Relation Age of Onset   • Aneurysm Mother         ABDMONIAL  AORTA   • Coronary artery disease Father    • Coronary artery disease Brother       Social History:     Social History     Socioeconomic History   • Marital status: /Civil Union     Spouse name: None   • Number of children: None   • Years of education: None   • Highest education level: None   Occupational History   • None   Tobacco Use   • Smoking status: Former     Packs/day: 3 00     Years: 10 00     Pack years: 30 00     Types: Cigarettes     Quit date: 1971     Years since quittin 7   • Smokeless tobacco: Never   Vaping Use   • Vaping Use: Never used   Substance and Sexual Activity   • Alcohol use: Yes     Comment: occasional   • Drug use: No   • Sexual activity: None   Other Topics Concern   • None   Social History Narrative   • None     Social Determinants of Health     Financial Resource Strain: Low Risk    • Difficulty of Paying Living Expenses: Not hard at all   Food Insecurity: No Food Insecurity   • Worried About Running Out of Food in the Last Year: Never true   • Ran Out of Food in the Last Year: Never true   Transportation Needs: No Transportation Needs   • Lack of Transportation (Medical): No   • Lack of Transportation (Non-Medical): No   Physical Activity: Not on file   Stress: Not on file   Social Connections: Not on file   Intimate Partner Violence: Not on file   Housing Stability: Low Risk    • Unable to Pay for Housing in the Last Year: No   • Number of Places Lived in the Last Year: 1   • Unstable Housing in the Last Year: No      Medications and Allergies:     Current Outpatient Medications   Medication Sig Dispense Refill   • acetaminophen (TYLENOL) 500 mg tablet Take 500 mg by mouth every 6 (six) hours as needed for mild pain     • aspirin (ECOTRIN LOW STRENGTH) 81 mg EC tablet Take 1 tablet (81 mg total) by mouth 3 (three) times a week 30 tablet 1   • Levetiracetam 750 MG TB24 Take 2 tablets (1,500 mg total) by mouth daily at bedtime 180 tablet 3   • loperamide (IMODIUM) 2 mg capsule Take 2 capsules (4 mg total) by mouth 4 (four) times a day as needed for diarrhea 30 capsule 0   • simvastatin (ZOCOR) 40 mg tablet Take 1 tablet (40 mg total) by mouth daily 90 tablet 3   • tamsulosin (FLOMAX) 0 4 mg Take 1 capsule (0 4 mg total) by mouth daily with dinner 30 capsule 5     No current facility-administered medications for this visit       Allergies   Allergen Reactions   • Fenofibrate      Patient not aware of allergy   • Hydrocodone-Acetaminophen      Patient not aware of allergy      Immunizations:     Immunization History   Administered Date(s) Administered   • COVID-19 MODERNA VACC 0 5 ML IM 01/26/2021, 02/21/2021      Health Maintenance: Topic Date Due   • Colorectal Cancer Screening  05/18/2023         Topic Date Due   • Hepatitis B Vaccine (1 of 3 - 3-dose series) Never done   • COVID-19 Vaccine (3 - Booster for Moderna series) 07/21/2021   • Influenza Vaccine (1) Never done      Medicare Screening Tests and Risk Assessments:     Uvaldo Henry is here for his Subsequent Wellness visit  Last Medicare Wellness visit information reviewed, patient interviewed and updates made to the record today  Health Risk Assessment:   Patient rates overall health as good  Patient feels that their physical health rating is same  Patient is satisfied with their life  Hearing was rated as same  Patient feels that their emotional and mental health rating is same  Patients states they are sometimes angry  Patient states they are sometimes unusually tired/fatigued  Pain experienced in the last 7 days has been some  Patient's pain rating has been 5/10  Patient states that he has experienced no weight loss or gain in last 6 months  Depression Screening:   PHQ-2 Score: 0      Fall Risk Screening: In the past year, patient has experienced: no history of falling in past year      Home Safety:  Patient does not have trouble with stairs inside or outside of their home  Patient has no working smoke alarms and has no working carbon monoxide detector  Home safety hazards include: none  Nutrition:   Current diet is Regular  Medications:   Patient is currently taking over-the-counter supplements  OTC medications include: see medication list  Patient is able to manage medications  Activities of Daily Living (ADLs)/Instrumental Activities of Daily Living (IADLs):   Walk and transfer into and out of bed and chair?: Yes  Feed yourself?  Yes  Do your laundry/housekeeping?: Yes  Manage your money, pay your bills and track your expenses?: Yes  Make your own meals?: Yes    Do your own shopping?: Yes    Previous Hospitalizations:   Any hospitalizations or ED visits within the last 12 months?: Yes    How many hospitalizations have you had in the last year?: 1-2    Advance Care Planning:   Living will: No    Advanced directive: No      Cognitive Screening:   Provider or family/friend/caregiver concerned regarding cognition?: No    PREVENTIVE SCREENINGS      Cardiovascular Screening:    General: History Lipid Disorder and Screening Current      Diabetes Screening:     General: Screening Current      Colorectal Cancer Screening:     General: Screening Current      Prostate Cancer Screening:    General: Screening Not Indicated      Osteoporosis Screening:    General: Screening Not Indicated      Abdominal Aortic Aneurysm (AAA) Screening:    Risk factors include: tobacco use        General: Screening Not Indicated and History AAA      Lung Cancer Screening:     General: Screening Not Indicated      Hepatitis C Screening:    General: Patient Declines    Screening, Brief Intervention, and Referral to Treatment (SBIRT)    Screening  Typical number of drinks in a day: 0  Typical number of drinks in a week: 0  Interpretation: Low risk drinking behavior  Single Item Drug Screening:  How often have you used an illegal drug (including marijuana) or a prescription medication for non-medical reasons in the past year? never    Single Item Drug Screen Score: 0  Interpretation: Negative screen for possible drug use disorder    Brief Intervention  Alcohol & drug use screenings were reviewed  No concerns regarding substance use disorder identified  Other Counseling Topics:   Regular weightbearing exercise and calcium and vitamin D intake  No results found       Physical Exam:     /70   Pulse 66   Temp (!) 97 1 °F (36 2 °C)   Resp 16   Ht 5' 10" (1 778 m)   Wt 84 8 kg (187 lb)   SpO2 97%   BMI 26 83 kg/m²     Physical Exam     Collette Soles, MD

## 2023-02-22 ENCOUNTER — OFFICE VISIT (OUTPATIENT)
Dept: NEUROLOGY | Facility: CLINIC | Age: 83
End: 2023-02-22

## 2023-02-22 ENCOUNTER — TELEPHONE (OUTPATIENT)
Dept: NEUROLOGY | Facility: CLINIC | Age: 83
End: 2023-02-22

## 2023-02-22 VITALS
TEMPERATURE: 98 F | WEIGHT: 184 LBS | BODY MASS INDEX: 26.34 KG/M2 | OXYGEN SATURATION: 97 % | HEIGHT: 70 IN | SYSTOLIC BLOOD PRESSURE: 132 MMHG | DIASTOLIC BLOOD PRESSURE: 60 MMHG | HEART RATE: 60 BPM

## 2023-02-22 DIAGNOSIS — G40.001 PARTIAL IDIOPATHIC EPILEPSY WITH SEIZURES OF LOCALIZED ONSET, NOT INTRACTABLE, WITH STATUS EPILEPTICUS (HCC): ICD-10-CM

## 2023-02-22 RX ORDER — LEVETIRACETAM 750 MG/1
1500 TABLET, EXTENDED RELEASE ORAL
Qty: 180 TABLET | Refills: 3 | Status: SHIPPED | OUTPATIENT
Start: 2023-02-22

## 2023-02-22 NOTE — PROGRESS NOTES
Patient ID: Daily Warren is a [de-identified] y o  male with history that includes abdominal aortic aneurysm status post repair that presents regarding focal epilepsy manifest as 2 seizures occurring a few hours apart on 2/21/2017 and status epilepticus in 1/2022, who is returning to Neurology office for follow up of his seizures  Assessment/Plan:    Partial idiopathic epilepsy with seizures of localized onset, not intractable, with status epilepticus [facility]  Patient with focal epilepsy, history of status epilepticus  Currently seizure free on levetiracetam XR 1500 mg daily  Denies any side effects or concerns related to AED regimen  Most recent level therapeutic about one year ago  Prior EEG with right frontal seizure onset  MRI brain in 2017 was normal  Exam at today's visit is non-focal      Patient will continue with levetiracetam XR 1500 mg daily  He will call the office with possible seizures or concerns  Check levetiracetam level in the next few weeks  Follow up in 6 months or sooner if needed  He will Return in about 6 months (around 8/22/2023) for Follow up with Dr Shannan Mcgarry  Subjective:  Daily Warren is a [de-identified] y o  male with history that includes abdominal aortic aneurysm status post repair that presents regarding focal epilepsy manifest as 2 seizures occurring a few hours apart on 2/21/2017 and status epilepticus in 1/2022, who is returning to Neurology office for follow up of his seizures  He was last seen int he office by Dr Shannan Mcgarry on 7/26/2022  AT that time, seizures were controlled on levetiracetam ER 1500 mg HS  Noted essentially normal neurologic exam  No changes were made to AED regimen at that time  Recommended 6 month follow up  Since his last visit, he has continued to be seizure free  On levetiracetam Er 1500 mg daily with dinner  No side effects that he is aware of  He does drive and that is going well  No new medical issues since his last visit   He has been getting massages once per month      Wife has MS and he takes care of her  There is a caregiver for 6 hours per day 5 days per week  Current seizure medications:  - levetiracetam ER 1500 mg HS  Other medications as per Epic  Current AEDs:  Levetiracetam ER 1500 mg qhs  Medication side effects: None  Medication adherence: Yes     Seizures/Spell characteristics:  1  Witnessed GTC seizure  2 events on 2/21/17  One out of sleep, the other a few hours later in the ER  Status epilepticus in 1/2022    2  Right frontal subclinical seizures on VEEG in 2/2022       Special Features:  - History of status epilepticus: yes (1/2022)  - Self injury due to seizures: No  - Precipitating factors: None     Seizure risk factors: Normal birth and development  No history of seizures as a child  No family history of seizures  No head trauma resulting in loss of consciousness  No history of brain tumor, stroke or CNS infection       Prior AEDs:  None     Prior Evaluation:  REEG 2/21/17: normal, awake       1 hour EEG sleep-deprived EEG, 2 hours, 4/18/2017:  Normal     VEEG 1/2022 revealed 11 subclinical right frontal seizures during the initial 14 hours of recording as well as right frontal BIRDS, right frontal slowing and right frontal sharp waves      MRI brain w/ and w/o 2/21/17: normal       Psychiatric History:  None     Social History:   Driving: Yes  Lives Alone: No  Occupation: retired  Lives with his wife  She has MS and he is her caregiver  His [facility]      I reviewed prior neurology notes, most recent labs, as documented in Epic/BTR, and summarized above  Objective:    Blood pressure 132/60, pulse 60, temperature 98 °F (36 7 °C), temperature source Temporal, height 5' 10" (1 778 m), weight 83 5 kg (184 lb), SpO2 97 %  Physical Exam  No apparent distress  Appears well nourished     Mood appropriate for situation     Neurologic Exam  Mental status- alert and oriented to person, place, and time  Speech appropriate for conversation  Good attention and knowledge  Cranial Nerves- PERRL, EOMS normal, facial sensation and muscles symmetric, hearing intact bilaterally to finger rubs, tongue midline, palate rise symmetrical, shoulder shrug symmetrical     Motor- No pronator drift  Appropriate strength  Moves all extremities freely  No tremor  Sensory-  Intact distally in all extremities to light touch  DTRs- 2+ and symmetric in all extremities  Gait- normal casual gait  Coordination- FNF intact  ROS:  Review of Systems   Constitutional: Negative  Negative for appetite change and fever  HENT: Negative  Negative for hearing loss, tinnitus, trouble swallowing and voice change  Eyes: Negative  Negative for photophobia, pain and visual disturbance  Respiratory: Negative  Negative for shortness of breath  Cardiovascular: Negative  Negative for palpitations  Gastrointestinal: Negative  Negative for nausea and vomiting  Endocrine: Negative  Negative for cold intolerance  Genitourinary: Negative  Negative for dysuria, frequency and urgency  Musculoskeletal: Negative  Negative for gait problem, myalgias and neck pain  Skin: Negative  Negative for rash  Allergic/Immunologic: Negative  Neurological: Negative  Negative for dizziness, tremors, seizures, syncope, facial asymmetry, speech difficulty, weakness, light-headedness, numbness and headaches  Hematological: Negative  Does not bruise/bleed easily  Psychiatric/Behavioral: Negative  Negative for confusion, hallucinations and sleep disturbance  ROS obtained by MA and reviewed by myself  This note may have been created using voice recognition software  There may be unintentional errors such as grammatical errors, spelling errors, or pronoun errors

## 2023-02-22 NOTE — TELEPHONE ENCOUNTER
Is, is [facility]  I was just in for a visit with Dr Mikal Blanton and they ordered some lab work done  I just want to know if I have to fast or I don't have to fast  Make sure to call back  Bye  Thank you  Chart reviewed:  Keppra level was ordered     Per office notes-Check levetiracetam level in the next few weeks    Called pt and advised of the above and no need to fast  Pt verbalized understanding

## 2023-02-22 NOTE — ASSESSMENT & PLAN NOTE
Patient with focal epilepsy, history of status epilepticus  Currently seizure free on levetiracetam XR 1500 mg daily  Denies any side effects or concerns related to AED regimen  Most recent level therapeutic about one year ago  Prior EEG with right frontal seizure onset  MRI brain in 2017 was normal  Exam at today's visit is non-focal      Patient will continue with levetiracetam XR 1500 mg daily  He will call the office with possible seizures or concerns  Check levetiracetam level in the next few weeks  Follow up in 6 months or sooner if needed

## 2023-02-22 NOTE — PATIENT INSTRUCTIONS
- Continue with levetiracetam (keppra) XR 1500 mg daily  - Call the office with issues or concerns  - Have blood work done in the next few weeks  - Follow up in 6 months with Dr Ngozi Tobias

## 2023-02-24 ENCOUNTER — APPOINTMENT (OUTPATIENT)
Dept: LAB | Facility: CLINIC | Age: 83
End: 2023-02-24

## 2023-02-24 DIAGNOSIS — G40.001 PARTIAL IDIOPATHIC EPILEPSY WITH SEIZURES OF LOCALIZED ONSET, NOT INTRACTABLE, WITH STATUS EPILEPTICUS (HCC): ICD-10-CM

## 2023-02-27 LAB — LEVETIRACETAM SERPL-MCNC: 25.3 UG/ML (ref 10–40)

## 2023-03-01 ENCOUNTER — TELEPHONE (OUTPATIENT)
Dept: NEUROLOGY | Facility: CLINIC | Age: 83
End: 2023-03-01

## 2023-03-01 NOTE — TELEPHONE ENCOUNTER
----- Message from [place] [facility] sent at 2/28/2023  1:56 PM EST -----  Blood work looks good   Continue current dose of medication  ----- Message -----  From: Lab, Background User  Sent: 2/27/2023   7:08 PM EST  To: BRIANA Godoy

## 2023-03-10 NOTE — TELEPHONE ENCOUNTER
Patient left voicemail regarding labs, requested call back  Call returned to patient  Notified of initial message  Patient verbalized understanding  Nothing further

## 2023-03-14 DIAGNOSIS — E78.2 MIXED HYPERLIPIDEMIA: Primary | ICD-10-CM

## 2023-03-14 DIAGNOSIS — G40.909 SEIZURE DISORDER (HCC): ICD-10-CM

## 2023-03-29 DIAGNOSIS — R33.8 BENIGN PROSTATIC HYPERPLASIA WITH URINARY RETENTION: ICD-10-CM

## 2023-03-29 DIAGNOSIS — N40.1 BENIGN PROSTATIC HYPERPLASIA WITH URINARY RETENTION: ICD-10-CM

## 2023-03-29 RX ORDER — TAMSULOSIN HYDROCHLORIDE 0.4 MG/1
0.4 CAPSULE ORAL
Qty: 30 CAPSULE | Refills: 5 | Status: SHIPPED | OUTPATIENT
Start: 2023-03-29

## 2023-04-14 ENCOUNTER — TELEPHONE (OUTPATIENT)
Dept: NEUROLOGY | Facility: CLINIC | Age: 83
End: 2023-04-14

## 2023-04-15 PROBLEM — R65.10 SIRS (SYSTEMIC INFLAMMATORY RESPONSE SYNDROME) (HCC): Status: ACTIVE | Noted: 2023-04-15

## 2023-04-15 PROBLEM — R53.1 ACUTE LEFT-SIDED WEAKNESS: Status: ACTIVE | Noted: 2023-04-15

## 2023-04-15 PROBLEM — G40.919 BREAKTHROUGH SEIZURE (HCC): Status: ACTIVE | Noted: 2023-04-15

## 2023-04-16 PROBLEM — J18.9 PNEUMONIA: Status: ACTIVE | Noted: 2023-04-16

## 2023-04-16 PROBLEM — R56.9 SEIZURE (HCC): Status: ACTIVE | Noted: 2023-04-15

## 2023-04-17 PROBLEM — A41.9 SEPSIS (HCC): Status: ACTIVE | Noted: 2023-04-15

## 2023-04-19 PROBLEM — R53.1 ACUTE LEFT-SIDED WEAKNESS: Status: RESOLVED | Noted: 2023-04-15 | Resolved: 2023-04-19

## 2023-04-19 PROBLEM — A41.9 SEPSIS (HCC): Status: RESOLVED | Noted: 2023-04-15 | Resolved: 2023-04-19

## 2023-04-19 PROBLEM — J18.9 PNEUMONIA: Status: RESOLVED | Noted: 2023-04-16 | Resolved: 2023-04-19

## 2023-04-23 ENCOUNTER — NURSE TRIAGE (OUTPATIENT)
Dept: OTHER | Facility: OTHER | Age: 83
End: 2023-04-23

## 2023-04-23 NOTE — TELEPHONE ENCOUNTER
"    Reason for Disposition  • [1] DOUBLE DOSE (an extra dose or lesser amount) of prescription drug AND [2] any symptoms (e g , dizziness, nausea, pain, sleepiness)    Answer Assessment - Initial Assessment Questions  1  NAME of MEDICATION: \"What medicine are you calling about? \"      Keppra     2  QUESTION: Genejesúsa Hazard is your question? \" (e g , medication refill, side effect)      Has been giving 2 Keppra 750mg XR BID instead of 3 Keppra 500mg BID    3  PRESCRIBING HCP: \"Who prescribed it? \" Reason: if prescribed by specialist, call should be referred to that group  Neurology- medication was changed in hospital during recent admission     4  SYMPTOMS: \"Do you have any symptoms? \"      Per Daughter pt is more tired and irritable than usual but he has not been sleeping well, denies any other symptoms or problems currently    5  SEVERITY: If symptoms are present, ask \"Are they mild, moderate or severe? \"      Mild    Protocols used: MEDICATION QUESTION CALL-ADULT-      "

## 2023-04-23 NOTE — TELEPHONE ENCOUNTER
Daughter of pt calling because she was giving him the incorrect dosage of Keppra  She stated he was in the hospital and they changed his Keppra from 750mg XR to Keppra 1500mg BID  Over last 3 days his daughter has been giving him 2 Keppra 750mg XR BID- she didn't realize until this morning that she shouldn't have been doing this due to the extended release function of the medication  She stated he has been more tired than normal-but that he hasn't been sleeping well and that he is more irritable than usual, otherwise no other symptoms  She is picking up the correct prescription now  Per protocol on call provider contacted for recommendations/concerns and to see if they would prefer patient's daughter call poison control  On call provider stated there is no concerns for overdose at this point as the patient was still taking the recommended 1500mg BID even though she was giving the extended release  He stated that daughter was okay to  and start new prescription of 3 Tablets of Keppra 500mg (total of 1500mg) twice day  He also stated she could drop his morning dose down to 1000mg and keep his evening dose at 1500mg to help with irritability and sedation  Daughter stated she would continue doing the prescription as it was ordered and will monitor the side effects  Instructed daughter to call the office if she does decide to drop morning dose down so that the provider can be made aware  Pt's daughter verbalized understanding

## 2023-04-23 NOTE — TELEPHONE ENCOUNTER
"Regarding: Keppra 500 mg VS Keppra 750 mg XR  ----- Message from Kenya Mccall sent at 4/23/2023  7:59 AM EDT -----  \" My  was just discharged from the hospital  He was discharged from the hospital with Keppra 500mg take 3 tabs BID, however I have been giving him Keppra 750 mg XR 2 tabs BID that he was previously on  I need a call back today because I need to go to the pharmacy to  more medication  I want to make sure that I can continue to give it to him  \"    "

## 2023-04-25 ENCOUNTER — OFFICE VISIT (OUTPATIENT)
Dept: FAMILY MEDICINE CLINIC | Facility: CLINIC | Age: 83
End: 2023-04-25

## 2023-04-25 VITALS
TEMPERATURE: 97.2 F | OXYGEN SATURATION: 97 % | HEART RATE: 67 BPM | BODY MASS INDEX: 25.34 KG/M2 | HEIGHT: 70 IN | WEIGHT: 177 LBS | RESPIRATION RATE: 17 BRPM | DIASTOLIC BLOOD PRESSURE: 72 MMHG | SYSTOLIC BLOOD PRESSURE: 128 MMHG

## 2023-04-25 DIAGNOSIS — I10 PRIMARY HYPERTENSION: ICD-10-CM

## 2023-04-25 DIAGNOSIS — R45.89 DYSPHORIC MOOD: ICD-10-CM

## 2023-04-25 DIAGNOSIS — N18.31 CHRONIC KIDNEY DISEASE, STAGE 3A (HCC): ICD-10-CM

## 2023-04-25 DIAGNOSIS — R56.9 SEIZURE (HCC): Primary | ICD-10-CM

## 2023-04-25 DIAGNOSIS — I73.9 PAD (PERIPHERAL ARTERY DISEASE) (HCC): ICD-10-CM

## 2023-04-25 RX ORDER — ESCITALOPRAM OXALATE 10 MG/1
10 TABLET ORAL DAILY
Qty: 30 TABLET | Refills: 2 | Status: SHIPPED | OUTPATIENT
Start: 2023-04-25 | End: 2023-04-25

## 2023-04-25 RX ORDER — ESCITALOPRAM OXALATE 10 MG/1
10 TABLET ORAL DAILY
Qty: 30 TABLET | Refills: 2 | Status: SHIPPED | OUTPATIENT
Start: 2023-04-25

## 2023-04-25 NOTE — PROGRESS NOTES
Name: Elton Barnard      : 1940      MRN: [id number]  Encounter Provider: Deuce Madrigal MD  Encounter Date: 2023   Encounter department: [address] [place] [address]     1  Seizure ([facility] )    2  Dysphoric mood  -     escitalopram (Lexapro) 10 mg tablet; Take 1 tablet (10 mg total) by mouth daily Take 0 5 tablet (5 mg) for 5 days then take 1 tablet (10 mg) by mouth daily    3  PAD (peripheral artery disease) ([facility] )    4  Chronic kidney disease, stage 3a ([facility] )    5  Primary hypertension    TCM visit completed today  Continue changes made by neurology while inpatient  Keppra 1500 mg twice daily and Vimpat 100 mg twice daily  Follow-up appointment with neurology in 1 month  We will start patient on Lexapro for dysphoric mood  Start with 5 mg daily for 5 days  He can then increase to 10 mg daily  Subjective      TCM Call     Date and time call was made  2023 11:23 AM    Hospital care reviewed  Records reviewed    Patient was hospitialized at  [address] [place] [address]    Date of Admission  04/15/23    Date of discharge  23    Diagnosis  Seizure    Disposition  Home    Were the patients medications reviewed and updated  Yes    Current Symptoms  None    Weakness severity  Mild    Fatigue severity  Mild      TCM Call     Post hospital issues  None    Should patient be enrolled in anticoag monitoring? No    Scheduled for follow up? Yes    Did you obtain your prescribed medications  Yes    Do you need help managing your prescriptions or medications  Yes    Why type of assitance do you need  Patient Daughter Kellie Sanchez is assisting him   with managing his medications      Is transportation to your appointment needed  No    Specify why  Not currently driving     I have advised the patient to call PCP with any new or worsening symptoms    Christie Thomas MA    Living Arrangements  Spouse or Significiant other    [address] [facility]; Spouse    The type of support provided Emotional; Physical    Do you have social support  Yes, as much as I need    Are you recieving any outpatient services  No    What type of services  visiting nurses     Are you recieving home care services  No    Types of home care services  Home PT; Nurse visit    Are you using any community resources  No    Current waiver services  No    Have you fallen in the last 12 months  No    Interperter language line needed  No    Counseling  Patient    Counseling topics  Prognosis    Comments  Patient is experiencing right leg cramp which kept him awake   last night for about 6-7 hours, pain is in divya side of the knee  Patient was recently admitted on 4/15/2023 following a seizure  Seizure was thought to be secondary to infection  Imaging suggested aspiration pneumonia  Received 5 days of antibiotics  Neurology increased patient's Keppra to 1500 mg twice daily  The following morning he had an episode of behavioral arrest, sonorous respirations and a drop in oxygen that lasted 1 minute  Neurology was concerned for nonconvulsive seizure and lacosamide was added  OT and PT recommended postacute rehab patient declined the services  Procalcitonin continue to trend down  He was discharged off antibiotics  Today patient presents with his daughter reporting worsening mood and irritability  They have a follow-up appointment scheduled with neurology in May                Review of Systems   Constitutional: Positive for fatigue  Negative for fever  HENT: Negative for sore throat  Eyes: Negative for visual disturbance  Respiratory: Negative for cough, chest tightness and shortness of breath  Cardiovascular: Negative for chest pain, palpitations and leg swelling  Gastrointestinal: Negative for abdominal pain, constipation, diarrhea and nausea  Endocrine: Negative for cold intolerance and heat intolerance  Genitourinary: Negative for flank pain  Musculoskeletal: Negative for back pain and neck pain  "  Skin: Negative for rash  Neurological: Negative for headaches  Psychiatric/Behavioral: Positive for dysphoric mood  Negative for behavioral problems, confusion and self-injury  The patient is nervous/anxious  Current Outpatient Medications on File Prior to Visit   Medication Sig   • acetaminophen (TYLENOL) 500 mg tablet Take 500 mg by mouth every 6 (six) hours as needed for mild pain   • aspirin (ECOTRIN LOW STRENGTH) 81 mg EC tablet Take 1 tablet (81 mg total) by mouth 3 (three) times a week   • doxazosin (CARDURA) 1 mg tablet Take 1 mg by mouth daily at bedtime   • lacosamide (VIMPAT) 100 mg tablet Take 1 tablet (100 mg total) by mouth every 12 (twelve) hours for 10 days   • levETIRAcetam (Keppra) 500 mg tablet Take 3 tablets (1,500 mg total) by mouth every 12 (twelve) hours   • simvastatin (ZOCOR) 40 mg tablet Take 1 tablet (40 mg total) by mouth daily       Objective     /72 (BP Location: Left arm, Patient Position: Sitting, Cuff Size: Standard)   Pulse 67   Temp (!) 97 2 °F (36 2 °C) (Tympanic)   Resp 17   Ht 5' 10\" (1 778 m)   Wt 80 3 kg (177 lb)   SpO2 97%   BMI 25 40 kg/m²     Physical Exam  Vitals and nursing note reviewed  Constitutional:       Appearance: He is well-developed  HENT:      Head: Normocephalic and atraumatic  Cardiovascular:      Rate and Rhythm: Normal rate and regular rhythm  Pulmonary:      Effort: Pulmonary effort is normal       Breath sounds: Normal breath sounds  Neurological:      General: No focal deficit present  Mental Status: He is alert  Psychiatric:         Mood and Affect: Mood is anxious and depressed  Affect is angry         Iglseia Morse MD  "

## 2023-04-28 ENCOUNTER — TELEPHONE (OUTPATIENT)
Dept: FAMILY MEDICINE CLINIC | Facility: CLINIC | Age: 83
End: 2023-04-28

## 2023-04-28 NOTE — TELEPHONE ENCOUNTER
Pt requesting tamulosin  I do not see this on his current med list    Send to [facility]    Please advise daughter if patient is no longer taking this medication

## 2023-05-01 ENCOUNTER — TELEPHONE (OUTPATIENT)
Dept: NEUROLOGY | Facility: CLINIC | Age: 83
End: 2023-05-01

## 2023-05-01 DIAGNOSIS — N40.1 BENIGN PROSTATIC HYPERPLASIA WITH URINARY RETENTION: Primary | ICD-10-CM

## 2023-05-01 DIAGNOSIS — R33.8 BENIGN PROSTATIC HYPERPLASIA WITH URINARY RETENTION: Primary | ICD-10-CM

## 2023-05-01 RX ORDER — TAMSULOSIN HYDROCHLORIDE 0.4 MG/1
0.4 CAPSULE ORAL
Qty: 90 CAPSULE | Refills: 3 | Status: SHIPPED | OUTPATIENT
Start: 2023-05-01

## 2023-05-01 NOTE — TELEPHONE ENCOUNTER
Spoke with patient daughter, he is not taking the doxazoxin he is taking the tamsulosin    Please send rx to [facility]

## 2023-05-01 NOTE — PROGRESS NOTES
Pt was hospitalized 4/15-4/19 for seizure. Pt brought in seizure reporting form to have licensed back. Pt was informed he needs to be 6 months seizure free to have license back by MR. Pt states seizure was brought on due to pt getting pneumonia. Please review pt chart and if a nurse can please call and discuss with pt.

## 2023-05-02 ENCOUNTER — TELEPHONE (OUTPATIENT)
Dept: NEUROLOGY | Facility: CLINIC | Age: 83
End: 2023-05-02

## 2023-05-02 NOTE — TELEPHONE ENCOUNTER
----- Message from Michelle Del Toro sent at 5/1/2023 11:33 AM EDT -----        ----- Message -----  From: Hillary Zaragoza  Sent: 5/1/2023  10:53 AM EDT  To: Michelle Del Toro    Seizure reporting form    Author: Michelle Del Toro Service: -- Author Type: Medical Assistant   Filed: 5/1/2023 11:58 AM Encounter Date: 4/21/2023 Status: Signed   : Michelle Del Toro (Medical Assistant)           Pt was hospitalized 4/15-4/19 for seizure  Pt brought in seizure reporting form to have licensed back  Pt was informed he needs to be 6 months seizure free to have license back by MR  Pt states seizure was brought on due to pt getting pneumonia    Please review pt chart and if a nurse can please call and discuss with pt

## 2023-05-04 ENCOUNTER — TELEPHONE (OUTPATIENT)
Dept: NEUROLOGY | Facility: CLINIC | Age: 83
End: 2023-05-04

## 2023-05-04 NOTE — TELEPHONE ENCOUNTER
Pt dropped off [employer] forms  Pt's daughter is calling to check on the status of them  Please assist and call the daughter back

## 2023-05-04 NOTE — TELEPHONE ENCOUNTER
"Decision regarding [employer] paperwork should be reviewed at his upcoming follow up visit on 5/22  Alternatively I could see him on Thursday or Friday of next week and consider the paperwork a bit earlier  Review of records:  Had seizure on 4/14 leading to admission, then seizure on morning of 4/16 while admitted  Seizures were considered likely provoked by infection  Levetiracetam increased, lacosamide added  EMS reported a temp of 104 and he was febrile in the ED  CXR on 4/15: \"Persistent left basilar opacity consistent with pneumonia, atelectasis and/or effusion  Tracheal and right mainstem bronchus opacity suspect for debris and/or retained secretions  \"    Per final neurology progress note during the admission:  \"Etiology of breakthrough seizure unclear, but suspect infection may have lowered seizure threshold  Has been compliant with meds but possibly his dose did not provide enough coverage  CSF studies are unremarkable thus unlikely to be CNS infection  Given the fact that he had a suspected second seizure within 24-hours of receiving the bolus dose of Keppra, and that he has been in status in the past, would recommend adding second agent for additional seizure coverage  \"  "

## 2023-05-04 NOTE — TELEPHONE ENCOUNTER
Call placed to patient  No questions for nursing  Patient asking if [employer] forms have been completed? Dr Naylor - patient needs seizure reporting form completed due to hospitalization with admission 4/15/2023  Patient states this was due to pneumonia, asking if this would have any impact on license or if this can be noted on form

## 2023-05-05 NOTE — TELEPHONE ENCOUNTER
Message left for patient advising paperwork for VALERIA will be processed at 5/22/2023 visit or if interested in sooner appt can offer Thursday or Friday of next week  Requested call back if interested in sooner visit

## 2023-05-12 ENCOUNTER — OFFICE VISIT (OUTPATIENT)
Dept: NEUROLOGY | Facility: CLINIC | Age: 83
End: 2023-05-12

## 2023-05-12 ENCOUNTER — TELEPHONE (OUTPATIENT)
Dept: NEUROLOGY | Facility: CLINIC | Age: 83
End: 2023-05-12

## 2023-05-12 VITALS
WEIGHT: 175 LBS | TEMPERATURE: 97.8 F | BODY MASS INDEX: 25.05 KG/M2 | HEIGHT: 70 IN | DIASTOLIC BLOOD PRESSURE: 60 MMHG | HEART RATE: 68 BPM | OXYGEN SATURATION: 98 % | SYSTOLIC BLOOD PRESSURE: 126 MMHG

## 2023-05-12 DIAGNOSIS — R56.9 SEIZURE (HCC): ICD-10-CM

## 2023-05-12 DIAGNOSIS — G40.001 PARTIAL IDIOPATHIC EPILEPSY WITH SEIZURES OF LOCALIZED ONSET, NOT INTRACTABLE, WITH STATUS EPILEPTICUS (HCC): Primary | ICD-10-CM

## 2023-05-12 DIAGNOSIS — T88.7XXA MEDICATION SIDE EFFECT: ICD-10-CM

## 2023-05-12 RX ORDER — LACOSAMIDE 100 MG/1
100 TABLET ORAL EVERY 12 HOURS SCHEDULED
Qty: 60 TABLET | Refills: 5 | Status: SHIPPED | OUTPATIENT
Start: 2023-05-12

## 2023-05-12 RX ORDER — LEVETIRACETAM 750 MG/1
2250 TABLET, EXTENDED RELEASE ORAL DAILY
Qty: 90 TABLET | Refills: 5 | Status: SHIPPED | OUTPATIENT
Start: 2023-05-12

## 2023-05-12 NOTE — PATIENT INSTRUCTIONS
Start lacosamide 100 mg twice daily  When starting lacosamide change to levetiracetam  mg pills, take 3 pills at night (2250 mg nightly)  Have blood work done in about 2-4 weeks  Let us know if there are seizures

## 2023-05-12 NOTE — TELEPHONE ENCOUNTER
Amanda ackerman:  Hi, this message is for Dr Sybil Anthony  This is Michael Wallace's  daughter  We were just there for an appointment and he prescribed locasamide  I'm not able to physically able to get the medicine until Monday at the pharmacy  I, I or I can get it at a different location, but I'd have to drive pretty far to get it to like by tomorrow  So I'm not sure if we can wait or if I have to go get that  So let me know, [phone]  Thanks  JEMAL  I spoke to patient's daughter, José Reddy, she said she cannot get the lacosamide until Monday due to supply in pharmacy    She said patient will start lacosamide on Tuesday and follow plan at that time with adjusting levetiracetam      When starting lacosamide change to levetiracetam  mg pills, take 3 pills at night (2250 mg nightly

## 2023-05-12 NOTE — PROGRESS NOTES
[facility]  Follow Up Visit    Impression/Plan    Mr Marichuy Singh is a [de-identified] y o  male with history that includes abdominal aortic aneurysm status post repair that presents regarding focal epilepsy manifest as 2 seizures occurring a few hours apart on 2/21/2017, status epilepticus in 1/2022 and additional provoked seizures in 4/2023  His seizures arise from the right hemisphere, likely right frontal   He has had left postictal Derick's paralysis and/or left neglect after his seizures  All episodes of his seizures have started out of sleep  His neurological exam has been essentially normal  Seizures were controlled on levetiracetam 1500 mg per day until the 4/2023 seizures that occurred in the setting of pneumonia  Levetiracetam was increased to 3000 mg/day in the hospital and lacosamide 100 mg twice daily was added  He was provided with a 10-day prescription for lacosamide in the hospital and when it ran out he stopped the medication  He is noticing excessive sedation since the dose increase and notices that he gets significantly more tired shortly after taking his pills  We will change back to levetiracetam extended release, dosed all at night to help with sedation  We will also decrease the dose down to 2250 mg from the current 3000 mg  At the same time I will have him restart lacosamide 100 mg twice daily  We discussed that the goal is seizure freedom while avoiding medication side effects  We also discussed the importance of avoiding sleep deprivation and reducing stress if possible  He uses a pillbox and family helps him set up his medication  We will check levetiracetam and lacosamide levels in the next few weeks to establish a new baseline  Consider further reduction in levetiracetam dose if side effects continue  Patient Instructions   1  Start lacosamide 100 mg twice daily     2  When starting lacosamide change to levetiracetam  mg pills, take 3 pills at night (2250 mg nightly)  3  Have blood work done in about 2-4 weeks  4  Let us know if there are seizures  Diagnoses and all orders for this visit:    Seizure ([address] )  -     lacosamide (VIMPAT) 100 mg tablet; Take 1 tablet (100 mg total) by mouth every 12 (twelve) hours  -     Lacosamide; Future  -     Levetiracetam level; Future  -     levetiracetam (Keppra XR) 750 MG TB24 extended-release tablet; Take 3 tablets (2,250 mg total) by mouth daily        Subjective    Deepthi Vera is returning to the [place] [address] [facility] for follow up  Interval Events:   Seizures since last visit: yes 4/15 and 4/16  Hospitalizations: yes - for seizure    [facility] after unwitnessed seizure int eh setting of sepsis related to pneumonia  There was another seizure on the morning of 4/16  No seizures on VEEG x 24 hours,there was R hemisphere slowing and right temporal sharps   Levetiracetam increased levetiracetam to 3000 mg per day  Lacosamide 100 mg bid added  LKN was day prior to seizure at [address] [place] [address] when he spoke to his daughter by phone  The next morning family found him on the couch unable to speak with blood coming out of his mouth  There was blood on the couch pillow, but not anywhere else in the house  Fever to 104 with EMS  Left sided weakness noted in the ED  Also hypotension, mild tachycardia, leukocytosis and lactic acidosis  Blood in mouth and evidence of urinary incontincne  fmaily had not noted any illness, he had overexerted the weekend prior  Was noted to neglect the left side on neuro exam      He recalls the evening before the seizure eating a hamburger and having it go down his airway and having significant difficulty with coughing  After the meal he cleaned up at that has no additional recall  He was able to move his wife into her bed for the night  He slept on the couch as usual   He did not answer the phone when family called the next morning      Levetiracetam level "was 25 on 2/24/2023  Level not checked during admission  He returned to driving in 2/2569 after his 1/2022 status epilepticus  Current AEDs:  Levetiracetam 1500 mg bid  (not taking Lacosamide 100 mg bid, prescribed only a 10 day supply in hospital)  Medication side effects: sedation, especially post dose  Medication adherence: Yes    Seizures/Spell characteristics:  1  Witnessed GTC seizure  2 events on 2/21/17  One out of sleep, the other a few hours later in the ER  Status epilepticus in 1/2022  Provoked seizures on 4/15 and 4/16/2023    2  Right frontal subclinical seizures on VEEG in 2/2022       Special Features:  - History of status epilepticus: yes (1/2022)  - Self injury due to seizures: No  - Precipitating factors: None     Seizure risk factors: Normal birth and development  No history of seizures as a child  No family history of seizures  No head trauma resulting in loss of consciousness  No history of brain tumor, stroke or CNS infection       Prior AEDs:  None     Prior Evaluation:  REEG 2/21/17: normal, awake       1 hour EEG sleep-deprived EEG, 2 hours, 4/18/2017:  Normal     VEEG 1/2022 revealed 11 subclinical right frontal seizures during the initial 14 hours of recording as well as right frontal BIRDS, right frontal slowing and right frontal sharp waves      VEEG x 24 hours 4/16/2023  Continuous right hemisphere polymorphic theta/delta slowing and right temporal sharp waves suggest right hemisphere focal neuronal dysfunction and right temporal epileptogenic potential      MRI brain w/ and w/o 2/21/17: normal       Psychiatric History:  None     Social History:   Driving: Yes  Lives Alone: No  Occupation: retired  Lives with his wife  She has MS and he is her caregiver  His wife follows with Dr Kady Hernandez   Retired       Objective    /60 (BP Location: Left arm, Patient Position: Sitting, Cuff Size: Standard)   Pulse 68   Temp 97 8 °F (36 6 °C) (Temporal)   Ht 5' 10\" (1 778 " m)   Wt 79 4 kg (175 lb)   SpO2 98%   BMI 25 11 kg/m²      General Exam  No acute distress  Neurologic Exam  Mental Status:  Alert and oriented  Language: normal fluency and comprehension  Cranial Nerves: Face symmetric  No dysarthria  Motor:  No drift  Strength 5/5 throughout  No weakness or neglect on the left  Coordination: Finger to nose intact  Gait: Steady casual gait  I have spent a total time of 55 minutes on 05/12/23 in caring for this patient including Diagnostic results, Risks and benefits of tx options, Instructions for management, Patient and family education, Importance of tx compliance, Risk factor reductions, Impressions, Counseling / Coordination of care, Documenting in the medical record, Reviewing / ordering tests, medicine, procedures   and Obtaining or reviewing history

## 2023-05-16 NOTE — TELEPHONE ENCOUNTER
Pt's daughter left message   We saw dr Cahmp Soliz on 5/12 and did some med changes  I had to wait for one of the medication to come from the pharmacy,and then when I looked at the paper work is not what we discussed at the office  So I want to make sure I am giving him the correct medication ,and then I need dr Champ Soliz to changed the wrong informations on the computer ,so that is recorded  Please give me a call   CB# [phone] and spoke to pt's daughter Marion Nichole stated that she wanted to go over the instructions about starting the new medication Lacosamide and starting Levetiracetam ER  Advised    1  Start lacosamide 100 mg twice daily  2  When starting lacosamide change to levetiracetam  mg pills, take 3 pills at night (2250 mg nightly)  3  Have blood work done in about 2-4 weeks  4  Let us know if there are seizures     Daughter verbalized understanding,stated just wanted to be sure

## 2023-05-27 ENCOUNTER — NURSE TRIAGE (OUTPATIENT)
Dept: OTHER | Facility: OTHER | Age: 83
End: 2023-05-27

## 2023-05-27 ENCOUNTER — TELEPHONE (OUTPATIENT)
Dept: OTHER | Facility: OTHER | Age: 83
End: 2023-05-27

## 2023-05-27 NOTE — TELEPHONE ENCOUNTER
Neurology on-call: Late entry  Received call from patient earlier this morning at 1:59 PM, patient accidentally took his evening medications this morning  He is supposed to be on lacosamide 100 mg twice daily, and Keppra XR 2250 mg at night  He did take the [address] last night as well, recommended to hold off the evening dose for Keppra, continue lacosamide at 100 mg twice a day, will resume Keppra tomorrow in the evening and then at nighttime the next day  Patient demonstrated understanding

## 2023-05-27 NOTE — TELEPHONE ENCOUNTER
"Regarding: took PM meds and not AM meds  ----- Message from Rhina Charles sent at 5/27/2023 11:27 AM EDT -----  \"My father took his night time medications this morning instead of his morning medications  I am concerned about the Keppra XR and what we should do now? \"    "

## 2023-05-27 NOTE — TELEPHONE ENCOUNTER
"  Reason for Disposition  • Caller has medicine question, adult has minor symptoms, caller declines triage, AND triager answers question    Answer Assessment - Initial Assessment Questions  1  NAME of MEDICATION: \"What medicine are you calling about? \"      keppra xr  2  QUESTION: Luís Davalos is your question? \" (e g , medication refill, side effect)       \"my dad mixed up his day and night meds\"  3  PRESCRIBING HCP: \"Who prescribed it? \" Reason: if prescribed by specialist, call should be referred to that group  Dr Nahomi Guillermo  4  SYMPTOMS: \"Do you have any symptoms? \"      No   5  SEVERITY: If symptoms are present, ask \"Are they mild, moderate or severe? \"      No   6  PREGNANCY:  \"Is there any chance that you are pregnant? \" \"When was your last menstrual period? \"      No    Protocols used: MEDICATION QUESTION CALL-ADULT-    "

## 2023-05-27 NOTE — TELEPHONE ENCOUNTER
As per neurology patient was contacted directly and given instructions on how to manage his medications over the weekend  Patient understands

## 2023-05-31 ENCOUNTER — APPOINTMENT (OUTPATIENT)
Dept: LAB | Facility: CLINIC | Age: 83
End: 2023-05-31
Payer: MEDICARE

## 2023-05-31 DIAGNOSIS — R56.9 SEIZURE (HCC): ICD-10-CM

## 2023-05-31 LAB
ALBUMIN SERPL BCP-MCNC: 3.9 G/DL (ref 3.5–5)
ALP SERPL-CCNC: 82 U/L (ref 46–116)
ALT SERPL W P-5'-P-CCNC: 41 U/L (ref 12–78)
ANION GAP SERPL CALCULATED.3IONS-SCNC: 1 MMOL/L (ref 4–13)
AST SERPL W P-5'-P-CCNC: 25 U/L (ref 5–45)
BASOPHILS # BLD AUTO: 0.01 THOUSANDS/ÂΜL (ref 0–0.1)
BASOPHILS NFR BLD AUTO: 0 % (ref 0–1)
BILIRUB SERPL-MCNC: 0.91 MG/DL (ref 0.2–1)
BUN SERPL-MCNC: 13 MG/DL (ref 5–25)
CALCIUM SERPL-MCNC: 9.6 MG/DL (ref 8.3–10.1)
CHLORIDE SERPL-SCNC: 107 MMOL/L (ref 96–108)
CHOLEST SERPL-MCNC: 143 MG/DL
CO2 SERPL-SCNC: 31 MMOL/L (ref 21–32)
CREAT SERPL-MCNC: 1.22 MG/DL (ref 0.6–1.3)
EOSINOPHIL # BLD AUTO: 0.29 THOUSAND/ÂΜL (ref 0–0.61)
EOSINOPHIL NFR BLD AUTO: 4 % (ref 0–6)
ERYTHROCYTE [DISTWIDTH] IN BLOOD BY AUTOMATED COUNT: 12.4 % (ref 11.6–15.1)
GFR SERPL CREATININE-BSD FRML MDRD: 55 ML/MIN/1.73SQ M
GLUCOSE P FAST SERPL-MCNC: 96 MG/DL (ref 65–99)
HCT VFR BLD AUTO: 49 % (ref 36.5–49.3)
HDLC SERPL-MCNC: 53 MG/DL
HGB BLD-MCNC: 15.1 G/DL (ref 12–17)
IMM GRANULOCYTES # BLD AUTO: 0.02 THOUSAND/UL (ref 0–0.2)
IMM GRANULOCYTES NFR BLD AUTO: 0 % (ref 0–2)
LDLC SERPL CALC-MCNC: 58 MG/DL (ref 0–100)
LYMPHOCYTES # BLD AUTO: 1.25 THOUSANDS/ÂΜL (ref 0.6–4.47)
LYMPHOCYTES NFR BLD AUTO: 18 % (ref 14–44)
MCH RBC QN AUTO: 31 PG (ref 26.8–34.3)
MCHC RBC AUTO-ENTMCNC: 30.8 G/DL (ref 31.4–37.4)
MCV RBC AUTO: 101 FL (ref 82–98)
MONOCYTES # BLD AUTO: 0.41 THOUSAND/ÂΜL (ref 0.17–1.22)
MONOCYTES NFR BLD AUTO: 6 % (ref 4–12)
NEUTROPHILS # BLD AUTO: 5.12 THOUSANDS/ÂΜL (ref 1.85–7.62)
NEUTS SEG NFR BLD AUTO: 72 % (ref 43–75)
NONHDLC SERPL-MCNC: 90 MG/DL
NRBC BLD AUTO-RTO: 0 /100 WBCS
PLATELET # BLD AUTO: 155 THOUSANDS/UL (ref 149–390)
PMV BLD AUTO: 11.7 FL (ref 8.9–12.7)
POTASSIUM SERPL-SCNC: 5.2 MMOL/L (ref 3.5–5.3)
PROT SERPL-MCNC: 7.2 G/DL (ref 6.4–8.4)
RBC # BLD AUTO: 4.87 MILLION/UL (ref 3.88–5.62)
SODIUM SERPL-SCNC: 139 MMOL/L (ref 135–147)
TRIGL SERPL-MCNC: 161 MG/DL
WBC # BLD AUTO: 7.1 THOUSAND/UL (ref 4.31–10.16)

## 2023-05-31 PROCEDURE — 80235 DRUG ASSAY LACOSAMIDE: CPT

## 2023-05-31 PROCEDURE — 85025 COMPLETE CBC W/AUTO DIFF WBC: CPT | Performed by: FAMILY MEDICINE

## 2023-05-31 PROCEDURE — 80053 COMPREHEN METABOLIC PANEL: CPT | Performed by: FAMILY MEDICINE

## 2023-05-31 PROCEDURE — 80177 DRUG SCRN QUAN LEVETIRACETAM: CPT

## 2023-05-31 PROCEDURE — 80061 LIPID PANEL: CPT | Performed by: FAMILY MEDICINE

## 2023-05-31 PROCEDURE — 36415 COLL VENOUS BLD VENIPUNCTURE: CPT

## 2023-06-02 LAB
LACOSAMIDE SERPL-MCNC: 7.5 UG/ML (ref 5–10)
LEVETIRACETAM SERPL-MCNC: 34 UG/ML (ref 10–40)

## 2023-06-07 ENCOUNTER — OFFICE VISIT (OUTPATIENT)
Dept: FAMILY MEDICINE CLINIC | Facility: CLINIC | Age: 83
End: 2023-06-07
Payer: MEDICARE

## 2023-06-07 VITALS
WEIGHT: 182.5 LBS | TEMPERATURE: 98 F | HEIGHT: 70 IN | DIASTOLIC BLOOD PRESSURE: 78 MMHG | BODY MASS INDEX: 26.13 KG/M2 | SYSTOLIC BLOOD PRESSURE: 122 MMHG | RESPIRATION RATE: 17 BRPM | OXYGEN SATURATION: 96 % | HEART RATE: 60 BPM

## 2023-06-07 DIAGNOSIS — I10 PRIMARY HYPERTENSION: ICD-10-CM

## 2023-06-07 DIAGNOSIS — D53.1 OTHER MEGALOBLASTIC ANEMIAS, NOT ELSEWHERE CLASSIFIED: ICD-10-CM

## 2023-06-07 DIAGNOSIS — R71.8 ELEVATED MCV: ICD-10-CM

## 2023-06-07 DIAGNOSIS — R45.89 DYSPHORIC MOOD: ICD-10-CM

## 2023-06-07 DIAGNOSIS — K31.83 ACHLORHYDRIA: ICD-10-CM

## 2023-06-07 DIAGNOSIS — N18.31 STAGE 3A CHRONIC KIDNEY DISEASE (HCC): ICD-10-CM

## 2023-06-07 DIAGNOSIS — G40.001 PARTIAL IDIOPATHIC EPILEPSY WITH SEIZURES OF LOCALIZED ONSET, NOT INTRACTABLE, WITH STATUS EPILEPTICUS (HCC): Primary | ICD-10-CM

## 2023-06-07 PROBLEM — U07.1 COVID-19: Status: RESOLVED | Noted: 2022-01-29 | Resolved: 2023-06-07

## 2023-06-07 PROBLEM — S06.5XAA SUBDURAL HEMATOMA (HCC): Status: RESOLVED | Noted: 2022-01-27 | Resolved: 2023-06-07

## 2023-06-07 PROBLEM — R29.6 UNWITNESSED FALL: Status: RESOLVED | Noted: 2022-01-27 | Resolved: 2023-06-07

## 2023-06-07 PROCEDURE — 99214 OFFICE O/P EST MOD 30 MIN: CPT | Performed by: FAMILY MEDICINE

## 2023-06-07 NOTE — PROGRESS NOTES
Name: Ros Walker      : 1940      MRN: [id number]  Encounter Provider: Carmelita Flores MD  Encounter Date: 2023   Encounter department: [address] [place] [address]  Partial idiopathic epilepsy with seizures of localized onset, not intractable, with status epilepticus [facility])  Assessment & Plan:  Follows with neurology  Currently on Keppra to 2250 mg daily along with Vimpat 100 mg twice daily  2  Stage 3a chronic kidney disease (HCC)  -     CBC and differential; Future  -     Comprehensive metabolic panel; Future    3  Primary hypertension  -     CBC and differential; Future  -     TSH, 3rd generation with Free T4 reflex; Future  -     Comprehensive metabolic panel; Future    4  Elevated MCV  -     Vitamin B12; Future  -     Folate; Future    5  Dysphoric mood  -     TSH, 3rd generation with Free T4 reflex; Future    6  Achlorhydria  -     Vitamin B12; Future    7  Other megaloblastic anemias, not elsewhere classified  -     Folate; Future      Orders above  We will discontinue Lexapro due to fatigue  Did discuss fatigue could be secondary to antiseizure medications  Patient continues to have dysphoric mood and flat affect  Can consider Prozac if mood worsens  Patient would like to remain off medication at this time  Follow-up in 6 months for his annual physical       Subjective      Patient presents with: Follow-up: 6 mo    Patient reports fatigue which he believes is secondary to his antidepressant medication  He is following with neurology  Remains seizure-free  Thought last seizure was brought on by pneumonia  Neurology is adjusting medications  Recent blood work did note an elevated MCV  Review of Systems   Constitutional: Positive for fatigue  Negative for fever  HENT: Negative for sore throat  Eyes: Negative for visual disturbance  Respiratory: Negative for cough, chest tightness and shortness of breath      Cardiovascular: Negative for "chest pain, palpitations and leg swelling  Gastrointestinal: Negative for abdominal pain, constipation, diarrhea and nausea  Endocrine: Negative for cold intolerance and heat intolerance  Genitourinary: Negative for flank pain  Musculoskeletal: Negative for back pain and neck pain  Skin: Negative for rash  Neurological: Negative for headaches  Psychiatric/Behavioral: Negative for behavioral problems, confusion and dysphoric mood  Current Outpatient Medications on File Prior to Visit   Medication Sig   • acetaminophen (TYLENOL) 500 mg tablet Take 500 mg by mouth every 6 (six) hours as needed for mild pain   • lacosamide (VIMPAT) 100 mg tablet Take 1 tablet (100 mg total) by mouth every 12 (twelve) hours   • levetiracetam (Keppra XR) 750 MG TB24 extended-release tablet Take 3 tablets (2,250 mg total) by mouth daily   • simvastatin (ZOCOR) 40 mg tablet Take 1 tablet (40 mg total) by mouth daily   • tamsulosin (FLOMAX) 0 4 mg Take 1 capsule (0 4 mg total) by mouth daily with dinner   • [DISCONTINUED] escitalopram (Lexapro) 10 mg tablet Take 1 tablet (10 mg total) by mouth daily Take 0 5 tablet (5 mg) for 5 days then take 1 tablet (10 mg) by mouth daily   • aspirin (ECOTRIN LOW STRENGTH) 81 mg EC tablet Take 1 tablet (81 mg total) by mouth 3 (three) times a week (Patient not taking: Reported on 5/12/2023)       Objective     /78 (BP Location: Left arm, Patient Position: Sitting, Cuff Size: Standard)   Pulse 60   Temp 98 °F (36 7 °C) (Tympanic)   Resp 17   Ht 5' 10\" (1 778 m)   Wt 82 8 kg (182 lb 8 oz)   SpO2 96%   BMI 26 19 kg/m²     Physical Exam  Vitals and nursing note reviewed  Constitutional:       Appearance: He is well-developed  HENT:      Head: Normocephalic and atraumatic  Cardiovascular:      Rate and Rhythm: Normal rate and regular rhythm  Heart sounds: Murmur heard  Pulmonary:      Effort: Pulmonary effort is normal       Breath sounds: Normal breath sounds   " Neurological:      General: No focal deficit present  Mental Status: He is alert  Psychiatric:         Mood and Affect: Mood is depressed  Affect is flat         Emilia Reese MD

## 2023-06-07 NOTE — ASSESSMENT & PLAN NOTE
Murmur present on exam today  Echocardiogram done while admitted to the hospital did not show any severe stenosis  Does not follow cardiology at this time    Continue to monitor

## 2023-06-12 DIAGNOSIS — E78.2 MIXED HYPERLIPIDEMIA: ICD-10-CM

## 2023-06-12 RX ORDER — SIMVASTATIN 40 MG
40 TABLET ORAL DAILY
Qty: 90 TABLET | Refills: 1 | Status: SHIPPED | OUTPATIENT
Start: 2023-06-12

## 2023-08-15 ENCOUNTER — APPOINTMENT (OUTPATIENT)
Dept: LAB | Facility: CLINIC | Age: 83
End: 2023-08-15
Payer: MEDICARE

## 2023-08-15 DIAGNOSIS — N18.31 STAGE 3A CHRONIC KIDNEY DISEASE (HCC): ICD-10-CM

## 2023-08-15 DIAGNOSIS — R71.8 ELEVATED MCV: ICD-10-CM

## 2023-08-15 DIAGNOSIS — R45.89 DYSPHORIC MOOD: ICD-10-CM

## 2023-08-15 DIAGNOSIS — I10 PRIMARY HYPERTENSION: ICD-10-CM

## 2023-08-15 DIAGNOSIS — D53.1 OTHER MEGALOBLASTIC ANEMIAS, NOT ELSEWHERE CLASSIFIED: ICD-10-CM

## 2023-08-15 DIAGNOSIS — K31.83 ACHLORHYDRIA: ICD-10-CM

## 2023-08-15 LAB
ALBUMIN SERPL BCP-MCNC: 4 G/DL (ref 3.5–5)
ALP SERPL-CCNC: 76 U/L (ref 46–116)
ALT SERPL W P-5'-P-CCNC: 32 U/L (ref 12–78)
ANION GAP SERPL CALCULATED.3IONS-SCNC: 3 MMOL/L
AST SERPL W P-5'-P-CCNC: 24 U/L (ref 5–45)
BASOPHILS # BLD AUTO: 0.02 THOUSANDS/ÂΜL (ref 0–0.1)
BASOPHILS NFR BLD AUTO: 0 % (ref 0–1)
BILIRUB SERPL-MCNC: 1.12 MG/DL (ref 0.2–1)
BUN SERPL-MCNC: 18 MG/DL (ref 5–25)
CALCIUM SERPL-MCNC: 9.4 MG/DL (ref 8.3–10.1)
CHLORIDE SERPL-SCNC: 109 MMOL/L (ref 96–108)
CO2 SERPL-SCNC: 29 MMOL/L (ref 21–32)
CREAT SERPL-MCNC: 1.26 MG/DL (ref 0.6–1.3)
EOSINOPHIL # BLD AUTO: 0.3 THOUSAND/ÂΜL (ref 0–0.61)
EOSINOPHIL NFR BLD AUTO: 4 % (ref 0–6)
ERYTHROCYTE [DISTWIDTH] IN BLOOD BY AUTOMATED COUNT: 12.2 % (ref 11.6–15.1)
FOLATE SERPL-MCNC: 9.2 NG/ML
GFR SERPL CREATININE-BSD FRML MDRD: 53 ML/MIN/1.73SQ M
GLUCOSE P FAST SERPL-MCNC: 98 MG/DL (ref 65–99)
HCT VFR BLD AUTO: 46.9 % (ref 36.5–49.3)
HGB BLD-MCNC: 15.4 G/DL (ref 12–17)
IMM GRANULOCYTES # BLD AUTO: 0.03 THOUSAND/UL (ref 0–0.2)
IMM GRANULOCYTES NFR BLD AUTO: 0 % (ref 0–2)
LYMPHOCYTES # BLD AUTO: 1.37 THOUSANDS/ÂΜL (ref 0.6–4.47)
LYMPHOCYTES NFR BLD AUTO: 18 % (ref 14–44)
MCH RBC QN AUTO: 31.2 PG (ref 26.8–34.3)
MCHC RBC AUTO-ENTMCNC: 32.8 G/DL (ref 31.4–37.4)
MCV RBC AUTO: 95 FL (ref 82–98)
MONOCYTES # BLD AUTO: 0.39 THOUSAND/ÂΜL (ref 0.17–1.22)
MONOCYTES NFR BLD AUTO: 5 % (ref 4–12)
NEUTROPHILS # BLD AUTO: 5.35 THOUSANDS/ÂΜL (ref 1.85–7.62)
NEUTS SEG NFR BLD AUTO: 73 % (ref 43–75)
NRBC BLD AUTO-RTO: 0 /100 WBCS
PLATELET # BLD AUTO: 149 THOUSANDS/UL (ref 149–390)
PMV BLD AUTO: 11.4 FL (ref 8.9–12.7)
POTASSIUM SERPL-SCNC: 5.1 MMOL/L (ref 3.5–5.3)
PROT SERPL-MCNC: 7.1 G/DL (ref 6.4–8.4)
RBC # BLD AUTO: 4.94 MILLION/UL (ref 3.88–5.62)
SODIUM SERPL-SCNC: 141 MMOL/L (ref 135–147)
TSH SERPL DL<=0.05 MIU/L-ACNC: 2.65 UIU/ML (ref 0.45–4.5)
VIT B12 SERPL-MCNC: 269 PG/ML (ref 180–914)
WBC # BLD AUTO: 7.46 THOUSAND/UL (ref 4.31–10.16)

## 2023-08-15 PROCEDURE — 82746 ASSAY OF FOLIC ACID SERUM: CPT

## 2023-08-15 PROCEDURE — 82607 VITAMIN B-12: CPT

## 2023-08-15 PROCEDURE — 36415 COLL VENOUS BLD VENIPUNCTURE: CPT

## 2023-08-15 PROCEDURE — 84443 ASSAY THYROID STIM HORMONE: CPT

## 2023-08-15 PROCEDURE — 85025 COMPLETE CBC W/AUTO DIFF WBC: CPT

## 2023-08-15 PROCEDURE — 80053 COMPREHEN METABOLIC PANEL: CPT

## 2023-08-17 ENCOUNTER — TELEPHONE (OUTPATIENT)
Dept: NEUROLOGY | Facility: CLINIC | Age: 83
End: 2023-08-17

## 2023-08-17 NOTE — TELEPHONE ENCOUNTER
Spoke with pt to confirm anh tomorrow at [facility] with Krystal Tom

## 2023-08-18 ENCOUNTER — TELEPHONE (OUTPATIENT)
Dept: NEUROLOGY | Facility: CLINIC | Age: 83
End: 2023-08-18

## 2023-08-18 ENCOUNTER — OFFICE VISIT (OUTPATIENT)
Dept: NEUROLOGY | Facility: CLINIC | Age: 83
End: 2023-08-18

## 2023-08-18 VITALS
BODY MASS INDEX: 26.03 KG/M2 | DIASTOLIC BLOOD PRESSURE: 78 MMHG | WEIGHT: 181.8 LBS | HEIGHT: 70 IN | SYSTOLIC BLOOD PRESSURE: 120 MMHG | OXYGEN SATURATION: 94 % | TEMPERATURE: 98.2 F | HEART RATE: 76 BPM

## 2023-08-18 DIAGNOSIS — E53.8 B12 DEFICIENCY: ICD-10-CM

## 2023-08-18 DIAGNOSIS — R49.9 CHANGE IN VOICE: ICD-10-CM

## 2023-08-18 DIAGNOSIS — G40.001 PARTIAL IDIOPATHIC EPILEPSY WITH SEIZURES OF LOCALIZED ONSET, NOT INTRACTABLE, WITH STATUS EPILEPTICUS (HCC): Primary | ICD-10-CM

## 2023-08-18 NOTE — PROGRESS NOTES
Patient ID: Noemy Sykes is a 82 y.o. male with abdominal aortic aneurysm status post repair that presents regarding focal epilepsy manifest as 2 seizures occurring a few hours apart on 2/21/2017, status epilepticus in 1/2022 and additional provoked seizures in 4/2023. Assessment/Plan:    Partial idiopathic epilepsy with seizures of localized onset, not intractable, with status epilepticus [facility]  Patient with focal epilepsy, history of status epilepticus. Seizure free since most recent visit on levetiracetam XR 2250 mg daily and lacosamide 100 mg BID. Lacosamide started at last visit, patient reports voice change since his seizure in April, believes that this may be related to lacosamide which we discussed would be an atypical side effect of this medication. He does report feeling tired during the daytime which could be a side effect of his medications although labs were reviewed and his prior B12 level was 034 which could certainly be contributing. Recommended supplementation of cyanocobalamin 500 mcg daily. If he is still feeling fatigued in one month, could consider decreasing dose of levetiracetam slightly and increasing lacosamide. Recommended continuing current medications for now. Follow up in 3 months or sooner if needed with Dr Karon Lion. Change in voice  New onset since April. Discussed with patient and family that it is unlikely that this is related to his use of lacosamide. Would like to ensure there is not another issue causing the change in his voice. Recommended ENT evaluation. B12 deficiency  As above, start cyanocobalamin 500 mcg daily. Subjective:  Noemy Sykes is a 82 y.o. male with abdominal aortic aneurysm status post repair that presents regarding focal epilepsy manifest as 2 seizures occurring a few hours apart on 2/21/2017, status epilepticus in 1/2022 and additional provoked seizures in 4/2023. Last seen in the office by Dr Karon Lion on 5/12/23.  At that time, noted seizures arise from the right hemisphere, likely right frontal.  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 All episodes of his seizures have started out of sleep. His neurological exam has been essentially normal. Seizures were controlled on levetiracetam 1500 mg per day until the 4/2023 seizures that occurred in the setting of pneumonia. Levetiracetam was increased to 3000 mg/day in the hospital and lacosamide 100 mg twice daily was added. He was provided with a 10-day prescription for lacosamide in the hospital and when it ran out he stopped the medication. Recommended restarting lacosamide 100 mg BID and decreasing levetiracetam dose from 3000 mg daily to 2250 mg daily. He was to have blood work done in 2-4 weeks. Since his last visit, he has been seizure free. Currently on levetiracetam XR 2250 mg daily and lacosamide 100 mg BID. He reports feeling tired during the daytime. He has noticed a voice change since his last seizure in April. He has not been coughing. The long he talks his voice starts going away. No weakness. No trouble swallowing. He does cough sometimes after drinking water if he swallows it the wrong way. No sore throat or pain. He thinks it is due to the vimpat. No trouble with dizziness or balance. B12 269    Current seizure medications:  - levetiracetam XR 2250 mg daily  - lacosamide 100 mg BID  Other medications as per Epic. Latest Reference Range & Units 05/31/23 08:25   LEVETIRACETA (KEPPRA) 10.0 - 40.0 ug/mL 34.0   Lacosamide 5.0 - 10.0 ug/mL 7.5     Seizures/Spell characteristics:  1. Witnessed GTC seizure. 2 events on 2/21/17. One out of sleep, the other a few hours later in the ER. Status epilepticus in 1/2022.  Provoked seizures on 4/15 and 4/16/2023.   2. Right frontal subclinical seizures on VEEG in 2/2022.      Special Features:  - History of status epilepticus: yes (1/2022)  - Self injury due to seizures: No  - Precipitating factors: None     Seizure risk factors: Normal birth and development. No history of seizures as a child. No family history of seizures. No head trauma resulting in loss of consciousness. No history of brain tumor, stroke or CNS infection.      Prior AEDs:  None     Prior Evaluation:  REEG 2/21/17: normal, awake.      1 hour EEG sleep-deprived EEG, 2 hours, 4/18/2017:  Normal     VEEG 1/2022 revealed 11 subclinical right frontal seizures during the initial 14 hours of recording as well as right frontal BIRDS, right frontal slowing and right frontal sharp waves.     VEEG x 24 hours 4/16/2023  Continuous right hemisphere polymorphic theta/delta slowing and right temporal sharp waves suggest right hemisphere focal neuronal dysfunction and right temporal epileptogenic potential.      MRI brain w/ and w/o 2/21/17: normal.      Psychiatric History:  None    His history was also obtained from his family member, who was present at today's visit. I reviewed prior neurology notes, most recent labs, as documented in Epic/[employer], and summarized above. Objective:    Blood pressure 120/78, pulse 76, temperature 98.2 °F (36.8 °C), temperature source Temporal, height 5' 10" (1.778 m), weight 82.5 kg (181 lb 12.8 oz), SpO2 94 %. Physical Exam  No apparent distress. Appears well nourished. Flat affect  Voice is hoarse/weak. Neurologic Exam  Mental status- alert and oriented to person, place, and time. Speech appropriate for conversation. Good attention and knowledge. Cranial Nerves- PERRL, VFFTC, EOMS normal, facial sensation and muscles symmetric, hearing intact bilaterally to finger rubs, tongue midline, palate rise symmetrical, shoulder shrug symmetrical.    Motor- No pronator drift. Appropriate strength. Moves all extremities freely. No tremor. Sensory-  Intact distally in all extremities to light touch. DTRs- 2+ and symmetric in all extremities.      Gait- normal casual gait.      Coordination- FNF intact. ROS:  Review of Systems   Constitutional: Negative. HENT: Negative. Eyes: Negative. Respiratory: Negative. Cardiovascular: Negative. Gastrointestinal: Negative. Endocrine: Negative. Genitourinary: Negative. Musculoskeletal: Negative. Skin: Negative. Allergic/Immunologic: Negative. Hematological: Negative. Psychiatric/Behavioral: Negative. All other systems reviewed and are negative. ROS obtained by MA and reviewed by myself. This note may have been created using voice recognition software. There may be unintentional errors such as grammatical errors, spelling errors, or pronoun errors.

## 2023-08-18 NOTE — PROGRESS NOTES
Review of Systems   Constitutional: Negative. HENT: Negative. Eyes: Negative. Respiratory: Negative. Cardiovascular: Negative. Gastrointestinal: Negative. Endocrine: Negative. Genitourinary: Negative. Musculoskeletal: Negative. Skin: Negative. Allergic/Immunologic: Negative. Hematological: Negative. Psychiatric/Behavioral: Negative. All other systems reviewed and are negative.

## 2023-08-18 NOTE — TELEPHONE ENCOUNTER
SW reviewed [facility]-    Patient can get Levetiracetam  mg for lowest cost of  $36.91 for one month supply. There are other pharmacies that will supply for similar prices but this is the lowest cost.     Patient can get Lacosamide 100mg for lowest cost of $15.54 for one month supplies. Both medications are least expensive through [facility] DR RODRIGO PRYOR. ASHLY called and let message for patient requesting call back to review information.

## 2023-08-18 NOTE — TELEPHONE ENCOUNTER
----- Message from [address] [place] [address] sent at 8/18/2023 10:56 AM EDT -----  Patient paying a lot of money for his lacosamide and levetiractam er. Is there anything that we can do to help with this?  I discussed good rx briefly  Thanks   Augustine Castillo

## 2023-08-18 NOTE — PATIENT INSTRUCTIONS
- Start taking B12 500 mcg daily  - Continue current medication for now  - Let us know how you are doing in one month  - If you are still feeling more tired in the daytime, after that month, we may want to consider coming down slightly further on the levetiracetam  - Call the office with seizures or concerns  - See ENT for voice change  - social work will call about cost of medication  - Follow up in 3 months with Dr Kevin Parnell

## 2023-08-21 PROBLEM — E53.8 B12 DEFICIENCY: Status: ACTIVE | Noted: 2023-08-21

## 2023-08-21 PROBLEM — R49.9 CHANGE IN VOICE: Status: ACTIVE | Noted: 2023-08-21

## 2023-08-21 NOTE — TELEPHONE ENCOUNTER
ASHLY called patient at [phone]. Explained copay cards to patient through [facility]. Patient's computer does not work and he is not able to utilize his phone for the Internet or receiving messages. Patient requested information be sent to him in the mail. ASHLY placed in mail this date and also scanned into media. SW remains available. Will check back in a week to see how patient made out with the resources provided.

## 2023-08-22 NOTE — ASSESSMENT & PLAN NOTE
New onset since April. Discussed with patient and family that it is unlikely that this is related to his use of lacosamide. Would like to ensure there is not another issue causing the change in his voice. Recommended ENT evaluation.

## 2023-08-22 NOTE — ASSESSMENT & PLAN NOTE
Patient with focal epilepsy, history of status epilepticus. Seizure free since most recent visit on levetiracetam XR 2250 mg daily and lacosamide 100 mg BID. Lacosamide started at last visit, patient reports voice change since his seizure in April, believes that this may be related to lacosamide which we discussed would be an atypical side effect of this medication. He does report feeling tired during the daytime which could be a side effect of his medications although labs were reviewed and his prior B12 level was 175 which could certainly be contributing. Recommended supplementation of cyanocobalamin 500 mcg daily. If he is still feeling fatigued in one month, could consider decreasing dose of levetiracetam slightly and increasing lacosamide. Recommended continuing current medications for now. Follow up in 3 months or sooner if needed with Dr Mary Pringle.

## 2023-08-22 NOTE — TELEPHONE ENCOUNTER
[facility], NP  P Neurology [address] Team 1  Patient needs 3 month follow up with Dr Rock Tom. I am not sure who his current MA is       Clerical or Anca - please assist with scheduling. Thank you for your help.

## 2023-09-05 ENCOUNTER — TELEPHONE (OUTPATIENT)
Dept: NEUROLOGY | Facility: CLINIC | Age: 83
End: 2023-09-05

## 2023-09-05 NOTE — TELEPHONE ENCOUNTER
Daughter Javid Jenkins left voicemail requesting call back regarding ENT referral. Never received phone call, unsure who to follow up with. Call returned to daughter, [phone]. Provided number for [phone]. Verbalized understanding. Nothing further needed.

## 2023-09-06 DIAGNOSIS — R56.9 SEIZURE (HCC): ICD-10-CM

## 2023-09-06 RX ORDER — LACOSAMIDE 100 MG/1
100 TABLET ORAL EVERY 12 HOURS SCHEDULED
Qty: 60 TABLET | Refills: 0 | Status: SHIPPED | OUTPATIENT
Start: 2023-09-06

## 2023-09-06 NOTE — TELEPHONE ENCOUNTER
Received VM transcription from 9:04 AM:    Hi, my name is Hemant Berg. I'm calling for my dad Marleny Garcia. He needs to change pharmacies. So he wanted to get the lacosamide prescription sent over to the [facility]. If you could forward that prescription over there instead of the [facility] that he goes to. If you need any additional information, please give me a call back. Otherwise, if you could do that today or sometime today or tomorrow, that would be great. My number is [phone]. Thank you. Alessandro.  -------------------------------------------------    Called pt's daughter, Sanjuanita Delong. No answer. LVM acknowledging message received and that their request would be sent to provider. Leeann - Rx entered. Please review and sign if in agreement.

## 2023-09-18 ENCOUNTER — TELEPHONE (OUTPATIENT)
Dept: VASCULAR SURGERY | Facility: CLINIC | Age: 83
End: 2023-09-18

## 2023-09-18 NOTE — TELEPHONE ENCOUNTER
Attempted to contact patient to schedule appointment(s) listed below. Requested patient call [phone] option 3 to schedule appointment(s). Patient's appointment(s) are due on or after 11/10/23.     Dopplers  [] Abdominal Aorta Iliac (AOIL)  [] Carotid (CV)   [] Celiac and/or Mesenteric  [x] Endovascular Aortic Repair (EVAR) needs to be rescheduled  [] Hemodialysis Access (HD)   [x] Lower Limb Arterial (DESIREE) needs to be resheduled  [] Lower Limb Venous (LEV)  [] Lower Limb Venous Duplex with Reflux (LEVDR)  [] Renal Artery  [] Upper Limb Arterial (UEA)    [] Upper Limb Venous (UEV)              [] RUI and Waveform analysis     Advanced Imaging   [] CTA head/neck    [] CTA abdomen    [] CTA abdomen & pelvis    [] CT abdomen with/ without contrast  [] CT abdomen with contrast  [] CT abdomen without contrast    [] CT abdomen & pelvis with/ without contrast  [] CT abdomen & pelvis with contrast  [] CT abdomen & pelvis without contrast    Office Visit   [] New patient, patient last seen over 3 years ago  [x] Risk factor modification (RFM)  1 YR F/U - L/S 11/14/22 RD - RR EVAR & DESIREE   [] Follow up   [] Lost to follow up (LTFU)

## 2023-09-19 ENCOUNTER — TELEPHONE (OUTPATIENT)
Dept: NEUROLOGY | Facility: CLINIC | Age: 83
End: 2023-09-19

## 2023-09-19 NOTE — TELEPHONE ENCOUNTER
9/18 4:25    Pt's daughter left a VM re: B-12 supplementation. Transcribed VM:   Hi, this is Indianola Snowball. I'm calling on behalf of my dad Irby Gitelman. We were in a couple weeks ago and you had told him to go on B-12 vitamin to kind of bring up his energy level for the for the morning and afternoon. So he's been on that, I guess it's working, but you told me to give you a call back in a couple weeks just to let you know about that. So yeah, I think its seems to be helping a little bit. So if you wanna talk any further, give me a call back at [phone]. Thank you. Alessandro. Called back and spoke with pt's daughter, who reports that the B-12 supplements seem to be helping with the pt's energy level. Informed her that her message would be forwarded to the provider. Nothing else at this time.

## 2023-10-08 ENCOUNTER — TELEPHONE (OUTPATIENT)
Dept: NEUROLOGY | Facility: CLINIC | Age: 83
End: 2023-10-08

## 2023-10-08 DIAGNOSIS — R56.9 SEIZURE (HCC): ICD-10-CM

## 2023-10-09 RX ORDER — LACOSAMIDE 100 MG/1
100 TABLET ORAL EVERY 12 HOURS
Qty: 60 TABLET | Refills: 2 | Status: SHIPPED | OUTPATIENT
Start: 2023-10-09

## 2023-10-09 NOTE — TELEPHONE ENCOUNTER
Pts daughter called for the refill. They are requesting multiple refills if possible. Pt will be out tomorrow. Thank you.

## 2023-10-10 NOTE — TELEPHONE ENCOUNTER
Patient's daughter called for refill of lacosamide; I returned call and let her know prescription was sent yesterday.

## 2023-10-31 NOTE — RESPIRATORY THERAPY NOTE
Assumed care of pt from Dee DAVID at 2315. Pt is resting comfortably in stretcher. NAD. Pt is A&Ox4. Respirations are even and unlabored. Color is appropriate for race. Pt awaiting CT results and possible discharge. Pt updated on plan of care. RT Protocol Note  Cricket Files 80 y o  male MRN: [id number]  Unit/Bed#: ICU 09 Encounter: [id number]    Assessment    Principal Problem:    Seizure ([facility] )  Active Problems:    Hyperlipidemia    Hypertension    Aneurysm of abdominal aorta (HCC)    Seizure disorder ([facility] )    Nonrheumatic aortic valve stenosis    Stage 3a chronic kidney disease (HCC)      Home Pulmonary Medications:         Past Medical History:   Diagnosis Date    Colon polyps     Gout     Hyperlipidemia     Hypertension     S/P AAA repair     Seizure disorder ([facility] )     Tear of right rotator cuff 2017     Social History     Socioeconomic History    Marital status: /Civil Union     Spouse name: Not on file    Number of children: Not on file    Years of education: Not on file    Highest education level: Not on file   Occupational History    Not on file   Tobacco Use    Smoking status: Former Smoker     Packs/day: 3 00     Years: 10 00     Pack years: 30 00     Quit date: 1971     Years since quittin 9    Smokeless tobacco: Never Used   Vaping Use    Vaping Use: Never used   Substance and Sexual Activity    Alcohol use: Yes     Comment: occasional    Drug use: No    Sexual activity: Not on file   Other Topics Concern    Not on file   Social History Narrative    Not on file     Social Determinants of Health     Financial Resource Strain: Not on file   Food Insecurity: No Food Insecurity    Worried About Running Out of Food in the Last Year: Never true    Mark of Food in the Last Year: Never true   Transportation Needs: No Transportation Needs    Lack of Transportation (Medical): No    Lack of Transportation (Non-Medical):  No   Physical Activity: Not on file   Stress: Not on file   Social Connections: Not on file   Intimate Partner Violence: Not on file   Housing Stability: Low Risk     Unable to Pay for Housing in the Last Year: No    Number of Places Lived in the Last Year: 1    Unstable Housing in the Last Assumed care of pt from Dee DAVID at 2215. Pt is resting comfortably in stretcher. NAD. Pt is A&Ox4. Respirations are even and unlabored. Color is appropriate for race. Pt awaiting CT results and possible discharge. Pt updated on plan of care. Year: No       Subjective         Objective    Physical Exam:   Assessment Type: (P) Assess only  General Appearance: (P) Sleeping  Respiratory Pattern: Normal  Chest Assessment: Chest expansion symmetrical  Bilateral Breath Sounds: Diminished  Cough: None  O2 Device: (P) NC    Vitals:  Blood pressure (!) 126/49, pulse 86, temperature 98 6 °F (37 °C), temperature source Oral, resp  rate 18, height 5' 10" (1 778 m), weight 84 8 kg (187 lb), SpO2 99 %  Imaging and other studies: I have personally reviewed pertinent films in PACS with a Radiologist     O2 Device: (P) NC     Plan             Resp Comments: (P) Patient self-extubated yesterday, continues without respiratory difficulties  Currently on nasal cannula 2 L /min, SpO2 99%  Continue to follow

## 2023-11-06 ENCOUNTER — OFFICE VISIT (OUTPATIENT)
Dept: NEUROLOGY | Facility: CLINIC | Age: 83
End: 2023-11-06
Payer: MEDICARE

## 2023-11-06 VITALS
HEART RATE: 66 BPM | BODY MASS INDEX: 26.18 KG/M2 | TEMPERATURE: 98 F | SYSTOLIC BLOOD PRESSURE: 128 MMHG | OXYGEN SATURATION: 97 % | HEIGHT: 70 IN | DIASTOLIC BLOOD PRESSURE: 61 MMHG | WEIGHT: 182.9 LBS

## 2023-11-06 DIAGNOSIS — E53.8 B12 DEFICIENCY: ICD-10-CM

## 2023-11-06 DIAGNOSIS — G40.001 PARTIAL IDIOPATHIC EPILEPSY WITH SEIZURES OF LOCALIZED ONSET, NOT INTRACTABLE, WITH STATUS EPILEPTICUS (HCC): Primary | ICD-10-CM

## 2023-11-06 PROCEDURE — 99214 OFFICE O/P EST MOD 30 MIN: CPT | Performed by: PSYCHIATRY & NEUROLOGY

## 2023-11-06 RX ORDER — LACOSAMIDE 100 MG/1
100 TABLET ORAL EVERY 12 HOURS
Qty: 180 TABLET | Refills: 1 | Status: SHIPPED | OUTPATIENT
Start: 2023-11-06

## 2023-11-06 RX ORDER — LEVETIRACETAM 750 MG/1
2250 TABLET, EXTENDED RELEASE ORAL DAILY
Qty: 270 TABLET | Refills: 3 | Status: SHIPPED | OUTPATIENT
Start: 2023-11-06

## 2023-11-06 NOTE — PROGRESS NOTES
Review of Systems   Constitutional: Negative. Negative for appetite change, fatigue and fever. HENT: Negative. Negative for hearing loss, tinnitus, trouble swallowing and voice change. Eyes: Negative. Negative for photophobia, pain and visual disturbance. Respiratory: Negative. Negative for shortness of breath. Cardiovascular: Negative. Negative for palpitations. Gastrointestinal: Negative. Negative for nausea and vomiting. Endocrine: Negative. Negative for cold intolerance. Genitourinary: Negative. Negative for dysuria, frequency and urgency. Musculoskeletal: Negative. Negative for back pain, gait problem, myalgias and neck pain. Skin: Negative. Negative for rash. Allergic/Immunologic: Negative. Neurological: Negative. Negative for dizziness, tremors, seizures, syncope, facial asymmetry, speech difficulty, weakness, light-headedness, numbness and headaches. Hematological: Negative. Does not bruise/bleed easily. Psychiatric/Behavioral: Negative. Negative for confusion, hallucinations and sleep disturbance. All other systems reviewed and are negative.

## 2023-11-06 NOTE — PROGRESS NOTES
[facility] [address] [facility]  Follow Up Visit    Impression/Plan    Mr. Meera Cox is a 82 y.o. male with history that includes abdominal aortic aneurysm status post repair that presents regarding focal epilepsy manifest as 2 seizures occurring a few hours apart on 2/21/2017, status epilepticus in 1/2022 and additional provoked seizures in 4/2023. His seizures arise from the right hemisphere, likely right frontal.  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 All episodes of his seizures have started out of sleep. His neurological exam has been essentially normal. Seizures were controlled on levetiracetam 1500 mg per day until the 4/2023 seizures that occurred in the setting of pneumonia. Levetiracetam was increased to 3000 mg/day in the hospital and lacosamide 100 mg twice daily was added. Levetiracetam was later decreased some due to sedation. Discussed decreased energy level. Improved a bit with B12 for a time, but then worsened. Will increase B12. Discussed possible decrease in levetiracetam dose, but he is not interested due to risk of seizure. I do not think lacosamide is contributing significantly to decreased energy level. Mood and frequent interruptions in to sleep likely contribute. Will check CMP, CBC, AED levels and B12 in one month. Lacosamide is more costly than is other medications. We reviewed [employer] today and I printed a coupon for [employer] that should save him money over [employer]. Patient Instructions   Increase vitamin B12 to 1000 mcg per day. Continue lacosamide and levetiracetam unchanged. Try [facility] [employer] for lacosamide. Let us know if there are seizures. Return in about 6 months (AP). Have blood work done in about 4 weeks.      Diagnoses and all orders for this visit:    Partial idiopathic epilepsy with seizures of localized onset, not intractable, with status epilepticus ([address])  -     lacosamide (VIMPAT) 100 mg tablet; Take 1 tablet (100 mg total) by mouth every 12 (twelve) hours  -     levetiracetam (Keppra XR) 750 MG TB24 extended-release tablet; Take 3 tablets (2,250 mg total) by mouth daily  -     Lacosamide; Future  -     Levetiracetam level; Future  -     CBC and differential; Future  -     Comprehensive metabolic panel; Future    B12 deficiency  -     cyanocobalamin (VITAMIN B-12) 500 MCG tablet; Take 2 tablets (1,000 mcg total) by mouth daily  -     Vitamin B12; Future        Subjective    Learta Mony is returning to the [facility] for follow up. Interval Events:   Seizures since last visit: None  Hospitalizations: no    Last seen 8/2023. Started on B12. Continued levetiracetam 2250 and lacosamide 200 per day. Referred to ENT for voice change. Family called 9/19 to say B12 seemed to be helping his energy level. But improvement seemed only temporary and energy level decreased after September. To sleep around 7 pm and up 8 am. Checks on wife a few times per night and to urinate. Falls back asleep ok. Sleeps on couch next to wife in living room. Thinks he was tested for sleep apnea in the past. The room is dark and quiet. Feels more sleepy after taking levetiracetam and lacosamide at dinner time (5:30). Doesn't want to take levetiracetam later because he is afraid he may forget it. Doesn't feel more tired after lacosamide morning dose. Current AEDs:  Levetiracetam ER 2250 mg daily  Lacosamide 100 mg bid  Medication side effects: decreased energy level? Medication adherence: Yes    Seizures/Spell characteristics:  1. Witnessed GTC seizure. 2 events on 2/21/17. One out of sleep, the other a few hours later in the ER. Status epilepticus in 1/2022. Provoked seizures on 4/15 and 4/16/2023.   2. Right frontal subclinical seizures on VEEG in 2/2022.       Special Features:  - History of status epilepticus: yes (1/2022)  - Self injury due to seizures: No  - Precipitating factors: None Seizure risk factors: Normal birth and development. No history of seizures as a child. No family history of seizures. No head trauma resulting in loss of consciousness. No history of brain tumor, stroke or CNS infection. Prior AEDs:  None     Prior Evaluation:  REEG 2/21/17: normal, awake. 1 hour EEG sleep-deprived EEG, 2 hours, 4/18/2017:  Normal     VEEG 1/2022 revealed 11 subclinical right frontal seizures during the initial 14 hours of recording as well as right frontal BIRDS, right frontal slowing and right frontal sharp waves. VEEG x 24 hours 4/16/2023  Continuous right hemisphere polymorphic theta/delta slowing and right temporal sharp waves suggest right hemisphere focal neuronal dysfunction and right temporal epileptogenic potential.      MRI brain w/ and w/o 2/21/17: normal.      Psychiatric History:  None         Objective    /61 (BP Location: Right arm, Patient Position: Sitting, Cuff Size: Standard)   Pulse 66   Temp 98 °F (36.7 °C) (Temporal)   Ht 5' 10" (1.778 m)   Wt 83 kg (182 lb 14.4 oz)   SpO2 97%   BMI 26.24 kg/m²      General Exam  No acute distress. Neurologic Exam  Mental Status:  Alert and oriented x 3. Language: normal fluency and comprehension. Cranial Nerves: Face symmetric. Voice is soft.    Gait: mildly wide, mildly slow

## 2023-11-06 NOTE — PATIENT INSTRUCTIONS
Increase vitamin B12 to 1000 mcg per day. Continue lacosamide and levetiracetam unchanged. Try goodrx at [facility] for lacosamide. Let us know if there are seizures. Return in about 6 months (AP). Have blood work done in about 4 weeks.

## 2023-12-01 DIAGNOSIS — E78.2 MIXED HYPERLIPIDEMIA: ICD-10-CM

## 2023-12-01 RX ORDER — SIMVASTATIN 40 MG
40 TABLET ORAL DAILY
Qty: 90 TABLET | Refills: 1 | Status: SHIPPED | OUTPATIENT
Start: 2023-12-01 | End: 2023-12-01 | Stop reason: SDUPTHER

## 2023-12-01 RX ORDER — SIMVASTATIN 40 MG
40 TABLET ORAL DAILY
Qty: 90 TABLET | Refills: 1 | Status: SHIPPED | OUTPATIENT
Start: 2023-12-01

## 2023-12-01 NOTE — TELEPHONE ENCOUNTER
Pts daughter called stating pt needs a refill on his Simvastatin  If this can be sent to [facility]

## 2023-12-01 NOTE — TELEPHONE ENCOUNTER
Dup request. Simvastatin sent to [facility]  today (qty 80 w 1 refill). Confirm receipt from pharm: E-Prescribing Status: Receipt confirmed by pharmacy (12/1/2023 12:25 PM EST)   Please contact pt to let her know.

## 2023-12-04 ENCOUNTER — HOSPITAL ENCOUNTER (OUTPATIENT)
Dept: NON INVASIVE DIAGNOSTICS | Facility: CLINIC | Age: 83
Discharge: HOME | End: 2023-12-04
Payer: MEDICARE

## 2023-12-04 ENCOUNTER — APPOINTMENT (OUTPATIENT)
Dept: LAB | Facility: CLINIC | Age: 83
End: 2023-12-04
Payer: MEDICARE

## 2023-12-04 DIAGNOSIS — I71.43 INFRARENAL ABDOMINAL AORTIC ANEURYSM (AAA) WITHOUT RUPTURE (HCC): ICD-10-CM

## 2023-12-04 DIAGNOSIS — G40.001 PARTIAL IDIOPATHIC EPILEPSY WITH SEIZURES OF LOCALIZED ONSET, NOT INTRACTABLE, WITH STATUS EPILEPTICUS (HCC): ICD-10-CM

## 2023-12-04 DIAGNOSIS — E53.8 B12 DEFICIENCY: ICD-10-CM

## 2023-12-04 LAB
ALBUMIN SERPL BCP-MCNC: 4.4 G/DL (ref 3.5–5)
ALP SERPL-CCNC: 75 U/L (ref 34–104)
ALT SERPL W P-5'-P-CCNC: 24 U/L (ref 7–52)
ANION GAP SERPL CALCULATED.3IONS-SCNC: 8 MMOL/L
AST SERPL W P-5'-P-CCNC: 24 U/L (ref 13–39)
BASOPHILS # BLD AUTO: 0.02 THOUSANDS/ÂΜL (ref 0–0.1)
BASOPHILS NFR BLD AUTO: 0 % (ref 0–1)
BILIRUB SERPL-MCNC: 1.12 MG/DL (ref 0.2–1)
BUN SERPL-MCNC: 18 MG/DL (ref 5–25)
CALCIUM SERPL-MCNC: 9.5 MG/DL (ref 8.4–10.2)
CHLORIDE SERPL-SCNC: 106 MMOL/L (ref 96–108)
CO2 SERPL-SCNC: 31 MMOL/L (ref 21–32)
CREAT SERPL-MCNC: 1.17 MG/DL (ref 0.6–1.3)
EOSINOPHIL # BLD AUTO: 0.25 THOUSAND/ÂΜL (ref 0–0.61)
EOSINOPHIL NFR BLD AUTO: 4 % (ref 0–6)
ERYTHROCYTE [DISTWIDTH] IN BLOOD BY AUTOMATED COUNT: 12.3 % (ref 11.6–15.1)
GFR SERPL CREATININE-BSD FRML MDRD: 58 ML/MIN/1.73SQ M
GLUCOSE P FAST SERPL-MCNC: 92 MG/DL (ref 65–99)
HCT VFR BLD AUTO: 48.1 % (ref 36.5–49.3)
HGB BLD-MCNC: 15.2 G/DL (ref 12–17)
IMM GRANULOCYTES # BLD AUTO: 0.01 THOUSAND/UL (ref 0–0.2)
IMM GRANULOCYTES NFR BLD AUTO: 0 % (ref 0–2)
LYMPHOCYTES # BLD AUTO: 1.45 THOUSANDS/ÂΜL (ref 0.6–4.47)
LYMPHOCYTES NFR BLD AUTO: 24 % (ref 14–44)
MCH RBC QN AUTO: 31.4 PG (ref 26.8–34.3)
MCHC RBC AUTO-ENTMCNC: 31.6 G/DL (ref 31.4–37.4)
MCV RBC AUTO: 99 FL (ref 82–98)
MONOCYTES # BLD AUTO: 0.34 THOUSAND/ÂΜL (ref 0.17–1.22)
MONOCYTES NFR BLD AUTO: 6 % (ref 4–12)
NEUTROPHILS # BLD AUTO: 4.07 THOUSANDS/ÂΜL (ref 1.85–7.62)
NEUTS SEG NFR BLD AUTO: 66 % (ref 43–75)
NRBC BLD AUTO-RTO: 0 /100 WBCS
PLATELET # BLD AUTO: 155 THOUSANDS/UL (ref 149–390)
PMV BLD AUTO: 11 FL (ref 8.9–12.7)
POTASSIUM SERPL-SCNC: 4.5 MMOL/L (ref 3.5–5.3)
PROT SERPL-MCNC: 6.9 G/DL (ref 6.4–8.4)
RBC # BLD AUTO: 4.84 MILLION/UL (ref 3.88–5.62)
SODIUM SERPL-SCNC: 145 MMOL/L (ref 135–147)
VIT B12 SERPL-MCNC: 534 PG/ML (ref 180–914)
WBC # BLD AUTO: 6.14 THOUSAND/UL (ref 4.31–10.16)

## 2023-12-04 PROCEDURE — 93978 VASCULAR STUDY: CPT | Performed by: SURGERY

## 2023-12-04 PROCEDURE — 93923 UPR/LXTR ART STDY 3+ LVLS: CPT

## 2023-12-04 PROCEDURE — 93925 LOWER EXTREMITY STUDY: CPT | Performed by: SURGERY

## 2023-12-04 PROCEDURE — 80235 DRUG ASSAY LACOSAMIDE: CPT

## 2023-12-04 PROCEDURE — 93922 UPR/L XTREMITY ART 2 LEVELS: CPT | Performed by: SURGERY

## 2023-12-04 PROCEDURE — 93978 VASCULAR STUDY: CPT

## 2023-12-04 PROCEDURE — 80177 DRUG SCRN QUAN LEVETIRACETAM: CPT

## 2023-12-04 PROCEDURE — 36415 COLL VENOUS BLD VENIPUNCTURE: CPT

## 2023-12-04 PROCEDURE — 93925 LOWER EXTREMITY STUDY: CPT

## 2023-12-04 PROCEDURE — 82607 VITAMIN B-12: CPT

## 2023-12-04 PROCEDURE — 85025 COMPLETE CBC W/AUTO DIFF WBC: CPT

## 2023-12-04 PROCEDURE — 80053 COMPREHEN METABOLIC PANEL: CPT

## 2023-12-06 LAB — LEVETIRACETAM SERPL-MCNC: 38.2 UG/ML (ref 10–40)

## 2023-12-08 ENCOUNTER — RA CDI HCC (OUTPATIENT)
Dept: OTHER | Facility: HOSPITAL | Age: 83
End: 2023-12-08

## 2023-12-10 LAB — LACOSAMIDE SERPL-MCNC: 7.9 UG/ML (ref 5–10)

## 2023-12-12 NOTE — PROGRESS NOTES
Assessment/Plan:    Infrarenal abdominal aortic aneurysm (AAA) without rupture (HCC)  -     VAS abdominal aorta/iliacs; complete study; Future  -     VAS lower limb arterial duplex, complete bilateral; Future  AAA post-EVAR ()  -No abdominal/back or flank pain  -No leg pain, claudication or discoloration/foot wounds    Imaging: We reviewed his AOIL which shows patent AAA endograft repair. The sac size is 3.2 cm and was 3.2 last year. The iliacs distal to the graft are stable and unchanged compared to last year right and left 1.9 and 1.4 respectively. Lower extremity arterial duplex study shows diffuse, nonobstructive femoral-popliteal disease bilaterally. ABIs are normal on the right, 1.28/155/107 and on the left 1.27/160/118. The popliteal arteries are mildly ectatic right and left 0.9 and 1 cm respectively. Plan:  -Asymptomatic, AAA post EVAR repair  -Duplex shows patent EVAR without evidence of endoleak  -Recommend coated aspirin 81 mg three days weekly (Mon, Wed, Friday,) if no contraindication  -Continue with simvastatin 40 (LDL 58)  -Maintain good blood pressure and cholesterol control  -Patient education regarding healthy lifestyle changes  -AAA education provided  -We reviewed sx of rupture for which he should call 46  -Daughter and brother should be screened for AAA  -Follow up EVAR duplex in 1 year with office visit      Additional diagnoses:    Family history AAA  -Mom  of AAA  -Reminded patient that first degree relatives should be screened for aneurysms    Primary hypertension  Mixed hyperlipidemia  PAD (peripheral artery disease) ([address])  Stage 3a chronic kidney disease ([address])      Subjective:      Patient ID: Hardeep Guzman is a 82 y.o. male. Patient presents today to review DESIREE and EVAR duplex 23. Denies claudication. No wounds. Denies any new/worsening abdominal pain or back pain.      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 He presents for annual [facility] vascular follow-up and review of recent testing. 12/13/23:  Mr. Bouchra Shepherd is s/p AAA repair. 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 Patient offers no new complaints. He has no abdominal, chest, back or flank pain. He has no leg pain or claudication. No foot pain or tissue loss. He tells me that he is still seeing neurology due to history of seizures. He also has some additional stress and taking care of his wife with multiple sclerosis. He had lost some weight which he thinks is due to taking care of his wife and not eating as much. It seems that he is only a couple of pounds less than last year by the notes. He eats a regular diet and has no abdominal pain, food fear or aversions. He remains on simvastatin 40 mg. However, he did not start enteric-coated aspirin 81 3 times daily as we had discussed last year given the AAA. He seems agreeable to start aspirin at this time. LDL 58  BUN/creat 13/1.22      The following portions of the patient's history were reviewed and updated as appropriate: allergies, current medications, past family history, past medical history, past social history, past surgical history, and problem list.    Review of Systems   Constitutional: Negative. HENT: Negative. Eyes: Negative. Respiratory: Negative. Cardiovascular: Negative. Gastrointestinal: Negative. Endocrine: Negative. Genitourinary: Negative. Musculoskeletal: Negative. Skin: Negative. Allergic/Immunologic: Negative. Neurological:  Positive for numbness. Hematological: Negative. Psychiatric/Behavioral: Negative.            Objective:    /70 (BP Location: Right arm, Patient Position: Sitting)   Pulse 56   Ht 5' 10" (1.778 m)   Wt 82.6 kg (182 lb)   BMI 26.11 kg/m²     Non-tender, non-palp Ao; abdomen is soft/N /ND  2+ Femoral and PT pulses; 2+ L DP  Somewhat flat affect     Physical Exam  Vitals and nursing note reviewed. Constitutional:       Appearance: He is well-developed. HENT:      Head: Normocephalic and atraumatic. Eyes:      Pupils: Pupils are equal, round, and reactive to light. Neck:      Vascular: No carotid bruit or JVD. Trachea: Trachea normal.   Cardiovascular:      Rate and Rhythm: Normal rate and regular rhythm. Pulses:           Carotid pulses are 2+ on the right side and 2+ on the left side. Radial pulses are 2+ on the right side and 2+ on the left side. Femoral pulses are 2+ on the right side and 2+ on the left side. Dorsalis pedis pulses are 2+ on the left side. Posterior tibial pulses are 2+ on the right side and 2+ on the left side. Heart sounds: Normal heart sounds, S1 normal and S2 normal. No murmur heard. No friction rub. No gallop. Pulmonary:      Effort: Pulmonary effort is normal. No accessory muscle usage or respiratory distress. Breath sounds: Normal breath sounds. No wheezing or rales. Abdominal:      General: Bowel sounds are normal. There is no distension. Palpations: Abdomen is soft. Tenderness: There is no abdominal tenderness. Musculoskeletal:         General: No deformity. Normal range of motion. Right lower leg: No edema. Left lower leg: No edema. Skin:     General: Skin is warm and dry. Findings: No lesion or rash. Nails: There is no clubbing. Neurological:      Mental Status: He is alert and oriented to person, place, and time. Comments: Grossly normal    Psychiatric:         Behavior: Behavior is cooperative. EVAR 12/4/23  CLINICAL:  Indications:  Post operative surveillance protocol for AAA s/p Endovascular Aortic Repair. Patient reports bilateral numbness in his feet, he denies claudication symptoms  at this time.  Study performed in conjunction with lower extremity arterial  duplex on corresponding date.  Operative History:  2010-09-16 Endovascular graft, abdominal aortic aneurysm Endoluminal graft  2010-09-16 EVAR - AAA repair - Post Excluder graft and Cardiomems  2010-09-16 Insertion CardioMems Sensor Endoluminal graft  Risk Factors  The patient has history of HTN, Hyperlipidemia, CKD, PAD and AAA. Clinical  Right Pressure: 116/ mm Hg, Left Pressure: 120/ mm Hg. FINDINGS:     Unilateral                Impression  PSV  EDV  AP (cm)    AAA Sac                                             3.3    Prox Fixation                          52           2.0    Prox Main Stem Endograft               53                  Dist Main Stem Endograft               45                  Sup-Damari Ao                             52           2.5    Px Inf-Neftali Ao                          51                  Celiac                    >70%        348   69             Prox. SMA                             190                     Segment                    Rig                Left                                                    PSV  EDV  AP (cm)  PSV  EDV  AP (cm)    Prox Limb Endograft         56                 70                  Mid Limb Endograft          52                 60                  Dist Limb Endograft         46                 53                  Distal endograft fixation   70           1.9   77           1.4    Prox. EIA                   78           1.1   92           1.2    Dist EIA                    97           1.0  103           1.0    Prox Renal                 140   28           162   31                      CONCLUSION:     Impression     Duplex evaluation of the abdominal aortic aneurysm post EVAR shows a widely  patent endograft without evidence of endoleak. Sac size is approximately 3.3 cm, (Prior: 3.2 cm). The aorta proximal to the graft measures 2.0 cm, (Prior: 2.2 cm). The iliac arteries distal to the graft measures right: 1.9 cm, (Prior: 1.9 cm)  and left: 1.4 cm, (Prior: 1.4 cm).   The superior mesenteric and bilateral proximal renal arteries are patent. 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 PHYSIOLOGIC TESTING:  The RUI on the right is 1.28 which is in the normal category, (Prior: 1.09). The RUI on the left is 1.27 which is in the normal category, (Prior: 1.23 ). PVR/ PPG tracings are normal. Triphasic waveforms noted at the ankles  bilaterally. Metatarsal pressure of 155 mm Hg on the right and 160 mm Hg on the left. Great toe pressures are within the healing range at 107 mm Hg,on the right and  118 mm Hg on the left. Compared to previous study on 11/9/2022, there is a new findings of the celiac  artery. DESIREE 12/4/23:  FINDINGS:     Right                  Impression  Waveform   PSV (cm/s)  AP (cm)    Post. Tibial                       Triphasic                         Ant. Tibial                        Triphasic                         Common Femoral Artery                                 87             Prox Profunda                                         51             Prox SFA                                              61             Mid SFA                                               88             Dist SFA                                              44             Proximal Pop           Ectatic                        36      0.9    Distal Pop                                            73             Tibioperoneal                                         65             Dist Post Tibial                                      75             Dist. Ant. Tibial                                     77             Dist Peroneal                                         43                Left                   Impression  Waveform   PSV (cm/s)  AP (cm)    Post. Tibial                       Triphasic                         Ant.  Tibial                        Triphasic                         Common Femoral Artery                                 89             Prox Profunda 47             Prox SFA                                              56             Mid SFA                                               63             Dist SFA                                              44             Proximal Pop           Ectatic                        38      1.0    Distal Pop                                            84             Tibioperoneal                                         54             Dist Post Tibial                                      61             Dist. Ant. Tibial                                     63             Dist Peroneal                                         31                      CONCLUSION:     Impression:     RIGHT LOWER LIMB:  Diffuse disease noted throughout the femoral-popliteal arteries without  significant focal stenosis. The proximal popliteal artery is ectatic in caliber measuring 0.9 cm, (Prior:  1.1 cm). Ankle/Brachial index: 1.28 which is in the normal category, (Prior: 1.09). PVR/ PPG tracings are normal. Triphasic waveforms noted at the ankle. Metatarsal pressure of 155 mm Hg, (Prior: 203 mm Hg). Great toe pressure of 107 mm Hg, within the healing range, (Prior: 160 mm Hg). LEFT LOWER LIMB:  Diffuse disease noted throughout the femoral-popliteal arteries without  significant focal stenosis. The proximal popliteal artery is ectatic in caliber measuring 1.0 cm, (Prior:  0.9 cm). Ankle/Brachial index: 1.27 which is in the normal category, (Prior: 1.23). PVR/ PPG tracings are normal. Triphasic waveforms noted at the ankle. Metatarsal pressure of 160 mm Hg, (Prior: 207 mm Hg). Great toe pressure of 118 mm Hg, within the healing range, (Prior: 161 mm Hg). Compared to previous study on 12/6/2021, there is no significant progression of  disease. We reviewed his vascular testing which is stable.     I have reviewed and made appropriate changes to the review of systems input by the medical assistant. Vitals:    12/13/23 1452   BP: 122/70   BP Location: Right arm   Patient Position: Sitting   Pulse: 56   Weight: 82.6 kg (182 lb)   Height: 5' 10" (1.778 m)       Patient Active Problem List   Diagnosis    Hyperlipidemia    HTN (hypertension)    AAA (abdominal aortic aneurysm) (HCC)    Disc degeneration, lumbar    Diverticulosis    Hyperuricemia    Osteoarthrosis, hand    Partial idiopathic epilepsy with seizures of localized onset, not intractable, with status epilepticus ([address])    Nonrheumatic aortic valve stenosis    Nonrheumatic mitral valve regurgitation    Nonrheumatic aortic valve insufficiency    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HCC)    Stage 3a chronic kidney disease (HCC)    PAD (peripheral artery disease) (HCC)    Seizure ([address])    Change in voice    B12 deficiency       Past Surgical History:   Procedure Laterality Date    ABDOMINAL AORTIC ANEURYSM REPAIR, ENDOVASCULAR N/A     AR COLONOSCOPY FLX DX W/COLLJ SPEC WHEN PFRMD N/A 6/7/2017    Procedure: COLONOSCOPY;  Surgeon: Micah Cazares MD;  Location: [facility]; Service: Colorectal    AR COLONOSCOPY FLX DX W/COLLJ SPEC WHEN PFRMD N/A 5/18/2018    Procedure: COLONOSCOPY;  Surgeon: Micah Cazares MD;  Location: [facility];   Service: Colorectal    TONSILLECTOMY      WISDOM TOOTH EXTRACTION Bilateral        Family History   Problem Relation Age of Onset    Aneurysm Mother         ABDMONIAL  AORTA    Coronary artery disease Father     Coronary artery disease Brother        Social History     Socioeconomic History    Marital status: /Civil Union     Spouse name: Not on file    Number of children: Not on file    Years of education: Not on file    Highest education level: Not on file   Occupational History    Not on file   Tobacco Use    Smoking status: Former     Current packs/day: 0.00     Average packs/day: 3.0 packs/day for 10.0 years (30.0 ttl pk-yrs)     Types: Cigarettes     Start date: 2/21/1961     Quit date: 2/21/1971 Years since quittin.8    Smokeless tobacco: Never   Vaping Use    Vaping status: Never Used   Substance and Sexual Activity    Alcohol use: Yes     Comment: occasional    Drug use: No    Sexual activity: Not on file   Other Topics Concern    Not on file   Social History Narrative    Not on file     Social Determinants of Health     Financial Resource Strain: Low Risk  (2022)    Overall Financial Resource Strain (CARDIA)     Difficulty of Paying Living Expenses: Not hard at all   Food Insecurity: No Food Insecurity (2022)    Hunger Vital Sign     Worried About Running Out of Food in the Last Year: Never true     Ran Out of Food in the Last Year: Never true   Transportation Needs: No Transportation Needs (2023)    PRAPARE - Transportation     Lack of Transportation (Medical): No     Lack of Transportation (Non-Medical):  No   Physical Activity: Not on file   Stress: Not on file   Social Connections: Not on file   Intimate Partner Violence: Not on file   Housing Stability: Low Risk  (2022)    Housing Stability Vital Sign     Unable to Pay for Housing in the Last Year: No     Number of Places Lived in the Last Year: 1     Unstable Housing in the Last Year: No       Allergies   Allergen Reactions    Fenofibrate      Patient not aware of allergy    Hydrocodone-Acetaminophen      Patient not aware of allergy         Current Outpatient Medications:     acetaminophen (TYLENOL) 500 mg tablet, Take 500 mg by mouth every 6 (six) hours as needed for mild pain, Disp: , Rfl:     aspirin (ECOTRIN LOW STRENGTH) 81 mg EC tablet, Take 1 tablet (81 mg total) by mouth 3 (three) times a week, Disp: 30 tablet, Rfl: 1    cyanocobalamin (VITAMIN B-12) 500 MCG tablet, Take 2 tablets (1,000 mcg total) by mouth daily, Disp: 180 tablet, Rfl: 3    levetiracetam (Keppra XR) 750 MG TB24 extended-release tablet, Take 3 tablets (2,250 mg total) by mouth daily, Disp: 270 tablet, Rfl: 3    simvastatin (ZOCOR) 40 mg tablet, Take 1 tablet (40 mg total) by mouth daily, Disp: 90 tablet, Rfl: 1    tamsulosin (FLOMAX) 0.4 mg, Take 1 capsule (0.4 mg total) by mouth daily with dinner, Disp: 90 capsule, Rfl: 3    lacosamide (VIMPAT) 100 mg tablet, Take 1 tablet (100 mg total) by mouth every 12 (twelve) hours, Disp: 180 tablet, Rfl: 1

## 2023-12-13 ENCOUNTER — OFFICE VISIT (OUTPATIENT)
Dept: VASCULAR SURGERY | Facility: CLINIC | Age: 83
End: 2023-12-13
Payer: MEDICARE

## 2023-12-13 VITALS
BODY MASS INDEX: 26.05 KG/M2 | HEART RATE: 56 BPM | WEIGHT: 182 LBS | SYSTOLIC BLOOD PRESSURE: 122 MMHG | DIASTOLIC BLOOD PRESSURE: 70 MMHG | HEIGHT: 70 IN

## 2023-12-13 DIAGNOSIS — I10 PRIMARY HYPERTENSION: ICD-10-CM

## 2023-12-13 DIAGNOSIS — I71.43 INFRARENAL ABDOMINAL AORTIC ANEURYSM (AAA) WITHOUT RUPTURE (HCC): Primary | ICD-10-CM

## 2023-12-13 DIAGNOSIS — E78.2 MIXED HYPERLIPIDEMIA: ICD-10-CM

## 2023-12-13 DIAGNOSIS — I73.9 PAD (PERIPHERAL ARTERY DISEASE) (HCC): ICD-10-CM

## 2023-12-13 DIAGNOSIS — N18.31 STAGE 3A CHRONIC KIDNEY DISEASE (HCC): ICD-10-CM

## 2023-12-13 PROCEDURE — 99213 OFFICE O/P EST LOW 20 MIN: CPT | Performed by: PHYSICIAN ASSISTANT

## 2023-12-13 NOTE — PATIENT INSTRUCTIONS
Stable AAA post-EVAR (2010)    -Recommend coated aspirin 81 mg three days weekly (Mon, Wed, Friday,) if no contraindication  -Continue with simvastatin 40 (LDL 58)  -Maintain good blood pressure and cholesterol control  -We reviewed sx of rupture for which he should call 46  -Daughter and brother should be screened for AAA  -Follow up EVAR duplex in 1 year with office visit

## 2023-12-18 ENCOUNTER — OFFICE VISIT (OUTPATIENT)
Dept: FAMILY MEDICINE CLINIC | Facility: CLINIC | Age: 83
End: 2023-12-18
Payer: MEDICARE

## 2023-12-18 VITALS
RESPIRATION RATE: 16 BRPM | SYSTOLIC BLOOD PRESSURE: 124 MMHG | TEMPERATURE: 97.8 F | HEIGHT: 70 IN | HEART RATE: 63 BPM | DIASTOLIC BLOOD PRESSURE: 74 MMHG | BODY MASS INDEX: 25.98 KG/M2 | OXYGEN SATURATION: 99 % | WEIGHT: 181.5 LBS

## 2023-12-18 DIAGNOSIS — I34.0 NONRHEUMATIC MITRAL VALVE REGURGITATION: ICD-10-CM

## 2023-12-18 DIAGNOSIS — I35.1 NONRHEUMATIC AORTIC VALVE INSUFFICIENCY: ICD-10-CM

## 2023-12-18 DIAGNOSIS — I77.810 MILD DILATION OF ASCENDING AORTA (HCC): ICD-10-CM

## 2023-12-18 DIAGNOSIS — I71.43 INFRARENAL ABDOMINAL AORTIC ANEURYSM (AAA) WITHOUT RUPTURE (HCC): ICD-10-CM

## 2023-12-18 DIAGNOSIS — I35.0 NONRHEUMATIC AORTIC VALVE STENOSIS: ICD-10-CM

## 2023-12-18 DIAGNOSIS — I77.1 CELIAC ARTERY STENOSIS (HCC): ICD-10-CM

## 2023-12-18 DIAGNOSIS — E53.8 B12 DEFICIENCY: ICD-10-CM

## 2023-12-18 DIAGNOSIS — R56.9 SEIZURE (HCC): ICD-10-CM

## 2023-12-18 DIAGNOSIS — E78.2 MIXED HYPERLIPIDEMIA: Primary | ICD-10-CM

## 2023-12-18 DIAGNOSIS — N18.31 STAGE 3A CHRONIC KIDNEY DISEASE (HCC): ICD-10-CM

## 2023-12-18 DIAGNOSIS — Z00.00 MEDICARE ANNUAL WELLNESS VISIT, SUBSEQUENT: ICD-10-CM

## 2023-12-18 PROBLEM — I77.4 CELIAC ARTERY STENOSIS (HCC): Status: ACTIVE | Noted: 2023-12-18

## 2023-12-18 PROBLEM — R49.9 CHANGE IN VOICE: Status: RESOLVED | Noted: 2023-08-21 | Resolved: 2023-12-18

## 2023-12-18 PROBLEM — I73.9 PAD (PERIPHERAL ARTERY DISEASE) (HCC): Status: RESOLVED | Noted: 2022-02-13 | Resolved: 2023-12-18

## 2023-12-18 PROCEDURE — G0439 PPPS, SUBSEQ VISIT: HCPCS | Performed by: FAMILY MEDICINE

## 2023-12-18 PROCEDURE — 99214 OFFICE O/P EST MOD 30 MIN: CPT | Performed by: FAMILY MEDICINE

## 2023-12-18 NOTE — PROGRESS NOTES
Assessment and Plan:     Problem List Items Addressed This Visit          Cardiovascular and Mediastinum    AAA (abdominal aortic aneurysm) (HCC)    Nonrheumatic aortic valve stenosis    Nonrheumatic mitral valve regurgitation    Nonrheumatic aortic valve insufficiency    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HCC)    Celiac artery stenosis (HCC)       Genitourinary    Stage 3a chronic kidney disease (HCC)       Other    Hyperlipidemia - Primary    Seizure (HCC)    B12 deficiency     Other Visit Diagnoses       Medicare annual wellness visit, subsequent              Continue with current medications. OV 6 to 12 months. Follow up with Neurology and Vascular Surgery       BMI Counseling: Body mass index is 26.04 kg/m². The BMI is above normal. Nutrition recommendations include decreasing portion sizes, consuming healthier snacks, moderation in carbohydrate intake, reducing intake of saturated and trans fat and reducing intake of cholesterol. Exercise recommendations include exercising 3-5 times per week. No pharmacotherapy was ordered. Rationale for BMI follow-up plan is due to patient being overweight or obese.     Depression Screening and Follow-up Plan: Patient was screened for depression during today's encounter. They screened negative with a PHQ-2 score of 0.      Preventive health issues were discussed with patient, and age appropriate screening tests were ordered as noted in patient's After Visit Summary.  Personalized health advice and appropriate referrals for health education or preventive services given if needed, as noted in patient's After Visit Summary.     History of Present Illness:     Patient presents for a Medicare Wellness Visit    Follow up visit. Medications reviewed. Hyperlipidemia with history of AAA s/p  endovascular stent. 12/2023 Duplex evaluation of the abdominal aortic aneurysm post EVAR shows a widely patent endograft without evidence of endoleak. Sac size is approximately 3.3 cm, (Prior: 3.2  cm). The aorta proximal to the graft measures 2.0 cm, (Prior: 2.2 cm). The iliac arteries distal to the graft measures right: 1.9 cm, (Prior: 1.9 cm)and left: 1.4 cm, (Prior: 1.4 cm). The superior mesenteric and bilateral proximal renal arteries are patent. 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 R RUI 1.28. L RUI 1.27        Hyperlipidemia on Simvastatin 40 mg.daily   05/2023 Lipid profile Cholesterol 143. Triglycerides 161. HDL 53. LDL 58. 12/2023 LFTs normal except for total bili 1.12. IFG 12/2023 FBS 92 decreased from 103. CKD stage 3 12/2023 creatinine 1.17. GFR 58. 08/2023 creatinine 1.26. GFR 53. 05/2023 creatinine 1.22.  GFR 55. BP stable on no medication       Seizure disorder. 04/2023 admission for seizure in the setting of pneumonia with T to 104. He was on Keppra at the time of admission.  MRI brain No acute intracranial pathology. Minimal chronic microangiopathy.. Vimpat added to his regimen. No seizures since that time.      01/2022 admission presented as a possible stroke alert. He was found by his wife's caregiver on the morning of admission with seizure activity.  He had multiple seizures en route to the hospital requiring IV Lorazepam.  CT scan head no acute intracranial abnormality.  CTA no flow restrictive stenosis, occlusion or thrombosis of the cervical or intracranial vasculature.  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 LP negative.  Patient was intubated and transferred to [facility]. He was initially treated with Depakote and Keppra. Depakote was eventually discontinued.  Prior to discharge he tested positive for COV 19. During his hospitalization patient had an unwitnessed fall on 01/26/2022. CT scan head 01/27/2022 Small left-sided subdural hemorrhage along the left frontoparietal convexity.  No significant mass effect.  Repeat CT scan head 01/28/2022 unchanged acute trace subarachnoid  hemorrhage previously described a subdural hematoma left frontal cerebral convexity no new sites of acute intracranial hemorrhage . 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 She has advanced MS.        Recent Results (from the past 672 hour(s))   Lacosamide    Collection Time: 12/04/23  8:14 AM   Result Value Ref Range    Lacosamide 7.9 5.0 - 10.0 ug/mL   Levetiracetam level    Collection Time: 12/04/23  8:14 AM   Result Value Ref Range    Levetiracetam Lvl 38.2 10.0 - 40.0 ug/mL   Vitamin B12    Collection Time: 12/04/23  8:14 AM   Result Value Ref Range    Vitamin B-12 534 180 - 914 pg/mL   CBC and differential    Collection Time: 12/04/23  8:14 AM   Result Value Ref Range    WBC 6.14 4.31 - 10.16 Thousand/uL    RBC 4.84 3.88 - 5.62 Million/uL    Hemoglobin 15.2 12.0 - 17.0 g/dL    Hematocrit 48.1 36.5 - 49.3 %    MCV 99 (H) 82 - 98 fL    MCH 31.4 26.8 - 34.3 pg    MCHC 31.6 31.4 - 37.4 g/dL    RDW 12.3 11.6 - 15.1 %    MPV 11.0 8.9 - 12.7 fL    Platelets 155 149 - 390 Thousands/uL    nRBC 0 /100 WBCs    Neutrophils Relative 66 43 - 75 %    Immat GRANS % 0 0 - 2 %    Lymphocytes Relative 24 14 - 44 %    Monocytes Relative 6 4 - 12 %    Eosinophils Relative 4 0 - 6 %    Basophils Relative 0 0 - 1 %    Neutrophils Absolute 4.07 1.85 - 7.62 Thousands/µL    Immature Grans Absolute 0.01 0.00 - 0.20 Thousand/uL    Lymphocytes Absolute 1.45 0.60 - 4.47 Thousands/µL    Monocytes Absolute 0.34 0.17 - 1.22 Thousand/µL    Eosinophils Absolute 0.25 0.00 - 0.61 Thousand/µL    Basophils Absolute 0.02 0.00 - 0.10 Thousands/µL   Comprehensive metabolic panel    Collection Time: 12/04/23  8:14 AM   Result Value Ref Range    Sodium 145 135 - 147 mmol/L    Potassium 4.5 3.5 - 5.3 mmol/L    Chloride 106 96 - 108 mmol/L    CO2 31 21 - 32 mmol/L    ANION GAP 8 mmol/L    BUN 18 5 - 25 mg/dL    Creatinine 1.17 0.60 - 1.30 mg/dL    Glucose, Fasting 92 65 - 99 mg/dL    Calcium 9.5 8.4 - 10.2 mg/dL    AST 24 13 - 39 U/L    ALT 24 7 - 52 U/L     Alkaline Phosphatase 75 34 - 104 U/L    Total Protein 6.9 6.4 - 8.4 g/dL    Albumin 4.4 3.5 - 5.0 g/dL    Total Bilirubin 1.12 (H) 0.20 - 1.00 mg/dL    eGFR 58 ml/min/1.73sq m     Lab Results   Component Value Date    CHOLESTEROL 143 05/31/2023    CHOLESTEROL 122 04/16/2023    CHOLESTEROL 143 11/02/2022     Lab Results   Component Value Date    HDL 53 05/31/2023    HDL 41 04/16/2023    HDL 50 11/02/2022     Lab Results   Component Value Date    TRIG 161 (H) 05/31/2023    TRIG 79 04/16/2023    TRIG 118 11/02/2022     Lab Results   Component Value Date    LDLCALC 58 05/31/2023            Patient Care Team:  Sudheer Goncalves MD as PCP - General  MD David Chin MD W Terence Reilly, MD (Colon and Rectal Surgery)     Review of Systems:     Review of Systems   Constitutional:  Negative for appetite change, chills, fatigue, fever and unexpected weight change.   HENT:  Negative for congestion, ear pain, hearing loss, postnasal drip, rhinorrhea, sinus pressure, sinus pain, sore throat, trouble swallowing and voice change.    Eyes:  Negative for visual disturbance.   Respiratory:  Negative for cough, shortness of breath and wheezing.    Cardiovascular:  Negative for chest pain, palpitations and leg swelling.        01/2022 echocardiogram normal left ventricular systolic function.  EF 60%.  Diastolic function normal.  Mild to moderate AR.  Moderate AS. Moderate MR.   Abdominal aortic aneurysm status post endovascular stenting followed by vascular surgery.  No claudications.  11/2022 widely patent endograft without evidence of endoleak.  12/2021 arterial dopplers mild diffuse disease LEs. No significant stenosis   02/2017 echocardiogram normal left ventricular systolic function.  No regional wall motion abnormalities.  Mild MR. Mild to moderate aortic regurgitation.  Mild aortic stenosis.  Mild tricuspid regurgitation.  Aortic root mild dilatation.  Ascending aorta mildly dilated at 4.1 cm.     Gastrointestinal:  Negative for abdominal pain, blood in stool, constipation, diarrhea, nausea and vomiting.        Colonoscopy 05/2018   Endocrine: Negative for polydipsia and polyuria.        Past history of borderline abnormal elevated TSH 6.163.  04/2019  On no medications    Lab Results       Component                Value               Date                       [id number]             2.080               09/04/2020                                    Genitourinary:  Negative for difficulty urinating and urgency.        BPH on Tamsulosin-nocturia 2-3.                             Musculoskeletal:  Negative for arthralgias and myalgias.        Hyperuricemia and history of gout.  No longer on Allopurinol. Chronic bilateral shoulder pain with chronic rotator cuff tear right shoulder and subacromial bursitis left shoulder.  Prior bilateral steroid injections by Orthopedic surgery  He is on no pain medications at present      Lab Results       Component                Value               Date                       URICACID                 7.5                 11/02/2022                          Skin:  Negative for rash.   Allergic/Immunologic: Negative for environmental allergies.   Neurological:  Positive for seizures. Negative for dizziness, light-headedness and headaches.        See HPI.    Hematological:  Negative for adenopathy. Does not bruise/bleed easily.        On Vitamin B 12 supplement. 12/2023 B12 level 534 improved from 269.    Psychiatric/Behavioral:  Negative for dysphoric mood and sleep disturbance.         Problem List:     Patient Active Problem List   Diagnosis    Hyperlipidemia    HTN (hypertension)    AAA (abdominal aortic aneurysm) (HCC)    Disc degeneration, lumbar    Diverticulosis    Hyperuricemia    Osteoarthrosis, hand    Partial idiopathic epilepsy with seizures of localized onset, not intractable, with status epilepticus (HCC)    Nonrheumatic aortic valve stenosis    Nonrheumatic mitral  valve regurgitation    Nonrheumatic aortic valve insufficiency    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HCC)    Stage 3a chronic kidney disease (HCC)    Seizure (HCC)    B12 deficiency    Celiac artery stenosis (HCC)      Past Medical and Surgical History:     Past Medical History:   Diagnosis Date    Colon polyps     Epilepsy (HCC)     Gout     Hyperlipidemia     Hypertension     S/P AAA repair     Subdural hematoma (HCC) 2022    Tear of right rotator cuff 2017     Past Surgical History:   Procedure Laterality Date    ABDOMINAL AORTIC ANEURYSM REPAIR, ENDOVASCULAR N/A     OR COLONOSCOPY FLX DX W/COLLJ SPEC WHEN PFRMD N/A 2017    Procedure: COLONOSCOPY;  Surgeon: NORM Lilly MD;  Location: [facility];  Service: Colorectal    OR COLONOSCOPY FLX DX W/COLLJ SPEC WHEN PFRMD N/A 2018    Procedure: COLONOSCOPY;  Surgeon: NORM Lilly MD;  Location: [facility];  Service: Colorectal    TONSILLECTOMY      WISDOM TOOTH EXTRACTION Bilateral       Family History:     Family History   Problem Relation Age of Onset    Aneurysm Mother         ABDMONIAL  AORTA    Coronary artery disease Father     Coronary artery disease Brother       Social History:     Social History     Socioeconomic History    Marital status: /Civil Union     Spouse name: None    Number of children: None    Years of education: None    Highest education level: None   Occupational History    None   Tobacco Use    Smoking status: Former     Current packs/day: 0.00     Average packs/day: 3.0 packs/day for 10.0 years (30.0 ttl pk-yrs)     Types: Cigarettes     Start date: 1961     Quit date: 1971     Years since quittin.8    Smokeless tobacco: Never   Vaping Use    Vaping status: Never Used   Substance and Sexual Activity    Alcohol use: Yes     Comment: occasional    Drug use: No    Sexual activity: Not Currently   Other Topics Concern    None   Social History Narrative    None     Social Determinants of Health      Financial Resource Strain: Low Risk  (12/18/2023)    Overall Financial Resource Strain (CARDIA)     Difficulty of Paying Living Expenses: Not hard at all   Food Insecurity: No Food Insecurity (1/19/2022)    Hunger Vital Sign     Worried About Running Out of Food in the Last Year: Never true     Ran Out of Food in the Last Year: Never true   Transportation Needs: No Transportation Needs (12/18/2023)    PRAPARE - Transportation     Lack of Transportation (Medical): No     Lack of Transportation (Non-Medical): No   Physical Activity: Not on file   Stress: Not on file   Social Connections: Not on file   Intimate Partner Violence: Not on file   Housing Stability: Low Risk  (1/19/2022)    Housing Stability Vital Sign     Unable to Pay for Housing in the Last Year: No     Number of Places Lived in the Last Year: 1     Unstable Housing in the Last Year: No      Medications and Allergies:     Current Outpatient Medications   Medication Sig Dispense Refill    acetaminophen (TYLENOL) 500 mg tablet Take 500 mg by mouth every 6 (six) hours as needed for mild pain      aspirin (ECOTRIN LOW STRENGTH) 81 mg EC tablet Take 1 tablet (81 mg total) by mouth 3 (three) times a week 30 tablet 1    cyanocobalamin (VITAMIN B-12) 500 MCG tablet Take 2 tablets (1,000 mcg total) by mouth daily 180 tablet 3    lacosamide (VIMPAT) 100 mg tablet Take 1 tablet (100 mg total) by mouth every 12 (twelve) hours 180 tablet 1    levetiracetam (Keppra XR) 750 MG TB24 extended-release tablet Take 3 tablets (2,250 mg total) by mouth daily 270 tablet 3    simvastatin (ZOCOR) 40 mg tablet Take 1 tablet (40 mg total) by mouth daily 90 tablet 1    tamsulosin (FLOMAX) 0.4 mg Take 1 capsule (0.4 mg total) by mouth daily with dinner 90 capsule 3     No current facility-administered medications for this visit.     Allergies   Allergen Reactions    Fenofibrate      Patient not aware of allergy    Hydrocodone-Acetaminophen      Patient not aware of allergy       Immunizations:     Immunization History   Administered Date(s) Administered    COVID-19 MODERNA VACC 0.5 ML IM 01/26/2021, 02/21/2021      Health Maintenance:         Topic Date Due    Colorectal Cancer Screening  05/18/2023     There are no preventive care reminders to display for this patient.     Medicare Screening Tests and Risk Assessments:     Poncho is here for his Subsequent Wellness visit. Last Medicare Wellness visit information reviewed, patient interviewed and updates made to the record today.      Health Risk Assessment:   Patient rates overall health as good. Patient feels that their physical health rating is same. Patient is dissatisfied with their life. Eyesight was rated as same. Hearing was rated as same. Patient feels that their emotional and mental health rating is slightly worse. Patients states they are sometimes angry. Patient states they are often unusually tired/fatigued. Pain experienced in the last 7 days has been some. Patient's pain rating has been 5/10. Patient states that he has experienced no weight loss or gain in last 6 months.     Depression Screening:   PHQ-2 Score: 0      Fall Risk Screening:   In the past year, patient has experienced: no history of falling in past year      Home Safety:  Patient does not have trouble with stairs inside or outside of their home. Patient has working smoke alarms and has working carbon monoxide detector. Home safety hazards include: none.     Nutrition:   Current diet is Low Carb and Regular.     Medications:   Patient is currently taking over-the-counter supplements. OTC medications include: see medication list. Patient is able to manage medications.     Activities of Daily Living (ADLs)/Instrumental Activities of Daily Living (IADLs):   Walk and transfer into and out of bed and chair?: Yes  Dress and groom yourself?: Yes    Bathe or shower yourself?: Yes    Feed yourself? Yes  Do your laundry/housekeeping?: Yes  Manage your money, pay your bills  "and track your expenses?: Yes  Make your own meals?: Yes    Do your own shopping?: Yes    Previous Hospitalizations:   Any hospitalizations or ED visits within the last 12 months?: Yes    How many hospitalizations have you had in the last year?: 1-2    Advance Care Planning:   Living will: Yes    Advanced directive: Yes      Cognitive Screening:   Provider or family/friend/caregiver concerned regarding cognition?: No    PREVENTIVE SCREENINGS      Cardiovascular Screening:    General: History Lipid Disorder and Screening Current      Diabetes Screening:     General: Screening Current      Colorectal Cancer Screening:     General: Screening Not Indicated      Prostate Cancer Screening:    General: Screening Not Indicated      Osteoporosis Screening:    General: Screening Not Indicated      Abdominal Aortic Aneurysm (AAA) Screening:    Risk factors include: tobacco use        General: Screening Not Indicated and History AAA      Lung Cancer Screening:     General: Screening Not Indicated      Hepatitis C Screening:    General: Screening Not Indicated    Screening, Brief Intervention, and Referral to Treatment (SBIRT)    Screening  Typical number of drinks in a day: 0  Typical number of drinks in a week: 0  Interpretation: Low risk drinking behavior.    Single Item Drug Screening:  How often have you used an illegal drug (including marijuana) or a prescription medication for non-medical reasons in the past year? never    Single Item Drug Screen Score: 0  Interpretation: Negative screen for possible drug use disorder    Brief Intervention  Alcohol & drug use screenings were reviewed. No concerns regarding substance use disorder identified.     Other Counseling Topics:   Regular weightbearing exercise and calcium and vitamin D intake.          Physical Exam:     /74 (BP Location: Left arm, Patient Position: Sitting, Cuff Size: Large)   Pulse 63   Temp 97.8 °F (36.6 °C)   Resp 16   Ht 5' 10\" (1.778 m)   Wt 82.3 " kg (181 lb 8 oz)   SpO2 99%   BMI 26.04 kg/m²     BP Readings from Last 3 Encounters:   12/18/23 124/74   12/13/23 122/70   11/06/23 128/61      Wt Readings from Last 3 Encounters:   12/18/23 82.3 kg (181 lb 8 oz)   12/13/23 82.6 kg (182 lb)   11/06/23 83 kg (182 lb 14.4 oz)          Physical Exam  Constitutional:       General: He is not in acute distress.  HENT:      Right Ear: Tympanic membrane and ear canal normal.      Left Ear: Tympanic membrane and ear canal normal.      Mouth/Throat:      Mouth: No oral lesions.      Pharynx: Oropharynx is clear.   Eyes:      General: No scleral icterus.     Extraocular Movements: Extraocular movements intact.      Conjunctiva/sclera: Conjunctivae normal.      Pupils: Pupils are equal, round, and reactive to light.   Neck:      Thyroid: No thyroid mass, thyromegaly or thyroid tenderness.      Vascular: No carotid bruit or JVD.   Cardiovascular:      Rate and Rhythm: Normal rate and regular rhythm.      Heart sounds: Murmur heard.      Crescendo decrescendo systolic murmur is present with a grade of 2/6.      No gallop.   Pulmonary:      Effort: Pulmonary effort is normal.      Breath sounds: Normal breath sounds. No wheezing or rales.   Musculoskeletal:      Right lower leg: No edema.      Left lower leg: No edema.   Lymphadenopathy:      Cervical: No cervical adenopathy.   Skin:     Findings: No rash.   Neurological:      General: No focal deficit present.      Mental Status: He is alert and oriented to person, place, and time.   Psychiatric:         Mood and Affect: Mood normal.         Behavior: Behavior normal.         Cognition and Memory: Cognition normal.          Sudheer Goncalves MD   Injury, ankle

## 2023-12-18 NOTE — PATIENT INSTRUCTIONS
Medicare Preventive Visit Patient Instructions  Thank you for completing your Welcome to Medicare Visit or Medicare Annual Wellness Visit today. Your next wellness visit will be due in one year (12/18/2024).  The screening/preventive services that you may require over the next 5-10 years are detailed below. Some tests may not apply to you based off risk factors and/or age. Screening tests ordered at today's visit but not completed yet may show as past due. Also, please note that scanned in results may not display below.  Preventive Screenings:  Service Recommendations Previous Testing/Comments   Colorectal Cancer Screening  Colonoscopy    Fecal Occult Blood Test (FOBT)/Fecal Immunochemical Test (FIT)  Fecal DNA/Cologuard Test  Flexible Sigmoidoscopy Age: 45-75 years old   Colonoscopy: every 10 years (May be performed more frequently if at higher risk)  OR  FOBT/FIT: every 1 year  OR  Cologuard: every 3 years  OR  Sigmoidoscopy: every 5 years  Screening may be recommended earlier than age 45 if at higher risk for colorectal cancer. Also, an individualized decision between you and your healthcare provider will decide whether screening between the ages of 76-85 would be appropriate. Colonoscopy: 05/18/2018  FOBT/FIT: Not on file  Cologuard: Not on file  Sigmoidoscopy: Not on file          Prostate Cancer Screening Individualized decision between patient and health care provider in men between ages of 55-69   Medicare will cover every 12 months beginning on the day after your 50th birthday PSA: No results in last 5 years     Screening Not Indicated     Hepatitis C Screening Once for adults born between 1945 and 1965  More frequently in patients at high risk for Hepatitis C Hep C Antibody: Not on file        Diabetes Screening 1-2 times per year if you're at risk for diabetes or have pre-diabetes Fasting glucose: 92 mg/dL (12/4/2023)  A1C: 5.0 % (4/16/2023)  Screening Current   Cholesterol Screening Once every 5 years if  you don't have a lipid disorder. May order more often based on risk factors. Lipid panel: 05/31/2023  Screening Not Indicated  History Lipid Disorder      Other Preventive Screenings Covered by Medicare:  Abdominal Aortic Aneurysm (AAA) Screening: covered once if your at risk. You're considered to be at risk if you have a family history of AAA or a male between the age of 65-75 who smoking at least 100 cigarettes in your lifetime.  Lung Cancer Screening: covers low dose CT scan once per year if you meet all of the following conditions: (1) Age 55-77; (2) No signs or symptoms of lung cancer; (3) Current smoker or have quit smoking within the last 15 years; (4) You have a tobacco smoking history of at least 20 pack years (packs per day x number of years you smoked); (5) You get a written order from a healthcare provider.  Glaucoma Screening: covered annually if you're considered high risk: (1) You have diabetes OR (2) Family history of glaucoma OR (3)  aged 50 and older OR (4)  American aged 65 and older  Osteoporosis Screening: covered every 2 years if you meet one of the following conditions: (1) Have a vertebral abnormality; (2) On glucocorticoid therapy for more than 3 months; (3) Have primary hyperparathyroidism; (4) On osteoporosis medications and need to assess response to drug therapy.  HIV Screening: covered annually if you're between the age of 15-65. Also covered annually if you are younger than 15 and older than 65 with risk factors for HIV infection. For pregnant patients, it is covered up to 3 times per pregnancy.    Immunizations:  Immunization Recommendations   Influenza Vaccine Annual influenza vaccination during flu season is recommended for all persons aged >= 6 months who do not have contraindications   Pneumococcal Vaccine   * Pneumococcal conjugate vaccine = PCV13 (Prevnar 13), PCV15 (Vaxneuvance), PCV20 (Prevnar 20)  * Pneumococcal polysaccharide vaccine = PPSV23  (Pneumovax) Adults 19-65 yo with certain risk factors or if 65+ yo  If never received any pneumonia vaccine: recommend Prevnar 20 (PCV20)  Give PCV20 if previously received 1 dose of PCV13 or PPSV23   Hepatitis B Vaccine 3 dose series if at intermediate or high risk (ex: diabetes, end stage renal disease, liver disease)   Respiratory syncytial virus (RSV) Vaccine - COVERED BY MEDICARE PART D  * RSVPreF3 (Arexvy) CDC recommends that adults 60 years of age and older may receive a single dose of RSV vaccine using shared clinical decision-making (SCDM)   Tetanus (Td) Vaccine - COST NOT COVERED BY MEDICARE PART B Following completion of primary series, a booster dose should be given every 10 years to maintain immunity against tetanus. Td may also be given as tetanus wound prophylaxis.   Tdap Vaccine - COST NOT COVERED BY MEDICARE PART B Recommended at least once for all adults. For pregnant patients, recommended with each pregnancy.   Shingles Vaccine (Shingrix) - COST NOT COVERED BY MEDICARE PART B  2 shot series recommended in those 19 years and older who have or will have weakened immune systems or those 50 years and older     Health Maintenance Due:      Topic Date Due   • Colorectal Cancer Screening  05/18/2023     Immunizations Due:      Topic Date Due   • Pneumococcal Vaccine: 65+ Years (1 - PCV) Never done   • Influenza Vaccine (1) Never done   • COVID-19 Vaccine (3 - 2023-24 season) 09/01/2023     Advance Directives   What are advance directives?  Advance directives are legal documents that state your wishes and plans for medical care. These plans are made ahead of time in case you lose your ability to make decisions for yourself. Advance directives can apply to any medical decision, such as the treatments you want, and if you want to donate organs.   What are the types of advance directives?  There are many types of advance directives, and each state has rules about how to use them. You may choose a combination  of any of the following:  Living will:  This is a written record of the treatment you want. You can also choose which treatments you do not want, which to limit, and which to stop at a certain time. This includes surgery, medicine, IV fluid, and tube feedings.   Durable power of  for healthcare (DPAHC):  This is a written record that states who you want to make healthcare choices for you when you are unable to make them for yourself. This person, called a proxy, is usually a family member or a friend. You may choose more than 1 proxy.  Do not resuscitate (DNR) order:  A DNR order is used in case your heart stops beating or you stop breathing. It is a request not to have certain forms of treatment, such as CPR. A DNR order may be included in other types of advance directives.  Medical directive:  This covers the care that you want if you are in a coma, near death, or unable to make decisions for yourself. You can list the treatments you want for each condition. Treatment may include pain medicine, surgery, blood transfusions, dialysis, IV or tube feedings, and a ventilator (breathing machine).  Values history:  This document has questions about your views, beliefs, and how you feel and think about life. This information can help others choose the care that you would choose.  Why are advance directives important?  An advance directive helps you control your care. Although spoken wishes may be used, it is better to have your wishes written down. Spoken wishes can be misunderstood, or not followed. Treatments may be given even if you do not want them. An advance directive may make it easier for your family to make difficult choices about your care.   Weight Management   Why it is important to manage your weight:  Being overweight increases your risk of health conditions such as heart disease, high blood pressure, type 2 diabetes, and certain types of cancer. It can also increase your risk for osteoarthritis,  sleep apnea, and other respiratory problems. Aim for a slow, steady weight loss. Even a small amount of weight loss can lower your risk of health problems.  How to lose weight safely:  A safe and healthy way to lose weight is to eat fewer calories and get regular exercise. You can lose up about 1 pound a week by decreasing the number of calories you eat by 500 calories each day.   Healthy meal plan for weight management:  A healthy meal plan includes a variety of foods, contains fewer calories, and helps you stay healthy. A healthy meal plan includes the following:  Eat whole-grain foods more often.  A healthy meal plan should contain fiber. Fiber is the part of grains, fruits, and vegetables that is not broken down by your body. Whole-grain foods are healthy and provide extra fiber in your diet. Some examples of whole-grain foods are whole-wheat breads and pastas, oatmeal, brown rice, and bulgur.  Eat a variety of vegetables every day.  Include dark, leafy greens such as spinach, kale, raymundo greens, and mustard greens. Eat yellow and orange vegetables such as carrots, sweet potatoes, and winter squash.   Eat a variety of fruits every day.  Choose fresh or canned fruit (canned in its own juice or light syrup) instead of juice. Fruit juice has very little or no fiber.  Eat low-fat dairy foods.  Drink fat-free (skim) milk or 1% milk. Eat fat-free yogurt and low-fat cottage cheese. Try low-fat cheeses such as mozzarella and other reduced-fat cheeses.  Choose meat and other protein foods that are low in fat.  Choose beans or other legumes such as split peas or lentils. Choose fish, skinless poultry (chicken or turkey), or lean cuts of red meat (beef or pork). Before you cook meat or poultry, cut off any visible fat.   Use less fat and oil.  Try baking foods instead of frying them. Add less fat, such as margarine, sour cream, regular salad dressing and mayonnaise to foods. Eat fewer high-fat foods. Some examples of  high-fat foods include french fries, doughnuts, ice cream, and cakes.  Eat fewer sweets.  Limit foods and drinks that are high in sugar. This includes candy, cookies, regular soda, and sweetened drinks.  Exercise:  Exercise at least 30 minutes per day on most days of the week. Some examples of exercise include walking, biking, dancing, and swimming. You can also fit in more physical activity by taking the stairs instead of the elevator or parking farther away from stores. Ask your healthcare provider about the best exercise plan for you.      © Copyright [employer] 2018 Information is for End User's use only and may not be sold, redistributed or otherwise used for commercial purposes. All illustrations and images included in CareNotes® are the copyrighted property of A.D.A.M., Inc[employer] or [employer]

## 2023-12-19 ENCOUNTER — TELEPHONE (OUTPATIENT)
Dept: ADMINISTRATIVE | Facility: OTHER | Age: 83
End: 2023-12-19

## 2023-12-19 NOTE — TELEPHONE ENCOUNTER
----- Message from Ann Samayoa sent at 12/18/2023  3:42 PM EST -----  12/18/23 3:42 PM    Hello, our patient Pocnho Wallace has had colorectal screening aged out at age 83     Thank you,  Ann RAMIREZ

## 2023-12-20 NOTE — TELEPHONE ENCOUNTER
Upon review of the In Basket request we have found that the patient has aged out of the measure and/or the document date is outside of the measure timeframe.     Any additional questions or concerns should be emailed to the Practice Liaisons via the appropriate education email address, please do not reply via In Basket.    Thank you  Kimmy Miles

## 2024-02-22 ENCOUNTER — TELEPHONE (OUTPATIENT)
Dept: NEUROLOGY | Facility: CLINIC | Age: 84
End: 2024-02-22

## 2024-02-22 NOTE — TELEPHONE ENCOUNTER
Recd  2/21 9:36 AM    Vidhya alfaro. calling on behalf of my dad terri garza.  I was calling to find out when his next appointment was scheduled for. So if you could give me a call back and let me know that would be great.    [phone]  _________  Visit scheduled 5/7 Leeann Cabral 11 AM [place] office; please return call and advise, thank you.

## 2024-02-28 NOTE — ED NOTES
----- Message from Anne Rehman sent at 2/27/2024  2:03 PM EST -----  Subject: Referral Request    Reason for referral request? pt would like to get orders for a mammogram  Provider patient wants to be referred to(if known):     Provider Phone Number(if known):    Additional Information for Provider? pt states her home number does ring a   long time before getting vm prompt, so if you need to l/m be aware.   ---------------------------------------------------------------------------  --------------  CALL BACK INFO    [phone]; OK to leave message on voicemail  ---------------------------------------------------------------------------  --------------   Etomidate given by Mirlande Fowler followed by kari Singh RN  01/18/22 [id number]

## 2024-04-23 ENCOUNTER — TELEPHONE (OUTPATIENT)
Age: 84
End: 2024-04-23

## 2024-04-23 DIAGNOSIS — R33.8 BENIGN PROSTATIC HYPERPLASIA WITH URINARY RETENTION: ICD-10-CM

## 2024-04-23 DIAGNOSIS — E78.2 MIXED HYPERLIPIDEMIA: ICD-10-CM

## 2024-04-23 DIAGNOSIS — N40.1 BENIGN PROSTATIC HYPERPLASIA WITH URINARY RETENTION: ICD-10-CM

## 2024-04-23 RX ORDER — SIMVASTATIN 40 MG
40 TABLET ORAL DAILY
Qty: 90 TABLET | Refills: 1 | Status: SHIPPED | OUTPATIENT
Start: 2024-04-23

## 2024-04-23 RX ORDER — TAMSULOSIN HYDROCHLORIDE 0.4 MG/1
0.4 CAPSULE ORAL
Qty: 90 CAPSULE | Refills: 1 | Status: CANCELLED | OUTPATIENT
Start: 2024-04-23

## 2024-04-23 NOTE — TELEPHONE ENCOUNTER
Reason for call:   [x] Refill   [] Prior Auth  [] Other:     Office:   [x] PCP/Provider - [facility]  [] Specialty/Provider -     Medication:       Does the patient have enough for 3 days?   [] Yes   [x] No - Send as HP to POD

## 2024-04-23 NOTE — TELEPHONE ENCOUNTER
Please verify if patient is still to be continuing this medication as he states that it makes him go to the bathroom at 2 AM and he doesn't think he needs it anymore.

## 2024-04-24 NOTE — TELEPHONE ENCOUNTER
Called patients daughter for more clarification and she stated its the medication Tamsulosin they were told patient might not need the medication any more.  Daughter wanted to know since patient will be needing a refill soon.

## 2024-04-24 NOTE — TELEPHONE ENCOUNTER
Called patients daughter gave her message, daughter wanted to know patients next appointment told her patient did not have upcoming appointments,he was last seen for his annual,but if he wanted to schedule an appointment for a problem  visit they can

## 2024-04-24 NOTE — TELEPHONE ENCOUNTER
"Patient called asking about his medication, tamsulosin. His questions at first why he needed to be on the medication and what was it for. After explain to the patient it was enlarged prostate,he knew what the medication was for. Then the patient asked why did he have to stop the medication.  Explained to the patient his daughter had called about the medication. He stated that he knew, he had his daughter call for him to ask about the medication he was present while she made the call. Gave him  message, explained to him that in the message we received it states \" he doesn't think he needs it anymore\". There fore  message. Patient stated that was not a good enough answer. Then he asked how did he know he had enlarge prostate, told patient he probably  went for testing. Then he stated he has been on this medication for a while. Patient stated he was told a year ago that he didn't have to take the medication if he didn't want too. Asked the patient if daughter helped him with medication he states she dose but he is able to manage his own medication.   Then clarified patients questions patient would like to know how dose  know he dose not need the medication anymore.   "

## 2024-04-29 ENCOUNTER — TELEPHONE (OUTPATIENT)
Age: 84
End: 2024-04-29

## 2024-04-29 DIAGNOSIS — G40.001 PARTIAL IDIOPATHIC EPILEPSY WITH SEIZURES OF LOCALIZED ONSET, NOT INTRACTABLE, WITH STATUS EPILEPTICUS (HCC): ICD-10-CM

## 2024-04-29 NOTE — TELEPHONE ENCOUNTER
Reason for call:   [x] Refill   [] Prior Auth  [] Other:     Office:   [x] PCP/Provider -   [] Specialty/Provider -     Medication: LACOSAMIDE    Dose/Frequency: 100 MG    Quantity: 180    Pharmacy: [facility]  [address] [place] [address]    Does the patient have enough for 3 days?   [x] Yes   [] No - Send as HP to POD

## 2024-04-29 NOTE — TELEPHONE ENCOUNTER
Vidhya - Patients daughter        Daughter wants a better explanation why her dad doesn't have to take Tamsulosin  Any more?  They feel they never received a good explanation why    Please call today if possible.    CB: [phone]

## 2024-04-30 RX ORDER — LACOSAMIDE 100 MG/1
100 TABLET ORAL EVERY 12 HOURS
Qty: 180 TABLET | Refills: 1 | Status: SHIPPED | OUTPATIENT
Start: 2024-04-30

## 2024-04-30 NOTE — TELEPHONE ENCOUNTER
Patient  is waiting for a response. Out of medication completley? Would like to know why he can stop. Was on due to enlarged prostate please advise

## 2024-04-30 NOTE — TELEPHONE ENCOUNTER
Relayed message to Vidhya daughter of [place].    Daughter requesting refill Rx Tamsulosin sent script to [facility], Patient is all out of medication. Scheduled patient for appointment follow up to discuss medication .

## 2024-05-01 DIAGNOSIS — R33.8 BENIGN PROSTATIC HYPERPLASIA WITH URINARY RETENTION: ICD-10-CM

## 2024-05-01 DIAGNOSIS — N40.1 BENIGN PROSTATIC HYPERPLASIA WITH URINARY RETENTION: ICD-10-CM

## 2024-05-01 RX ORDER — TAMSULOSIN HYDROCHLORIDE 0.4 MG/1
0.4 CAPSULE ORAL
Qty: 90 CAPSULE | Refills: 3 | Status: SHIPPED | OUTPATIENT
Start: 2024-05-01

## 2024-05-07 ENCOUNTER — TELEPHONE (OUTPATIENT)
Dept: NEUROLOGY | Facility: CLINIC | Age: 84
End: 2024-05-07

## 2024-05-07 ENCOUNTER — OFFICE VISIT (OUTPATIENT)
Dept: NEUROLOGY | Facility: CLINIC | Age: 84
End: 2024-05-07
Payer: MEDICARE

## 2024-05-07 VITALS
HEART RATE: 53 BPM | TEMPERATURE: 98.6 F | HEIGHT: 70 IN | BODY MASS INDEX: 24.88 KG/M2 | DIASTOLIC BLOOD PRESSURE: 63 MMHG | WEIGHT: 173.8 LBS | SYSTOLIC BLOOD PRESSURE: 145 MMHG

## 2024-05-07 DIAGNOSIS — G40.001 PARTIAL IDIOPATHIC EPILEPSY WITH SEIZURES OF LOCALIZED ONSET, NOT INTRACTABLE, WITH STATUS EPILEPTICUS (HCC): ICD-10-CM

## 2024-05-07 PROCEDURE — 99215 OFFICE O/P EST HI 40 MIN: CPT | Performed by: NURSE PRACTITIONER

## 2024-05-07 RX ORDER — LEVETIRACETAM 750 MG/1
2250 TABLET, FILM COATED, EXTENDED RELEASE ORAL DAILY
Qty: 270 TABLET | Refills: 3 | Status: SHIPPED | OUTPATIENT
Start: 2024-05-07

## 2024-05-07 NOTE — TELEPHONE ENCOUNTER
----- Message from BRIANA Dos Santos sent at 5/7/2024 12:50 PM EDT -----  Patient has been caring for his wife at home who has MS. She is bed bound. Since he has been caring for her he is not sleeping well and reports significant fatigue. He does have a caretaker ([facility]) that comes 3 days per week but it seems like this is not enough.     Can we please see if there are any services available to him that may be helpful?    Thanks

## 2024-05-07 NOTE — PROGRESS NOTES
Review of Systems   Constitutional:  Negative for appetite change, fatigue and fever.   HENT: Negative.  Negative for hearing loss, tinnitus, trouble swallowing and voice change.    Eyes: Negative.  Negative for photophobia, pain and visual disturbance.   Respiratory: Negative.  Negative for shortness of breath.    Cardiovascular: Negative.  Negative for palpitations.   Gastrointestinal: Negative.  Negative for nausea and vomiting.   Endocrine: Negative.  Negative for cold intolerance.   Genitourinary: Negative.  Negative for dysuria, frequency and urgency.   Musculoskeletal:  Negative for back pain, gait problem, myalgias, neck pain and neck stiffness.   Skin: Negative.  Negative for rash.   Allergic/Immunologic: Negative.    Neurological: Negative.  Negative for dizziness, tremors, seizures, syncope, facial asymmetry, speech difficulty, weakness, light-headedness, numbness and headaches.   Hematological: Negative.  Does not bruise/bleed easily.   Psychiatric/Behavioral: Negative.  Negative for confusion, hallucinations and sleep disturbance.

## 2024-05-07 NOTE — PATIENT INSTRUCTIONS
- Continue current medications  - Call the office with seizures, issues or concerns  - Please work on getting adequate sleep  - Social work will reach out  - Follow up in 3 months with Dr Naylor

## 2024-05-07 NOTE — TELEPHONE ENCOUNTER
MSW phoned pt and discussed help at home option. Mr. Wallace informed that he feels pt meets the eligibility criteria. He would like this writer to call back tomorrow to help him with placing the referral with [facility] for home based services.   MSW will call again tomorrow around 9am

## 2024-05-07 NOTE — PROGRESS NOTES
Patient ID: Poncho Wallace is a 83 y.o. male with focal epilepsy manifest as 2 seizures occurring a few hours apart on 2/21/2017, status epilepticus in 1/2022 and additional provoked seizures in 4/2023.          Assessment/Plan:    Partial idiopathic epilepsy with seizures of localized onset, not intractable, with status epilepticus (Formerly [facility])  Patient with focal epilepsy, history of status epilepticus. Seizure free since most recent visit on levetiracetam XR 2250 mg daily and lacosamide 100 mg BID. He continues to report feeling very tired and fatigued but upon discussion, his sleep is quite poor and is likely the biggest contributing factor. Starting B12 has not improved fatigue. We did review possibly decreasing levetiracetam slightly but I do not believe this would provide significant improvement and may place him at increased risk for seizure recurrence.     We reviewed the importance of sleep in epilepsy. He does currently care for his wife who has MS. Reviewed having others help to care for her so that he can get adequate sleep.Agreeable to having social work contact him to discuss possible options that may benefit him as primary caretaker.    Recommended continuing current medications for now. Follow up in 3 months or sooner if needed with Dr Naylor.        He will Return for Follow up with Dr Naylor.      Subjective:  Poncho Wallcae is a 83 y.o. male with focal epilepsy manifest as 2 seizures occurring a few hours apart on 2/21/2017, status epilepticus in 1/2022 and additional provoked seizures in 4/2023.   He was last seen in the office on 11/6/2023 by Dr Naylor. At that time, seizures were well controlled on lacosamide and levetiracetam. Recommended increasing B12 due to low energy. Recommended 6 month follow up.     Since his last visit he has continued to be seizure free of levetiracetam XR 2250 mg daily and lacosamide 100 mg BID. He denies any side effects other than being tired. He takes his medication  after supper and then falls asleep.     Takes his morning medication around 8 am. He does not feel tired after his morning medication as he only takes the B12 and lacosamide. The night time ones make him tired. Discussed taking them before bedtime but he feels that he would forget to take them because he falls asleep.     He does take care of his wife who has MS. B12 does not seem to be helpful for his tiredness.     His daughter Vidhya was present on the telephone via speaker phone at patient's request during his visit. We reviewed the possibility of medication changes but they continue to understandably be concerned about the possibility of breakthorugh seizures with decreasing his levetiracetam dose. She does report that he has not been sleeping well because he cares for his wife. He sleeps on the couch next to the hospital bed but is up most of the night caring for her. He does not sleep during the daytime. He does have a home health care worker that comes to his home 3 days per week. There have been issues in the past with caretakers in the home. He is agreeable to social work reaching out to him to discuss possible assistance.     Current seizure medications:  - levetiracetam XR 2250 mg daily  - lacosamide 100 mg BID  Other medications as per Epic.     Latest Reference Range & Units 12/04/23 08:14   LEVETIRACETA (KEPPRA) 10.0 - 40.0 ug/mL 38.2   Lacosamide 5.0 - 10.0 ug/mL 7.9     Seizures/Spell characteristics:  1. Witnessed GTC seizure. 2 events on 2/21/17. One out of sleep, the other a few hours later in the ER. Status epilepticus in 1/2022. Provoked seizures on 4/15 and 4/16/2023.   2. Right frontal subclinical seizures on VEEG in 2/2022.      Special Features:  - History of status epilepticus: yes (1/2022)  - Self injury due to seizures: No  - Precipitating factors: None     Seizure risk factors: Normal birth and development. No history of seizures as a child. No family history of seizures. No head trauma  "resulting in loss of consciousness. No history of brain tumor, stroke or CNS infection.      Prior AEDs:  None     Prior Evaluation:  REEG 2/21/17: normal, awake.      1 hour EEG sleep-deprived EEG, 2 hours, 4/18/2017:  Normal     VEEG 1/2022 revealed 11 subclinical right frontal seizures during the initial 14 hours of recording as well as right frontal BIRDS, right frontal slowing and right frontal sharp waves.     VEEG x 24 hours 4/16/2023  Continuous right hemisphere polymorphic theta/delta slowing and right temporal sharp waves suggest right hemisphere focal neuronal dysfunction and right temporal epileptogenic potential.      MRI brain w/ and w/o 2/21/17: normal.      Psychiatric History:  None     I reviewed prior neurology notes, most , as documented in Epic/[facility], and summarized above.        Objective:    Blood pressure 145/63, pulse (!) 53, temperature 98.6 °F (37 °C), temperature source Temporal, height 5' 10\" (1.778 m), weight 78.8 kg (173 lb 12.8 oz).    Physical Exam  No apparent distress.   Appears well nourished.   Mood appropriate for situation     Neurologic Exam  Mental status- alert and oriented to person, place, and time. Speech appropriate for conversation. Good attention and knowledge.     Cranial Nerves- EOMS normal, facial sensation and muscles symmetric, hearing intact bilaterally to finger rubs, tongue midline, palate rise symmetrical, shoulder shrug symmetrical.    Motor- No pronator drift. Appropriate strength. Moves all extremities freely. No tremor.    Sensory-  Intact distally in all extremities to light touch.     DTRs- 2+ and symmetric in all extremities.     Gait- normal casual gait.    Coordination- FNF intact.         ROS:  Review of Systems   Constitutional:  Negative for appetite change, fatigue and fever.   HENT: Negative.  Negative for hearing loss, tinnitus, trouble swallowing and voice change.    Eyes: Negative.  Negative for photophobia, pain and visual " disturbance.   Respiratory: Negative.  Negative for shortness of breath.    Cardiovascular: Negative.  Negative for palpitations.   Gastrointestinal: Negative.  Negative for nausea and vomiting.   Endocrine: Negative.  Negative for cold intolerance.   Genitourinary: Negative.  Negative for dysuria, frequency and urgency.   Musculoskeletal:  Negative for back pain, gait problem, myalgias, neck pain and neck stiffness.   Skin: Negative.  Negative for rash.   Allergic/Immunologic: Negative.    Neurological: Negative.  Negative for dizziness, tremors, seizures, syncope, facial asymmetry, speech difficulty, weakness, light-headedness, numbness and headaches.   Hematological: Negative.  Does not bruise/bleed easily.   Psychiatric/Behavioral: Negative.  Negative for confusion, hallucinations and sleep disturbance.         ROS obtained by MA and reviewed by myself.       This note may have been created using voice recognition software. There may be unintentional errors such as grammatical errors, spelling errors, or pronoun errors.

## 2024-05-08 ENCOUNTER — OFFICE VISIT (OUTPATIENT)
Dept: FAMILY MEDICINE CLINIC | Facility: CLINIC | Age: 84
End: 2024-05-08
Payer: MEDICARE

## 2024-05-08 VITALS
WEIGHT: 176 LBS | SYSTOLIC BLOOD PRESSURE: 130 MMHG | OXYGEN SATURATION: 95 % | TEMPERATURE: 97.9 F | BODY MASS INDEX: 25.2 KG/M2 | RESPIRATION RATE: 16 BRPM | HEIGHT: 70 IN | DIASTOLIC BLOOD PRESSURE: 62 MMHG | HEART RATE: 76 BPM

## 2024-05-08 DIAGNOSIS — I35.0 NONRHEUMATIC AORTIC VALVE STENOSIS: ICD-10-CM

## 2024-05-08 DIAGNOSIS — E53.8 B12 DEFICIENCY: ICD-10-CM

## 2024-05-08 DIAGNOSIS — R56.9 SEIZURE (HCC): ICD-10-CM

## 2024-05-08 DIAGNOSIS — I77.1 CELIAC ARTERY STENOSIS (HCC): ICD-10-CM

## 2024-05-08 DIAGNOSIS — I71.43 INFRARENAL ABDOMINAL AORTIC ANEURYSM (AAA) WITHOUT RUPTURE (HCC): ICD-10-CM

## 2024-05-08 DIAGNOSIS — E78.2 MIXED HYPERLIPIDEMIA: ICD-10-CM

## 2024-05-08 DIAGNOSIS — N40.1 BENIGN PROSTATIC HYPERPLASIA WITH NOCTURIA: Primary | ICD-10-CM

## 2024-05-08 DIAGNOSIS — N18.31 STAGE 3A CHRONIC KIDNEY DISEASE (HCC): ICD-10-CM

## 2024-05-08 DIAGNOSIS — I35.1 NONRHEUMATIC AORTIC VALVE INSUFFICIENCY: ICD-10-CM

## 2024-05-08 DIAGNOSIS — I77.810 MILD DILATION OF ASCENDING AORTA (HCC): ICD-10-CM

## 2024-05-08 DIAGNOSIS — R35.1 BENIGN PROSTATIC HYPERPLASIA WITH NOCTURIA: Primary | ICD-10-CM

## 2024-05-08 DIAGNOSIS — E79.0 HYPERURICEMIA: ICD-10-CM

## 2024-05-08 PROCEDURE — G2211 COMPLEX E/M VISIT ADD ON: HCPCS | Performed by: FAMILY MEDICINE

## 2024-05-08 PROCEDURE — 99214 OFFICE O/P EST MOD 30 MIN: CPT | Performed by: FAMILY MEDICINE

## 2024-05-08 NOTE — ASSESSMENT & PLAN NOTE
Patient with focal epilepsy, history of status epilepticus. Seizure free since most recent visit on levetiracetam XR 2250 mg daily and lacosamide 100 mg BID. He continues to report feeling very tired and fatigued but upon discussion, his sleep is quite poor and is likely the biggest contributing factor. Starting B12 has not improved fatigue. We did review possibly decreasing levetiracetam slightly but I do not believe this would provide significant improvement and may place him at increased risk for seizure recurrence.     We reviewed the importance of sleep in epilepsy. He does currently care for his wife who has MS. Reviewed having others help to care for her so that he can get adequate sleep.Agreeable to having social work contact him to discuss possible options that may benefit him as primary caretaker.    Recommended continuing current medications for now. Follow up in 3 months or sooner if needed with Dr Naylor.

## 2024-05-08 NOTE — TELEPHONE ENCOUNTER
MSW phoned pt and provided phoned number to apply for home based services. This writer also spoke with patient's caregiver and provided information about steps to follow.     Pt will be calling them once patient is awake [phone].

## 2024-05-08 NOTE — PROGRESS NOTES
Name: Poncho Wallace      : 1940      MRN: [id number]  Encounter Provider: Sudheer Goncalves MD  Encounter Date: 2024   Encounter department: [facility]    Assessment & Plan     1. Benign prostatic hyperplasia with nocturia    2. Stage 3a chronic kidney disease (HCC)    3. Seizure (HCC)    4. Celiac artery stenosis (HCC)    5. Infrarenal abdominal aortic aneurysm (AAA) without rupture (HCC)    6. Nonrheumatic aortic valve stenosis    7. Nonrheumatic aortic valve insufficiency    8. 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HCC)    9. Mixed hyperlipidemia    10. B12 deficiency    11. Hyperuricemia    Continue with current dose of Tamsulosin 0.4 mg.  Dose could be titrated to 0.8 mg.  Patient given the option of additional testing/evaluation- ultrasound for PVR/Urology referral he declined for now. OV 6 months        Subjective     Follow up visit to review medications. 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 She has advanced MS.         BPH on Tamsulosin  Episode of urinary retention during one of his hospitalizations. Nocturia several times a night. Emptying normal. Stream weak at times. 2023 CT scan Bilateral parapelvic renal cysts. No hydronephrosis.    CKD stage 3 2023 creatinine 1.17. GFR 58. 2023 creatinine 1.26. GFR 53. 2023 creatinine 1.22.  GFR 55. BP stable on no medication       Hyperlipidemia with history of AAA s/p  endovascular stent. 2023 Duplex evaluation of the abdominal aortic aneurysm post EVAR shows a widely patent endograft without evidence of endoleak. Sac size is approximately 3.3 cm, (Prior: 3.2 cm). The aorta proximal to the graft measures 2.0 cm, (Prior: 2.2 cm). The iliac arteries distal to the graft measures right: 1.9 cm, (Prior: 1.9 cm)and left: 1.4 cm, (Prior: 1.4 cm). The superior mesenteric and bilateral proximal renal arteries are patent. 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 R RUI 1.28. L RUI 1.27        Hyperlipidemia on Simvastatin 40  mg.daily   05/2023 Lipid profile Cholesterol 143. Triglycerides 161. HDL 53. LDL 58. 12/2023 LFTs normal except for total bili 1.12. IFG 12/2023 FBS 92 decreased from 103.         Seizure disorder followed by Neurology.  04/2023 admission for seizure in the setting of pneumonia with T to 104. He was on Keppra at the time of admission.  MRI brain No acute intracranial pathology. Minimal chronic microangiopathy.. Vimpat added to his regimen. No seizures since that time.      01/2022 admission presented as a possible stroke alert. He was found by his wife's caregiver on the morning of admission with seizure activity.  He had multiple seizures en route to the hospital requiring IV Lorazepam.  CT scan head no acute intracranial abnormality.  CTA no flow restrictive stenosis, occlusion or thrombosis of the cervical or intracranial vasculature.  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 LP negative.  Patient was intubated and transferred to [facility]. He was initially treated with Depakote and Keppra. Depakote was eventually discontinued.  Prior to discharge he tested positive for COV 19. During his hospitalization patient had an unwitnessed fall on 01/26/2022. CT scan head 01/27/2022 Small left-sided subdural hemorrhage along the left frontoparietal convexity.  No significant mass effect.  Repeat CT scan head 01/28/2022 unchanged acute trace subarachnoid hemorrhage previously described a subdural hematoma left frontal cerebral convexity no new sites of acute intracranial hemorrhage . 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 She has advanced MS.       Recent Results (from the past 4704 hour(s))   Lacosamide    Collection Time: 12/04/23  8:14 AM   Result Value Ref Range    Lacosamide 7.9 5.0 - 10.0 ug/mL   Levetiracetam level    Collection Time: 12/04/23  8:14 AM   Result Value Ref Range    Levetiracetam Lvl 38.2 10.0 - 40.0 ug/mL   Vitamin B12     Collection Time: 12/04/23  8:14 AM   Result Value Ref Range    Vitamin B-12 534 180 - 914 pg/mL   CBC and differential    Collection Time: 12/04/23  8:14 AM   Result Value Ref Range    WBC 6.14 4.31 - 10.16 Thousand/uL    RBC 4.84 3.88 - 5.62 Million/uL    Hemoglobin 15.2 12.0 - 17.0 g/dL    Hematocrit 48.1 36.5 - 49.3 %    MCV 99 (H) 82 - 98 fL    MCH 31.4 26.8 - 34.3 pg    MCHC 31.6 31.4 - 37.4 g/dL    RDW 12.3 11.6 - 15.1 %    MPV 11.0 8.9 - 12.7 fL    Platelets 155 149 - 390 Thousands/uL    nRBC 0 /100 WBCs    Segmented % 66 43 - 75 %    Immature Grans % 0 0 - 2 %    Lymphocytes % 24 14 - 44 %    Monocytes % 6 4 - 12 %    Eosinophils Relative 4 0 - 6 %    Basophils Relative 0 0 - 1 %    Absolute Neutrophils 4.07 1.85 - 7.62 Thousands/µL    Absolute Immature Grans 0.01 0.00 - 0.20 Thousand/uL    Absolute Lymphocytes 1.45 0.60 - 4.47 Thousands/µL    Absolute Monocytes 0.34 0.17 - 1.22 Thousand/µL    Eosinophils Absolute 0.25 0.00 - 0.61 Thousand/µL    Basophils Absolute 0.02 0.00 - 0.10 Thousands/µL   Comprehensive metabolic panel    Collection Time: 12/04/23  8:14 AM   Result Value Ref Range    Sodium 145 135 - 147 mmol/L    Potassium 4.5 3.5 - 5.3 mmol/L    Chloride 106 96 - 108 mmol/L    CO2 31 21 - 32 mmol/L    ANION GAP 8 mmol/L    BUN 18 5 - 25 mg/dL    Creatinine 1.17 0.60 - 1.30 mg/dL    Glucose, Fasting 92 65 - 99 mg/dL    Calcium 9.5 8.4 - 10.2 mg/dL    AST 24 13 - 39 U/L    ALT 24 7 - 52 U/L    Alkaline Phosphatase 75 34 - 104 U/L    Total Protein 6.9 6.4 - 8.4 g/dL    Albumin 4.4 3.5 - 5.0 g/dL    Total Bilirubin 1.12 (H) 0.20 - 1.00 mg/dL    eGFR 58 ml/min/1.73sq m           Review of Systems   Constitutional:  Positive for fatigue and unexpected weight change (5 lb weight loss from 12/2023). Negative for appetite change, chills and fever.   HENT:  Negative for congestion.    Respiratory:  Negative for cough, shortness of breath and wheezing.    Cardiovascular:  Negative for chest pain, palpitations  and leg swelling.        See HPI. 01/2022 echocardiogram normal left ventricular systolic function.  EF 60%.  Diastolic function normal.  Mild to moderate AR.  Moderate AS. Moderate MR.      Gastrointestinal:  Positive for constipation. Negative for abdominal pain, blood in stool, diarrhea and nausea.        Colonoscopy 05/2018   Endocrine:        Past history of borderline abnormal elevated TSH 6.163.  04/2019  On no medications    Lab Results       Component                Value               Date                       [id number]             2.080               09/04/2020                                    Genitourinary:  Positive for difficulty urinating. Negative for dysuria, frequency, hematuria and urgency.        See HPI                              Musculoskeletal:  Negative for arthralgias and myalgias.        Hyperuricemia and history of gout.  No longer on Allopurinol. Chronic bilateral shoulder pain with chronic rotator cuff tear right shoulder and subacromial bursitis left shoulder.  Prior bilateral steroid injections by Orthopedic surgery  He is on no pain medications at present      Lab Results       Component                Value               Date                       URICACID                 7.5                 11/02/2022                          Skin:  Negative for rash.   Allergic/Immunologic: Negative for environmental allergies.   Neurological:  Positive for seizures. Negative for dizziness, light-headedness and headaches.        See HPI.    Hematological:  Negative for adenopathy. Does not bruise/bleed easily.        On Vitamin B 12 supplement. 12/2023 B12 level 534 improved from 269.    Psychiatric/Behavioral:  Negative for dysphoric mood and sleep disturbance.        Past Medical History:   Diagnosis Date    Colon polyps     Epilepsy (HCC)     Gout     Hyperlipidemia     Hypertension     S/P AAA repair     Subdural hematoma (HCC) 1/27/2022    Tear of right rotator cuff 04/04/2017     Past  Surgical History:   Procedure Laterality Date    ABDOMINAL AORTIC ANEURYSM REPAIR, ENDOVASCULAR N/A     TX COLONOSCOPY FLX DX W/COLLJ SPEC WHEN PFRMD N/A 2017    Procedure: COLONOSCOPY;  Surgeon: NORM Lilly MD;  Location: [facility];  Service: Colorectal    TX COLONOSCOPY FLX DX W/COLLJ SPEC WHEN PFRMD N/A 2018    Procedure: COLONOSCOPY;  Surgeon: NORM Lilly MD;  Location: [facility];  Service: Colorectal    TONSILLECTOMY      WISDOM TOOTH EXTRACTION Bilateral      Family History   Problem Relation Age of Onset    Aneurysm Mother         ABDMONIAL  AORTA    Coronary artery disease Father     Coronary artery disease Brother      Social History     Socioeconomic History    Marital status: /Civil Union     Spouse name: None    Number of children: None    Years of education: None    Highest education level: None   Occupational History    None   Tobacco Use    Smoking status: Former     Current packs/day: 0.00     Average packs/day: 3.0 packs/day for 10.0 years (30.0 ttl pk-yrs)     Types: Cigarettes     Start date: 1961     Quit date: 1971     Years since quittin.2    Smokeless tobacco: Never   Vaping Use    Vaping status: Never Used   Substance and Sexual Activity    Alcohol use: Not Currently     Comment: occasional    Drug use: No    Sexual activity: Not Currently   Other Topics Concern    None   Social History Narrative    None     Social Determinants of Health     Financial Resource Strain: Low Risk  (2023)    Overall Financial Resource Strain (CARDIA)     Difficulty of Paying Living Expenses: Not hard at all   Food Insecurity: No Food Insecurity (2022)    Hunger Vital Sign     Worried About Running Out of Food in the Last Year: Never true     Ran Out of Food in the Last Year: Never true   Transportation Needs: No Transportation Needs (2023)    PRAPARE - Transportation     Lack of Transportation (Medical): No     Lack of Transportation (Non-Medical): No  "  Physical Activity: Not on file   Stress: Not on file   Social Connections: Not on file   Intimate Partner Violence: Not on file   Housing Stability: Low Risk  (1/19/2022)    Housing Stability Vital Sign     Unable to Pay for Housing in the Last Year: No     Number of Places Lived in the Last Year: 1     Unstable Housing in the Last Year: No     Current Outpatient Medications on File Prior to Visit   Medication Sig    lacosamide (VIMPAT) 100 mg tablet Take 1 tablet (100 mg total) by mouth every 12 (twelve) hours    levetiracetam (Keppra XR) 750 MG TB24 extended-release tablet Take 3 tablets (2,250 mg total) by mouth daily    simvastatin (ZOCOR) 40 mg tablet Take 1 tablet (40 mg total) by mouth daily    tamsulosin (FLOMAX) 0.4 mg Take 1 capsule (0.4 mg total) by mouth daily with dinner    acetaminophen (TYLENOL) 500 mg tablet Take 500 mg by mouth every 6 (six) hours as needed for mild pain (Patient not taking: Reported on 5/7/2024)    cyanocobalamin (VITAMIN B-12) 500 MCG tablet Take 2 tablets (1,000 mcg total) by mouth daily (Patient not taking: Reported on 5/8/2024)    [DISCONTINUED] aspirin (ECOTRIN LOW STRENGTH) 81 mg EC tablet Take 1 tablet (81 mg total) by mouth 3 (three) times a week (Patient not taking: Reported on 5/7/2024)     Allergies   Allergen Reactions    Fenofibrate      Patient not aware of allergy    Hydrocodone-Acetaminophen      Patient not aware of allergy     Immunization History   Administered Date(s) Administered    COVID-19 MODERNA VACC 0.5 ML IM 01/26/2021, 02/21/2021       Objective     /62 (BP Location: Left arm, Patient Position: Sitting, Cuff Size: Large)   Pulse 76   Temp 97.9 °F (36.6 °C)   Resp 16   Ht 5' 10\" (1.778 m)   Wt 79.8 kg (176 lb)   SpO2 95%   BMI 25.25 kg/m²      BP Readings from Last 3 Encounters:   05/08/24 130/62   05/07/24 145/63   12/18/23 124/74      Wt Readings from Last 3 Encounters:   05/08/24 79.8 kg (176 lb)   05/07/24 78.8 kg (173 lb 12.8 oz) "   12/18/23 82.3 kg (181 lb 8 oz)           Physical Exam  Constitutional:       General: He is not in acute distress.  Eyes:      General: No scleral icterus.     Conjunctiva/sclera: Conjunctivae normal.   Neck:      Thyroid: No thyroid mass or thyromegaly.      Vascular: Normal carotid pulses. No carotid bruit.   Cardiovascular:      Rate and Rhythm: Normal rate and regular rhythm.      Heart sounds: Murmur heard.      Crescendo decrescendo systolic murmur is present with a grade of 2/6.      Diastolic murmur is present with a grade of 2/4.      No gallop.   Pulmonary:      Effort: Pulmonary effort is normal. No respiratory distress.      Breath sounds: Normal breath sounds. No wheezing or rales.   Musculoskeletal:      Right lower leg: No edema.      Left lower leg: No edema.   Neurological:      General: No focal deficit present.      Mental Status: He is alert and oriented to person, place, and time.   Psychiatric:         Mood and Affect: Mood normal.       Sudheer Goncalves MD

## 2024-05-10 NOTE — TELEPHONE ENCOUNTER
MSW phoned pt and he informed that he has help at home 3x a week and this is covered by [facility]. He gets reimbursement. He will call his CM from [employer] to learn if he is eligible for [employer].     MSW remains available for future social needs.

## 2024-06-13 ENCOUNTER — OFFICE VISIT (OUTPATIENT)
Dept: FAMILY MEDICINE CLINIC | Facility: CLINIC | Age: 84
End: 2024-06-13
Payer: MEDICARE

## 2024-06-13 VITALS
RESPIRATION RATE: 16 BRPM | HEIGHT: 70 IN | WEIGHT: 176.5 LBS | SYSTOLIC BLOOD PRESSURE: 128 MMHG | OXYGEN SATURATION: 95 % | BODY MASS INDEX: 25.27 KG/M2 | HEART RATE: 68 BPM | DIASTOLIC BLOOD PRESSURE: 68 MMHG | TEMPERATURE: 98 F

## 2024-06-13 DIAGNOSIS — H00.012 HORDEOLUM EXTERNUM OF RIGHT LOWER EYELID: Primary | ICD-10-CM

## 2024-06-13 DIAGNOSIS — L81.4 LENTIGO: ICD-10-CM

## 2024-06-13 PROCEDURE — 99213 OFFICE O/P EST LOW 20 MIN: CPT | Performed by: FAMILY MEDICINE

## 2024-06-13 PROCEDURE — G2211 COMPLEX E/M VISIT ADD ON: HCPCS | Performed by: FAMILY MEDICINE

## 2024-06-13 RX ORDER — TOBRAMYCIN 3 MG/ML
1 SOLUTION/ DROPS OPHTHALMIC 3 TIMES DAILY
Qty: 5 ML | Refills: 0 | Status: SHIPPED | OUTPATIENT
Start: 2024-06-13

## 2024-06-13 NOTE — PROGRESS NOTES
"Ambulatory Visit  Name: Poncho Wallace      : 1940      MRN: [id number]  Encounter Provider: Sudheer Goncalves MD  Encounter Date: 2024   Encounter department: [facility]    Assessment & Plan   1. Hordeolum externum of right lower eyelid  -     tobramycin (TOBREX) 0.3 % SOLN; Administer 1 drop to the right eye 3 (three) times a day  2. Lentigo    Antibiotic eye drops. Warm compresses.     No treatment needed for skin lesion. Observe for any changes        History of Present Illness     Check \"spot\" R temple area. No changes in size,color. No pain, bleeding or itching.       Review of Systems   Eyes:  Positive for redness. Negative for photophobia, pain, discharge, itching and visual disturbance.        Recent \"redness\" R eye. No tearing. No visual changes. No contacts  History of seasonal allergies    Skin:         See HPI    Allergic/Immunologic: Positive for environmental allergies.   Hematological:  Negative for adenopathy.     Past Medical History:   Diagnosis Date    Colon polyps     Epilepsy (HCC)     Gout     Hyperlipidemia     Hypertension     S/P AAA repair     Subdural hematoma (HCC) 2022    Tear of right rotator cuff 2017     Past Surgical History:   Procedure Laterality Date    ABDOMINAL AORTIC ANEURYSM REPAIR, ENDOVASCULAR N/A     NH COLONOSCOPY FLX DX W/COLLJ SPEC WHEN PFRMD N/A 2017    Procedure: COLONOSCOPY;  Surgeon: NORM Lilly MD;  Location: [facility];  Service: Colorectal    NH COLONOSCOPY FLX DX W/COLLJ SPEC WHEN PFRMD N/A 2018    Procedure: COLONOSCOPY;  Surgeon: NORM Lilly MD;  Location: [facility];  Service: Colorectal    TONSILLECTOMY      WISDOM TOOTH EXTRACTION Bilateral      Family History   Problem Relation Age of Onset    Aneurysm Mother         ABDMONIAL  AORTA    Coronary artery disease Father     Coronary artery disease Brother      Social History     Tobacco Use    Smoking status: Former     Current packs/day: 0.00     " "Average packs/day: 3.0 packs/day for 10.0 years (30.0 ttl pk-yrs)     Types: Cigarettes     Start date: 1961     Quit date: 1971     Years since quittin.3    Smokeless tobacco: Never   Vaping Use    Vaping status: Never Used   Substance and Sexual Activity    Alcohol use: Not Currently     Comment: occasional    Drug use: No    Sexual activity: Not Currently     Current Outpatient Medications on File Prior to Visit   Medication Sig    lacosamide (VIMPAT) 100 mg tablet Take 1 tablet (100 mg total) by mouth every 12 (twelve) hours    levetiracetam (Keppra XR) 750 MG TB24 extended-release tablet Take 3 tablets (2,250 mg total) by mouth daily    simvastatin (ZOCOR) 40 mg tablet Take 1 tablet (40 mg total) by mouth daily    tamsulosin (FLOMAX) 0.4 mg Take 1 capsule (0.4 mg total) by mouth daily with dinner    acetaminophen (TYLENOL) 500 mg tablet Take 500 mg by mouth every 6 (six) hours as needed for mild pain (Patient not taking: Reported on 2024)    cyanocobalamin (VITAMIN B-12) 500 MCG tablet Take 2 tablets (1,000 mcg total) by mouth daily (Patient not taking: Reported on 2024)     Allergies   Allergen Reactions    Fenofibrate      Patient not aware of allergy    Hydrocodone-Acetaminophen      Patient not aware of allergy     Immunization History   Administered Date(s) Administered    COVID-19 MODERNA VACC 0.5 ML IM 2021, 2021     Objective     /68 (BP Location: Left arm, Patient Position: Sitting, Cuff Size: Large)   Pulse 68   Temp 98 °F (36.7 °C)   Resp 16   Ht 5' 10\" (1.778 m)   Wt 80.1 kg (176 lb 8 oz)   SpO2 95%   BMI 25.33 kg/m²     Physical Exam  Eyes:      General: Vision grossly intact. No scleral icterus.        Right eye: Hordeolum present. No discharge.         Left eye: No discharge.      Extraocular Movements: Extraocular movements intact.      Conjunctiva/sclera: Conjunctivae normal.      Pupils: Pupils are equal, round, and reactive to light.      " Comments: Small stye R lower eyelid    Skin:     Comments: Suspected lentigo vs SK R temple no suspicious features     See photo          Administrative Statements

## 2024-09-05 ENCOUNTER — TELEPHONE (OUTPATIENT)
Age: 84
End: 2024-09-05

## 2024-09-05 DIAGNOSIS — R49.0 HOARSENESS: Primary | ICD-10-CM

## 2024-09-05 NOTE — TELEPHONE ENCOUNTER
Pt called in requesting if another referral can be put in for Otolaryngology due to changes in his voice. A referral was put it in previously for the same issue, but has now . Pt would like to  the referral on Monday, if possible.    Please advise.

## 2024-09-17 ENCOUNTER — TELEPHONE (OUTPATIENT)
Age: 84
End: 2024-09-17

## 2024-09-17 NOTE — TELEPHONE ENCOUNTER
Patients daughter Karla called and stated received a call needing to RS FU 10/23/24 11 AM; no notes however icon indicating RS was there. Accepted first next available 1/8/25 10 AM Ryan Naylor MD [facility] and placed on wait list for both [facility] and [facility] locations.  Karla requested a sooner appointment if possible because patient was instructed to FU in 3 months; advised someone will call should one become available.

## 2024-10-08 NOTE — ASSESSMENT & PLAN NOTE
10 (severe pain) Patient had an unwitnessed fall in the hospital on 01/26  A rapid response was called at that time and he was cleared by the ICU team without any need for stat CT head  The next morning, primary team noticed a small hematoma on the left side of his forehead and the patient complained of pain  He went down for CT scan the morning of 1/27, which found small 0 5 cm left-sided subdural hemorrhage along the left frontoparietal convexity  No mass effect or midline shift  He was seen at same-day by Neurosurgery, who recommended repeat imaging the next day  Repeat head CT obtained on 01/28 showed redistribution and feeding of hemorrhage per Neurosurgery, after which time neurosurgery signed off and cleared the patient for discharge  They recommended avoiding anticoagulation and antiplatelet agents for 2 weeks      Plan  · No further need for imaging follow-up per Neurosurgery  · Hold anticoagulation and antiplatelet agents for 2 weeks

## 2024-10-28 DIAGNOSIS — G40.001 PARTIAL IDIOPATHIC EPILEPSY WITH SEIZURES OF LOCALIZED ONSET, NOT INTRACTABLE, WITH STATUS EPILEPTICUS (HCC): ICD-10-CM

## 2024-10-28 NOTE — TELEPHONE ENCOUNTER
Reason for call:   [x] Refill   [] Prior Auth  [] Other:     Office:   [x] PCP/Provider -   [] Specialty/Provider -     Medication:     lacosamide (VIMPAT) 100 mg tablet       Dose/Frequency: Take 1 tablet (100 mg total) by mouth every 12 (twelve) hours     Quantity: 180 tab    Pharmacy: [address]        Does the patient have enough for 3 days?   [x] Yes   [] No - Send as HP to POD

## 2024-10-29 RX ORDER — LACOSAMIDE 100 MG/1
100 TABLET ORAL EVERY 12 HOURS
Qty: 180 TABLET | Refills: 1 | OUTPATIENT
Start: 2024-10-29

## 2024-10-29 RX ORDER — LACOSAMIDE 100 MG/1
100 TABLET ORAL EVERY 12 HOURS
Qty: 180 TABLET | Refills: 1 | Status: SHIPPED | OUTPATIENT
Start: 2024-10-29

## 2024-11-15 ENCOUNTER — TELEPHONE (OUTPATIENT)
Dept: NEUROLOGY | Facility: CLINIC | Age: 84
End: 2024-11-15

## 2024-11-15 NOTE — TELEPHONE ENCOUNTER
Called pts daughter a sooner appt - calling from waitlist - she will check with her father - will call back to confirm if sooner appt works for him

## 2024-12-02 DIAGNOSIS — E78.2 MIXED HYPERLIPIDEMIA: ICD-10-CM

## 2024-12-04 RX ORDER — SIMVASTATIN 40 MG
40 TABLET ORAL DAILY
Qty: 90 TABLET | Refills: 1 | Status: SHIPPED | OUTPATIENT
Start: 2024-12-04

## 2024-12-24 ENCOUNTER — OFFICE VISIT (OUTPATIENT)
Dept: NEUROLOGY | Facility: CLINIC | Age: 84
End: 2024-12-24
Payer: MEDICARE

## 2024-12-24 VITALS
WEIGHT: 180.2 LBS | TEMPERATURE: 98 F | HEIGHT: 70 IN | SYSTOLIC BLOOD PRESSURE: 124 MMHG | DIASTOLIC BLOOD PRESSURE: 74 MMHG | OXYGEN SATURATION: 97 % | HEART RATE: 61 BPM | BODY MASS INDEX: 25.8 KG/M2

## 2024-12-24 DIAGNOSIS — T88.7XXA MEDICATION SIDE EFFECT: ICD-10-CM

## 2024-12-24 DIAGNOSIS — G40.001 PARTIAL IDIOPATHIC EPILEPSY WITH SEIZURES OF LOCALIZED ONSET, NOT INTRACTABLE, WITH STATUS EPILEPTICUS (HCC): Primary | ICD-10-CM

## 2024-12-24 DIAGNOSIS — E53.8 B12 DEFICIENCY: ICD-10-CM

## 2024-12-24 DIAGNOSIS — G47.00 INSOMNIA: ICD-10-CM

## 2024-12-24 DIAGNOSIS — F43.21 GRIEF: ICD-10-CM

## 2024-12-24 PROCEDURE — G2211 COMPLEX E/M VISIT ADD ON: HCPCS | Performed by: PSYCHIATRY & NEUROLOGY

## 2024-12-24 PROCEDURE — 99214 OFFICE O/P EST MOD 30 MIN: CPT | Performed by: PSYCHIATRY & NEUROLOGY

## 2024-12-24 RX ORDER — LEVETIRACETAM 750 MG/1
2250 TABLET, FILM COATED, EXTENDED RELEASE ORAL DAILY
Qty: 270 TABLET | Refills: 3 | Status: SHIPPED | OUTPATIENT
Start: 2024-12-24

## 2024-12-24 RX ORDER — LACOSAMIDE 100 MG/1
100 TABLET ORAL EVERY 12 HOURS
Qty: 180 TABLET | Refills: 1 | Status: SHIPPED | OUTPATIENT
Start: 2024-12-24

## 2024-12-24 NOTE — PROGRESS NOTES
[facility]  Follow Up Visit    Impression/Plan        Assessment & Plan  Partial idiopathic epilepsy with seizures of localized onset, not intractable, with status epilepticus (HCC)  Poncho Wallace is a 84 y.o. male with history that includes abdominal aortic aneurysm status post repair that presents regarding focal epilepsy manifest as 2 seizures occurring a few hours apart on 2/21/2017, status epilepticus in 1/2022 and additional provoked seizures in 4/2023.  His seizures arise from the right hemisphere, likely right frontal.  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  All episodes of his seizures have started out of sleep.  His neurological exam has been essentially normal. Seizures were controlled on levetiracetam 1500 mg per day until the 4/2023 seizures that occurred in the setting of pneumonia.  Levetiracetam was increased to 3000 mg/day in the hospital and lacosamide 100 mg twice daily was added. Levetiracetam was later decreased some due to sedation.      He continues to experience sedation after his levetiracetam dose. It would be best to take levetiracetam at bedtime, but he has been concerned that he will miss a dose if he waits until bedtime. We discussed strategies to change to bedtime dosing while avoiding missed doses. He can take his regular dose in the morning if a missed dose is discovered when he wakes up.     Orders:    levetiracetam (Keppra XR) 750 MG TB24 extended-release tablet; Take 3 tablets (2,250 mg total) by mouth daily    lacosamide (VIMPAT) 100 mg tablet; Take 1 tablet (100 mg total) by mouth every 12 (twelve) hours    Grief         Medication side effect  See above.        B12 deficiency  Improved. He should take daily supplementation.        Insomnia  Changing levetiracetam to bedtime. Will monitor as his schedule and life circumstances are changing after the passing of his wife.             Patient  Instructions   Try taking levetiracetam at 30 minutes before bedtime (10:30 pm). Use an alarm or have someone call you to remind you.   Take your levetiracetam the next morning if you realize you missed it at bedtime.   Return in about 6 months (AP).         Subjective    Poncho Wallace is returning to the [facility] for follow up.     Interval Events:   Seizures since last visit: None    Last seen 5/2024. His wife passed away about a week ago after a long period of disability related to MS. He was her caregiver. Takes levetiracetam at 5, falls asleep at 6 and up at 8 p. Wakes up overnight. He is afraid that he will miss a dose if he tries to change to taking it at bedtime. B12 normal on 12/4. Levetiracetam 38, lacosamide 7.9.     Current AEDs:  Levetiracetam ER 2250 mg daily  Lacosamide 100 mg bid  Medication side effects: sleepy after evening levetiracetam dose  Medication adherence: Yes    Seizures/Spell characteristics:  1. Witnessed GTC seizure. 2 events on 2/21/17. One out of sleep, the other a few hours later in the ER. Status epilepticus in 1/2022. Provoked seizures on 4/15 and 4/16/2023.   2. Right frontal subclinical seizures on VEEG in 2/2022.      Special Features:  - History of status epilepticus: yes (1/2022)  - Self injury due to seizures: No  - Precipitating factors: None     Seizure risk factors: Normal birth and development. No history of seizures as a child. No family history of seizures. No head trauma resulting in loss of consciousness. No history of brain tumor, stroke or CNS infection.      Prior AEDs:  None     Prior Evaluation:  REEG 2/21/17: normal, awake.      1 hour EEG sleep-deprived EEG, 2 hours, 4/18/2017:  Normal     VEEG 1/2022 revealed 11 subclinical right frontal seizures during the initial 14 hours of recording as well as right frontal BIRDS, right frontal slowing and right frontal sharp waves.     VEEG x 24 hours 4/16/2023  Continuous right hemisphere  "polymorphic theta/delta slowing and right temporal sharp waves suggest right hemisphere focal neuronal dysfunction and right temporal epileptogenic potential.      MRI brain w/ and w/o 2/21/17: normal.      Psychiatric History:  None      Objective    /74 (BP Location: Right arm, Patient Position: Sitting, Cuff Size: Adult)   Pulse 61   Temp 98 °F (36.7 °C) (Temporal)   Ht 5' 10\" (1.778 m)   Wt 81.7 kg (180 lb 3.2 oz)   SpO2 97%   BMI 25.86 kg/m²      General Exam  No acute distress.    Neurologic Exam  Mental Status:  Alert and oriented x 3.  Language: normal fluency and comprehension.  Cranial Nerves: gaze conjugate, no nystagmus.  Face symmetric. No dysarthria.  Coordination: Finger to nose intact.  Gait: Normal casual gait.         "

## 2024-12-24 NOTE — ASSESSMENT & PLAN NOTE
Poncho Wallace is a 84 y.o. male with history that includes abdominal aortic aneurysm status post repair that presents regarding focal epilepsy manifest as 2 seizures occurring a few hours apart on 2/21/2017, status epilepticus in 1/2022 and additional provoked seizures in 4/2023.  His seizures arise from the right hemisphere, likely right frontal.  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  All episodes of his seizures have started out of sleep.  His neurological exam has been essentially normal. Seizures were controlled on levetiracetam 1500 mg per day until the 4/2023 seizures that occurred in the setting of pneumonia.  Levetiracetam was increased to 3000 mg/day in the hospital and lacosamide 100 mg twice daily was added. Levetiracetam was later decreased some due to sedation.      He continues to experience sedation after his levetiracetam dose. It would be best to take levetiracetam at bedtime, but he has been concerned that he will miss a dose if he waits until bedtime. We discussed strategies to change to bedtime dosing while avoiding missed doses. He can take his regular dose in the morning if a missed dose is discovered when he wakes up.     Orders:    levetiracetam (Keppra XR) 750 MG TB24 extended-release tablet; Take 3 tablets (2,250 mg total) by mouth daily    lacosamide (VIMPAT) 100 mg tablet; Take 1 tablet (100 mg total) by mouth every 12 (twelve) hours

## 2024-12-24 NOTE — PATIENT INSTRUCTIONS
Try taking levetiracetam at 30 minutes before bedtime (10:30 pm). Use an alarm or have someone call you to remind you.   Take your levetiracetam the next morning if you realize you missed it at bedtime.   Return in about 6 months (AP).

## 2024-12-31 ENCOUNTER — HOSPITAL ENCOUNTER (OUTPATIENT)
Dept: NON INVASIVE DIAGNOSTICS | Facility: CLINIC | Age: 84
Discharge: HOME | End: 2024-12-31
Payer: MEDICARE

## 2024-12-31 DIAGNOSIS — I71.43 INFRARENAL ABDOMINAL AORTIC ANEURYSM (AAA) WITHOUT RUPTURE (HCC): ICD-10-CM

## 2024-12-31 PROCEDURE — 93925 LOWER EXTREMITY STUDY: CPT | Performed by: SURGERY

## 2024-12-31 PROCEDURE — 93923 UPR/LXTR ART STDY 3+ LVLS: CPT

## 2024-12-31 PROCEDURE — 93978 VASCULAR STUDY: CPT

## 2024-12-31 PROCEDURE — 93922 UPR/L XTREMITY ART 2 LEVELS: CPT | Performed by: SURGERY

## 2024-12-31 PROCEDURE — 93978 VASCULAR STUDY: CPT | Performed by: SURGERY

## 2024-12-31 PROCEDURE — 93925 LOWER EXTREMITY STUDY: CPT

## 2025-01-01 ENCOUNTER — RESULTS FOLLOW-UP (OUTPATIENT)
Dept: VASCULAR SURGERY | Facility: CLINIC | Age: 85
End: 2025-01-01

## 2025-01-06 ENCOUNTER — TELEPHONE (OUTPATIENT)
Age: 85
End: 2025-01-06

## 2025-01-06 NOTE — TELEPHONE ENCOUNTER
Pt called in stating that for the past few weeks he has been experiencing urinary frequency. Pt states that he wakes up about 3 times to urinate and also goes frequently throughout the day. Pt states that when he goes he does feel like he emptied his bladder completely. Pt states that for the past week he has been also having burning intermittently with urination. Pt states that he believes his symptoms are from the tamsulosin. Please advise.

## 2025-01-07 DIAGNOSIS — R39.9 UTI SYMPTOMS: Primary | ICD-10-CM

## 2025-01-08 NOTE — TELEPHONE ENCOUNTER
Called patient gave him message and let him know he can go to a [facility] lab to give sample

## 2025-01-09 ENCOUNTER — APPOINTMENT (OUTPATIENT)
Dept: LAB | Facility: CLINIC | Age: 85
End: 2025-01-09
Payer: MEDICARE

## 2025-01-09 DIAGNOSIS — R39.9 UTI SYMPTOMS: ICD-10-CM

## 2025-01-09 LAB
BILIRUB UR QL STRIP: NEGATIVE
CLARITY UR: CLEAR
COLOR UR: NORMAL
GLUCOSE UR STRIP-MCNC: NEGATIVE MG/DL
HGB UR QL STRIP.AUTO: NEGATIVE
KETONES UR STRIP-MCNC: NEGATIVE MG/DL
LEUKOCYTE ESTERASE UR QL STRIP: NEGATIVE
NITRITE UR QL STRIP: NEGATIVE
PH UR STRIP.AUTO: 5 [PH]
PROT UR STRIP-MCNC: NEGATIVE MG/DL
SP GR UR STRIP.AUTO: 1.02 (ref 1–1.03)
UROBILINOGEN UR STRIP-ACNC: <2 MG/DL

## 2025-01-09 PROCEDURE — 81003 URINALYSIS AUTO W/O SCOPE: CPT

## 2025-01-12 ENCOUNTER — RESULTS FOLLOW-UP (OUTPATIENT)
Dept: FAMILY MEDICINE CLINIC | Facility: CLINIC | Age: 85
End: 2025-01-12

## 2025-01-28 DIAGNOSIS — N40.1 BENIGN PROSTATIC HYPERPLASIA WITH URINARY RETENTION: ICD-10-CM

## 2025-01-28 DIAGNOSIS — R33.8 BENIGN PROSTATIC HYPERPLASIA WITH URINARY RETENTION: ICD-10-CM

## 2025-01-28 RX ORDER — TAMSULOSIN HYDROCHLORIDE 0.4 MG/1
0.4 CAPSULE ORAL
Qty: 90 CAPSULE | Refills: 3 | Status: SHIPPED | OUTPATIENT
Start: 2025-01-28

## 2025-01-28 NOTE — TELEPHONE ENCOUNTER
Medication: tamsulosin (FLOMAX) 0.4 mg     Dose/Frequency: Take 1 capsule (0.4 mg total) by mouth daily with dinner     Quantity: 90 cap    Pharmacy: [facility] - [address] [place] [address]     Office:   [x] PCP/Provider -   [] Speciality/Provider -     Does the patient have enough for 3 days?   [] Yes   [x] No - Send as HP to POD

## 2025-02-10 ENCOUNTER — OFFICE VISIT (OUTPATIENT)
Dept: VASCULAR SURGERY | Facility: CLINIC | Age: 85
End: 2025-02-10
Payer: MEDICARE

## 2025-02-10 VITALS
SYSTOLIC BLOOD PRESSURE: 118 MMHG | HEART RATE: 75 BPM | OXYGEN SATURATION: 97 % | DIASTOLIC BLOOD PRESSURE: 66 MMHG | HEIGHT: 70 IN | WEIGHT: 182 LBS | RESPIRATION RATE: 18 BRPM | BODY MASS INDEX: 26.05 KG/M2

## 2025-02-10 DIAGNOSIS — I77.810 MILD DILATION OF ASCENDING AORTA (HCC): ICD-10-CM

## 2025-02-10 DIAGNOSIS — I71.43 INFRARENAL ABDOMINAL AORTIC ANEURYSM (AAA) WITHOUT RUPTURE (HCC): Primary | ICD-10-CM

## 2025-02-10 DIAGNOSIS — E78.2 MIXED HYPERLIPIDEMIA: ICD-10-CM

## 2025-02-10 DIAGNOSIS — N18.31 STAGE 3A CHRONIC KIDNEY DISEASE (HCC): ICD-10-CM

## 2025-02-10 DIAGNOSIS — I10 PRIMARY HYPERTENSION: ICD-10-CM

## 2025-02-10 PROCEDURE — 99214 OFFICE O/P EST MOD 30 MIN: CPT | Performed by: PHYSICIAN ASSISTANT

## 2025-02-10 NOTE — ASSESSMENT & PLAN NOTE
Lab Results   Component Value Date    EGFR 70 01/21/2025    EGFR 58 12/04/2023    EGFR 53 08/15/2023    CREATININE 1.06 01/21/2025    CREATININE 1.17 12/04/2023    CREATININE 1.26 08/15/2023

## 2025-02-10 NOTE — ASSESSMENT & PLAN NOTE
-CTA c/a/p [facility]: 43 mm ascending thoracic aortic aneurysm  -Stable mild dilatation of the ascending aorta which was 41 mm in 2017  Orders:    Ambulatory Referral to Cardiology; Future

## 2025-02-10 NOTE — PATIENT INSTRUCTIONS
AAA status post EVAR repair.  Repair remains open and looks good.      Coated aspirin 81 three days weekly and simvastatin.      Follow-up with primary care regarding cholesterol medication/updated lipid panel profile.      Follow-up imaging for AAA in 1 year with office visit.

## 2025-02-10 NOTE — ASSESSMENT & PLAN NOTE
AAA s/p EVAR duplex 9/16/2010  Bilateral popliteal ectasia    -No symptoms of AAA  -No abdominal/ back/ flank pain; no claudication    Imaging:  -EVAR 12/31/24:  AAA s/p widely patent EVAR. Residual sac 3.3 (prior 3.3).    Iliacs distal to the graft R 1.9 cm, L 1.4 cm and unchanged.    Patent celiac, SMA and B renals    -DESIREE 12/31/24:    R 1.16/184/133; mild, diffuse CFA, SFA, PFA, popliteal s significant stneosis. Ectatic pop 0.9    L 1.14/170/128; mild, diffuse CFA, SFA, PFA, popliteal s  significant stenosis. Ectatic pop 1    A/P   -Stable AAA s/p EVAR repair  -EVAR duplex shows patent endovascular repair with stable residual sac size 3.3 cm  -Patient education regarding AAA provided, including sx for which he should call 911  -Continue with medical therapy and healthy lifestyle changes  -Continue with simvastatin and recommend aspirin 81 ec three days weekly  -Follow up EVAR duplex in 1 year with OV  Orders:    VAS ARTERIAL DUPLEX- LOWER LIMB BILATERAL; Future     VAS EVAR DUPLEX EVALUATION; Future

## 2025-02-10 NOTE — ASSESSMENT & PLAN NOTE
-No recent lipid profile; continue statin therapy with mgt as per primary care  -Maintain good blood pressure and cholesterol control per primary care

## 2025-02-10 NOTE — PROGRESS NOTES
Name: Poncho Wallace      : 1940      MRN: [id number]  Encounter Provider: Pennie Espinoza PA-C  Encounter Date: 2/10/2025   Encounter department: [facility]  :  Assessment & Plan  Infrarenal abdominal aortic aneurysm (AAA) without rupture (HCC)  AAA s/p EVAR duplex 2010  Bilateral popliteal ectasia    -No symptoms of AAA  -No abdominal/ back/ flank pain; no claudication    Imaging:  -EVAR 24:  AAA s/p widely patent EVAR. Residual sac 3.3 (prior 3.3).    Iliacs distal to the graft R 1.9 cm, L 1.4 cm and unchanged.    Patent celiac, SMA and B renals    -DESIREE 24:    R 1.16/184/133; mild, diffuse CFA, SFA, PFA, popliteal s significant stneosis. Ectatic pop 0.9    L 1.14/170/128; mild, diffuse CFA, SFA, PFA, popliteal s  significant stenosis. Ectatic pop 1    A/P   -Stable AAA s/p EVAR repair  -EVAR duplex shows patent endovascular repair with stable residual sac size 3.3 cm  -Patient education regarding AAA provided, including sx for which he should call 911  -Continue with medical therapy and healthy lifestyle changes  -Continue with simvastatin and recommend aspirin 81 ec three days weekly  -Follow up EVAR duplex in 1 year with OV  Orders:    VAS ARTERIAL DUPLEX- LOWER LIMB BILATERAL; Future     VAS EVAR DUPLEX EVALUATION; Future    Stage 3a chronic kidney disease (HCC)  Lab Results   Component Value Date    EGFR 70 2025    EGFR 58 2023    EGFR 53 08/15/2023    CREATININE 1.06 2025    CREATININE 1.17 2023    CREATININE 1.26 08/15/2023          Primary hypertension  -Maintain good blood pressure and cholesterol control per primary care       Mixed hyperlipidemia  -No recent lipid profile; continue statin therapy with mgt as per primary care  -Maintain good blood pressure and cholesterol control per primary care       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HCC)  -CTA c/a/p LVHN: 43 mm ascending thoracic aortic aneurysm  -Stable mild dilatation of the ascending  aorta which was 41 mm in 2017  Orders:    Ambulatory Referral to Cardiology; Future        History of Present Illness   Patient had an EVAR duplex and DESIREE on 12/5/24.    HPI  Poncho Wallace is a 84 y.o. male seizures, nonrheumatic aortic stenosis, 4.1 centimeter ascending aortic ectasia, AAA status post EVAR '10. He presents for annual [facility] vascular follow-up and review of recent testing.       Mr. Wallace is s/p AAA EVAR repair. No AAA symptoms. He has a chronic neuropathy since at least 2013.         2/10/25:  Mr. Wallace offers no new complaints. He medical changes since he was last seen.  No abdominal, back or flank pain.  No claudication.  He generally walks is much as he wants to walk without any leg pain or claudication.  Chronic neuropathy in the feet not worsening.  No ischemic rest pain or tissue loss..     We reviewed his recent labs and testing.    EVAR duplex which shows patent EVAR graft with AAA and 3.3 cm residual sac which is same as last year.    He had a baseline lower extremity arterial duplex study which shows mild, diffuse disease throughout the arteries without significant stenosis and preserved ABIs are normal R 1.16 and L 1.14 with metatarsal and toe pressures well above the healing level. There are triphasic waveforms at the ankles. However, he does not have palpable pulses.    They also has an ectatic ascending aneurysm which has been small and stable since 2017.  He has no chest pain.  There would be no indication for surgery, but will refer to cardiology for monitoring of this problem.    He is on simvastatin 40.  He has not been reliably taking enteric-coated aspirin.  Since he does have atherosclerotic disease and aneurysm, enteric-coated 81 mg aspirin 3 days a week was again recommended.    EVAR 12/31/24  [facility]PORT  CLINICAL:  Indications:  Post operative surveillance protocol for AAA s/p Endovascular  Aortic Repair. Patient is asymptomatic.  Clinical:  Operative  History:  2010-09-16 Endovascular graft, abdominal aortic aneurysm Endoluminal graft  2010-09-16 EVAR - AAA repair - [place] Excluder graft and Cardiomems  2010-09-16 Insertion CardioMems Sensor Endoluminal graft  Risk Factors  The patient has history of HTN, Hyperlipidemia, CKD and PAD.  Clinical  Right Pressure:  146/ mm Hg, Left Pressure:  134/ mm Hg.     FINDINGS:     Unilateral                Impression  PSV  EDV  AP (cm)  TRV (cm)    AAA Sac                                             2.9       3.3    Prox Fixation                          64           2.1              Prox Main Stem Endograft               51                            Dist Main Stem Endograft               51                            Sup-Damari Ao                             64    3      2.3              Celiac                    Normal      116   25                       Prox. SMA                 Normal      181   32                          Right                      Impression  PSV  EDV  AP (cm)    Prox Limb Endograft                     85                  Mid Limb Endograft                      50                  Dist Limb Endograft                     55                  Distal endograft fixation               68           1.9    Prox. EIA                  Normal       88    0      1.0    Dist EIA                                94    0      1.0    Prox Renal                 Normal      147   31                Left                       Impression  PSV  EDV  AP (cm)    Prox Limb Endograft                     77                  Mid Limb Endograft                      81                  Dist Limb Endograft                     55                  Distal endograft fixation               94           1.4    Prox. EIA                  Normal       85    9      1.1    Dist EIA                               106    0      1.1    Prox Renal                 Normal      147   33                      CONCLUSION:     Impression  Duplex evaluation of  the abdominal aortic aneurysm post EVAR shows a widely  patent endograft without evidence of endoleak.  Residual AAA sac size is 2.9 cm x 3.3 cm.  (Prior: 3.3 cm)  The iliac arteries distal to the graft measures right: 1.9 cm, (Prior: 1.9 cm)  and left: 1.4 cm, (Prior: 1.4 cm).  Patent celiac, superior mesenteric and bilateral renal arteries.    DISTAL PERFUSION:  Ankle/Brachial indices: Rt - 1.16. normal range (Prior 1.28 ) and Lt - 1.14,  normal range (Prior 1.27)  Great toe pressures are within the healing range.  PVR/ PPG tracings are normal.     Compared to previous study on 12/04/2023, there is no significant change.  Recommend repeat testing in 1 year as per protocol unless otherwise indicated.      DESIREE 12/31/24  FINDINGS:     Right                  Impression  Waveform   PSV (cm/s)  EDV  AP (cm)    Post. Tibial                       Triphasic                              Ant. Tibial                        Triphasic                              Common Femoral Artery                                 44    4             Prox Profunda                                         44    0             Prox SFA                                              58    4             Mid SFA                                               63    6             Dist SFA                                              42    5             Proximal Pop           Ectatic                        32    0      0.9    Distal Pop                                            41    0      0.9    Tibioperoneal                                         63                  Dist Post Tibial                                      46    4             Dist. Ant. Tibial                                     51    0             Dist Peroneal                                         39    0                Left                   Impression  Waveform   PSV (cm/s)  EDV  AP (cm)    Post. Tibial                       Triphasic                              Ant. Tibial                         Triphasic                              Common Femoral Artery                                 82    5             Prox Profunda                                         44   15             Prox SFA                                              64    5             Mid SFA                                               62    6             Dist SFA                                              68    6             Proximal Pop           Ectatic                        49    8      1.0    Distal Pop                                            39    0      0.9    Tibioperoneal                                         99                  Dist Post Tibial                                      82    0             Dist. Ant. Tibial                                     71    2             Dist Peroneal                                         40    0                      CONCLUSION:     Impression:  RIGHT LOWER LIMB:  Mild diffuse disease noted throughout the common femoral, superficial femoral,  proximal profunda femoris and popliteal arteries, with no significant stenosis.  Popliteal artery is ectatic measuring 0.9 cm. (Prior: 0.9 cm).  Ankle/Brachial index:  1.16  which is in the normal category. (Prior: 1.28).  PVR/ PPG tracings are normal .  Metatarsal pressure of 184 mm Hg  Great toe pressure of 133 mm Hg, within the healing range. (Prior: 107 mm Hg).  Pedal Acceleration time: 100 ms which is in the normal range.    LEFT LOWER LIMB:  Mild diffuse disease noted throughout the common femoral, superficial femoral,  proximal profunda femoris and popliteal arteries, with no significant stenosis.  Popliteal artery is ectatic measuring 1.0 cm. (Prior: 1.0 cm).  Ankle/Brachial index:   1.14  which is in the normal category.  (Prior: 1.27).  PVR/ PPG tracings are normal.  Metatarsal pressure of 170 mm Hg  Great toe pressure of 128 mm Hg, within the healing range. (Prior: 118mm Hg).  Pedal Acceleration time: 120 ms which is  "in the normal range.     Compared to previous study on 12/04/2023, there is no significant progression  of disease.      Review of Systems   Constitutional:  Negative for appetite change and unexpected weight change.   Gastrointestinal:  Negative for abdominal distention, abdominal pain, constipation, diarrhea, nausea and vomiting.   Musculoskeletal:  Positive for back pain (for the past month).   Skin:  Negative for color change and wound.          Objective   /66 (BP Location: Right arm, Patient Position: Sitting)   Pulse 75   Resp 18   Ht 5' 10\" (1.778 m)   Wt 82.6 kg (182 lb)   SpO2 97%   BMI 26.11 kg/m²     Nontender, nonpalpable ao  Femoral and distal pulses intact    Physical Exam  Vitals and nursing note reviewed.   Constitutional:       Appearance: He is well-developed.   HENT:      Head: Normocephalic and atraumatic.   Eyes:      Pupils: Pupils are equal, round, and reactive to light.   Neck:      Thyroid: No thyromegaly.      Vascular: No JVD.      Trachea: Trachea normal.   Cardiovascular:      Rate and Rhythm: Normal rate and regular rhythm.      Pulses:           Carotid pulses are 2+ on the right side and 2+ on the left side.       Radial pulses are 2+ on the right side and 2+ on the left side.        Femoral pulses are 3+ on the right side and 2+ on the left side.       Dorsalis pedis pulses are 2+ on the right side and 2+ on the left side.        Posterior tibial pulses are 2+ on the right side and 2+ on the left side.      Heart sounds: S1 normal and S2 normal. Murmur heard.      Systolic murmur is present with a grade of 2/6.      No friction rub. No gallop.   Pulmonary:      Effort: Pulmonary effort is normal. No accessory muscle usage or respiratory distress.      Breath sounds: Normal breath sounds. No wheezing or rales.   Abdominal:      General: Bowel sounds are normal. There is no distension.      Palpations: Abdomen is soft.      Tenderness: There is no abdominal tenderness. " "  Musculoskeletal:         General: No deformity. Normal range of motion.      Cervical back: Neck supple.      Right lower leg: No edema.      Left lower leg: No edema.   Skin:     General: Skin is warm and dry.      Findings: No lesion or rash.      Nails: There is no clubbing.   Neurological:      Mental Status: He is alert and oriented to person, place, and time.      Comments: Grossly normal    Psychiatric:         Behavior: Behavior is cooperative.         I have reviewed and made appropriate changes to the review of systems input by the medical assistant.    Vitals:    02/10/25 0824   BP: 118/66   BP Location: Right arm   Patient Position: Sitting   Pulse: 75   Resp: 18   SpO2: 97%   Weight: 82.6 kg (182 lb)   Height: 5' 10\" (1.778 m)       Patient Active Problem List   Diagnosis    Hyperlipidemia    HTN (hypertension)    AAA (abdominal aortic aneurysm) (HCC)    Disc degeneration, lumbar    Diverticulosis    Hyperuricemia    Osteoarthrosis, hand    Partial idiopathic epilepsy with seizures of localized onset, not intractable, with status epilepticus (HCC)    Nonrheumatic aortic valve stenosis    Nonrheumatic mitral valve regurgitation    Nonrheumatic aortic valve insufficiency    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HCC)    Stage 3a chronic kidney disease (HCC)    Seizure (HCC)    B12 deficiency    Celiac artery stenosis (HCC)       Past Surgical History:   Procedure Laterality Date    ABDOMINAL AORTIC ANEURYSM REPAIR, ENDOVASCULAR N/A     MD COLONOSCOPY FLX DX W/COLLJ SPEC WHEN PFRMD N/A 6/7/2017    Procedure: COLONOSCOPY;  Surgeon: NORM Lilly MD;  Location: [facility];  Service: Colorectal    MD COLONOSCOPY FLX DX W/COLLJ SPEC WHEN PFRMD N/A 5/18/2018    Procedure: COLONOSCOPY;  Surgeon: NORM Lilly MD;  Location: [facility];  Service: Colorectal    TONSILLECTOMY      WISDOM TOOTH EXTRACTION Bilateral        Family History   Problem Relation Age of Onset    Aneurysm Mother         ABDMONIAL  " AORTA    Coronary artery disease Father     Coronary artery disease Brother        Social History     Socioeconomic History    Marital status: /Civil Union     Spouse name: Not on file    Number of children: Not on file    Years of education: Not on file    Highest education level: Not on file   Occupational History    Not on file   Tobacco Use    Smoking status: Former     Current packs/day: 0.00     Average packs/day: 3.0 packs/day for 10.0 years (30.0 ttl pk-yrs)     Types: Cigarettes     Start date: 1961     Quit date: 1971     Years since quittin.0    Smokeless tobacco: Never   Vaping Use    Vaping status: Never Used   Substance and Sexual Activity    Alcohol use: Not Currently     Comment: occasional    Drug use: No    Sexual activity: Not Currently   Other Topics Concern    Not on file   Social History Narrative    Not on file     Social Drivers of Health     Financial Resource Strain: Low Risk  (2023)    Overall Financial Resource Strain (CARDIA)     Difficulty of Paying Living Expenses: Not hard at all   Food Insecurity: No Food Insecurity (2022)    Hunger Vital Sign     Worried About Running Out of Food in the Last Year: Never true     Ran Out of Food in the Last Year: Never true   Transportation Needs: No Transportation Needs (2023)    PRAPARE - Transportation     Lack of Transportation (Medical): No     Lack of Transportation (Non-Medical): No   Physical Activity: Not on file   Stress: Not on file   Social Connections: Not on file   Intimate Partner Violence: Not on file   Housing Stability: Low Risk  (2022)    Housing Stability Vital Sign     Unable to Pay for Housing in the Last Year: No     Number of Places Lived in the Last Year: 1     Unstable Housing in the Last Year: No       Allergies   Allergen Reactions    Fenofibrate      Patient not aware of allergy    Hydrocodone-Acetaminophen      Patient not aware of allergy         Current Outpatient  Medications:     acetaminophen (TYLENOL) 500 mg tablet, Take 500 mg by mouth every 6 (six) hours as needed for mild pain, Disp: , Rfl:     diphenhydrAMINE HCl (BENADRYL ALLERGY PO), Take by mouth if needed, Disp: , Rfl:     lacosamide (VIMPAT) 100 mg tablet, Take 1 tablet (100 mg total) by mouth every 12 (twelve) hours, Disp: 180 tablet, Rfl: 1    levetiracetam (Keppra XR) 750 MG TB24 extended-release tablet, Take 3 tablets (2,250 mg total) by mouth daily, Disp: 270 tablet, Rfl: 3    simvastatin (ZOCOR) 40 mg tablet, Take 1 tablet (40 mg total) by mouth daily, Disp: 90 tablet, Rfl: 1    tamsulosin (FLOMAX) 0.4 mg, Take 1 capsule (0.4 mg total) by mouth daily with dinner, Disp: 90 capsule, Rfl: 3    cyanocobalamin (VITAMIN B-12) 500 MCG tablet, Take 2 tablets (1,000 mcg total) by mouth daily (Patient not taking: Reported on 5/8/2024), Disp: 180 tablet, Rfl: 3    tobramycin (TOBREX) 0.3 % SOLN, Administer 1 drop to the right eye 3 (three) times a day (Patient not taking: Reported on 12/24/2024), Disp: 5 mL, Rfl: 0

## 2025-03-06 ENCOUNTER — TELEPHONE (OUTPATIENT)
Age: 85
End: 2025-03-06

## 2025-03-06 ENCOUNTER — OFFICE VISIT (OUTPATIENT)
Dept: FAMILY MEDICINE CLINIC | Facility: CLINIC | Age: 85
End: 2025-03-06
Payer: MEDICARE

## 2025-03-06 VITALS
HEIGHT: 70 IN | HEART RATE: 70 BPM | TEMPERATURE: 97.9 F | DIASTOLIC BLOOD PRESSURE: 78 MMHG | OXYGEN SATURATION: 97 % | RESPIRATION RATE: 18 BRPM | SYSTOLIC BLOOD PRESSURE: 124 MMHG | WEIGHT: 183 LBS | BODY MASS INDEX: 26.2 KG/M2

## 2025-03-06 DIAGNOSIS — E78.2 MIXED HYPERLIPIDEMIA: ICD-10-CM

## 2025-03-06 DIAGNOSIS — E53.8 B12 DEFICIENCY: ICD-10-CM

## 2025-03-06 DIAGNOSIS — N18.31 STAGE 3A CHRONIC KIDNEY DISEASE (HCC): ICD-10-CM

## 2025-03-06 DIAGNOSIS — G40.001 PARTIAL IDIOPATHIC EPILEPSY WITH SEIZURES OF LOCALIZED ONSET, NOT INTRACTABLE, WITH STATUS EPILEPTICUS (HCC): ICD-10-CM

## 2025-03-06 DIAGNOSIS — R61 EXCESSIVE SWEATING: ICD-10-CM

## 2025-03-06 DIAGNOSIS — R56.9 SEIZURE (HCC): ICD-10-CM

## 2025-03-06 DIAGNOSIS — N40.1 BENIGN PROSTATIC HYPERPLASIA WITH NOCTURIA: ICD-10-CM

## 2025-03-06 DIAGNOSIS — I10 PRIMARY HYPERTENSION: Primary | ICD-10-CM

## 2025-03-06 DIAGNOSIS — R35.1 BENIGN PROSTATIC HYPERPLASIA WITH NOCTURIA: ICD-10-CM

## 2025-03-06 PROCEDURE — G0439 PPPS, SUBSEQ VISIT: HCPCS | Performed by: FAMILY MEDICINE

## 2025-03-06 PROCEDURE — G2211 COMPLEX E/M VISIT ADD ON: HCPCS | Performed by: FAMILY MEDICINE

## 2025-03-06 PROCEDURE — 99214 OFFICE O/P EST MOD 30 MIN: CPT | Performed by: FAMILY MEDICINE

## 2025-03-06 PROCEDURE — G0444 DEPRESSION SCREEN ANNUAL: HCPCS | Performed by: FAMILY MEDICINE

## 2025-03-06 NOTE — PATIENT INSTRUCTIONS
Medicare Preventive Visit Patient Instructions  Thank you for completing your Welcome to Medicare Visit or Medicare Annual Wellness Visit today. Your next wellness visit will be due in one year (3/7/2026).  The screening/preventive services that you may require over the next 5-10 years are detailed below. Some tests may not apply to you based off risk factors and/or age. Screening tests ordered at today's visit but not completed yet may show as past due. Also, please note that scanned in results may not display below.  Preventive Screenings:  Service Recommendations Previous Testing/Comments   Colorectal Cancer Screening  Colonoscopy    Fecal Occult Blood Test (FOBT)/Fecal Immunochemical Test (FIT)  Fecal DNA/Cologuard Test  Flexible Sigmoidoscopy Age: 45-75 years old   Colonoscopy: every 10 years (May be performed more frequently if at higher risk)  OR  FOBT/FIT: every 1 year  OR  Cologuard: every 3 years  OR  Sigmoidoscopy: every 5 years  Screening may be recommended earlier than age 45 if at higher risk for colorectal cancer. Also, an individualized decision between you and your healthcare provider will decide whether screening between the ages of 76-85 would be appropriate. Colonoscopy: 05/18/2018  FOBT/FIT: Not on file  Cologuard: Not on file  Sigmoidoscopy: Not on file          Prostate Cancer Screening Individualized decision between patient and health care provider in men between ages of 55-69   Medicare will cover every 12 months beginning on the day after your 50th birthday PSA: No results in last 5 years     Screening Not Indicated     Hepatitis C Screening Once for adults born between 1945 and 1965  More frequently in patients at high risk for Hepatitis C Hep C Antibody: Not on file        Diabetes Screening 1-2 times per year if you're at risk for diabetes or have pre-diabetes Fasting glucose: 92 mg/dL (12/4/2023)  A1C: 5.0 % (4/16/2023)  Screening Current   Cholesterol Screening Once every 5 years if  you don't have a lipid disorder. May order more often based on risk factors. Lipid panel: 05/31/2023  Screening Not Indicated  History Lipid Disorder      Other Preventive Screenings Covered by Medicare:  Abdominal Aortic Aneurysm (AAA) Screening: covered once if your at risk. You're considered to be at risk if you have a family history of AAA or a male between the age of 65-75 who smoking at least 100 cigarettes in your lifetime.  Lung Cancer Screening: covers low dose CT scan once per year if you meet all of the following conditions: (1) Age 55-77; (2) No signs or symptoms of lung cancer; (3) Current smoker or have quit smoking within the last 15 years; (4) You have a tobacco smoking history of at least 20 pack years (packs per day x number of years you smoked); (5) You get a written order from a healthcare provider.  Glaucoma Screening: covered annually if you're considered high risk: (1) You have diabetes OR (2) Family history of glaucoma OR (3)  aged 50 and older OR (4)  American aged 65 and older  Osteoporosis Screening: covered every 2 years if you meet one of the following conditions: (1) Have a vertebral abnormality; (2) On glucocorticoid therapy for more than 3 months; (3) Have primary hyperparathyroidism; (4) On osteoporosis medications and need to assess response to drug therapy.  HIV Screening: covered annually if you're between the age of 15-65. Also covered annually if you are younger than 15 and older than 65 with risk factors for HIV infection. For pregnant patients, it is covered up to 3 times per pregnancy.    Immunizations:  Immunization Recommendations   Influenza Vaccine Annual influenza vaccination during flu season is recommended for all persons aged >= 6 months who do not have contraindications   Pneumococcal Vaccine   * Pneumococcal conjugate vaccine = PCV13 (Prevnar 13), PCV15 (Vaxneuvance), PCV20 (Prevnar 20)  * Pneumococcal polysaccharide vaccine = PPSV23  (Pneumovax) Adults 19-63 yo with certain risk factors or if 65+ yo  If never received any pneumonia vaccine: recommend Prevnar 20 (PCV20)  Give PCV20 if previously received 1 dose of PCV13 or PPSV23   Hepatitis B Vaccine 3 dose series if at intermediate or high risk (ex: diabetes, end stage renal disease, liver disease)   Respiratory syncytial virus (RSV) Vaccine - COVERED BY MEDICARE PART D  * RSVPreF3 (Arexvy) CDC recommends that adults 60 years of age and older may receive a single dose of RSV vaccine using shared clinical decision-making (SCDM)   Tetanus (Td) Vaccine - COST NOT COVERED BY MEDICARE PART B Following completion of primary series, a booster dose should be given every 10 years to maintain immunity against tetanus. Td may also be given as tetanus wound prophylaxis.   Tdap Vaccine - COST NOT COVERED BY MEDICARE PART B Recommended at least once for all adults. For pregnant patients, recommended with each pregnancy.   Shingles Vaccine (Shingrix) - COST NOT COVERED BY MEDICARE PART B  2 shot series recommended in those 19 years and older who have or will have weakened immune systems or those 50 years and older     Health Maintenance Due:      Topic Date Due   • Colorectal Cancer Screening  Discontinued     Immunizations Due:      Topic Date Due   • Pneumococcal Vaccine: 65+ Years (1 of 1 - PCV) Never done   • Influenza Vaccine (1) Never done   • COVID-19 Vaccine (3 - 2024-25 season) 09/01/2024     Advance Directives   What are advance directives?  Advance directives are legal documents that state your wishes and plans for medical care. These plans are made ahead of time in case you lose your ability to make decisions for yourself. Advance directives can apply to any medical decision, such as the treatments you want, and if you want to donate organs.   What are the types of advance directives?  There are many types of advance directives, and each state has rules about how to use them. You may choose a  combination of any of the following:  Living will:  This is a written record of the treatment you want. You can also choose which treatments you do not want, which to limit, and which to stop at a certain time. This includes surgery, medicine, IV fluid, and tube feedings.   Durable power of  for healthcare (DPAHC):  This is a written record that states who you want to make healthcare choices for you when you are unable to make them for yourself. This person, called a proxy, is usually a family member or a friend. You may choose more than 1 proxy.  Do not resuscitate (DNR) order:  A DNR order is used in case your heart stops beating or you stop breathing. It is a request not to have certain forms of treatment, such as CPR. A DNR order may be included in other types of advance directives.  Medical directive:  This covers the care that you want if you are in a coma, near death, or unable to make decisions for yourself. You can list the treatments you want for each condition. Treatment may include pain medicine, surgery, blood transfusions, dialysis, IV or tube feedings, and a ventilator (breathing machine).  Values history:  This document has questions about your views, beliefs, and how you feel and think about life. This information can help others choose the care that you would choose.  Why are advance directives important?  An advance directive helps you control your care. Although spoken wishes may be used, it is better to have your wishes written down. Spoken wishes can be misunderstood, or not followed. Treatments may be given even if you do not want them. An advance directive may make it easier for your family to make difficult choices about your care.   Weight Management   Why it is important to manage your weight:  Being overweight increases your risk of health conditions such as heart disease, high blood pressure, type 2 diabetes, and certain types of cancer. It can also increase your risk for  osteoarthritis, sleep apnea, and other respiratory problems. Aim for a slow, steady weight loss. Even a small amount of weight loss can lower your risk of health problems.  How to lose weight safely:  A safe and healthy way to lose weight is to eat fewer calories and get regular exercise. You can lose up about 1 pound a week by decreasing the number of calories you eat by 500 calories each day.   Healthy meal plan for weight management:  A healthy meal plan includes a variety of foods, contains fewer calories, and helps you stay healthy. A healthy meal plan includes the following:  Eat whole-grain foods more often.  A healthy meal plan should contain fiber. Fiber is the part of grains, fruits, and vegetables that is not broken down by your body. Whole-grain foods are healthy and provide extra fiber in your diet. Some examples of whole-grain foods are whole-wheat breads and pastas, oatmeal, brown rice, and bulgur.  Eat a variety of vegetables every day.  Include dark, leafy greens such as spinach, kale, raymundo greens, and mustard greens. Eat yellow and orange vegetables such as carrots, sweet potatoes, and winter squash.   Eat a variety of fruits every day.  Choose fresh or canned fruit (canned in its own juice or light syrup) instead of juice. Fruit juice has very little or no fiber.  Eat low-fat dairy foods.  Drink fat-free (skim) milk or 1% milk. Eat fat-free yogurt and low-fat cottage cheese. Try low-fat cheeses such as mozzarella and other reduced-fat cheeses.  Choose meat and other protein foods that are low in fat.  Choose beans or other legumes such as split peas or lentils. Choose fish, skinless poultry (chicken or turkey), or lean cuts of red meat (beef or pork). Before you cook meat or poultry, cut off any visible fat.   Use less fat and oil.  Try baking foods instead of frying them. Add less fat, such as margarine, sour cream, regular salad dressing and mayonnaise to foods. Eat fewer high-fat foods. Some  examples of high-fat foods include french fries, doughnuts, ice cream, and cakes.  Eat fewer sweets.  Limit foods and drinks that are high in sugar. This includes candy, cookies, regular soda, and sweetened drinks.  Exercise:  Exercise at least 30 minutes per day on most days of the week. Some examples of exercise include walking, biking, dancing, and swimming. You can also fit in more physical activity by taking the stairs instead of the elevator or parking farther away from stores. Ask your healthcare provider about the best exercise plan for you.      © Copyright [employer] 2018 Information is for End User's use only and may not be sold, redistributed or otherwise used for commercial purposes. All illustrations and images included in CareNotes® are the copyrighted property of [employer]., Inc. or [employer]

## 2025-03-06 NOTE — ASSESSMENT & PLAN NOTE
Lab Results   Component Value Date    EGFR 70 01/21/2025    EGFR 58 12/04/2023    EGFR 53 08/15/2023    CREATININE 1.06 01/21/2025    CREATININE 1.17 12/04/2023    CREATININE 1.26 08/15/2023     Stable.  Avoid nephrotoxic medications.

## 2025-03-06 NOTE — TELEPHONE ENCOUNTER
New Patient    Appointment Scheduling  What office location does the patient prefer?: [facility]  What is the reason for the patient's appointment?: BPH  Have patient records been requested?: N  If No, are the records showing in Epic: Y    Appointment Details  Date: 3/21  Time:    8:20 AM  Location:   [facility]  Provider:  [facility]  Does the appointment need further review? N  HISTORY  Is the patient having active symptoms? If so, describe symptoms:  Has the patient had any previous Urologist(s)?: N  Was the patient seen in the ED?: N  Has the patient had any outside testing done?:N  Does patient have Imaging/Lab Results: In Epic  Have you had Cancer: N    INSURANCE   Have you confirmed Patient's insurance? Y  Is the insurance accepted?  Y   Is the insurance active? Y

## 2025-03-06 NOTE — ASSESSMENT & PLAN NOTE
Blood pressure remains well-controlled off medications.  Continue to monitor  Orders:    Comprehensive metabolic panel; Future    CBC and differential; Future

## 2025-03-07 ENCOUNTER — APPOINTMENT (OUTPATIENT)
Dept: LAB | Facility: CLINIC | Age: 85
End: 2025-03-07
Payer: MEDICARE

## 2025-03-07 DIAGNOSIS — R61 EXCESSIVE SWEATING: ICD-10-CM

## 2025-03-07 DIAGNOSIS — E53.8 B12 DEFICIENCY: ICD-10-CM

## 2025-03-07 DIAGNOSIS — I10 PRIMARY HYPERTENSION: ICD-10-CM

## 2025-03-07 LAB
ALBUMIN SERPL BCG-MCNC: 4.2 G/DL (ref 3.5–5)
ALP SERPL-CCNC: 97 U/L (ref 34–104)
ALT SERPL W P-5'-P-CCNC: 20 U/L (ref 7–52)
ANION GAP SERPL CALCULATED.3IONS-SCNC: 8 MMOL/L (ref 4–13)
AST SERPL W P-5'-P-CCNC: 24 U/L (ref 13–39)
BASOPHILS # BLD AUTO: 0.02 THOUSANDS/ÂΜL (ref 0–0.1)
BASOPHILS NFR BLD AUTO: 0 % (ref 0–1)
BILIRUB SERPL-MCNC: 0.79 MG/DL (ref 0.2–1)
BUN SERPL-MCNC: 16 MG/DL (ref 5–25)
CALCIUM SERPL-MCNC: 9.2 MG/DL (ref 8.4–10.2)
CHLORIDE SERPL-SCNC: 107 MMOL/L (ref 96–108)
CO2 SERPL-SCNC: 29 MMOL/L (ref 21–32)
CREAT SERPL-MCNC: 1.18 MG/DL (ref 0.6–1.3)
EOSINOPHIL # BLD AUTO: 0.39 THOUSAND/ÂΜL (ref 0–0.61)
EOSINOPHIL NFR BLD AUTO: 5 % (ref 0–6)
ERYTHROCYTE [DISTWIDTH] IN BLOOD BY AUTOMATED COUNT: 12.2 % (ref 11.6–15.1)
GFR SERPL CREATININE-BSD FRML MDRD: 57 ML/MIN/1.73SQ M
GLUCOSE P FAST SERPL-MCNC: 89 MG/DL (ref 65–99)
HCT VFR BLD AUTO: 45.5 % (ref 36.5–49.3)
HGB BLD-MCNC: 14.9 G/DL (ref 12–17)
IMM GRANULOCYTES # BLD AUTO: 0.03 THOUSAND/UL (ref 0–0.2)
IMM GRANULOCYTES NFR BLD AUTO: 0 % (ref 0–2)
LYMPHOCYTES # BLD AUTO: 1.61 THOUSANDS/ÂΜL (ref 0.6–4.47)
LYMPHOCYTES NFR BLD AUTO: 19 % (ref 14–44)
MCH RBC QN AUTO: 32.3 PG (ref 26.8–34.3)
MCHC RBC AUTO-ENTMCNC: 32.7 G/DL (ref 31.4–37.4)
MCV RBC AUTO: 99 FL (ref 82–98)
MONOCYTES # BLD AUTO: 0.52 THOUSAND/ÂΜL (ref 0.17–1.22)
MONOCYTES NFR BLD AUTO: 6 % (ref 4–12)
NEUTROPHILS # BLD AUTO: 5.87 THOUSANDS/ÂΜL (ref 1.85–7.62)
NEUTS SEG NFR BLD AUTO: 70 % (ref 43–75)
NRBC BLD AUTO-RTO: 0 /100 WBCS
PLATELET # BLD AUTO: 146 THOUSANDS/UL (ref 149–390)
PMV BLD AUTO: 11.2 FL (ref 8.9–12.7)
POTASSIUM SERPL-SCNC: 4.9 MMOL/L (ref 3.5–5.3)
PROT SERPL-MCNC: 6.7 G/DL (ref 6.4–8.4)
RBC # BLD AUTO: 4.61 MILLION/UL (ref 3.88–5.62)
SODIUM SERPL-SCNC: 144 MMOL/L (ref 135–147)
TSH SERPL DL<=0.05 MIU/L-ACNC: 2.63 UIU/ML (ref 0.45–4.5)
VIT B12 SERPL-MCNC: 337 PG/ML (ref 180–914)
WBC # BLD AUTO: 8.44 THOUSAND/UL (ref 4.31–10.16)

## 2025-03-07 PROCEDURE — 80053 COMPREHEN METABOLIC PANEL: CPT

## 2025-03-07 PROCEDURE — 85025 COMPLETE CBC W/AUTO DIFF WBC: CPT

## 2025-03-07 PROCEDURE — 84443 ASSAY THYROID STIM HORMONE: CPT

## 2025-03-07 PROCEDURE — 36415 COLL VENOUS BLD VENIPUNCTURE: CPT

## 2025-03-07 PROCEDURE — 82607 VITAMIN B-12: CPT

## 2025-03-13 ENCOUNTER — APPOINTMENT (OUTPATIENT)
Dept: LAB | Facility: CLINIC | Age: 85
End: 2025-03-13
Payer: MEDICARE

## 2025-03-13 ENCOUNTER — OFFICE VISIT (OUTPATIENT)
Dept: CARDIOLOGY CLINIC | Facility: CLINIC | Age: 85
End: 2025-03-13
Payer: MEDICARE

## 2025-03-13 VITALS
WEIGHT: 181 LBS | BODY MASS INDEX: 25.97 KG/M2 | DIASTOLIC BLOOD PRESSURE: 66 MMHG | HEART RATE: 87 BPM | OXYGEN SATURATION: 96 % | SYSTOLIC BLOOD PRESSURE: 148 MMHG

## 2025-03-13 DIAGNOSIS — I10 PRIMARY HYPERTENSION: ICD-10-CM

## 2025-03-13 DIAGNOSIS — E53.8 B12 DEFICIENCY: Primary | ICD-10-CM

## 2025-03-13 DIAGNOSIS — E78.2 MIXED HYPERLIPIDEMIA: ICD-10-CM

## 2025-03-13 DIAGNOSIS — Z13.220 SCREENING CHOLESTEROL LEVEL: ICD-10-CM

## 2025-03-13 DIAGNOSIS — N18.31 STAGE 3A CHRONIC KIDNEY DISEASE (HCC): ICD-10-CM

## 2025-03-13 DIAGNOSIS — I35.0 MODERATE AORTIC STENOSIS BY PRIOR ECHOCARDIOGRAM: ICD-10-CM

## 2025-03-13 DIAGNOSIS — I35.1 NONRHEUMATIC AORTIC VALVE INSUFFICIENCY: ICD-10-CM

## 2025-03-13 DIAGNOSIS — I71.43 INFRARENAL ABDOMINAL AORTIC ANEURYSM (AAA) WITHOUT RUPTURE (HCC): ICD-10-CM

## 2025-03-13 DIAGNOSIS — I77.810 MILD DILATION OF ASCENDING AORTA (HCC): ICD-10-CM

## 2025-03-13 LAB
CHOLEST SERPL-MCNC: 140 MG/DL (ref ?–200)
HDLC SERPL-MCNC: 44 MG/DL
LDLC SERPL CALC-MCNC: 78 MG/DL (ref 0–100)
TRIGL SERPL-MCNC: 92 MG/DL (ref ?–150)

## 2025-03-13 PROCEDURE — 99214 OFFICE O/P EST MOD 30 MIN: CPT | Performed by: STUDENT IN AN ORGANIZED HEALTH CARE EDUCATION/TRAINING PROGRAM

## 2025-03-13 PROCEDURE — 36415 COLL VENOUS BLD VENIPUNCTURE: CPT

## 2025-03-13 PROCEDURE — 93000 ELECTROCARDIOGRAM COMPLETE: CPT | Performed by: STUDENT IN AN ORGANIZED HEALTH CARE EDUCATION/TRAINING PROGRAM

## 2025-03-13 PROCEDURE — 80061 LIPID PANEL: CPT

## 2025-03-13 NOTE — ASSESSMENT & PLAN NOTE
Stable AAA s/p EVAR repair  EVAR duplex shows patent endovascular repair with stable residual sac size 3.3 cm

## 2025-03-13 NOTE — PROGRESS NOTES
[facility]  [address]  [address]  [facility] [address]  Phone#  [phone]  Fax#  [phone]  [facility]'s Cardiology Office Consultation             NAME: Poncho Wallace  AGE: 84 y.o. SEX: male   : 1940   MRN: [id number]    DATE: 3/13/2025  TIME: 3:41 PM    Assessment & Plan  Infrarenal abdominal aortic aneurysm (AAA) without rupture (HCC)  Stable AAA s/p EVAR repair  EVAR duplex shows patent endovascular repair with stable residual sac size 3.3 cm  Primary hypertension  Not currently on any medications.  His blood pressure is elevated today at 148/66.  However, per review of prior office visits, his blood pressure is usually better controlled with systolics in the 110s to 120s.  Therefore, I recommended that he do home blood pressure monitoring, and call us in approximately 1 week with his blood pressure readings to see if we need to add an antihypertensive medication in the setting of uncontrolled hypertension and ascending aortic aneurysm.  We did discuss the importance of aggressive blood pressure management with his ascending aortic aneurysm.  Patient follow a low-salt diet.  Moderate aortic stenosis by prior echocardiogram  Last echocardiogram from 2023 showed aortic valve area of 1.2 cm and mean gradient of 34.  Will repeat surveillance echocardiogram at this time.  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HCC)  We will obtain a transthoracic echocardiogram to see if measurements from echo correlate with measurements of ascending aorta on CT from [facility].  They reported a 4 x 4.3 cm aneurysm in the ascending aorta.  Nonrheumatic aortic valve insufficiency  Mild on transthoracic echocardiogram 3/2023.  Would need follow-up echo in 3 years, but we are obtaining 1 at this time for evaluation of other issues.  Mixed hyperlipidemia  Has not had lipid panel checked in 2 years.  Will repeat lipid panel continue simvastatin 40 mg daily.  Stage 3a chronic kidney disease  (HCC)  Avoiding nephrotoxic medications    Follow up 6 months for results review      ------     Chief Complaint   Patient presents with    Follow-up       HPI:    Poncho Wallace is a 84 y.o.-year-old male who presents to the cardiology clinic to establish care.    Past medical history significant for abdominal aortic aneurysm status post EVAR, hypertension, mildly dilated ascending aorta, mild aortic insufficiency chronic kidney disease stage III AA, hyperlipidemia, seizure disorder.    He  has a past medical history of Colon polyps, Epilepsy (HCC), Gout, Hyperlipidemia, Hypertension, S/P AAA repair, Subdural hematoma (HCC) (1/27/2022), and Tear of right rotator cuff (04/04/2017).    He was referred by vascular surgery due to mild dilated ascending aorta seen on last transthoracic echocardiogram.  Ascending aorta measured 4.3 cm 4/16/23 mild aortic regurgitation and moderate aortic stenosis.  He also had a CT of his chest done at [facility] in January of this year.  They reported a 4 x 4.3 cm ascending aortic aneurysm, suggesting that it is stable in size.    He presents today without any concerns.  He denies any chest pain, chest discomfort, shortness of breath at rest, dyspnea on exertion, lightheadedness, dizziness, presyncope, syncope.  He reports that his main concern is a possible urinary tract infection.    /66 today. Better on prior office visits.    ------    I personally reviewed the patient's pertinent cardiac imaging and labs in Epic:      Reviewed his ECG from today which showed normal sinus rhythm, LVH, possible inferior infarct.  This looks similar to his prior ECGs, however his inferior Q waves are more prominent.      1/21/25 CT C/A/P from [facility] report only - 4 x 4.3 cm ascending thoracic aortic aneurysm     4/16/23 echo  LVEF 70%.  Normal RV size and function.  Moderate aortic stenosis with mild aortic regurgitation.  Aortic valve area 1.2 cm² with mean aortic gradient of 35  mmHg.  Aortic root was mildly dilated at 4.4 cm, and ascending aorta mildly dilated at 4.2 cm.    Labs below.    -----    Past history, family history, social history, current medications, vital signs, recent lab and imaging studies and  prior cardiology studies reviewed independently on this visit.    Allergies   Allergen Reactions    Fenofibrate      Patient not aware of allergy    Hydrocodone-Acetaminophen      Patient not aware of allergy       Current Outpatient Medications:     acetaminophen (TYLENOL) 500 mg tablet, Take 500 mg by mouth every 6 (six) hours as needed for mild pain, Disp: , Rfl:     ASPIRIN 81 PO, Take by mouth, Disp: , Rfl:     lacosamide (VIMPAT) 100 mg tablet, Take 1 tablet (100 mg total) by mouth every 12 (twelve) hours, Disp: 180 tablet, Rfl: 1    levetiracetam (Keppra XR) 750 MG TB24 extended-release tablet, Take 3 tablets (2,250 mg total) by mouth daily, Disp: 270 tablet, Rfl: 3    simvastatin (ZOCOR) 40 mg tablet, Take 1 tablet (40 mg total) by mouth daily, Disp: 90 tablet, Rfl: 1    tamsulosin (FLOMAX) 0.4 mg, Take 1 capsule (0.4 mg total) by mouth daily with dinner, Disp: 90 capsule, Rfl: 3    diphenhydrAMINE HCl (BENADRYL ALLERGY PO), Take by mouth if needed (Patient not taking: Reported on 3/13/2025), Disp: , Rfl:     Past Medical History:   Diagnosis Date    Colon polyps     Epilepsy (HCC)     Gout     Hyperlipidemia     Hypertension     S/P AAA repair     Subdural hematoma (HCC) 1/27/2022    Tear of right rotator cuff 04/04/2017     Past Surgical History:   Procedure Laterality Date    ABDOMINAL AORTIC ANEURYSM REPAIR, ENDOVASCULAR N/A     WY COLONOSCOPY FLX DX W/COLLJ SPEC WHEN PFRMD N/A 6/7/2017    Procedure: COLONOSCOPY;  Surgeon: NORM Lilly MD;  Location: [facility];  Service: Colorectal    WY COLONOSCOPY FLX DX W/COLLJ SPEC WHEN PFRMD N/A 5/18/2018    Procedure: COLONOSCOPY;  Surgeon: NORM Lilly MD;  Location: [facility];  Service: Colorectal    TONSILLECTOMY       WISDOM TOOTH EXTRACTION Bilateral      Family History   Problem Relation Age of Onset    Aneurysm Mother         ABDMONIAL  AORTA    Coronary artery disease Father     Coronary artery disease Brother        Social History   reports that he quit smoking about 54 years ago. His smoking use included cigarettes. He started smoking about 64 years ago. He has a 30 pack-year smoking history. He has never used smokeless tobacco. He reports that he does not currently use alcohol. He reports that he does not use drugs.     Review of Systems   Constitutional:  Negative for fatigue.   Respiratory:  Negative for chest tightness and shortness of breath.    Cardiovascular:  Negative for chest pain, palpitations and leg swelling.   Neurological:  Negative for dizziness and light-headedness.         Objective:     Vitals:    03/13/25 1534   BP: 148/66   Pulse: 87   SpO2: 96%     Physical Exam  Vitals and nursing note reviewed.   Constitutional:       General: He is not in acute distress.     Appearance: Normal appearance. He is well-developed.   HENT:      Head: Normocephalic and atraumatic.   Cardiovascular:      Rate and Rhythm: Normal rate and regular rhythm.      Heart sounds: Murmur heard.      Crescendo decrescendo systolic murmur is present with a grade of 3/6.      Diastolic murmur is present with a grade of 1/4.   Pulmonary:      Effort: Pulmonary effort is normal. No respiratory distress.      Breath sounds: Normal breath sounds.   Abdominal:      General: There is no distension.   Musculoskeletal:         General: No swelling.   Skin:     General: Skin is warm and dry.   Neurological:      Mental Status: He is alert.   Psychiatric:         Mood and Affect: Mood normal.           Pertinent Laboratory/Diagnostic Studies:    Laboratory studies reviewed personally by Anat Sanchez MD    BMP:   Lab Results   Component Value Date    SODIUM 144 03/07/2025    K 4.9 03/07/2025     03/07/2025    CO2 29 03/07/2025    BUN  "16 03/07/2025    CREATININE 1.18 03/07/2025    GLUC 169 (H) 01/21/2025    CALCIUM 9.2 03/07/2025     CBC:  Lab Results   Component Value Date    WBC 8.44 03/07/2025    HGB 14.9 03/07/2025    HCT 45.5 03/07/2025    MCV 99 (H) 03/07/2025     (L) 03/07/2025     Lipid Profile:   Lab Results   Component Value Date    HDL 53 05/31/2023     Lab Results   Component Value Date    LDLCALC 58 05/31/2023     Lab Results   Component Value Date    TRIG 161 (H) 05/31/2023      Other labs:  Lab Results   Component Value Date    XPP0JEHUHVIZ 2.632 03/07/2025     Lab Results   Component Value Date    ALT 20 03/07/2025    AST 24 03/07/2025       Imaging Studies:     Pertinent cardiac studies and imaging studies were personally reviewed on this visit and results summarized above.    Anat Sanchez MD, PhD    Portions of the record may have been created with voice recognition software.  Occasional wrong word or \"sound alike\" substitutions may have occurred due to the inherent limitations of voice recognition software.  Read the chart carefully and recognize, using context, where substitutions have occurred. Please reach out to me directly for any clarifications.   "

## 2025-03-13 NOTE — ASSESSMENT & PLAN NOTE
Mild on transthoracic echocardiogram 3/2023.  Would need follow-up echo in 3 years, but we are obtaining 1 at this time for evaluation of other issues.

## 2025-03-13 NOTE — ASSESSMENT & PLAN NOTE
Has not had lipid panel checked in 2 years.  Will repeat lipid panel continue simvastatin 40 mg daily.

## 2025-03-13 NOTE — PATIENT INSTRUCTIONS
Check blood pressures at home once a day. Call us in 1 week with readings.  We will get an echocardiogram or ultrasound of your heart.

## 2025-03-13 NOTE — ASSESSMENT & PLAN NOTE
We will obtain a transthoracic echocardiogram to see if measurements from echo correlate with measurements of ascending aorta on CT from [facility].  They reported a 4 x 4.3 cm aneurysm in the ascending aorta.

## 2025-03-13 NOTE — ASSESSMENT & PLAN NOTE
Not currently on any medications.  His blood pressure is elevated today at 148/66.  However, per review of prior office visits, his blood pressure is usually better controlled with systolics in the 110s to 120s.  Therefore, I recommended that he do home blood pressure monitoring, and call us in approximately 1 week with his blood pressure readings to see if we need to add an antihypertensive medication in the setting of uncontrolled hypertension and ascending aortic aneurysm.  We did discuss the importance of aggressive blood pressure management with his ascending aortic aneurysm.  Patient follow a low-salt diet.

## 2025-03-14 ENCOUNTER — TELEPHONE (OUTPATIENT)
Age: 85
End: 2025-03-14

## 2025-03-14 ENCOUNTER — RESULTS FOLLOW-UP (OUTPATIENT)
Dept: NON INVASIVE DIAGNOSTICS | Facility: HOSPITAL | Age: 85
End: 2025-03-14

## 2025-03-14 NOTE — TELEPHONE ENCOUNTER
Caller: Poncho Wallace    Doctor: Dr. Sanchez    Reason for call:  Patient  called in today to give Blood Pressure readings per Dr Sanchez instructions.  03/14/2025 137/65  7:00 AM    Call back#: [phone]

## 2025-03-21 ENCOUNTER — CONSULT (OUTPATIENT)
Dept: UROLOGY | Facility: CLINIC | Age: 85
End: 2025-03-21
Payer: MEDICARE

## 2025-03-21 VITALS
HEIGHT: 70 IN | OXYGEN SATURATION: 96 % | BODY MASS INDEX: 25.91 KG/M2 | HEART RATE: 79 BPM | DIASTOLIC BLOOD PRESSURE: 80 MMHG | WEIGHT: 181 LBS | SYSTOLIC BLOOD PRESSURE: 124 MMHG

## 2025-03-21 DIAGNOSIS — N40.1 BENIGN PROSTATIC HYPERPLASIA WITH NOCTURIA: Primary | ICD-10-CM

## 2025-03-21 DIAGNOSIS — R35.1 BENIGN PROSTATIC HYPERPLASIA WITH NOCTURIA: Primary | ICD-10-CM

## 2025-03-21 DIAGNOSIS — R35.0 URINARY FREQUENCY: ICD-10-CM

## 2025-03-21 LAB — POST-VOID RESIDUAL VOLUME, ML POC: 42 ML

## 2025-03-21 PROCEDURE — 99203 OFFICE O/P NEW LOW 30 MIN: CPT

## 2025-03-21 PROCEDURE — 51798 US URINE CAPACITY MEASURE: CPT

## 2025-03-21 RX ORDER — TROSPIUM CHLORIDE 20 MG/1
20 TABLET, FILM COATED ORAL 2 TIMES DAILY
Qty: 60 TABLET | Refills: 3 | Status: SHIPPED | OUTPATIENT
Start: 2025-03-21

## 2025-03-21 NOTE — PROGRESS NOTES
Name: Poncho Wallace      : 1940      MRN: [id number]  Encounter Provider: BRIANA Hernandez  Encounter Date: 3/21/2025   Encounter department: [facility]  :  Assessment & Plan  Benign prostatic hyperplasia with nocturia  -Continue 0.4 mg tamsulosin.   -PVR 42 mL today, indicative patient is emptying bladder.  Orders:    Ambulatory Referral to Urology    POCT Measure PVR    Urinary frequency  -Initiate Sanctura 20 mg twice daily.  Side effect profile and usage discussed.  -We also discussed reducing fluid intake before bedtime to help prevent nocturia.  Patient is bothered by daytime urinary frequency.  Consumes little bladder irritants.  -Patient will return in 8 weeks for follow-up for symptom reevaluation with repeat PVR.  -All questions addressed.  Please do not hesitate to reach out with further questions or concerns.  Orders:    trospium chloride (SANCTURA) 20 mg tablet; Take 1 tablet (20 mg total) by mouth 2 (two) times a day        History of Present Illness   Poncho Wlalace is a 84 y.o. male who presents in consultation for BPH with lower urinary tract symptoms.  Patient has past medical history of hypertension, abdominal aortic aneurysm, aortic valve stenosis, mitral valve regurgitation, celiac artery stenosis, diverticulosis, osteoarthrosis, disc degeneration, stage III CKD, seizure, hyperlipidemia.    Patient denies previous urologic history or history of  manipulation.  He denies history of recurrent UTIs and kidney stones.  He denies family history of  malignancy.    Patient is on 0.4 mg daily tamsulosin. He has been on this for about 1 year.  He states that several months ago, developed a burning sensation in his groin with a rash that was itchy.  He states he was putting Goldbond and this seems to be helping.  He does not have itching or burning today.  He had a normal examination and no rashes were noted.    Patient states for several months he has been urinating  "more frequently, going up to 10 times per day.  Nocturia at least 3 times per night.  He also states he sweats in his groin region at night.  He denies incontinence of urine.  He denies dysuria, flank pain, gross hematuria. Denies constipation.     Patient states he drinks a few glasses of water per day, he states he stopped drinking coffee.  Does not drink tea.  He states he drinks an occasional small soda with dinner.      Review of Systems   Constitutional:  Negative for activity change, chills, fatigue and fever.   HENT:  Negative for congestion, rhinorrhea and sore throat.    Eyes:  Negative for photophobia, redness and visual disturbance.   Respiratory:  Negative for cough, shortness of breath and wheezing.    Cardiovascular:  Negative for chest pain, palpitations and leg swelling.   Gastrointestinal:  Negative for abdominal pain, constipation, diarrhea, nausea and vomiting.   Genitourinary:  Positive for frequency. Negative for difficulty urinating, dysuria, flank pain, hematuria and urgency.        Nocturia   Neurological:  Negative for weakness, light-headedness and headaches.          Objective   /80 (BP Location: Left arm, Patient Position: Sitting, Cuff Size: Adult)   Pulse 79   Ht 5' 10\" (1.778 m)   Wt 82.1 kg (181 lb)   SpO2 96%   BMI 25.97 kg/m²     Physical Exam  Vitals and nursing note reviewed.   Constitutional:       General: He is not in acute distress.     Appearance: He is well-developed.   HENT:      Head: Normocephalic and atraumatic.   Eyes:      Conjunctiva/sclera: Conjunctivae normal.   Cardiovascular:      Rate and Rhythm: Normal rate and regular rhythm.      Heart sounds: No murmur heard.  Pulmonary:      Effort: Pulmonary effort is normal. No respiratory distress.      Breath sounds: Normal breath sounds.   Abdominal:      Palpations: Abdomen is soft.      Tenderness: There is no abdominal tenderness.   Genitourinary:     Comments: Normal phallus, patent meatus.  Bilateral " testicles equally descended.  No palpable masses or asymmetry.  No rashes noted.  No swelling, erythema, tenderness to palpation.    DERRICK prostate is smooth, nontender.  There are no nodules or asymmetry.  Prostate is enlarged at about 50 g.  Musculoskeletal:         General: No swelling.      Cervical back: Neck supple.   Skin:     General: Skin is warm and dry.      Capillary Refill: Capillary refill takes less than 2 seconds.   Neurological:      Mental Status: He is alert.   Psychiatric:         Mood and Affect: Mood normal.          Results   Lab Results   Component Value Date    PSA 0.5 07/29/2015    PSA 0.4 07/17/2014     Lab Results   Component Value Date    GLUCOSE 145 (H) 02/21/2017    CALCIUM 9.2 03/07/2025     07/29/2015    K 4.9 03/07/2025    CO2 29 03/07/2025     03/07/2025    BUN 16 03/07/2025    CREATININE 1.18 03/07/2025     Lab Results   Component Value Date    WBC 8.44 03/07/2025    HGB 14.9 03/07/2025    HCT 45.5 03/07/2025    MCV 99 (H) 03/07/2025     (L) 03/07/2025       Office Urine Dip  Recent Results (from the past hour)   POCT Measure PVR    Collection Time: 03/21/25  8:17 AM   Result Value Ref Range    POST-VOID RESIDUAL VOLUME, ML POC 42 mL

## 2025-03-24 ENCOUNTER — TELEPHONE (OUTPATIENT)
Age: 85
End: 2025-03-24

## 2025-03-24 NOTE — TELEPHONE ENCOUNTER
Patient was calling to let Nika SALAS know that he decided to stop taking the trospium chloride (SANCTURA) 20 mg tablet  she prescribed. He was reading up on that side effects and decided to stop taking.     He would like to let her know that he does feel better not taking it than he did while taking it

## 2025-03-24 NOTE — TELEPHONE ENCOUNTER
Caller: Patient     Doctor: Dr. Sanchez     Reason for call: Pt called & stated that he filled a form out with his bp for 3/18 & 3/20 and put the readings were in the afternoon pt called to say he made a mistake and it was in the morning when his BP was taken.     Call back#: [phone]

## 2025-04-11 ENCOUNTER — HOSPITAL ENCOUNTER (OUTPATIENT)
Dept: NON INVASIVE DIAGNOSTICS | Facility: CLINIC | Age: 85
Discharge: HOME | End: 2025-04-11
Payer: MEDICARE

## 2025-04-11 VITALS
DIASTOLIC BLOOD PRESSURE: 80 MMHG | WEIGHT: 181 LBS | SYSTOLIC BLOOD PRESSURE: 124 MMHG | HEART RATE: 79 BPM | BODY MASS INDEX: 25.91 KG/M2 | HEIGHT: 70 IN

## 2025-04-11 DIAGNOSIS — I77.810 MILD DILATION OF ASCENDING AORTA (HCC): ICD-10-CM

## 2025-04-11 LAB
AORTIC ROOT: 4.2 CM
AORTIC VALVE MEAN VELOCITY: 33 M/S
ASCENDING AORTA: 3.7 CM
AV AREA BY CONTINUOUS VTI: 0.9 CM2
AV AREA PEAK VELOCITY: 0.9 CM2
AV LVOT MEAN GRADIENT: 2 MMHG
AV LVOT PEAK GRADIENT: 3 MMHG
AV MEAN PRESS GRAD SYS DOP V1V2: 48 MMHG
AV ORIFICE AREA US: 0.9 CM2
AV PEAK GRADIENT: 75 MMHG
AV REGURGITATION PRESSURE HALF TIME: 389 MS
AV VELOCITY RATIO: 0.22
AV VMAX SYS DOP: 4.33 M/S
BSA FOR ECHO PROCEDURE: 2 M2
DOP CALC AO VTI: 107.19 CM
DOP CALC LVOT AREA: 4.15 CM2
DOP CALC LVOT CARDIAC INDEX: 2.55 L/MIN/M2
DOP CALC LVOT CARDIAC OUTPUT: 5.1 L/MIN
DOP CALC LVOT DIAMETER: 2.3 CM
DOP CALC LVOT PEAK VEL VTI: 23.14 CM
DOP CALC LVOT PEAK VEL: 0.9 M/S
DOP CALC LVOT STROKE INDEX: 46.5 ML/M2
DOP CALC LVOT STROKE VOLUME: 96.09
E WAVE DECELERATION TIME: 345 MS
E/A RATIO: 0.81
FRACTIONAL SHORTENING: 36 (ref 28–44)
INTERVENTRICULAR SEPTUM IN DIASTOLE (PARASTERNAL SHORT AXIS VIEW): 1.7 CM
INTERVENTRICULAR SEPTUM: 1.7 CM (ref 0.6–1.1)
LAAS-AP2: 32.3 CM2
LAAS-AP4: 22.5 CM2
LEFT ATRIUM SIZE: 4.4 CM
LEFT ATRIUM VOLUME (MOD BIPLANE): 92 ML
LEFT ATRIUM VOLUME INDEX (MOD BIPLANE): 46 ML/M2
LEFT INTERNAL DIMENSION IN SYSTOLE: 3.2 CM (ref 2.1–4)
LEFT VENTRICULAR INTERNAL DIMENSION IN DIASTOLE: 5 CM (ref 3.5–6)
LEFT VENTRICULAR POSTERIOR WALL IN END DIASTOLE: 1.1 CM
LEFT VENTRICULAR STROKE VOLUME: 80 ML
LV EF US.2D.A4C+ESTIMATED: 56 %
LVSV (TEICH): 80 ML
MV E'TISSUE VEL-LAT: 10 CM/S
MV E'TISSUE VEL-SEP: 7 CM/S
MV PEAK A VEL: 0.99 M/S
MV PEAK E VEL: 80 CM/S
MV STENOSIS PRESSURE HALF TIME: 100 MS
MV VALVE AREA P 1/2 METHOD: 2.2
RA PRESSURE ESTIMATED: 10 MMHG
RIGHT ATRIUM AREA SYSTOLE A4C: 27 CM2
RIGHT VENTRICLE ID DIMENSION: 3.2 CM
RV PSP: 39 MMHG
SL CV AV DECELERATION TIME RETROGRADE: 1340 MS
SL CV AV PEAK GRADIENT RETROGRADE: 64 MMHG
SL CV LEFT ATRIUM LENGTH A2C: 7.1 CM
SL CV LV EF: 65
SL CV PED ECHO LEFT VENTRICLE DIASTOLIC VOLUME (MOD BIPLANE) 2D: 120 ML
SL CV PED ECHO LEFT VENTRICLE SYSTOLIC VOLUME (MOD BIPLANE) 2D: 40 ML
TR MAX PG: 29 MMHG
TR PEAK VELOCITY: 2.7 M/S
TRICUSPID ANNULAR PLANE SYSTOLIC EXCURSION: 2.8 CM
TRICUSPID VALVE PEAK REGURGITATION VELOCITY: 2.71 M/S

## 2025-04-11 PROCEDURE — 93306 TTE W/DOPPLER COMPLETE: CPT | Performed by: INTERNAL MEDICINE

## 2025-04-11 PROCEDURE — 93306 TTE W/DOPPLER COMPLETE: CPT

## 2025-04-22 ENCOUNTER — DOCUMENTATION (OUTPATIENT)
Dept: ADMINISTRATIVE | Facility: OTHER | Age: 85
End: 2025-04-22

## 2025-04-22 NOTE — PROGRESS NOTES
04/22/25 3:20 PM    Annual Wellness Visit outreach is not required, an AWV was completed at the PCP office.    Thank you.  Jessica Fry MA  [facility]

## 2025-05-01 ENCOUNTER — HOSPITAL ENCOUNTER (EMERGENCY)
Facility: HOSPITAL | Age: 85
Discharge: HOME | End: 2025-05-01
Attending: EMERGENCY MEDICINE
Payer: MEDICARE

## 2025-05-01 ENCOUNTER — APPOINTMENT (EMERGENCY)
Dept: CT IMAGING | Facility: HOSPITAL | Age: 85
End: 2025-05-01
Payer: MEDICARE

## 2025-05-01 VITALS
HEART RATE: 72 BPM | RESPIRATION RATE: 18 BRPM | SYSTOLIC BLOOD PRESSURE: 115 MMHG | OXYGEN SATURATION: 94 % | DIASTOLIC BLOOD PRESSURE: 53 MMHG | TEMPERATURE: 98.4 F

## 2025-05-01 DIAGNOSIS — R30.0 DYSURIA: ICD-10-CM

## 2025-05-01 DIAGNOSIS — D69.6 THROMBOCYTOPENIA (HCC): ICD-10-CM

## 2025-05-01 DIAGNOSIS — K57.92 DIVERTICULITIS: Primary | ICD-10-CM

## 2025-05-01 LAB
ALBUMIN SERPL BCG-MCNC: 4.2 G/DL (ref 3.5–5)
ALP SERPL-CCNC: 76 U/L (ref 34–104)
ALT SERPL W P-5'-P-CCNC: 12 U/L (ref 7–52)
ANION GAP SERPL CALCULATED.3IONS-SCNC: 7 MMOL/L (ref 4–13)
AST SERPL W P-5'-P-CCNC: 18 U/L (ref 13–39)
BACTERIA UR QL AUTO: NORMAL /HPF
BASOPHILS # BLD AUTO: 0.01 THOUSANDS/ÂΜL (ref 0–0.1)
BASOPHILS NFR BLD AUTO: 0 % (ref 0–1)
BILIRUB SERPL-MCNC: 0.73 MG/DL (ref 0.2–1)
BILIRUB UR QL STRIP: NEGATIVE
BUN SERPL-MCNC: 17 MG/DL (ref 5–25)
CALCIUM SERPL-MCNC: 9 MG/DL (ref 8.4–10.2)
CHLORIDE SERPL-SCNC: 105 MMOL/L (ref 96–108)
CLARITY UR: CLEAR
CO2 SERPL-SCNC: 26 MMOL/L (ref 21–32)
COLOR UR: ABNORMAL
CREAT SERPL-MCNC: 1.2 MG/DL (ref 0.6–1.3)
EOSINOPHIL # BLD AUTO: 0.23 THOUSAND/ÂΜL (ref 0–0.61)
EOSINOPHIL NFR BLD AUTO: 2 % (ref 0–6)
ERYTHROCYTE [DISTWIDTH] IN BLOOD BY AUTOMATED COUNT: 12.1 % (ref 11.6–15.1)
GFR SERPL CREATININE-BSD FRML MDRD: 55 ML/MIN/1.73SQ M
GLUCOSE SERPL-MCNC: 101 MG/DL (ref 65–140)
GLUCOSE UR STRIP-MCNC: NEGATIVE MG/DL
HCT VFR BLD AUTO: 42.6 % (ref 36.5–49.3)
HGB BLD-MCNC: 13.8 G/DL (ref 12–17)
HGB UR QL STRIP.AUTO: ABNORMAL
IMM GRANULOCYTES # BLD AUTO: 0.04 THOUSAND/UL (ref 0–0.2)
IMM GRANULOCYTES NFR BLD AUTO: 0 % (ref 0–2)
KETONES UR STRIP-MCNC: NEGATIVE MG/DL
LEUKOCYTE ESTERASE UR QL STRIP: NEGATIVE
LG PLATELETS BLD QL SMEAR: PRESENT
LYMPHOCYTES # BLD AUTO: 1.03 THOUSANDS/ÂΜL (ref 0.6–4.47)
LYMPHOCYTES NFR BLD AUTO: 10 % (ref 14–44)
MCH RBC QN AUTO: 31.4 PG (ref 26.8–34.3)
MCHC RBC AUTO-ENTMCNC: 32.4 G/DL (ref 31.4–37.4)
MCV RBC AUTO: 97 FL (ref 82–98)
MONOCYTES # BLD AUTO: 0.9 THOUSAND/ÂΜL (ref 0.17–1.22)
MONOCYTES NFR BLD AUTO: 9 % (ref 4–12)
NEUTROPHILS # BLD AUTO: 7.82 THOUSANDS/ÂΜL (ref 1.85–7.62)
NEUTS SEG NFR BLD AUTO: 79 % (ref 43–75)
NITRITE UR QL STRIP: NEGATIVE
NON-SQ EPI CELLS URNS QL MICRO: NORMAL /HPF
NRBC BLD AUTO-RTO: 0 /100 WBCS
PH UR STRIP.AUTO: 5.5 [PH]
PLATELET # BLD AUTO: 113 THOUSANDS/UL (ref 149–390)
PLATELET BLD QL SMEAR: ABNORMAL
PMV BLD AUTO: 11.6 FL (ref 8.9–12.7)
POTASSIUM SERPL-SCNC: 4.5 MMOL/L (ref 3.5–5.3)
PROT SERPL-MCNC: 6.5 G/DL (ref 6.4–8.4)
PROT UR STRIP-MCNC: NEGATIVE MG/DL
RBC # BLD AUTO: 4.39 MILLION/UL (ref 3.88–5.62)
RBC #/AREA URNS AUTO: NORMAL /HPF
RBC MORPH BLD: NORMAL
SODIUM SERPL-SCNC: 138 MMOL/L (ref 135–147)
SP GR UR STRIP.AUTO: 1.02 (ref 1–1.03)
UROBILINOGEN UR STRIP-ACNC: <2 MG/DL
WBC # BLD AUTO: 10.03 THOUSAND/UL (ref 4.31–10.16)
WBC #/AREA URNS AUTO: NORMAL /HPF

## 2025-05-01 PROCEDURE — 85025 COMPLETE CBC W/AUTO DIFF WBC: CPT

## 2025-05-01 PROCEDURE — 99285 EMERGENCY DEPT VISIT HI MDM: CPT

## 2025-05-01 PROCEDURE — 99284 EMERGENCY DEPT VISIT MOD MDM: CPT

## 2025-05-01 PROCEDURE — 36415 COLL VENOUS BLD VENIPUNCTURE: CPT

## 2025-05-01 PROCEDURE — 81001 URINALYSIS AUTO W/SCOPE: CPT

## 2025-05-01 PROCEDURE — 74177 CT ABD & PELVIS W/CONTRAST: CPT

## 2025-05-01 PROCEDURE — 80053 COMPREHEN METABOLIC PANEL: CPT

## 2025-05-01 RX ORDER — ACETAMINOPHEN 325 MG/1
975 TABLET ORAL ONCE
Status: COMPLETED | OUTPATIENT
Start: 2025-05-01 | End: 2025-05-01

## 2025-05-01 RX ADMIN — IOHEXOL 100 ML: 350 INJECTION, SOLUTION INTRAVENOUS at 02:08

## 2025-05-01 RX ADMIN — AMOXICILLIN AND CLAVULANATE POTASSIUM 1 TABLET: 875; 125 TABLET, FILM COATED ORAL at 02:59

## 2025-05-01 RX ADMIN — ACETAMINOPHEN 975 MG: 325 TABLET, FILM COATED ORAL at 01:56

## 2025-05-01 NOTE — ED PROVIDER NOTES
Time reflects when diagnosis was documented in both MDM as applicable and the Disposition within this note       Time User Action Codes Description Comment    5/1/2025  2:50 AM Anat Zuñiga Add [K57.92] Diverticulitis     5/1/2025  2:50 AM Anat Zuñiga Add [R30.0] Dysuria     5/1/2025  2:50 AM Anat Zuñiga Add [D69.6] Thrombocytopenia (HCC)           ED Disposition       ED Disposition   Discharge    Condition   Stable    Date/Time   u May 1, 2025  2:50 AM    Comment   Poncho Wallace discharge to home/self care.                   Assessment & Plan       Medical Decision Making  Patient seen, examined and noted to have diverticulitis, dysuria and thrombocytopenia.  Patient coming in with complaints of dysuria and urinary frequency.  Has been seen by urology for this and was recommended to take trospium chloride for this but read the side effect profile and decided he did not want to take it.  Initially patient only tender over his suprapubic area stating he needed to urinate and did not endorse constipation or other pain to me so started with a urine and a CBC and a CMP.  Upon reevaluation of patient he does note that he usually has diarrhea however only had a small hard bowel movement yesterday and is  after emptying his bladder.  Because of this in addition to difficulty obtaining a more clear history we will proceed to CT scan.  Scan revealing findings consistent with acute proximal sigmoid colitis/diverticulitis without free or air or abscess.  Will give a dose of Augmentin here and have patient complete a course at home.  Primary care follow-up encouraged.  Referral placed to heme-onc for his persistent thrombocytopenia.  ER return precautions given    Differential diagnosis includes but is not limited to UTI, BPH, appendicitis, constipation, colitis, diverticulitis    Patient's labs notable for: Platelets of 113, urine largely unremarkable    Imaging revealed:   Diverticulitis     Patient  appears well, is nontoxic appearing, he and his daughter expresses understanding and agrees with plan of care at this time.  In light of this patient would benefit from outpatient management.    Patient was rx'd: Augmentin    Patient at time of discharge well-appearing in no acute distress, all questions answered. Patient agreeable to plan.  Patient's vitals, lab/imaging results, diagnosis, and treatment plan were discussed with the patient. All new/changed medications were discussed with patient, specifically, route of administration, how often and when to take, and where they can be picked up. Strict return precautions as well as close follow up with PCP was discussed with the patient and the patient was agreeable to my recommendations. Patient verbally acknowledged understanding of the above communications. Strict return precautions were provided. All labs reviewed and utilized in the medical decision making process.    Amount and/or Complexity of Data Reviewed  Labs: ordered.  Radiology: ordered.    Risk  OTC drugs.  Prescription drug management.             Medications   acetaminophen (TYLENOL) tablet 975 mg (975 mg Oral Given 5/1/25 0156)   iohexol (OMNIPAQUE) 350 MG/ML injection (MULTI-DOSE) 100 mL (100 mL Intravenous Given 5/1/25 0208)   amoxicillin-clavulanate (AUGMENTIN) 875-125 mg per tablet 1 tablet (1 tablet Oral Given 5/1/25 0259)       ED Risk Strat Scores                    No data recorded                            History of Present Illness       Chief Complaint   Patient presents with    Possible UTI     Patient arrives with frequency and pain while urinating. Also complains of lower back pain and RLQ pain and constipation       Past Medical History:   Diagnosis Date    Colon polyps     Epilepsy (HCC)     Gout     Hyperlipidemia     Hypertension     S/P AAA repair     Subdural hematoma (HCC) 1/27/2022    Tear of right rotator cuff 04/04/2017      Past Surgical History:   Procedure Laterality Date     ABDOMINAL AORTIC ANEURYSM REPAIR, ENDOVASCULAR N/A     CO COLONOSCOPY FLX DX W/COLLJ SPEC WHEN PFRMD N/A 2017    Procedure: COLONOSCOPY;  Surgeon: NORM Lilly MD;  Location: [facility];  Service: Colorectal    CO COLONOSCOPY FLX DX W/COLLJ SPEC WHEN PFRMD N/A 2018    Procedure: COLONOSCOPY;  Surgeon: NORM Lilly MD;  Location: [facility];  Service: Colorectal    TONSILLECTOMY      WISDOM TOOTH EXTRACTION Bilateral       Family History   Problem Relation Age of Onset    Aneurysm Mother         ABDMONIAL  AORTA    Coronary artery disease Father     Coronary artery disease Brother       Social History     Tobacco Use    Smoking status: Former     Current packs/day: 0.00     Average packs/day: 3.0 packs/day for 10.0 years (30.0 ttl pk-yrs)     Types: Cigarettes     Start date: 1961     Quit date: 1971     Years since quittin.2    Smokeless tobacco: Never   Vaping Use    Vaping status: Never Used   Substance Use Topics    Alcohol use: Not Currently     Comment: occasional    Drug use: No      E-Cigarette/Vaping    E-Cigarette Use Never User       E-Cigarette/Vaping Substances    Nicotine No     THC No     CBD No       I have reviewed and agree with the history as documented.     Poncho Wallace is a 84 y.o. male presenting to the ED on May 1, 2025 with possible UTI. Patient endorses that for months he has been having frequency, dysuria and pelvic pain.  He did see urology who prescribed Tropium chloride however patient was uncomfortable taking this medication and did not start it.  Additionally notes that for the last few days he has had a pain in his abdomen that has felt different than the pain that he has been feeling for months.  He also notes that he typically has diarrhea almost every day however only had a small formed bowel movement yesterday.  Unsure if there was blood in it.  He also notes that in the last few days he has had some nosebleeds.  They resolve on their own.   Patient denies nausea, vomiting, diarrhea, blood in his urine, bleeding gums, fevers, flank pain, chills or any other complaints at this time.             Review of Systems   Constitutional:  Positive for chills. Negative for fever.   HENT:  Negative for congestion.    Respiratory:  Negative for shortness of breath.    Cardiovascular:  Negative for chest pain and palpitations.   Gastrointestinal:  Positive for abdominal pain. Negative for constipation, diarrhea, nausea and vomiting.   Genitourinary:  Positive for dysuria and frequency. Negative for flank pain, hematuria and urgency.   Skin:  Negative for rash.           Objective       ED Triage Vitals   Temperature Pulse Blood Pressure Respirations SpO2 Patient Position - Orthostatic VS   05/01/25 0030 05/01/25 0030 05/01/25 0030 05/01/25 0030 05/01/25 0030 05/01/25 0030   98.4 °F (36.9 °C) 84 155/69 18 94 % Lying      Temp Source Heart Rate Source BP Location FiO2 (%) Pain Score    05/01/25 0030 05/01/25 0030 05/01/25 0030 -- 05/01/25 0156    Oral Monitor Right arm  6      Vitals      Date and Time Temp Pulse SpO2 Resp BP Pain Score FACES Pain Rating User   05/01/25 0300 -- 72 94 % -- 115/53 -- -- KB   05/01/25 0215 -- 77 94 % -- -- -- -- KB   05/01/25 0156 -- -- -- -- -- 6 -- MEK   05/01/25 0130 -- 77 92 % -- -- -- -- KB   05/01/25 0030 98.4 °F (36.9 °C) 84 94 % 18 155/69 -- -- MD            Physical Exam  Constitutional:       General: He is not in acute distress.     Appearance: Normal appearance. He is normal weight. He is not ill-appearing or toxic-appearing.   HENT:      Head: Normocephalic and atraumatic.   Cardiovascular:      Rate and Rhythm: Normal rate and regular rhythm.      Heart sounds: Normal heart sounds. No murmur heard.     No friction rub. No gallop.   Pulmonary:      Effort: Pulmonary effort is normal. No respiratory distress.      Breath sounds: Normal breath sounds. No stridor. No wheezing, rhonchi or rales.   Chest:      Chest wall: No  tenderness.   Abdominal:      General: Abdomen is flat. Bowel sounds are normal. There is no distension.      Palpations: Abdomen is soft. There is no mass.      Tenderness: There is abdominal tenderness in the right lower quadrant and suprapubic area. There is no right CVA tenderness, left CVA tenderness, guarding or rebound.      Hernia: No hernia is present.   Neurological:      Mental Status: He is alert.         Results Reviewed       Procedure Component Value Units Date/Time    CBC and differential [[id number]]  (Abnormal) Collected: 05/01/25 0051    Lab Status: Final result Specimen: Blood from Arm, Right Updated: 05/01/25 0131     WBC 10.03 Thousand/uL      RBC 4.39 Million/uL      Hemoglobin 13.8 g/dL      Hematocrit 42.6 %      MCV 97 fL      MCH 31.4 pg      MCHC 32.4 g/dL      RDW 12.1 %      MPV 11.6 fL      Platelets 113 Thousands/uL      nRBC 0 /100 WBCs      Segmented % 79 %      Immature Grans % 0 %      Lymphocytes % 10 %      Monocytes % 9 %      Eosinophils Relative 2 %      Basophils Relative 0 %      Absolute Neutrophils 7.82 Thousands/µL      Absolute Immature Grans 0.04 Thousand/uL      Absolute Lymphocytes 1.03 Thousands/µL      Absolute Monocytes 0.90 Thousand/µL      Eosinophils Absolute 0.23 Thousand/µL      Basophils Absolute 0.01 Thousands/µL     Narrative:      This is an appended report.  These results have been appended to a previously verified report.    Smear Review(Phlebs Do Not Order) [[id number]]  (Abnormal) Collected: 05/01/25 0051    Lab Status: Final result Specimen: Blood from Arm, Right Updated: 05/01/25 0131     RBC Morphology Normal     Platelet Estimate Borderline     Large Platelet Present    Comprehensive metabolic panel [[id number]] Collected: 05/01/25 0051    Lab Status: Final result Specimen: Blood from Arm, Right Updated: 05/01/25 0128     Sodium 138 mmol/L      Potassium 4.5 mmol/L      Chloride 105 mmol/L      CO2 26 mmol/L      ANION GAP 7 mmol/L      BUN 17 mg/dL       Creatinine 1.20 mg/dL      Glucose 101 mg/dL      Calcium 9.0 mg/dL      AST 18 U/L      ALT 12 U/L      Alkaline Phosphatase 76 U/L      Total Protein 6.5 g/dL      Albumin 4.2 g/dL      Total Bilirubin 0.73 mg/dL      eGFR 55 ml/min/1.73sq m     Narrative:      National Kid[employer] Foundation guidelines for Chronic Kidney Disease (CKD):     Stage 1 with normal or high GFR (GFR > 90 mL/min/1.73 square meters)    Stage 2 Mild CKD (GFR = 60-89 mL/min/1.73 square meters)    Stage 3A Moderate CKD (GFR = 45-59 mL/min/1.73 square meters)    Stage 3B Moderate CKD (GFR = 30-44 mL/min/1.73 square meters)    Stage 4 Severe CKD (GFR = 15-29 mL/min/1.73 square meters)    Stage 5 End Stage CKD (GFR <15 mL/min/1.73 square meters)  Note: GFR calculation is accurate only with a steady state creatinine    Urine Microscopic [[id number]]  (Normal) Collected: 05/01/25 0051    Lab Status: Final result Specimen: Urine, Clean Catch Updated: 05/01/25 0115     RBC, UA 1-2 /hpf      WBC, UA 1-2 /hpf      Epithelial Cells None Seen /hpf      Bacteria, UA Occasional /hpf     UA w Reflex to Microscopic w Reflex to Culture [[id number]]  (Abnormal) Collected: 05/01/25 0051    Lab Status: Final result Specimen: Urine, Clean Catch Updated: 05/01/25 0110     Color, UA Light Yellow     Clarity, UA Clear     Specific Gravity, UA 1.022     pH, UA 5.5     Leukocytes, UA Negative     Nitrite, UA Negative     Protein, UA Negative mg/dl      Glucose, UA Negative mg/dl      Ketones, UA Negative mg/dl      Urobilinogen, UA <2.0 mg/dl      Bilirubin, UA Negative     Occult Blood, UA Trace            CT abdomen pelvis with contrast   Final Interpretation by Enrico Juarez MD (05/01 0242)      1.  Findings consistent with acute proximal sigmoid colitis/diverticulitis noted without free air or abscess. Recommend follow-up colonoscopy when clinically appropriate/after resolution of acute illness.   2.  No evidence of bowel obstruction, appendicitis,  obstructive uropathy, free air, or free fluid.   3.  3.9 x 3.3 cm infrarenal abdominal aortic aneurysm is again seen with patent aortobiiliac vascular stent graft in place. Extensive calcific atherosclerosis.   4.  Small fat-containing right inguinal hernia.         Workstation performed: [id number]             Procedures    ED Medication and Procedure Management   Prior to Admission Medications   Prescriptions Last Dose Informant Patient Reported? Taking?   ASPIRIN 81 PO Unknown  Yes No   Sig: Take by mouth   acetaminophen (TYLENOL) 500 mg tablet Unknown Self Yes No   Sig: Take 500 mg by mouth every 6 (six) hours as needed for mild pain   diphenhydrAMINE HCl (BENADRYL ALLERGY PO) Not Taking Self Yes No   Sig: Take by mouth if needed   Patient not taking: Reported on 3/13/2025   lacosamide (VIMPAT) 100 mg tablet 4/30/2025 at  5:00 PM Self No Yes   Sig: Take 1 tablet (100 mg total) by mouth every 12 (twelve) hours   levetiracetam (Keppra XR) 750 MG TB24 extended-release tablet 4/30/2025 at  5:00 PM Self No Yes   Sig: Take 3 tablets (2,250 mg total) by mouth daily   simvastatin (ZOCOR) 40 mg tablet 4/30/2025 at  5:00 PM Self No Yes   Sig: Take 1 tablet (40 mg total) by mouth daily   tamsulosin (FLOMAX) 0.4 mg 4/30/2025 at  5:00 PM Self No Yes   Sig: Take 1 capsule (0.4 mg total) by mouth daily with dinner   trospium chloride (SANCTURA) 20 mg tablet Not Taking  No No   Sig: Take 1 tablet (20 mg total) by mouth 2 (two) times a day   Patient not taking: Reported on 5/1/2025      Facility-Administered Medications: None     Discharge Medication List as of 5/1/2025  2:56 AM        START taking these medications    Details   amoxicillin-clavulanate (AUGMENTIN) 875-125 mg per tablet Take 1 tablet by mouth every 12 (twelve) hours for 7 days, Starting Thu 5/1/2025, Until Thu 5/8/2025, Normal           CONTINUE these medications which have NOT CHANGED    Details   lacosamide (VIMPAT) 100 mg tablet Take 1 tablet (100 mg total) by  mouth every 12 (twelve) hours, Starting Tue 12/24/2024, Normal      levetiracetam (Keppra XR) 750 MG TB24 extended-release tablet Take 3 tablets (2,250 mg total) by mouth daily, Starting Tue 12/24/2024, Normal      simvastatin (ZOCOR) 40 mg tablet Take 1 tablet (40 mg total) by mouth daily, Starting Wed 12/4/2024, Normal      tamsulosin (FLOMAX) 0.4 mg Take 1 capsule (0.4 mg total) by mouth daily with dinner, Starting Tue 1/28/2025, Normal      acetaminophen (TYLENOL) 500 mg tablet Take 500 mg by mouth every 6 (six) hours as needed for mild pain, Historical Med      ASPIRIN 81 PO Take by mouth, Historical Med      diphenhydrAMINE HCl (BENADRYL ALLERGY PO) Take by mouth if needed, Historical Med      trospium chloride (SANCTURA) 20 mg tablet Take 1 tablet (20 mg total) by mouth 2 (two) times a day, Starting Fri 3/21/2025, Normal             ED SEPSIS DOCUMENTATION   Time reflects when diagnosis was documented in both MDM as applicable and the Disposition within this note       Time User Action Codes Description Comment    5/1/2025  2:50 AM Anat Zuñiga [K57.92] Diverticulitis     5/1/2025  2:50 AM Anat Zuñiga [R30.0] Dysuria     5/1/2025  2:50 AM Anat Zuñiga [D69.6] Thrombocytopenia (HCC)                  Anat Zuñiga PA-C  05/01/25 0655

## 2025-05-01 NOTE — DISCHARGE INSTRUCTIONS
Please take your antibiotic 2 times daily to treat your diverticulitis for the next 7 days. Follow up with urology regarding your urinary frequency and burning with urination. Encourage you to try the trospium chloride for these symptoms. Follow up with hematology regarding your low platelets. Return to the ER if you have bleeding that does not stop, become dizzy, shortness of breath, chest pain, your abdominal pain worsens or you develop fevers.

## 2025-05-02 ENCOUNTER — OFFICE VISIT (OUTPATIENT)
Dept: CARDIOLOGY CLINIC | Facility: CLINIC | Age: 85
End: 2025-05-02
Payer: MEDICARE

## 2025-05-02 VITALS
DIASTOLIC BLOOD PRESSURE: 62 MMHG | SYSTOLIC BLOOD PRESSURE: 110 MMHG | OXYGEN SATURATION: 94 % | WEIGHT: 183.2 LBS | HEART RATE: 72 BPM | BODY MASS INDEX: 26.29 KG/M2

## 2025-05-02 DIAGNOSIS — E78.2 MIXED HYPERLIPIDEMIA: ICD-10-CM

## 2025-05-02 DIAGNOSIS — I35.0 NONRHEUMATIC AORTIC VALVE STENOSIS: ICD-10-CM

## 2025-05-02 DIAGNOSIS — I34.2 NONRHEUMATIC MITRAL VALVE STENOSIS: ICD-10-CM

## 2025-05-02 DIAGNOSIS — I35.1 NONRHEUMATIC AORTIC VALVE INSUFFICIENCY: ICD-10-CM

## 2025-05-02 DIAGNOSIS — I77.810 MILD DILATION OF ASCENDING AORTA (HCC): ICD-10-CM

## 2025-05-02 DIAGNOSIS — I71.43 INFRARENAL ABDOMINAL AORTIC ANEURYSM (AAA) WITHOUT RUPTURE (HCC): Primary | ICD-10-CM

## 2025-05-02 DIAGNOSIS — I10 PRIMARY HYPERTENSION: ICD-10-CM

## 2025-05-02 DIAGNOSIS — N18.31 STAGE 3A CHRONIC KIDNEY DISEASE (HCC): ICD-10-CM

## 2025-05-02 PROCEDURE — 99214 OFFICE O/P EST MOD 30 MIN: CPT | Performed by: STUDENT IN AN ORGANIZED HEALTH CARE EDUCATION/TRAINING PROGRAM

## 2025-05-02 NOTE — PROGRESS NOTES
[facility] [place]  [address]  [address]  [facility] [address]  Phone#  [phone]  Fax#  [phone]  [facility]'s Cardiology Office Follow-up Visit             NAME: Poncho Wallace  AGE: 84 y.o. SEX: male   : 1940   MRN: [id number]    DATE: 2025  TIME: 12:00 PM      Assessment & Plan  Nonrheumatic aortic valve stenosis  Severe aortic stenosis  Echo 25 showed LVEF 65%, mod AR, mod-to-sev MS, severe aortic stenosis with peak velocity 4.5 m/s, peak and mean gradients 76 and 47 mmHg respectively.  Discussed that his aortic stenosis is now severe and associated with moderate aortic regurgitation.  I recommended CT surgery evaluation to discuss potential interventional options such as TAVR.  Referral sent.  Nonrheumatic aortic valve insufficiency  As above  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HCC)  25 ascending aorta 3.7 cm, aortic root 4.2 cm -stable  Nonrheumatic mitral valve stenosis  2025 moderate to severe mitral stenosis.  Will continue with rate control and CT surgery referral   Infrarenal abdominal aortic aneurysm (AAA) without rupture (HCC)  Stable AAA s/p EVAR repair  EVAR duplex shows patent endovascular repair with stable residual sac size 3.3 cm    Primary hypertension  Not currently on any medications.  BP good today 110/62  We did discuss the importance of aggressive blood pressure management with his ascending aortic aneurysm.  Patient follow a low-salt diet.    Mixed hyperlipidemia  Stable.  3/13/2025 cholesterol 140, triglycerides 92, HDL 44, LDL 77  Continue simvastatin 40 mg daily.    Stage 3a chronic kidney disease (HCC)  Avoiding nephrotoxic medications    Follow up 6 mo for mixed valvular disease      -----    Chief Complaint   Patient presents with    Follow-up       HPI:    Poncho Wallace is a 84 y.o.-year-old male who presents to the cardiology clinic for follow up for the above-listed problems.   Past medical history significant for abdominal  aortic aneurysm status post EVAR, hypertension, mildly dilated ascending aorta, mild aortic insufficiency chronic kidney disease stage III AA, hyperlipidemia, seizure disorder.     He  has a past medical history of Colon polyps, Epilepsy (HCC), Gout, Hyperlipidemia, Hypertension, S/P AAA repair, Subdural hematoma (HCC) (1/27/2022), and Tear of right rotator cuff (04/04/2017).     He was initially referred by vascular surgery due to mild dilated ascending aorta seen on last transthoracic echocardiogram.  Ascending aorta measured 4.3 cm 4/16/23 mild aortic regurgitation and moderate aortic stenosis.  He also had a CT of his chest done at [facility] in January of this year.  They reported a 4 x 4.3 cm ascending aortic aneurysm, suggesting that it is stable in size.     Since last appointment, I did obtain a transthoracic echocardiogram done 4/11/2025.  I personally reviewed the echo which showed preserved LVEF of 65% with moderate aortic regurgitation, moderate to severe mitral stenosis, and severe aortic stenosis with peak velocity 4.5 m/s, peak and mean gradients 76 and 47 mmHg respectively.  We discussed the results of the echocardiogram today.  He reports that he is having no symptoms.  Denies any chest pain, chest discomfort, shortness of breath at rest, dyspnea on exertion, lightheadedness, dizziness.    -----    I personally reviewed the patient's pertinent cardiac imaging and labs in Epic:    Reviewed his ECG which showed normal sinus rhythm, LVH, possible inferior infarct.  This looks similar to his prior ECGs, however his inferior Q waves are more prominent.       1/21/25 CT C/A/P from [facility] report only - 4 x 4.3 cm ascending thoracic aortic aneurysm      4/16/23 echo  LVEF 70%.  Normal RV size and function.  Moderate aortic stenosis with mild aortic regurgitation.  Aortic valve area 1.2 cm² with mean aortic gradient of 35 mmHg.  Aortic root was mildly dilated at 4.4 cm, and ascending aorta  mildly dilated at 4.2 cm.     Labs below.    -----    Past history, family history, social history, current medications, vital signs, recent lab and imaging studies and  prior cardiology studies reviewed independently on this visit.    Allergies   Allergen Reactions    Fenofibrate      Patient not aware of allergy    Hydrocodone-Acetaminophen      Patient not aware of allergy       Current Outpatient Medications:     acetaminophen (TYLENOL) 500 mg tablet, Take 500 mg by mouth every 6 (six) hours as needed for mild pain, Disp: , Rfl:     amoxicillin-clavulanate (AUGMENTIN) 875-125 mg per tablet, Take 1 tablet by mouth every 12 (twelve) hours for 7 days, Disp: 14 tablet, Rfl: 0    ASPIRIN 81 PO, Take by mouth, Disp: , Rfl:     lacosamide (VIMPAT) 100 mg tablet, Take 1 tablet (100 mg total) by mouth every 12 (twelve) hours, Disp: 180 tablet, Rfl: 1    levetiracetam (Keppra XR) 750 MG TB24 extended-release tablet, Take 3 tablets (2,250 mg total) by mouth daily, Disp: 270 tablet, Rfl: 3    simvastatin (ZOCOR) 40 mg tablet, Take 1 tablet (40 mg total) by mouth daily, Disp: 90 tablet, Rfl: 1    tamsulosin (FLOMAX) 0.4 mg, Take 1 capsule (0.4 mg total) by mouth daily with dinner, Disp: 90 capsule, Rfl: 3    trospium chloride (SANCTURA) 20 mg tablet, Take 1 tablet (20 mg total) by mouth 2 (two) times a day, Disp: 60 tablet, Rfl: 3    diphenhydrAMINE HCl (BENADRYL ALLERGY PO), Take by mouth if needed (Patient not taking: Reported on 3/13/2025), Disp: , Rfl:     Review of Systems   Respiratory:  Negative for chest tightness and shortness of breath.    Cardiovascular:  Negative for chest pain.   Neurological:  Negative for dizziness and light-headedness.       Objective:     Vitals:    05/02/25 0941   BP: 110/62   Pulse: 72   SpO2: 94%     Wt Readings from Last 3 Encounters:   05/02/25 83.1 kg (183 lb 3.2 oz)   04/11/25 82.1 kg (181 lb)   03/21/25 82.1 kg (181 lb)     Pulse Readings from Last 3 Encounters:   05/02/25 72    05/01/25 72   04/11/25 79     BP Readings from Last 3 Encounters:   05/02/25 110/62   05/01/25 115/53   04/11/25 124/80     Physical Exam  Vitals reviewed.   Constitutional:       General: He is not in acute distress.     Appearance: Normal appearance. He is well-developed.   HENT:      Head: Normocephalic and atraumatic.   Cardiovascular:      Rate and Rhythm: Normal rate and regular rhythm.      Heart sounds: Murmur heard.      Crescendo decrescendo systolic murmur is present with a grade of 3/6.      Diastolic murmur is present with a grade of 1/4.   Pulmonary:      Effort: Pulmonary effort is normal. No respiratory distress.      Breath sounds: Normal breath sounds.   Abdominal:      General: There is no distension.   Musculoskeletal:         General: No swelling.      Cervical back: Neck supple.   Skin:     General: Skin is warm and dry.   Neurological:      Mental Status: He is alert.   Psychiatric:         Mood and Affect: Mood normal.         Pertinent Laboratory/Diagnostic Studies:    Laboratory studies reviewed personally by Anat Sanchez MD    BMP:   Lab Results   Component Value Date    SODIUM 138 05/01/2025    K 4.5 05/01/2025     05/01/2025    CO2 26 05/01/2025    BUN 17 05/01/2025    CREATININE 1.20 05/01/2025    GLUC 101 05/01/2025    CALCIUM 9.0 05/01/2025     CBC:  Lab Results   Component Value Date    WBC 10.03 05/01/2025    HGB 13.8 05/01/2025    HCT 42.6 05/01/2025    MCV 97 05/01/2025     (L) 05/01/2025     Lipid Profile:   Lab Results   Component Value Date    HDL 44 03/13/2025     Lab Results   Component Value Date    LDLCALC 78 03/13/2025     Lab Results   Component Value Date    TRIG 92 03/13/2025      Other labs:  Lab Results   Component Value Date    EVB9RMFAJFPA 2.632 03/07/2025     Lab Results   Component Value Date    ALT 12 05/01/2025    AST 18 05/01/2025     Lab Results   Component Value Date    HGBA1C 5.0 04/16/2023         Imaging Studies:     Pertinent imaging  "studies and cardiac studies were independently reviewed on this visit and findings summarized.    Anat Sanchez MD, PhD    Portions of the record may have been created with voice recognition software.  Occasional wrong word or \"sound alike\" substitutions may have occurred due to the inherent limitations of voice recognition software.  Read the chart carefully and recognize, using context, where substitutions have occurred. Please reach out to me directly for any clarifications.   "

## 2025-05-02 NOTE — ASSESSMENT & PLAN NOTE
4/11/2025 moderate to severe mitral stenosis.  Will continue with rate control and CT surgery referral

## 2025-05-02 NOTE — ASSESSMENT & PLAN NOTE
Severe aortic stenosis  Echo 4/11/25 showed LVEF 65%, mod AR, mod-to-sev MS, severe aortic stenosis with peak velocity 4.5 m/s, peak and mean gradients 76 and 47 mmHg respectively.  Discussed that his aortic stenosis is now severe and associated with moderate aortic regurgitation.  I recommended CT surgery evaluation to discuss potential interventional options such as TAVR.  Referral sent.

## 2025-05-07 ENCOUNTER — OFFICE VISIT (OUTPATIENT)
Dept: FAMILY MEDICINE CLINIC | Facility: CLINIC | Age: 85
End: 2025-05-07
Payer: MEDICARE

## 2025-05-07 VITALS
TEMPERATURE: 98.2 F | SYSTOLIC BLOOD PRESSURE: 118 MMHG | RESPIRATION RATE: 16 BRPM | DIASTOLIC BLOOD PRESSURE: 76 MMHG | BODY MASS INDEX: 26.47 KG/M2 | WEIGHT: 184.5 LBS | OXYGEN SATURATION: 96 % | HEART RATE: 84 BPM

## 2025-05-07 DIAGNOSIS — R35.0 URINARY FREQUENCY: ICD-10-CM

## 2025-05-07 DIAGNOSIS — I10 PRIMARY HYPERTENSION: ICD-10-CM

## 2025-05-07 DIAGNOSIS — K57.92 DIVERTICULITIS: Primary | ICD-10-CM

## 2025-05-07 DIAGNOSIS — N18.31 STAGE 3A CHRONIC KIDNEY DISEASE (HCC): ICD-10-CM

## 2025-05-07 PROCEDURE — 99214 OFFICE O/P EST MOD 30 MIN: CPT | Performed by: FAMILY MEDICINE

## 2025-05-07 PROCEDURE — G2211 COMPLEX E/M VISIT ADD ON: HCPCS | Performed by: FAMILY MEDICINE

## 2025-05-07 NOTE — PROGRESS NOTES
Name: Poncho Wallace      : 1940      MRN: [id number]  Encounter Provider: Miranda Landa MD  Encounter Date: 2025   Encounter department: [facility]  :  Assessment & Plan  Diverticulitis  Recently diagnosed with diverticulitis in the ER.  Discharged on Augmentin.  Has 1 pill left.  Symptoms have improved.  Orders:    CBC and differential; Future    Comprehensive metabolic panel; Future    Primary hypertension    Orders:    CBC and differential; Future    Comprehensive metabolic panel; Future    Stage 3a chronic kidney disease (HCC)  Lab Results   Component Value Date    EGFR 55 2025    EGFR 57 2025    EGFR 70 2025    CREATININE 1.20 2025    CREATININE 1.18 2025    CREATININE 1.06 2025       Orders:    CBC and differential; Future    Comprehensive metabolic panel; Future    Feels urination has actually worsened since starting [place]ura.  Plan to hold medication and monitor symptoms.  Continue taking Flomax       History of Present Illness   Patient presents today for ER follow-up.  Seen in the emergency department for diverticulitis.  Had imaging which revealed diverticulitis.  Was deemed stable for discharge and sent home on Augmentin.  Doing well since discharge.  Denies any acute distress.      Review of Systems   Constitutional:  Negative for activity change, fatigue and fever.   Eyes:  Negative for visual disturbance.   Respiratory:  Negative for shortness of breath.    Cardiovascular:  Negative for chest pain.   Gastrointestinal:  Negative for abdominal pain, constipation, diarrhea and nausea.   Endocrine: Negative for cold intolerance and heat intolerance.   Musculoskeletal:  Negative for back pain.   Skin:  Negative for rash.   Neurological:  Negative for headaches.   Psychiatric/Behavioral:  Negative for confusion.        Objective   /76 (BP Location: Left arm, Patient Position: Sitting, Cuff Size: Standard)   Pulse 84   Temp 98.2  °F (36.8 °C) (Temporal)   Resp 16   Wt 83.7 kg (184 lb 8 oz)   SpO2 96%   BMI 26.47 kg/m²      Physical Exam  Vitals and nursing note reviewed.   Constitutional:       Appearance: Normal appearance. He is well-developed.   HENT:      Head: Normocephalic and atraumatic.   Cardiovascular:      Rate and Rhythm: Normal rate and regular rhythm.      Heart sounds: Murmur heard.   Pulmonary:      Effort: Pulmonary effort is normal.      Breath sounds: Normal breath sounds.   Abdominal:      General: Bowel sounds are normal.      Palpations: Abdomen is soft.   Musculoskeletal:      Cervical back: Normal range of motion.   Skin:     General: Skin is warm.   Neurological:      General: No focal deficit present.      Mental Status: He is alert.   Psychiatric:         Mood and Affect: Mood is depressed.         Speech: Speech normal.

## 2025-05-07 NOTE — ASSESSMENT & PLAN NOTE
Lab Results   Component Value Date    EGFR 55 05/01/2025    EGFR 57 03/07/2025    EGFR 70 01/21/2025    CREATININE 1.20 05/01/2025    CREATININE 1.18 03/07/2025    CREATININE 1.06 01/21/2025       Orders:    CBC and differential; Future    Comprehensive metabolic panel; Future

## 2025-05-09 ENCOUNTER — OFFICE VISIT (OUTPATIENT)
Dept: CARDIAC SURGERY | Facility: CLINIC | Age: 85
End: 2025-05-09
Payer: MEDICARE

## 2025-05-09 ENCOUNTER — TELEPHONE (OUTPATIENT)
Dept: CARDIOLOGY CLINIC | Facility: CLINIC | Age: 85
End: 2025-05-09

## 2025-05-09 ENCOUNTER — PREP FOR PROCEDURE (OUTPATIENT)
Dept: CARDIOLOGY CLINIC | Facility: CLINIC | Age: 85
End: 2025-05-09

## 2025-05-09 VITALS
BODY MASS INDEX: 26.16 KG/M2 | SYSTOLIC BLOOD PRESSURE: 136 MMHG | DIASTOLIC BLOOD PRESSURE: 60 MMHG | HEART RATE: 60 BPM | OXYGEN SATURATION: 98 % | WEIGHT: 182.7 LBS | HEIGHT: 70 IN

## 2025-05-09 DIAGNOSIS — I34.0 NONRHEUMATIC MITRAL VALVE REGURGITATION: ICD-10-CM

## 2025-05-09 DIAGNOSIS — I35.1 NONRHEUMATIC AORTIC VALVE INSUFFICIENCY: ICD-10-CM

## 2025-05-09 DIAGNOSIS — I35.0 NONRHEUMATIC AORTIC VALVE STENOSIS: Primary | ICD-10-CM

## 2025-05-09 DIAGNOSIS — I35.0 AORTIC STENOSIS, SEVERE: Primary | ICD-10-CM

## 2025-05-09 DIAGNOSIS — I34.2 NONRHEUMATIC MITRAL VALVE STENOSIS: ICD-10-CM

## 2025-05-09 DIAGNOSIS — Z13.6 ENCOUNTER FOR SCREENING FOR STENOSIS OF CAROTID ARTERY: ICD-10-CM

## 2025-05-09 PROCEDURE — 99204 OFFICE O/P NEW MOD 45 MIN: CPT | Performed by: THORACIC SURGERY (CARDIOTHORACIC VASCULAR SURGERY)

## 2025-05-09 NOTE — H&P (VIEW-ONLY)
Consultation - Cardiothoracic Surgery   Poncho Wallace 84 y.o. male MRN: [id number]    Physician Requesting Consult: Dr. Sanchez    Reason for Consult / Principal Problem: Aortic stenosis, Non-Rheumatic, Mitral regurgitation, Mitral Stenosis, Non-Rheumatic    History of Present Illness: Poncho Wallace is a 84 y.o. year old male who presents for initial outpatient surgical consultation for symptomatic severe aortic stenosis. Patient's PMHx is notable for AS. AI, MS, MR, HTN, HLD, CKD3a (GFR 55), infrarenal AAA s/p EVAR (2010), ascending aortic ectasia (4.3 cm), PAD w/ celiac artery stenosis, partial idiopathic epilepsy (seizures 2017, 2022, 2023), SDH (2022), gout, lumbar disc disease, R rotator cuff tear (receiving PT), diverticulitis, colon polyps and prior tobacco abuse.  Patient established care with Cardiology March 2025 as a referral from Vascular surgery for ascending aortic ectasia. Echo in April demonstrates EF 65%, mod AI, severe AS, severe MS and mod-sever MR.     Patient is accompanied today by his daughter. He reports mild lightheadedness but attributes it to his seizure medications. He deneis chest pain, MANRIQUE, palpitations, edema, fatigue or change in activity tolerance. He states he still  mows the lawn.  He is ,lives independently and still drives. Retired .    Former smoker, up to 3 ppd before quitting in 1975; occasional alcohol, no drug use.    Dental care one month ago.    Past Medical History:  Past Medical History:   Diagnosis Date    Colon polyps     Epilepsy (HCC)     Gout     Hyperlipidemia     Hypertension     S/P AAA repair     Subdural hematoma (HCC) 1/27/2022    Tear of right rotator cuff 04/04/2017         Past Surgical History:   Past Surgical History:   Procedure Laterality Date    ABDOMINAL AORTIC ANEURYSM REPAIR, ENDOVASCULAR N/A     ID COLONOSCOPY FLX DX W/COLLJ SPEC WHEN PFRMD N/A 6/7/2017    Procedure: COLONOSCOPY;  Surgeon: NORM Lilly MD;  Location: [facility];   Service: Colorectal    AL COLONOSCOPY FLX DX W/COLLJ SPEC WHEN PFRMD N/A 2018    Procedure: COLONOSCOPY;  Surgeon: NORM Lilly MD;  [facility];  Service: Colorectal    TONSILLECTOMY      WISDOM TOOTH EXTRACTION Bilateral          Family History:  Family History   Problem Relation Age of Onset    Aneurysm Mother         ABDMONIAL  AORTA    Coronary artery disease Father     Coronary artery disease Brother          Social History:    Social History     Substance and Sexual Activity   Alcohol Use Not Currently    Comment: occasional     Social History     Substance and Sexual Activity   Drug Use No     Social History     Tobacco Use   Smoking Status Former    Current packs/day: 0.00    Average packs/day: 3.0 packs/day for 10.0 years (30.0 ttl pk-yrs)    Types: Cigarettes    Start date: 1961    Quit date: 1971    Years since quittin.2   Smokeless Tobacco Never         Home Medications:   Prior to Admission medications    Medication Sig Start Date End Date Taking? Authorizing Provider   acetaminophen (TYLENOL) 500 mg tablet Take 500 mg by mouth every 6 (six) hours as needed for mild pain   Yes Historical Provider, MD   diphenhydrAMINE HCl (BENADRYL ALLERGY PO) Take by mouth if needed   Yes Historical Provider, MD   lacosamide (VIMPAT) 100 mg tablet Take 1 tablet (100 mg total) by mouth every 12 (twelve) hours 24  Yes Ryan Naylor MD   levetiracetam (Keppra XR) 750 MG TB24 extended-release tablet Take 3 tablets (2,250 mg total) by mouth daily 24  Yes Ryan Naylor MD   simvastatin (ZOCOR) 40 mg tablet Take 1 tablet (40 mg total) by mouth daily 24  Yes Sudheer Goncalves MD   tamsulosin (FLOMAX) 0.4 mg Take 1 capsule (0.4 mg total) by mouth daily with dinner 25  Yes Sudheer Goncalves MD   ASPIRIN 81 PO Take by mouth  Patient not taking: Reported on 2025    Historical Provider, MD   trospium chloride (SANCTURA) 20 mg tablet Take 1 tablet (20 mg  "total) by mouth 2 (two) times a day  Patient not taking: Reported on 5/9/2025 3/21/25   BRIANA Walker   amoxicillin-clavulanate (AUGMENTIN) 875-125 mg per tablet Take 1 tablet by mouth every 12 (twelve) hours for 7 days  Patient not taking: Reported on 5/7/2025 5/1/25 5/9/25  Anat Zuñiga PA-C       Allergies:  Allergies   Allergen Reactions    Fenofibrate      Patient not aware of allergy    Hydrocodone-Acetaminophen      Patient not aware of allergy       Review of Systems:  Review of Systems - History obtained from chart review and the patient  General ROS: negative  Psychological ROS: negative  Ophthalmic ROS: negative  ENT ROS: negative  Allergy and Immunology ROS: negative  Hematological and Lymphatic ROS: negative  Endocrine ROS: negative  Breast ROS: negative  Respiratory ROS: no cough, shortness of breath, or wheezing  Cardiovascular ROS: positive for - murmur  negative for - chest pain, dyspnea on exertion, edema, irregular heartbeat, loss of consciousness, orthopnea, palpitations, paroxysmal nocturnal dyspnea, or rapid heart rate  Gastrointestinal ROS: no abdominal pain, change in bowel habits, or black or bloody stools  Genito-Urinary ROS: no dysuria, trouble voiding, or hematuria  Musculoskeletal ROS: negative  Neurological ROS: positive for - headaches, seizures, occasional lightheadedness and occasional foot numbness.  Dermatological ROS: negative    Vital Signs:     Vitals:    05/09/25 0953 05/09/25 0956   BP: 139/63 136/60   BP Location: Left arm Right arm   Patient Position: Sitting Sitting   Cuff Size: Standard Standard   Pulse: 60    SpO2: 98%    Weight: 82.9 kg (182 lb 11.2 oz)    Height: 5' 10\" (1.778 m)        Physical Exam:    General: Alert, oriented, well developed, no acute distress  HEENT/NECK:  PERRLA.  No jugular venous distention.    Cardiac:Regular rate and rhythm, III/VI systolic murmur   Carotid arteries: 1+ pulses, no bruits  Pulmonary:  Breath sounds clear " bilaterally.  Abdomen:  Non-tender, Non-distended.  Positive bowel sounds.  Upper extremities: 2+ radial pulses; brisk capillary refill  Lower extremities: Extremities warm/dry.   PT/DP pulses 2+ bilaterally.  No edema B/L  Neuro: Alert and oriented X 3.  Sensation is grossly intact.  No focal deficits.  Musculoskeletal: MAEE, stable gait  Skin: Warm/Dry, without rashes or lesions.      Lab Results:     Lab Results   Component Value Date    WBC 10.03 05/01/2025    HGB 13.8 05/01/2025    HCT 42.6 05/01/2025    MCV 97 05/01/2025     (L) 05/01/2025     Lab Results   Component Value Date     07/29/2015    SODIUM 138 05/01/2025    K 4.5 05/01/2025     05/01/2025    CO2 26 05/01/2025    ANIONGAP 7 07/29/2015    AGAP 7 05/01/2025    BUN 17 05/01/2025    CREATININE 1.20 05/01/2025    GLUC 101 05/01/2025    GLUF 89 03/07/2025    CALCIUM 9.0 05/01/2025    AST 18 05/01/2025    ALT 12 05/01/2025    ALKPHOS 76 05/01/2025    PROT 7.0 07/29/2015    TP 6.5 05/01/2025    BILITOT 1.23 (H) 07/29/2015    TBILI 0.73 05/01/2025    EGFR 55 05/01/2025     Lab Results   Component Value Date    HGBA1C 5.0 04/16/2023         Imaging Studies:     Echocardiogram: 4/16/25        Left Ventricle: Left ventricular cavity size is normal. Wall thickness is moderately increased. There is moderate concentric hypertrophy. The left ventricular ejection fraction is 70%. Systolic function is vigorous. Wall motion is normal. Diastolic function is normal.    Right Ventricle: Right ventricular cavity size is normal. Systolic function is normal.    Aortic Valve: The aortic valve is trileaflet. The leaflets are moderately thickened. The leaflets are moderately calcified. There is at least moderately reduced mobility. There is mild regurgitation. The aortic valve has no significant stenosis. The aortic valve peak velocity is 3.64 m/s. The aortic valve peak gradient is 53 mmHg. The aortic valve mean gradient is 34 mmHg. The dimensionless VTI  index is 0.31. The aortic valve area is 1.2 cm2 using an LVOT diameter of 2.24 cm.    Aorta: The aortic root is mildly dilated. The ascending aorta is mildly dilated. The aortic root is 4.40 cm. The ascending aorta is 4.2 cm.     Findings    Left Ventricle Left ventricular cavity size is normal. Wall thickness is moderately increased. There is moderate concentric hypertrophy. The left ventricular ejection fraction is 70%. Systolic function is vigorous.  Wall motion is normal. Diastolic function is normal.   Right Ventricle Right ventricular cavity size is normal. Systolic function is normal. Wall thickness is normal.   Left Atrium The atrium is normal in size.   Right Atrium The atrium is normal in size.   Aortic Valve The aortic valve is trileaflet. The leaflets are moderately thickened. The leaflets are moderately calcified. There is at least moderately reduced mobility. There is mild regurgitation. The aortic valve has no significant stenosis. The aortic valve peak velocity is 3.64 m/s. The aortic valve peak gradient is 53 mmHg. The aortic valve mean gradient is 34 mmHg. The dimensionless VTI index is 0.31. The aortic valve area is 1.2 cm2 using an LVOT diameter of 2.24 cm.   Mitral Valve Mitral valve structure is normal. There is trace regurgitation. There is no evidence of stenosis.   Tricuspid Valve Tricuspid valve structure is normal. There is no evidence of regurgitation. There is no evidence of stenosis.   Pulmonic Valve Pulmonic valve structure is normal. There is no evidence of regurgitation. There is no evidence of stenosis.   Ascending Aorta The aortic root is mildly dilated. The ascending aorta is mildly dilated. The aortic root is 4.40 cm. The ascending aorta is 4.2 cm.   IVC/SVC The inferior vena cava is not well visualized.   Pericardium There is no pericardial effusion. The pericardium is normal in appearance.     Left Ventricle Measurements    Function/Volumes   A4C EF 64 %         LVOT stroke  volume 92.52 cm3         LVOT stroke volume index 44.1 ml/m2         Left ventricular stroke volume (2D) 24 mL         Dimensions   LVIDd 3.4 cm         LVIDS 2.6 cm         IVSd 1.7 cm         LVPWd 1.6 cm         LVOT area 3.8 cm2         FS 24 %         Diastolic Filling   MV E' Tissue Velocity Septal 7 cm/s         MV E' Tissue Velocity Lateral 9 cm/s         E wave deceleration time 296 ms         MV Peak E Rivera 94 cm/s         MV Peak A Rivera 0.88 m/s          Report Measurements   AV LVOT peak gradient 4 mmHg              Interventricular Septum Measurements    Shunt Ratio   LVOT peak VTI 24.35 cm         LVOT peak rivera 0.96 m/s              Right Ventricle Measurements    Dimensions   RVID d 3.7 cm         Tricuspid annular plane systolic excursion 2.5 cm               Left Atrium Measurements    Dimensions   LA size 4.6 cm         LA length (A2C) 5.3 cm               Right Atrium Measurements    Dimensions   RAA A4C 13.6 cm2               Atrial Septum Measurements    Shunt Ratio   LVOT peak VTI 24.35 cm         LVOT peak rivera 0.96 m/s               Aortic Valve Measurements    Stenosis   Aortic valve peak velocity 3.64 m/s         LVOT peak rivera 0.96 m/s         Ao VTI 78.99 cm         LVOT peak VTI 24.35 cm         AV mean gradient 34 mmHg         LVOT mn grad 2 mmHg         AV peak gradient 53 mmHg         AV LVOT peak gradient 4 mmHg         Area/Dimensions   DVI 0.26         AV valve area 1.17 cm2         AV area by cont VTI 1.2 cm2         AV area peak rivera 1 cm2         LVOT diameter 2.2 cm         LVOT area 3.8 cm2               Mitral Valve Measurements    Stenosis   MV stenosis pressure 1/2 time 86 ms         MV valve area p 1/2 method 2.56 cm2               Tricuspid Valve Measurements    RVSP Parameters   TR Peak Rivera 2.6 m/s         Triscuspid Valve Regurgitation Peak Gradient 27 mmHg               Aorta Measurements      Aortic Dimensions   Ao root 4.4 cm         Asc Ao 4.2 cm              ECG:  4/16/25    Normal sinus rhythm  Prolonged QT    Results Review Statement: I personally reviewed the following image studies in PACS and associated radiology reports: Echocardiogram. My interpretation of the radiology images/reports is: AS, AI, MS, MR.    TAVR evaluation Assessment:     [facility]: I    Aortic Stenosis Stage: D1    5 Meter Walk: 8 sec, 9 sec, 8 sec    STS risk score (preliminary): 3.28%    KCCQ-12 completed    Assessment:  Patient Active Problem List    Diagnosis Date Noted    Nonrheumatic mitral valve stenosis 05/02/2025    Celiac artery stenosis (HCC) 12/18/2023    B12 deficiency 08/21/2023    Seizure (HCC) 04/15/2023    Stage 3a chronic kidney disease (HCC) 03/01/2021    Nonrheumatic aortic valve stenosis 02/20/2020    Nonrheumatic mitral valve regurgitation 02/20/2020    Nonrheumatic aortic valve insufficiency 02/20/2020    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HCC) 02/20/2020    Partial idiopathic epilepsy with seizures of localized onset, not intractable, with status epilepticus ([facility]) 03/01/2017    Hyperlipidemia     HTN (hypertension)     Hyperuricemia 02/09/2016    Disc degeneration, lumbar 07/09/2013    Diverticulosis 07/09/2013    Osteoarthrosis, hand 07/09/2013    AAA (abdominal aortic aneurysm) (HCC) 01/21/2013     Cardiology notes reviewed    Impression/Plan:    Poncho Wallace has severe aortic stenosis, moderate aortic insufficiency, moderate to severe mitral regurgitation and  severe mitral stenosis on recent echo.. They will undergo the following testing for transcatheter aortic valve replacement: Gated CTA of the chest/abdomen/pelvis, 3D MIRYAM (better assess MS/MR), cardiac catheterization, dental clearance and carotid artery ultrasound.      Once these studies have been completed, Poncho Wallace will follow up in our office to review the results and to be evaluated to confirm the suitability of proceeding with transcatheter aortic valve replacment.     Poncho Wallace was comfortable with our  recommendations, and their questions were answered to their satisfaction.    Thank you for allowing us to participate in the care of this patient.     Routine referral to gastroenterology for colonoscopy screening was not indicated, as the patient is over 77 years old    SIGNATURE: BRIANA Marin  DATE: May 9, 2025  TIME: 10:24 AM

## 2025-05-09 NOTE — TELEPHONE ENCOUNTER
": Please enter Prep for Procedure for R/LHC.      Patient scheduled for 3DTEE/Right + Left Heart Cath on 6/5/25 at [facility] with Dr. Hicks.      3DTEE Appt on 6/5/25 w/ at 9AM.    Instructions sent to patient through [email or web address].      Patient's daughter Kirsten aware of all general instructions.    Medication holds:   N/A    Blood Thinners:   N/A    Labs to be done on 5/22/25:  CMP / CBC (fasting 8 hours)       Thank you,  Lynnette \"Sarahy\" Zohaib      "

## 2025-05-09 NOTE — TELEPHONE ENCOUNTER
----- Message from Rosemarie BARNETT sent at 5/9/2025 12:08 PM EDT -----  Regarding: Cath & 3DTEE  Please schedule patient for:     Pre TAVR Cardiac Cath & 3DTEE(to be done by either Dr. Maher or María) to be scheduled in the next few weeks. Patient has Medicare as primary insurance and had recent bloodwork. Please call patient's daughter Kirsten at [phone]    Please schedule with either Dr. Hicks, Dr. Daniel, Dr. Salazar or Dr. Sage     To be done at: [facility]     To be done by:     Please address any questions regarding this request to Rosemarie Mercedes or Randi Pablo,     Thank you.

## 2025-05-09 NOTE — PROGRESS NOTES
Consultation - Cardiothoracic Surgery   Poncho Wallace 84 y.o. male MRN: [id number]    Physician Requesting Consult: Dr. Sanchez    Reason for Consult / Principal Problem: Aortic stenosis, Non-Rheumatic, Mitral regurgitation, Mitral Stenosis, Non-Rheumatic    History of Present Illness: Poncho Wallace is a 84 y.o. year old male who presents for initial outpatient surgical consultation for symptomatic severe aortic stenosis. Patient's PMHx is notable for AS. AI, MS, MR, HTN, HLD, CKD3a (GFR 55), infrarenal AAA s/p EVAR (2010), ascending aortic ectasia (4.3 cm), PAD w/ celiac artery stenosis, partial idiopathic epilepsy (seizures 2017, 2022, 2023), SDH (2022), gout, lumbar disc disease, R rotator cuff tear (receiving PT), diverticulitis, colon polyps and prior tobacco abuse.  Patient established care with Cardiology March 2025 as a referral from Vascular surgery for ascending aortic ectasia. Echo in April demonstrates EF 65%, mod AI, severe AS, severe MS and mod-sever MR.     Patient is accompanied today by his daughter. He reports mild lightheadedness but attributes it to his seizure medications. He deneis chest pain, MANRIQUE, palpitations, edema, fatigue or change in activity tolerance. He states he still  mows the lawn.  He is ,lives independently and still drives. Retired .    Former smoker, up to 3 ppd before quitting in 1975; occasional alcohol, no drug use.    Dental care one month ago.    Past Medical History:  Past Medical History:   Diagnosis Date    Colon polyps     Epilepsy (HCC)     Gout     Hyperlipidemia     Hypertension     S/P AAA repair     Subdural hematoma (HCC) 1/27/2022    Tear of right rotator cuff 04/04/2017         Past Surgical History:   Past Surgical History:   Procedure Laterality Date    ABDOMINAL AORTIC ANEURYSM REPAIR, ENDOVASCULAR N/A     CT COLONOSCOPY FLX DX W/COLLJ SPEC WHEN PFRMD N/A 6/7/2017    Procedure: COLONOSCOPY;  Surgeon: NORM Lilly MD;  Location: [facility];   Service: Colorectal    RI COLONOSCOPY FLX DX W/COLLJ SPEC WHEN PFRMD N/A 2018    Procedure: COLONOSCOPY;  Surgeon: NORM Lilly MD;  Location: [facility];  Service: Colorectal    TONSILLECTOMY      WISDOM TOOTH EXTRACTION Bilateral          Family History:  Family History   Problem Relation Age of Onset    Aneurysm Mother         ABDMONIAL  AORTA    Coronary artery disease Father     Coronary artery disease Brother          Social History:    Social History     Substance and Sexual Activity   Alcohol Use Not Currently    Comment: occasional     Social History     Substance and Sexual Activity   Drug Use No     Social History     Tobacco Use   Smoking Status Former    Current packs/day: 0.00    Average packs/day: 3.0 packs/day for 10.0 years (30.0 ttl pk-yrs)    Types: Cigarettes    Start date: 1961    Quit date: 1971    Years since quittin.2   Smokeless Tobacco Never         Home Medications:   Prior to Admission medications    Medication Sig Start Date End Date Taking? Authorizing Provider   acetaminophen (TYLENOL) 500 mg tablet Take 500 mg by mouth every 6 (six) hours as needed for mild pain   Yes Historical Provider, MD   diphenhydrAMINE HCl (BENADRYL ALLERGY PO) Take by mouth if needed   Yes Historical Provider, MD   lacosamide (VIMPAT) 100 mg tablet Take 1 tablet (100 mg total) by mouth every 12 (twelve) hours 24  Yes Ryan Naylor MD   levetiracetam (Keppra XR) 750 MG TB24 extended-release tablet Take 3 tablets (2,250 mg total) by mouth daily 24  Yes Ryan Naylor MD   simvastatin (ZOCOR) 40 mg tablet Take 1 tablet (40 mg total) by mouth daily 24  Yes Sudheer Goncalves MD   tamsulosin (FLOMAX) 0.4 mg Take 1 capsule (0.4 mg total) by mouth daily with dinner 25  Yes Sudheer Goncalves MD   ASPIRIN 81 PO Take by mouth  Patient not taking: Reported on 2025    Historical Provider, MD   trospium chloride (SANCTURA) 20 mg tablet Take 1 tablet (20 mg  "total) by mouth 2 (two) times a day  Patient not taking: Reported on 5/9/2025 3/21/25   BRIANA Walker   amoxicillin-clavulanate (AUGMENTIN) 875-125 mg per tablet Take 1 tablet by mouth every 12 (twelve) hours for 7 days  Patient not taking: Reported on 5/7/2025 5/1/25 5/9/25  Anat Zuñiga PA-C       Allergies:  Allergies   Allergen Reactions    Fenofibrate      Patient not aware of allergy    Hydrocodone-Acetaminophen      Patient not aware of allergy       Review of Systems:  Review of Systems - History obtained from chart review and the patient  General ROS: negative  Psychological ROS: negative  Ophthalmic ROS: negative  ENT ROS: negative  Allergy and Immunology ROS: negative  Hematological and Lymphatic ROS: negative  Endocrine ROS: negative  Breast ROS: negative  Respiratory ROS: no cough, shortness of breath, or wheezing  Cardiovascular ROS: positive for - murmur  negative for - chest pain, dyspnea on exertion, edema, irregular heartbeat, loss of consciousness, orthopnea, palpitations, paroxysmal nocturnal dyspnea, or rapid heart rate  Gastrointestinal ROS: no abdominal pain, change in bowel habits, or black or bloody stools  Genito-Urinary ROS: no dysuria, trouble voiding, or hematuria  Musculoskeletal ROS: negative  Neurological ROS: positive for - headaches, seizures, occasional lightheadedness and occasional foot numbness.  Dermatological ROS: negative    Vital Signs:     Vitals:    05/09/25 0953 05/09/25 0956   BP: 139/63 136/60   BP Location: Left arm Right arm   Patient Position: Sitting Sitting   Cuff Size: Standard Standard   Pulse: 60    SpO2: 98%    Weight: 82.9 kg (182 lb 11.2 oz)    Height: 5' 10\" (1.778 m)        Physical Exam:    General: Alert, oriented, well developed, no acute distress  HEENT/NECK:  PERRLA.  No jugular venous distention.    Cardiac:Regular rate and rhythm, III/VI systolic murmur   Carotid arteries: 1+ pulses, no bruits  Pulmonary:  Breath sounds clear " bilaterally.  Abdomen:  Non-tender, Non-distended.  Positive bowel sounds.  Upper extremities: 2+ radial pulses; brisk capillary refill  Lower extremities: Extremities warm/dry.   PT/DP pulses 2+ bilaterally.  No edema B/L  Neuro: Alert and oriented X 3.  Sensation is grossly intact.  No focal deficits.  Musculoskeletal: MAEE, stable gait  Skin: Warm/Dry, without rashes or lesions.      Lab Results:     Lab Results   Component Value Date    WBC 10.03 05/01/2025    HGB 13.8 05/01/2025    HCT 42.6 05/01/2025    MCV 97 05/01/2025     (L) 05/01/2025     Lab Results   Component Value Date     07/29/2015    SODIUM 138 05/01/2025    K 4.5 05/01/2025     05/01/2025    CO2 26 05/01/2025    ANIONGAP 7 07/29/2015    AGAP 7 05/01/2025    BUN 17 05/01/2025    CREATININE 1.20 05/01/2025    GLUC 101 05/01/2025    GLUF 89 03/07/2025    CALCIUM 9.0 05/01/2025    AST 18 05/01/2025    ALT 12 05/01/2025    ALKPHOS 76 05/01/2025    PROT 7.0 07/29/2015    TP 6.5 05/01/2025    BILITOT 1.23 (H) 07/29/2015    TBILI 0.73 05/01/2025    EGFR 55 05/01/2025     Lab Results   Component Value Date    HGBA1C 5.0 04/16/2023         Imaging Studies:     Echocardiogram: 4/16/25        Left Ventricle: Left ventricular cavity size is normal. Wall thickness is moderately increased. There is moderate concentric hypertrophy. The left ventricular ejection fraction is 70%. Systolic function is vigorous. Wall motion is normal. Diastolic function is normal.    Right Ventricle: Right ventricular cavity size is normal. Systolic function is normal.    Aortic Valve: The aortic valve is trileaflet. The leaflets are moderately thickened. The leaflets are moderately calcified. There is at least moderately reduced mobility. There is mild regurgitation. The aortic valve has no significant stenosis. The aortic valve peak velocity is 3.64 m/s. The aortic valve peak gradient is 53 mmHg. The aortic valve mean gradient is 34 mmHg. The dimensionless VTI  index is 0.31. The aortic valve area is 1.2 cm2 using an LVOT diameter of 2.24 cm.    Aorta: The aortic root is mildly dilated. The ascending aorta is mildly dilated. The aortic root is 4.40 cm. The ascending aorta is 4.2 cm.     Findings    Left Ventricle Left ventricular cavity size is normal. Wall thickness is moderately increased. There is moderate concentric hypertrophy. The left ventricular ejection fraction is 70%. Systolic function is vigorous.  Wall motion is normal. Diastolic function is normal.   Right Ventricle Right ventricular cavity size is normal. Systolic function is normal. Wall thickness is normal.   Left Atrium The atrium is normal in size.   Right Atrium The atrium is normal in size.   Aortic Valve The aortic valve is trileaflet. The leaflets are moderately thickened. The leaflets are moderately calcified. There is at least moderately reduced mobility. There is mild regurgitation. The aortic valve has no significant stenosis. The aortic valve peak velocity is 3.64 m/s. The aortic valve peak gradient is 53 mmHg. The aortic valve mean gradient is 34 mmHg. The dimensionless VTI index is 0.31. The aortic valve area is 1.2 cm2 using an LVOT diameter of 2.24 cm.   Mitral Valve Mitral valve structure is normal. There is trace regurgitation. There is no evidence of stenosis.   Tricuspid Valve Tricuspid valve structure is normal. There is no evidence of regurgitation. There is no evidence of stenosis.   Pulmonic Valve Pulmonic valve structure is normal. There is no evidence of regurgitation. There is no evidence of stenosis.   Ascending Aorta The aortic root is mildly dilated. The ascending aorta is mildly dilated. The aortic root is 4.40 cm. The ascending aorta is 4.2 cm.   IVC/SVC The inferior vena cava is not well visualized.   Pericardium There is no pericardial effusion. The pericardium is normal in appearance.     Left Ventricle Measurements    Function/Volumes   A4C EF 64 %         LVOT stroke  volume 92.52 cm3         LVOT stroke volume index 44.1 ml/m2         Left ventricular stroke volume (2D) 24 mL         Dimensions   LVIDd 3.4 cm         LVIDS 2.6 cm         IVSd 1.7 cm         LVPWd 1.6 cm         LVOT area 3.8 cm2         FS 24 %         Diastolic Filling   MV E' Tissue Velocity Septal 7 cm/s         MV E' Tissue Velocity Lateral 9 cm/s         E wave deceleration time 296 ms         MV Peak E Rivera 94 cm/s         MV Peak A Rivera 0.88 m/s          Report Measurements   AV LVOT peak gradient 4 mmHg              Interventricular Septum Measurements    Shunt Ratio   LVOT peak VTI 24.35 cm         LVOT peak rivera 0.96 m/s              Right Ventricle Measurements    Dimensions   RVID d 3.7 cm         Tricuspid annular plane systolic excursion 2.5 cm               Left Atrium Measurements    Dimensions   LA size 4.6 cm         LA length (A2C) 5.3 cm               Right Atrium Measurements    Dimensions   RAA A4C 13.6 cm2               Atrial Septum Measurements    Shunt Ratio   LVOT peak VTI 24.35 cm         LVOT peak rivera 0.96 m/s               Aortic Valve Measurements    Stenosis   Aortic valve peak velocity 3.64 m/s         LVOT peak rivera 0.96 m/s         Ao VTI 78.99 cm         LVOT peak VTI 24.35 cm         AV mean gradient 34 mmHg         LVOT mn grad 2 mmHg         AV peak gradient 53 mmHg         AV LVOT peak gradient 4 mmHg         Area/Dimensions   DVI 0.26         AV valve area 1.17 cm2         AV area by cont VTI 1.2 cm2         AV area peak rivera 1 cm2         LVOT diameter 2.2 cm         LVOT area 3.8 cm2               Mitral Valve Measurements    Stenosis   MV stenosis pressure 1/2 time 86 ms         MV valve area p 1/2 method 2.56 cm2               Tricuspid Valve Measurements    RVSP Parameters   TR Peak Rivera 2.6 m/s         Triscuspid Valve Regurgitation Peak Gradient 27 mmHg               Aorta Measurements      Aortic Dimensions   Ao root 4.4 cm         Asc Ao 4.2 cm              ECG:  4/16/25    Normal sinus rhythm  Prolonged QT    Results Review Statement: I personally reviewed the following image studies in PACS and associated radiology reports: Echocardiogram. My interpretation of the radiology images/reports is: AS, AI, MS, MR.    TAVR evaluation Assessment:     [facility]: I    Aortic Stenosis Stage: D1    5 Meter Walk: 8 sec, 9 sec, 8 sec    STS risk score (preliminary): 3.28%    KCCQ-12 completed    Assessment:  Patient Active Problem List    Diagnosis Date Noted    Nonrheumatic mitral valve stenosis 05/02/2025    Celiac artery stenosis (HCC) 12/18/2023    B12 deficiency 08/21/2023    Seizure (HCC) 04/15/2023    Stage 3a chronic kidney disease (HCC) 03/01/2021    Nonrheumatic aortic valve stenosis 02/20/2020    Nonrheumatic mitral valve regurgitation 02/20/2020    Nonrheumatic aortic valve insufficiency 02/20/2020    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HCC) 02/20/2020    Partial idiopathic epilepsy with seizures of localized onset, not intractable, with status epilepticus ([facility]) 03/01/2017    Hyperlipidemia     HTN (hypertension)     Hyperuricemia 02/09/2016    Disc degeneration, lumbar 07/09/2013    Diverticulosis 07/09/2013    Osteoarthrosis, hand 07/09/2013    AAA (abdominal aortic aneurysm) (HCC) 01/21/2013     Cardiology notes reviewed    Impression/Plan:    Poncho Wallace has severe aortic stenosis, moderate aortic insufficiency, moderate to severe mitral regurgitation and  severe mitral stenosis on recent echo.. They will undergo the following testing for transcatheter aortic valve replacement: Gated CTA of the chest/abdomen/pelvis, 3D MIRYAM (better assess MS/MR), cardiac catheterization, dental clearance and carotid artery ultrasound.      Once these studies have been completed, Pocnho Wallace will follow up in our office to review the results and to be evaluated to confirm the suitability of proceeding with transcatheter aortic valve replacment.     Poncho Wallace was comfortable with our  recommendations, and their questions were answered to their satisfaction.    Thank you for allowing us to participate in the care of this patient.     Routine referral to gastroenterology for colonoscopy screening was not indicated, as the patient is over 77 years old    SIGNATURE: BRIANA Marin  DATE: May 9, 2025  TIME: 10:24 AM

## 2025-05-12 ENCOUNTER — APPOINTMENT (OUTPATIENT)
Dept: LAB | Facility: CLINIC | Age: 85
End: 2025-05-12
Payer: MEDICARE

## 2025-05-12 DIAGNOSIS — N18.31 STAGE 3A CHRONIC KIDNEY DISEASE (HCC): ICD-10-CM

## 2025-05-12 DIAGNOSIS — K57.92 DIVERTICULITIS: ICD-10-CM

## 2025-05-12 DIAGNOSIS — I10 PRIMARY HYPERTENSION: ICD-10-CM

## 2025-05-12 LAB
ALBUMIN SERPL BCG-MCNC: 4 G/DL (ref 3.5–5)
ALP SERPL-CCNC: 80 U/L (ref 34–104)
ALT SERPL W P-5'-P-CCNC: 55 U/L (ref 7–52)
ANION GAP SERPL CALCULATED.3IONS-SCNC: 12 MMOL/L (ref 4–13)
AST SERPL W P-5'-P-CCNC: 50 U/L (ref 13–39)
BASOPHILS # BLD AUTO: 0.02 THOUSANDS/ÂΜL (ref 0–0.1)
BASOPHILS NFR BLD AUTO: 0 % (ref 0–1)
BILIRUB SERPL-MCNC: 0.65 MG/DL (ref 0.2–1)
BUN SERPL-MCNC: 15 MG/DL (ref 5–25)
CALCIUM SERPL-MCNC: 9.2 MG/DL (ref 8.4–10.2)
CHLORIDE SERPL-SCNC: 107 MMOL/L (ref 96–108)
CO2 SERPL-SCNC: 26 MMOL/L (ref 21–32)
CREAT SERPL-MCNC: 1.19 MG/DL (ref 0.6–1.3)
EOSINOPHIL # BLD AUTO: 0.32 THOUSAND/ÂΜL (ref 0–0.61)
EOSINOPHIL NFR BLD AUTO: 4 % (ref 0–6)
ERYTHROCYTE [DISTWIDTH] IN BLOOD BY AUTOMATED COUNT: 12 % (ref 11.6–15.1)
GFR SERPL CREATININE-BSD FRML MDRD: 56 ML/MIN/1.73SQ M
GLUCOSE P FAST SERPL-MCNC: 93 MG/DL (ref 65–99)
HCT VFR BLD AUTO: 45.1 % (ref 36.5–49.3)
HGB BLD-MCNC: 14.6 G/DL (ref 12–17)
IMM GRANULOCYTES # BLD AUTO: 0.02 THOUSAND/UL (ref 0–0.2)
IMM GRANULOCYTES NFR BLD AUTO: 0 % (ref 0–2)
LYMPHOCYTES # BLD AUTO: 1.76 THOUSANDS/ÂΜL (ref 0.6–4.47)
LYMPHOCYTES NFR BLD AUTO: 22 % (ref 14–44)
MCH RBC QN AUTO: 31.3 PG (ref 26.8–34.3)
MCHC RBC AUTO-ENTMCNC: 32.4 G/DL (ref 31.4–37.4)
MCV RBC AUTO: 97 FL (ref 82–98)
MONOCYTES # BLD AUTO: 0.53 THOUSAND/ÂΜL (ref 0.17–1.22)
MONOCYTES NFR BLD AUTO: 7 % (ref 4–12)
NEUTROPHILS # BLD AUTO: 5.53 THOUSANDS/ÂΜL (ref 1.85–7.62)
NEUTS SEG NFR BLD AUTO: 67 % (ref 43–75)
NRBC BLD AUTO-RTO: 0 /100 WBCS
PLATELET # BLD AUTO: 158 THOUSANDS/UL (ref 149–390)
PMV BLD AUTO: 10.8 FL (ref 8.9–12.7)
POTASSIUM SERPL-SCNC: 4.9 MMOL/L (ref 3.5–5.3)
PROT SERPL-MCNC: 6.5 G/DL (ref 6.4–8.4)
RBC # BLD AUTO: 4.67 MILLION/UL (ref 3.88–5.62)
SODIUM SERPL-SCNC: 145 MMOL/L (ref 135–147)
WBC # BLD AUTO: 8.18 THOUSAND/UL (ref 4.31–10.16)

## 2025-05-12 PROCEDURE — 85025 COMPLETE CBC W/AUTO DIFF WBC: CPT

## 2025-05-12 PROCEDURE — 80053 COMPREHEN METABOLIC PANEL: CPT

## 2025-05-12 PROCEDURE — 36415 COLL VENOUS BLD VENIPUNCTURE: CPT

## 2025-05-16 ENCOUNTER — TELEPHONE (OUTPATIENT)
Age: 85
End: 2025-05-16

## 2025-05-16 ENCOUNTER — TELEPHONE (OUTPATIENT)
Dept: CARDIAC SURGERY | Facility: CLINIC | Age: 85
End: 2025-05-16

## 2025-05-16 ENCOUNTER — OFFICE VISIT (OUTPATIENT)
Dept: UROLOGY | Facility: CLINIC | Age: 85
End: 2025-05-16
Payer: MEDICARE

## 2025-05-16 VITALS
WEIGHT: 184 LBS | OXYGEN SATURATION: 95 % | SYSTOLIC BLOOD PRESSURE: 134 MMHG | HEIGHT: 70 IN | HEART RATE: 78 BPM | DIASTOLIC BLOOD PRESSURE: 60 MMHG | BODY MASS INDEX: 26.34 KG/M2

## 2025-05-16 DIAGNOSIS — N40.1 BPH ASSOCIATED WITH NOCTURIA: Primary | ICD-10-CM

## 2025-05-16 DIAGNOSIS — R35.1 BPH ASSOCIATED WITH NOCTURIA: Primary | ICD-10-CM

## 2025-05-16 PROCEDURE — 99213 OFFICE O/P EST LOW 20 MIN: CPT

## 2025-05-16 NOTE — PROGRESS NOTES
Name: Poncho Wallace      : 1940      MRN: [id number]  Encounter Provider: BRIANA Hernandez  Encounter Date: 2025   Encounter department: [facility] UROLOGY [facility]  :  Assessment & Plan  BPH associated with nocturia  - Continue 0.4 mg tamsulosin.  Tolerating medication well.  -Patient contents with urinary symptoms at this time.   - Patient would like to follow-up with her service on an as needed basis.  All questions addressed.  Please do not hesitate to reach out with further questions or concerns.           History of Present Illness   Poncho Wallace is a 84 y.o. male who presents  for follow-up of BPH with lower urinary tract symptoms.  He was seen by me in consultation of 2025.  Patient has past medical history of hypertension, abdominal aortic aneurysm, aortic valve stenosis, mitral valve regurgitation, celiac artery stenosis, diverticulosis, osteoarthrosis, disc degeneration, stage III CKD, seizure, hyperlipidemia.     Patient denies previous urologic history or history of  manipulation.  He denies history of recurrent UTIs and kidney stones.  He denies family history of  malignancy.     Patient is on 0.4 mg daily tamsulosin. He has been on this for about 1 year.  He does still experience nocturia, however, his tolerating this.  At our last visit, we did initiate 20 mg of trospium which he states he had diverticulitis at that time and had trouble urinating.  He denies needing a catheter.  He stopped taking this.  He is content with voiding and denies any difficulties.  He denies dysuria, flank pain, frequency, urgency, gross hematuria.       Review of Systems   Constitutional:  Negative for activity change, chills, fatigue and fever.   HENT:  Negative for congestion, rhinorrhea and sore throat.    Eyes:  Negative for photophobia, redness and visual disturbance.   Respiratory:  Negative for cough, shortness of breath and wheezing.    Cardiovascular:  Negative for chest pain,  "palpitations and leg swelling.   Gastrointestinal:  Negative for abdominal pain, diarrhea, nausea and vomiting.   Genitourinary:  Negative for difficulty urinating, dysuria, flank pain, frequency, hematuria and urgency.        Nocturia   Neurological:  Negative for weakness, light-headedness and headaches.          Objective   /60 (BP Location: Left arm, Patient Position: Sitting, Cuff Size: Large)   Pulse 78   Ht 5' 10\" (1.778 m)   Wt 83.5 kg (184 lb)   SpO2 95%   BMI 26.40 kg/m²     Physical Exam  Vitals reviewed.   Constitutional:       General: He is not in acute distress.     Appearance: He is well-developed.   HENT:      Head: Normocephalic and atraumatic.     Eyes:      Conjunctiva/sclera: Conjunctivae normal.     Pulmonary:      Effort: Pulmonary effort is normal. No respiratory distress.     Neurological:      Mental Status: He is alert and oriented to person, place, and time.     Psychiatric:         Mood and Affect: Mood normal.          Results   Lab Results   Component Value Date    PSA 0.5 07/29/2015    PSA 0.4 07/17/2014     Lab Results   Component Value Date    GLUCOSE 145 (H) 02/21/2017    CALCIUM 9.2 05/12/2025     07/29/2015    K 4.9 05/12/2025    CO2 26 05/12/2025     05/12/2025    BUN 15 05/12/2025    CREATININE 1.19 05/12/2025     Lab Results   Component Value Date    WBC 8.18 05/12/2025    HGB 14.6 05/12/2025    HCT 45.1 05/12/2025    MCV 97 05/12/2025     05/12/2025       Office Urine Dip  No results found for this or any previous visit (from the past hour).      "

## 2025-05-16 NOTE — TELEPHONE ENCOUNTER
Caller:  Poncho - Daughter    Doctor: Dr. Dave     Reason for call:  Patient calling in with his daughter asking if due to him having a tooth infection and having a TVAR CTA scan coming up if patient can still have scan completed. Called office and transferred call     Call back#: [phone]

## 2025-05-16 NOTE — TELEPHONE ENCOUNTER
Patient and his daughter called in with concerns about his tooth infection, he is scheduled on the 21 st of this month to have his tooth extracted. His CTA chest/abdomen and pelvis scheduled for the 27 th. He asked if this would affect his testings. I advised patient to continue as planned. If he cancels his testing it will only delay his procedure or vise versa. Patient and daughter understood.

## 2025-05-22 ENCOUNTER — APPOINTMENT (OUTPATIENT)
Dept: LAB | Facility: CLINIC | Age: 85
End: 2025-05-22
Payer: MEDICARE

## 2025-05-22 ENCOUNTER — RESULTS FOLLOW-UP (OUTPATIENT)
Dept: NON INVASIVE DIAGNOSTICS | Facility: HOSPITAL | Age: 85
End: 2025-05-22

## 2025-05-22 LAB
ALBUMIN SERPL BCG-MCNC: 4.2 G/DL (ref 3.5–5)
ALP SERPL-CCNC: 109 U/L (ref 34–104)
ALT SERPL W P-5'-P-CCNC: 21 U/L (ref 7–52)
ANION GAP SERPL CALCULATED.3IONS-SCNC: 11 MMOL/L (ref 4–13)
AST SERPL W P-5'-P-CCNC: 23 U/L (ref 13–39)
BASOPHILS # BLD AUTO: 0.02 THOUSANDS/ÂΜL (ref 0–0.1)
BASOPHILS NFR BLD AUTO: 0 % (ref 0–1)
BILIRUB SERPL-MCNC: 1.17 MG/DL (ref 0.2–1)
BUN SERPL-MCNC: 14 MG/DL (ref 5–25)
CALCIUM SERPL-MCNC: 9.2 MG/DL (ref 8.4–10.2)
CHLORIDE SERPL-SCNC: 103 MMOL/L (ref 96–108)
CO2 SERPL-SCNC: 26 MMOL/L (ref 21–32)
CREAT SERPL-MCNC: 1.07 MG/DL (ref 0.6–1.3)
EOSINOPHIL # BLD AUTO: 0.31 THOUSAND/ÂΜL (ref 0–0.61)
EOSINOPHIL NFR BLD AUTO: 4 % (ref 0–6)
ERYTHROCYTE [DISTWIDTH] IN BLOOD BY AUTOMATED COUNT: 11.9 % (ref 11.6–15.1)
GFR SERPL CREATININE-BSD FRML MDRD: 64 ML/MIN/1.73SQ M
GLUCOSE P FAST SERPL-MCNC: 95 MG/DL (ref 65–99)
HCT VFR BLD AUTO: 45.8 % (ref 36.5–49.3)
HGB BLD-MCNC: 14.3 G/DL (ref 12–17)
IMM GRANULOCYTES # BLD AUTO: 0.01 THOUSAND/UL (ref 0–0.2)
IMM GRANULOCYTES NFR BLD AUTO: 0 % (ref 0–2)
LYMPHOCYTES # BLD AUTO: 1.18 THOUSANDS/ÂΜL (ref 0.6–4.47)
LYMPHOCYTES NFR BLD AUTO: 16 % (ref 14–44)
MCH RBC QN AUTO: 31 PG (ref 26.8–34.3)
MCHC RBC AUTO-ENTMCNC: 31.2 G/DL (ref 31.4–37.4)
MCV RBC AUTO: 99 FL (ref 82–98)
MONOCYTES # BLD AUTO: 0.46 THOUSAND/ÂΜL (ref 0.17–1.22)
MONOCYTES NFR BLD AUTO: 6 % (ref 4–12)
NEUTROPHILS # BLD AUTO: 5.25 THOUSANDS/ÂΜL (ref 1.85–7.62)
NEUTS SEG NFR BLD AUTO: 74 % (ref 43–75)
NRBC BLD AUTO-RTO: 0 /100 WBCS
PLATELET # BLD AUTO: 148 THOUSANDS/UL (ref 149–390)
PMV BLD AUTO: 11.5 FL (ref 8.9–12.7)
POTASSIUM SERPL-SCNC: 4.9 MMOL/L (ref 3.5–5.3)
PROT SERPL-MCNC: 7.1 G/DL (ref 6.4–8.4)
RBC # BLD AUTO: 4.62 MILLION/UL (ref 3.88–5.62)
SODIUM SERPL-SCNC: 140 MMOL/L (ref 135–147)
WBC # BLD AUTO: 7.23 THOUSAND/UL (ref 4.31–10.16)

## 2025-05-23 ENCOUNTER — OFFICE VISIT (OUTPATIENT)
Dept: HEMATOLOGY ONCOLOGY | Facility: CLINIC | Age: 85
End: 2025-05-23
Payer: MEDICARE

## 2025-05-23 VITALS
RESPIRATION RATE: 16 BRPM | DIASTOLIC BLOOD PRESSURE: 78 MMHG | HEIGHT: 70 IN | WEIGHT: 183 LBS | SYSTOLIC BLOOD PRESSURE: 168 MMHG | OXYGEN SATURATION: 97 % | TEMPERATURE: 98.1 F | BODY MASS INDEX: 26.2 KG/M2 | HEART RATE: 79 BPM

## 2025-05-23 DIAGNOSIS — E53.8 LOW SERUM VITAMIN B12: ICD-10-CM

## 2025-05-23 DIAGNOSIS — D69.6 THROMBOCYTOPENIA (HCC): Primary | ICD-10-CM

## 2025-05-23 PROCEDURE — 99202 OFFICE O/P NEW SF 15 MIN: CPT | Performed by: PHYSICIAN ASSISTANT

## 2025-05-23 NOTE — PROGRESS NOTES
Name: Poncho Wallace      : 1940      MRN: [id number]  Encounter Provider: Tiffany Wade PA-C  Encounter Date: 2025   Encounter department: [facility] [place]  :  Assessment & Plan  Thrombocytopenia (HCC)  2017 - 2025  platelet count 113-174.    Normal hemoglobin and white blood cell count.    No liver or spleen enlargement noted on 2025 CT scan abdomen and pelvis.  Hepatic steatosis present.  History of mild grade 1 transaminase elevation May 2025.  Fluctuant bilirubin 0.65-1.17 2023 - 2025 platelet count 158  25 platelet count 148    Reviewed with the patient and his daughter that he has had fluctuant platelet counts over many years.  They have remained greater than 100,000.  No further workup or intervention needed at this time.  Follow-up as needed       Low serum vitamin B12    2023 B12 269  2023 B12 534  3/20/2025 B12 337    2023 folate 9.2           Return if symptoms worsen or fail to improve.    History of Present Illness   Chief Complaint   Patient presents with   • Consult     Pertinent Medical History     25: Poncho Wallace is an 85 y/o male seen for initial consultation 25, referred by Anat Zuñiga PA-C regarding thrombocytopenia.  He is accompanied by his daughter.    PMH hypertension, AAA, aortic valve stenosis, aortic valve insufficiency, celiac artery stenosis, mitral valve regurgitation, mitral valve stenosis, diverticulosis, idiopathic epilepsy, osteoarthritis, stage III CKD, lumbar generative disc disease, hyperlipidemia, B12 deficiency.  He is status post AAA repair.    Occasionally will get nosebleeds, self-limited.  Denies any melena, hematochezia, hematuria or other bleeding.    25 presented to the ED regarding dysuria and urinary frequency.  Platelet count 113.         Review of Systems   Constitutional:  Negative for activity change, appetite change, chills, diaphoresis,  "fatigue, fever and unexpected weight change.   HENT:  Negative for congestion, dental problem, facial swelling, hearing loss, mouth sores, nosebleeds, postnasal drip, rhinorrhea, sore throat, trouble swallowing and voice change.    Eyes:  Negative for photophobia, pain, discharge, redness, itching and visual disturbance.   Respiratory:  Negative for cough, choking, chest tightness, shortness of breath and wheezing.    Cardiovascular:  Negative for chest pain, palpitations and leg swelling.   Gastrointestinal:  Negative for abdominal distention, abdominal pain, anal bleeding, blood in stool, constipation, diarrhea, nausea, rectal pain and vomiting.   Endocrine: Negative for cold intolerance and heat intolerance.   Genitourinary:  Negative for decreased urine volume, difficulty urinating, dysuria, flank pain, frequency, hematuria and urgency.   Musculoskeletal:  Positive for arthralgias. Negative for back pain, gait problem, joint swelling, myalgias, neck pain and neck stiffness.   Skin:  Negative for color change, pallor, rash and wound.   Allergic/Immunologic: Negative for immunocompromised state.   Neurological:  Negative for dizziness, tremors, seizures, syncope, facial asymmetry, speech difficulty, weakness, light-headedness, numbness and headaches.   Hematological:  Negative for adenopathy. Does not bruise/bleed easily.   Psychiatric/Behavioral:  Negative for agitation, confusion, decreased concentration, dysphoric mood and sleep disturbance. The patient is not nervous/anxious.    All other systems reviewed and are negative.          Objective   /78 (BP Location: Right arm, Patient Position: Sitting, Cuff Size: Adult)   Pulse 79   Temp 98.1 °F (36.7 °C) (Temporal)   Resp 16   Ht 5' 10\" (1.778 m)   Wt 83 kg (183 lb)   SpO2 97%   BMI 26.26 kg/m²     Physical Exam  Constitutional:       Appearance: Normal appearance. He is well-developed.   HENT:      Head: Normocephalic and atraumatic. "     Cardiovascular:      Rate and Rhythm: Normal rate.      Heart sounds: Murmur heard.   Pulmonary:      Effort: Pulmonary effort is normal.      Breath sounds: Normal breath sounds. No stridor. No rhonchi.   Abdominal:      General: Abdomen is flat.      Palpations: Abdomen is soft.      Tenderness: There is no abdominal tenderness.     Skin:     General: Skin is warm and dry.      Coloration: Skin is not pale.      Findings: No bruising.     Neurological:      General: No focal deficit present.      Mental Status: He is alert.     Psychiatric:         Behavior: Behavior normal.         Labs: I have reviewed the following labs:  Results for orders placed or performed in visit on 05/12/25   Comprehensive metabolic panel   Result Value Ref Range    Sodium 145 135 - 147 mmol/L    Potassium 4.9 3.5 - 5.3 mmol/L    Chloride 107 96 - 108 mmol/L    CO2 26 21 - 32 mmol/L    ANION GAP 12 4 - 13 mmol/L    BUN 15 5 - 25 mg/dL    Creatinine 1.19 0.60 - 1.30 mg/dL    Glucose, Fasting 93 65 - 99 mg/dL    Calcium 9.2 8.4 - 10.2 mg/dL    AST 50 (H) 13 - 39 U/L    ALT 55 (H) 7 - 52 U/L    Alkaline Phosphatase 80 34 - 104 U/L    Total Protein 6.5 6.4 - 8.4 g/dL    Albumin 4.0 3.5 - 5.0 g/dL    Total Bilirubin 0.65 0.20 - 1.00 mg/dL    eGFR 56 ml/min/1.73sq m   CBC and differential   Result Value Ref Range    WBC 8.18 4.31 - 10.16 Thousand/uL    RBC 4.67 3.88 - 5.62 Million/uL    Hemoglobin 14.6 12.0 - 17.0 g/dL    Hematocrit 45.1 36.5 - 49.3 %    MCV 97 82 - 98 fL    MCH 31.3 26.8 - 34.3 pg    MCHC 32.4 31.4 - 37.4 g/dL    RDW 12.0 11.6 - 15.1 %    MPV 10.8 8.9 - 12.7 fL    Platelets 158 149 - 390 Thousands/uL    nRBC 0 /100 WBCs    Segmented % 67 43 - 75 %    Immature Grans % 0 0 - 2 %    Lymphocytes % 22 14 - 44 %    Monocytes % 7 4 - 12 %    Eosinophils Relative 4 0 - 6 %    Basophils Relative 0 0 - 1 %    Absolute Neutrophils 5.53 1.85 - 7.62 Thousands/µL    Absolute Immature Grans 0.02 0.00 - 0.20 Thousand/uL    Absolute  Lymphocytes 1.76 0.60 - 4.47 Thousands/µL    Absolute Monocytes 0.53 0.17 - 1.22 Thousand/µL    Eosinophils Absolute 0.32 0.00 - 0.61 Thousand/µL    Basophils Absolute 0.02 0.00 - 0.10 Thousands/µL

## 2025-05-23 NOTE — ASSESSMENT & PLAN NOTE
2/2017 - 5/20/2025  platelet count 113-174.    Normal hemoglobin and white blood cell count.    No liver or spleen enlargement noted on 1/21/2025 CT scan abdomen and pelvis.  Hepatic steatosis present.  History of mild grade 1 transaminase elevation May 2025.  Fluctuant bilirubin 0.65-1.17 8/20/2023 - 5/20/2025 5/12/25 platelet count 158  5/22/25 platelet count 148    Reviewed with the patient and his daughter that he has had fluctuant platelet counts over many years.  They have remained greater than 100,000.  No further workup or intervention needed at this time.  Follow-up as needed

## 2025-05-27 ENCOUNTER — HOSPITAL ENCOUNTER (OUTPATIENT)
Dept: RADIOLOGY | Facility: HOSPITAL | Age: 85
Discharge: HOME | End: 2025-05-27
Attending: NURSE PRACTITIONER
Payer: MEDICARE

## 2025-05-27 ENCOUNTER — HOSPITAL ENCOUNTER (OUTPATIENT)
Dept: NON INVASIVE DIAGNOSTICS | Facility: HOSPITAL | Age: 85
Discharge: HOME | End: 2025-05-27
Attending: NURSE PRACTITIONER
Payer: MEDICARE

## 2025-05-27 DIAGNOSIS — I34.0 NONRHEUMATIC MITRAL VALVE REGURGITATION: ICD-10-CM

## 2025-05-27 DIAGNOSIS — I34.2 NONRHEUMATIC MITRAL VALVE STENOSIS: ICD-10-CM

## 2025-05-27 DIAGNOSIS — I35.0 NONRHEUMATIC AORTIC VALVE STENOSIS: ICD-10-CM

## 2025-05-27 DIAGNOSIS — Z13.6 ENCOUNTER FOR SCREENING FOR STENOSIS OF CAROTID ARTERY: ICD-10-CM

## 2025-05-27 DIAGNOSIS — I35.1 NONRHEUMATIC AORTIC VALVE INSUFFICIENCY: ICD-10-CM

## 2025-05-27 PROCEDURE — 74174 CTA ABD&PLVS W/CONTRAST: CPT

## 2025-05-27 PROCEDURE — 93880 EXTRACRANIAL BILAT STUDY: CPT

## 2025-05-27 PROCEDURE — 93880 EXTRACRANIAL BILAT STUDY: CPT | Performed by: SURGERY

## 2025-05-27 PROCEDURE — 75572 CT HRT W/3D IMAGE: CPT

## 2025-05-27 RX ADMIN — IOHEXOL 100 ML: 350 INJECTION, SOLUTION INTRAVENOUS at 10:00

## 2025-05-29 DIAGNOSIS — E78.2 MIXED HYPERLIPIDEMIA: ICD-10-CM

## 2025-05-29 DIAGNOSIS — G40.001 PARTIAL IDIOPATHIC EPILEPSY WITH SEIZURES OF LOCALIZED ONSET, NOT INTRACTABLE, WITH STATUS EPILEPTICUS (HCC): ICD-10-CM

## 2025-05-30 RX ORDER — LEVETIRACETAM 750 MG/1
2250 TABLET, FILM COATED, EXTENDED RELEASE ORAL DAILY
Qty: 270 TABLET | Refills: 3 | OUTPATIENT
Start: 2025-05-30

## 2025-05-30 RX ORDER — SIMVASTATIN 40 MG
40 TABLET ORAL DAILY
Qty: 90 TABLET | Refills: 1 | Status: SHIPPED | OUTPATIENT
Start: 2025-05-30

## 2025-06-04 ENCOUNTER — ANESTHESIA EVENT (OUTPATIENT)
Dept: NON INVASIVE DIAGNOSTICS | Facility: HOSPITAL | Age: 85
End: 2025-06-04
Payer: MEDICARE

## 2025-06-05 ENCOUNTER — HOSPITAL ENCOUNTER (OUTPATIENT)
Dept: NON INVASIVE DIAGNOSTICS | Facility: HOSPITAL | Age: 85
Discharge: HOME | End: 2025-06-05
Payer: MEDICARE

## 2025-06-05 ENCOUNTER — HOSPITAL ENCOUNTER (OUTPATIENT)
Facility: HOSPITAL | Age: 85
Setting detail: OUTPATIENT SURGERY
Discharge: HOME | End: 2025-06-05
Attending: INTERNAL MEDICINE | Admitting: INTERNAL MEDICINE
Payer: MEDICARE

## 2025-06-05 ENCOUNTER — ANESTHESIA (OUTPATIENT)
Dept: NON INVASIVE DIAGNOSTICS | Facility: HOSPITAL | Age: 85
End: 2025-06-05
Payer: MEDICARE

## 2025-06-05 VITALS
WEIGHT: 175 LBS | HEART RATE: 51 BPM | SYSTOLIC BLOOD PRESSURE: 118 MMHG | BODY MASS INDEX: 25.92 KG/M2 | DIASTOLIC BLOOD PRESSURE: 58 MMHG | HEIGHT: 69 IN

## 2025-06-05 VITALS
DIASTOLIC BLOOD PRESSURE: 58 MMHG | RESPIRATION RATE: 18 BRPM | BODY MASS INDEX: 25.92 KG/M2 | SYSTOLIC BLOOD PRESSURE: 138 MMHG | HEART RATE: 58 BPM | OXYGEN SATURATION: 96 % | HEIGHT: 69 IN | WEIGHT: 175 LBS | TEMPERATURE: 97.1 F

## 2025-06-05 DIAGNOSIS — I34.0 NONRHEUMATIC MITRAL VALVE REGURGITATION: ICD-10-CM

## 2025-06-05 DIAGNOSIS — I35.1 NONRHEUMATIC AORTIC VALVE INSUFFICIENCY: ICD-10-CM

## 2025-06-05 DIAGNOSIS — I35.0 NONRHEUMATIC AORTIC VALVE STENOSIS: ICD-10-CM

## 2025-06-05 DIAGNOSIS — I35.0 AORTIC STENOSIS, SEVERE: ICD-10-CM

## 2025-06-05 DIAGNOSIS — I34.2 NONRHEUMATIC MITRAL VALVE STENOSIS: ICD-10-CM

## 2025-06-05 PROBLEM — I25.10 CORONARY ARTERY DISEASE INVOLVING NATIVE CORONARY ARTERY: Status: ACTIVE | Noted: 2025-06-05

## 2025-06-05 LAB
ATRIAL RATE: 61 BPM
P AXIS: -2 DEGREES
PR INTERVAL: 150 MS
QRS AXIS: 45 DEGREES
QRSD INTERVAL: 76 MS
QT INTERVAL: 416 MS
QTC INTERVAL: 419 MS
T WAVE AXIS: 97 DEGREES
VENTRICULAR RATE: 61 BPM

## 2025-06-05 PROCEDURE — 93010 ELECTROCARDIOGRAM REPORT: CPT | Performed by: INTERNAL MEDICINE

## 2025-06-05 PROCEDURE — 93312 ECHO TRANSESOPHAGEAL: CPT

## 2025-06-05 PROCEDURE — 99152 MOD SED SAME PHYS/QHP 5/>YRS: CPT | Performed by: INTERNAL MEDICINE

## 2025-06-05 PROCEDURE — C1769 GUIDE WIRE: HCPCS | Performed by: INTERNAL MEDICINE

## 2025-06-05 PROCEDURE — 93454 CORONARY ARTERY ANGIO S&I: CPT | Performed by: INTERNAL MEDICINE

## 2025-06-05 PROCEDURE — C1894 INTRO/SHEATH, NON-LASER: HCPCS | Performed by: INTERNAL MEDICINE

## 2025-06-05 PROCEDURE — 76377 3D RENDER W/INTRP POSTPROCES: CPT

## 2025-06-05 PROCEDURE — 93005 ELECTROCARDIOGRAM TRACING: CPT

## 2025-06-05 RX ORDER — NITROGLYCERIN 20 MG/100ML
INJECTION INTRAVENOUS CODE/TRAUMA/SEDATION MEDICATION
Status: DISCONTINUED | OUTPATIENT
Start: 2025-06-05 | End: 2025-06-05 | Stop reason: HOSPADM

## 2025-06-05 RX ORDER — SODIUM CHLORIDE 9 MG/ML
INJECTION, SOLUTION INTRAVENOUS CONTINUOUS PRN
Status: DISCONTINUED | OUTPATIENT
Start: 2025-06-05 | End: 2025-06-05

## 2025-06-05 RX ORDER — HEPARIN SODIUM 1000 [USP'U]/ML
INJECTION, SOLUTION INTRAVENOUS; SUBCUTANEOUS CODE/TRAUMA/SEDATION MEDICATION
Status: DISCONTINUED | OUTPATIENT
Start: 2025-06-05 | End: 2025-06-05 | Stop reason: HOSPADM

## 2025-06-05 RX ORDER — LIDOCAINE HYDROCHLORIDE 10 MG/ML
INJECTION, SOLUTION EPIDURAL; INFILTRATION; INTRACAUDAL; PERINEURAL CODE/TRAUMA/SEDATION MEDICATION
Status: DISCONTINUED | OUTPATIENT
Start: 2025-06-05 | End: 2025-06-05 | Stop reason: HOSPADM

## 2025-06-05 RX ORDER — VERAPAMIL HYDROCHLORIDE 2.5 MG/ML
INJECTION INTRAVENOUS CODE/TRAUMA/SEDATION MEDICATION
Status: DISCONTINUED | OUTPATIENT
Start: 2025-06-05 | End: 2025-06-05 | Stop reason: HOSPADM

## 2025-06-05 RX ORDER — MIDAZOLAM HYDROCHLORIDE 2 MG/2ML
INJECTION, SOLUTION INTRAMUSCULAR; INTRAVENOUS CODE/TRAUMA/SEDATION MEDICATION
Status: DISCONTINUED | OUTPATIENT
Start: 2025-06-05 | End: 2025-06-05 | Stop reason: HOSPADM

## 2025-06-05 RX ORDER — KETAMINE HCL IN NACL, ISO-OSM 100MG/10ML
SYRINGE (ML) INJECTION AS NEEDED
Status: DISCONTINUED | OUTPATIENT
Start: 2025-06-05 | End: 2025-06-05

## 2025-06-05 RX ORDER — ASPIRIN 81 MG/1
324 TABLET, CHEWABLE ORAL ONCE
Status: COMPLETED | OUTPATIENT
Start: 2025-06-05 | End: 2025-06-05

## 2025-06-05 RX ORDER — PROPOFOL 10 MG/ML
INJECTION, EMULSION INTRAVENOUS AS NEEDED
Status: DISCONTINUED | OUTPATIENT
Start: 2025-06-05 | End: 2025-06-05

## 2025-06-05 RX ORDER — SODIUM CHLORIDE 9 MG/ML
125 INJECTION, SOLUTION INTRAVENOUS CONTINUOUS
Status: DISCONTINUED | OUTPATIENT
Start: 2025-06-05 | End: 2025-06-05

## 2025-06-05 RX ORDER — SODIUM CHLORIDE 9 MG/ML
50 INJECTION, SOLUTION INTRAVENOUS CONTINUOUS
Status: DISPENSED | OUTPATIENT
Start: 2025-06-05 | End: 2025-06-05

## 2025-06-05 RX ORDER — FENTANYL CITRATE 50 UG/ML
INJECTION, SOLUTION INTRAMUSCULAR; INTRAVENOUS CODE/TRAUMA/SEDATION MEDICATION
Status: DISCONTINUED | OUTPATIENT
Start: 2025-06-05 | End: 2025-06-05 | Stop reason: HOSPADM

## 2025-06-05 RX ADMIN — Medication 10 MG: at 15:17

## 2025-06-05 RX ADMIN — PROPOFOL 30 MG: 10 INJECTION, EMULSION INTRAVENOUS at 15:29

## 2025-06-05 RX ADMIN — PROPOFOL 30 MG: 10 INJECTION, EMULSION INTRAVENOUS at 15:22

## 2025-06-05 RX ADMIN — PROPOFOL 20 MG: 10 INJECTION, EMULSION INTRAVENOUS at 15:17

## 2025-06-05 RX ADMIN — PROPOFOL 20 MG: 10 INJECTION, EMULSION INTRAVENOUS at 15:19

## 2025-06-05 RX ADMIN — SODIUM CHLORIDE 125 ML/HR: 0.9 INJECTION, SOLUTION INTRAVENOUS at 08:11

## 2025-06-05 RX ADMIN — PHENYLEPHRINE HYDROCHLORIDE 40 MCG/MIN: 50 INJECTION INTRAVENOUS at 15:25

## 2025-06-05 RX ADMIN — PROPOFOL 30 MG: 10 INJECTION, EMULSION INTRAVENOUS at 15:36

## 2025-06-05 RX ADMIN — Medication 20 MG: at 15:13

## 2025-06-05 RX ADMIN — PROPOFOL 20 MG: 10 INJECTION, EMULSION INTRAVENOUS at 15:21

## 2025-06-05 RX ADMIN — ASPIRIN 81 MG CHEWABLE TABLET 324 MG: 81 TABLET CHEWABLE at 08:10

## 2025-06-05 RX ADMIN — SODIUM CHLORIDE: 9 INJECTION, SOLUTION INTRAVENOUS at 15:13

## 2025-06-05 RX ADMIN — PROPOFOL 20 MG: 10 INJECTION, EMULSION INTRAVENOUS at 15:14

## 2025-06-05 NOTE — ANESTHESIA PREPROCEDURE EVALUATION
Procedure:  MIRYAM    Relevant Problems   ANESTHESIA (within normal limits)      CARDIO   (+) AAA (abdominal aortic aneurysm) (HCC)   (+) Celiac artery stenosis (HCC)   (+) HTN (hypertension)   (+) Hyperlipidemia   (+) 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HCC)   (+) Nonrheumatic aortic valve insufficiency   (+) Nonrheumatic aortic valve stenosis   (+) Nonrheumatic mitral valve regurgitation   (+) Nonrheumatic mitral valve stenosis      /RENAL   (+) Stage 3a chronic kidney disease (HCC)      HEMATOLOGY   (+) Thrombocytopenia (HCC)      MUSCULOSKELETAL   (+) Osteoarthrosis, hand      NEURO/PSYCH   (+) Partial idiopathic epilepsy with seizures of localized onset, not intractable, with status epilepticus ([facility])   (+) Seizure ([facility])      Surgery/Wound/Pain   (+) S/P AAA repair       TTE 4/11/2025    Left Ventricle: Left ventricular cavity size is normal. There is moderate concentric hypertrophy. The left ventricular ejection fraction is 65%. Systolic function is normal. Wall motion is normal. Diastolic function is mildly abnormal, consistent with grade I (abnormal) relaxation.    Right Ventricle: Right ventricular cavity size is normal. Systolic function is normal.    Left Atrium: The atrium is mildly dilated.    Right Atrium: The atrium is mildly dilated.    Aortic Valve: There is moderate regurgitation. There is severe stenosis. The aortic valve mean gradient is 48 mmHg. The dimensionless velocity index is 0.22. The aortic valve area is 0.90 cm2.    Mitral Valve: There is moderate to severe regurgitation. There is severe stenosis.    Aorta: The aortic root is normal in size. The ascending aorta is mildly dilated.    Prior TTE study available for comparison. Prior study date: 4/16/2023. Changes noted when compared to prior study. Changes include: Aortic stenosis and aortic regurgitation have worsened..      Physical Exam    Airway     Mallampati score: II  TM Distance: >3 FB  Neck ROM: full      Cardiovascular      Dental        Pulmonary      Neurological    He appears awake, alert and oriented x3.      Other Findings  post-pubertal.      Anesthesia Plan  ASA Score- 4     Anesthesia Type- IV sedation with anesthesia with ASA Monitors.         Additional Monitors: arterial line.    Airway Plan: natural airway.           Plan Factors-Exercise tolerance (METS): >4 METS.    Chart reviewed. EKG reviewed.  Existing labs reviewed. Patient summary reviewed.    Patient is not a current smoker.              Induction- intravenous.    Postoperative Plan- .   Monitoring Plan - Monitoring plan - standard ASA monitoring and arterial line kit      Perioperative Resuscitation Plan - Level 1 - Full Code.       Informed Consent- Anesthetic plan and risks discussed with patient.  I personally reviewed this patient with the CRNA. Discussed and agreed on the Anesthesia Plan with the CRNA..      NPO Status:  Vitals Value Taken Time   Date of last liquid 06/05/25 06/05/25 11:03   Time of last liquid 1730 06/05/25 08:06   Date of last solid 06/04/25 06/05/25 11:03   Time of last solid 1700 06/05/25 11:03

## 2025-06-05 NOTE — ANESTHESIA POSTPROCEDURE EVALUATION
Post-Op Assessment Note    CV Status:  Stable  Pain Score: 0    Pain management: adequate       Mental Status:  Alert and awake   Hydration Status:  Euvolemic   PONV Controlled:  Controlled   Airway Patency:  Patent  Two or more mitigation strategies used for obstructive sleep apnea   Post Op Vitals Reviewed: Yes    No anethesia notable event occurred.    Staff: CRNA           Last Filed PACU Vitals:  Vitals Value Taken Time   Temp     Pulse 59    /60    Resp 14    SpO2 95%

## 2025-06-05 NOTE — ANESTHESIA PROCEDURE NOTES
"Arterial Line Insertion    Performed by: Jacinto Buchanan DO  Authorized by: Jacinto Buchanan DO  Consent: Verbal consent obtained. Written consent obtained  Risks and benefits: risks, benefits and alternatives were discussed  Consent given by: patient  Patient understanding: patient states understanding of the procedure being performed  Patient consent: the patient's understanding of the procedure matches consent given  Procedure consent: procedure consent matches procedure scheduled  Relevant documents: relevant documents present and verified  Test results: test results available and properly labeled  Site marked: the operative site was marked  Radiology Images: Radiology Images displayed and confirmed. If images not available, report reviewed  Required items: required blood products, implants, devices, and special equipment available  Patient identity confirmed: verbally with patient and arm band  Time out: Immediately prior to procedure a \"time out\" was called to verify the correct patient, procedure, equipment, support staff and site/side marked as required.  Preparation: Patient was prepped and draped in the usual sterile fashion.  Indications: hemodynamic monitoring  Orientation:  Left  Location: radial artery  Sedation:  Patient sedated: no    Procedure Details:      Needle gauge: 20  Placement technique:  Anatomical landmarks and palpation  Number of attempts: 2    Post-procedure:  Post-procedure: dressing applied  Waveform: good waveform  Post-procedure CNS: normal  Patient tolerance: Patient tolerated the procedure well with no immediate complications          "

## 2025-06-05 NOTE — INTERVAL H&P NOTE
Patient seen and examined at bedside.  Severe aortic valve stenosis. . The aortic valve mean gradient is 48 mmHg. The dimensionless velocity index is 0.22. The aortic valve area is 0.90 cm2.    Vitals:    06/05/25 0817   BP: 163/72   Pulse: 63   Resp: 18   Temp: 98.1 °F (36.7 °C)   SpO2: 96%       Lab Results   Component Value Date    WBC 7.23 05/22/2025    HGB 14.3 05/22/2025    HCT 45.8 05/22/2025    MCV 99 (H) 05/22/2025     (L) 05/22/2025       Lab Results   Component Value Date    SODIUM 140 05/22/2025    K 4.9 05/22/2025     05/22/2025    CO2 26 05/22/2025    BUN 14 05/22/2025    CREATININE 1.07 05/22/2025    GLUC 101 05/01/2025    CALCIUM 9.2 05/22/2025       Brief overview of procedure discussed with patient and family member at bedside.   Indication, risk, benefits and alternatives discussed with patient who verbalized understanding.  All questions addressed fully.  Patient elected to proceed with planned procedure, consent completed.    Patient remains clinically stable.  Renal function, coagulation profile, and hemoglobin all within normal limits.      We will proceed with planned procedure.  Has a MIRYAM planned as well for today.     Cassie Carcamo 06/05/25 11:07 AM

## 2025-06-05 NOTE — DISCHARGE INSTR - AVS FIRST PAGE
1. Please see the post cardiac catheterization dishcarge instructions.   No heavy lifting, greater than 10 lbs. or strenuous  activity for 48 hrs.    2.Remove band aid tomorrow.  Shower and wash area- wrist gently with soap and water- beginning tomorrow. Rinse and pat dry.  Apply new water seal band aid.  Repeat this process for 5 days. No powders, creams lotions or antibiotic ointments  for 5 days.  No tub baths, hot tubs or swimming for 5 days.     3. Please call our office ([phone]) if you have any fever, redness, swelling, discharge from your wrist access site.    4.No driving for 1 day

## 2025-06-06 ENCOUNTER — RESULTS FOLLOW-UP (OUTPATIENT)
Dept: NON INVASIVE DIAGNOSTICS | Facility: HOSPITAL | Age: 85
End: 2025-06-06

## 2025-06-06 LAB
AORTIC VALVE MEAN VELOCITY: 29.7 M/S
AV AREA BY CONTINUOUS VTI: 1.2 CM2
AV AREA PEAK VELOCITY: 1 CM2
AV LVOT MEAN GRADIENT: 2 MMHG
AV LVOT PEAK GRADIENT: 3 MMHG
AV MEAN PRESS GRAD SYS DOP V1V2: 39 MMHG
AV ORIFICE AREA US: 0.92 CM2
AV PEAK GRADIENT: 63 MMHG
AV REGURGITATION PRESSURE HALF TIME: 586 MS
AV VELOCITY RATIO: 0.26
DOP CALC AO VTI: 107 CM
DOP CALC LVOT AREA: 3.46 CM2
DOP CALC LVOT DIAMETER: 2.1 CM
DOP CALC LVOT PEAK VEL VTI: 28.3 CM
DOP CALC LVOT PEAK VEL: 0.92 M/S
DOP CALC LVOT STROKE INDEX: 65.6 ML/M2
DOP CALC LVOT STROKE VOLUME: 97.97 CM3
SL CV AV PEAK GRADIENT RETROGRADE: 57 MMHG
SL CV LV EF: 65

## 2025-06-06 PROCEDURE — 93325 DOPPLER ECHO COLOR FLOW MAPG: CPT | Performed by: INTERNAL MEDICINE

## 2025-06-06 PROCEDURE — 93320 DOPPLER ECHO COMPLETE: CPT | Performed by: INTERNAL MEDICINE

## 2025-06-06 PROCEDURE — 93312 ECHO TRANSESOPHAGEAL: CPT | Performed by: INTERNAL MEDICINE

## 2025-06-24 ENCOUNTER — OFFICE VISIT (OUTPATIENT)
Dept: NEUROLOGY | Facility: CLINIC | Age: 85
End: 2025-06-24
Payer: MEDICARE

## 2025-06-25 ENCOUNTER — OFFICE VISIT (OUTPATIENT)
Dept: FAMILY MEDICINE CLINIC | Facility: CLINIC | Age: 85
End: 2025-06-25
Payer: MEDICARE

## 2025-06-25 VITALS
BODY MASS INDEX: 27.25 KG/M2 | HEART RATE: 70 BPM | DIASTOLIC BLOOD PRESSURE: 78 MMHG | TEMPERATURE: 97.6 F | HEIGHT: 69 IN | RESPIRATION RATE: 20 BRPM | OXYGEN SATURATION: 98 % | WEIGHT: 184 LBS | SYSTOLIC BLOOD PRESSURE: 124 MMHG

## 2025-06-25 DIAGNOSIS — M10.9 ACUTE GOUT INVOLVING TOE OF LEFT FOOT, UNSPECIFIED CAUSE: Primary | ICD-10-CM

## 2025-06-25 PROCEDURE — 99213 OFFICE O/P EST LOW 20 MIN: CPT | Performed by: FAMILY MEDICINE

## 2025-06-25 PROCEDURE — G2211 COMPLEX E/M VISIT ADD ON: HCPCS | Performed by: FAMILY MEDICINE

## 2025-06-25 RX ORDER — METHYLPREDNISOLONE 4 MG/1
TABLET ORAL
Qty: 21 EACH | Refills: 0 | Status: SHIPPED | OUTPATIENT
Start: 2025-06-25

## 2025-06-25 NOTE — PROGRESS NOTES
"Name: Poncho Wallace      : 1940      MRN: [id number]  Encounter Provider: Miranda Landa MD  Encounter Date: 2025   Encounter department: [facility]  :  Assessment & Plan  Acute gout involving toe of left foot, unspecified cause  Acute gout of the left toe.  Trial patient on methylprednisolone.  Follow-up if no improvement  Orders:    methylPREDNISolone 4 MG tablet therapy pack; Use as directed on package           History of Present Illness   Patient presents today with redness and pain in his left great toe.  Suspect this is a gout flare.  Has had multiple flares throughout his life.  He is not currently on any maintenance medications.  Toe is sensitive to touch.      Review of Systems   Musculoskeletal:         Left big toe redness and pain       Objective   /78 (BP Location: Right arm, Patient Position: Sitting, Cuff Size: Standard)   Pulse 70   Temp 97.6 °F (36.4 °C) (Temporal)   Resp 20   Ht 5' 9\" (1.753 m)   Wt 83.5 kg (184 lb)   SpO2 98%   BMI 27.17 kg/m²      Physical Exam    Musculoskeletal:         General: Tenderness (Left great toe) present.     Skin:     Findings: Erythema present.         "

## 2025-06-27 ENCOUNTER — OFFICE VISIT (OUTPATIENT)
Dept: CARDIAC SURGERY | Facility: CLINIC | Age: 85
End: 2025-06-27
Payer: MEDICARE

## 2025-06-27 VITALS
DIASTOLIC BLOOD PRESSURE: 56 MMHG | WEIGHT: 186.3 LBS | BODY MASS INDEX: 27.59 KG/M2 | HEART RATE: 55 BPM | OXYGEN SATURATION: 98 % | SYSTOLIC BLOOD PRESSURE: 116 MMHG | HEIGHT: 69 IN

## 2025-06-27 DIAGNOSIS — Z01.810 PRE-OPERATIVE CARDIOVASCULAR EXAMINATION: ICD-10-CM

## 2025-06-27 DIAGNOSIS — I35.0 NONRHEUMATIC AORTIC VALVE STENOSIS: Primary | ICD-10-CM

## 2025-06-27 DIAGNOSIS — Z01.812 ENCOUNTER FOR PRE-OPERATIVE LABORATORY TESTING: ICD-10-CM

## 2025-06-27 PROCEDURE — 99214 OFFICE O/P EST MOD 30 MIN: CPT | Performed by: THORACIC SURGERY (CARDIOTHORACIC VASCULAR SURGERY)

## 2025-06-27 RX ORDER — CHLORHEXIDINE GLUCONATE ORAL RINSE 1.2 MG/ML
15 SOLUTION DENTAL ONCE
OUTPATIENT
Start: 2025-06-27 | End: 2025-06-27

## 2025-06-27 RX ORDER — MUPIROCIN 2 %
OINTMENT (GRAM) TOPICAL 2 TIMES DAILY
Qty: 22 G | Refills: 0 | Status: SHIPPED | OUTPATIENT
Start: 2025-06-27

## 2025-06-27 RX ORDER — CEFAZOLIN SODIUM 2 G/50ML
2000 SOLUTION INTRAVENOUS ONCE
OUTPATIENT
Start: 2025-06-27 | End: 2025-06-27

## 2025-06-27 NOTE — H&P
Preop History and Physical - Cardiac Surgery   Poncho Wallace 84 y.o. male MRN: [id number]      Reason for Consult / Principal Problem: Aortic stenosis, Non-Rheumatic    History of Present Illness: Poncho Wallace is a 84 y.o. year old male who was previously evaluated in our office by Neville Dave M.D. for transcatheter aortic valve replacement.  During this initial consultation, arrangements were made for the following studies to be completed: gated CTA of the chest, abdomen, and pelvis, cardiac catheterization, dental clearance, and carotid ultrasound.     Poncho Wallace now presents to review the results of these tests and confirm the suitability of proceeding with transcatheter aortic valve replacement.    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 Patient established care with cardiology in March 2025 as a referral from vascular surgery due to finding of ascending aortic ectasia. He had an echo in April that demonstrated moderate AI, severe AS, severe MS, moderate-severe MR. A MIRYAM was obtained after his visit with us, which confirmed AS, mild-moderate MR, but did not identify MS.     Patient is accompanied today by his daughter. He reports that he has some mild lightheadedness. He denies MANRIQUE, SOB, CP, syncope, palpitations, edema, fatigue or change in activity tolerance. He is independent with ADL's, and still does yard work, including mowing the lawn. He lives with his wife. He is a retired .     Patient is a remote former smoker, quit in 1975. Occasional alcohol use, no drug use. This week, patient had a gout flare, and has been started on a 7 day course of prednisone. He states he had relief about one day after starting the steroid. We have obtained dental  clearance.          Past Medical History:  Past Medical History:   Diagnosis Date    Colon polyps     Epilepsy (HCC)     Gout     Hyperlipidemia     Hypertension     S/P AAA repair     Subdural hematoma (HCC) 2022    Tear of right rotator cuff 2017         Past Surgical History:   Past Surgical History:   Procedure Laterality Date    ABDOMINAL AORTIC ANEURYSM REPAIR, ENDOVASCULAR N/A     CARDIAC CATHETERIZATION N/A 2025    Procedure: Cardiac RHC/LHC;  Surgeon: Ti Hicks DO;  Location: [facility];  Service: Cardiology    CARDIAC CATHETERIZATION  2025    Procedure: Cardiac Catheterization;  Surgeon: Ti Hicks DO;  [facility];  Service: Cardiology    CARDIAC CATHETERIZATION N/A 2025    Procedure: Cardiac Coronary Angiogram;  Surgeon: Ti Hicks DO;  [facility];  Service: Cardiology    DE COLONOSCOPY FLX DX W/COLLJ SPEC WHEN PFRMD N/A 2017    Procedure: COLONOSCOPY;  Surgeon: NORM Lilly MD;  Location: [facility];  Service: Colorectal    DE COLONOSCOPY FLX DX W/COLLJ SPEC WHEN PFRMD N/A 2018    Procedure: COLONOSCOPY;  Surgeon: NORM Lilly MD;  Location: [facility];  Service: Colorectal    TONSILLECTOMY      WISDOM TOOTH EXTRACTION Bilateral          Family History:  Family History   Problem Relation Name Age of Onset    Aneurysm Mother          ABDMONIAL  AORTA    Coronary artery disease Father      Coronary artery disease Brother           Social History:    Social History     Substance and Sexual Activity   Alcohol Use Not Currently    Comment: occasional     Social History     Substance and Sexual Activity   Drug Use No     Social History     Tobacco Use   Smoking Status Former    Current packs/day: 0.00    Average packs/day: 3.0 packs/day for 10.0 years (30.0 ttl pk-yrs)    Types: Cigarettes    Start date: 1961    Quit date: 1971    Years since quittin.3   Smokeless Tobacco  Never       Home Medications:   Prior to Admission medications    Medication Sig Start Date End Date Taking? Authorizing Provider   lacosamide (VIMPAT) 100 mg tablet Take 1 tablet (100 mg total) by mouth every 12 (twelve) hours 6/24/25  Yes BRIANA Lang   levetiracetam (Keppra XR) 750 MG TB24 extended-release tablet Take 3 tablets (2,250 mg total) by mouth daily 12/24/24  Yes Ryan Naylor MD   methylPREDNISolone 4 MG tablet therapy pack Use as directed on package 6/25/25  Yes Miranda Landa MD   mupirocin (BACTROBAN) 2 % ointment Apply topically 2 (two) times a day 6/27/25  Yes Magaly Ross PA-C   simvastatin (ZOCOR) 40 mg tablet TAKE ONE TABLET BY MOUTH EVERY DAY 5/30/25  Yes Sudheer Goncalves MD   tamsulosin (FLOMAX) 0.4 mg Take 1 capsule (0.4 mg total) by mouth daily with dinner 1/28/25  Yes Sudheer Goncalves MD   acetaminophen (TYLENOL) 500 mg tablet Take 500 mg by mouth every 6 (six) hours as needed for mild pain  Patient not taking: Reported on 6/27/2025    Historical Provider, MD   ASPIRIN 81 PO Take by mouth  Patient not taking: Reported on 6/27/2025    Historical Provider, MD       Allergies:  [Allergies]    [Allergies]  Allergen Reactions    Fenofibrate      Patient not aware of allergy    Hydrocodone-Acetaminophen      Patient not aware of allergy       Review of Systems:     Review of Systems   Constitutional:  Negative for chills and fever.   HENT:  Negative for ear pain and sore throat.    Eyes:  Negative for pain and visual disturbance.   Respiratory:  Negative for cough and shortness of breath.    Cardiovascular:  Negative for chest pain and palpitations.   Gastrointestinal:  Negative for abdominal pain and vomiting.   Genitourinary:  Negative for dysuria and hematuria.   Musculoskeletal:  Negative for arthralgias and back pain.   Skin:  Negative for color change and rash.   Neurological:  Positive for seizures and light-headedness. Negative for syncope.   All other  "systems reviewed and are negative.      Vital Signs:     Vitals:    06/27/25 1023 06/27/25 1025   BP: 116/55 116/56   BP Location: Left arm Right arm   Patient Position: Sitting Sitting   Cuff Size: Standard Standard   Pulse: 55    SpO2: 98%    Weight: 84.5 kg (186 lb 4.8 oz)    Height: 5' 9\" (1.753 m)        Physical Exam:     Physical Exam  Vitals reviewed.   Constitutional:       Appearance: Normal appearance.   HENT:      Head: Normocephalic and atraumatic.     Eyes:      Pupils: Pupils are equal, round, and reactive to light.     Neck:      Vascular: No carotid bruit or JVD.     Cardiovascular:      Rate and Rhythm: Normal rate and regular rhythm.      Pulses:           Carotid pulses are 1+ on the right side and 1+ on the left side.       Radial pulses are 2+ on the right side and 2+ on the left side.        Dorsalis pedis pulses are 2+ on the right side and 2+ on the left side.      Heart sounds: Murmur heard.      Systolic murmur is present with a grade of 3/6.      No friction rub. No gallop.   Pulmonary:      Effort: Pulmonary effort is normal.      Breath sounds: Normal breath sounds. No wheezing, rhonchi or rales.   Abdominal:      Palpations: Abdomen is soft.      Tenderness: There is no abdominal tenderness.     Musculoskeletal:      Cervical back: Normal range of motion.      Right lower leg: No edema.      Left lower leg: No edema.     Skin:     General: Skin is warm and dry.      Capillary Refill: Capillary refill takes less than 2 seconds.     Neurological:      General: No focal deficit present.      Mental Status: He is alert.      Sensory: Sensation is intact.     Psychiatric:         Attention and Perception: Attention normal.         Mood and Affect: Mood normal.         Behavior: Behavior normal. Behavior is cooperative.         Lab Results:               Invalid input(s): \"LABGLOM\"      Lab Results   Component Value Date    HGBA1C 5.0 04/16/2023     Lab Results   Component Value Date    " CKTOTAL 232 04/15/2023    CKMB 2.7 02/23/2017    CKMBINDEX <1.0 02/23/2017    TROPONINI 0.28 (H) 02/21/2017       Imaging Studies:     MIRYAM: 6/5/25  Left Ventricle Left ventricular cavity size is normal. Wall thickness is moderately increased. There is moderate concentric hypertrophy. The left ventricular ejection fraction is 65%. Systolic function is normal. Wall motion is normal. Unable to assess diastolic function.   Right Ventricle Right ventricular cavity size is normal. Systolic function is normal. Wall thickness is normal.   Left Atrium The atrium is dilated. There is no thrombus.   Right Atrium The atrium is normal in size.   Atrial Septum No patent foramen ovale detected, confirmed at rest using color doppler.   Left Atrial Appendage Left atrial appendage is normal in size. There is normal function. There is no thrombus.   Aortic Valve The aortic valve is functionally bicuspid. The leaflets are severely thickened. The leaflets are severely calcified. There is severely reduced mobility. There is no evidence of regurgitation. There is severe stenosis. The aortic valve mean gradient is 39 mmHg. The dimensionless velocity index is 0.26. The aortic valve area is 0.92 cm2. AV mean gradient is 39 mmHg. DVI is 0.26. AV valve area is 0.92 cm2. There is a small, echodense, mobile mass attached to the ventricular aspect of the aortic valve likely representing a degenerated calcific deposit.   Mitral Valve There is mild diffuse thickening of the anterior leaflet involving the leaflet from base to margin. The leaflets exhibit normal mobility. There is annular calcification.  There is mild to moderate regurgitation. There is no evidence of stenosis.   Tricuspid Valve Tricuspid valve structure is normal. There is mild regurgitation. There is no evidence of stenosis. Right ventricular systolic pressure could not be assessed.   Pulmonic Valve Pulmonic valve structure is normal. There is trace regurgitation. There is no  evidence of stenosis.   Ascending Aorta The aortic root is normal in size. There is no evidence of aortic dissection. There is no aneurysm in the aorta.       Gated CTA of the chest/abdomen/pelvis: 5/27/25  VASCULAR FINDINGS:     Central pulmonary artery is not abnormally enlarged.  Right atrium and right ventricle are normal in size.  Left atrial cavity is not abnormally dilated.  Left ventricular myocardium is normal.  No significant mitral annular calcification.     Coronary artery origins are in typical position.  Mild coronary artery calcification.     Annulus, LVOT and aorta analysis:     Typical trileaflet aortic valve morphology.        Optimal CT aortic valve plane angles:  3 cusp view: Mauritanian 12, cranial 10  Cusp overlap view Mauritanian 3, cranial 38     Measurements:     Annulus diameter (max x min): 33 x 26 mm.  Annulus Area 665 mm2.  Annulus Perimeter 93 mm.     Annulus to left main coronary ostium:  15 mm.  Annulus to right main coronary ostium:  15 mm.  Sinus of Valsalva height: 31 mm.     Aortic sinus of Valsalva diameter (max x min): 43 x 42 mm.  LVOT minimal diameter: 22 mm.     Sino-tubular junction minimal diameter: 36 mm.  Ascending thoracic aorta maximal diameter: 44 mm.     Valve Calcification:     Aortic valve calcium score is 6750.  Volume of 4295 mm3.     Thoracic Aorta:     Porcelain aorta = no.  Aortic root and ascending thoracic aorta free of significant calcification beyond the sino-tubular junction.  Aortic arch is normal in course and caliber with insignificant calcification.  Descending thoracic aorta is normal in course, caliber, and contour.     Abdominal aorta and pelvic artery measurements:     Abdominal aorta:  Minimal diameter above aortic bifurcation: 22 mm  Calcification: mild  Tortuosity: none     Right iliofemoral segment:  Minimal diameter: 7 mm  Calcification: Aorto-biiliac stent, patent. Minimal atherosclerosis.  Tortuosity: none     Left iliofemoral segment:  Minimal diameter: 7  mm  Calcification: mild  Tortuosity: none     ADDITIONAL FINDINGS:     Chest:  Left basilar atelectasis. Probably benign noncalcified solid clustered left lateral left lung base pulmonary nodules measuring on the order between 2 and 4 mm each on images 77, 79, 86, and 87 of series 303. A few of these are unchanged as far back as   April 2023, and the findings are most likely postinfectious/postinflammatory. No alveolar consolidation. No tracheal or central endobronchial lesion.     No acute findings in the lungs, pleura, mediastinum or chest wall.     Abdomen:  No calcified gallstones. No pericholecystic inflammatory change.  No biliary dilatation. Parapelvic renal cysts. Renal cortical thinning. No acute findings involving liver, pancreas, spleen, adrenal glands, or kidneys. Colonic diverticulosis without   evidence for acute diverticulitis.     Pelvis:  No significant findings involving urinary bladder, reproductive structures or pelvic cavity.     Abdominopelvic Cavity:  No ascites.  No pneumoperitoneum.  No lymphadenopathy. Small fat-containing right inguinal hernia.     Osseous Structures:  No acute fracture or destructive osseous lesion.        IMPRESSION:     Measurements and analysis to allow for planning of Transcatheter Aortic Valve Repair as above.     Ectasia of ascending thoracic aorta measuring up to 44 mm.    Cardiac catheterization: 6/5/25  Left Main   The vessel exhibits minimal luminal irregularities.      Left Anterior Descending   There is mild diffuse disease throughout the vessel. The vessel is calcified.   Mid LAD lesion is 30% stenosed. The lesion is diffuse.      Left Circumflex   There is mild diffuse disease throughout the vessel.      Right Coronary Artery   The vessel exhibits minimal luminal irregularities.          Carotid artery ultrasound: 5/27/25  FINDINGS:     Main                                Segment Impression  PSV  EDV  Ratio  Direction of Flow  Right                                                                                     Prox CCA                                        83   20                               Mid CCA                                         78   13                               Dist CCA                                        74   13                               Prox. ICA                             1 - 49%   63   10   0.80                        Mid. ICA                                        78   15   0.99                        Dist. ICA                                       90   19   1.15                        Ext Carotid                                     81    5                               Prox Vert.                                      42   11                 Antegrade     Right Subclavian Artery                        123   11                            Left                                                                                     Prox CCA                                       101   14                               Mid CCA                                        113   13                               Dist CCA                                        74   15                               Prox. ICA                             1 - 49%   65   16   0.58                        Mid. ICA                                        93   25   0.82                        Dist. ICA                                      103   27   0.91                        Prox Vert                                       48   11                 Antegrade     Left Subclavian Artery                          98    0                               CONCLUSION:      RIGHT:    There is <50% stenosis noted in the internal carotid artery.  Plaque is heterogenous and irregular.     Vertebral artery flow is antegrade.  There is no significant subclavian artery disease.        LEFT:    There is <50% stenosis noted in the internal carotid artery.  Plaque is heterogenous and smooth.      Vertebral artery flow is antegrade.  There is no significant subclavian artery disease.      Results Review Statement: I personally reviewed the following image studies in PACS and associated radiology reports: carotid artery ultrasound, MIRYAM, cardiac catheterization, CTA c/a/p. My interpretation of the radiology images/reports is: agree with above interpretation.    TAVR evaluation Assessment:     5 Meter Walk Test:      Attempt 1: 8   Attempt 2: 9   Attempt 3: 8    STS Risk Score: 3.28 %, mortality risk    Aortic Stenosis Stage: D1    [facility]: III    [facility]Q-12 was completed    Assessment:  Patient Active Problem List    Diagnosis Date Noted    Coronary artery disease involving native coronary artery 06/05/2025    S/P AAA repair     Thrombocytopenia ([facility]) 05/23/2025    Nonrheumatic mitral valve stenosis 05/02/2025    Celiac artery stenosis ([facility]) 12/18/2023    B12 deficiency 08/21/2023    Seizure ([facility]) 04/15/2023    Stage 3a chronic kidney disease ([facility]) 03/01/2021    Nonrheumatic aortic valve stenosis 02/20/2020    Nonrheumatic mitral valve regurgitation 02/20/2020    Nonrheumatic aortic valve insufficiency 02/20/2020    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[facility]) 02/20/2020    Partial idiopathic epilepsy with seizures of localized onset, not intractable, with status epilepticus ([facility]) 03/01/2017    Hyperlipidemia     HTN (hypertension)     Hyperuricemia 02/09/2016    Disc degeneration, lumbar 07/09/2013    Diverticulosis 07/09/2013    Osteoarthrosis, hand 07/09/2013    AAA (abdominal aortic aneurysm) ([facility]) 01/21/2013     Impression/Plan:    Poncho Wallace has symptomatic severe aortic stenosis. They have undergone testing for transcatheter aortic valve replacement.  The results of these studies have been interpreted by the multidisciplinary TAVR team who have determined the patient to be Low risk for open aortic valve replacement based on other pre-existing comobidities not reflected in the STS risk score.     The risks,  benefits, and alternatives to TAVR were discussed in detail with the patient today. They understand and wish to proceed with transfemoral transcatheter aortic valve replacement.  Informed consent was obtained and a date for the intervention has been set.    Pre-op instructions reviewed with patient and they were instructed to take Keppra as he normally does, and take Vimpat the morning of surgery with a small sip of water.     Poncho Wallace was comfortable with our recommendations, and their questions were answered to their satisfaction.       SIGNATURE: Magaly Ross PA-C  DATE: June 27, 2025  TIME: 11:15 AM

## 2025-06-27 NOTE — PROGRESS NOTES
Consultation - Cardiac Surgery   Poncho Wallace 84 y.o. male MRN: [id number]      Reason for Consult / Principal Problem: Aortic stenosis, Non-Rheumatic    History of Present Illness: Poncho Wallace is a 84 y.o. year old male who was previously evaluated in our office by Neville Dave M.D. for transcatheter aortic valve replacement.  During this initial consultation, arrangements were made for the following studies to be completed: gated CTA of the chest, abdomen, and pelvis, cardiac catheterization, dental clearance, and carotid ultrasound.     Poncho Wallace now presents to review the results of these tests and confirm the suitability of proceeding with transcatheter aortic valve replacement.    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 Patient established care with cardiology in March 2025 as a referral from vascular surgery due to finding of ascending aortic ectasia. He had an echo in April that demonstrated moderate AI, severe AS, severe MS, moderate-severe MR. A MIRYAM was obtained after his visit with us, which confirmed AS, mild-moderate MR, but did not identify MS.     Patient is accompanied today by his daughter. He reports that he has some mild lightheadedness. He denies MANRIQUE, SOB, CP, syncope, palpitations, edema, fatigue or change in activity tolerance. He is independent with ADL's, and still does yard work, including mowing the lawn. He lives with his wife. He is a retired .     Patient is a remote former smoker, quit in 1975. Occasional alcohol use, no drug use. This week, patient had a gout flare, and has been started on a 7 day course of prednisone. He states he had relief about one day after starting the steroid. We have obtained dental clearance.           Past Medical History:  Past Medical History:   Diagnosis Date    Colon polyps     Epilepsy (HCC)     Gout     Hyperlipidemia     Hypertension     S/P AAA repair     Subdural hematoma (HCC) 2022    Tear of right rotator cuff 2017         Past Surgical History:   Past Surgical History:   Procedure Laterality Date    ABDOMINAL AORTIC ANEURYSM REPAIR, ENDOVASCULAR N/A     CARDIAC CATHETERIZATION N/A 2025    Procedure: Cardiac RHC/LHC;  Surgeon: Ti Hicks DO;  [facility]: [facility];  Service: Cardiology    CARDIAC CATHETERIZATION  2025    Procedure: Cardiac Catheterization;  Surgeon: Ti Hicks DO;  [facility];  Service: Cardiology    CARDIAC CATHETERIZATION N/A 2025    Procedure: Cardiac Coronary Angiogram;  Surgeon: Ti Hicks DO;  [facility];  Service: Cardiology    WA COLONOSCOPY FLX DX W/COLLJ SPEC WHEN PFRMD N/A 2017    Procedure: COLONOSCOPY;  Surgeon: NORM Lilly MD;  Location: [facility];  Service: Colorectal    WA COLONOSCOPY FLX DX W/COLLJ SPEC WHEN PFRMD N/A 2018    Procedure: COLONOSCOPY;  Surgeon: NORM Lilly MD;  Location: [facility];  Service: Colorectal    TONSILLECTOMY      WISDOM TOOTH EXTRACTION Bilateral          Family History:  Family History   Problem Relation Name Age of Onset    Aneurysm Mother          ABDMONIAL  AORTA    Coronary artery disease Father      Coronary artery disease Brother           Social History:    Social History     Substance and Sexual Activity   Alcohol Use Not Currently    Comment: occasional     Social History     Substance and Sexual Activity   Drug Use No     Social History     Tobacco Use   Smoking Status Former    Current packs/day: 0.00    Average packs/day: 3.0 packs/day for 10.0 years (30.0 ttl pk-yrs)    Types: Cigarettes    Start date: 1961    Quit date: 1971    Years since quittin.3   Smokeless Tobacco Never        Home Medications:   Prior to Admission medications    Medication Sig Start Date End Date Taking? Authorizing Provider   lacosamide (VIMPAT) 100 mg tablet Take 1 tablet (100 mg total) by mouth every 12 (twelve) hours 6/24/25  Yes BRIANA Lang   levetiracetam (Keppra XR) 750 MG TB24 extended-release tablet Take 3 tablets (2,250 mg total) by mouth daily 12/24/24  Yes Ryan Naylor MD   methylPREDNISolone 4 MG tablet therapy pack Use as directed on package 6/25/25  Yes Miranda Landa MD   mupirocin (BACTROBAN) 2 % ointment Apply topically 2 (two) times a day 6/27/25  Yes Magaly Ross PA-C   simvastatin (ZOCOR) 40 mg tablet TAKE ONE TABLET BY MOUTH EVERY DAY 5/30/25  Yes Sudheer Goncalves MD   tamsulosin (FLOMAX) 0.4 mg Take 1 capsule (0.4 mg total) by mouth daily with dinner 1/28/25  Yes Sudheer Goncalves MD   acetaminophen (TYLENOL) 500 mg tablet Take 500 mg by mouth every 6 (six) hours as needed for mild pain  Patient not taking: Reported on 6/27/2025    Historical Provider, MD   ASPIRIN 81 PO Take by mouth  Patient not taking: Reported on 6/27/2025    Historical Provider, MD       Allergies:  Allergies[1]    Review of Systems:     Review of Systems   Constitutional:  Negative for chills and fever.   HENT:  Negative for ear pain and sore throat.    Eyes:  Negative for pain and visual disturbance.   Respiratory:  Negative for cough and shortness of breath.    Cardiovascular:  Negative for chest pain and palpitations.   Gastrointestinal:  Negative for abdominal pain and vomiting.   Genitourinary:  Negative for dysuria and hematuria.   Musculoskeletal:  Negative for arthralgias and back pain.   Skin:  Negative for color change and rash.   Neurological:  Positive for seizures and light-headedness. Negative for syncope.   All other systems reviewed and are negative.      Vital Signs:     Vitals:    06/27/25 1023 06/27/25 1025   BP: 116/55 116/56   BP Location: Left arm Right arm   Patient  "Position: Sitting Sitting   Cuff Size: Standard Standard   Pulse: 55    SpO2: 98%    Weight: 84.5 kg (186 lb 4.8 oz)    Height: 5' 9\" (1.753 m)        Physical Exam:     Physical Exam  Vitals reviewed.   Constitutional:       Appearance: Normal appearance.   HENT:      Head: Normocephalic and atraumatic.     Eyes:      Pupils: Pupils are equal, round, and reactive to light.     Neck:      Vascular: No carotid bruit or JVD.     Cardiovascular:      Rate and Rhythm: Normal rate and regular rhythm.      Pulses:           Carotid pulses are 1+ on the right side and 1+ on the left side.       Radial pulses are 2+ on the right side and 2+ on the left side.        Dorsalis pedis pulses are 2+ on the right side and 2+ on the left side.      Heart sounds: Murmur heard.      Systolic murmur is present with a grade of 3/6.      No friction rub. No gallop.   Pulmonary:      Effort: Pulmonary effort is normal.      Breath sounds: Normal breath sounds. No wheezing, rhonchi or rales.   Abdominal:      Palpations: Abdomen is soft.      Tenderness: There is no abdominal tenderness.     Musculoskeletal:      Cervical back: Normal range of motion.      Right lower leg: No edema.      Left lower leg: No edema.     Skin:     General: Skin is warm and dry.      Capillary Refill: Capillary refill takes less than 2 seconds.     Neurological:      General: No focal deficit present.      Mental Status: He is alert.      Sensory: Sensation is intact.     Psychiatric:         Attention and Perception: Attention normal.         Mood and Affect: Mood normal.         Behavior: Behavior normal. Behavior is cooperative.         Lab Results:               Invalid input(s): \"LABGLOM\"      Lab Results   Component Value Date    HGBA1C 5.0 04/16/2023     Lab Results   Component Value Date    CKTOTAL 232 04/15/2023    CKMB 2.7 02/23/2017    CKMBINDEX <1.0 02/23/2017    TROPONINI 0.28 (H) 02/21/2017       Imaging Studies:     MIRYAM: 6/5/25  Left Ventricle " Left ventricular cavity size is normal. Wall thickness is moderately increased. There is moderate concentric hypertrophy. The left ventricular ejection fraction is 65%. Systolic function is normal. Wall motion is normal. Unable to assess diastolic function.   Right Ventricle Right ventricular cavity size is normal. Systolic function is normal. Wall thickness is normal.   Left Atrium The atrium is dilated. There is no thrombus.   Right Atrium The atrium is normal in size.   Atrial Septum No patent foramen ovale detected, confirmed at rest using color doppler.   Left Atrial Appendage Left atrial appendage is normal in size. There is normal function. There is no thrombus.   Aortic Valve The aortic valve is functionally bicuspid. The leaflets are severely thickened. The leaflets are severely calcified. There is severely reduced mobility. There is no evidence of regurgitation. There is severe stenosis. The aortic valve mean gradient is 39 mmHg. The dimensionless velocity index is 0.26. The aortic valve area is 0.92 cm2. AV mean gradient is 39 mmHg. DVI is 0.26. AV valve area is 0.92 cm2. There is a small, echodense, mobile mass attached to the ventricular aspect of the aortic valve likely representing a degenerated calcific deposit.   Mitral Valve There is mild diffuse thickening of the anterior leaflet involving the leaflet from base to margin. The leaflets exhibit normal mobility. There is annular calcification.  There is mild to moderate regurgitation. There is no evidence of stenosis.   Tricuspid Valve Tricuspid valve structure is normal. There is mild regurgitation. There is no evidence of stenosis. Right ventricular systolic pressure could not be assessed.   Pulmonic Valve Pulmonic valve structure is normal. There is trace regurgitation. There is no evidence of stenosis.   Ascending Aorta The aortic root is normal in size. There is no evidence of aortic dissection. There is no aneurysm in the aorta.       Gated CTA  of the chest/abdomen/pelvis: 5/27/25  VASCULAR FINDINGS:     Central pulmonary artery is not abnormally enlarged.  Right atrium and right ventricle are normal in size.  Left atrial cavity is not abnormally dilated.  Left ventricular myocardium is normal.  No significant mitral annular calcification.     Coronary artery origins are in typical position.  Mild coronary artery calcification.     Annulus, LVOT and aorta analysis:     Typical trileaflet aortic valve morphology.        Optimal CT aortic valve plane angles:  3 cusp view: Irish 12, cranial 10  Cusp overlap view Irish 3, cranial 38     Measurements:     Annulus diameter (max x min): 33 x 26 mm.  Annulus Area 665 mm2.  Annulus Perimeter 93 mm.     Annulus to left main coronary ostium:  15 mm.  Annulus to right main coronary ostium:  15 mm.  Sinus of Valsalva height: 31 mm.     Aortic sinus of Valsalva diameter (max x min): 43 x 42 mm.  LVOT minimal diameter: 22 mm.     Sino-tubular junction minimal diameter: 36 mm.  Ascending thoracic aorta maximal diameter: 44 mm.     Valve Calcification:     Aortic valve calcium score is 6750.  Volume of 4295 mm3.     Thoracic Aorta:     Porcelain aorta = no.  Aortic root and ascending thoracic aorta free of significant calcification beyond the sino-tubular junction.  Aortic arch is normal in course and caliber with insignificant calcification.  Descending thoracic aorta is normal in course, caliber, and contour.     Abdominal aorta and pelvic artery measurements:     Abdominal aorta:  Minimal diameter above aortic bifurcation: 22 mm  Calcification: mild  Tortuosity: none     Right iliofemoral segment:  Minimal diameter: 7 mm  Calcification: Aorto-biiliac stent, patent. Minimal atherosclerosis.  Tortuosity: none     Left iliofemoral segment:  Minimal diameter: 7 mm  Calcification: mild  Tortuosity: none     ADDITIONAL FINDINGS:     Chest:  Left basilar atelectasis. Probably benign noncalcified solid clustered left lateral left  lung base pulmonary nodules measuring on the order between 2 and 4 mm each on images 77, 79, 86, and 87 of series 303. A few of these are unchanged as far back as   April 2023, and the findings are most likely postinfectious/postinflammatory. No alveolar consolidation. No tracheal or central endobronchial lesion.     No acute findings in the lungs, pleura, mediastinum or chest wall.     Abdomen:  No calcified gallstones. No pericholecystic inflammatory change.  No biliary dilatation. Parapelvic renal cysts. Renal cortical thinning. No acute findings involving liver, pancreas, spleen, adrenal glands, or kidneys. Colonic diverticulosis without   evidence for acute diverticulitis.     Pelvis:  No significant findings involving urinary bladder, reproductive structures or pelvic cavity.     Abdominopelvic Cavity:  No ascites.  No pneumoperitoneum.  No lymphadenopathy. Small fat-containing right inguinal hernia.     Osseous Structures:  No acute fracture or destructive osseous lesion.        IMPRESSION:     Measurements and analysis to allow for planning of Transcatheter Aortic Valve Repair as above.     Ectasia of ascending thoracic aorta measuring up to 44 mm.    Cardiac catheterization: 6/5/25  Left Main   The vessel exhibits minimal luminal irregularities.      Left Anterior Descending   There is mild diffuse disease throughout the vessel. The vessel is calcified.   Mid LAD lesion is 30% stenosed. The lesion is diffuse.      Left Circumflex   There is mild diffuse disease throughout the vessel.      Right Coronary Artery   The vessel exhibits minimal luminal irregularities.          Carotid artery ultrasound: 5/27/25  FINDINGS:     Main                                Segment Impression  PSV  EDV  Ratio  Direction of Flow  Right                                                                                    Prox CCA                                        83   20                               Mid CCA                                          78   13                               Dist CCA                                        74   13                               Prox. ICA                             1 - 49%   63   10   0.80                        Mid. ICA                                        78   15   0.99                        Dist. ICA                                       90   19   1.15                        Ext Carotid                                     81    5                               Prox Vert.                                      42   11                 Antegrade     Right Subclavian Artery                        123   11                            Left                                                                                     Prox CCA                                       101   14                               Mid CCA                                        113   13                               Dist CCA                                        74   15                               Prox. ICA                             1 - 49%   65   16   0.58                        Mid. ICA                                        93   25   0.82                        Dist. ICA                                      103   27   0.91                        Prox Vert                                       48   11                 Antegrade     Left Subclavian Artery                          98    0                               CONCLUSION:      RIGHT:    There is <50% stenosis noted in the internal carotid artery.  Plaque is heterogenous and irregular.     Vertebral artery flow is antegrade.  There is no significant subclavian artery disease.        LEFT:    There is <50% stenosis noted in the internal carotid artery.  Plaque is heterogenous and smooth.     Vertebral artery flow is antegrade.  There is no significant subclavian artery disease.      Results Review Statement: I personally reviewed the following image studies in PACS  and associated radiology reports: carotid artery ultrasound, MIRYAM, cardiac catheterization, CTA c/a/p. My interpretation of the radiology images/reports is: agree with above interpretation.    TAVR evaluation Assessment:     5 Meter Walk Test:      Attempt 1: 8   Attempt 2: 9   Attempt 3: 8    STS Risk Score: 3.28 %, mortality risk    Aortic Stenosis Stage: D1    [facility]: III    KCCQ-12 was completed    Assessment:  Patient Active Problem List    Diagnosis Date Noted    Coronary artery disease involving native coronary artery 06/05/2025    S/P AAA repair     Thrombocytopenia ([facility]) 05/23/2025    Nonrheumatic mitral valve stenosis 05/02/2025    Celiac artery stenosis ([facility]) 12/18/2023    B12 deficiency 08/21/2023    Seizure ([facility]) 04/15/2023    Stage 3a chronic kidney disease ([facility]) 03/01/2021    Nonrheumatic aortic valve stenosis 02/20/2020    Nonrheumatic mitral valve regurgitation 02/20/2020    Nonrheumatic aortic valve insufficiency 02/20/2020    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[facility]) 02/20/2020    Partial idiopathic epilepsy with seizures of localized onset, not intractable, with status epilepticus ([facility]) 03/01/2017    Hyperlipidemia     HTN (hypertension)     Hyperuricemia 02/09/2016    Disc degeneration, lumbar 07/09/2013    Diverticulosis 07/09/2013    Osteoarthrosis, hand 07/09/2013    AAA (abdominal aortic aneurysm) ([facility]) 01/21/2013     Impression/Plan:    Poncho Wallace has symptomatic severe aortic stenosis. They have undergone testing for transcatheter aortic valve replacement.  The results of these studies have been interpreted by the multidisciplinary TAVR team who have determined the patient to be Low risk for open aortic valve replacement based on other pre-existing comobidities not reflected in the STS risk score.     The risks, benefits, and alternatives to TAVR were discussed in detail with the patient today. They understand and wish to proceed with transfemoral transcatheter aortic valve replacement.   Informed consent was obtained and a date for the intervention has been set.    Pre-op instructions reviewed with patient and they were instructed to take Keppra as he normally does, and take Vimpat the morning of surgery with a small sip of water.     Poncho Wallace was comfortable with our recommendations, and their questions were answered to their satisfaction.       SIGNATURE: Magaly Ross PA-C  DATE: June 27, 2025  TIME: 11:15 AM       [1]   Allergies  Allergen Reactions    Fenofibrate      Patient not aware of allergy    Hydrocodone-Acetaminophen      Patient not aware of allergy

## 2025-07-07 ENCOUNTER — APPOINTMENT (OUTPATIENT)
Dept: LAB | Facility: CLINIC | Age: 85
DRG: 267 | End: 2025-07-07
Attending: NURSE PRACTITIONER
Payer: MEDICARE

## 2025-07-07 DIAGNOSIS — G40.001 PARTIAL IDIOPATHIC EPILEPSY WITH SEIZURES OF LOCALIZED ONSET, NOT INTRACTABLE, WITH STATUS EPILEPTICUS (HCC): ICD-10-CM

## 2025-07-07 DIAGNOSIS — Z01.810 PRE-OPERATIVE CARDIOVASCULAR EXAMINATION: ICD-10-CM

## 2025-07-07 DIAGNOSIS — I35.0 NONRHEUMATIC AORTIC VALVE STENOSIS: ICD-10-CM

## 2025-07-07 DIAGNOSIS — Z01.812 ENCOUNTER FOR PRE-OPERATIVE LABORATORY TESTING: ICD-10-CM

## 2025-07-07 LAB
ABO GROUP BLD: NORMAL
ALBUMIN SERPL BCG-MCNC: 4.2 G/DL (ref 3.5–5)
ALP SERPL-CCNC: 89 U/L (ref 34–104)
ALT SERPL W P-5'-P-CCNC: 16 U/L (ref 7–52)
ANION GAP SERPL CALCULATED.3IONS-SCNC: 7 MMOL/L (ref 4–13)
AST SERPL W P-5'-P-CCNC: 22 U/L (ref 13–39)
BASOPHILS # BLD AUTO: 0.03 THOUSANDS/ÂΜL (ref 0–0.1)
BASOPHILS NFR BLD AUTO: 0 % (ref 0–1)
BILIRUB SERPL-MCNC: 0.74 MG/DL (ref 0.2–1)
BLD GP AB SCN SERPL QL: NEGATIVE
BUN SERPL-MCNC: 19 MG/DL (ref 5–25)
CALCIUM SERPL-MCNC: 9.4 MG/DL (ref 8.4–10.2)
CHLORIDE SERPL-SCNC: 104 MMOL/L (ref 96–108)
CO2 SERPL-SCNC: 31 MMOL/L (ref 21–32)
CREAT SERPL-MCNC: 1.07 MG/DL (ref 0.6–1.3)
EOSINOPHIL # BLD AUTO: 0.3 THOUSAND/ÂΜL (ref 0–0.61)
EOSINOPHIL NFR BLD AUTO: 4 % (ref 0–6)
ERYTHROCYTE [DISTWIDTH] IN BLOOD BY AUTOMATED COUNT: 12.5 % (ref 11.6–15.1)
GFR SERPL CREATININE-BSD FRML MDRD: 64 ML/MIN/1.73SQ M
GLUCOSE P FAST SERPL-MCNC: 93 MG/DL (ref 65–99)
HCT VFR BLD AUTO: 46.3 % (ref 36.5–49.3)
HGB BLD-MCNC: 14.9 G/DL (ref 12–17)
IMM GRANULOCYTES # BLD AUTO: 0.03 THOUSAND/UL (ref 0–0.2)
IMM GRANULOCYTES NFR BLD AUTO: 0 % (ref 0–2)
INR PPP: 0.94 (ref 0.85–1.19)
LACOSAMIDE SERPL-MCNC: 7.28 UG/ML (ref 1–20)
LEVETIRACETAM SERPL-MCNC: 43.6 UG/ML (ref 12–46)
LYMPHOCYTES # BLD AUTO: 1.35 THOUSANDS/ÂΜL (ref 0.6–4.47)
LYMPHOCYTES NFR BLD AUTO: 17 % (ref 14–44)
MCH RBC QN AUTO: 30.8 PG (ref 26.8–34.3)
MCHC RBC AUTO-ENTMCNC: 32.2 G/DL (ref 31.4–37.4)
MCV RBC AUTO: 96 FL (ref 82–98)
MONOCYTES # BLD AUTO: 0.52 THOUSAND/ÂΜL (ref 0.17–1.22)
MONOCYTES NFR BLD AUTO: 6 % (ref 4–12)
NEUTROPHILS # BLD AUTO: 5.89 THOUSANDS/ÂΜL (ref 1.85–7.62)
NEUTS SEG NFR BLD AUTO: 73 % (ref 43–75)
NRBC BLD AUTO-RTO: 0 /100 WBCS
PLATELET # BLD AUTO: 121 THOUSANDS/UL (ref 149–390)
PMV BLD AUTO: 11.4 FL (ref 8.9–12.7)
POTASSIUM SERPL-SCNC: 5.3 MMOL/L (ref 3.5–5.3)
PROT SERPL-MCNC: 6.8 G/DL (ref 6.4–8.4)
PROTHROMBIN TIME: 12.9 SECONDS (ref 12.3–15)
RBC # BLD AUTO: 4.84 MILLION/UL (ref 3.88–5.62)
RH BLD: POSITIVE
SODIUM SERPL-SCNC: 142 MMOL/L (ref 135–147)
SPECIMEN EXPIRATION DATE: NORMAL
WBC # BLD AUTO: 8.12 THOUSAND/UL (ref 4.31–10.16)

## 2025-07-07 PROCEDURE — 85610 PROTHROMBIN TIME: CPT

## 2025-07-07 PROCEDURE — 80053 COMPREHEN METABOLIC PANEL: CPT

## 2025-07-07 PROCEDURE — 87081 CULTURE SCREEN ONLY: CPT

## 2025-07-07 PROCEDURE — 86923 COMPATIBILITY TEST ELECTRIC: CPT

## 2025-07-07 PROCEDURE — 86850 RBC ANTIBODY SCREEN: CPT

## 2025-07-07 PROCEDURE — 85025 COMPLETE CBC W/AUTO DIFF WBC: CPT

## 2025-07-07 PROCEDURE — 86901 BLOOD TYPING SEROLOGIC RH(D): CPT

## 2025-07-07 PROCEDURE — 80235 DRUG ASSAY LACOSAMIDE: CPT

## 2025-07-07 PROCEDURE — 86900 BLOOD TYPING SEROLOGIC ABO: CPT

## 2025-07-07 PROCEDURE — 83520 IMMUNOASSAY QUANT NOS NONAB: CPT

## 2025-07-07 PROCEDURE — 36415 COLL VENOUS BLD VENIPUNCTURE: CPT

## 2025-07-08 LAB — MRSA NOSE QL CULT: NORMAL

## 2025-07-14 ENCOUNTER — ANESTHESIA EVENT (INPATIENT)
Dept: PERIOP | Facility: HOSPITAL | Age: 85
DRG: 267 | End: 2025-07-14
Payer: MEDICARE

## 2025-07-14 LAB
ABO GROUP BLD: NORMAL
BLD GP AB SCN SERPL QL: NEGATIVE
RH BLD: POSITIVE
SPECIMEN EXPIRATION DATE: NORMAL

## 2025-07-15 ENCOUNTER — ANESTHESIA (INPATIENT)
Dept: PERIOP | Facility: HOSPITAL | Age: 85
DRG: 267 | End: 2025-07-15
Payer: MEDICARE

## 2025-07-15 ENCOUNTER — HOSPITAL ENCOUNTER (INPATIENT)
Facility: HOSPITAL | Age: 85
LOS: 1 days | Discharge: HOME HEALTH | DRG: 267 | End: 2025-07-16
Attending: THORACIC SURGERY (CARDIOTHORACIC VASCULAR SURGERY) | Admitting: THORACIC SURGERY (CARDIOTHORACIC VASCULAR SURGERY)
Payer: MEDICARE

## 2025-07-15 ENCOUNTER — APPOINTMENT (INPATIENT)
Dept: RADIOLOGY | Facility: HOSPITAL | Age: 85
DRG: 267 | End: 2025-07-15
Attending: PHYSICIAN ASSISTANT
Payer: MEDICARE

## 2025-07-15 ENCOUNTER — APPOINTMENT (OUTPATIENT)
Dept: NON INVASIVE DIAGNOSTICS | Facility: HOSPITAL | Age: 85
DRG: 267 | End: 2025-07-15
Payer: MEDICARE

## 2025-07-15 DIAGNOSIS — I35.9 NONRHEUMATIC AORTIC VALVE DISORDER, UNSPECIFIED: ICD-10-CM

## 2025-07-15 DIAGNOSIS — Z95.2 S/P TAVR (TRANSCATHETER AORTIC VALVE REPLACEMENT): ICD-10-CM

## 2025-07-15 DIAGNOSIS — Z95.2 S/P TAVR (TRANSCATHETER AORTIC VALVE REPLACEMENT): Primary | ICD-10-CM

## 2025-07-15 DIAGNOSIS — I35.0 NONRHEUMATIC AORTIC VALVE STENOSIS: ICD-10-CM

## 2025-07-15 DIAGNOSIS — I35.0 NONRHEUMATIC AORTIC VALVE STENOSIS: Primary | ICD-10-CM

## 2025-07-15 PROBLEM — I44.7 LEFT BUNDLE BRANCH BLOCK (LBBB): Status: ACTIVE | Noted: 2025-07-15

## 2025-07-15 PROBLEM — R41.3 MEMORY DEFICIT: Status: ACTIVE | Noted: 2025-07-15

## 2025-07-15 LAB
ANION GAP SERPL CALCULATED.3IONS-SCNC: 5 MMOL/L (ref 4–13)
BASE EXCESS BLDA CALC-SCNC: -2 MMOL/L (ref -2–3)
BASE EXCESS BLDA CALC-SCNC: 0 MMOL/L (ref -2–3)
BASE EXCESS BLDA CALC-SCNC: 1 MMOL/L (ref -2–3)
BASE EXCESS BLDA CALC-SCNC: 1 MMOL/L (ref -2–3)
BUN SERPL-MCNC: 15 MG/DL (ref 5–25)
CA-I BLD-SCNC: 1.05 MMOL/L (ref 1.12–1.32)
CA-I BLD-SCNC: 1.13 MMOL/L (ref 1.12–1.32)
CA-I BLD-SCNC: 1.18 MMOL/L (ref 1.12–1.32)
CA-I BLD-SCNC: 1.27 MMOL/L (ref 1.12–1.32)
CALCIUM SERPL-MCNC: 8.5 MG/DL (ref 8.4–10.2)
CHLORIDE SERPL-SCNC: 107 MMOL/L (ref 96–108)
CO2 SERPL-SCNC: 27 MMOL/L (ref 21–32)
CREAT SERPL-MCNC: 0.94 MG/DL (ref 0.6–1.3)
EST. AVERAGE GLUCOSE BLD GHB EST-MCNC: 111 MG/DL
GFR SERPL CREATININE-BSD FRML MDRD: 75 ML/MIN/1.73SQ M
GLUCOSE SERPL-MCNC: 102 MG/DL (ref 65–140)
GLUCOSE SERPL-MCNC: 161 MG/DL (ref 65–140)
GLUCOSE SERPL-MCNC: 85 MG/DL (ref 65–140)
GLUCOSE SERPL-MCNC: 86 MG/DL (ref 65–140)
GLUCOSE SERPL-MCNC: 93 MG/DL (ref 65–140)
GLUCOSE SERPL-MCNC: 93 MG/DL (ref 65–140)
GLUCOSE SERPL-MCNC: 96 MG/DL (ref 65–140)
HBA1C MFR BLD: 5.5 %
HCO3 BLDA-SCNC: 22.6 MMOL/L (ref 22–28)
HCO3 BLDA-SCNC: 24.6 MMOL/L (ref 22–28)
HCO3 BLDA-SCNC: 25.3 MMOL/L (ref 22–28)
HCO3 BLDA-SCNC: 28.4 MMOL/L (ref 24–30)
HCT VFR BLD AUTO: 40.7 % (ref 36.5–49.3)
HCT VFR BLD CALC: 31 % (ref 36.5–49.3)
HCT VFR BLD CALC: 33 % (ref 36.5–49.3)
HCT VFR BLD CALC: 35 % (ref 36.5–49.3)
HCT VFR BLD CALC: 37 % (ref 36.5–49.3)
HGB BLD-MCNC: 13.3 G/DL (ref 12–17)
HGB BLDA-MCNC: 10.5 G/DL (ref 12–17)
HGB BLDA-MCNC: 11.2 G/DL (ref 12–17)
HGB BLDA-MCNC: 11.9 G/DL (ref 12–17)
HGB BLDA-MCNC: 12.6 G/DL (ref 12–17)
KCT BLD-ACNC: 119 SEC (ref 89–137)
KCT BLD-ACNC: 124 SEC (ref 89–137)
KCT BLD-ACNC: 281 SEC (ref 89–137)
KCT BLD-ACNC: 351 SEC (ref 89–137)
PCO2 BLD: 24 MMOL/L (ref 21–32)
PCO2 BLD: 26 MMOL/L (ref 21–32)
PCO2 BLD: 26 MMOL/L (ref 21–32)
PCO2 BLD: 30 MMOL/L (ref 21–32)
PCO2 BLD: 36.8 MM HG (ref 36–44)
PCO2 BLD: 37.4 MM HG (ref 36–44)
PCO2 BLD: 38.1 MM HG (ref 36–44)
PCO2 BLD: 54.1 MM HG (ref 42–50)
PH BLD: 7.33 [PH] (ref 7.3–7.4)
PH BLD: 7.39 [PH] (ref 7.35–7.45)
PH BLD: 7.42 [PH] (ref 7.35–7.45)
PH BLD: 7.45 [PH] (ref 7.35–7.45)
PLATELET # BLD AUTO: 111 THOUSANDS/UL (ref 149–390)
PMV BLD AUTO: 10.6 FL (ref 8.9–12.7)
PO2 BLD: 120 MM HG (ref 75–129)
PO2 BLD: 207 MM HG (ref 75–129)
PO2 BLD: 218 MM HG (ref 75–129)
PO2 BLD: 53 MM HG (ref 35–45)
POTASSIUM BLD-SCNC: 3.6 MMOL/L (ref 3.5–5.3)
POTASSIUM BLD-SCNC: 3.9 MMOL/L (ref 3.5–5.3)
POTASSIUM BLD-SCNC: 4.3 MMOL/L (ref 3.5–5.3)
POTASSIUM BLD-SCNC: 4.3 MMOL/L (ref 3.5–5.3)
POTASSIUM SERPL-SCNC: 3.9 MMOL/L (ref 3.5–5.3)
SAO2 % BLD FROM PO2: 100 % (ref 60–85)
SAO2 % BLD FROM PO2: 100 % (ref 60–85)
SAO2 % BLD FROM PO2: 84 % (ref 60–85)
SAO2 % BLD FROM PO2: 99 % (ref 60–85)
SODIUM BLD-SCNC: 139 MMOL/L (ref 136–145)
SODIUM BLD-SCNC: 140 MMOL/L (ref 136–145)
SODIUM BLD-SCNC: 141 MMOL/L (ref 136–145)
SODIUM BLD-SCNC: 144 MMOL/L (ref 136–145)
SODIUM SERPL-SCNC: 139 MMOL/L (ref 135–147)
SPECIMEN SOURCE: ABNORMAL
SPECIMEN SOURCE: NORMAL
SPECIMEN SOURCE: NORMAL

## 2025-07-15 PROCEDURE — 71045 X-RAY EXAM CHEST 1 VIEW: CPT

## 2025-07-15 PROCEDURE — 02HV33Z INSERTION OF INFUSION DEVICE INTO SUPERIOR VENA CAVA, PERCUTANEOUS APPROACH: ICD-10-PCS | Performed by: ANESTHESIOLOGY

## 2025-07-15 PROCEDURE — 85014 HEMATOCRIT: CPT | Performed by: PHYSICIAN ASSISTANT

## 2025-07-15 PROCEDURE — 85014 HEMATOCRIT: CPT

## 2025-07-15 PROCEDURE — 85018 HEMOGLOBIN: CPT | Performed by: PHYSICIAN ASSISTANT

## 2025-07-15 PROCEDURE — 85049 AUTOMATED PLATELET COUNT: CPT | Performed by: PHYSICIAN ASSISTANT

## 2025-07-15 PROCEDURE — 83036 HEMOGLOBIN GLYCOSYLATED A1C: CPT | Performed by: PHYSICIAN ASSISTANT

## 2025-07-15 PROCEDURE — 85347 COAGULATION TIME ACTIVATED: CPT

## 2025-07-15 PROCEDURE — 82947 ASSAY GLUCOSE BLOOD QUANT: CPT

## 2025-07-15 PROCEDURE — 82948 REAGENT STRIP/BLOOD GLUCOSE: CPT

## 2025-07-15 PROCEDURE — C1894 INTRO/SHEATH, NON-LASER: HCPCS | Performed by: THORACIC SURGERY (CARDIOTHORACIC VASCULAR SURGERY)

## 2025-07-15 PROCEDURE — C1760 CLOSURE DEV, VASC: HCPCS | Performed by: THORACIC SURGERY (CARDIOTHORACIC VASCULAR SURGERY)

## 2025-07-15 PROCEDURE — 99222 1ST HOSP IP/OBS MODERATE 55: CPT | Performed by: INTERNAL MEDICINE

## 2025-07-15 PROCEDURE — C1769 GUIDE WIRE: HCPCS | Performed by: THORACIC SURGERY (CARDIOTHORACIC VASCULAR SURGERY)

## 2025-07-15 PROCEDURE — B24BZZ4 ULTRASONOGRAPHY OF HEART WITH AORTA, TRANSESOPHAGEAL: ICD-10-PCS | Performed by: ANESTHESIOLOGY

## 2025-07-15 PROCEDURE — 02RF38Z REPLACEMENT OF AORTIC VALVE WITH ZOOPLASTIC TISSUE, PERCUTANEOUS APPROACH: ICD-10-PCS | Performed by: THORACIC SURGERY (CARDIOTHORACIC VASCULAR SURGERY)

## 2025-07-15 PROCEDURE — 93005 ELECTROCARDIOGRAM TRACING: CPT

## 2025-07-15 PROCEDURE — NC001 PR NO CHARGE: Performed by: INTERNAL MEDICINE

## 2025-07-15 PROCEDURE — 93355 ECHO TRANSESOPHAGEAL (TEE): CPT

## 2025-07-15 PROCEDURE — 82803 BLOOD GASES ANY COMBINATION: CPT

## 2025-07-15 PROCEDURE — 80048 BASIC METABOLIC PNL TOTAL CA: CPT | Performed by: PHYSICIAN ASSISTANT

## 2025-07-15 PROCEDURE — 33361 REPLACE AORTIC VALVE PERQ: CPT | Performed by: THORACIC SURGERY (CARDIOTHORACIC VASCULAR SURGERY)

## 2025-07-15 PROCEDURE — 84295 ASSAY OF SERUM SODIUM: CPT

## 2025-07-15 PROCEDURE — C1751 CATH, INF, PER/CENT/MIDLINE: HCPCS | Performed by: THORACIC SURGERY (CARDIOTHORACIC VASCULAR SURGERY)

## 2025-07-15 PROCEDURE — NC001 PR NO CHARGE: Performed by: PHYSICIAN ASSISTANT

## 2025-07-15 PROCEDURE — 82330 ASSAY OF CALCIUM: CPT

## 2025-07-15 PROCEDURE — C1725 CATH, TRANSLUMIN NON-LASER: HCPCS | Performed by: THORACIC SURGERY (CARDIOTHORACIC VASCULAR SURGERY)

## 2025-07-15 PROCEDURE — 33361 REPLACE AORTIC VALVE PERQ: CPT | Performed by: INTERNAL MEDICINE

## 2025-07-15 PROCEDURE — 84132 ASSAY OF SERUM POTASSIUM: CPT

## 2025-07-15 DEVICE — IMPLANTABLE DEVICE: Type: IMPLANTABLE DEVICE | Site: HEART | Status: FUNCTIONAL

## 2025-07-15 DEVICE — IMPLANTABLE DEVICE: Type: IMPLANTABLE DEVICE | Site: ARTERIAL | Status: FUNCTIONAL

## 2025-07-15 RX ORDER — HYDRALAZINE HYDROCHLORIDE 20 MG/ML
10 INJECTION INTRAMUSCULAR; INTRAVENOUS EVERY 6 HOURS PRN
Status: DISCONTINUED | OUTPATIENT
Start: 2025-07-15 | End: 2025-07-16 | Stop reason: HOSPADM

## 2025-07-15 RX ORDER — NICARDIPINE HYDROCHLORIDE 2.5 MG/ML
INJECTION INTRAVENOUS
Status: COMPLETED
Start: 2025-07-15 | End: 2025-07-15

## 2025-07-15 RX ORDER — ONDANSETRON 2 MG/ML
4 INJECTION INTRAMUSCULAR; INTRAVENOUS EVERY 6 HOURS PRN
Status: DISCONTINUED | OUTPATIENT
Start: 2025-07-15 | End: 2025-07-16 | Stop reason: HOSPADM

## 2025-07-15 RX ORDER — CALCIUM GLUCONATE 20 MG/ML
2 INJECTION, SOLUTION INTRAVENOUS ONCE AS NEEDED
Status: DISCONTINUED | OUTPATIENT
Start: 2025-07-15 | End: 2025-07-16 | Stop reason: HOSPADM

## 2025-07-15 RX ORDER — CHLORHEXIDINE GLUCONATE ORAL RINSE 1.2 MG/ML
15 SOLUTION DENTAL ONCE
Status: COMPLETED | OUTPATIENT
Start: 2025-07-15 | End: 2025-07-15

## 2025-07-15 RX ORDER — LIDOCAINE HYDROCHLORIDE 10 MG/ML
INJECTION, SOLUTION EPIDURAL; INFILTRATION; INTRACAUDAL; PERINEURAL AS NEEDED
Status: DISCONTINUED | OUTPATIENT
Start: 2025-07-15 | End: 2025-07-15

## 2025-07-15 RX ORDER — SODIUM CHLORIDE, SODIUM LACTATE, POTASSIUM CHLORIDE, CALCIUM CHLORIDE 600; 310; 30; 20 MG/100ML; MG/100ML; MG/100ML; MG/100ML
125 INJECTION, SOLUTION INTRAVENOUS CONTINUOUS
Status: DISCONTINUED | OUTPATIENT
Start: 2025-07-15 | End: 2025-07-16

## 2025-07-15 RX ORDER — ESMOLOL HYDROCHLORIDE 10 MG/ML
INJECTION INTRAVENOUS AS NEEDED
Status: DISCONTINUED | OUTPATIENT
Start: 2025-07-15 | End: 2025-07-15

## 2025-07-15 RX ORDER — TAMSULOSIN HYDROCHLORIDE 0.4 MG/1
0.4 CAPSULE ORAL
Status: DISCONTINUED | OUTPATIENT
Start: 2025-07-15 | End: 2025-07-16 | Stop reason: HOSPADM

## 2025-07-15 RX ORDER — PROTAMINE SULFATE 10 MG/ML
INJECTION, SOLUTION INTRAVENOUS AS NEEDED
Status: DISCONTINUED | OUTPATIENT
Start: 2025-07-15 | End: 2025-07-15

## 2025-07-15 RX ORDER — HEPARIN SODIUM 1000 [USP'U]/ML
400 INJECTION, SOLUTION INTRAVENOUS; SUBCUTANEOUS ONCE
Status: COMPLETED | OUTPATIENT
Start: 2025-07-15 | End: 2025-07-15

## 2025-07-15 RX ORDER — SODIUM CHLORIDE 9 MG/ML
INJECTION, SOLUTION INTRAVENOUS CONTINUOUS PRN
Status: DISCONTINUED | OUTPATIENT
Start: 2025-07-15 | End: 2025-07-15

## 2025-07-15 RX ORDER — CHLORHEXIDINE GLUCONATE ORAL RINSE 1.2 MG/ML
15 SOLUTION DENTAL 2 TIMES DAILY
Status: DISCONTINUED | OUTPATIENT
Start: 2025-07-15 | End: 2025-07-16 | Stop reason: HOSPADM

## 2025-07-15 RX ORDER — ASPIRIN 81 MG/1
81 TABLET, CHEWABLE ORAL DAILY
Status: DISCONTINUED | OUTPATIENT
Start: 2025-07-15 | End: 2025-07-16 | Stop reason: HOSPADM

## 2025-07-15 RX ORDER — PROPOFOL 10 MG/ML
INJECTION, EMULSION INTRAVENOUS AS NEEDED
Status: DISCONTINUED | OUTPATIENT
Start: 2025-07-15 | End: 2025-07-15

## 2025-07-15 RX ORDER — HYDROMORPHONE HCL/PF 1 MG/ML
0.4 SYRINGE (ML) INJECTION
Status: COMPLETED | OUTPATIENT
Start: 2025-07-15 | End: 2025-07-15

## 2025-07-15 RX ORDER — INSULIN LISPRO 100 [IU]/ML
1-6 INJECTION, SOLUTION INTRAVENOUS; SUBCUTANEOUS
Status: DISCONTINUED | OUTPATIENT
Start: 2025-07-15 | End: 2025-07-16 | Stop reason: HOSPADM

## 2025-07-15 RX ORDER — LEVETIRACETAM 500 MG/1
1000 TABLET ORAL DAILY
Status: DISCONTINUED | OUTPATIENT
Start: 2025-07-16 | End: 2025-07-16 | Stop reason: HOSPADM

## 2025-07-15 RX ORDER — LEVETIRACETAM 750 MG/1
2250 TABLET ORAL DAILY
Status: DISCONTINUED | OUTPATIENT
Start: 2025-07-15 | End: 2025-07-15

## 2025-07-15 RX ORDER — POLYETHYLENE GLYCOL 3350 17 G/17G
17 POWDER, FOR SOLUTION ORAL DAILY
Status: DISCONTINUED | OUTPATIENT
Start: 2025-07-15 | End: 2025-07-16 | Stop reason: HOSPADM

## 2025-07-15 RX ORDER — CEFAZOLIN SODIUM 2 G/50ML
2000 SOLUTION INTRAVENOUS EVERY 8 HOURS
Status: COMPLETED | OUTPATIENT
Start: 2025-07-15 | End: 2025-07-16

## 2025-07-15 RX ORDER — HEPARIN SODIUM 5000 [USP'U]/ML
5000 INJECTION, SOLUTION INTRAVENOUS; SUBCUTANEOUS EVERY 8 HOURS SCHEDULED
Status: DISCONTINUED | OUTPATIENT
Start: 2025-07-16 | End: 2025-07-16 | Stop reason: HOSPADM

## 2025-07-15 RX ORDER — LACOSAMIDE 100 MG/1
100 TABLET ORAL EVERY 12 HOURS
Status: DISCONTINUED | OUTPATIENT
Start: 2025-07-15 | End: 2025-07-16 | Stop reason: HOSPADM

## 2025-07-15 RX ORDER — MAGNESIUM HYDROXIDE 1200 MG/15ML
LIQUID ORAL AS NEEDED
Status: DISCONTINUED | OUTPATIENT
Start: 2025-07-15 | End: 2025-07-15 | Stop reason: HOSPADM

## 2025-07-15 RX ORDER — CLOPIDOGREL BISULFATE 75 MG/1
75 TABLET ORAL DAILY
Status: DISCONTINUED | OUTPATIENT
Start: 2025-07-15 | End: 2025-07-16 | Stop reason: HOSPADM

## 2025-07-15 RX ORDER — ROCURONIUM BROMIDE 10 MG/ML
INJECTION, SOLUTION INTRAVENOUS AS NEEDED
Status: DISCONTINUED | OUTPATIENT
Start: 2025-07-15 | End: 2025-07-15

## 2025-07-15 RX ORDER — CEFAZOLIN SODIUM 2 G/50ML
2000 SOLUTION INTRAVENOUS ONCE
Status: COMPLETED | OUTPATIENT
Start: 2025-07-15 | End: 2025-07-15

## 2025-07-15 RX ORDER — PRAVASTATIN SODIUM 80 MG/1
80 TABLET ORAL
Status: DISCONTINUED | OUTPATIENT
Start: 2025-07-15 | End: 2025-07-16 | Stop reason: HOSPADM

## 2025-07-15 RX ORDER — FENTANYL CITRATE/PF 50 MCG/ML
25 SYRINGE (ML) INJECTION
Status: DISCONTINUED | OUTPATIENT
Start: 2025-07-15 | End: 2025-07-15 | Stop reason: HOSPADM

## 2025-07-15 RX ORDER — ONDANSETRON 2 MG/ML
4 INJECTION INTRAMUSCULAR; INTRAVENOUS ONCE AS NEEDED
Status: DISCONTINUED | OUTPATIENT
Start: 2025-07-15 | End: 2025-07-15 | Stop reason: HOSPADM

## 2025-07-15 RX ORDER — PANTOPRAZOLE SODIUM 40 MG/1
40 TABLET, DELAYED RELEASE ORAL DAILY
Status: DISCONTINUED | OUTPATIENT
Start: 2025-07-15 | End: 2025-07-16 | Stop reason: HOSPADM

## 2025-07-15 RX ORDER — ACETAMINOPHEN 325 MG/1
650 TABLET ORAL EVERY 4 HOURS PRN
Status: DISCONTINUED | OUTPATIENT
Start: 2025-07-15 | End: 2025-07-16 | Stop reason: HOSPADM

## 2025-07-15 RX ORDER — PHENYLEPHRINE HCL IN 0.9% NACL 1 MG/10 ML
200-2000 SYRINGE (ML) INTRAVENOUS ONCE
Status: DISCONTINUED | OUTPATIENT
Start: 2025-07-15 | End: 2025-07-15

## 2025-07-15 RX ORDER — ONDANSETRON 2 MG/ML
INJECTION INTRAMUSCULAR; INTRAVENOUS AS NEEDED
Status: DISCONTINUED | OUTPATIENT
Start: 2025-07-15 | End: 2025-07-15

## 2025-07-15 RX ORDER — BISACODYL 10 MG
10 SUPPOSITORY, RECTAL RECTAL DAILY PRN
Status: DISCONTINUED | OUTPATIENT
Start: 2025-07-15 | End: 2025-07-16 | Stop reason: HOSPADM

## 2025-07-15 RX ORDER — SUCCINYLCHOLINE/SOD CL,ISO/PF 100 MG/5ML
SYRINGE (ML) INTRAVENOUS AS NEEDED
Status: DISCONTINUED | OUTPATIENT
Start: 2025-07-15 | End: 2025-07-15

## 2025-07-15 RX ORDER — ACETAMINOPHEN 650 MG/1
650 SUPPOSITORY RECTAL EVERY 4 HOURS PRN
Status: DISCONTINUED | OUTPATIENT
Start: 2025-07-15 | End: 2025-07-16 | Stop reason: HOSPADM

## 2025-07-15 RX ORDER — LEVETIRACETAM 500 MG/1
1000 TABLET ORAL DAILY
Status: DISCONTINUED | OUTPATIENT
Start: 2025-07-15 | End: 2025-07-15

## 2025-07-15 RX ADMIN — LACOSAMIDE 100 MG: 100 TABLET, FILM COATED ORAL at 19:28

## 2025-07-15 RX ADMIN — PHENYLEPHRINE HYDROCHLORIDE 200 MCG: 50 INJECTION INTRAVENOUS at 12:58

## 2025-07-15 RX ADMIN — FENTANYL CITRATE 25 MCG: 50 INJECTION INTRAMUSCULAR; INTRAVENOUS at 14:45

## 2025-07-15 RX ADMIN — PRAVASTATIN SODIUM 80 MG: 80 TABLET ORAL at 19:27

## 2025-07-15 RX ADMIN — ESMOLOL HYDROCHLORIDE 40 MG: 100 INJECTION, SOLUTION INTRAVENOUS at 12:06

## 2025-07-15 RX ADMIN — FENTANYL CITRATE 25 MCG: 50 INJECTION INTRAMUSCULAR; INTRAVENOUS at 14:31

## 2025-07-15 RX ADMIN — PHENYLEPHRINE HYDROCHLORIDE 200 MCG: 50 INJECTION INTRAVENOUS at 13:16

## 2025-07-15 RX ADMIN — HYDROMORPHONE HYDROCHLORIDE 0.4 MG: 1 INJECTION, SOLUTION INTRAMUSCULAR; INTRAVENOUS; SUBCUTANEOUS at 16:11

## 2025-07-15 RX ADMIN — POLYETHYLENE GLYCOL 3350 17 G: 17 POWDER, FOR SOLUTION ORAL at 19:27

## 2025-07-15 RX ADMIN — SODIUM CHLORIDE, SODIUM LACTATE, POTASSIUM CHLORIDE, AND CALCIUM CHLORIDE 125 ML/HR: .6; .31; .03; .02 INJECTION, SOLUTION INTRAVENOUS at 18:51

## 2025-07-15 RX ADMIN — CEFAZOLIN SODIUM 2000 MG: 2 SOLUTION INTRAVENOUS at 21:35

## 2025-07-15 RX ADMIN — CHLORHEXIDINE GLUCONATE 15 ML: 1.2 SOLUTION ORAL at 10:34

## 2025-07-15 RX ADMIN — PHENYLEPHRINE HYDROCHLORIDE 100 MCG: 50 INJECTION INTRAVENOUS at 12:56

## 2025-07-15 RX ADMIN — TAMSULOSIN HYDROCHLORIDE 0.4 MG: 0.4 CAPSULE ORAL at 19:27

## 2025-07-15 RX ADMIN — CHLORHEXIDINE GLUCONATE 15 ML: 1.2 SOLUTION ORAL at 19:27

## 2025-07-15 RX ADMIN — NICARDIPINE HYDROCHLORIDE 200 MCG: 2.5 INJECTION, SOLUTION INTRAVENOUS at 13:32

## 2025-07-15 RX ADMIN — LIDOCAINE HYDROCHLORIDE 50 MG: 10 INJECTION, SOLUTION EPIDURAL; INFILTRATION; INTRACAUDAL at 12:06

## 2025-07-15 RX ADMIN — MUPIROCIN 1 APPLICATION: 20 OINTMENT TOPICAL at 19:27

## 2025-07-15 RX ADMIN — Medication 100 MG: at 12:07

## 2025-07-15 RX ADMIN — PHENYLEPHRINE HYDROCHLORIDE 200 MCG: 50 INJECTION INTRAVENOUS at 13:15

## 2025-07-15 RX ADMIN — NICARDIPINE HYDROCHLORIDE 5 MG/HR: 2.5 INJECTION, SOLUTION INTRAVENOUS at 13:19

## 2025-07-15 RX ADMIN — NICARDIPINE HYDROCHLORIDE 25 MG: 25 INJECTION, SOLUTION INTRAVENOUS at 19:21

## 2025-07-15 RX ADMIN — ONDANSETRON 4 MG: 2 INJECTION INTRAMUSCULAR; INTRAVENOUS at 13:24

## 2025-07-15 RX ADMIN — FENTANYL CITRATE 25 MCG: 50 INJECTION INTRAMUSCULAR; INTRAVENOUS at 13:56

## 2025-07-15 RX ADMIN — PROPOFOL 100 MG: 10 INJECTION, EMULSION INTRAVENOUS at 12:06

## 2025-07-15 RX ADMIN — PANTOPRAZOLE SODIUM 40 MG: 40 TABLET, DELAYED RELEASE ORAL at 19:27

## 2025-07-15 RX ADMIN — CEFAZOLIN SODIUM 2000 MG: 2 SOLUTION INTRAVENOUS at 12:24

## 2025-07-15 RX ADMIN — LEVETIRACETAM 1250 MG: 750 TABLET, FILM COATED ORAL at 21:33

## 2025-07-15 RX ADMIN — PHENYLEPHRINE HYDROCHLORIDE 30 MCG/MIN: 50 INJECTION INTRAVENOUS at 12:57

## 2025-07-15 RX ADMIN — HEPARIN SODIUM 11000 UNITS: 1000 INJECTION, SOLUTION INTRAVENOUS; SUBCUTANEOUS at 12:43

## 2025-07-15 RX ADMIN — CLOPIDOGREL BISULFATE 75 MG: 75 TABLET, FILM COATED ORAL at 19:28

## 2025-07-15 RX ADMIN — ROCURONIUM 40 MG: 50 INJECTION, SOLUTION INTRAVENOUS at 12:07

## 2025-07-15 RX ADMIN — SUGAMMADEX 200 MG: 100 INJECTION, SOLUTION INTRAVENOUS at 13:28

## 2025-07-15 RX ADMIN — NICARDIPINE HYDROCHLORIDE 5 MG/HR: 25 INJECTION, SOLUTION INTRAVENOUS at 19:22

## 2025-07-15 RX ADMIN — HYDROMORPHONE HYDROCHLORIDE 0.4 MG: 1 INJECTION, SOLUTION INTRAMUSCULAR; INTRAVENOUS; SUBCUTANEOUS at 16:38

## 2025-07-15 RX ADMIN — PROTAMINE SULFATE 20 MG: 10 INJECTION, SOLUTION INTRAVENOUS at 13:26

## 2025-07-15 RX ADMIN — ASPIRIN 81 MG CHEWABLE TABLET 81 MG: 81 TABLET CHEWABLE at 19:28

## 2025-07-15 RX ADMIN — PHENYLEPHRINE HYDROCHLORIDE 200 MCG: 50 INJECTION INTRAVENOUS at 13:01

## 2025-07-15 RX ADMIN — SODIUM CHLORIDE, SODIUM LACTATE, POTASSIUM CHLORIDE, AND CALCIUM CHLORIDE: .6; .31; .03; .02 INJECTION, SOLUTION INTRAVENOUS at 11:59

## 2025-07-15 RX ADMIN — SODIUM CHLORIDE: 9 INJECTION, SOLUTION INTRAVENOUS at 12:30

## 2025-07-15 RX ADMIN — INSULIN LISPRO 1 UNITS: 100 INJECTION, SOLUTION INTRAVENOUS; SUBCUTANEOUS at 21:36

## 2025-07-15 RX ADMIN — MUPIROCIN 1 APPLICATION: 20 OINTMENT TOPICAL at 10:34

## 2025-07-15 RX ADMIN — FENTANYL CITRATE 25 MCG: 50 INJECTION INTRAMUSCULAR; INTRAVENOUS at 14:15

## 2025-07-15 RX ADMIN — NICARDIPINE HYDROCHLORIDE 200 MCG: 2.5 INJECTION, SOLUTION INTRAVENOUS at 13:20

## 2025-07-15 RX ADMIN — PROPOFOL 50 MG: 10 INJECTION, EMULSION INTRAVENOUS at 13:19

## 2025-07-15 RX ADMIN — PROTAMINE SULFATE 20 MG: 10 INJECTION, SOLUTION INTRAVENOUS at 13:27

## 2025-07-15 RX ADMIN — PROTAMINE SULFATE 20 MG: 10 INJECTION, SOLUTION INTRAVENOUS at 13:25

## 2025-07-15 NOTE — ASSESSMENT & PLAN NOTE
Lab Results   Component Value Date    EGFR 75 07/15/2025    EGFR 64 07/07/2025    EGFR 64 05/22/2025    CREATININE 0.94 07/15/2025    CREATININE 1.07 07/07/2025    CREATININE 1.07 05/22/2025   Avoid nephrotoxic medications

## 2025-07-15 NOTE — CONSULTS
Consultation - Geriatric Medicine   Name: Poncho Wallace 84 y.o. male I MRN: [id number]  Unit/Bed#: OR POOL I Date of Admission: 7/15/2025   Date of Service: 7/15/2025 I Hospital Day: 0   Inpatient consult to Gerontology for [facility]  Consult performed by: BRIANA Steinberg  Consult ordered by: Arely Fernandez PA-C        Physician Requesting Evaluation: SHAWN Dave MD   Reason for Evaluation / Principal Problem: ISAR greater than 2    Assessment & Plan  Nonrheumatic aortic valve stenosis  S/p TAVR  Echo pending  Partial idiopathic epilepsy with seizures of localized onset, not intractable, with status epilepticus (HCC)  Follows with neurology as outpatient, last OV 6/24/25  Maintained on levetiracetam 2250 mg po daily and lacosamide 100 mg po BID    Stage 3a chronic kidney disease (HCC)  Lab Results   Component Value Date    EGFR 75 07/15/2025    EGFR 64 07/07/2025    EGFR 64 05/22/2025    CREATININE 0.94 07/15/2025    CREATININE 1.07 07/07/2025    CREATININE 1.07 05/22/2025   Avoid nephrotoxic medications  B12 deficiency  Vitamin B 12 level 337, recommend supplementation goal level greater than 400  Memory deficit  Per neurology, has been getting nights and days mixed up, leaves stove on  MOCA 20/30 (4/19/23)  Vitamin B 12 level 337, recommend supplementation goal level greater than 400  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  Can be exacerbated by grief  Recommend follow up with [facility] for formal comprehensive evaluation    Left bundle branch block (LBBB)  EP on consult  Ongoing telemetry monitoring  Grief  Passing of wife in December from advanced MS  Recommend grief support  Follow up with PCP for evaluation of depression and consideration for SSRI therapy such as zoloft or lexapro  Frailty  Clinical Frail Scale: 4- Vulnerable  Daughter reports he is independent with IADLs and ADLs  Lives alone, daughter lives 20 minutes away  Albumin 4.2, maintain protein in diet  At risk for  delirium  Oriented x 4, no signs of delirium  At risk secondary to hospitalization, anesthesia, hearing impairment, underlying memory deficit  Maintain delirium precautions:  Provide frequent redirection, reorientation, distraction techniques  Avoid deliriogenic medications such as tramadol, benzodiazepines, anticholinergics,  Benadryl  Treat pain, See geriatric pain protocol  Monitor for constipation and urinary retention  Encourage early and frequent moblization, OOB  Encourage Hydration/ Nutrition  Implement sleep hygiene, limit night time interuptions, group activities     Hearing loss  Hearing impairment strongly correlated with depression, cognitive impairment, delirium and falls in the older adult  Use hearing aids or sound amplifier  Speaking face to face  Use clear dictation, enunciation of words        History of Present Illness   Hx and PE limited by: aortic stenosis  HPI: Poncho Wallace is a 84 y.o. year old male who presents with aortic stenosis. He was having lightheadedness.  He is admitted for transcatheter aortic stenosis.     He has HTN, hyperlipidemia, CKD3, AAA, SDH, seizures    Prior to arrival he lives alone since his wife passed in December.  He is independent with IADLs and ADLs. He denies falls.  He does yard work. He is a retired .     He is sitting in bed, he is flat, masked face. Daughter at bedside. She reports she is 20 minutes away and calls him 4 x day to check on him. She has a camera on him at home on the first floor. He reports he has a phone to call for emergency upstairs. Daughter sometimes takes him to work with her.     Review of Systems   Constitutional:  Negative for unexpected weight change.   HENT:  Positive for hearing loss.    Eyes:  Negative for visual disturbance.   Respiratory:  Negative for cough.    Cardiovascular:  Negative for chest pain.   Gastrointestinal:  Negative for constipation.   Genitourinary:  Negative for difficulty urinating.   Musculoskeletal:   Positive for gait problem.   Skin:  Negative for color change.   Psychiatric/Behavioral:  Negative for sleep disturbance.            Historical Information   Past Medical History[1]  Past Surgical History[2]  Social History[3]  E-Cigarette/Vaping    E-Cigarette Use Never User      E-Cigarette/Vaping Substances    Nicotine No     THC No     CBD No      Family History[4]  Social History[5]    Current Facility-Administered Medications:     acetaminophen (TYLENOL) rectal suppository 650 mg, Q4H PRN    acetaminophen (TYLENOL) tablet 650 mg, Q4H PRN    aspirin chewable tablet 81 mg, Daily    bisacodyl (DULCOLAX) rectal suppository 10 mg, Daily PRN    calcium gluconate 2 g in sodium chloride 0.9% 100 mL (premix), Once PRN    ceFAZolin (ANCEF) IVPB (premix in dextrose) 2,000 mg 50 mL, Q8H    chlorhexidine (PERIDEX) 0.12 % oral rinse 15 mL, BID    clopidogrel (PLAVIX) tablet 75 mg, Daily    fentaNYL (SUBLIMAZE) injection 25 mcg, Q3 min PRN    [START ON 7/16/2025] heparin (porcine) subcutaneous injection 5,000 Units, Q8H PARVIZ **AND** [CANCELED] Platelet count, Once    hydrALAZINE (APRESOLINE) injection 10 mg, Q6H PRN    insulin lispro (HumALOG/ADMELOG) 100 units/mL subcutaneous injection 1-6 Units, TID AC **AND** Fingerstick Glucose (POCT), TID AC    insulin lispro (HumALOG/ADMELOG) 100 units/mL subcutaneous injection 1-6 Units, HS    lacosamide (VIMPAT) tablet 100 mg, Q12H    lactated ringers infusion, Continuous, Last Rate: Stopped (07/15/25 1344)    [START ON 7/16/2025] levETIRAcetam (KEPPRA) tablet 1,000 mg, Daily    levETIRAcetam (KEPPRA) tablet 1,250 mg, HS    mupirocin (BACTROBAN) 2 % nasal ointment 1 Application, Q12H PARVIZ    niCARdipine (CARDENE) 25 mg (STANDARD CONCENTRATION) in sodium chloride 0.9% 250 mL, Titrated, Last Rate: 5 mg/hr (07/15/25 1516)    ondansetron (ZOFRAN) injection 4 mg, Q6H PRN    ondansetron (ZOFRAN) injection 4 mg, Once PRN    pantoprazole (PROTONIX) EC tablet 40 mg, Daily    polyethylene glycol  (MIRALAX) packet 17 g, Daily    pravastatin (PRAVACHOL) tablet 80 mg, Daily With Dinner    tamsulosin (FLOMAX) capsule 0.4 mg, Daily With Dinner  Fenofibrate and Hydrocodone-acetaminophen    Meds/Allergies   Home medication review  [facility]  Lacosamide 100 mg po Q12, last refill 4/29/25 # 90 days  Simvastatin 40 mg po Q evening, last refill 5/30/25 # 90 days  Tamsulosin 0.4 mg po Q evening, last refill 4/28/25 # 90 days  Levetiracetam  mg po 3 tablets daily, last refill 5/30/25 # 90 days      Objective :  Temp:  [97.3 °F (36.3 °C)-97.9 °F (36.6 °C)] 97.3 °F (36.3 °C)  HR:  [50-74] 59  BP: ()/(51-78) 132/59  Resp:  [14-24] 15  SpO2:  [91 %-98 %] 97 %  O2 Device: Nasal cannula  Nasal Cannula O2 Flow Rate (L/min):  [2 L/min-4 L/min] 4 L/min    Physical Exam  Vitals and nursing note reviewed.   HENT:      Head: Normocephalic.      Mouth/Throat:      Pharynx: No oropharyngeal exudate.     Eyes:      General:         Right eye: No discharge.         Left eye: No discharge.       Cardiovascular:      Rate and Rhythm: Normal rate and regular rhythm.      Pulses: Normal pulses.   Abdominal:      General: Abdomen is flat.     Musculoskeletal:         General: Normal range of motion.      Cervical back: Normal range of motion.     Skin:     General: Skin is warm and dry.     Neurological:      Mental Status: He is alert and oriented to person, place, and time. Mental status is at baseline.     Psychiatric:      Comments: Flat affect           Lab Results: I have reviewed the following results:CBC/BMP:   .     07/15/25  1336 07/15/25  1414   HGB 10.5* 13.3   HCT 31* 40.7   PLT  --  111*   SODIUM  --  139   K  --  3.9   CL  --  107   CO2 24 27   BUN  --  15   CREATININE  --  0.94   GLUC  --  96   CAIONIZED 1.05*  --         Imaging Results Review: I reviewed radiology reports from this admission including: procedure reports and Echocardiogram.  Other Study Results Review: EKG was reviewed.       VTE  Prophylaxis: Sequential compression device (Venodyne)     Code Status: Level 1 - Full Code      Family and Social Support:   No data recorded    Goals of Care: full code    I have spent a total time of 90 minutes in caring for this patient on the day of the visit/encounter including Diagnostic results, Prognosis, Risks and benefits of tx options, Instructions for management, Patient and family education, Importance of tx compliance, Risk factor reductions, Impressions, Counseling / Coordination of care, Documenting in the medical record, Reviewing/placing orders in the medical record (including tests, medications, and/or procedures), Obtaining or reviewing history  , and Communicating with other healthcare professionals .          [1]   Past Medical History:  Diagnosis Date    Colon polyps     Epilepsy (HCC)     Gout     Hyperlipidemia     Hypertension     S/P AAA repair     Subdural hematoma (HCC) 1/27/2022    Tear of right rotator cuff 04/04/2017   [2]   Past Surgical History:  Procedure Laterality Date    ABDOMINAL AORTIC ANEURYSM REPAIR, ENDOVASCULAR N/A     CARDIAC CATHETERIZATION N/A 6/5/2025    Procedure: Cardiac RHC/LHC;  Surgeon: Ti Hicks DO;  [facility];  Service: Cardiology    CARDIAC CATHETERIZATION  6/5/2025    Procedure: Cardiac Catheterization;  Surgeon: Ti Hicks DO;  [facility];  Service: Cardiology    CARDIAC CATHETERIZATION N/A 6/5/2025    Procedure: Cardiac Coronary Angiogram;  Surgeon: Ti Hicks DO;  [facility];  Service: Cardiology    OR COLONOSCOPY FLX DX W/COLLJ SPEC WHEN PFRMD N/A 6/7/2017    Procedure: COLONOSCOPY;  Surgeon: NORM Lilly MD;  Location: [facility];  Service: Colorectal    OR COLONOSCOPY FLX DX W/COLLJ SPEC WHEN PFRMD N/A 5/18/2018    Procedure: COLONOSCOPY;  Surgeon: NORM Lilly MD;  Location: [facility];  Service: Colorectal    TONSILLECTOMY      WISDOM TOOTH EXTRACTION  Bilateral    [3]   Social History  Tobacco Use    Smoking status: Former     Current packs/day: 0.00     Average packs/day: 3.0 packs/day for 10.0 years (30.0 ttl pk-yrs)     Types: Cigarettes     Start date: 1961     Quit date: 1971     Years since quittin.4    Smokeless tobacco: Never   Vaping Use    Vaping status: Never Used   Substance and Sexual Activity    Alcohol use: Not Currently     Comment: occasional    Drug use: No    Sexual activity: Not Currently   [4]   Family History  Problem Relation Name Age of Onset    Aneurysm Mother          ABDMONIAL  AORTA    Coronary artery disease Father      Coronary artery disease Brother     [5]   Social History  Tobacco Use    Smoking status: Former     Current packs/day: 0.00     Average packs/day: 3.0 packs/day for 10.0 years (30.0 ttl pk-yrs)     Types: Cigarettes     Start date: 1961     Quit date: 1971     Years since quittin.4    Smokeless tobacco: Never   Vaping Use    Vaping status: Never Used   Substance and Sexual Activity    Alcohol use: Not Currently     Comment: occasional    Drug use: No    Sexual activity: Not Currently

## 2025-07-15 NOTE — ASSESSMENT & PLAN NOTE
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

## 2025-07-16 ENCOUNTER — HOME HEALTH ADMISSION (OUTPATIENT)
Dept: HOME HEALTH SERVICES | Facility: HOME HEALTHCARE | Age: 85
End: 2025-07-16
Payer: MEDICARE

## 2025-07-16 ENCOUNTER — APPOINTMENT (INPATIENT)
Dept: RADIOLOGY | Facility: HOSPITAL | Age: 85
DRG: 267 | End: 2025-07-16
Payer: MEDICARE

## 2025-07-16 ENCOUNTER — CLINICAL SUPPORT (OUTPATIENT)
Dept: CARDIOLOGY CLINIC | Facility: CLINIC | Age: 85
End: 2025-07-16
Payer: MEDICARE

## 2025-07-16 ENCOUNTER — APPOINTMENT (INPATIENT)
Dept: NON INVASIVE DIAGNOSTICS | Facility: HOSPITAL | Age: 85
DRG: 267 | End: 2025-07-16
Payer: MEDICARE

## 2025-07-16 VITALS
WEIGHT: 183 LBS | SYSTOLIC BLOOD PRESSURE: 116 MMHG | HEART RATE: 82 BPM | HEIGHT: 70 IN | BODY MASS INDEX: 26.2 KG/M2 | DIASTOLIC BLOOD PRESSURE: 56 MMHG | TEMPERATURE: 98.9 F | OXYGEN SATURATION: 94 % | RESPIRATION RATE: 18 BRPM

## 2025-07-16 DIAGNOSIS — I35.1 NONRHEUMATIC AORTIC VALVE INSUFFICIENCY: ICD-10-CM

## 2025-07-16 DIAGNOSIS — R00.2 PALPITATION: ICD-10-CM

## 2025-07-16 DIAGNOSIS — I35.0 NONRHEUMATIC AORTIC VALVE STENOSIS: ICD-10-CM

## 2025-07-16 DIAGNOSIS — I44.7 LEFT BUNDLE BRANCH BLOCK: ICD-10-CM

## 2025-07-16 DIAGNOSIS — I10 HYPERTENSION, UNSPECIFIED TYPE: ICD-10-CM

## 2025-07-16 DIAGNOSIS — Z95.2 S/P TAVR (TRANSCATHETER AORTIC VALVE REPLACEMENT): Primary | ICD-10-CM

## 2025-07-16 DIAGNOSIS — I34.2 NONRHEUMATIC MITRAL VALVE STENOSIS: ICD-10-CM

## 2025-07-16 PROBLEM — F43.21 GRIEF: Status: ACTIVE | Noted: 2025-07-16

## 2025-07-16 PROBLEM — H91.90 HEARING LOSS: Status: ACTIVE | Noted: 2025-07-16

## 2025-07-16 PROBLEM — R54 FRAILTY: Status: ACTIVE | Noted: 2025-07-16

## 2025-07-16 PROBLEM — Z91.89 AT RISK FOR DELIRIUM: Status: ACTIVE | Noted: 2025-07-16

## 2025-07-16 LAB
ABO GROUP BLD BPU: NORMAL
ANION GAP SERPL CALCULATED.3IONS-SCNC: 5 MMOL/L (ref 4–13)
AORTIC ROOT: 3.2 CM
AORTIC VALVE MEAN VELOCITY: 13.2 M/S
ASCENDING AORTA: 4.1 CM
ATRIAL RATE: 67 BPM
ATRIAL RATE: 68 BPM
ATRIAL RATE: 71 BPM
ATRIAL RATE: 82 BPM
ATRIAL RATE: 97 BPM
AV AREA BY CONTINUOUS VTI: 2.5 CM2
AV AREA PEAK VELOCITY: 2.4 CM2
AV LVOT MEAN GRADIENT: 3 MMHG
AV LVOT PEAK GRADIENT: 5 MMHG
AV MEAN PRESS GRAD SYS DOP V1V2: 8 MMHG
AV ORIFICE AREA US: 2.76 CM2
AV PEAK GRADIENT: 11 MMHG
AV VELOCITY RATIO: 0.73
AV VMAX SYS DOP: 1.63 M/S
BPU ID: NORMAL
BSA FOR ECHO PROCEDURE: 2.01 M2
BUN SERPL-MCNC: 18 MG/DL (ref 5–25)
CALCIUM SERPL-MCNC: 8.2 MG/DL (ref 8.4–10.2)
CHLORIDE SERPL-SCNC: 105 MMOL/L (ref 96–108)
CO2 SERPL-SCNC: 28 MMOL/L (ref 21–32)
CREAT SERPL-MCNC: 1.12 MG/DL (ref 0.6–1.3)
CROSSMATCH: NORMAL
DOP CALC AO VTI: 28.54 CM
DOP CALC LVOT AREA: 3.8 CM2
DOP CALC LVOT CARDIAC INDEX: 3.02 L/MIN/M2
DOP CALC LVOT CARDIAC OUTPUT: 6.01 L/MIN
DOP CALC LVOT DIAMETER: 2.2 CM
DOP CALC LVOT PEAK VEL VTI: 20.74 CM
DOP CALC LVOT PEAK VEL: 1.13 M/S
DOP CALC LVOT STROKE INDEX: 37.7 ML/M2
DOP CALC LVOT STROKE VOLUME: 78.8 CM3
E WAVE DECELERATION TIME: 280 MS
E/A RATIO: 0.52
ERYTHROCYTE [DISTWIDTH] IN BLOOD BY AUTOMATED COUNT: 12.5 % (ref 11.6–15.1)
FRACTIONAL SHORTENING: 35 (ref 28–44)
GFR SERPL CREATININE-BSD FRML MDRD: 60 ML/MIN/1.73SQ M
GLUCOSE SERPL-MCNC: 124 MG/DL (ref 65–140)
GLUCOSE SERPL-MCNC: 91 MG/DL (ref 65–140)
GLUCOSE SERPL-MCNC: 98 MG/DL (ref 65–140)
HCT VFR BLD AUTO: 38.2 % (ref 36.5–49.3)
HGB BLD-MCNC: 12.6 G/DL (ref 12–17)
INTERVENTRICULAR SEPTUM IN DIASTOLE (PARASTERNAL SHORT AXIS VIEW): 1.5 CM
INTERVENTRICULAR SEPTUM: 1.5 CM (ref 0.6–1.1)
LAAS-AP2: 12.8 CM2
LAAS-AP4: 17.8 CM2
LEFT ATRIUM SIZE: 4.1 CM
LEFT ATRIUM VOLUME (MOD BIPLANE): 44 ML
LEFT ATRIUM VOLUME INDEX (MOD BIPLANE): 22.1 ML/M2
LEFT INTERNAL DIMENSION IN SYSTOLE: 2.8 CM (ref 2.1–4)
LEFT VENTRICULAR INTERNAL DIMENSION IN DIASTOLE: 4.3 CM (ref 3.5–6)
LEFT VENTRICULAR POSTERIOR WALL IN END DIASTOLE: 1.3 CM
LEFT VENTRICULAR STROKE VOLUME: 51 ML
LV EF US.2D.A4C+ESTIMATED: 60 %
LVSV (TEICH): 51 ML
MAGNESIUM SERPL-MCNC: 1.9 MG/DL (ref 1.9–2.7)
MCH RBC QN AUTO: 31.5 PG (ref 26.8–34.3)
MCHC RBC AUTO-ENTMCNC: 33 G/DL (ref 31.4–37.4)
MCV RBC AUTO: 96 FL (ref 82–98)
MV E'TISSUE VEL-LAT: 11 CM/S
MV E'TISSUE VEL-SEP: 6 CM/S
MV PEAK A VEL: 1.3 M/S
MV PEAK E VEL: 68 CM/S
MV STENOSIS PRESSURE HALF TIME: 81 MS
MV VALVE AREA P 1/2 METHOD: 2.72
P AXIS: 10 DEGREES
P AXIS: 10 DEGREES
P AXIS: 2 DEGREES
P AXIS: 23 DEGREES
P AXIS: 31 DEGREES
P AXIS: 39 DEGREES
P AXIS: 8 DEGREES
PLATELET # BLD AUTO: 112 THOUSANDS/UL (ref 149–390)
PMV BLD AUTO: 9.9 FL (ref 8.9–12.7)
POTASSIUM SERPL-SCNC: 4.4 MMOL/L (ref 3.5–5.3)
PR INTERVAL: 156 MS
PR INTERVAL: 158 MS
PR INTERVAL: 160 MS
PR INTERVAL: 160 MS
PR INTERVAL: 162 MS
PR INTERVAL: 168 MS
PR INTERVAL: 170 MS
QRS AXIS: -42 DEGREES
QRS AXIS: -43 DEGREES
QRS AXIS: -48 DEGREES
QRS AXIS: -48 DEGREES
QRS AXIS: -49 DEGREES
QRS AXIS: -49 DEGREES
QRS AXIS: -53 DEGREES
QRSD INTERVAL: 128 MS
QRSD INTERVAL: 130 MS
QRSD INTERVAL: 132 MS
QRSD INTERVAL: 132 MS
QRSD INTERVAL: 134 MS
QRSD INTERVAL: 136 MS
QRSD INTERVAL: 138 MS
QT INTERVAL: 380 MS
QT INTERVAL: 406 MS
QT INTERVAL: 438 MS
QT INTERVAL: 460 MS
QT INTERVAL: 460 MS
QT INTERVAL: 470 MS
QT INTERVAL: 474 MS
QTC INTERVAL: 474 MS
QTC INTERVAL: 475 MS
QTC INTERVAL: 482 MS
QTC INTERVAL: 486 MS
QTC INTERVAL: 489 MS
QTC INTERVAL: 510 MS
QTC INTERVAL: 515 MS
RBC # BLD AUTO: 4 MILLION/UL (ref 3.88–5.62)
RIGHT VENTRICLE ID DIMENSION: 3.4 CM
SL CV LEFT ATRIUM LENGTH A2C: 4.3 CM
SL CV LV EF: 60
SL CV PED ECHO LEFT VENTRICLE DIASTOLIC VOLUME (MOD BIPLANE) 2D: 81 ML
SL CV PED ECHO LEFT VENTRICLE SYSTOLIC VOLUME (MOD BIPLANE) 2D: 30 ML
SODIUM SERPL-SCNC: 138 MMOL/L (ref 135–147)
T WAVE AXIS: 101 DEGREES
T WAVE AXIS: 102 DEGREES
T WAVE AXIS: 103 DEGREES
T WAVE AXIS: 106 DEGREES
T WAVE AXIS: 106 DEGREES
T WAVE AXIS: 109 DEGREES
T WAVE AXIS: 85 DEGREES
TRICUSPID ANNULAR PLANE SYSTOLIC EXCURSION: 2.7 CM
UNIT DISPENSE STATUS: NORMAL
UNIT PRODUCT CODE: NORMAL
UNIT PRODUCT VOLUME: 350 ML
UNIT RH: NORMAL
VENTRICULAR RATE: 67 BPM
VENTRICULAR RATE: 68 BPM
VENTRICULAR RATE: 71 BPM
VENTRICULAR RATE: 82 BPM
VENTRICULAR RATE: 97 BPM
WBC # BLD AUTO: 7.8 THOUSAND/UL (ref 4.31–10.16)

## 2025-07-16 PROCEDURE — 99232 SBSQ HOSP IP/OBS MODERATE 35: CPT | Performed by: INTERNAL MEDICINE

## 2025-07-16 PROCEDURE — 97163 PT EVAL HIGH COMPLEX 45 MIN: CPT

## 2025-07-16 PROCEDURE — 93306 TTE W/DOPPLER COMPLETE: CPT

## 2025-07-16 PROCEDURE — 85027 COMPLETE CBC AUTOMATED: CPT | Performed by: PHYSICIAN ASSISTANT

## 2025-07-16 PROCEDURE — 99223 1ST HOSP IP/OBS HIGH 75: CPT | Performed by: INTERNAL MEDICINE

## 2025-07-16 PROCEDURE — 93306 TTE W/DOPPLER COMPLETE: CPT | Performed by: INTERNAL MEDICINE

## 2025-07-16 PROCEDURE — 83735 ASSAY OF MAGNESIUM: CPT | Performed by: PHYSICIAN ASSISTANT

## 2025-07-16 PROCEDURE — 99238 HOSP IP/OBS DSCHRG MGMT 30/<: CPT | Performed by: PHYSICIAN ASSISTANT

## 2025-07-16 PROCEDURE — 97167 OT EVAL HIGH COMPLEX 60 MIN: CPT

## 2025-07-16 PROCEDURE — 82948 REAGENT STRIP/BLOOD GLUCOSE: CPT

## 2025-07-16 PROCEDURE — 99211 OFF/OP EST MAY X REQ PHY/QHP: CPT

## 2025-07-16 PROCEDURE — 80048 BASIC METABOLIC PNL TOTAL CA: CPT | Performed by: PHYSICIAN ASSISTANT

## 2025-07-16 PROCEDURE — 93005 ELECTROCARDIOGRAM TRACING: CPT

## 2025-07-16 PROCEDURE — 93010 ELECTROCARDIOGRAM REPORT: CPT | Performed by: INTERNAL MEDICINE

## 2025-07-16 PROCEDURE — 71045 X-RAY EXAM CHEST 1 VIEW: CPT

## 2025-07-16 PROCEDURE — NC001 PR NO CHARGE: Performed by: PHYSICIAN ASSISTANT

## 2025-07-16 PROCEDURE — 99222 1ST HOSP IP/OBS MODERATE 55: CPT | Performed by: INTERNAL MEDICINE

## 2025-07-16 RX ORDER — CLOPIDOGREL BISULFATE 75 MG/1
75 TABLET ORAL DAILY
Qty: 90 TABLET | Refills: 0 | Status: SHIPPED | OUTPATIENT
Start: 2025-07-17

## 2025-07-16 RX ORDER — PANTOPRAZOLE SODIUM 40 MG/1
40 TABLET, DELAYED RELEASE ORAL DAILY
Qty: 90 TABLET | Refills: 0 | Status: SHIPPED | OUTPATIENT
Start: 2025-07-17

## 2025-07-16 RX ORDER — FUROSEMIDE 10 MG/ML
20 INJECTION INTRAMUSCULAR; INTRAVENOUS ONCE
Status: COMPLETED | OUTPATIENT
Start: 2025-07-16 | End: 2025-07-16

## 2025-07-16 RX ORDER — POLYETHYLENE GLYCOL 3350 17 G/17G
17 POWDER, FOR SOLUTION ORAL DAILY PRN
Start: 2025-07-16

## 2025-07-16 RX ADMIN — CEFAZOLIN SODIUM 2000 MG: 2 SOLUTION INTRAVENOUS at 14:00

## 2025-07-16 RX ADMIN — CLOPIDOGREL BISULFATE 75 MG: 75 TABLET, FILM COATED ORAL at 09:29

## 2025-07-16 RX ADMIN — HEPARIN SODIUM 5000 UNITS: 5000 INJECTION INTRAVENOUS; SUBCUTANEOUS at 14:01

## 2025-07-16 RX ADMIN — POLYETHYLENE GLYCOL 3350 17 G: 17 POWDER, FOR SOLUTION ORAL at 09:35

## 2025-07-16 RX ADMIN — HEPARIN SODIUM 5000 UNITS: 5000 INJECTION INTRAVENOUS; SUBCUTANEOUS at 05:36

## 2025-07-16 RX ADMIN — LACOSAMIDE 100 MG: 100 TABLET, FILM COATED ORAL at 06:11

## 2025-07-16 RX ADMIN — FUROSEMIDE 20 MG: 10 INJECTION, SOLUTION INTRAMUSCULAR; INTRAVENOUS at 10:26

## 2025-07-16 RX ADMIN — PANTOPRAZOLE SODIUM 40 MG: 40 TABLET, DELAYED RELEASE ORAL at 09:29

## 2025-07-16 RX ADMIN — ASPIRIN 81 MG CHEWABLE TABLET 81 MG: 81 TABLET CHEWABLE at 09:28

## 2025-07-16 RX ADMIN — CHLORHEXIDINE GLUCONATE 15 ML: 1.2 SOLUTION ORAL at 09:33

## 2025-07-16 RX ADMIN — MUPIROCIN 1 APPLICATION: 20 OINTMENT TOPICAL at 09:33

## 2025-07-16 RX ADMIN — CEFAZOLIN SODIUM 2000 MG: 2 SOLUTION INTRAVENOUS at 05:37

## 2025-07-16 RX ADMIN — LEVETIRACETAM 1000 MG: 500 TABLET, FILM COATED ORAL at 09:29

## 2025-07-16 NOTE — RESTORATIVE TECHNICIAN NOTE
Restorative Technician Note      Patient Name: Poncho Wallace     Restorative Tech Visit Date: 07/16/25  Note Type: Mobility  Patient Position Upon Consult: Bedside chair  Activity Performed: Ambulated  Assistive Device: Other (Comment) (none)  Patient Position at End of Consult: All needs within reach; Bedside chair

## 2025-07-16 NOTE — CASE MANAGEMENT
Case Management Assessment & Discharge Planning Note    Patient name Poncho Moranlp  Location [facility] MRN [id number]  : 1940 Date 2025       Current Admission Date: 7/15/2025  Current Admission Diagnosis:Nonrheumatic aortic valve stenosis   Patient Active Problem List    Diagnosis Date Noted    Grief 2025    Frailty 2025    At risk for delirium 2025    Hearing loss 2025    S/P TAVR (transcatheter aortic valve replacement) 07/15/2025    Memory deficit 07/15/2025    Left bundle branch block (LBBB) 07/15/2025    Coronary artery disease involving native coronary artery 2025    S/P AAA repair     Thrombocytopenia (HCC) 2025    Nonrheumatic mitral valve stenosis 2025    Celiac artery stenosis ([facility]) 2023    B12 deficiency 2023    Seizure ([facility]) 04/15/2023    Stage 3a chronic kidney disease (HCC) 2021    Nonrheumatic aortic valve stenosis 2020    Nonrheumatic mitral valve regurgitation 2020    Nonrheumatic aortic valve insufficiency 2020    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[facility]) 2020    Partial idiopathic epilepsy with seizures of localized onset, not intractable, with status epilepticus ([facility]) 2017    Hyperlipidemia     HTN (hypertension)     Hyperuricemia 2016    History of malignant neoplasm of skin 2015    Disc degeneration, lumbar 2013    Diverticulosis 2013    Osteoarthrosis, hand 2013    AAA (abdominal aortic aneurysm) ([facility]) 2013      LOS (days): 1  Geometric Mean LOS (GMLOS) (days): 1.3  Days to GMLOS:0.2     OBJECTIVE:    Risk of Unplanned Readmission Score: 12.3         Current admission status: Inpatient       Preferred Pharmacy:   [facility] [address] [place] [address] [place] - [address] [address]  Phone: [phone] Fax: [phone]    Primary Care Provider: Sudheer Goncalves MD    Primary Insurance: MEDICARE  Secondary Insurance:  [facility]    ASSESSMENT:  Active Health Care Proxies    There are no active Health Care Proxies on file.       Advance Directives  Does patient have a Health Care POA?: No  Was patient offered paperwork?:  (not appropriate)  Does patient currently have a Health Care decision maker?: Yes, please see Health Care Proxy section  Does patient have Advance Directives?: No  Was patient offered paperwork?:  (not appropriate)  Primary Contact: daughter Vidhya Mahoney         Readmission Root Cause  30 Day Readmission: No    Patient Information  Admitted from:: Home  Mental Status: Alert  During Assessment patient was accompanied by: Daughter  Assessment information provided by:: Patient, Daughter  Primary Caregiver: Self  Support Systems: Family members, Daughter  County of Residence: [place]  What [facility] do you live in?: [place]  Home entry access options. Select all that apply.: No steps to enter home  Type of Current Residence: 2 story home  Upon entering residence, is there a bedroom on the main floor (no further steps)?: No  A bedroom is located on the following floor levels of residence (select all that apply):: 2nd Floor  Upon entering residence, is there a bathroom on the main floor (no further steps)?: Yes  Number of steps to 2nd floor from main floor: One Flight  Living Arrangements: Lives Alone  Is patient a ?: Yes  Is patient active with [facility] ( [facility])?: No    Activities of Daily Living Prior to Admission  Functional Status: Independent  Completes ADLs independently?: Yes  Ambulates independently?: Yes  Does patient use assisted devices?: No  Does patient currently own DME?: No  Does patient have a history of Outpatient Therapy (PT/OT)?: No  Does the patient have a history of Short-Term Rehab?: No  Does patient have a history of HHC?: No  Does patient currently have HHC?: No    Current Home Health Care  Home Health Agency Name:: [facility]    Patient Information Continued  Income  Source: Pension/detention  Does patient have prescription coverage?: Yes  Can the patient afford their medications and any related supplies (such as glucometers or test strips)?: Yes  Does patient receive dialysis treatments?: No  Does patient have a history of substance abuse?: No  Does patient have a history of Mental Health Diagnosis?: Yes (grief, wife  one month ago)  Is patient receiving treatment for mental health?: Yes  Has patient received inpatient treatment related to mental health in the last 2 years?: No         Means of Transportation  Means of Transport to \[facility]\"":: Drives Self          DISCHARGE DETAILS:    Discharge planning discussed with:: pt and daughter--recommended for HHC nsg/PT/OT. and [facility]  [place] of Choice: Yes     CM contacted family/caregiver?: Yes  Were Treatment Team discharge recommendations reviewed with patient/caregiver?: Yes  Did patient/caregiver verbalize understanding of patient care needs?: Yes  Were patient/caregiver advised of the risks associated with not following Treatment Team discharge recommendations?: Yes    Contacts  Patient Contacts: daughter Vidhya Mahoney  Relationship to Patient:: Family  Contact Method: Phone  Phone Number: [phone]  Reason/Outcome: Continuity of Care, Emergency Contact, Discharge Planning, Referral    Requested Home Health Care         Is the patient interested in HHC at discharge?: Yes  Home Health Discipline requested:: Nursing, Occupational Therapy, Physical Therapy, Other (comment) ([facility])  Home Health Agency Name:: [facility]  Home Health Follow-Up Provider:: PCP  Home Health Services Needed:: Evaluate Functional Status and Safety, Gait/ADL Training, Strengthening/Theraputic Exercises to Improve Function, Post-Op Care and Assessment, Other (comment) ([facility], Parkinson's, possible early dementia)  Homebound Criteria Met:: Requires the Assistance of Another Person for Safe Ambulation or to Leave the  Home  Supporting Clincal Findings:: Limited Endurance, Fatigues Easliy in Short Distances, Cognitive Deficit Requiring the Assistance of Others    DME Referral Provided  Referral made for DME?: No    Other Referral/Resources/Interventions Provided:  Interventions: [facility]  Referral Comments: Referral to [facility] nsg, PT/OT and [facility]    Would you like to participate in our [facility] service program?  : No - Declined    Treatment Team Recommendation: Home with Home Health Care  Expected Discharge Disposition: Home Health Services     Transport at Discharge : Family                                   Family notified:: daughter here  Additional Comments: 83yo male admitted for TAVR, is POD#1. Developed left BBB and seen by EP, plan Zio patch at discharge. Daughter here. Pt with Parkinson's and possible early dementia per Geriatrics. Pt and daughter agreeable to nsg/PT/OT and [facility].  Plan home today with daughter transporting.

## 2025-07-16 NOTE — PROGRESS NOTES
Patient presents in office for Zio AT 1/2 patch placement.     Patch applied, patient verbalized understanding as all instructions were given.     Patch 2/2 will be place in office on 7/30/25 at 1 pm.

## 2025-07-16 NOTE — DISCHARGE SUMMARY
Discharge Summary - Cardiac Surgery   Poncho Wallace 84 y.o. 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    Consulting Physician(s):   Cardiology  Electrophysiology  Gerontology    Procedures Performed:   Procedure(s):  REPLACEMENT AORTIC VALVE TRANSCATHETER (TAVR) TRANSFEMORAL  W/ 29MM VALVE, (ACCESS ON RIGHT) MIRYAM  Cardiac tavr     Hospital Course:   The patient was seen in consultation prior to this admission for evaluation of severe aortic stenosis.  Risks and benefits of transfemoral transcatheter aortic valve replacement were discussed in detail, and patient was agreeable.  Routine preoperative evaluation was completed and informed consent was obtained prior to admission.    7/15: Elective admission for TF TAVR # 29 mm via R approach (L side balloon valvuloplasty). Extubated and transferred to PACU supported with no gtts. DP pulses 2+ b/l. Restarted on home lacosamide 100 mg BID, Keppra 1000 in AM and 1250 in PM (home dose= ER 2250 mg QD, confirmed equivalent dose with pharmacy), pravastatin 80 mg QD (home statin equivalent), Flomax 0.4 mg QD. ECG shows NSR with a new LBBB, EP consulted, repeat EKG at 1700. Gerontology and cardiology consulted. PM: 3pm EKG with ongoing LBBB, EP notified.     7/16: LBBB remains this AM. No zarina or HB, HR SR 70s. -110s, on RA. Neuro/vasc intact, groin site stable. CXR clear. EKG SR 70  with LBBB . Hgb 12.6, plts 112k (111k yest, 121k preop). No complaints. F/u Echo. F/U EP recs. Likely rhythm monitoring today, home later today vs tomorrow with Zio patch if no worsening rhythm. PM: Another repeat EKG done and stable LBBB, okay for discharge today with Zio patch per EP. Echo adequate. Discharge home today with VN/PT/OT.      Condition at Discharge:   good     Discharge Physical Exam:    Please see the documented physical exam from this morning's progress note for details.    Discharge Data:  Results from last 7 days   Lab Units 07/16/25  0504 07/15/25  1414 07/15/25  1336   WBC Thousand/uL 7.80  --   --    HEMOGLOBIN g/dL 12.6 13.3  --    I STAT HEMOGLOBIN g/dl  --   --  10.5*   HEMATOCRIT % 38.2 40.7  --    HEMATOCRIT, ISTAT %  --   --  31*   PLATELETS Thousands/uL 112* 111*  --      Results from last 7 days   Lab Units 07/16/25  0504 07/15/25  1414 07/15/25  1336   POTASSIUM mmol/L 4.4 3.9  --    CHLORIDE mmol/L 105 107  --    CO2 mmol/L 28 27  --    CO2, I-STAT mmol/L  --   --  24   BUN mg/dL 18 15  --    CREATININE mg/dL 1.12 0.94  --    GLUCOSE, ISTAT mg/dl  --   --  86   CALCIUM mg/dL 8.2* 8.5  --            Discharge instructions/Information to patient and family:   See after visit summary for information provided to patient and family.      Poncho Wallace was educated on restrictions regarding driving and lifting, and techniques of proper incisional care.  They were specifically counselled on signs and symptoms of an incisional infection, and advised to contact our service immediately should they develop fevers, sweats, chill, redness or drainage at the site of any incisions.    Provisions for Follow-Up Care:  See after visit summary for information related to follow-up care and any pertinent home health orders.      Disposition:  Home    Planned Readmission:   No    Discharge Medications:  See after visit summary for reconciled discharge medications provided to patient and family.       Poncho Wallace was provided contact information and scheduled a follow up appointment with Neville Dave M.D.  Additionally, follow up appointments have been scheduled for their primary care physician and primary cardiologist.  Contact information was provided.    Poncho Wallace was counseled on the importance of avoiding tobacco products.  As with all patients whom have undergone open heart surgery, tobacco cessation medication was contraindicated at the time of discharge.     ACE/ARB was Contraindicated secondary to hypotension    Beta Blocker was Contraindicated secondary to bradycardia/heart block    Aspirin was Prescribed at discharge, lifelong    Statin was resumed/Prescribed at discharge    Plavix was prescribed x 90 days for post TAVR antithrombotic therapy      The patient was not discharged on ongoing diuretic therapy as patient is euvolemic, normal LVEF, no history of CHF, and not on diuretics preop.     The patient was informed that following their postoperative surgical evaluation, they will be referred to outpatient cardiac rehabilitation.  They were counseled that this program is run by specialists who will help them safely strengthen their heart and prevent more heart disease.  Cardiac rehabilitation will include exercise, relaxation, stress management, and heart-healthy nutrition.  Caregivers will also check to make sure their medication regimen is working.    During this admission, the patient was questioned on their use of tobacco, alcohol, and illicit/non-prescription drug use in the  previous 24 months. Poncho Wallace states that they have not used any of these substances in this time frame.    I spent 30 minutes discharging the patient. This time was spent on the day of discharge. I had direct contact with the patient on the day of discharge. Additional documentation is required if more than 30 minutes were spent on discharge.     SIGNATURE: Kj Hernandez PA-C  DATE: July 16, 2025  TIME:  12:58 PM

## 2025-07-16 NOTE — PROGRESS NOTES
Progress Note - Cardiac Surgery   Poncho Wallace 84 y.o. male MRN: [id number]  Unit/Bed#: [facility]-304-01 Encounter: [id number]    Severe AS S/P R TF TAVR 29mm S3 UR POD # 1    24 Hour Events: New LBBB post TAVR. EP consulted. Will need rhythm monitor if no worsening conductions. LBBB remains this AM. No zarina or HB, HR SR 70s. -110s, on RA. Neuro/vasc intact, groin site stable. CXR clear. EKG SR 70 with LBBB . Hgb 12.6, plts 112k (111k yest, 121k preop). No complaints.     Medications:   Scheduled Meds:  Current Facility-Administered Medications   Medication Dose Route Frequency Provider Last Rate    acetaminophen  650 mg Rectal Q4H PRN Aerly Rebecca, PA-C      acetaminophen  650 mg Oral Q4H PRN Arely Rebecca, PA-C      aspirin  81 mg Oral Daily Arely Rebecca, PA-C      bisacodyl  10 mg Rectal Daily PRN Arely Rebecca, PA-C      calcium gluconate  2 g Intravenous Once PRN Arely Rebecca, PA-C      cefazolin  2,000 mg Intravenous Q8H Arely Rebecca, PA-C 2,000 mg (07/16/25 0537)    chlorhexidine  15 mL Mouth/Throat BID Arely Rebecca, PA-C      clopidogrel  75 mg Oral Daily Arely Rebecca, PA-C      heparin (porcine)  5,000 Units Subcutaneous Q8H PARVIZ Arely Rebecca, PA-C      hydrALAZINE  10 mg Intravenous Q6H PRN Arely Rebecca, PA-C      insulin lispro  1-6 Units Subcutaneous TID AC Arely Rebecca, PA-C      insulin lispro  1-6 Units Subcutaneous HS Arely Rebecca, PA-C      lacosamide  100 mg Oral Q12H Arely Rebecca, PA-C      lactated ringers  125 mL/hr Intravenous Continuous Oscar Khan MD Stopped (07/15/25 1915)    levETIRAcetam  1,000 mg Oral Daily Arely Rebecca, PA-C      levETIRAcetam  1,250 mg Oral HS Arely Rebecca, PA-C      mupirocin  1 Application Nasal Q12H PARVIZ Arely Rebecca, PA-C      niCARdipine  1-15 mg/hr Intravenous Titrated Arely Fernandez PA-C Stopped (07/15/25 0592)    ondansetron  4 mg Intravenous Q6H PRN Arely Fernandez PA-C      pantoprazole  40 mg Oral Daily Arely Fernandez PA-C      polyethylene glycol   17 g Oral Daily Arely Fernandez PA-C      pravastatin  80 mg Oral Daily With Dinner Arely Fernandez PA-C      tamsulosin  0.4 mg Oral Daily With Dinner Arely Fernandez PA-C       Continuous Infusions:lactated ringers, 125 mL/hr, Last Rate: Stopped (07/15/25 1915)  niCARdipine, 1-15 mg/hr, Last Rate: Stopped (07/15/25 2329)      PRN Meds:.  acetaminophen    acetaminophen    bisacodyl    calcium gluconate    hydrALAZINE    ondansetron    Vitals:   Vitals:    25 0400 25 0500 25 0530 25 0600   BP: 103/56 117/61  104/58   Pulse: 69 71  78   Resp: 13 (!) 24  15   Temp:       TempSrc:       SpO2: 90% 91%  91%   Weight:   83.1 kg (183 lb 3.2 oz)    Height:           Telemetry: NSR with BBB; Heart Rate: 70s    Respiratory:   SpO2: SpO2: 91 %; Room Air    Intake/Output:     Intake/Output Summary (Last 24 hours) at 2025 0650  Last data filed at 2025 0531  Gross per 24 hour   Intake 1140 ml   Output 1650 ml   Net -510 ml        Weights:   Weight (last 2 days)       Date/Time Weight    25 0530 83.1 (183.2)    07/15/25 1010 80.9 (178.35)          Admit weight: 178 lbs    Results:   Results from last 7 days   Lab Units 25  0504 07/15/25  1414 07/15/25  1336   WBC Thousand/uL 7.80  --   --    HEMOGLOBIN g/dL 12.6 13.3  --    I STAT HEMOGLOBIN g/dl  --   --  10.5*   HEMATOCRIT % 38.2 40.7  --    HEMATOCRIT, ISTAT %  --   --  31*   PLATELETS Thousands/uL 112* 111*  --      Results from last 7 days   Lab Units 25  0504 07/15/25  1414 07/15/25  1336   POTASSIUM mmol/L 4.4 3.9  --    CHLORIDE mmol/L 105 107  --    CO2 mmol/L 28 27  --    CO2, I-STAT mmol/L  --   --  24   BUN mg/dL 18 15  --    CREATININE mg/dL 1.12 0.94  --    GLUCOSE, ISTAT mg/dl  --   --  86   CALCIUM mg/dL 8.2* 8.5  --          Recent Labs     25  0504   MG 1.9     Point of care glucose:     Studies:    EC/16  NSR 71, LBBB with     EC/15  NSR 67, LBBB with     CXR:   Clear, no PTX/pl  effusions, or PVC/CHF.     Echocardiogram: 7/16  pending    Results Review Statement: I personally reviewed the following image studies in PACS and associated radiology reports: EKG and chest xray. My interpretation of the radiology images/reports is: same as above.    Invasive Lines/Tubes:  Invasive Devices       Central Venous Catheter Line  Duration             CVC Central Lines 07/15/25 <1 day              Peripheral Intravenous Line  Duration             Peripheral IV 06/05/25 Left;Ventral (anterior) Forearm 40 days    Peripheral IV 07/15/25 Left Wrist <1 day                    Physical Exam:    General: No acute distress, Alert, and Normal appearance  HEENT/NECK:  Normocephalic. Atraumatic.  No jugular venous distention.    Cardiac: Regular rate and rhythm and No murmurs/rubs/gallops  Pulmonary:  Breath sounds clear bilaterally  Abdomen:  Non-tender, Non-distended, and Normal bowel sounds  Incisions: Groin puncture sites clean, dry, and intact without hematoma  Extremities: Extremities warm/dry and No edema B/L  Neuro: Alert and oriented X 3  Skin: Warm/Dry, without rashes or lesions.    Assessment:  Principal Problem:    Nonrheumatic aortic valve stenosis  Active Problems:    Hyperlipidemia    HTN (hypertension)    AAA (abdominal aortic aneurysm) ([facility])    Partial idiopathic epilepsy with seizures of localized onset, not intractable, with status epilepticus ([facility])    Nonrheumatic mitral valve regurgitation    Nonrheumatic aortic valve insufficiency    Stage 3a chronic kidney disease ([facility])    Seizure ([facility])    B12 deficiency    Celiac artery stenosis ([facility])    Nonrheumatic mitral valve stenosis    Thrombocytopenia ([facility])    S/P AAA repair    Coronary artery disease involving native coronary artery    S/P TAVR (transcatheter aortic valve replacement)    Memory deficit    Left bundle branch block (LBBB)     Severe AS S/P R TF TAVR 29mm S3 UR POD # 1    Plan:    Cardiac:     Normal ventricular systolic function, EF  65%    NSR;     new post op LBBB  - EP consulted  - LBBB remains today  - Will need Zio monitor on discharge  - F/U final EP recs    HTN Regimen: BP controlled  Not on home BP meds, BP controlled  - no need start new meds    TAVR anticoagulation regimen:   ASA/Plavix (new to both)    Postoperative transthoracic echocardiogram:  Echo to be completed today    Continue Statin therapy    Central IV access no longer required; Remove central venous catheter today    Continue Subcutaneous Heparin for DVT prophylaxis    Pulmonary:     Good Room air oxygen saturation; Continue incentive spirometry/Coughing/Deep breathing exercises    Renal:     CKD2-3a at baseline, (Cr 1.0-1.2, GFR 50s-60s)    Renal function at baseline    Intake/Output net: -500 mL/24 hours    Diuretic Regimen:  No diuretics needed  EF normal, euvolemic, not on home diuretics  Auto diuresing well    Neuro:    Neurologically intact; No active issues     Incisional pain well-controlled; Continue prn Tylenol    Hx of seizures  - cont home Tegretol    GI:    Cardiac diet, with 1800 mL fluid restriction    Tolerating diet without complaint    Continue stool softeners and prn suppository    Continue GI prophylaxis    Endo:     Glucose well-controlled with insulin sliding scale coverage    7.  Hematology:     Post-operative blood count acceptable; Trend prn    Preop chronic thrombocytopenia, mild, stable,   Plts 112k, (111k yest, preop 121k)    8.  Disposition:    Gerontology consultation ordered for routine assessment of TAVR patient over 65 years old    F/U EP recs, likely monitor rhythm today, home later today vs tomorrow with Zio patch if no worsening rhythm changes    Anticipated discharge date: later today vs tomorrow       VTE Pharmacologic Prophylaxis: Heparin  VTE Mechanical Prophylaxis: sequential compression device    Collaborative rounds completed with supervising physician  Plan of care discussed with bedside nurse    SIGNATURE: Kj Hernandez,  LALITHA  DATE: July 16, 2025  TIME: 6:50 AM

## 2025-07-16 NOTE — ASSESSMENT & PLAN NOTE
Passing of wife in December from advanced MS  Recommend grief support  Follow up with PCP for evaluation of depression and consideration for SSRI therapy such as zoloft or lexapro

## 2025-07-16 NOTE — PROGRESS NOTES
[facility]  Progress Note: Electrophysiology  Date of Service: 7/16/2025  Hospital Day: 1  Name: Poncho Wallace I  MRN: [id number]  Unit/Bed#: PPHP-304-01      Assessment & Plan  Left bundle branch block (LBBB)  Noted to have new LBBB w/ QRS ~140ms post-TAVR 07/15/25  Repeat EKG ~3 hours later w/ continued LBBB  Pre-op EKG:    Immediate post-op EKG:    3 hour post-op EKG:    Nonrheumatic aortic valve stenosis  Current POD 0 following TAVR w/ 29mm Krishna MARQUES 3 Ultra resilia bioprosthetic valve  Continue management per CT surgical team  Hyperlipidemia  Not currently maintained on statin  HTN (hypertension)  BP appears stable at this time  Partial idiopathic epilepsy with seizures of localized onset, not intractable, with status epilepticus (HCC)  Maintained on vimpat and keppra       Assessment & Plan     Patient is POD #1 s/p TAVR w/ Krishna Marques 3 valve     He did not take BB PTA as outpatient     On POD#0 initial EKG he was noted to have developed new LBBB after his TAVR    Repeat 3 hour EKG showed persistent but stable LBBB    He was monitored on telemetry overnight post-procedure showing no significant pause or bradycardia overnight    Repeat AM EKG POD#1 (7/16/25) showed sinus rhythm w/ continued stable LBBB    Case was discussed w/ Dr. Gurpreet bishop for discharge from an EP standpoint w/ outpatient Zio AT monitor (patient instructed to present to the cardiology [address] [place] [address] office for zio AT placement immediately upon discharge - message sent to the staff at that clinic to expect patient)    We will sign off at this time - please reach out with further question/concern.      Subjective       HPI:    Patient reports he currently feels well and denies acute cardiac complaint.     Objective                            Vitals I/O    Most Recent Min/Max in 24hrs   Temp 98.9 °F (37.2 °C) Temp  Min: 97.3 °F (36.3 °C)  Max: 98.9 °F (37.2 °C)   Pulse 78 Pulse  Min: 50  Max: 78   Resp  15 Resp  Min: 11  Max: 27   /58 BP  Min: 92/52  Max: 155/78   O2 Sat 91 % SpO2  Min: 90 %  Max: 99 %      Intake/Output Summary (Last 24 hours) at 7/16/2025 0748  Last data filed at 7/16/2025 0736  Gross per 24 hour   Intake 1260 ml   Output 1650 ml   Net -390 ml                Physical Exam   Physical Exam  Constitutional:       General: He is not in acute distress.     Appearance: Normal appearance.   HENT:      Head: Normocephalic and atraumatic.     Cardiovascular:      Rate and Rhythm: Normal rate and regular rhythm.      Pulses: Normal pulses.      Heart sounds: Normal heart sounds.   Pulmonary:      Effort: Pulmonary effort is normal.      Breath sounds: Normal breath sounds.   Abdominal:      General: Abdomen is flat. Bowel sounds are normal.      Palpations: Abdomen is soft.     Musculoskeletal:      Right lower leg: No edema.      Left lower leg: No edema.     Skin:     General: Skin is warm and dry.      Capillary Refill: Capillary refill takes less than 2 seconds.     Neurological:      Mental Status: He is alert and oriented to person, place, and time.          Labs:    CBC  Recent Labs     07/15/25  1414 07/16/25  0504   WBC  --  7.80   HGB 13.3 12.6   HCT 40.7 38.2   * 112*     BMP  Recent Labs     07/15/25  1414 07/16/25  0504   SODIUM 139 138   K 3.9 4.4    105   CO2 27 28   AGAP 5 5   BUN 15 18   CREATININE 0.94 1.12   CALCIUM 8.5 8.2*       Coags  No recent results     Additional Electrolytes  Recent Labs     07/15/25  1317 07/15/25  1336 07/16/25  0504   MG  --   --  1.9   CAIONIZED 1.13 1.05*  --           Blood Gas  No recent results  No recent results LFTs  No recent results    Infectious  No recent results  Glucose  Recent Labs     07/15/25  1414 07/16/25  0504   GLUC 96 91           SIGNATURE: Edouard Jessica PA-C

## 2025-07-16 NOTE — ASSESSMENT & PLAN NOTE
New LBBB post TAVR   EP following   Reviewed telemetry showing no bradycardia or heart block   EP recommending Garoo

## 2025-07-16 NOTE — ASSESSMENT & PLAN NOTE
Oriented x 4, no signs of delirium  At risk secondary to hospitalization, anesthesia, hearing impairment, underlying memory deficit  Maintain delirium precautions:  Provide frequent redirection, reorientation, distraction techniques  Avoid deliriogenic medications such as tramadol, benzodiazepines, anticholinergics,  Benadryl  Treat pain, See geriatric pain protocol  Monitor for constipation and urinary retention  Encourage early and frequent moblization, OOB  Encourage Hydration/ Nutrition  Implement sleep hygiene, limit night time interuptions, group activities

## 2025-07-16 NOTE — CASE MANAGEMENT
Case Management Discharge Planning Note    Patient name Poncho Moranlp  [facility] MRN [id number]  : 1940 Date 2025       Current Admission Date: 7/15/2025  Current Admission Diagnosis:Nonrheumatic aortic valve stenosis   Patient Active Problem List    Diagnosis Date Noted    Grief 2025    Frailty 2025    At risk for delirium 2025    Hearing loss 2025    S/P TAVR (transcatheter aortic valve replacement) 07/15/2025    Memory deficit 07/15/2025    Left bundle branch block (LBBB) 07/15/2025    Coronary artery disease involving native coronary artery 2025    S/P AAA repair     Thrombocytopenia (HCC) 2025    Nonrheumatic mitral valve stenosis 2025    Celiac artery stenosis ([facility]) 2023    B12 deficiency 2023    Seizure ([facility]) 04/15/2023    Stage 3a chronic kidney disease (HCC) 2021    Nonrheumatic aortic valve stenosis 2020    Nonrheumatic mitral valve regurgitation 2020    Nonrheumatic aortic valve insufficiency 2020    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[facility]) 2020    Partial idiopathic epilepsy with seizures of localized onset, not intractable, with status epilepticus ([facility]) 2017    Hyperlipidemia     HTN (hypertension)     Hyperuricemia 2016    History of malignant neoplasm of skin 2015    Disc degeneration, lumbar 2013    Diverticulosis 2013    Osteoarthrosis, hand 2013    AAA (abdominal aortic aneurysm) ([facility]) 2013      LOS (days): 1  Geometric Mean LOS (GMLOS) (days): 1.3  Days to GMLOS:0.1     OBJECTIVE:  Risk of Unplanned Readmission Score: 12.46         Current admission status: Inpatient   Preferred Pharmacy:   [facility] [address] [place] [id number]  Phone: [phone] Fax: [phone]    Primary Care Provider: Sudheer Goncalves MD    Primary Insurance: MEDICARE  Secondary Insurance: [employer]    DISCHARGE DETAILS:                                                                                                    Additional Comments: Initial referral denied by [facility], contacted [facility] liason, Cornelio Dallas approved for [facility] and [facility]

## 2025-07-16 NOTE — ASSESSMENT & PLAN NOTE
Current POD 0 following TAVR w/ 29mm Krishna MARQUES 3 Ultra resilia bioprosthetic valve  Continue management per CT surgical team

## 2025-07-16 NOTE — OCCUPATIONAL THERAPY NOTE
Occupational Therapy Evaluation     Patient Name: Poncho Wallace  Today's Date: 7/16/2025  Problem List  Principal Problem:    Nonrheumatic aortic valve stenosis  Active Problems:    Hyperlipidemia    HTN (hypertension)    AAA (abdominal aortic aneurysm) (HCC)    Partial idiopathic epilepsy with seizures of localized onset, not intractable, with status epilepticus (HCC)    Nonrheumatic mitral valve regurgitation    Nonrheumatic aortic valve insufficiency    Stage 3a chronic kidney disease (HCC)    Seizure (HCC)    B12 deficiency    Celiac artery stenosis (HCC)    Nonrheumatic mitral valve stenosis    Thrombocytopenia (HCC)    S/P AAA repair    Coronary artery disease involving native coronary artery    S/P TAVR (transcatheter aortic valve replacement)    Memory deficit    Left bundle branch block (LBBB)    Grief    Frailty    At risk for delirium    Hearing loss    Past Medical History  Past Medical History[1]  Past Surgical History  Past Surgical History[2]      07/16/25 0833   OT Last Visit   OT Visit Date 07/16/25   Note Type   Note type Evaluation   Pain Assessment   Pain Assessment Tool 0-10   Pain Score 5   Pain Location/Orientation Location: Groin   Patient's Stated Pain Goal No pain   Hospital Pain Intervention(s) Repositioned;Ambulation/increased activity   Restrictions/Precautions   Weight Bearing Precautions Per Order No   Other Precautions Cardiac/sternal;Cognitive;Chair Alarm;Bed Alarm;Telemetry;Multiple lines;Fall Risk;Pain;Hard of hearing   Home Living   Type of Home House   Home Layout Two level;Stairs to enter with rails  (1 ALISIA w/ rail)   Bathroom Shower/Tub Walk-in shower   Bathroom Toilet Standard   Bathroom Accessibility Accessible   Home Equipment Cane  (although does not use pta)   Additional Comments Pt reports living alone in 2 SH, 1 ALISIA w/ rail, FFSU if needed, walk-in shower, standard toilet.   Prior Function   Level of [place] Independent with ADLs;Independent with functional  "mobility;Independent with IADLS   Lives With (S)  Alone;Other (Comment)  (Pt reports his dtr has cameras in the home to check in/watch him.)   Receives Help From Family   IADLs Independent with driving;Independent with meal prep;Independent with medication management   Falls in the last 6 months 0   Vocational Retired   Lifestyle   Autonomy Pta pt reports being independent in ADLs, IADLs, and functional mobility, (+) .   Reciprocal Relationships dtr   Service to Others retired    Intrinsic Gratification outside work   General   Family/Caregiver Present No   Subjective   Subjective \"I do it all myself\"   ADL   Where Assessed Chair   Eating Assistance 6  Modified independent   Grooming Assistance 5  Supervision/Setup   UB Bathing Assistance 5  Supervision/Setup   LB Bathing Assistance 5  Supervision/Setup   UB Dressing Assistance 5  Supervision/Setup   LB Dressing Assistance 5  Supervision/Setup   Toileting Assistance  5  Supervision/Setup   Functional Assistance 5  Supervision/Setup   Additional Comments Pt reports dtr checks in multiple times during the week and has cameras in the home.   Bed Mobility   Sit to Supine 5  Supervision   Additional items Increased time required;Verbal cues   Additional Comments Pt greeted OOB in chair, required Supervision for sit>supine, left supine in bed with alarm on and all needs within reach.   Transfers   Sit to Stand 5  Supervision   Additional items Increased time required;Verbal cues   Stand to Sit 5  Supervision   Additional items Increased time required;Verbal cues   Additional Comments w/out AD   Functional Mobility   Functional Mobility 5  Supervision   Additional Comments Pt performed household distance functional mobility w/ Supervision, w/out AD.   Balance   Static Sitting Fair +   Dynamic Sitting Fair   Static Standing Fair   Dynamic Standing Fair -   Ambulatory Fair -   Activity Tolerance   Activity Tolerance Patient limited by fatigue;Patient limited by " pain   Medical Staff Made Aware Co-eval with PT due to medical complexity and co-morbidities, OTR present.   Nurse Made Aware RN cleared for therapy.   RUE Assessment   RUE Assessment WFL   LUE Assessment   LUE Assessment WFL   Hand Function   Gross Motor Coordination Functional   Fine Motor Coordination Functional   Sensation   Light Touch No apparent deficits   Cognition   Arousal/Participation Alert;Cooperative   Attention Attends with cues to redirect   Orientation Level Oriented to person;Oriented to place;Oriented to situation;Disoriented to time  (partially to time w/ vc)   Memory Decreased recall of precautions   Following Commands Follows one step commands without difficulty   Comments Pt alert and cooperative to therapy although somewhat flat affect and decreased recall of precautions and insight to deficits. Previous MOCA score 20/30 in 4/2023, has good support that checks in at home.   Assessment   Limitation Decreased Safe judgement during ADL;Decreased cognition;Decreased endurance;Decreased high-level ADLs;Decreased self-care trans   Prognosis Good   Assessment Pt is a 84 y.o. male who was admitted to [facility] on 7/15/2025 with Nonrheumatic aortic valve stenosis, s/p TAVR, w/ LBBB post-op and EP following to workup. Pt seen for an OT evaluation per active OT orders.  Pt  has a past medical history of Colon polyps, Epilepsy (HCC), Gout, Hyperlipidemia, Hypertension, S/P AAA repair, Subdural hematoma (HCC), and Tear of right rotator cuff. Pt reports living alone in 2 , 1 [facility] w/ rail, FF if needed, walk-in shower, standard toilet. Pta, pt was independent w/ ADL, IADL, and functional mobility, was not using any DME at baseline, and is (+) . Currently, pt is Supervision for UB ADL, Supervision for LB ADL, and performed transfers/functional mobility w/ Supervision w/out AD. Pt currently presents impairments including -steps to enter environment, limited home support, difficulty  performing ADLS, difficulty performing IADLS , and limited insight into deficits endurance, standing balance/tolerance, and safety . These impairments, as well as pt's fatigue, pain, and cardiac/sternal precautions limit pt's ability to safely engage in baseline areas of occupation such as grooming, bathing, dressing, toileting, and functional mobility/transfers.   The patient's raw score on the -PAC Daily Activity Inpatient Short Form is 19. A raw score of greater than or equal to 19 suggests the patient may benefit from discharge to home. Please refer to the recommendation of the Occupational Therapist for safe discharge planning.  Pt would benefit from continued acute OT services throughout hospital course and following D/C. Moving forward, OT interventions 2-3x per week to increase safety and participation in ADLs, transfers, and functional mobility. From OT standpoint, recommend pt to return home with increased social support and level III services upon D/C. Pt was left supine in bed with alarm on and all needs within reach.   Goals   Patient Goals to go home   LTG Time Frame 10-14   Long Term Goal #1 see below   Plan   Treatment Interventions ADL retraining;Functional transfer training;Endurance training;Cognitive reorientation;Continued evaluation;Energy conservation   Goal Expiration Date 07/30/25   OT Frequency 2-3x/wk   Discharge Recommendation   Rehab Resource Intensity Level, OT III (Minimum Resource Intensity)  (+fitness to drive assessment when medically appropriate)   -PAC Daily Activity Inpatient   Lower Body Dressing 3   Bathing 3   Toileting 3   Upper Body Dressing 3   Grooming 3   Eating 4   Daily Activity Raw Score 19   Daily Activity Standardized Score (Calc for Raw Score >=11) 40.22   AM-PAC Applied Cognition Inpatient   Following a Speech/Presentation 4   Understanding Ordinary Conversation 4   Taking Medications 3   Remembering Where Things Are Placed or Put Away 3   Remembering List of  4-5 Errands 3   Taking Care of Complicated Tasks 2   Applied Cognition Raw Score 19   Applied Cognition Standardized Score 39.77   End of Consult   Education Provided Yes  (Pt received and verbalized understanding of Recovering from Minimally Invasive Cardiac Surgery packet.)   Patient Position at End of Consult Supine in Bed;Bed/Chair alarm activated;All needs within reach   Nurse Communication Nurse aware of consult       OT Goals:       Pt will be Mod I in UB ADLs by end of hospital stay.    Pt will be Mod I in LB ADLs by end of hospital stay.    Pt will perform functional transfers with Mod I and increased safety awareness by end of hospital stay.    Pt will increase standing tolerance to 5 minutes to safely engage in self-care ADLs at the sink.    Pt will be Mod I in functional mobility with/without any required AD to promote participation in IADL tasks by end of hospital stay.    Pt will follow two-step directions to complete ADL tasks.     Pt will undergo ongoing cognitive assessment during therapy session while in hospital.    SHO Caldwell               [1]   Past Medical History:  Diagnosis Date    Colon polyps     Epilepsy (HCC)     Gout     Hyperlipidemia     Hypertension     S/P AAA repair     Subdural hematoma (HCC) 1/27/2022    Tear of right rotator cuff 04/04/2017   [2]   Past Surgical History:  Procedure Laterality Date    ABDOMINAL AORTIC ANEURYSM REPAIR, ENDOVASCULAR N/A     CARDIAC CATHETERIZATION N/A 6/5/2025    Procedure: Cardiac RHC/LHC;  Surgeon: Ti Hicks DO;  [facility]: [facility];  Service: Cardiology    CARDIAC CATHETERIZATION  6/5/2025    Procedure: Cardiac Catheterization;  Surgeon: Ti Hicks DO;  [facility];  Service: Cardiology    CARDIAC CATHETERIZATION N/A 6/5/2025    Procedure: Cardiac Coronary Angiogram;  Surgeon: Ti Hicks DO;  [facility];  Service: Cardiology    WA COLONOSCOPY FLX DX W/COLLJ  SPEC WHEN PFRMD N/A 6/7/2017    Procedure: COLONOSCOPY;  Surgeon: NORM Lilly MD;  Location: [facility];  Service: Colorectal    NH COLONOSCOPY FLX DX W/COLLJ SPEC WHEN PFRMD N/A 5/18/2018    Procedure: COLONOSCOPY;  Surgeon: NORM Lilly MD;  Location: [facility];  Service: Colorectal    TONSILLECTOMY      WISDOM TOOTH EXTRACTION Bilateral

## 2025-07-16 NOTE — ASSESSMENT & PLAN NOTE
Clinical Frail Scale: 4- Vulnerable  Daughter reports he is independent with IADLs and ADLs  Lives alone, daughter lives 20 minutes away  Albumin 4.2, maintain protein in diet

## 2025-07-16 NOTE — CONSULTS
Consultation - Cardiology Team One  Poncho Wallace 84 y.o. male MRN: [id number]  Unit/Bed#: PPHP-304-01 Encounter: [id number]    Inpatient consult to Cardiology  Consult performed by: BRIANA Coombs  Consult ordered by: Arely Fernandez PA-C          Physician Requesting Consult: SHAWN Dave MD  Reason for Consult / Principal Problem: s/p TAVR     Assessment & Plan  Nonrheumatic aortic valve stenosis  S/P TAVR (transcatheter aortic valve replacement)  POD #1 s/p AVR   On DAPT: aspirin and plavix  Echocardiogram pending   Hyperlipidemia  Pravastatin  80 mg PO daily   HTN (hypertension)  Average /59  Coronary artery disease involving native coronary artery  Cardiac cath 6/5/2025 showed mid LAD 30% stenosis   Left bundle branch block (LBBB)  New LBBB post TAVR   EP following   Reviewed telemetry showing no bradycardia or heart block   EP recommending Zio     History of Present Illness   HPI: Poncho Wallace is a 84 y.o. year old male who has a history of severe aortic stenosis, mild to moderate MR, AAA s/p EVAR, hypertension, hyperlipidemia, CKD stage III and PAD. He follows with cardiologist Dr. Sanchez.            He presents to [facility] 7/15 for elective TAVR. He is now POD #1 TF TAVR with A 29 mm MARQUES 3 Ultra Resilia valve. Following deployment, the position of the valve was confirmed by fluoroscopy and echocardiography and its position appeared appropriate with no perivalvular leak. Repeat echocardiogram today pending.         EKG reviewed personally:  Normal sinus rhythm with LBBB   Ventricular rate 71 bpm  WA interval 160 ms  QRSD 138 ms  QT interval 470 ms  QTc interval 510     Telemetry reviewed personally:   Normal sinus rhythm     Review of Systems   Constitutional: Positive for malaise/fatigue. Negative for chills and fever.   HENT:  Negative for congestion.    Cardiovascular:  Negative for chest pain, dyspnea on exertion, leg swelling, orthopnea and palpitations.   Respiratory:  Negative for  shortness of breath.    Musculoskeletal:  Negative for falls.   Gastrointestinal:  Negative for bloating, nausea and vomiting.   Neurological:  Negative for dizziness and light-headedness.   Psychiatric/Behavioral:  Negative for altered mental status.    All other systems reviewed and are negative.    Historical Information   Past Medical History[1]  Past Surgical History[2]  Social History     Substance and Sexual Activity   Alcohol Use Not Currently    Comment: occasional     Social History     Substance and Sexual Activity   Drug Use No     Tobacco Use History[3]  Family History: Family history non-contributory    Meds/Allergies   all current active meds have been reviewed and current meds:   Current Facility-Administered Medications:     acetaminophen (TYLENOL) rectal suppository 650 mg, Q4H PRN    acetaminophen (TYLENOL) tablet 650 mg, Q4H PRN    aspirin chewable tablet 81 mg, Daily    bisacodyl (DULCOLAX) rectal suppository 10 mg, Daily PRN    calcium gluconate 2 g in sodium chloride 0.9% 100 mL (premix), Once PRN    ceFAZolin (ANCEF) IVPB (premix in dextrose) 2,000 mg 50 mL, Q8H, Last Rate: 2,000 mg (07/16/25 0537)    chlorhexidine (PERIDEX) 0.12 % oral rinse 15 mL, BID    clopidogrel (PLAVIX) tablet 75 mg, Daily    heparin (porcine) subcutaneous injection 5,000 Units, Q8H PARVIZ **AND** [CANCELED] Platelet count, Once    hydrALAZINE (APRESOLINE) injection 10 mg, Q6H PRN    insulin lispro (HumALOG/ADMELOG) 100 units/mL subcutaneous injection 1-6 Units, TID AC **AND** Fingerstick Glucose (POCT), TID AC    insulin lispro (HumALOG/ADMELOG) 100 units/mL subcutaneous injection 1-6 Units, HS    lacosamide (VIMPAT) tablet 100 mg, Q12H    levETIRAcetam (KEPPRA) tablet 1,000 mg, Daily    levETIRAcetam (KEPPRA) tablet 1,250 mg, HS    mupirocin (BACTROBAN) 2 % nasal ointment 1 Application, Q12H PARVIZ    ondansetron (ZOFRAN) injection 4 mg, Q6H PRN    pantoprazole (PROTONIX) EC tablet 40 mg, Daily    polyethylene glycol  "(MIRALAX) packet 17 g, Daily    pravastatin (PRAVACHOL) tablet 80 mg, Daily With Dinner    tamsulosin (FLOMAX) capsule 0.4 mg, Daily With Dinner       Allergies[4]    Objective   Vitals: Blood pressure 116/56, pulse 83, temperature 98.9 °F (37.2 °C), resp. rate 15, height 5' 10\" (1.778 m), weight 83 kg (183 lb), SpO2 93%.,     Body mass index is 26.26 kg/m².,     Systolic (24hrs), Av , Min:92 , Max:153     Diastolic (24hrs), Av, Min:51, Max:71      Intake/Output Summary (Last 24 hours) at 2025 1051  Last data filed at 2025 0900  Gross per 24 hour   Intake 1260 ml   Output 1750 ml   Net -490 ml     Weight (last 2 days)       Date/Time Weight    25 0900 83 (183)    25 0530 83.1 (183.2)    07/15/25 1010 80.9 (178.35)          Invasive Devices       Central Venous Catheter Line  Duration             CVC Central Lines 07/15/25 <1 day              Peripheral Intravenous Line  Duration             Peripheral IV 25 Left;Ventral (anterior) Forearm 41 days    Peripheral IV 07/15/25 Left Wrist 1 day                  Physical Exam  Constitutional:       General: He is not in acute distress.  HENT:      Head: Normocephalic.      Mouth/Throat:      Mouth: Mucous membranes are moist.     Cardiovascular:      Rate and Rhythm: Normal rate and regular rhythm.      Pulses: Normal pulses.   Pulmonary:      Effort: Pulmonary effort is normal. No respiratory distress.      Breath sounds: Normal breath sounds.      Comments: Decreased in bases   Abdominal:      Palpations: Abdomen is soft.     Musculoskeletal:         General: Normal range of motion.      Cervical back: Neck supple.     Skin:     General: Skin is warm and dry.      Capillary Refill: Capillary refill takes less than 2 seconds.      Comments: R groin puncture site EZEKIEL      Neurological:      Mental Status: He is alert and oriented to person, place, and time.     Psychiatric:         Mood and Affect: Mood normal.         LABORATORY " "RESULTS:      CBC with diff:   Results from last 7 days   Lab Units 07/16/25  0504 07/15/25  1414 07/15/25  1336 07/15/25  1317 07/15/25  1252 07/15/25  1220   WBC Thousand/uL 7.80  --   --   --   --   --    HEMOGLOBIN g/dL 12.6 13.3  --   --   --   --    I STAT HEMOGLOBIN g/dl  --   --  10.5* 11.9* 11.2* 12.6   HEMATOCRIT % 38.2 40.7  --   --   --   --    HEMATOCRIT, ISTAT %  --   --  31* 35* 33* 37   MCV fL 96  --   --   --   --   --    PLATELETS Thousands/uL 112* 111*  --   --   --   --    RBC Million/uL 4.00  --   --   --   --   --    MCH pg 31.5  --   --   --   --   --    MCHC g/dL 33.0  --   --   --   --   --    RDW % 12.5  --   --   --   --   --    MPV fL 9.9 10.6  --   --   --   --        CMP:  Results from last 7 days   Lab Units 07/16/25  0504 07/15/25  1414 07/15/25  1336 07/15/25  1317 07/15/25  1252 07/15/25  1220   POTASSIUM mmol/L 4.4 3.9  --   --   --   --    CHLORIDE mmol/L 105 107  --   --   --   --    CO2 mmol/L 28 27  --   --   --   --    CO2, I-STAT mmol/L  --   --  24 26 26 30   BUN mg/dL 18 15  --   --   --   --    CREATININE mg/dL 1.12 0.94  --   --   --   --    GLUCOSE, ISTAT mg/dl  --   --  86 93 93 85   CALCIUM mg/dL 8.2* 8.5  --   --   --   --    EGFR ml/min/1.73sq m 60 75  --   --   --   --        BMP:  Results from last 7 days   Lab Units 07/16/25  0504 07/15/25  1414 07/15/25  1336 07/15/25  1317 07/15/25  1252 07/15/25  1220   POTASSIUM mmol/L 4.4 3.9  --   --   --   --    CHLORIDE mmol/L 105 107  --   --   --   --    CO2 mmol/L 28 27  --   --   --   --    CO2, I-STAT mmol/L  --   --  24 26 26 30   BUN mg/dL 18 15  --   --   --   --    CREATININE mg/dL 1.12 0.94  --   --   --   --    GLUCOSE, ISTAT mg/dl  --   --  86 93 93 85   CALCIUM mg/dL 8.2* 8.5  --   --   --   --           No results found for: \"NTBNP\"         Results from last 7 days   Lab Units 07/16/25  0504   MAGNESIUM mg/dL 1.9          Results from last 7 days   Lab Units 07/15/25  1414   HEMOGLOBIN A1C % 5.5               "    Lipid Profile:   Lab Results   Component Value Date    CHOL 141 2015    CHOL 154 2015    CHOL 148 2014     Lab Results   Component Value Date    HDL 44 2025    HDL 53 2023    HDL 41 2023     Lab Results   Component Value Date    LDLCALC 78 2025    LDLCALC 58 2023    LDLCALC 65 2023     Lab Results   Component Value Date    TRIG 92 2025    TRIG 161 (H) 2023    TRIG 79 2023         Cardiac testing:   Results for orders placed during the hospital encounter of 17    Echo complete with contrast if indicated    [address] [id number] [phone]    Transthoracic Echocardiogram  2D, M-mode, Doppler, and Color Doppler    Study date:  2017    Patient: TOÑITO DAMON  MR number: [id number]  Account number: [id number]  : 1940  Age: 76 years  Gender: Male  Status: Inpatient  Location: Bedside  Height: 70 in  Weight: 189.6 lb  BP: 109/ 56 mmHg    Indications: Hypertension    Diagnoses: I11.9 - Hypertensive heart disease without heart failure    Sonographer:  PAYTON Garcia, RDCS  Primary Physician:  Sudheer Goncalves MD  Referring Physician:  Pa Hou MD  Group:  [facility]  Interpreting Physician:  Pa Hou MD    SUMMARY    LEFT VENTRICLE:  Systolic function was normal by visual assessment. Ejection fraction was estimated to be 60 %.  There were no regional wall motion abnormalities.  Wall thickness was mildly increased.    MITRAL VALVE:  There was mild regurgitation.    AORTIC VALVE:  Transaortic velocity was increased due to valvular stenosis.  There was mild stenosis.  There was mild to moderate regurgitation.    TRICUSPID VALVE:  There was mild regurgitation.    PULMONIC VALVE:  There was trace regurgitation.    AORTA:  The root exhibited mild dilatation. The ascending aorta is mildly dilated at 4.1 cm.    HISTORY: PRIOR HISTORY:  Hyperlipidemia; Elevated troponin; Seizure; RICK; Hypternsion; S/P AAA repair; Former smoker    PROCEDURE: The procedure was performed at the bedside. This was a routine study. The transthoracic approach was used. The study included complete 2D imaging, M-mode, complete spectral Doppler, and color Doppler. The heart rate was 65 bpm,  at the start of the study. Images were obtained from the parasternal, apical, subcostal, and suprasternal notch acoustic windows. Image quality was adequate.    LEFT VENTRICLE: Size was normal. Systolic function was normal by visual assessment. Ejection fraction was estimated to be 60 %. There were no regional wall motion abnormalities. Wall thickness was mildly increased. DOPPLER: There was an  increased relative contribution of atrial contraction to ventricular filling. The study was not technically sufficient to allow evaluation of LV diastolic function.    RIGHT VENTRICLE: The size was normal. Systolic function was normal.    LEFT ATRIUM: Size was normal.    RIGHT ATRIUM: Size was normal.    MITRAL VALVE: Valve structure was normal. There was normal leaflet separation. DOPPLER: The transmitral velocity was within the normal range. There was no evidence for stenosis. There was mild regurgitation.    AORTIC VALVE: The valve was trileaflet. Leaflets exhibited mildly increased thickness, mild to moderate calcification, and mildly reduced cuspal separation. DOPPLER: Transaortic velocity was increased due to valvular stenosis. There was  mild stenosis. There was mild to moderate regurgitation.    TRICUSPID VALVE: The valve structure was normal. There was normal leaflet separation. DOPPLER: The transtricuspid velocity was within the normal range. There was no evidence for stenosis. There was mild regurgitation.    PULMONIC VALVE: Leaflets exhibited normal thickness, no calcification, and normal cuspal separation. DOPPLER: The transpulmonic velocity was within the normal range. There was  trace regurgitation.    PERICARDIUM: There was no pericardial effusion.    AORTA: The root exhibited mild dilatation. The ascending aorta is mildly dilated at 4.1 cm.    SYSTEMIC VEINS: IVC: The inferior vena cava was normal in size and course. Respirophasic changes were normal.    SYSTEM MEASUREMENT TABLES    2D  %FS: 31.97 %  AV Diam: 4.05 cm  EDV(Teich): 112.95 ml  EF(Teich): 59.91 %  ESV(Teich): 45.28 ml  IVSd: 1.37 cm  LA Area: 15.22 cm2  LA Diam: 3.84 cm  LVEDV MOD A4C: 97.71 ml  LVEF MOD A4C: 66.62 %  LVESV MOD A4C: 32.61 ml  LVIDd: 4.9 cm  LVIDs: 3.34 cm  LVLd A4C: 8.29 cm  LVLs A4C: 6.81 cm  LVOT Diam: 2.57 cm  LVPWd: 1.1 cm  RA Area: 18.7 cm2  RVIDd: 3.58 cm  SV MOD A4C: 65.1 ml  SV(Teich): 67.67 ml    CW  AR Dec [place]: 2.2 m/s2  AR Dec Time: 1532.99 ms  AR PHT: 444.57 ms  AR Vmax: 3.37 m/s  AR maxP.46 mmHg  AV Env.Ti: 304.5 ms  AV MaxP.54 mmHg  AV SI: 334.79 ml/m2  AV SV: 682.97 ml  AV VTI: 53.13 cm  AV Vmax: 2.37 m/s  AV Vmean: 1.75 m/s  AV meanP.19 mmHg  TR MaxP.36 mmHg  TR Vmax: 2.02 m/s    MM  TAPSE: 2.39 cm    PW  GUS (VTI): 2.66 cm2  GUS Vmax: 2.5 cm2  E': 0.06 m/s  E/E': 11.13  LVOT Env.Ti: 341.41 ms  LVOT VTI: 27.35 cm  LVOT Vmax: 1.15 m/s  LVOT Vmean: 0.8 m/s  LVOT maxP.27 mmHg  LVOT meanP.87 mmHg  LVSI Dopp: 69.3 ml/m2  LVSV Dopp: 141.38 ml  MV A Rivera: 1.05 m/s  MV Dec [place]: 1.88 m/s2  MV DecT: 337.26 ms  MV E Rivera: 0.63 m/s  MV E/A Ratio: 0.6  MV PHT: 97.8 ms  MVA By PHT: 2.25 cm2    [employer] Accredited Echocardiography Laboratory    Prepared and electronically signed by    Pa Hou MD  Signed 2017 11:53:28    No results found for this or any previous visit.    No valid procedures specified.  No results found for this or any previous visit.    Imaging:   Cardiac catheterization  Result Date: 7/15/2025  Narrative: OPERATIVE REPORT PATIENT NAME: Poncho Wallace  :  1940 MRN: [id number] Pt Location: [facility]BR[facility] OR ROOM 02 SURGERY DATE:  7/15/2025 Surgeons and Role: Panel 1:    * SHAWN Dave MD - Primary    * Arely Fernandez PA-C - Assisting Panel 2:    * Ti Hicks DO - Primary Co-Surgeons: Ti Hicks DO; DEMETRIO Dave MD PREOPERATIVE DIAGNOSIS Symptomatic severe aortic stenosis. POSTOPERATIVE DIAGNOSIS Symptomatic severe aortic stenosis. PROCEDURE Transcatheter aortic valve replacement with a 29 mm Krishna MARQUES 3 ULTRA RESILIA bioprosthetic valve via a percutaneous right transfemoral approach. ANESTHESIA Dr. Jerzy Khan, general endotracheal anesthesia with transesophageal echocardiogram guidance. After informed consent was obtained, patient brought to hybrid operating room in fasting state. Time out was performed. Using modified seldinger technique sheaths were placed in the right  common  femoral artery and vein respectively. The right  common  femoral artery was also used for placement of delivery sheath. The vessel was accessed using modified seldinger technique.  Using fluoroscopy a temporary wire was advanced into RV apex through transfemoral sheath.  The coplanar view was obtained using pigtail catheter placed in ascending aorta with subsequent ascending aortography. The TAVR delivery sheath was ulltimately advanced over a stiff wire.  Valve could not be advanced across the aortic valve.  We elected to place via modified Seldinger technique 14 Sami sheath left common femoral artery and performed valvuloplasty.  Preclosed technique was also used here.  This was performed with rapid pacing with a 20 mm true balloon successfully.  Next the 29 mm Krishna MARQUES 3 ULTRA RESILIA valve was implanted using rapid pacing with fluoro and MIRYAM guidance.  There was no significant paravalvular leak appreciated post implant. The sheaths were removed and hemostasis obtained by manual compression of the venous sheath.  The TAVR delivery sheath was removed and percutaneous closure was obtained using Preclose  technique with two Proglide devices deployed pre sheath insertion. Patient left the hybrid operating room in stable condition. Impression.  Successful 29mm Krishna MARQUES 3 ULTRA RESILIA transcatheter aortic valve replacement. SIGNATURE: Ti Hicks DO DATE: July 15, 2025 TIME: 3:56 PM NOTE: A cardiac surgeon as co-surgeon was required as is a CMS requirement as well as needed for conventional open (non catheter based) surgical skills should become necessary.     XR chest portable  Result Date: 7/15/2025  Narrative: XR CHEST PORTABLE INDICATION: Post Open Heart Surgey. COMPARISON: CT 5/27/2025. FINDINGS: Right IJ line present. Upper normal heart size. TAVR noted. Left base slight atelectasis. Clear lungs otherwise. No pneumothorax or pleural effusion. Normal cardiomediastinal silhouette. Bones are unremarkable for age. Normal upper abdomen.     Impression: Postop chest with TAVR. No acute cardiopulmonary disease. Workstation performed: [id number]     MIRYAM Anesthesia  Result Date: 7/15/2025  Narrative: Oscar Khan MD     7/15/2025  1:40 PM Procedure Performed: MIRYAM Anesthesia Start Time:  7/15/2025 12:25 PM Preanesthesia Checklist Patient identified, IV assessed, risks and benefits discussed, monitors and equipment assessed, procedure being performed at surgeon's request and anesthesia consent obtained. Procedure Diagnostic Indications for MIRYAM:  assessment of surgical repair. Type of MIRYAM: interventional MIRYAM with real time guidance of intracardiac procedure. Images Saved: ultrasound permanent image saved. Physician Requesting Echo: SHAWN Dave MD.  Location performed: OR. Intubated. Bite block not placed.  Heart visualized. Insertion of MIRYAM Probe:  Atraumatic. Probe Type:  Multiplane. Modalities:  3D, color flow mapping, contrast and pulse wave Doppler. Echocardiographic and Doppler Measurements PREPROCEDURE LEFT VENTRICLE: Systolic Function: normal. Ejection Fraction: 60%. Cavity size:  normal.   RIGHT VENTRICLE: Systolic Function: normal.  Cavity size mildly dilated. Hypertrophy present. AORTIC VALVE: Leaflets: mobile plaque noted and trileaflet. Leaflet motions restricted. Stenosis: severe.     Regurgitation: mild.  MITRAL VALVE: Leaflets: calcified. Leaflet Motions: restricted. Regurgitation: moderate.   Stenosis: none.   TRICUSPID VALVE: Leaflets: normal. Leaflet Motions: normal. Stenosis: none. Regurgitation: mild. PULMONIC VALVE: Leaflets: normal. Regurgitation: none. Stenosis: none. ASCENDING AORTA: Size:  normal.  Dissection not present.  AORTIC ARCH: Size:  normal.  dissection not present. Grade 4: atheroma protruding => 0.5 cm into lumen. DESCENDING AORTA: Size: normal.  Dissection not present. Grade 5: any thickness with mobile component or components. RIGHT ATRIUM: Size:  normal. LEFT ATRIUM: Size: dilated. Spontaneous echo contrast. LEFT ATRIAL APPENDAGE: Size: normal.  ATRIAL SEPTUM: Intra-atrial septal morphology: normal.  VENTRICULAR SEPTUM: Intra-ventricular septum morphology: normal. OTHER FINDINGS: Pericardium:  normal. Pleural Effusion:  none. POSTPROCEDURE LEFT VENTRICLE: Unchanged .    RIGHT VENTRICLE: Unchanged .  AORTIC VALVE: Leaflets: bioprosthetic. Stenosis: none. Mean Gradient: 2 mmHg. Regurgitation: none.  Valve Size: 29 mm. MITRAL VALVE: Unchanged .    TRICUSPID VALVE: Unchanged .   PULMONIC VALVE: Unchanged   ATRIA: Unchanged .   AORTA: Unchanged . REMOVAL: Probe Removal: atraumatic.      Thank you for allowing us to participate in this patient's care.     Counseling / Coordination of Care  Total floor / unit time spent today 45 minutes.  Greater than 50% of total time was spent with the patient and / or family counseling and / or coordination of care.  A description of the counseling / coordination of care: Review of history, current assessment, development of a plan.      Code Status: Level 1 - Full Code    ** Please Note: Dragon 360 Dictation voice to text software  may have been used in the creation of this document. **       [1]   Past Medical History:  Diagnosis Date    Colon polyps     Epilepsy (HCC)     Gout     Hyperlipidemia     Hypertension     S/P AAA repair     Subdural hematoma (HCC) 2022    Tear of right rotator cuff 2017   [2]   Past Surgical History:  Procedure Laterality Date    ABDOMINAL AORTIC ANEURYSM REPAIR, ENDOVASCULAR N/A     CARDIAC CATHETERIZATION N/A 2025    Procedure: Cardiac RHC/LHC;  Surgeon: Ti Hicks DO;  Location: [facility];  Service: Cardiology    CARDIAC CATHETERIZATION  2025    Procedure: Cardiac Catheterization;  Surgeon: Ti Hicks DO;  Location: [facility];  Service: Cardiology    CARDIAC CATHETERIZATION N/A 2025    Procedure: Cardiac Coronary Angiogram;  Surgeon: Ti Hicks DO;  Location: [facility];  Service: Cardiology    NJ COLONOSCOPY FLX DX W/COLLJ SPEC WHEN PFRMD N/A 2017    Procedure: COLONOSCOPY;  Surgeon: NORM Lilly MD;  Location: [facility];  Service: Colorectal    NJ COLONOSCOPY FLX DX W/COLLJ SPEC WHEN PFRMD N/A 2018    Procedure: COLONOSCOPY;  Surgeon: NORM Lilly MD;  Location: [facility];  Service: Colorectal    TONSILLECTOMY      WISDOM TOOTH EXTRACTION Bilateral    [3]   Social History  Tobacco Use   Smoking Status Former    Current packs/day: 0.00    Average packs/day: 3.0 packs/day for 10.0 years (30.0 ttl pk-yrs)    Types: Cigarettes    Start date: 1961    Quit date: 1971    Years since quittin.4   Smokeless Tobacco Never   [4]   Allergies  Allergen Reactions    Fenofibrate      Patient not aware of allergy    Hydrocodone-Acetaminophen      Patient not aware of allergy

## 2025-07-16 NOTE — PHYSICAL THERAPY NOTE
Physical Therapy Evaluation     Patient's Name: Poncho Wallace    Admitting Diagnosis  Nonrheumatic aortic valve stenosis [I35.0]    Problem List  Problem List[1]    Past Medical History  Past Medical History[2]    Past Surgical History  Past Surgical History[3]       07/16/25 0827   PT Last Visit   PT Visit Date 07/16/25   Note Type   Note type Evaluation   Pain Assessment   Pain Assessment Tool 0-10   Pain Score 5   Pain Location/Orientation Location: Groin   Pain Onset/Description Onset: Ongoing   Patient's Stated Pain Goal No pain   Hospital Pain Intervention(s) Repositioned;Ambulation/increased activity;Emotional support   Restrictions/Precautions   Weight Bearing Precautions Per Order No   Other Precautions Pain;Cardiac/sternal;Bed Alarm;Chair Alarm;Telemetry;Multiple lines   Home Living   Type of Home House   Home Layout Two level;Performs ADLs on one level;Able to live on main level with bedroom/bathroom;Stairs to enter with rails  (0STE and able to have FFSU)   Bathroom Shower/Tub Walk-in shower   Bathroom Toilet Standard   Bathroom Accessibility Accessible   Home Equipment Cane   Additional Comments Pt states 2STH with FFSU available, 0STE   Prior Function   Level of [place] Independent with ADLs;Independent with functional mobility;Independent with IADLS   Lives With (S)  Alone   Receives Help From Family  (Dtr)   IADLs Independent with driving;Independent with meal prep;Independent with medication management   Falls in the last 6 months 0   Vocational Retired   Comments No AD used PTA. Pt states dtr has placed cameras in house + checks in on him   Cognition   Arousal/Participation Responsive   Attention Attends with cues to redirect   Orientation Level Oriented to person;Oriented to place;Oriented to situation  (Partially oriented to time)   Following Commands Follows one step commands without difficulty   Comments Pt cooperative during session, overall flat affect. Pt does have local dtr who checks  in frequently and has cameras placed inside home   Subjective   Subjective Agreeable to mobilize   RLE Assessment   RLE Assessment WFL   LLE Assessment   LLE Assessment WFL   Bed Mobility   Sit to Supine Unable to assess   Additional Comments Pt OOB in chair upon arrival.   Transfers   Sit to Stand 5  Supervision   Additional items Increased time required   Stand to Sit 5  Supervision   Additional items Increased time required   Additional Comments no AD used   Ambulation/Elevation   Gait pattern Foward flexed   Gait Assistance 5  Supervision   Assistive Device None   Distance 140 ft   Ambulation/Elevation Additional Comments Pt ambulates in hallway without AD, reports mild pain in groin area   Balance   Static Sitting Good   Dynamic Sitting Good   Static Standing Fair +   Dynamic Standing Fair +   Ambulatory Fair   Activity Tolerance   Activity Tolerance Patient limited by fatigue;Patient limited by pain   Medical Staff Made Aware Co-eval with OT   Nurse Made Aware RN cleared pt   Assessment   Prognosis Good   Problem List Decreased endurance;Decreased mobility;Pain   Assessment Pt is a 84 y.o. male seen for PT evaluation s/p admit to [facility] on 7/15/2025. Pt was admitted with a primary dx of: Non rheumatic aortic valve stenosis, LBBB, Hearing loss, Grief, Coronary artery disease involving native coronary artery, memory deficit,s/p AAA repair, now s/p TAVR on 7/15.Pt has a past medical history of Colon polyps, Epilepsy (HCC), Gout, Hyperlipidemia, Hypertension, S/P AAA repair, Subdural hematoma (HCC) (1/27/2022), and Tear of right rotator cuff (04/04/2017).   PT now consulted for assessment of mobility and d/c needs. Pt with OOB to chair orders.Pts current clinical presentation is Unstable/ Unpredictable (high complexity) due to Ongoing medical management for primary dx, Decreased activity tolerance compared to baseline, Ongoing telemetry monitoring, s/p surgical intervention. Prior to admission, pt  was Ind for mobility and ADLs, no AD used pta but has cane if needed.Pt lives alone in 2STH with FFSU available, 0STE, with local daughter who is supportive. Upon evaluation, pt currently is requiring supervision for transfers and ambulation, no AD used durign session. Pt approaching functional baseline and will benefit from continued Pt while in hospital to maximize mobility, stair assessment as required. At conclusion of PT session chair alarm engaged, all needs in reach, RN notified of session findings/recommendations, and pt returned back in recliner chair with phone and call bell within reach. Pt denies any further questions at this time. The patient's AM-PAC Basic Mobility Inpatient Short Form Raw Score is 18. A Raw score of greater than 16 suggests the patient may benefit from discharge to home. Please also refer to the recommendation of the Physical Therapist for safe discharge planning. Recommend Level III with increased family support upon hospital D/C.   Goals   Patient Goals to go home   STG Expiration Date 07/30/25   Short Term Goal #1 STG 1. Pt will be able to perform bed mobility tasks with Mod I in order to improve overall functional mobility and assist in safe d/c. STG 2. Pt with sit EOB for at least 25 minutes at Ind level in order to strengthen abdominal musculature and assist in future transfers/ ambulation. STG 3. Pt will be able to perform functional transfer with Ind in order to improve overall functional mobility and assist in safe d/c. STG 4. Pt will be able to ambulate at least 300 feet with least restrictive device with Sup A in order to improve overall functional mobility and assist in safe d/c. STG 5. Pt will improve sitting/standing static/dynamic balance 1/2 grade in order to improve functional mobility and assist in safe d/c. STG 6. Pt will be able to negotiate at least 12 stairs with least restrictive device with sup A in order to improve overall functional mobility and assist in  safe d/c.   PT Treatment Day 0   Plan   Treatment/Interventions Functional transfer training;Elevations;Gait training;Spoke to nursing;Spoke to case management;OT   PT Frequency 2-3x/wk   Discharge Recommendation   Rehab Resource Intensity Level, PT III (Minimum Resource Intensity) with increased family support   Equipment Recommended   (Pt has cane if needed)   AM-PAC Basic Mobility Inpatient   Turning in Flat Bed Without Bedrails 3   Lying on Back to Sitting on Edge of Flat Bed Without Bedrails 3   Moving Bed to Chair 3   Standing Up From Chair Using Arms 3   Walk in Room 3   Climb 3-5 Stairs With Railing 3   Basic Mobility Inpatient Raw Score 18   Basic Mobility Standardized Score 41.05   [facility] Highest Level Of Mobility   -HLM Goal 6: Walk 10 steps or more   -HLM Achieved 7: Walk 25 feet or more   Modified [place] Scale   Modified [place] Scale 2   End of Consult   Patient Position at End of Consult All needs within reach;Bed/Chair alarm activated;Bedside chair         Karla Torre, PT DPT       [1]   Patient Active Problem List  Diagnosis    Hyperlipidemia    HTN (hypertension)    AAA (abdominal aortic aneurysm) (HCC)    Disc degeneration, lumbar    Diverticulosis    Hyperuricemia    Osteoarthrosis, hand    Partial idiopathic epilepsy with seizures of localized onset, not intractable, with status epilepticus (HCC)    Nonrheumatic aortic valve stenosis    Nonrheumatic mitral valve regurgitation    Nonrheumatic aortic valve insufficiency    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HCC)    Stage 3a chronic kidney disease (HCC)    Seizure (HCC)    B12 deficiency    Celiac artery stenosis (HCC)    Nonrheumatic mitral valve stenosis    Thrombocytopenia (HCC)    S/P AAA repair    Coronary artery disease involving native coronary artery    S/P TAVR (transcatheter aortic valve replacement)    History of malignant neoplasm of skin    Memory deficit    Left bundle branch block (LBBB)    Grief    Frailty    At risk for  delirium    Hearing loss   [2]   Past Medical History:  Diagnosis Date    Colon polyps     Epilepsy (HCC)     Gout     Hyperlipidemia     Hypertension     S/P AAA repair     Subdural hematoma (HCC) 1/27/2022    Tear of right rotator cuff 04/04/2017   [3]   Past Surgical History:  Procedure Laterality Date    ABDOMINAL AORTIC ANEURYSM REPAIR, ENDOVASCULAR N/A     CARDIAC CATHETERIZATION N/A 6/5/2025    Procedure: Cardiac RHC/LHC;  Surgeon: Ti Hicks DO;  [facility];  Service: Cardiology    CARDIAC CATHETERIZATION  6/5/2025    Procedure: Cardiac Catheterization;  Surgeon: Ti Hicks DO;  [facility];  Service: Cardiology    CARDIAC CATHETERIZATION N/A 6/5/2025    Procedure: Cardiac Coronary Angiogram;  Surgeon: Ti Hicks DO;  [facility];  Service: Cardiology    CARDIAC CATHETERIZATION N/A 7/15/2025    Procedure: Cardiac tavr;  Surgeon: Ti Hicks DO;  Location: [facility];  Service: Cardiology    SC COLONOSCOPY FLX DX W/COLLJ SPEC WHEN PFRMD N/A 6/7/2017    Procedure: COLONOSCOPY;  Surgeon: NORM Lilly MD;  Location: [facility];  Service: Colorectal    SC COLONOSCOPY FLX DX W/COLLJ SPEC WHEN PFRMD N/A 5/18/2018    Procedure: COLONOSCOPY;  Surgeon: NORM Lilly MD;  [facility]: [facility];  Service: Colorectal    SC REPLACE AORTIC VALVE OPENFEMORAL ARTERY APPROACH Right 7/15/2025    Procedure: REPLACEMENT AORTIC VALVE TRANSCATHETER (TAVR) TRANSFEMORAL  W/ 29MM VALVE, (ACCESS ON RIGHT) MIRYAM;  Surgeon: SHAWN Dave MD;  Location: [facility];  Service: Cardiac Surgery    TONSILLECTOMY      WISDOM TOOTH EXTRACTION Bilateral

## 2025-07-16 NOTE — PLAN OF CARE
Problem: OCCUPATIONAL THERAPY ADULT  Goal: Performs self-care activities at highest level of function for planned discharge setting.  See evaluation for individualized goals.  Description: Treatment Interventions: ADL retraining, Functional transfer training, Endurance training, Cognitive reorientation, Continued evaluation, Energy conservation          See flowsheet documentation for full assessment, interventions and recommendations.   Outcome: Progressing  Note: Limitation: Decreased Safe judgement during ADL, Decreased cognition, Decreased endurance, Decreased high-level ADLs, Decreased self-care trans  Prognosis: Good  Assessment: Pt is a 84 y.o. male who was admitted to [facility] on 7/15/2025 with Nonrheumatic aortic valve stenosis, s/p TAVR, w/ LBBB post-op and EP following to workup. Pt seen for an OT evaluation per active OT orders.  Pt  has a past medical history of Colon polyps, Epilepsy (HCC), Gout, Hyperlipidemia, Hypertension, S/P AAA repair, Subdural hematoma (HCC), and Tear of right rotator cuff. Pt reports living alone in 2 , 1 [facility] w/ rail, [facility] if needed, walk-in shower, standard toilet. Pta, pt was independent w/ ADL, IADL, and functional mobility, was not using any DME at baseline, and is (+) . Currently, pt is Supervision for UB ADL, Supervision for LB ADL, and performed transfers/functional mobility w/ Supervision w/out AD. Pt currently presents impairments including -steps to enter environment, limited home support, difficulty performing ADLS, difficulty performing IADLS , and limited insight into deficits endurance, standing balance/tolerance, and safety . These impairments, as well as pt's fatigue, pain, and cardiac/sternal precautions limit pt's ability to safely engage in baseline areas of occupation such as grooming, bathing, dressing, toileting, and functional mobility/transfers.   The patient's raw score on the AM-PAC Daily Activity Inpatient Short Form is 19. A raw  score of greater than or equal to 19 suggests the patient may benefit from discharge to home. Please refer to the recommendation of the Occupational Therapist for safe discharge planning.  Pt would benefit from continued acute OT services throughout hospital course and following D/C. Moving forward, OT interventions 2-3x per week to increase safety and participation in ADLs, transfers, and functional mobility. From OT standpoint, recommend pt to return home with increased social support and level III services upon D/C. Pt was left supine in bed with alarm on and all needs within reach.     Rehab Resource Intensity Level, OT: III (Minimum Resource Intensity) (+fitness to drive assessment when medically appropriate)

## 2025-07-16 NOTE — ASSESSMENT & PLAN NOTE
Noted to have new LBBB w/ QRS ~140ms post-TAVR 07/15/25  Repeat EKG ~3 hours later w/ continued LBBB  Pre-op EKG:    Immediate post-op EKG:    3 hour post-op EKG:

## 2025-07-16 NOTE — RESTORATIVE TECHNICIAN NOTE
Restorative Technician Note      Patient Name: Poncho Wallace     Restorative Tech Visit Date: 07/16/25  Note Type: Mobility  Patient Position Upon Consult: Bedside chair  Activity Performed: Ambulated  Assistive Device: Other (Comment) (none)  Patient Position at End of Consult: Bed/Chair alarm activated; All needs within reach; Bedside chair

## 2025-07-17 ENCOUNTER — HOME CARE VISIT (OUTPATIENT)
Dept: HOME HEALTH SERVICES | Facility: HOME HEALTHCARE | Age: 85
End: 2025-07-17

## 2025-07-17 ENCOUNTER — TRANSITIONAL CARE MANAGEMENT (OUTPATIENT)
Dept: FAMILY MEDICINE CLINIC | Facility: CLINIC | Age: 85
End: 2025-07-17

## 2025-07-18 NOTE — ANESTHESIA POSTPROCEDURE EVALUATION
Post-Op Assessment Note    CV Status:  Stable    Pain management: adequate       Mental Status:  Alert and awake   Hydration Status:  Euvolemic   PONV Controlled:  Controlled   Airway Patency:  Patent     Post Op Vitals Reviewed: Yes    No anethesia notable event occurred.    Staff: Anesthesiologist           Last Filed PACU Vitals:  Vitals Value Taken Time   Temp 98.6 °F (37 °C) 07/15/25 18:36   Pulse 68 07/15/25 18:39   /58 07/15/25 18:39   Resp 14 07/15/25 18:39   SpO2 94 % 07/15/25 18:39       Modified Leah:     Vitals Value Taken Time   Activity 2 07/15/25 18:00   Respiration 2 07/15/25 18:00   Circulation 2 07/15/25 18:00   Consciousness 2 07/15/25 18:00   Oxygen Saturation 1 07/15/25 18:00     Modified Leah Score: 9

## 2025-07-19 ENCOUNTER — HOME CARE VISIT (OUTPATIENT)
Dept: HOME HEALTH SERVICES | Facility: HOME HEALTHCARE | Age: 85
End: 2025-07-19
Payer: MEDICARE

## 2025-07-19 VITALS
RESPIRATION RATE: 16 BRPM | WEIGHT: 179 LBS | OXYGEN SATURATION: 95 % | SYSTOLIC BLOOD PRESSURE: 120 MMHG | TEMPERATURE: 97.1 F | HEART RATE: 77 BPM | HEIGHT: 70 IN | BODY MASS INDEX: 25.62 KG/M2 | DIASTOLIC BLOOD PRESSURE: 70 MMHG

## 2025-07-19 PROCEDURE — 400013 VN SOC

## 2025-07-19 PROCEDURE — G0299 HHS/HOSPICE OF RN EA 15 MIN: HCPCS

## 2025-07-19 PROCEDURE — 10330081 VN NO-PAY CLAIM PROCEDURE

## 2025-07-21 ENCOUNTER — HOME CARE VISIT (OUTPATIENT)
Dept: HOME HEALTH SERVICES | Facility: HOME HEALTHCARE | Age: 85
End: 2025-07-21
Attending: PHYSICIAN ASSISTANT
Payer: MEDICARE

## 2025-07-21 ENCOUNTER — OFFICE VISIT (OUTPATIENT)
Dept: FAMILY MEDICINE CLINIC | Facility: CLINIC | Age: 85
End: 2025-07-21
Payer: MEDICARE

## 2025-07-21 VITALS
HEIGHT: 70 IN | SYSTOLIC BLOOD PRESSURE: 120 MMHG | WEIGHT: 179 LBS | RESPIRATION RATE: 18 BRPM | BODY MASS INDEX: 25.62 KG/M2 | HEART RATE: 89 BPM | OXYGEN SATURATION: 99 % | TEMPERATURE: 99.1 F | DIASTOLIC BLOOD PRESSURE: 72 MMHG

## 2025-07-21 VITALS — HEART RATE: 100 BPM | OXYGEN SATURATION: 98 % | DIASTOLIC BLOOD PRESSURE: 64 MMHG | SYSTOLIC BLOOD PRESSURE: 108 MMHG

## 2025-07-21 DIAGNOSIS — I35.0 NONRHEUMATIC AORTIC VALVE STENOSIS: Primary | ICD-10-CM

## 2025-07-21 DIAGNOSIS — Z95.2 S/P TAVR (TRANSCATHETER AORTIC VALVE REPLACEMENT): ICD-10-CM

## 2025-07-21 PROCEDURE — 99496 TRANSJ CARE MGMT HIGH F2F 7D: CPT

## 2025-07-21 PROCEDURE — G0151 HHCP-SERV OF PT,EA 15 MIN: HCPCS

## 2025-07-21 NOTE — PROGRESS NOTES
Transition of Care Visit:  Name: Poncho Wallace      : 1940      MRN: [id number]  Encounter Provider: Pierre Garrett DO  Encounter Date: 2025   Encounter department: [facility]    Assessment & Plan  Nonrheumatic aortic valve stenosis         S/P TAVR (transcatheter aortic valve replacement)          S/p TAVR on 7/15 in setting of severe aortic valve stenosis.  Had new onset LBBB postop which was followed by EP.  Patient discharged on Zio patch with recommendation for outpatient follow-up.  Also discharged on aspirin and Plavix.  Patient saw PT this morning, said he was doing well and was discharged from their care.  Recommend follow-up with specialists as scheduled.    History of Present Illness     Transitional Care Management Review:   Poncho Wallace is a 84 y.o. male here for TCM follow up.     84-year-old male admitted 7/15 -  for severe aortic stenosis now s/p TAVR.  Postop, developed new LBBB and EP was consulted.  Repeat serial EKGs during hospitalization showed stable LBBB and patient was discharged home with a Zio patch.  Echo was adequate.  Patient has follow-up with cardiology on . Discharged on aspirin and plavix. Was discharged from PT this morning.  Has follow-up with specialist scheduled.    Has some lateral right knee discomfort but otherwise no acute concerns or questions.    During the TCM phone call patient stated:  TCM Call (since 2025)       Date and time call was made  2025  9:11 AM    Hospital care reviewed  Records reviewed    Patient was hospitial[facility] at  [facility]    Date of Admission  07/15/25    Date of discharge  25    Diagnosis  Nonrheumatic aortic valve stenosis    Disposition  Home; Home health services    Were the patients medications reviewed and updated  Yes    Current Symptoms  None          TCM Call (since 2025)       Post hospital issues  None    Scheduled for follow up?  Yes    Did you obtain your prescribed  "medications  Yes    Do you need help managing your prescriptions or medications  No    Is transportation to your appointment needed  No    I have advised the patient to call PCP with any new or worsening symptoms  Ann Samayoa MA    Living Arrangements  Alone    Support System  Family    The type of support provided  Emotional; Physical    Do you have social support  Yes, as much as I need    Are you recieving home care services  Yes          HPI  Review of Systems   Constitutional:  Negative for chills and fever.   HENT:  Negative for congestion and rhinorrhea.    Eyes:  Negative for visual disturbance.   Respiratory:  Negative for cough and shortness of breath.    Cardiovascular:  Negative for chest pain and palpitations.   Gastrointestinal:  Negative for abdominal pain, constipation, diarrhea, nausea and vomiting.   Genitourinary:  Negative for dysuria.   Musculoskeletal:  Negative for arthralgias.   Skin:  Negative for rash.   Neurological:  Negative for dizziness, light-headedness and headaches.     Objective   /72 (BP Location: Left arm, Patient Position: Sitting, Cuff Size: Standard)   Pulse 89   Temp 99.1 °F (37.3 °C) (Temporal)   Resp 18   Ht 5' 10\" (1.778 m)   Wt 81.2 kg (179 lb)   SpO2 99%   BMI 25.68 kg/m²     Physical Exam  Vitals reviewed.   Constitutional:       General: He is not in acute distress.     Appearance: Normal appearance.   HENT:      Head: Normocephalic and atraumatic.      Right Ear: External ear normal.      Left Ear: External ear normal.      Nose: Nose normal.      Mouth/Throat:      Pharynx: Oropharynx is clear.     Eyes:      Conjunctiva/sclera: Conjunctivae normal.       Cardiovascular:      Rate and Rhythm: Normal rate and regular rhythm.      Pulses: Normal pulses.      Heart sounds: Normal heart sounds.   Pulmonary:      Effort: Pulmonary effort is normal.      Breath sounds: Normal breath sounds.   Abdominal:      Palpations: Abdomen is soft. "     Musculoskeletal:      Right lower leg: No edema.      Left lower leg: No edema.     Skin:     General: Skin is warm.     Neurological:      Mental Status: He is alert and oriented to person, place, and time.     Psychiatric:         Mood and Affect: Mood normal.         Behavior: Behavior normal.       Medications have been reviewed by provider in current encounter

## 2025-07-22 ENCOUNTER — HOME CARE VISIT (OUTPATIENT)
Dept: HOME HEALTH SERVICES | Facility: HOME HEALTHCARE | Age: 85
End: 2025-07-22
Payer: MEDICARE

## 2025-07-22 VITALS
TEMPERATURE: 97.5 F | OXYGEN SATURATION: 94 % | BODY MASS INDEX: 25.68 KG/M2 | DIASTOLIC BLOOD PRESSURE: 70 MMHG | HEART RATE: 76 BPM | RESPIRATION RATE: 18 BRPM | WEIGHT: 179 LBS | SYSTOLIC BLOOD PRESSURE: 126 MMHG

## 2025-07-22 PROCEDURE — G0153 HHCP-SVS OF S/L PATH,EA 15MN: HCPCS

## 2025-07-22 PROCEDURE — G0299 HHS/HOSPICE OF RN EA 15 MIN: HCPCS

## 2025-07-23 NOTE — CASE COMMUNICATION
Speech therapy evaluation completed 7/22/25.  Pt will benefit from speech therapy services to address breath support and vocal intensity for improved speech intelligibility. POC 1 wk 3-4.    Dr. Goncalves, pt will benefit from assessment by ENT.  Please order/refer if in agreement.  Thank you.

## 2025-07-24 ENCOUNTER — TELEPHONE (OUTPATIENT)
Dept: CARDIAC SURGERY | Facility: CLINIC | Age: 85
End: 2025-07-24

## 2025-07-25 ENCOUNTER — HOME CARE VISIT (OUTPATIENT)
Dept: HOME HEALTH SERVICES | Facility: HOME HEALTHCARE | Age: 85
End: 2025-07-25
Payer: MEDICARE

## 2025-07-25 VITALS
HEART RATE: 78 BPM | RESPIRATION RATE: 18 BRPM | SYSTOLIC BLOOD PRESSURE: 128 MMHG | DIASTOLIC BLOOD PRESSURE: 70 MMHG | WEIGHT: 178 LBS | TEMPERATURE: 97.5 F | OXYGEN SATURATION: 97 % | BODY MASS INDEX: 25.54 KG/M2

## 2025-07-25 PROCEDURE — G0299 HHS/HOSPICE OF RN EA 15 MIN: HCPCS

## 2025-07-28 ENCOUNTER — HOME CARE VISIT (OUTPATIENT)
Dept: HOME HEALTH SERVICES | Facility: HOME HEALTHCARE | Age: 85
End: 2025-07-28
Payer: MEDICARE

## 2025-07-28 PROCEDURE — G0153 HHCP-SVS OF S/L PATH,EA 15MN: HCPCS

## 2025-07-29 ENCOUNTER — CLINICAL SUPPORT (OUTPATIENT)
Dept: CARDIOLOGY CLINIC | Facility: CLINIC | Age: 85
End: 2025-07-29
Payer: MEDICARE

## 2025-07-29 DIAGNOSIS — R00.2 PALPITATION: Primary | ICD-10-CM

## 2025-07-29 DIAGNOSIS — Z95.2 S/P TAVR (TRANSCATHETER AORTIC VALVE REPLACEMENT): ICD-10-CM

## 2025-07-29 DIAGNOSIS — I44.7 LEFT BUNDLE BRANCH BLOCK: ICD-10-CM

## 2025-07-29 DIAGNOSIS — R49.0 HOARSENESS: Primary | ICD-10-CM

## 2025-07-29 PROCEDURE — 99211 OFF/OP EST MAY X REQ PHY/QHP: CPT

## 2025-07-30 ENCOUNTER — HOME CARE VISIT (OUTPATIENT)
Dept: HOME HEALTH SERVICES | Facility: HOME HEALTHCARE | Age: 85
End: 2025-07-30
Payer: MEDICARE

## 2025-07-30 VITALS
WEIGHT: 178 LBS | BODY MASS INDEX: 25.54 KG/M2 | TEMPERATURE: 97.6 F | HEART RATE: 74 BPM | SYSTOLIC BLOOD PRESSURE: 136 MMHG | DIASTOLIC BLOOD PRESSURE: 80 MMHG | RESPIRATION RATE: 18 BRPM | OXYGEN SATURATION: 95 %

## 2025-07-30 PROCEDURE — G0299 HHS/HOSPICE OF RN EA 15 MIN: HCPCS

## 2025-07-31 ENCOUNTER — OFFICE VISIT (OUTPATIENT)
Dept: CARDIOLOGY CLINIC | Facility: CLINIC | Age: 85
End: 2025-07-31
Payer: MEDICARE

## 2025-07-31 VITALS
SYSTOLIC BLOOD PRESSURE: 124 MMHG | HEART RATE: 88 BPM | DIASTOLIC BLOOD PRESSURE: 76 MMHG | HEIGHT: 70 IN | OXYGEN SATURATION: 96 % | WEIGHT: 179 LBS | BODY MASS INDEX: 25.62 KG/M2

## 2025-07-31 DIAGNOSIS — E78.2 MIXED HYPERLIPIDEMIA: ICD-10-CM

## 2025-07-31 DIAGNOSIS — Z86.79 S/P AAA REPAIR: ICD-10-CM

## 2025-07-31 DIAGNOSIS — I77.4 CELIAC ARTERY STENOSIS (HCC): ICD-10-CM

## 2025-07-31 DIAGNOSIS — I10 PRIMARY HYPERTENSION: ICD-10-CM

## 2025-07-31 DIAGNOSIS — I34.2 NONRHEUMATIC MITRAL VALVE STENOSIS: ICD-10-CM

## 2025-07-31 DIAGNOSIS — Z98.890 S/P AAA REPAIR: ICD-10-CM

## 2025-07-31 DIAGNOSIS — I44.7 LEFT BUNDLE BRANCH BLOCK (LBBB): ICD-10-CM

## 2025-07-31 DIAGNOSIS — I77.810 MILD DILATION OF ASCENDING AORTA (HCC): ICD-10-CM

## 2025-07-31 DIAGNOSIS — Z95.2 S/P TAVR (TRANSCATHETER AORTIC VALVE REPLACEMENT): Primary | ICD-10-CM

## 2025-07-31 DIAGNOSIS — N18.31 STAGE 3A CHRONIC KIDNEY DISEASE (HCC): ICD-10-CM

## 2025-07-31 DIAGNOSIS — I25.10 CORONARY ARTERY DISEASE INVOLVING NATIVE CORONARY ARTERY OF NATIVE HEART WITHOUT ANGINA PECTORIS: ICD-10-CM

## 2025-07-31 PROCEDURE — 93000 ELECTROCARDIOGRAM COMPLETE: CPT | Performed by: NURSE PRACTITIONER

## 2025-07-31 PROCEDURE — 99214 OFFICE O/P EST MOD 30 MIN: CPT | Performed by: NURSE PRACTITIONER

## 2025-07-31 RX ORDER — ASPIRIN 81 MG/1
TABLET ORAL
Start: 2025-07-31

## 2025-08-01 ENCOUNTER — HOME CARE VISIT (OUTPATIENT)
Dept: HOME HEALTH SERVICES | Facility: HOME HEALTHCARE | Age: 85
End: 2025-08-01
Payer: MEDICARE

## 2025-08-01 VITALS
HEART RATE: 73 BPM | DIASTOLIC BLOOD PRESSURE: 82 MMHG | BODY MASS INDEX: 25.54 KG/M2 | SYSTOLIC BLOOD PRESSURE: 136 MMHG | TEMPERATURE: 97.6 F | WEIGHT: 178 LBS | RESPIRATION RATE: 18 BRPM | OXYGEN SATURATION: 94 %

## 2025-08-01 PROCEDURE — G0299 HHS/HOSPICE OF RN EA 15 MIN: HCPCS

## 2025-08-12 LAB
CV ZIO BASELINE AVG BPM: 70 BPM
CV ZIO BASELINE BPM HIGH: 169 BPM
CV ZIO BASELINE BPM LOW: 40 BPM
CV ZIO DEVICE ANALYSIS TIME: NORMAL
CV ZIO ECT SVE COUNT: 1656 EPISODES
CV ZIO ECT SVE CPLT COUNT: 38 EPISODES
CV ZIO ECT SVE CPLT FREQ: NORMAL
CV ZIO ECT SVE FREQ: NORMAL
CV ZIO ECT SVE TPLT COUNT: 8 EPISODES
CV ZIO ECT SVE TPLT FREQ: NORMAL
CV ZIO ECT VE COUNT: 359 EPISODES
CV ZIO ECT VE CPLT COUNT: 0 EPISODES
CV ZIO ECT VE CPLT FREQ: 0
CV ZIO ECT VE FREQ: NORMAL
CV ZIO ECT VE TPLT COUNT: 0 EPISODES
CV ZIO ECT VE TPLT FREQ: 0
CV ZIO ECTOPIC SVE COUPLET RAW PERCENT: 0.01 %
CV ZIO ECTOPIC SVE ISOLATED PERCENT: 0.13 %
CV ZIO ECTOPIC SVE TRIPLET RAW PERCENT: 0 %
CV ZIO ECTOPIC VE COUPLET RAW PERCENT: 0 %
CV ZIO ECTOPIC VE ISOLATED PERCENT: 0.03 %
CV ZIO ECTOPIC VE TRIPLET RAW PERCENT: 0 %
CV ZIO ENROLLMENT END: NORMAL
CV ZIO ENROLLMENT START: NORMAL
CV ZIO PATIENT EVENTS DIARIES: 0
CV ZIO PATIENT EVENTS TRIGGERS: 0
CV ZIO PAUSE COUNT: 0
CV ZIO PRESCRIPTION STATUS: NORMAL
CV ZIO SVT AVG BPM: 131 BPM
CV ZIO SVT BPM HIGH: 169 BPM
CV ZIO SVT BPM LOW: 89 BPM
CV ZIO SVT COUNT: 10
CV ZIO SVT F EPI AVG BPM: 142 BPM
CV ZIO SVT F EPI BEATS: 15 BEATS
CV ZIO SVT F EPI BPM HIGH: 169 BPM
CV ZIO SVT F EPI BPM LOW: 98 BPM
CV ZIO SVT F EPI DUR: 6.4 SEC
CV ZIO SVT F EPI END: NORMAL
CV ZIO SVT F EPI START: NORMAL
CV ZIO SVT L EPI AVG BPM: 118 BPM
CV ZIO SVT L EPI BEATS: 16 BEATS
CV ZIO SVT L EPI BPM HIGH: 146 BPM
CV ZIO SVT L EPI BPM LOW: 90 BPM
CV ZIO SVT L EPI DUR: 8.2 SEC
CV ZIO SVT L EPI END: NORMAL
CV ZIO SVT L EPI START: NORMAL
CV ZIO TOTAL  ENROLLMENT PERIOD: NORMAL
CV ZIO VT COUNT: 0

## 2025-08-21 ENCOUNTER — CLINICAL SUPPORT (OUTPATIENT)
Dept: CARDIAC REHAB | Facility: CLINIC | Age: 85
End: 2025-08-21
Attending: PHYSICIAN ASSISTANT
Payer: MEDICARE

## 2025-08-21 ENCOUNTER — TELEPHONE (OUTPATIENT)
Dept: CARDIAC SURGERY | Facility: CLINIC | Age: 85
End: 2025-08-21

## 2025-08-21 DIAGNOSIS — Z95.2 S/P TAVR (TRANSCATHETER AORTIC VALVE REPLACEMENT): Primary | ICD-10-CM

## 2025-08-21 PROCEDURE — 93797 PHYS/QHP OP CAR RHAB WO ECG: CPT

## 2025-08-22 ENCOUNTER — HOSPITAL ENCOUNTER (OUTPATIENT)
Dept: NON INVASIVE DIAGNOSTICS | Facility: HOSPITAL | Age: 85
Discharge: HOME | End: 2025-08-22
Payer: MEDICARE

## 2025-08-22 ENCOUNTER — OFFICE VISIT (OUTPATIENT)
Dept: CARDIAC SURGERY | Facility: CLINIC | Age: 85
End: 2025-08-22
Payer: MEDICARE

## 2025-08-22 ENCOUNTER — HOSPITAL ENCOUNTER (OUTPATIENT)
Dept: NON INVASIVE DIAGNOSTICS | Facility: HOSPITAL | Age: 85
Discharge: HOME | End: 2025-08-22
Attending: PHYSICIAN ASSISTANT
Payer: MEDICARE

## 2025-08-22 VITALS
OXYGEN SATURATION: 96 % | HEIGHT: 70 IN | SYSTOLIC BLOOD PRESSURE: 141 MMHG | TEMPERATURE: 98.5 F | HEART RATE: 68 BPM | DIASTOLIC BLOOD PRESSURE: 64 MMHG | WEIGHT: 178.7 LBS | BODY MASS INDEX: 25.58 KG/M2

## 2025-08-22 VITALS
WEIGHT: 177.91 LBS | BODY MASS INDEX: 25.47 KG/M2 | DIASTOLIC BLOOD PRESSURE: 82 MMHG | HEART RATE: 73 BPM | HEIGHT: 70 IN | SYSTOLIC BLOOD PRESSURE: 136 MMHG

## 2025-08-22 DIAGNOSIS — I35.0 NONRHEUMATIC AORTIC VALVE STENOSIS: ICD-10-CM

## 2025-08-22 DIAGNOSIS — Z95.2 S/P TAVR (TRANSCATHETER AORTIC VALVE REPLACEMENT): Primary | ICD-10-CM

## 2025-08-22 DIAGNOSIS — Z95.2 S/P TAVR (TRANSCATHETER AORTIC VALVE REPLACEMENT): ICD-10-CM

## 2025-08-22 LAB
AORTIC ROOT: 2.7 CM
AORTIC VALVE MEAN VELOCITY: 17 M/S
ASCENDING AORTA: 4.4 CM
ATRIAL RATE: 57 BPM
AV AREA BY CONTINUOUS VTI: 1.4 CM2
AV AREA PEAK VELOCITY: 1.5 CM2
AV LVOT MEAN GRADIENT: 2 MMHG
AV LVOT PEAK GRADIENT: 3 MMHG
AV MEAN PRESS GRAD SYS DOP V1V2: 13 MMHG
AV ORIFICE AREA US: 1.35 CM2
AV PEAK GRADIENT: 23 MMHG
AV VELOCITY RATIO: 0.36
AV VMAX SYS DOP: 2.4 M/S
BSA FOR ECHO PROCEDURE: 1.99 M2
DOP CALC AO VTI: 49.83 CM
DOP CALC LVOT AREA: 3.8 CM2
DOP CALC LVOT CARDIAC INDEX: 2.51 L/MIN/M2
DOP CALC LVOT CARDIAC OUTPUT: 5 L/MIN
DOP CALC LVOT DIAMETER: 2.2 CM
DOP CALC LVOT PEAK VEL VTI: 17.77 CM
DOP CALC LVOT PEAK VEL: 0.92 M/S
DOP CALC LVOT STROKE INDEX: 33.7 ML/M2
DOP CALC LVOT STROKE VOLUME: 67.52
E WAVE DECELERATION TIME: 256 MS
E/A RATIO: 0.44
FRACTIONAL SHORTENING: 34 (ref 28–44)
INTERVENTRICULAR SEPTUM IN DIASTOLE (PARASTERNAL SHORT AXIS VIEW): 1.3 CM
INTERVENTRICULAR SEPTUM: 1.3 CM (ref 0.6–1.1)
LAAS-AP2: 16.4 CM2
LAAS-AP4: 13 CM2
LEFT ATRIUM SIZE: 3.8 CM
LEFT ATRIUM VOLUME (MOD BIPLANE): 38 ML
LEFT ATRIUM VOLUME INDEX (MOD BIPLANE): 19.1 ML/M2
LEFT INTERNAL DIMENSION IN SYSTOLE: 3.3 CM (ref 2.1–4)
LEFT VENTRICLE DIASTOLIC VOLUME (MOD BIPLANE): 78 ML
LEFT VENTRICLE DIASTOLIC VOLUME INDEX (MOD BIPLANE): 39.2 ML/M2
LEFT VENTRICLE SYSTOLIC VOLUME (MOD BIPLANE): 34 ML
LEFT VENTRICLE SYSTOLIC VOLUME INDEX (MOD BIPLANE): 17.1 ML/M2
LEFT VENTRICULAR INTERNAL DIMENSION IN DIASTOLE: 5 CM (ref 3.5–6)
LEFT VENTRICULAR POSTERIOR WALL IN END DIASTOLE: 1.2 CM
LEFT VENTRICULAR STROKE VOLUME: 73 ML
LV EF BIPLANE MOD: 57 %
LV EF US.2D.A4C+ESTIMATED: 59 %
LVSV (TEICH): 73 ML
MV E'TISSUE VEL-SEP: 5 CM/S
MV PEAK A VEL: 1.1 M/S
MV PEAK E VEL: 48 CM/S
MV STENOSIS PRESSURE HALF TIME: 74 MS
MV VALVE AREA P 1/2 METHOD: 2.97
P AXIS: 0 DEGREES
PR INTERVAL: 160 MS
QRS AXIS: -32 DEGREES
QRSD INTERVAL: 136 MS
QT INTERVAL: 476 MS
QTC INTERVAL: 464 MS
RIGHT ATRIUM AREA SYSTOLE A4C: 12.3 CM2
RIGHT VENTRICLE ID DIMENSION: 2.7 CM
SL CV LEFT ATRIUM LENGTH A2C: 5.7 CM
SL CV LV EF: 55
SL CV PED ECHO LEFT VENTRICLE DIASTOLIC VOLUME (MOD BIPLANE) 2D: 116 ML
SL CV PED ECHO LEFT VENTRICLE SYSTOLIC VOLUME (MOD BIPLANE) 2D: 44 ML
T WAVE AXIS: 112 DEGREES
TRICUSPID ANNULAR PLANE SYSTOLIC EXCURSION: 1.4 CM
VENTRICULAR RATE: 57 BPM

## 2025-08-22 PROCEDURE — 99214 OFFICE O/P EST MOD 30 MIN: CPT | Performed by: THORACIC SURGERY (CARDIOTHORACIC VASCULAR SURGERY)

## 2025-08-22 PROCEDURE — 93010 ELECTROCARDIOGRAM REPORT: CPT | Performed by: INTERNAL MEDICINE

## 2025-08-22 PROCEDURE — 93306 TTE W/DOPPLER COMPLETE: CPT

## 2025-08-22 PROCEDURE — 93005 ELECTROCARDIOGRAM TRACING: CPT

## 2025-08-22 PROCEDURE — 93306 TTE W/DOPPLER COMPLETE: CPT | Performed by: INTERNAL MEDICINE

## (undated) DEVICE — Device

## (undated) DEVICE — RADIFOCUS OPTITORQUE ANGIOGRAPHIC CATHETER: Brand: OPTITORQUE

## (undated) DEVICE — TR BAND RADIAL ARTERY COMPRESSION DEVICE: Brand: TR BAND

## (undated) DEVICE — PACK PBDS MAJOR BASIC RF

## (undated) DEVICE — GLIDESHEATH BASIC HYDROPHILIC COATED INTRODUCER SHEATH: Brand: GLIDESHEATH

## (undated) DEVICE — DGW .035 FC J3MM 260CM TEF: Brand: EMERALD

## (undated) DEVICE — DRESSING MEPORE FILM ADHESIVE 4 X 5IN

## (undated) DEVICE — GUIDEWIRE WHOLEY HI TORQUE INTERM MOD J .035 145CM

## (undated) DEVICE — GUIDEWIRE VASC SAFARI2 0.035IN DIA XSMALL 275CML

## (undated) DEVICE — SUCTION CANISTER 3000ML